# Patient Record
Sex: MALE | Race: WHITE | NOT HISPANIC OR LATINO | Employment: OTHER | URBAN - METROPOLITAN AREA
[De-identification: names, ages, dates, MRNs, and addresses within clinical notes are randomized per-mention and may not be internally consistent; named-entity substitution may affect disease eponyms.]

---

## 2017-08-17 PROBLEM — I48.0 PAROXYSMAL ATRIAL FIBRILLATION: Status: ACTIVE | Noted: 2017-08-17

## 2018-12-12 ENCOUNTER — LAB VISIT (OUTPATIENT)
Dept: LAB | Facility: HOSPITAL | Age: 78
End: 2018-12-12
Attending: UROLOGY
Payer: MEDICARE

## 2018-12-12 ENCOUNTER — OFFICE VISIT (OUTPATIENT)
Dept: UROLOGY | Facility: CLINIC | Age: 78
End: 2018-12-12
Attending: UROLOGY
Payer: MEDICARE

## 2018-12-12 VITALS — WEIGHT: 189.38 LBS | BODY MASS INDEX: 28.05 KG/M2 | HEIGHT: 69 IN

## 2018-12-12 DIAGNOSIS — C61 PROSTATE CANCER: Primary | ICD-10-CM

## 2018-12-12 DIAGNOSIS — C61 ADENOCARCINOMA OF PROSTATE: ICD-10-CM

## 2018-12-12 LAB — COMPLEXED PSA SERPL-MCNC: 0.24 NG/ML

## 2018-12-12 PROCEDURE — 99204 OFFICE O/P NEW MOD 45 MIN: CPT | Mod: S$GLB,,, | Performed by: UROLOGY

## 2018-12-12 PROCEDURE — 99999 PR PBB SHADOW E&M-NEW PATIENT-LVL III: CPT | Mod: PBBFAC,,, | Performed by: UROLOGY

## 2018-12-12 PROCEDURE — 84153 ASSAY OF PSA TOTAL: CPT

## 2018-12-12 PROCEDURE — 36415 COLL VENOUS BLD VENIPUNCTURE: CPT

## 2018-12-12 RX ORDER — POTASSIUM CHLORIDE 1500 MG/1
20 TABLET, EXTENDED RELEASE ORAL DAILY
COMMUNITY
Start: 2018-12-11 | End: 2024-01-11

## 2018-12-12 RX ORDER — CLONAZEPAM 2 MG/1
2 TABLET ORAL DAILY PRN
COMMUNITY
Start: 2018-11-07 | End: 2020-07-09

## 2018-12-12 RX ORDER — FERROUS SULFATE 325(65) MG
325 TABLET ORAL
COMMUNITY
End: 2020-07-09

## 2018-12-12 RX ORDER — CHOLECALCIFEROL (VITAMIN D3) 125 MCG
5000 CAPSULE ORAL DAILY
COMMUNITY

## 2018-12-12 NOTE — PROGRESS NOTES
Subjective:       Patient ID: Michael Mclean is a 78 y.o. male.    Chief Complaint: Results (PSA done 2-3mos ago)    HPI  patient who is deaf who has an  with him is here for follow-up of prostate cancer.  Evidently for 5 years ago he underwent XRT for prostate cancer followed by a radical retropubic prostatectomy and was found have a PSA of 29.  He saw Dr. carter free annual all urines and was started on hormone treatment and his PSA which was done in September and [facility] was 0.1.  Patient feels well and voids well but would not like to hold off being treated with hormone deprivation therapy due to side effects including erectile dysfunction.  He does not have back pain or bone pain and he had a negative bone scan and metastatic workup    Past Medical History:   Diagnosis Date    Cancer     prostate    Carotid artery stenosis     left    Coronary artery disease     Encounter for blood transfusion     Hyperlipidemia     Hypertension     Mitral regurgitation     Paroxysmal atrial fibrillation        Past Surgical History:   Procedure Laterality Date    AORTIC VALVE REPLACEMENT      CARDIOVERSION N/A 8/17/2017    Performed by Puma Shrestha MD at [facility]    CORONARY ARTERY BYPASS GRAFT      HERNIA REPAIR      SHOULDER SURGERY      TOE SURGERY         History reviewed. No pertinent family history.    Social History     Socioeconomic History    Marital status:      Spouse name: Not on file    Number of children: Not on file    Years of education: Not on file    Highest education level: Not on file   Social Needs    Financial resource strain: Not on file    Food insecurity - worry: Not on file    Food insecurity - inability: Not on file    Transportation needs - medical: Not on file    Transportation needs - non-medical: Not on file   Occupational History    Not on file   Tobacco Use    Smoking status: Never Smoker    Smokeless tobacco: Never Used   Substance and  Sexual Activity    Alcohol use: Yes     Comment: occasionally    Drug use: Not on file    Sexual activity: Not on file   Other Topics Concern    Not on file   Social History Narrative    Not on file       Allergies:  Patient has no known allergies.    Medications:    Current Outpatient Medications:     alpha lipoic acid 200 mg Cap, Take 200 mg by mouth once daily., Disp: , Rfl:     aspirin (ECOTRIN) 81 MG EC tablet, Take 81 mg by mouth once daily., Disp: , Rfl:     CALCIUM ORAL, Take 750 mg by mouth., Disp: , Rfl:     clonazePAM (KLONOPIN) 2 MG Tab, 2 mg as needed. , Disp: , Rfl:     CYANOCOBALAMIN, VITAMIN B-12, INJ, Inject as directed., Disp: , Rfl:     diltiaZEM (CARDIZEM CD) 240 MG 24 hr capsule, Take 240 mg by mouth once daily., Disp: , Rfl:     docusate sodium (COLACE) 100 MG capsule, Take 100 mg by mouth 2 (two) times daily., Disp: , Rfl:     FENUGREEK SEED-BL.THISTLE-ANIS ORAL, Take 610 mg by mouth once daily., Disp: , Rfl:     ferrous sulfate (FEOSOL) 325 mg (65 mg iron) Tab tablet, Take 325 mg by mouth daily with breakfast., Disp: , Rfl:     fish oil-omega-3 fatty acids 300-1,000 mg capsule, Take 1 g by mouth once daily., Disp: , Rfl:     KLOR-CON M20 20 mEq tablet, , Disp: , Rfl:     magnesium oxide (MAG-OX) 400 mg tablet, Take 400 mg by mouth once daily., Disp: , Rfl:     rivaroxaban (XARELTO) 20 mg Tab, Take 20 mg by mouth daily with dinner or evening meal., Disp: , Rfl:     rosuvastatin (CRESTOR) 20 MG tablet, Take 20 mg by mouth once daily., Disp: , Rfl:     torsemide (DEMADEX) 20 MG Tab, Take 20 mg by mouth once daily., Disp: , Rfl:     TURMERIC ORAL, Take by mouth once daily., Disp: , Rfl:     vitamin D (VITAMIN D3) 1000 units Tab, Take 1,000 Units by mouth., Disp: , Rfl:     niacin 500 MG CpSR, Take 250 mg by mouth every evening., Disp: , Rfl:     Review of Systems   Constitutional: Negative for activity change, appetite change, chills, diaphoresis, fatigue, fever and  unexpected weight change.   HENT: Negative for congestion, dental problem, hearing loss, mouth sores, postnasal drip, rhinorrhea, sinus pressure and trouble swallowing.    Eyes: Negative for pain, discharge and itching.   Respiratory: Negative for apnea, cough, choking, chest tightness, shortness of breath and wheezing.    Cardiovascular: Negative for chest pain, palpitations and leg swelling.   Gastrointestinal: Negative for abdominal distention, abdominal pain, anal bleeding, blood in stool, constipation, diarrhea, nausea, rectal pain and vomiting.   Endocrine: Negative for polydipsia and polyuria.   Genitourinary: Negative for decreased urine volume, difficulty urinating, discharge, dysuria, enuresis, flank pain, frequency, genital sores, hematuria, penile pain, penile swelling, scrotal swelling, testicular pain and urgency.   Musculoskeletal: Negative for arthralgias, back pain and myalgias.   Skin: Negative for color change, rash and wound.   Neurological: Negative for dizziness, syncope, speech difficulty, light-headedness and headaches.   Hematological: Negative for adenopathy. Does not bruise/bleed easily.   Psychiatric/Behavioral: Negative for behavioral problems, confusion, hallucinations and sleep disturbance.       Objective:      Physical Exam   Constitutional: He appears well-developed.   HENT:   Head: Normocephalic.   Cardiovascular: Normal rate.    Pulmonary/Chest: Effort normal.   Abdominal: Soft.   Neurological: He is alert.   Skin: Skin is warm.     Psychiatric: He has a normal mood and affect.       Assessment:       1. psa    2. Adenocarcinoma of prostate        Plan:       Michael was seen today for results.    Diagnoses and all orders for this visit:    psa    Adenocarcinoma of prostate  -     Prostate Specific Antigen, Diagnostic; Future  -     Prostate Specific Antigen, Diagnostic; Future        await results of PSA and if it is elevated then he will need to continue and LHRH agonist and  possible Casodex.  If his PSA is still id 0 then perhaps we can watch him and treat conservatively per his wishes.  Will call him with the results and at the very least PSA comes back 0 that I will see him back in 6 months with another PSA.  If he needs an LHRH agonist will get him in sooner

## 2018-12-13 ENCOUNTER — TELEPHONE (OUTPATIENT)
Dept: UROLOGY | Facility: CLINIC | Age: 78
End: 2018-12-13

## 2018-12-13 NOTE — TELEPHONE ENCOUNTER
----- Message from Adeel Manrique Jr., MD sent at 12/13/2018  6:41 AM CST -----  psa .24  Hold lupron and repeat in 3 months w office visit

## 2018-12-17 NOTE — TELEPHONE ENCOUNTER
Notified patient of results and recommendations via . Patient voiced understanding via ASL (American Sign Language).

## 2019-03-11 ENCOUNTER — LAB VISIT (OUTPATIENT)
Dept: LAB | Facility: HOSPITAL | Age: 79
End: 2019-03-11
Attending: UROLOGY
Payer: MEDICARE

## 2019-03-11 DIAGNOSIS — C61 ADENOCARCINOMA OF PROSTATE: ICD-10-CM

## 2019-03-11 LAB — COMPLEXED PSA SERPL-MCNC: 35.5 NG/ML

## 2019-03-11 PROCEDURE — 36415 COLL VENOUS BLD VENIPUNCTURE: CPT

## 2019-03-11 PROCEDURE — 84153 ASSAY OF PSA TOTAL: CPT

## 2019-03-13 ENCOUNTER — OFFICE VISIT (OUTPATIENT)
Dept: UROLOGY | Facility: CLINIC | Age: 79
End: 2019-03-13
Attending: UROLOGY
Payer: MEDICARE

## 2019-03-13 VITALS
HEART RATE: 83 BPM | BODY MASS INDEX: 28.05 KG/M2 | DIASTOLIC BLOOD PRESSURE: 82 MMHG | SYSTOLIC BLOOD PRESSURE: 134 MMHG | WEIGHT: 189.38 LBS | HEIGHT: 69 IN

## 2019-03-13 DIAGNOSIS — C61 PROSTATE CANCER: Primary | ICD-10-CM

## 2019-03-13 PROCEDURE — 1101F PT FALLS ASSESS-DOCD LE1/YR: CPT | Mod: CPTII,S$GLB,, | Performed by: UROLOGY

## 2019-03-13 PROCEDURE — 99999 PR PBB SHADOW E&M-EST. PATIENT-LVL III: CPT | Mod: PBBFAC,,, | Performed by: UROLOGY

## 2019-03-13 PROCEDURE — 99214 OFFICE O/P EST MOD 30 MIN: CPT | Mod: S$GLB,,, | Performed by: UROLOGY

## 2019-03-13 PROCEDURE — 1101F PR PT FALLS ASSESS DOC 0-1 FALLS W/OUT INJ PAST YR: ICD-10-PCS | Mod: CPTII,S$GLB,, | Performed by: UROLOGY

## 2019-03-13 PROCEDURE — 99999 PR PBB SHADOW E&M-EST. PATIENT-LVL III: ICD-10-PCS | Mod: PBBFAC,,, | Performed by: UROLOGY

## 2019-03-13 PROCEDURE — 99214 PR OFFICE/OUTPT VISIT, EST, LEVL IV, 30-39 MIN: ICD-10-PCS | Mod: S$GLB,,, | Performed by: UROLOGY

## 2019-03-13 RX ORDER — WARFARIN SODIUM 5 MG/1
5 TABLET ORAL DAILY
Status: ON HOLD | COMMUNITY
End: 2019-04-05 | Stop reason: SDUPTHER

## 2019-03-13 NOTE — PROGRESS NOTES
Subjective:       Patient ID: Michael Mclean is a 79 y.o. male.    Chief Complaint: Prostate Cancer    HPI  a PSA for his prostate cancer.  He was late on receiving his Lupron and I was hoping that his PSA would have remained low we could have given him a holiday however his PSA is 35 so we need to restart Lupron as soon as we get approval from his insurance company.  yuri is here follow-up with   patient is voiding well and feels well.  We also discussed with his son and him the possibility of Viagra versus Pap injections.  He has taken Viagra in the past but is not as effective in was interested in Pap.  I explained to him the potential side effects etc and he wants to think about his options    Past Medical History:   Diagnosis Date    Cancer     prostate    Carotid artery stenosis     left    Coronary artery disease     Encounter for blood transfusion     Hyperlipidemia     Hypertension     Mitral regurgitation     Paroxysmal atrial fibrillation        Past Surgical History:   Procedure Laterality Date    AORTIC VALVE REPLACEMENT      CARDIOVERSION N/A 8/17/2017    Performed by [facility]ha [facility] at [facility]    CORONARY ARTERY BYPASS GRAFT      HERNIA REPAIR      SHOULDER SURGERY      TOE SURGERY         History reviewed. No pertinent family history.    Social History     Socioeconomic History    Marital status:      Spouse name: Not on file    Number of children: Not on file    Years of education: Not on file    Highest education level: Not on file   Social Needs    Financial resource strain: Not on file    Food insecurity - worry: Not on file    Food insecurity - inability: Not on file    Transportation needs - medical: Not on file    Transportation needs - non-medical: Not on file   Occupational History    Not on file   Tobacco Use    Smoking status: Never Smoker    Smokeless tobacco: Never Used   Substance and Sexual Activity    Alcohol use: Yes     Comment:  occasionally    Drug use: Not on file    Sexual activity: Not on file   Other Topics Concern    Not on file   Social History Narrative    Not on file       Allergies:  Patient has no known allergies.    Medications:    Current Outpatient Medications:     warfarin (COUMADIN) 5 MG tablet, Take 5 mg by mouth once daily., Disp: , Rfl:     alpha lipoic acid 200 mg Cap, Take 200 mg by mouth once daily., Disp: , Rfl:     aspirin (ECOTRIN) 81 MG EC tablet, Take 81 mg by mouth once daily., Disp: , Rfl:     CALCIUM ORAL, Take 750 mg by mouth., Disp: , Rfl:     clonazePAM (KLONOPIN) 2 MG Tab, 2 mg as needed. , Disp: , Rfl:     CYANOCOBALAMIN, VITAMIN B-12, INJ, Inject as directed., Disp: , Rfl:     diltiaZEM (CARDIZEM CD) 240 MG 24 hr capsule, Take 240 mg by mouth once daily., Disp: , Rfl:     docusate sodium (COLACE) 100 MG capsule, Take 100 mg by mouth 2 (two) times daily., Disp: , Rfl:     FENUGREEK SEED-BL.THISTLE-ANIS ORAL, Take 610 mg by mouth once daily., Disp: , Rfl:     ferrous sulfate (FEOSOL) 325 mg (65 mg iron) Tab tablet, Take 325 mg by mouth daily with breakfast., Disp: , Rfl:     fish oil-omega-3 fatty acids 300-1,000 mg capsule, Take 1 g by mouth once daily., Disp: , Rfl:     KLOR-CON M20 20 mEq tablet, , Disp: , Rfl:     magnesium oxide (MAG-OX) 400 mg tablet, Take 400 mg by mouth once daily., Disp: , Rfl:     niacin 500 MG CpSR, Take 250 mg by mouth every evening., Disp: , Rfl:     rivaroxaban (XARELTO) 20 mg Tab, Take 20 mg by mouth daily with dinner or evening meal., Disp: , Rfl:     rosuvastatin (CRESTOR) 20 MG tablet, Take 20 mg by mouth once daily., Disp: , Rfl:     torsemide (DEMADEX) 20 MG Tab, Take 20 mg by mouth once daily., Disp: , Rfl:     TURMERIC ORAL, Take by mouth once daily., Disp: , Rfl:     vitamin D (VITAMIN D3) 1000 units Tab, Take 1,000 Units by mouth., Disp: , Rfl:     Review of Systems   Constitutional: Negative for activity change, appetite change, chills,  diaphoresis, fatigue, fever and unexpected weight change.   HENT: Negative for congestion, dental problem, hearing loss, mouth sores, postnasal drip, rhinorrhea, sinus pressure and trouble swallowing.    Eyes: Negative for pain, discharge and itching.   Respiratory: Negative for apnea, cough, choking, chest tightness, shortness of breath and wheezing.    Cardiovascular: Negative for chest pain, palpitations and leg swelling.   Gastrointestinal: Negative for abdominal distention, abdominal pain, anal bleeding, blood in stool, constipation, diarrhea, nausea, rectal pain and vomiting.   Endocrine: Negative for polydipsia and polyuria.   Genitourinary: Negative for decreased urine volume, difficulty urinating, discharge, dysuria, enuresis, flank pain, frequency, genital sores, hematuria, penile pain, penile swelling, scrotal swelling, testicular pain and urgency.   Musculoskeletal: Negative for arthralgias, back pain and myalgias.   Skin: Negative for color change, rash and wound.   Neurological: Negative for dizziness, syncope, speech difficulty, light-headedness and headaches.   Hematological: Negative for adenopathy. Does not bruise/bleed easily.   Psychiatric/Behavioral: Negative for behavioral problems, confusion, hallucinations and sleep disturbance.       Objective:      Physical Exam   Constitutional: He appears well-developed.   HENT:   Head: Normocephalic.   Cardiovascular: Normal rate.    Pulmonary/Chest: Effort normal.   Abdominal: Soft.   Genitourinary: Prostate normal.   Neurological: He is alert.   Skin: Skin is warm.     Psychiatric: He has a normal mood and affect.       Assessment:       1. Prostate cancer        Plan:       Michael was seen today for prostate cancer.    Diagnoses and all orders for this visit:    Prostate cancer        will place an order for prior authorization start Lupron 45 mg IM every 6 months and repeat a PSA in 6 months

## 2019-03-15 DIAGNOSIS — C61 PROSTATE CANCER: Primary | ICD-10-CM

## 2019-03-27 ENCOUNTER — OFFICE VISIT (OUTPATIENT)
Dept: UROLOGY | Facility: CLINIC | Age: 79
End: 2019-03-27
Attending: UROLOGY
Payer: MEDICARE

## 2019-03-27 DIAGNOSIS — C61 PROSTATE CANCER: Primary | ICD-10-CM

## 2019-03-27 PROCEDURE — 99499 UNLISTED E&M SERVICE: CPT | Mod: S$GLB,,, | Performed by: UROLOGY

## 2019-03-27 PROCEDURE — 99999 PR PBB SHADOW E&M-EST. PATIENT-LVL I: CPT | Mod: PBBFAC,,, | Performed by: UROLOGY

## 2019-03-27 PROCEDURE — 99999 PR PBB SHADOW E&M-EST. PATIENT-LVL I: ICD-10-PCS | Mod: PBBFAC,,, | Performed by: UROLOGY

## 2019-03-27 PROCEDURE — 99499 NO LOS: ICD-10-PCS | Mod: S$GLB,,, | Performed by: UROLOGY

## 2019-04-02 PROBLEM — I63.412 STROKE DUE TO EMBOLISM OF LEFT MIDDLE CEREBRAL ARTERY: Status: ACTIVE | Noted: 2019-04-02

## 2019-04-03 ENCOUNTER — HOSPITAL ENCOUNTER (INPATIENT)
Facility: HOSPITAL | Age: 79
LOS: 2 days | Discharge: HOME | DRG: 064 | End: 2019-04-05
Attending: EMERGENCY MEDICINE | Admitting: PSYCHIATRY & NEUROLOGY
Payer: MEDICARE

## 2019-04-03 DIAGNOSIS — I48.0 PAROXYSMAL ATRIAL FIBRILLATION: ICD-10-CM

## 2019-04-03 DIAGNOSIS — I63.412 EMBOLIC STROKE INVOLVING LEFT MIDDLE CEREBRAL ARTERY: Primary | ICD-10-CM

## 2019-04-03 DIAGNOSIS — I42.9 CARDIOMYOPATHY, UNSPECIFIED TYPE: ICD-10-CM

## 2019-04-03 DIAGNOSIS — R47.01 APHASIA: ICD-10-CM

## 2019-04-03 DIAGNOSIS — H91.3 DEAF, NONSPEAKING: ICD-10-CM

## 2019-04-03 PROBLEM — I10 ESSENTIAL HYPERTENSION: Status: ACTIVE | Noted: 2019-04-03

## 2019-04-03 LAB
ALBUMIN SERPL BCP-MCNC: 3.5 G/DL (ref 3.5–5.2)
ALP SERPL-CCNC: 56 U/L (ref 55–135)
ALT SERPL W/O P-5'-P-CCNC: 12 U/L (ref 10–44)
ANION GAP SERPL CALC-SCNC: 10 MMOL/L (ref 8–16)
ASCENDING AORTA: 3.57 CM
AST SERPL-CCNC: 23 U/L (ref 10–40)
AV INDEX (PROSTH): 0.26
AV MEAN GRADIENT: 10.33 MMHG
AV PEAK GRADIENT: 13.25 MMHG
AV VALVE AREA: 0.79 CM2
AV VELOCITY RATIO: 0.27
BACTERIA #/AREA URNS AUTO: ABNORMAL /HPF
BASOPHILS # BLD AUTO: 0.04 K/UL (ref 0–0.2)
BASOPHILS NFR BLD: 0.5 % (ref 0–1.9)
BILIRUB SERPL-MCNC: 0.6 MG/DL (ref 0.1–1)
BILIRUB UR QL STRIP: NEGATIVE
BSA FOR ECHO PROCEDURE: 2.02 M2
BUN SERPL-MCNC: 19 MG/DL (ref 8–23)
CALCIUM SERPL-MCNC: 9.1 MG/DL (ref 8.7–10.5)
CHLORIDE SERPL-SCNC: 105 MMOL/L (ref 95–110)
CHOLEST SERPL-MCNC: 124 MG/DL (ref 120–199)
CHOLEST/HDLC SERPL: 3.5 {RATIO} (ref 2–5)
CK MB SERPL-MCNC: 2.2 NG/ML (ref 0.1–6.5)
CK MB SERPL-RTO: 2.7 % (ref 0–5)
CK SERPL-CCNC: 83 U/L (ref 20–200)
CLARITY UR REFRACT.AUTO: CLEAR
CO2 SERPL-SCNC: 24 MMOL/L (ref 23–29)
COLOR UR AUTO: YELLOW
CREAT SERPL-MCNC: 0.9 MG/DL (ref 0.5–1.4)
CV ECHO LV RWT: 0.37 CM
DIFFERENTIAL METHOD: ABNORMAL
DOP CALC AO PEAK VEL: 1.82 M/S
DOP CALC AO VTI: 26.02 CM
DOP CALC LVOT AREA: 3.02 CM2
DOP CALC LVOT DIAMETER: 1.96 CM
DOP CALC LVOT PEAK VEL: 0.49 M/S
DOP CALC LVOT STROKE VOLUME: 20.66 CM3
DOP CALCLVOT PEAK VEL VTI: 6.85 CM
ECHO LV POSTERIOR WALL: 1.15 CM (ref 0.6–1.1)
EOSINOPHIL # BLD AUTO: 0.1 K/UL (ref 0–0.5)
EOSINOPHIL NFR BLD: 0.7 % (ref 0–8)
ERYTHROCYTE [DISTWIDTH] IN BLOOD BY AUTOMATED COUNT: 12.5 % (ref 11.5–14.5)
EST. GFR  (AFRICAN AMERICAN): >60 ML/MIN/1.73 M^2
EST. GFR  (NON AFRICAN AMERICAN): >60 ML/MIN/1.73 M^2
FRACTIONAL SHORTENING: 4 % (ref 28–44)
GLUCOSE SERPL-MCNC: 103 MG/DL (ref 70–110)
GLUCOSE UR QL STRIP: NEGATIVE
HCT VFR BLD AUTO: 43.2 % (ref 40–54)
HDLC SERPL-MCNC: 35 MG/DL (ref 40–75)
HDLC SERPL: 28.2 % (ref 20–50)
HGB BLD-MCNC: 15.1 G/DL (ref 14–18)
HGB UR QL STRIP: ABNORMAL
IMM GRANULOCYTES # BLD AUTO: 0.02 K/UL (ref 0–0.04)
IMM GRANULOCYTES NFR BLD AUTO: 0.3 % (ref 0–0.5)
INR PPP: 2.3 (ref 0.8–1.2)
INTERVENTRICULAR SEPTUM: 1.36 CM (ref 0.6–1.1)
KETONES UR QL STRIP: NEGATIVE
LA MAJOR: 6.71 CM
LA MINOR: 5.9 CM
LA WIDTH: 4.82 CM
LDLC SERPL CALC-MCNC: 74 MG/DL (ref 63–159)
LEFT ATRIUM SIZE: 4.43 CM
LEFT ATRIUM VOLUME INDEX: 57 ML/M2
LEFT ATRIUM VOLUME: 113.96 CM3
LEFT INTERNAL DIMENSION IN SYSTOLE: 5.99 CM (ref 2.1–4)
LEFT VENTRICLE DIASTOLIC VOLUME INDEX: 99.12 ML/M2
LEFT VENTRICLE DIASTOLIC VOLUME: 198.07 ML
LEFT VENTRICLE MASS INDEX: 179 G/M2
LEFT VENTRICLE SYSTOLIC VOLUME INDEX: 89.6 ML/M2
LEFT VENTRICLE SYSTOLIC VOLUME: 179.02 ML
LEFT VENTRICULAR INTERNAL DIMENSION IN DIASTOLE: 6.26 CM (ref 3.5–6)
LEFT VENTRICULAR MASS: 357.7 G
LEUKOCYTE ESTERASE UR QL STRIP: NEGATIVE
LYMPHOCYTES # BLD AUTO: 1.9 K/UL (ref 1–4.8)
LYMPHOCYTES NFR BLD: 25.8 % (ref 18–48)
MCH RBC QN AUTO: 32.5 PG (ref 27–31)
MCHC RBC AUTO-ENTMCNC: 35 G/DL (ref 32–36)
MCV RBC AUTO: 93 FL (ref 82–98)
MICROSCOPIC COMMENT: ABNORMAL
MONOCYTES # BLD AUTO: 0.8 K/UL (ref 0.3–1)
MONOCYTES NFR BLD: 11 % (ref 4–15)
NEUTROPHILS # BLD AUTO: 4.6 K/UL (ref 1.8–7.7)
NEUTROPHILS NFR BLD: 61.7 % (ref 38–73)
NITRITE UR QL STRIP: NEGATIVE
NONHDLC SERPL-MCNC: 89 MG/DL
NRBC BLD-RTO: 0 /100 WBC
PH UR STRIP: 8 [PH] (ref 5–8)
PISA TR MAX VEL: 2.65 M/S
PLATELET # BLD AUTO: 212 K/UL (ref 150–350)
PMV BLD AUTO: 10.8 FL (ref 9.2–12.9)
POTASSIUM SERPL-SCNC: 3.9 MMOL/L (ref 3.5–5.1)
PROT SERPL-MCNC: 6.7 G/DL (ref 6–8.4)
PROT UR QL STRIP: NEGATIVE
PROTHROMBIN TIME: 22 SEC (ref 9–12.5)
RA MAJOR: 5.11 CM
RA PRESSURE: 3 MMHG
RA WIDTH: 4.24 CM
RBC # BLD AUTO: 4.65 M/UL (ref 4.6–6.2)
RBC #/AREA URNS AUTO: 8 /HPF (ref 0–4)
RIGHT VENTRICULAR END-DIASTOLIC DIMENSION: 3.43 CM
SINUS: 3.86 CM
SODIUM SERPL-SCNC: 139 MMOL/L (ref 136–145)
SP GR UR STRIP: >=1.03 (ref 1–1.03)
STJ: 3.38 CM
TDI LATERAL: 0.14
TDI SEPTAL: 0.05
TDI: 0.1
TR MAX PG: 28.09 MMHG
TRIGL SERPL-MCNC: 75 MG/DL (ref 30–150)
TROPONIN I SERPL DL<=0.01 NG/ML-MCNC: 0.02 NG/ML (ref 0–0.03)
TSH SERPL DL<=0.005 MIU/L-ACNC: 3.51 UIU/ML (ref 0.4–4)
TV REST PULMONARY ARTERY PRESSURE: 31 MMHG
URN SPEC COLLECT METH UR: ABNORMAL
WBC # BLD AUTO: 7.45 K/UL (ref 3.9–12.7)
WBC #/AREA URNS AUTO: 1 /HPF (ref 0–5)

## 2019-04-03 PROCEDURE — 51798 US URINE CAPACITY MEASURE: CPT

## 2019-04-03 PROCEDURE — 85610 PROTHROMBIN TIME: CPT

## 2019-04-03 PROCEDURE — 82550 ASSAY OF CK (CPK): CPT

## 2019-04-03 PROCEDURE — 84484 ASSAY OF TROPONIN QUANT: CPT

## 2019-04-03 PROCEDURE — 25000003 PHARM REV CODE 250: Performed by: PHYSICIAN ASSISTANT

## 2019-04-03 PROCEDURE — 63600175 PHARM REV CODE 636 W HCPCS: Performed by: PHYSICIAN ASSISTANT

## 2019-04-03 PROCEDURE — 80053 COMPREHEN METABOLIC PANEL: CPT

## 2019-04-03 PROCEDURE — 25000003 PHARM REV CODE 250: Performed by: NURSE PRACTITIONER

## 2019-04-03 PROCEDURE — 85025 COMPLETE CBC W/AUTO DIFF WBC: CPT

## 2019-04-03 PROCEDURE — 99285 PR EMERGENCY DEPT VISIT,LEVEL V: ICD-10-PCS | Mod: ,,, | Performed by: PHYSICIAN ASSISTANT

## 2019-04-03 PROCEDURE — 51701 INSERT BLADDER CATHETER: CPT

## 2019-04-03 PROCEDURE — 20600001 HC STEP DOWN PRIVATE ROOM

## 2019-04-03 PROCEDURE — 99232 PR SUBSEQUENT HOSPITAL CARE,LEVL II: ICD-10-PCS | Mod: ,,, | Performed by: INTERNAL MEDICINE

## 2019-04-03 PROCEDURE — 96374 THER/PROPH/DIAG INJ IV PUSH: CPT

## 2019-04-03 PROCEDURE — 99223 1ST HOSP IP/OBS HIGH 75: CPT | Mod: AI,GC,, | Performed by: PSYCHIATRY & NEUROLOGY

## 2019-04-03 PROCEDURE — 84443 ASSAY THYROID STIM HORMONE: CPT

## 2019-04-03 PROCEDURE — 92610 EVALUATE SWALLOWING FUNCTION: CPT

## 2019-04-03 PROCEDURE — 81001 URINALYSIS AUTO W/SCOPE: CPT

## 2019-04-03 PROCEDURE — 80061 LIPID PANEL: CPT

## 2019-04-03 PROCEDURE — 99285 EMERGENCY DEPT VISIT HI MDM: CPT | Mod: 25

## 2019-04-03 PROCEDURE — 96375 TX/PRO/DX INJ NEW DRUG ADDON: CPT | Mod: 59

## 2019-04-03 PROCEDURE — 99223 PR INITIAL HOSPITAL CARE,LEVL III: ICD-10-PCS | Mod: AI,GC,, | Performed by: PSYCHIATRY & NEUROLOGY

## 2019-04-03 PROCEDURE — 82553 CREATINE MB FRACTION: CPT

## 2019-04-03 PROCEDURE — 99285 EMERGENCY DEPT VISIT HI MDM: CPT | Mod: ,,, | Performed by: PHYSICIAN ASSISTANT

## 2019-04-03 PROCEDURE — 99232 SBSQ HOSP IP/OBS MODERATE 35: CPT | Mod: ,,, | Performed by: INTERNAL MEDICINE

## 2019-04-03 RX ORDER — WARFARIN SODIUM 5 MG/1
5 TABLET ORAL
Status: DISCONTINUED | OUTPATIENT
Start: 2019-04-08 | End: 2019-04-05 | Stop reason: HOSPADM

## 2019-04-03 RX ORDER — WARFARIN SODIUM 5 MG/1
5 TABLET ORAL
Status: DISCONTINUED | OUTPATIENT
Start: 2019-04-04 | End: 2019-04-05 | Stop reason: HOSPADM

## 2019-04-03 RX ORDER — WARFARIN SODIUM 5 MG/1
5 TABLET ORAL
Status: DISCONTINUED | OUTPATIENT
Start: 2019-04-07 | End: 2019-04-05 | Stop reason: HOSPADM

## 2019-04-03 RX ORDER — METOPROLOL TARTRATE 50 MG/1
50 TABLET ORAL 2 TIMES DAILY
Status: DISCONTINUED | OUTPATIENT
Start: 2019-04-03 | End: 2019-04-05 | Stop reason: HOSPADM

## 2019-04-03 RX ORDER — DIGOXIN 0.25 MG/ML
125 INJECTION INTRAMUSCULAR; INTRAVENOUS ONCE
Status: COMPLETED | OUTPATIENT
Start: 2019-04-03 | End: 2019-04-03

## 2019-04-03 RX ORDER — VALSARTAN 80 MG/1
80 TABLET ORAL DAILY
Status: DISCONTINUED | OUTPATIENT
Start: 2019-04-04 | End: 2019-04-03

## 2019-04-03 RX ORDER — ROSUVASTATIN CALCIUM 20 MG/1
20 TABLET, COATED ORAL NIGHTLY
Status: DISCONTINUED | OUTPATIENT
Start: 2019-04-04 | End: 2019-04-05 | Stop reason: HOSPADM

## 2019-04-03 RX ORDER — WARFARIN 2.5 MG/1
2.5 TABLET ORAL
Status: DISCONTINUED | OUTPATIENT
Start: 2019-04-03 | End: 2019-04-05 | Stop reason: HOSPADM

## 2019-04-03 RX ORDER — DILTIAZEM HYDROCHLORIDE 120 MG/1
240 CAPSULE, COATED, EXTENDED RELEASE ORAL DAILY
Status: DISCONTINUED | OUTPATIENT
Start: 2019-04-03 | End: 2019-04-03

## 2019-04-03 RX ORDER — HYDRALAZINE HYDROCHLORIDE 20 MG/ML
10 INJECTION INTRAMUSCULAR; INTRAVENOUS EVERY 8 HOURS PRN
Status: DISCONTINUED | OUTPATIENT
Start: 2019-04-03 | End: 2019-04-05 | Stop reason: HOSPADM

## 2019-04-03 RX ORDER — WARFARIN SODIUM 5 MG/1
5 TABLET ORAL DAILY
Status: DISCONTINUED | OUTPATIENT
Start: 2019-04-03 | End: 2019-04-03

## 2019-04-03 RX ORDER — DILTIAZEM HYDROCHLORIDE 5 MG/ML
20 INJECTION INTRAVENOUS
Status: DISCONTINUED | OUTPATIENT
Start: 2019-04-03 | End: 2019-04-03

## 2019-04-03 RX ORDER — DOCUSATE SODIUM 100 MG/1
100 CAPSULE, LIQUID FILLED ORAL 2 TIMES DAILY
Status: DISCONTINUED | OUTPATIENT
Start: 2019-04-03 | End: 2019-04-05 | Stop reason: HOSPADM

## 2019-04-03 RX ORDER — DILTIAZEM HYDROCHLORIDE 5 MG/ML
10 INJECTION INTRAVENOUS
Status: COMPLETED | OUTPATIENT
Start: 2019-04-03 | End: 2019-04-03

## 2019-04-03 RX ORDER — ONDANSETRON 8 MG/1
8 TABLET, ORALLY DISINTEGRATING ORAL EVERY 8 HOURS PRN
Status: DISCONTINUED | OUTPATIENT
Start: 2019-04-03 | End: 2019-04-05 | Stop reason: HOSPADM

## 2019-04-03 RX ORDER — LOSARTAN POTASSIUM 50 MG/1
50 TABLET ORAL DAILY
Status: DISCONTINUED | OUTPATIENT
Start: 2019-04-04 | End: 2019-04-05 | Stop reason: HOSPADM

## 2019-04-03 RX ORDER — SODIUM CHLORIDE 0.9 % (FLUSH) 0.9 %
10 SYRINGE (ML) INJECTION
Status: DISCONTINUED | OUTPATIENT
Start: 2019-04-03 | End: 2019-04-05 | Stop reason: HOSPADM

## 2019-04-03 RX ORDER — ONDANSETRON 2 MG/ML
4 INJECTION INTRAMUSCULAR; INTRAVENOUS EVERY 12 HOURS PRN
Status: DISCONTINUED | OUTPATIENT
Start: 2019-04-03 | End: 2019-04-05 | Stop reason: HOSPADM

## 2019-04-03 RX ORDER — ATORVASTATIN CALCIUM 20 MG/1
40 TABLET, FILM COATED ORAL DAILY
Status: DISCONTINUED | OUTPATIENT
Start: 2019-04-03 | End: 2019-04-03

## 2019-04-03 RX ORDER — DILTIAZEM HYDROCHLORIDE 30 MG/1
30 TABLET, FILM COATED ORAL
Status: DISCONTINUED | OUTPATIENT
Start: 2019-04-03 | End: 2019-04-03

## 2019-04-03 RX ADMIN — WARFARIN SODIUM 2.5 MG: 2.5 TABLET ORAL at 05:04

## 2019-04-03 RX ADMIN — ATORVASTATIN CALCIUM 40 MG: 10 TABLET, FILM COATED ORAL at 08:04

## 2019-04-03 RX ADMIN — METOPROLOL TARTRATE 50 MG: 50 TABLET, FILM COATED ORAL at 09:04

## 2019-04-03 RX ADMIN — DOCUSATE SODIUM 100 MG: 100 CAPSULE, LIQUID FILLED ORAL at 08:04

## 2019-04-03 RX ADMIN — DILTIAZEM HYDROCHLORIDE 240 MG: 240 CAPSULE, COATED, EXTENDED RELEASE ORAL at 08:04

## 2019-04-03 RX ADMIN — DILTIAZEM HYDROCHLORIDE 10 MG: 5 INJECTION INTRAVENOUS at 05:04

## 2019-04-03 RX ADMIN — DIGOXIN 125 MCG: 250 INJECTION, SOLUTION INTRAMUSCULAR; INTRAVENOUS at 09:04

## 2019-04-03 RX ADMIN — DOCUSATE SODIUM 100 MG: 100 CAPSULE, LIQUID FILLED ORAL at 09:04

## 2019-04-03 NOTE — ED NOTES
at bedside along with physician and patient's daughter for assessment.  Patient has returned from ordered MRIs

## 2019-04-03 NOTE — ASSESSMENT & PLAN NOTE
-Patient is deaf at baseline and uses sign language to communicate.  [facility]  at the bedside.  -He is currently having difficulty communicating.

## 2019-04-03 NOTE — SUBJECTIVE & OBJECTIVE
Past Medical History:   Diagnosis Date    Cancer     prostate    Carotid artery stenosis     left    Coronary artery disease     Encounter for blood transfusion     Hyperlipidemia     Hypertension     Mitral regurgitation     Paroxysmal atrial fibrillation      Past Surgical History:   Procedure Laterality Date    AORTIC VALVE REPLACEMENT      CARDIOVERSION N/A 8/17/2017    Performed by [facility] at [facility]    CORONARY ARTERY BYPASS GRAFT      HERNIA REPAIR      SHOULDER SURGERY      TOE SURGERY       No family history on file.  Social History     Tobacco Use    Smoking status: Never Smoker    Smokeless tobacco: Never Used   Substance Use Topics    Alcohol use: Yes     Comment: occasionally    Drug use: Not on file     Review of patient's allergies indicates:  No Known Allergies    Medications: I have reviewed the current medication administration record.    Current Facility-Administered Medications on File Prior to Encounter   Medication Dose Route Frequency Provider Last Rate Last Dose    leuprolide (6 month) SyKt 45 mg  45 mg Intramuscular Q6 Months Adeel Manrique Jr., MD        leuprolide (6 month) SyKt 45 mg  45 mg Intramuscular Q6 Months Adeel Manrique Jr., MD         Current Outpatient Medications on File Prior to Encounter   Medication Sig Dispense Refill    alpha lipoic acid 200 mg Cap Take 200 mg by mouth once daily.      aspirin (ECOTRIN) 81 MG EC tablet Take 81 mg by mouth once daily.      CALCIUM ORAL Take 750 mg by mouth.      clonazePAM (KLONOPIN) 2 MG Tab 2 mg as needed.       CYANOCOBALAMIN, VITAMIN B-12, INJ Inject as directed.      diltiaZEM (CARDIZEM CD) 240 MG 24 hr capsule Take 240 mg by mouth once daily.      docusate sodium (COLACE) 100 MG capsule Take 100 mg by mouth 2 (two) times daily.      FENUGREEK SEED-BL.THISTLE-ANIS ORAL Take 610 mg by mouth once daily.      ferrous sulfate (FEOSOL) 325 mg (65 mg iron) Tab tablet Take 325 mg by mouth  daily with breakfast.      fish oil-omega-3 fatty acids 300-1,000 mg capsule Take 1 g by mouth once daily.      KLOR-CON M20 20 mEq tablet       magnesium oxide (MAG-OX) 400 mg tablet Take 400 mg by mouth once daily.      niacin 500 MG CpSR Take 250 mg by mouth every evening.      rivaroxaban (XARELTO) 20 mg Tab Take 20 mg by mouth daily with dinner or evening meal.      rosuvastatin (CRESTOR) 20 MG tablet Take 20 mg by mouth once daily.      torsemide (DEMADEX) 20 MG Tab Take 20 mg by mouth once daily.      TURMERIC ORAL Take by mouth once daily.      vitamin D (VITAMIN D3) 1000 units Tab Take 1,000 Units by mouth.      warfarin (COUMADIN) 5 MG tablet Take 5 mg by mouth once daily.           Review of Systems   Unable to perform ROS: Other   Constitutional: Negative for fever.   HENT: Negative for drooling.    Eyes: Negative for discharge and redness.   Respiratory: Negative for cough and shortness of breath.    Gastrointestinal: Negative for diarrhea and vomiting.   Genitourinary: Negative for hematuria.   Musculoskeletal: Negative for neck stiffness.   Skin: Negative for rash.   Neurological: Positive for facial asymmetry and speech difficulty (baseline). Negative for dizziness and weakness.   Psychiatric/Behavioral: Positive for confusion. Negative for agitation.     Objective:     Vital Signs (Most Recent):  Temp: 98.4 °F (36.9 °C) (04/03/19 0430)  Pulse: 95 (04/03/19 0516)  Resp: 20 (04/03/19 0516)  BP: 106/66 (04/03/19 0516)  SpO2: (!) 94 % (04/03/19 0516)    Vital Signs Range (Last 24H):  Temp:  [98.1 °F (36.7 °C)-98.4 °F (36.9 °C)]   Pulse:  []   Resp:  [16-21]   BP: (106-142)/(64-91)   SpO2:  [93 %-99 %]     Physical Exam   Constitutional: He is oriented to person, place, and time. He appears well-developed and well-nourished. No distress.   HENT:   Head: Normocephalic and atraumatic.   Right Ear: External ear normal.   Left Ear: External ear normal.   Nose: Nose normal.   Mouth/Throat:  Oropharynx is clear and moist. No oropharyngeal exudate.   Eyes: Pupils are equal, round, and reactive to light. Conjunctivae are normal. Right eye exhibits no discharge. Left eye exhibits no discharge. No scleral icterus.   Neck: Normal range of motion. Neck supple. No thyromegaly present.   Cardiovascular: Normal heart sounds and intact distal pulses. An irregularly irregular rhythm present. Tachycardia present.   No murmur heard.  Pulmonary/Chest: Effort normal and breath sounds normal. No respiratory distress.   Abdominal: Soft. Bowel sounds are normal. He exhibits no distension. There is no tenderness.   Musculoskeletal: He exhibits no edema.   Lymphadenopathy:     He has no cervical adenopathy.   Neurological: He is alert and oriented to person, place, and time.   Skin: Skin is warm and dry. He is not diaphoretic.   Psychiatric: He has a normal mood and affect.   Nursing note and vitals reviewed.      Neurological Exam:   LOC: alert  Attention Span: Good   Language: Global aphasia, exam complicated by patient being deaf at baseline and using sign language to communicated.  It is noted he is having difficulty using sign language at this time.  Pupils (CN II, III): PERRL  Facial Sensation (CN V): Normal  Facial Movement (CN VII): Lower facial weakness on the Right  Motor: Arm left  Normal 5/5  Leg left  Normal 5/5  Arm right  Normal 5/5  Leg right Normal 5/5  Sensation: Intact to light touch, temperature and vibration      Laboratory:  CMP: No results for input(s): GLUCOSE, CALCIUM, ALBUMIN, PROT, NA, K, CO2, CL, BUN, CREATININE, ALKPHOS, ALT, AST, BILITOT in the last 24 hours.  CBC: No results for input(s): WBC, RBC, HGB, HCT, PLT, MCV, MCH, MCHC in the last 168 hours.  Lipid Panel: No results for input(s): CHOL, LDLCALC, HDL, TRIG in the last 168 hours.  Coagulation: No results for input(s): PT, INR, APTT in the last 168 hours.  Hgb A1C: No results for input(s): HGBA1C in the last 168 hours.  TSH: No results  for input(s): TSH in the last 168 hours.    Diagnostic Results:      Brain imaging:  CTH 04/02/2019 Performed at OSH.  Negative for acute findings.    Vessel Imaging:  CTA head and neck 04/03/2019 Performed at OSH.  Nondiagnostic secondary to the timing of the bolus.      Cardiac Evaluation:   2D Echo pending

## 2019-04-03 NOTE — ASSESSMENT & PLAN NOTE
79 y.o. male with significant past medical history of Afib on anticoagulation, deaf (uses sign language to communicate) presented to hospital as a transfer from [facility] for evaluation of stroke.  The patient had right facial droop with right upper and lower extremity weakness.  He also had difficulty communicating using sign language, following commands.  No tPA administered due to INR 2.9.  Inconclusive CTA head and neck at the OSH.  No intervention at this time.    Antithrombotics for secondary stroke prevention: Anticoagulants: Warfarin INR adjusted target    Statins for secondary stroke prevention and hyperlipidemia, if present:   Statins: Atorvastatin- 40 mg daily    Aggressive risk factor modification: HTN, HLD, A-Fib     Rehab efforts: PT/OT/SLP to evaluate and treat    Diagnostics ordered/pending: HgbA1C to assess blood glucose levels, TTE to assess cardiac function/status , TSH to assess thyroid function    VTE prophylaxis: None: Reason for No Pharmacological VTE Prophylaxis: Currently on anticoagulation, Mechanical prophylaxis: Place SCDs    BP parameters: Infarct: No intervention, SBP <220

## 2019-04-03 NOTE — ED NOTES
Communicated with MD Serena from stroke team. Informed her about patient's HR. Instructed to give the digoxin injection.

## 2019-04-03 NOTE — CONSULTS
[facility]  Cardiology  Consult Note    Patient Name: Michael Mclean  MRN: [id number]  Admission Date: 4/3/2019  Hospital Length of Stay: 0 days  Code Status: Full Code   Attending Provider: Ezekiel Barrera MD   Consulting Provider: Mu Kiran MD  Primary Care Physician: Josh Alejo Jr, MD  Principal Problem:Embolic stroke involving left middle cerebral artery    Patient information was obtained from patient and ER records.     Inpatient consult to Cardiology  Consult performed by: Mu Kiran MD  Consult ordered by: AMY Novak        Subjective:     Chief Complaint:  AFib     HPI:   79 M with PMH bioprosthetic AVR and 2v CABG (2010), AF, HTN, deafness (uses sign language) admitted with L MCA embolic stroke, cardiology is consulted for AF and cardiomyopathy. The patient was using facetime with his son when he collapsed, when he came back on screen he was aphasic. He had been complaining of dizziness for months and balance problems. He denies any history of chest pain/pressure/tightness/discomfort and does not recall his symptoms prior to his CABG. He denies any prior syncope, palpitations or irregular heart beats. He has noticed lower extremity edema in the past but has not had LOCKHART, orthopnea or PND. When he was admitted with stroke he did not receive tPA because his INR was therapeutic. He has been compliant with his medications and takes diltiazem for rate control. His EF was found to be 15% with no evidence of thrombus.    Past Medical History:   Diagnosis Date    Anticoagulant long-term use     Cancer     prostate    Carotid artery stenosis     left    Coronary artery disease     Encounter for blood transfusion     Hyperlipidemia     Hypertension     Mitral regurgitation     Paroxysmal atrial fibrillation     Stroke        Past Surgical History:   Procedure Laterality Date    AORTIC VALVE REPLACEMENT      CARDIOVERSION N/A 8/17/2017    Performed by Puma  NONA Shrestha MD at [facility]    CORONARY ARTERY BYPASS GRAFT      HERNIA REPAIR      SHOULDER SURGERY      TOE SURGERY         Review of patient's allergies indicates:  No Known Allergies    No current facility-administered medications on file prior to encounter.      Current Outpatient Medications on File Prior to Encounter   Medication Sig    warfarin (COUMADIN) 5 MG tablet Take 5 mg by mouth once daily.    alpha lipoic acid 200 mg Cap Take 200 mg by mouth once daily.    aspirin (ECOTRIN) 81 MG EC tablet Take 81 mg by mouth once daily.    CALCIUM ORAL Take 1,500 mg by mouth once daily.     clonazePAM (KLONOPIN) 2 MG Tab 2 mg as needed.     CYANOCOBALAMIN, VITAMIN B-12, INJ Inject as directed.    diltiaZEM (CARDIZEM CD) 240 MG 24 hr capsule Take 240 mg by mouth once daily.    docusate sodium (COLACE) 100 MG capsule Take 100 mg by mouth 2 (two) times daily.    FENUGREEK SEED-BL.THISTLE-ANIS ORAL Take 610 mg by mouth once daily.    ferrous sulfate (FEOSOL) 325 mg (65 mg iron) Tab tablet Take 325 mg by mouth daily with breakfast.    fish oil-omega-3 fatty acids 300-1,000 mg capsule Take 1 g by mouth once daily.    KLOR-CON M20 20 mEq tablet     magnesium oxide (MAG-OX) 400 mg tablet Take 400 mg by mouth once daily.    niacin 500 MG CpSR Take 250 mg by mouth every evening.    rosuvastatin (CRESTOR) 20 MG tablet Take 20 mg by mouth once daily.    torsemide (DEMADEX) 20 MG Tab Take 20 mg by mouth once daily.    TURMERIC ORAL Take by mouth once daily.    vitamin D (VITAMIN D3) 1000 units Tab Take 1,000 Units by mouth.    [DISCONTINUED] rivaroxaban (XARELTO) 20 mg Tab Take 20 mg by mouth daily with dinner or evening meal.     Family History     Family history is unknown by patient.        Tobacco Use    Smoking status: Never Smoker    Smokeless tobacco: Never Used   Substance and Sexual Activity    Alcohol use: Yes     Comment: occasionally    Drug use: Not Currently    Sexual activity: Not on file      Review of Systems   Constitution: Negative.   HENT: Negative.    Eyes: Negative.    Cardiovascular: Negative.    Respiratory: Negative.    Endocrine: Negative.    Hematologic/Lymphatic: Negative.    Skin: Negative.    Musculoskeletal: Positive for falls.   Gastrointestinal: Negative.    Genitourinary: Negative.    Neurological: Positive for disturbances in coordination, dizziness and loss of balance.   Psychiatric/Behavioral: Negative.      Objective:     Vital Signs (Most Recent):  Temp: 98 °F (36.7 °C) (04/03/19 1130)  Pulse: (!) 130 (04/03/19 1130)  Resp: 16 (04/03/19 1130)  BP: 126/83 (04/03/19 1130)  SpO2: 96 % (04/03/19 1015) Vital Signs (24h Range):  Temp:  [97.8 °F (36.6 °C)-98.4 °F (36.9 °C)] 98 °F (36.7 °C)  Pulse:  [] 130  Resp:  [11-21] 16  SpO2:  [93 %-99 %] 96 %  BP: (106-151)/(64-97) 126/83     Weight: 83.5 kg (184 lb)  Body mass index is 27.17 kg/m².    SpO2: 96 %  O2 Device (Oxygen Therapy): room air    No intake or output data in the 24 hours ending 04/03/19 1816    Lines/Drains/Airways     Peripheral Intravenous Line                 Peripheral IV - Single Lumen 04/02/19 Left Forearm 1 day         Peripheral IV - Single Lumen 04/02/19 Right Forearm 1 day                Physical Exam   Constitutional: He is oriented to person, place, and time. He appears well-developed and well-nourished. No distress.   HENT:   Head: Normocephalic and atraumatic.   Neck: No JVD present.   Cardiovascular: Normal rate, regular rhythm, normal heart sounds and intact distal pulses. Exam reveals no gallop and no friction rub.   No murmur heard.  Sternotomy incision, healed   Pulmonary/Chest: Effort normal and breath sounds normal. No respiratory distress. He has no wheezes. He has no rales.   Abdominal: Soft. Bowel sounds are normal. He exhibits no distension. There is no tenderness.   Musculoskeletal: He exhibits no edema.   Neurological: He is alert and oriented to person, place, and time.   Skin: He is not  diaphoretic.       Significant Labs:     Recent Results (from the past 24 hour(s))   CBC auto differential    Collection Time: 04/03/19  5:02 AM   Result Value Ref Range    WBC 7.45 3.90 - 12.70 K/uL    RBC 4.65 4.60 - 6.20 M/uL    Hemoglobin 15.1 14.0 - 18.0 g/dL    Hematocrit 43.2 40.0 - 54.0 %    MCV 93 82 - 98 fL    MCH 32.5 (H) 27.0 - 31.0 pg    MCHC 35.0 32.0 - 36.0 g/dL    RDW 12.5 11.5 - 14.5 %    Platelets 212 150 - 350 K/uL    MPV 10.8 9.2 - 12.9 fL    Immature Granulocytes 0.3 0.0 - 0.5 %    Gran # (ANC) 4.6 1.8 - 7.7 K/uL    Immature Grans (Abs) 0.02 0.00 - 0.04 K/uL    Lymph # 1.9 1.0 - 4.8 K/uL    Mono # 0.8 0.3 - 1.0 K/uL    Eos # 0.1 0.0 - 0.5 K/uL    Baso # 0.04 0.00 - 0.20 K/uL    nRBC 0 0 /100 WBC    Gran% 61.7 38.0 - 73.0 %    Lymph% 25.8 18.0 - 48.0 %    Mono% 11.0 4.0 - 15.0 %    Eosinophil% 0.7 0.0 - 8.0 %    Basophil% 0.5 0.0 - 1.9 %    Differential Method Automated    Comprehensive metabolic panel    Collection Time: 04/03/19  5:02 AM   Result Value Ref Range    Sodium 139 136 - 145 mmol/L    Potassium 3.9 3.5 - 5.1 mmol/L    Chloride 105 95 - 110 mmol/L    CO2 24 23 - 29 mmol/L    Glucose 103 70 - 110 mg/dL    BUN, Bld 19 8 - 23 mg/dL    Creatinine 0.9 0.5 - 1.4 mg/dL    Calcium 9.1 8.7 - 10.5 mg/dL    Total Protein 6.7 6.0 - 8.4 g/dL    Albumin 3.5 3.5 - 5.2 g/dL    Total Bilirubin 0.6 0.1 - 1.0 mg/dL    Alkaline Phosphatase 56 55 - 135 U/L    AST 23 10 - 40 U/L    ALT 12 10 - 44 U/L    Anion Gap 10 8 - 16 mmol/L    eGFR if African American >60.0 >60 mL/min/1.73 m^2    eGFR if non African American >60.0 >60 mL/min/1.73 m^2   Lipid panel    Collection Time: 04/03/19  5:02 AM   Result Value Ref Range    Cholesterol 124 120 - 199 mg/dL    Triglycerides 75 30 - 150 mg/dL    HDL 35 (L) 40 - 75 mg/dL    LDL Cholesterol 74.0 63.0 - 159.0 mg/dL    HDL/Chol Ratio 28.2 20.0 - 50.0 %    Total Cholesterol/HDL Ratio 3.5 2.0 - 5.0    Non-HDL Cholesterol 89 mg/dL   Troponin I    Collection Time: 04/03/19   5:02 AM   Result Value Ref Range    Troponin I 0.018 0.000 - 0.026 ng/mL   Protime-INR    Collection Time: 04/03/19  5:02 AM   Result Value Ref Range    Prothrombin Time 22.0 (H) 9.0 - 12.5 sec    INR 2.3 (H) 0.8 - 1.2   TSH    Collection Time: 04/03/19  5:02 AM   Result Value Ref Range    TSH 3.512 0.400 - 4.000 uIU/mL   CK-MB    Collection Time: 04/03/19  5:02 AM   Result Value Ref Range    CPK 83 20 - 200 U/L    CPK MB 2.2 0.1 - 6.5 ng/mL    MB% 2.7 0.0 - 5.0 %   Transthoracic echo (TTE) complete (Cupid Only)    Collection Time: 04/03/19  9:05 AM   Result Value Ref Range    Ascending aorta 3.57 cm    STJ 3.38 cm    AV mean gradient 10.33 mmHg    Ao peak mehdi 1.82 m/s    Ao VTI 26.02 cm    IVS 1.36 (A) 0.6 - 1.1 cm    LA size 4.43 cm    Left Atrium Major Axis 6.71 cm    Left Atrium Minor Axis 5.90 cm    LVIDD 6.26 (A) 3.5 - 6.0 cm    LVIDS 5.99 (A) 2.1 - 4.0 cm    LVOT diameter 1.96 cm    LVOT peak VTI 6.85 cm    PW 1.15 (A) 0.6 - 1.1 cm    RA Major Axis 5.11 cm    RA Width 4.24 cm    RVDD 3.43 cm    Sinus 3.86 cm    TR Max Mehdi 2.65 m/s    TDI LATERAL 0.14     TDI SEPTAL 0.05     LA WIDTH 4.82 cm    LV Diastolic Volume 198.07 mL    LV Systolic Volume 179.02 mL    LVOT peak mehdi [id number] m/s    FS 4 %    LA volume 113.96 cm3    LV mass 357.70 g    Left Ventricle Relative Wall Thickness 0.37 cm    AV valve area 0.79 cm2    AV Velocity Ratio 0.27     AV index (prosthetic) 0.26     Mean e' 0.10     LVOT area 3.02 cm2    LVOT stroke volume 20.66 cm3    AV peak gradient 13.25 mmHg    LV Systolic Volume Index 89.6 mL/m2    LV Diastolic Volume Index 99.12 mL/m2    LA Volume Index 57.0 mL/m2    LV Mass Index 179.0 g/m2    Triscuspid Valve Regurgitation Peak Gradient 28.09 mmHg    BSA 2.02 m2    Right Atrial Pressure (from IVC) 3 mmHg    TV rest pulmonary artery pressure 31 mmHg   Urinalysis, Reflex to Urine Culture Urine, Clean Catch    Collection Time: 04/03/19  9:34 AM   Result Value Ref Range    Specimen UA  Urine, Clean Catch     Color, UA Yellow Yellow, Straw, [place]    Appearance, UA Clear Clear    pH, UA 8.0 5.0 - 8.0    Specific Gravity, UA >=1.030 (A) 1.005 - 1.030    Protein, UA Negative Negative    Glucose, UA Negative Negative    Ketones, UA Negative Negative    Bilirubin (UA) Negative Negative    Occult Blood UA 1+ (A) Negative    Nitrite, UA Negative Negative    Leukocytes, UA Negative Negative   Urinalysis Microscopic    Collection Time: 04/03/19  9:34 AM   Result Value Ref Range    RBC, UA 8 (H) 0 - 4 /hpf    WBC, UA 1 0 - 5 /hpf    Bacteria, UA Occasional None-Occ /hpf    Microscopic Comment SEE COMMENT          Significant Imaging:     I have reviewed all pertinent imaging studies from the last 24 hours      Assessment and Plan:     Cardiomyopathy  EF 15%  Recommend starting GDMT with valsartan 80 mg daily  Metoprolol tartrate 50 mg BID  Can also add spironolactone at a later time if BP tolerates  Patient will need outpatient cardiology follow-up (he has established care with a cardiologist)      Paroxysmal atrial fibrillation  CHADS VASc 7  HAS BLED 3  Stop diltiazem as patient has cardiomyopathy with EF 15%  Start metoprolol 50 mg BID for rate control  Patient has high stroke risk, recommend continuing anticoagulation and adding clopidogrel (if acceptable from a bleeding risk standpoint) to reduce stroke risk        VTE Risk Mitigation (From admission, onward)        Ordered     warfarin (COUMADIN) tablet 5 mg  Every Monday 04/03/19 1444     warfarin (COUMADIN) tablet 5 mg  Every Sunday 04/03/19 1444     warfarin (COUMADIN) tablet 5 mg  Every Tues, Thurs, Sat      04/03/19 1444     warfarin (COUMADIN) tablet 2.5 mg  Every Wednesday 04/03/19 1444     Reason for No Pharmacological VTE Prophylaxis  Once      04/03/19 0704     IP VTE HIGH RISK PATIENT  Once      04/03/19 0704     Place sequential compression device  Until discontinued      04/03/19 0704          Thank you for your consult.  Please call with questions or concerns.    Mu Kiran MD  Cardiology   [facility]

## 2019-04-03 NOTE — ED TRIAGE NOTES
Michael Mclean, an 79 y.o. male presents to the ED as a transfer from [facility] secondary to right hemiparesis and confusion.  Patient is deaf at baseline and his son noticed around 2200 yesterday changes in behavior and affect while on a facetime phone call with him.  Daughter is at bedside on arrival and patient was placed on cardiac monitoring, continuous pulse oximetry and BP monitoring.      Review of patient's allergies indicates:  No Known Allergies  Chief Complaint   Patient presents with    Cerebrovascular Accident     Stroke transfer from [facility]. Right hemiparesis and confusion. Pt fell out of the chair while facetiming with son. Pt appeared to be unaware and confused. Was not signing appropriately (pt is deaf/mute)     Past Medical History:   Diagnosis Date    Cancer     prostate    Carotid artery stenosis     left    Coronary artery disease     Encounter for blood transfusion     Hyperlipidemia     Hypertension     Mitral regurgitation     Paroxysmal atrial fibrillation

## 2019-04-03 NOTE — HPI
79 M with PMH bioprosthetic AVR and 2v CABG (2010), AF, HTN, deafness (uses sign language) admitted with L MCA embolic stroke, cardiology is consulted for AF and cardiomyopathy. The patient was using facetime with his son when he collapsed, when he came back on screen he was aphasic. He had been complaining of dizziness for months and balance problems. He denies any history of chest pain/pressure/tightness/discomfort and does not recall his symptoms prior to his CABG. He denies any prior syncope, palpitations or irregular heart beats. He has noticed lower extremity edema in the past but has not had LOCKHART, orthopnea or PND. When he was admitted with stroke he did not receive tPA because his INR was therapeutic. He has been compliant with his medications and takes diltiazem for rate control. His EF was found to be 15% with no evidence of thrombus.

## 2019-04-03 NOTE — ED NOTES
Patient able to take medications without a problem; could take multiple at a time. No trouble swallowing, no coughing observed. Continuous pulse ox, BP, and cardiac monitoring in place. Daughter at bedside. Call bell within reach. Will continue to monitor.

## 2019-04-03 NOTE — PLAN OF CARE
Problem: SLP Goal  Goal: SLP Goal  Speech Language Pathology Goals  Goals expected to be met by 4/10/19    1.  Pt will tolerate regular consistency diet w/ thin liquids w/ adequate oral clearance and no overt signs of aspiration.  2.  Pt will complete Speech Language Cognitive evaluation to determine the need for additional goals.      Outcome: Ongoing (interventions implemented as appropriate)  Clinical Swallow Evaluation completed.  REC:  Pt resume po intake w/ regular consistency diet w/thin liquids, No Straws, oral meds whole 1-2 at a time, aspiration precautions.  Recs reviewed w/ RN. Maude ST per POC.    Grace Espinal, CCC-SLP  4/3/2019

## 2019-04-03 NOTE — ASSESSMENT & PLAN NOTE
EF 15%  Recommend starting GDMT with valsartan 80 mg daily  Metoprolol tartrate 50 mg BID  Can also add spironolactone at a later time if BP tolerates  Patient will need outpatient cardiology follow-up (he has established care with a cardiologist)

## 2019-04-03 NOTE — HPI
Patient is a 79 y.o. male with significant past medical history of Afib on anticoagulation, deaf (uses sign language to communicate) presented to hospital as a transfer from [facility] for evaluation of stroke.  HPI information gathered from a review of the patient's medical record, discussion with the patient's family due to the patient's current condition.  At baseline the patient lives alone and performs his ADLs, handles his personal affairs without assistance.  He was using Facetime with his son when he fell out of a chair for ~3-4 min.  When he reappeared the patient seemed confused, looking around, and not engaged with his computer.  The patient's son went to the patient's home and found him in the same state.  The patient was incontinent of urine but did not have evidence of tongue biting or abrasions to his body.  At that time he was noted to have a right facial droop with right arm and leg weakness.  The patient is on Coumadin for A-fib with INR of 2.9.  He was brought [facility] ED for evaluation.  A CTH was obtained and was negative for acute findings.  A telestroke consult was obtained and performed by Dr. Ivory.  tPA not administered 2/2 elevated INR.  A CTA head was obtained but was inconclusive due to the timing of the bolus.  The patient was transferred to [facility] for higher level of care.  On arrival the patient is awake and alert.  Exam is complicated by the patient being deaf and using sign language.  Official  en route.  Patient's daughter, who uses sign language, is at the bedside.  The patient is confused and is unable to express himself as he normally would through signing per his daughter.  He denies HA, dizziness, weakness, n/v.  The patient seems to have confusion with some simple and all multi-step commands.  MRI brain reveals acute stroke of the left frontal lobe precentral gyrus, left caudate head and putamen.  Will admit to  Vascular Neurology.

## 2019-04-03 NOTE — ASSESSMENT & PLAN NOTE
CHADS VASc 7  HAS BLED 3  Stop diltiazem as patient has cardiomyopathy with EF 15%  Start metoprolol 50 mg BID for rate control  Patient has high stroke risk, recommend continuing anticoagulation and adding clopidogrel (if acceptable from a bleeding risk standpoint) to reduce stroke risk

## 2019-04-03 NOTE — PT/OT/SLP EVAL
Speech Language Pathology Evaluation  Bedside Swallow    Patient Name:  Michael Mclean   MRN:  [id number]  Admitting Diagnosis: Embolic stroke involving left middle cerebral artery    Recommendations:                 General Recommendations:  Dysphagia therapy and Speech language evaluation  Diet recommendations:  Regular, Thin   Aspiration Precautions: 1 bite/sip at a time, Alternating bites/sips, Double swallow with each bite/sip, Feed only when awake/alert, HOB to 90 degrees, Meds whole 1 at a time, Monitor for s/s of aspiration, No straws, Small bites/sips and Standard aspiration precautions   General Precautions: Standard, aspiration, aphasia, deaf, fall  Communication strategies:  provide increased time to answer and  needed    History:     Past Medical History:   Diagnosis Date    Anticoagulant long-term use     Cancer     prostate    Carotid artery stenosis     left    Coronary artery disease     Encounter for blood transfusion     Hyperlipidemia     Hypertension     Mitral regurgitation     Paroxysmal atrial fibrillation     Stroke        Past Surgical History:   Procedure Laterality Date    AORTIC VALVE REPLACEMENT      CARDIOVERSION N/A 8/17/2017    Performed by Puma Shrestha MD at [facility]    CORONARY ARTERY BYPASS GRAFT      HERNIA REPAIR      SHOULDER SURGERY      TOE SURGERY         Social History: Patient lives alone.    Prior Intubation HX:  None this admission    Modified Barium Swallow: none this admission    Chest X-Rays: none this admission    Prior diet: regular w/thin liquids.    Occupation/hobbies/homemaking: Pt's daughter reported pt was a  for many years.    Subjective     Pt was awake and alert in NAD. Evaluation was completed w/  present for communication assistance  Patient goals: none expressed     Pain/Comfort:  · Pain Rating 1: 0/10  · Pain Rating Post-Intervention 1: 0/10    Objective:     Oral  Musculature Evaluation  · Oral Musculature: facial asymmetry present  · Dentition: present and adequate  · Secretion Management: adequate  · Mucosal Quality: adequate  · Mandibular Strength and Mobility: WFL  · Oral Labial Strength and Mobility: impaired retraction  · Lingual Strength and Mobility: impaired anterior elevation, impaired strength, impaired left lateral movement  · Buccal Strength and Mobility: impaired  · Volitional Cough: adequate  · Volitional Swallow: timely  · Voice Prior to PO Intake: clear    Bedside Swallow Eval:   Consistencies Assessed:  · Thin liquids tsp, cup and straw sips  · Puree tsp bites  · Solids self fed bite     Oral Phase:   · Lingual residue: mild solid stasis on right lateral margin of tongue body cleared w/ liquid wash  · Slow oral transit time    Pharyngeal Phase:   · coughing/choking: delayed x1 w/ large cup sip  · Pt tolerated all other trials of thin liquids, puree and solids using strategies of small bites/sips, double swallows per bite/sip and alternating bites/sips w/ no overt signs of airway threat    Compensatory Strategies  · alternating bites/sips  · Multiple swallows   · Small bites/sips    Treatment: Education was provided to pt and daughter re: SLP role, eval results, s/s of aspiration, risk of aspiration, aspiration precautions, diet recs and SLP POC.  Pt and daughter indicated good understanding and agreed w/ established POC.    Assessment:     Michael Mclean is a 79 y.o. male with an SLP diagnosis of Dysphagia.  He presents with mild oropharyngeal dysphagia at this time.    Goals:   Multidisciplinary Problems     SLP Goals        Problem: SLP Goal    Goal Priority Disciplines Outcome   SLP Goal     SLP Ongoing (interventions implemented as appropriate)   Description:  Speech Language Pathology Goals  Goals expected to be met by 4/10/19    1.  Pt will tolerate regular consistency diet w/ thin liquids w/ adequate oral clearance and no overt signs of  aspiration.  2.  Pt will complete Speech Language Cognitive evaluation to determine the need for additional goals.                        Plan:     · Patient to be seen:  4 x/week   · Plan of Care expires:  05/02/19  · Plan of Care reviewed with:  patient, daughter   · SLP Follow-Up:  Yes       Discharge recommendations:  other (see comments)(pending pt progress and pt/ot recs)   Barriers to Discharge:  Inaccessible Home Environment    Time Tracking:     SLP Treatment Date:   04/03/19  Speech Start Time:  1000  Speech Stop Time:  1024     Speech Total Time (min):  24 min    Billable Minutes: Eval Swallow and Oral Function 24    Grace Espinal CCC-SLP  04/03/2019

## 2019-04-03 NOTE — SUBJECTIVE & OBJECTIVE
Past Medical History:   Diagnosis Date    Anticoagulant long-term use     Cancer     prostate    Carotid artery stenosis     left    Coronary artery disease     Encounter for blood transfusion     Hyperlipidemia     Hypertension     Mitral regurgitation     Paroxysmal atrial fibrillation     Stroke        Past Surgical History:   Procedure Laterality Date    AORTIC VALVE REPLACEMENT      CARDIOVERSION N/A 8/17/2017    Performed by Puma Shrestha MD at [facility] ENDO    CORONARY ARTERY BYPASS GRAFT      HERNIA REPAIR      SHOULDER SURGERY      TOE SURGERY         Review of patient's allergies indicates:  No Known Allergies    No current facility-administered medications on file prior to encounter.      Current Outpatient Medications on File Prior to Encounter   Medication Sig    warfarin (COUMADIN) 5 MG tablet Take 5 mg by mouth once daily.    alpha lipoic acid 200 mg Cap Take 200 mg by mouth once daily.    aspirin (ECOTRIN) 81 MG EC tablet Take 81 mg by mouth once daily.    CALCIUM ORAL Take 1,500 mg by mouth once daily.     clonazePAM (KLONOPIN) 2 MG Tab 2 mg as needed.     CYANOCOBALAMIN, VITAMIN B-12, INJ Inject as directed.    diltiaZEM (CARDIZEM CD) 240 MG 24 hr capsule Take 240 mg by mouth once daily.    docusate sodium (COLACE) 100 MG capsule Take 100 mg by mouth 2 (two) times daily.    FENUGREEK SEED-BL.THISTLE-ANIS ORAL Take 610 mg by mouth once daily.    ferrous sulfate (FEOSOL) 325 mg (65 mg iron) Tab tablet Take 325 mg by mouth daily with breakfast.    fish oil-omega-3 fatty acids 300-1,000 mg capsule Take 1 g by mouth once daily.    KLOR-CON M20 20 mEq tablet     magnesium oxide (MAG-OX) 400 mg tablet Take 400 mg by mouth once daily.    niacin 500 MG CpSR Take 250 mg by mouth every evening.    rosuvastatin (CRESTOR) 20 MG tablet Take 20 mg by mouth once daily.    torsemide (DEMADEX) 20 MG Tab Take 20 mg by mouth once daily.    TURMERIC ORAL Take by mouth once daily.     vitamin D (VITAMIN D3) 1000 units Tab Take 1,000 Units by mouth.    [DISCONTINUED] rivaroxaban (XARELTO) 20 mg Tab Take 20 mg by mouth daily with dinner or evening meal.     Family History     Family history is unknown by patient.        Tobacco Use    Smoking status: Never Smoker    Smokeless tobacco: Never Used   Substance and Sexual Activity    Alcohol use: Yes     Comment: occasionally    Drug use: Not Currently    Sexual activity: Not on file     Review of Systems   Constitution: Negative.   HENT: Negative.    Eyes: Negative.    Cardiovascular: Negative.    Respiratory: Negative.    Endocrine: Negative.    Hematologic/Lymphatic: Negative.    Skin: Negative.    Musculoskeletal: Positive for falls.   Gastrointestinal: Negative.    Genitourinary: Negative.    Neurological: Positive for disturbances in coordination, dizziness and loss of balance.   Psychiatric/Behavioral: Negative.      Objective:     Vital Signs (Most Recent):  Temp: 98 °F (36.7 °C) (04/03/19 1130)  Pulse: (!) 130 (04/03/19 1130)  Resp: 16 (04/03/19 1130)  BP: 126/83 (04/03/19 1130)  SpO2: 96 % (04/03/19 1015) Vital Signs (24h Range):  Temp:  [97.8 °F (36.6 °C)-98.4 °F (36.9 °C)] 98 °F (36.7 °C)  Pulse:  [] 130  Resp:  [11-21] 16  SpO2:  [93 %-99 %] 96 %  BP: (106-151)/(64-97) 126/83     Weight: 83.5 kg (184 lb)  Body mass index is 27.17 kg/m².    SpO2: 96 %  O2 Device (Oxygen Therapy): room air    No intake or output data in the 24 hours ending 04/03/19 1816    Lines/Drains/Airways     Peripheral Intravenous Line                 Peripheral IV - Single Lumen 04/02/19 Left Forearm 1 day         Peripheral IV - Single Lumen 04/02/19 Right Forearm 1 day                Physical Exam   Constitutional: He is oriented to person, place, and time. He appears well-developed and well-nourished. No distress.   HENT:   Head: Normocephalic and atraumatic.   Neck: No JVD present.   Cardiovascular: Normal rate, regular rhythm, normal heart sounds and  intact distal pulses. Exam reveals no gallop and no friction rub.   No murmur heard.  Sternotomy incision, healed   Pulmonary/Chest: Effort normal and breath sounds normal. No respiratory distress. He has no wheezes. He has no rales.   Abdominal: Soft. Bowel sounds are normal. He exhibits no distension. There is no tenderness.   Musculoskeletal: He exhibits no edema.   Neurological: He is alert and oriented to person, place, and time.   Skin: He is not diaphoretic.       Significant Labs:     Recent Results (from the past 24 hour(s))   CBC auto differential    Collection Time: 04/03/19  5:02 AM   Result Value Ref Range    WBC 7.45 3.90 - 12.70 K/uL    RBC 4.65 4.60 - 6.20 M/uL    Hemoglobin 15.1 14.0 - 18.0 g/dL    Hematocrit 43.2 40.0 - 54.0 %    MCV 93 82 - 98 fL    MCH 32.5 (H) 27.0 - 31.0 pg    MCHC 35.0 32.0 - 36.0 g/dL    RDW 12.5 11.5 - 14.5 %    Platelets 212 150 - 350 K/uL    MPV 10.8 9.2 - 12.9 fL    Immature Granulocytes 0.3 0.0 - 0.5 %    Gran # (ANC) 4.6 1.8 - 7.7 K/uL    Immature Grans (Abs) 0.02 0.00 - 0.04 K/uL    Lymph # 1.9 1.0 - 4.8 K/uL    Mono # 0.8 0.3 - 1.0 K/uL    Eos # 0.1 0.0 - 0.5 K/uL    Baso # 0.04 0.00 - 0.20 K/uL    nRBC 0 0 /100 WBC    Gran% 61.7 38.0 - 73.0 %    Lymph% 25.8 18.0 - 48.0 %    Mono% 11.0 4.0 - 15.0 %    Eosinophil% 0.7 0.0 - 8.0 %    Basophil% 0.5 0.0 - 1.9 %    Differential Method Automated    Comprehensive metabolic panel    Collection Time: 04/03/19  5:02 AM   Result Value Ref Range    Sodium 139 136 - 145 mmol/L    Potassium 3.9 3.5 - 5.1 mmol/L    Chloride 105 95 - 110 mmol/L    CO2 24 23 - 29 mmol/L    Glucose 103 70 - 110 mg/dL    BUN, Bld 19 8 - 23 mg/dL    Creatinine 0.9 0.5 - 1.4 mg/dL    Calcium 9.1 8.7 - 10.5 mg/dL    Total Protein 6.7 6.0 - 8.4 g/dL    Albumin 3.5 3.5 - 5.2 g/dL    Total Bilirubin 0.6 0.1 - 1.0 mg/dL    Alkaline Phosphatase 56 55 - 135 U/L    AST 23 10 - 40 U/L    ALT 12 10 - 44 U/L    Anion Gap 10 8 - 16 mmol/L    eGFR if African American  >60.0 >60 mL/min/1.73 m^2    eGFR if non African American >60.0 >60 mL/min/1.73 m^2   Lipid panel    Collection Time: 04/03/19  5:02 AM   Result Value Ref Range    Cholesterol 124 120 - 199 mg/dL    Triglycerides 75 30 - 150 mg/dL    HDL 35 (L) 40 - 75 mg/dL    LDL Cholesterol 74.0 63.0 - 159.0 mg/dL    HDL/Chol Ratio 28.2 20.0 - 50.0 %    Total Cholesterol/HDL Ratio 3.5 2.0 - 5.0    Non-HDL Cholesterol 89 mg/dL   Troponin I    Collection Time: 04/03/19  5:02 AM   Result Value Ref Range    Troponin I 0.018 0.000 - 0.026 ng/mL   Protime-INR    Collection Time: 04/03/19  5:02 AM   Result Value Ref Range    Prothrombin Time 22.0 (H) 9.0 - 12.5 sec    INR 2.3 (H) 0.8 - 1.2   TSH    Collection Time: 04/03/19  5:02 AM   Result Value Ref Range    TSH 3.512 0.400 - 4.000 uIU/mL   CK-MB    Collection Time: 04/03/19  5:02 AM   Result Value Ref Range    CPK 83 20 - 200 U/L    CPK MB 2.2 0.1 - 6.5 ng/mL    MB% 2.7 0.0 - 5.0 %   Transthoracic echo (TTE) complete (Cupid Only)    Collection Time: 04/03/19  9:05 AM   Result Value Ref Range    Ascending aorta 3.57 cm    STJ 3.38 cm    AV mean gradient 10.33 mmHg    Ao peak mehdi 1.82 m/s    Ao VTI 26.02 cm    IVS 1.36 (A) 0.6 - 1.1 cm    LA size 4.43 cm    Left Atrium Major Axis 6.71 cm    Left Atrium Minor Axis 5.90 cm    LVIDD 6.26 (A) 3.5 - 6.0 cm    LVIDS 5.99 (A) 2.1 - 4.0 cm    LVOT diameter 1.96 cm    LVOT peak VTI 6.85 cm    PW 1.15 (A) 0.6 - 1.1 cm    RA Major Axis 5.11 cm    RA Width 4.24 cm    RVDD 3.43 cm    Sinus 3.86 cm    TR Max Mehdi 2.65 m/s    TDI LATERAL 0.14     TDI SEPTAL 0.05     LA WIDTH 4.82 cm    LV Diastolic Volume 198.07 mL    LV Systolic Volume 179.02 mL    LVOT peak mehdi 0.652369540052617 m/s    FS 4 %    LA volume 113.96 cm3    LV mass 357.70 g    Left Ventricle Relative Wall Thickness 0.37 cm    AV valve area 0.79 cm2    AV Velocity Ratio 0.27     AV index (prosthetic) 0.26     Mean e' 0.10     LVOT area 3.02 cm2    LVOT stroke volume 20.66 cm3    AV peak  gradient 13.25 mmHg    LV Systolic Volume Index 89.6 mL/m2    LV Diastolic Volume Index 99.12 mL/m2    LA Volume Index 57.0 mL/m2    LV Mass Index 179.0 g/m2    Triscuspid Valve Regurgitation Peak Gradient 28.09 mmHg    BSA 2.02 m2    Right Atrial Pressure (from IVC) 3 mmHg    TV rest pulmonary artery pressure 31 mmHg   Urinalysis, Reflex to Urine Culture Urine, Clean Catch    Collection Time: 04/03/19  9:34 AM   Result Value Ref Range    Specimen UA Urine, Clean Catch     Color, UA Yellow Yellow, Straw, [place]    Appearance, UA Clear Clear    pH, UA 8.0 5.0 - 8.0    Specific Gravity, UA >=1.030 (A) 1.005 - 1.030    Protein, UA Negative Negative    Glucose, UA Negative Negative    Ketones, UA Negative Negative    Bilirubin (UA) Negative Negative    Occult Blood UA 1+ (A) Negative    Nitrite, UA Negative Negative    Leukocytes, UA Negative Negative   Urinalysis Microscopic    Collection Time: 04/03/19  9:34 AM   Result Value Ref Range    RBC, UA 8 (H) 0 - 4 /hpf    WBC, UA 1 0 - 5 /hpf    Bacteria, UA Occasional None-Occ /hpf    Microscopic Comment SEE COMMENT          Significant Imaging:     I have reviewed all pertinent imaging studies from the last 24 hours

## 2019-04-03 NOTE — CONSULTS
Inpatient consult to Physical Medicine Rehab  Consult performed by: Tamika Langley NP  Consult ordered by: Silvia Schwartz, LILLY, NP  Reason for consult: Assess rehab needs      Reviewed patient history and current admission.  Rehab team following.  Full consult to follow.    SYLVIA Street, FNP-C  Physical Medicine & Rehabilitation   04/03/2019  Spectralink: [id number]

## 2019-04-03 NOTE — ASSESSMENT & PLAN NOTE
-Stroke risk factor.  Patient with history of A-fib.  Afib w/RVR at OSH w/HR 100s.  On arrival to this facility HR 130s-150s.  -Patient given Diltiazem IV in the ED  -Continue home Diltiazem and Coumadin  -Monitor

## 2019-04-03 NOTE — H&P
[facility]  Vascular Neurology  Comprehensive Stroke Center  History & Physical    Consults  Assessment/Plan:     * Embolic stroke involving left middle cerebral artery  79 y.o. male with significant past medical history of Afib on anticoagulation, deaf (uses sign language to communicate) presented to hospital as a transfer from [facility] for evaluation of stroke.  The patient had right facial droop with right upper and lower extremity weakness.  He also had difficulty communicating using sign language, following commands.  No tPA administered due to INR 2.9.  Inconclusive CTA head and neck at the OSH.  No intervention at this time.    Antithrombotics for secondary stroke prevention: Anticoagulants: Warfarin INR adjusted target    Statins for secondary stroke prevention and hyperlipidemia, if present:   Statins: Atorvastatin- 40 mg daily    Aggressive risk factor modification: HTN, HLD, A-Fib     Rehab efforts: PT/OT/SLP to evaluate and treat    Diagnostics ordered/pending: HgbA1C to assess blood glucose levels, TTE to assess cardiac function/status , TSH to assess thyroid function    VTE prophylaxis: None: Reason for No Pharmacological VTE Prophylaxis: Currently on anticoagulation, Mechanical prophylaxis: Place SCDs    BP parameters: Infarct: No intervention, SBP <220        Paroxysmal atrial fibrillation  -Stroke risk factor.  Patient with history of A-fib.  Afib w/RVR at OSH w/HR 100s.  On arrival to this facility HR 130s-150s.  -Patient given Diltiazem IV in the ED  -Continue home Diltiazem and Coumadin  -Monitor    Essential hypertension  -Stroke risk factor.  SBP<220.  -Resume home meds when appropriate.    Deaf, nonspeaking  -Patient is deaf at baseline and uses sign language to communicate.  Hospital  at the bedside.  -He is currently having difficulty communicating.          STROKE DOCUMENTATION     Acute Stroke Times   Last Known Normal Date:  04/02/19  Last Known Normal Time: 2200  Symptom Onset Date: 04/02/19  Symptom Onset Time: 2200  Stroke Team Called Date: 04/02/19  Stroke Team Called Time: 2310  Stroke Team Arrival Date: 04/02/19  Stroke Team Arrival Time: 2313  CT Interpretation Time: 2313  Decision to Treat Time for Alteplase: 2313(no tPA due to elevated INR)    NIH Scale:  Interval: baseline  1b. LOC Questions: 2-->Answers neither question correctly  1c. LOC Commands: 1-->Performs one task correctly  4. Facial Palsy: 1-->Minor paralysis (flattened nasolabial fold, asymmetry on smiling)  7. Limb Ataxia: 0-->Absent  9. Best Language: 3-->Mute, global aphasia, no usable speech or auditory comprehension(patient is deaf and uses sign language to communicate at baseline)  Total (NIH Stroke Scale): 9     Modified Palmer Score: 0  [place] Coma Scale:14   ABCD2 Score:    [id number] Score:5  HAS -BLED Score:3  ICH Score:   Hunt & Harris Classification:      Thrombolysis Candidate? No, PT > 15 second or INR > 1.7       Interventional Revascularization Candidate?   Is the patient eligible for mechanical endovascular reperfusion (BENITO)?  No; No large vessel occlusion    Hemorrhagic change of an Ischemic Stroke: Does this patient have an ischemic stroke with hemorrhagic changes? No         Subjective:     History of Present Illness:  Patient is a 79 y.o. male with significant past medical history of Afib on anticoagulation, deaf (uses sign language to communicate) presented to hospital as a transfer from [facility] for evaluation of stroke.  HPI information gathered from a review of the patient's medical record, discussion with the patient's family due to the patient's current condition.  At baseline the patient lives alone and performs his ADLs, handles his personal affairs without assistance.  He was using Facetime with his son when he fell out of a chair for ~3-4 min.  When he reappeared the patient seemed confused, looking around, and  not engaged with his computer.  The patient's son went to the patient's home and found him in the same state.  The patient was incontinent of urine but did not have evidence of tongue biting or abrasions to his body.  At that time he was noted to have a right facial droop with right arm and leg weakness.  The patient is on Coumadin for A-fib with INR of 2.9.  He was brought to the [facility] ED for evaluation.  A CTH was obtained and was negative for acute findings.  A telestroke consult was obtained and performed by Dr. Ivory.  tPA not administered 2/2 elevated INR.  A CTA head was obtained but was inconclusive due to the timing of the bolus.  The patient was transferred to [facility] for higher level of care.  On arrival the patient is awake and alert.  Exam is complicated by the patient being deaf and using sign language.  Official  en route.  Patient's daughter, who uses sign language, is at the bedside.  The patient is confused and is unable to express himself as he normally would through signing per his daughter.  He denies HA, dizziness, weakness, n/v.  The patient seems to have confusion with some simple and all multi-step commands.  MRI brain reveals acute stroke of the left frontal lobe precentral gyrus, left caudate head and putamen.  Will admit to Vascular Neurology.            Past Medical History:   Diagnosis Date    Cancer     prostate    Carotid artery stenosis     left    Coronary artery disease     Encounter for blood transfusion     Hyperlipidemia     Hypertension     Mitral regurgitation     Paroxysmal atrial fibrillation      Past Surgical History:   Procedure Laterality Date    AORTIC VALVE REPLACEMENT      CARDIOVERSION N/A 8/17/2017    Performed by [facility]restha [facility] at [facility] ENDO    CORONARY ARTERY BYPASS GRAFT      HERNIA REPAIR      SHOULDER SURGERY      TOE SURGERY       No family history on file.  Social History     Tobacco Use    Smoking  status: Never Smoker    Smokeless tobacco: Never Used   Substance Use Topics    Alcohol use: Yes     Comment: occasionally    Drug use: Not on file     Review of patient's allergies indicates:  No Known Allergies    Medications: I have reviewed the current medication administration record.    Current Facility-Administered Medications on File Prior to Encounter   Medication Dose Route Frequency Provider Last Rate Last Dose    leuprolide (6 month) SyKt 45 mg  45 mg Intramuscular Q6 Months Adeel Manrique Jr., MD        leuprolide (6 month) SyKt 45 mg  45 mg Intramuscular Q6 Months Adeel Manrique Jr., MD         Current Outpatient Medications on File Prior to Encounter   Medication Sig Dispense Refill    alpha lipoic acid 200 mg Cap Take 200 mg by mouth once daily.      aspirin (ECOTRIN) 81 MG EC tablet Take 81 mg by mouth once daily.      CALCIUM ORAL Take 750 mg by mouth.      clonazePAM (KLONOPIN) 2 MG Tab 2 mg as needed.       CYANOCOBALAMIN, VITAMIN B-12, INJ Inject as directed.      diltiaZEM (CARDIZEM CD) 240 MG 24 hr capsule Take 240 mg by mouth once daily.      docusate sodium (COLACE) 100 MG capsule Take 100 mg by mouth 2 (two) times daily.      FENUGREEK SEED-BL.THISTLE-ANIS ORAL Take 610 mg by mouth once daily.      ferrous sulfate (FEOSOL) 325 mg (65 mg iron) Tab tablet Take 325 mg by mouth daily with breakfast.      fish oil-omega-3 fatty acids 300-1,000 mg capsule Take 1 g by mouth once daily.      KLOR-CON M20 20 mEq tablet       magnesium oxide (MAG-OX) 400 mg tablet Take 400 mg by mouth once daily.      niacin 500 MG CpSR Take 250 mg by mouth every evening.      rivaroxaban (XARELTO) 20 mg Tab Take 20 mg by mouth daily with dinner or evening meal.      rosuvastatin (CRESTOR) 20 MG tablet Take 20 mg by mouth once daily.      torsemide (DEMADEX) 20 MG Tab Take 20 mg by mouth once daily.      TURMERIC ORAL Take by mouth once daily.      vitamin D (VITAMIN D3) 1000 units Tab Take  1,000 Units by mouth.      warfarin (COUMADIN) 5 MG tablet Take 5 mg by mouth once daily.           Review of Systems   Unable to perform ROS: Other   Constitutional: Negative for fever.   HENT: Negative for drooling.    Eyes: Negative for discharge and redness.   Respiratory: Negative for cough and shortness of breath.    Gastrointestinal: Negative for diarrhea and vomiting.   Genitourinary: Negative for hematuria.   Musculoskeletal: Negative for neck stiffness.   Skin: Negative for rash.   Neurological: Positive for facial asymmetry and speech difficulty (baseline). Negative for dizziness and weakness.   Psychiatric/Behavioral: Positive for confusion. Negative for agitation.     Objective:     Vital Signs (Most Recent):  Temp: 98.4 °F (36.9 °C) (04/03/19 0430)  Pulse: 95 (04/03/19 0516)  Resp: 20 (04/03/19 0516)  BP: 106/66 (04/03/19 0516)  SpO2: (!) 94 % (04/03/19 0516)    Vital Signs Range (Last 24H):  Temp:  [98.1 °F (36.7 °C)-98.4 °F (36.9 °C)]   Pulse:  []   Resp:  [16-21]   BP: (106-142)/(64-91)   SpO2:  [93 %-99 %]     Physical Exam   Constitutional: He is oriented to person, place, and time. He appears well-developed and well-nourished. No distress.   HENT:   Head: Normocephalic and atraumatic.   Right Ear: External ear normal.   Left Ear: External ear normal.   Nose: Nose normal.   Mouth/Throat: Oropharynx is clear and moist. No oropharyngeal exudate.   Eyes: Pupils are equal, round, and reactive to light. Conjunctivae are normal. Right eye exhibits no discharge. Left eye exhibits no discharge. No scleral icterus.   Neck: Normal range of motion. Neck supple. No thyromegaly present.   Cardiovascular: Normal heart sounds and intact distal pulses. An irregularly irregular rhythm present. Tachycardia present.   No murmur heard.  Pulmonary/Chest: Effort normal and breath sounds normal. No respiratory distress.   Abdominal: Soft. Bowel sounds are normal. He exhibits no distension. There is no tenderness.    Musculoskeletal: He exhibits no edema.   Lymphadenopathy:     He has no cervical adenopathy.   Neurological: He is alert and oriented to person, place, and time.   Skin: Skin is warm and dry. He is not diaphoretic.   Psychiatric: He has a normal mood and affect.   Nursing note and vitals reviewed.      Neurological Exam:   LOC: alert  Attention Span: Good   Language: Global aphasia, exam complicated by patient being deaf at baseline and using sign language to communicated.  It is noted he is having difficulty using sign language at this time.  Pupils (CN II, III): PERRL  Facial Sensation (CN V): Normal  Facial Movement (CN VII): Lower facial weakness on the Right  Motor: Arm left  Normal 5/5  Leg left  Normal 5/5  Arm right  Normal 5/5  Leg right Normal 5/5  Sensation: Intact to light touch, temperature and vibration      Laboratory:  CMP: No results for input(s): GLUCOSE, CALCIUM, ALBUMIN, PROT, NA, K, CO2, CL, BUN, CREATININE, ALKPHOS, ALT, AST, BILITOT in the last 24 hours.  CBC: No results for input(s): WBC, RBC, HGB, HCT, PLT, MCV, MCH, MCHC in the last 168 hours.  Lipid Panel: No results for input(s): CHOL, LDLCALC, HDL, TRIG in the last 168 hours.  Coagulation: No results for input(s): PT, INR, APTT in the last 168 hours.  Hgb A1C: No results for input(s): HGBA1C in the last 168 hours.  TSH: No results for input(s): TSH in the last 168 hours.    Diagnostic Results:      Brain imaging:  CTH 04/02/2019 Performed at OSH.  Negative for acute findings.    Vessel Imaging:  CTA head and neck 04/03/2019 Performed at OSH.  Nondiagnostic secondary to the timing of the bolus.      Cardiac Evaluation:   2D Echo pending        Silvia Schwartz, LILLY, NP  [facility]  Department of Vascular Neurology   [facility]

## 2019-04-03 NOTE — ED NOTES
Attempted to call and give report. Nurse was in patient room and requested I call back in 10 minutes.

## 2019-04-04 PROBLEM — R47.01 APHASIA: Status: ACTIVE | Noted: 2019-04-04

## 2019-04-04 LAB
ALBUMIN SERPL BCP-MCNC: 3 G/DL (ref 3.5–5.2)
ALP SERPL-CCNC: 50 U/L (ref 55–135)
ALT SERPL W/O P-5'-P-CCNC: 11 U/L (ref 10–44)
ANION GAP SERPL CALC-SCNC: 7 MMOL/L (ref 8–16)
APTT BLDCRRT: 31.3 SEC (ref 21–32)
AST SERPL-CCNC: 21 U/L (ref 10–40)
BASOPHILS # BLD AUTO: 0.05 K/UL (ref 0–0.2)
BASOPHILS NFR BLD: 0.8 % (ref 0–1.9)
BILIRUB SERPL-MCNC: 0.7 MG/DL (ref 0.1–1)
BUN SERPL-MCNC: 13 MG/DL (ref 8–23)
CALCIUM SERPL-MCNC: 8.9 MG/DL (ref 8.7–10.5)
CHLORIDE SERPL-SCNC: 108 MMOL/L (ref 95–110)
CO2 SERPL-SCNC: 25 MMOL/L (ref 23–29)
CREAT SERPL-MCNC: 0.8 MG/DL (ref 0.5–1.4)
DIFFERENTIAL METHOD: ABNORMAL
EOSINOPHIL # BLD AUTO: 0.2 K/UL (ref 0–0.5)
EOSINOPHIL NFR BLD: 2.6 % (ref 0–8)
ERYTHROCYTE [DISTWIDTH] IN BLOOD BY AUTOMATED COUNT: 12.7 % (ref 11.5–14.5)
EST. GFR  (AFRICAN AMERICAN): >60 ML/MIN/1.73 M^2
EST. GFR  (NON AFRICAN AMERICAN): >60 ML/MIN/1.73 M^2
GLUCOSE SERPL-MCNC: 84 MG/DL (ref 70–110)
HCT VFR BLD AUTO: 42 % (ref 40–54)
HGB BLD-MCNC: 13.8 G/DL (ref 14–18)
IMM GRANULOCYTES # BLD AUTO: 0.03 K/UL (ref 0–0.04)
IMM GRANULOCYTES NFR BLD AUTO: 0.5 % (ref 0–0.5)
INR PPP: 2.2 (ref 0.8–1.2)
LYMPHOCYTES # BLD AUTO: 2 K/UL (ref 1–4.8)
LYMPHOCYTES NFR BLD: 30 % (ref 18–48)
MAGNESIUM SERPL-MCNC: 1.9 MG/DL (ref 1.6–2.6)
MCH RBC QN AUTO: 31.9 PG (ref 27–31)
MCHC RBC AUTO-ENTMCNC: 32.9 G/DL (ref 32–36)
MCV RBC AUTO: 97 FL (ref 82–98)
MONOCYTES # BLD AUTO: 0.9 K/UL (ref 0.3–1)
MONOCYTES NFR BLD: 13.5 % (ref 4–15)
NEUTROPHILS # BLD AUTO: 3.5 K/UL (ref 1.8–7.7)
NEUTROPHILS NFR BLD: 52.6 % (ref 38–73)
NRBC BLD-RTO: 0 /100 WBC
PHOSPHATE SERPL-MCNC: 2.8 MG/DL (ref 2.7–4.5)
PLATELET # BLD AUTO: 190 K/UL (ref 150–350)
PMV BLD AUTO: 10.6 FL (ref 9.2–12.9)
POTASSIUM SERPL-SCNC: 4.2 MMOL/L (ref 3.5–5.1)
PROT SERPL-MCNC: 5.8 G/DL (ref 6–8.4)
PROTHROMBIN TIME: 21.2 SEC (ref 9–12.5)
RBC # BLD AUTO: 4.33 M/UL (ref 4.6–6.2)
SODIUM SERPL-SCNC: 140 MMOL/L (ref 136–145)
WBC # BLD AUTO: 6.57 K/UL (ref 3.9–12.7)

## 2019-04-04 PROCEDURE — 97165 OT EVAL LOW COMPLEX 30 MIN: CPT

## 2019-04-04 PROCEDURE — 25000003 PHARM REV CODE 250: Performed by: NURSE PRACTITIONER

## 2019-04-04 PROCEDURE — 83735 ASSAY OF MAGNESIUM: CPT

## 2019-04-04 PROCEDURE — 99222 PR INITIAL HOSPITAL CARE,LEVL II: ICD-10-PCS | Mod: ,,, | Performed by: NURSE PRACTITIONER

## 2019-04-04 PROCEDURE — 20600001 HC STEP DOWN PRIVATE ROOM

## 2019-04-04 PROCEDURE — 99233 SBSQ HOSP IP/OBS HIGH 50: CPT | Mod: GC,,, | Performed by: PSYCHIATRY & NEUROLOGY

## 2019-04-04 PROCEDURE — 97161 PT EVAL LOW COMPLEX 20 MIN: CPT

## 2019-04-04 PROCEDURE — 84100 ASSAY OF PHOSPHORUS: CPT

## 2019-04-04 PROCEDURE — 25000003 PHARM REV CODE 250: Performed by: PHYSICIAN ASSISTANT

## 2019-04-04 PROCEDURE — 85025 COMPLETE CBC W/AUTO DIFF WBC: CPT

## 2019-04-04 PROCEDURE — 36415 COLL VENOUS BLD VENIPUNCTURE: CPT

## 2019-04-04 PROCEDURE — 99222 1ST HOSP IP/OBS MODERATE 55: CPT | Mod: ,,, | Performed by: NURSE PRACTITIONER

## 2019-04-04 PROCEDURE — 80053 COMPREHEN METABOLIC PANEL: CPT

## 2019-04-04 PROCEDURE — 92526 ORAL FUNCTION THERAPY: CPT

## 2019-04-04 PROCEDURE — 85730 THROMBOPLASTIN TIME PARTIAL: CPT

## 2019-04-04 PROCEDURE — 92523 SPEECH SOUND LANG COMPREHEN: CPT

## 2019-04-04 PROCEDURE — 99233 PR SUBSEQUENT HOSPITAL CARE,LEVL III: ICD-10-PCS | Mod: GC,,, | Performed by: PSYCHIATRY & NEUROLOGY

## 2019-04-04 PROCEDURE — 85610 PROTHROMBIN TIME: CPT

## 2019-04-04 RX ADMIN — METOPROLOL TARTRATE 50 MG: 50 TABLET, FILM COATED ORAL at 09:04

## 2019-04-04 RX ADMIN — DOCUSATE SODIUM 100 MG: 100 CAPSULE, LIQUID FILLED ORAL at 09:04

## 2019-04-04 RX ADMIN — LOSARTAN POTASSIUM 50 MG: 50 TABLET, FILM COATED ORAL at 09:04

## 2019-04-04 RX ADMIN — ROSUVASTATIN CALCIUM 20 MG: 20 TABLET, FILM COATED ORAL at 08:04

## 2019-04-04 RX ADMIN — METOPROLOL TARTRATE 50 MG: 50 TABLET, FILM COATED ORAL at 08:04

## 2019-04-04 RX ADMIN — WARFARIN SODIUM 5 MG: 5 TABLET ORAL at 06:04

## 2019-04-04 RX ADMIN — DOCUSATE SODIUM 100 MG: 100 CAPSULE, LIQUID FILLED ORAL at 08:04

## 2019-04-04 NOTE — PLAN OF CARE
Please continue current medications for cardiomyopathy and AF rate control (metoprolol 50 mg BID, losartan 50 mg daily). Spironolactone can be added at a later time if BP permits. Also recommend continuing anticoagulation with target INR 2-3 with warfarin and adding antiplatelet therapy, such as plavix 75 mg daily to reduce future stroke risk, although this would increase bleeding risk.    Cardiology will sign off, please call with any questions or concerns.    Mu Kiran MD  PGY-IV

## 2019-04-04 NOTE — SUBJECTIVE & OBJECTIVE
Neurologic Chief Complaint: L MCA stroke     Subjective:     Interval History: Patient is seen for follow-up neurological assessment and treatment recommendations:   patient seen and examined with family at bedside and with gabrielle  for american sign language. The patient still with aphasia, noting some peserverations, expressive worse than receptive. Also with some issues with dexterity of the right hand. Appreciate cardiology recs     HPI, Past Medical, Family, and Social History remains the same as documented in the initial encounter.     Review of Systems   Constitutional: Negative for fever.   Eyes: Negative for visual disturbance.   Neurological: Positive for facial asymmetry. Negative for dizziness.        (+) aphasia     Scheduled Meds:   docusate sodium  100 mg Oral BID    losartan  50 mg Oral Daily    metoprolol tartrate  50 mg Oral BID    rosuvastatin  20 mg Oral QHS    warfarin  2.5 mg Oral Every Wed    warfarin  5 mg Oral Every Tues, Thurs, Sat    [START ON 4/8/2019] warfarin  5 mg Oral Every Mon    [START ON 4/7/2019] warfarin  5 mg Oral Every Sun     Continuous Infusions:   sodium chloride 0.9%       PRN Meds:hydrALAZINE, ondansetron, ondansetron, sodium chloride 0.9%, sodium chloride 0.9%    Objective:     Vital Signs (Most Recent):  Temp: 97 °F (36.1 °C) (04/04/19 1541)  Pulse: 82 (04/04/19 1541)  Resp: 18 (04/04/19 1541)  BP: (!) 151/94 (04/04/19 1541)  SpO2: 98 % (04/04/19 1541)  BP Location: Left arm    Vital Signs Range (Last 24H):  Temp:  [97 °F (36.1 °C)-98.2 °F (36.8 °C)]   Pulse:  []   Resp:  [18]   BP: (141-151)/(65-94)   SpO2:  [94 %-99 %]   BP Location: Left arm    Physical Exam   Constitutional: He appears well-developed and well-nourished.   HENT:   Head: Normocephalic and atraumatic.   Cardiovascular: Normal rate.   Pulmonary/Chest: Effort normal.   Musculoskeletal: Normal range of motion.   Neurological: He is alert.   Skin: Skin is warm and dry.   Nursing note  and vitals reviewed.      Neurological Exam: patient is deaf but communicates through american sign language      LOC: alert  Attention Span: Good   Language: peseverates, expressive worse than receptive, naming impaired   Orientation: Person, Place, Time   Visual Fields: Full  EOM (CN III, IV, VI): Full/intact  Facial Movement (CN VII): Lower facial weakness on the Right  Motor: Arm left  Normal 5/5  Leg left  Normal 5/5  Arm right  Normal 5/5  Leg right Normal 5/5  Sensation: Intact to light touch, temperature and vibration  Tone: Normal tone throughout  Slowed rapid alternating movements on right       Laboratory:  CMP:   Recent Labs   Lab 04/04/19  0346   CALCIUM 8.9   ALBUMIN 3.0*   PROT 5.8*      K 4.2   CO2 25      BUN 13   CREATININE 0.8   ALKPHOS 50*   ALT 11   AST 21   BILITOT 0.7     CBC:   Recent Labs   Lab 04/04/19  0346   WBC 6.57   RBC 4.33*   HGB 13.8*   HCT 42.0      MCV 97   MCH 31.9*   MCHC 32.9     Lipid Panel:   Recent Labs   Lab 04/03/19  0502   CHOL 124   LDLCALC 74.0   HDL 35*   TRIG 75     Coagulation:   Recent Labs   Lab 04/04/19  0346   INR 2.2*   APTT 31.3     Platelet Aggregation Study: No results for input(s): PLTAGG, PLTAGINTERP, PLTAGREGLACO, ADPPLTAGGREG in the last 168 hours.  Hgb A1C: No results for input(s): HGBA1C in the last 168 hours.  TSH:   Recent Labs   Lab 04/03/19  0502   TSH 3.512       Diagnostic Results     Brain Imaging   4/3/19 MRI/MRA  Small acute infarcts involving the posterior left frontal lobe and left basal ganglia.  There is a punctate focus of gradient susceptibility within the region of the posterior left frontal lobe infarct which could represent small focus of petechial hemorrhage.    Mild chronic microvascular ischemic change.    No evidence of large vessel occlusion.    Diminutive appearance of the V3 and V4 segments of the right vertebral artery which may be congenital relate to underlying stenosis.  Further evaluation with CTA as  clinically warranted.    Echo 4/3/19  · Mild left ventricular enlargement.  · Severely decreased left ventricular systolic function. The estimated ejection fraction is 15%  · Moderately reduced right ventricular systolic function.  · Severe left atrial enlargement.  · Atrial fibrillation observed.  · Mild aortic regurgitation.  · The estimated PA systolic pressure is 31 mm Hg  · Normal central venous pressure (3 mm Hg).

## 2019-04-04 NOTE — PLAN OF CARE
Problem: Physical Therapy Goal  Goal: Physical Therapy Goal  Outcome: Outcome(s) achieved Date Met: 04/04/19  Initial eval completed.  Results, POC, and therapy recommendations discussed with patient and RN.   Complete evaluation documentation to follow.    Mobility Recommendations: safe to walk  D/c recommendations: home with 24 hr supervision      Susanna Christie, PT  4/4/2019  [phone] (pager)

## 2019-04-04 NOTE — HPI
Michael Mclean is a 79-year-old male with PMHx of deaf (uses sign language), HTN, HLP, CAD, A-fib (on Coumadin), & prostate CA.  Patient presented to [facility] ED with AMS and R sided weakness.  CTH revealed no acute pathology.  A telemedicine consult was placed.  Not a tPA candidate 2/2 full dose of AC. [place] [facility] [place] evaluation and management.  MRI brain revealed small acute infarcts involving the posterior left frontal lobe and left basal ganglia. Hospital course complicated by cardiomyopathy with elevated HR (EF 15%, Cards rec Valsartan 80 mg daily and Metoprolol tartrate 50 mg BID and stopping Diltiazem).       Functional History: Patient lives in [place] with alone in a single story home with no steps to enter.  Prior to admission, (I) with ADLs and mobility.  DME: none.

## 2019-04-04 NOTE — ASSESSMENT & PLAN NOTE
-Cards consulted  -EF 15%  -Cards rec Valsartan 80 mg daily and Metoprolol tartrate 50 mg BID and stopping Diltiazem

## 2019-04-04 NOTE — HOSPITAL COURSE
4/4/19- patient seen and examined with family at bedside and with gabrielle  for american sign language. The patient still with aphasia, noting some peserverations, expressive worse than receptive. Also with some issues with dexterity of the right hand. Appreciate cardiology recs   4/5/19 - seen with speech therapy and gabrielle , patient with aphasia still present. Plan to go home today with 24 hour care and family support (as well as outpatient speech therapy). Coumadin regimen will remain the same, has been therapeutic on regimen. Will send DC summary to cardiologist who follows him - Dr Fady Rodriguez Vinay is a 79 y.o. male with infarct 2/2  Embolic (Afib and low EF). Patient was educated on the type of stroke and etiology of their stroke. For secondary stroke prevention, the following risk factors were addressed with the patient, low EF , HLD, a fib and CAD along with the appropriate medications to continue at discharge. Stroke follow up appointment was discussed the patient. Patient was educated on the warning signs of stroke and instructed to call 911 immediately if they experience any stroke like symptoms.    Patient needs to go home with 24 hour care - advised daughter (napoleon) that he cannot drive or be alone until after follow up with vascular neurology (concern for severe aphasia). Also advised he should not go out walking alone. It seems Mr Mclean was previously independent and I think this will overall be hard for him but his daughter makes her own work schedule and has been here daily and very willing to participate in his care.She stated understanding and taught back regarding some of his deficiences in understanding and expressing himself.   Explained home medication changes and asked that he follow up with his cardiologist - new severely decreased EF. Stopped dilt, added valsartan and metoprolol   (was previously having a lot of dizziness - being investigated by various doctors  but has not had any here, suspect this may have been due to his tachycardia and afib) much better rate control here.   Will do outpatient therapy - daughter will accompany him for translation   Should follow up with pcp as well     Continued to stress home safety and supervision to daughter with  and speech therapy. Speech also did additional training on swallowing.     Inpatient acute stroke work up completed and patient is stable for discharge.  Please see imaging and discharge medication list below.

## 2019-04-04 NOTE — ASSESSMENT & PLAN NOTE
79 y.o. male with significant past medical history of Afib on anticoagulation, deaf (uses sign language to communicate) presented to hospital as a transfer from [facility] for evaluation of stroke.  The patient had right facial droop with right upper and lower extremity weakness.  He also had difficulty communicating using sign language, following commands.  No tPA administered due to INR 2.9.  Inconclusive CTA head and neck at the OSH.  No intervention at this time.    Antithrombotics for secondary stroke prevention: Anticoagulants: Warfarin INR adjusted target - remains therapeutic on home dosing     Statins for secondary stroke prevention and hyperlipidemia, if present:   Statins: Atorvastatin- 40 mg daily    Aggressive risk factor modification: HTN, HLD, A-Fib     Rehab efforts: PT/OT/SLP to evaluate and treat    Diagnostics ordered/pending: none    VTE prophylaxis: None: Reason for No Pharmacological VTE Prophylaxis: Currently on anticoagulation, Mechanical prophylaxis: Place SCDs    BP parameters: Infarct: No intervention, SBP <220

## 2019-04-04 NOTE — PROGRESS NOTES
"Stroke book individualized and filled out. Pt is deaf, so used tele [employer]  to do stroke teaching with pt. Pt is AAOx3, but has some cognitive deficits remaining and" difficulty with word finding to" sign" states Tamika the .Instructed Tamika to" sign" to pt to eat fruits and vegeatables and low salt, low fat diet . Also instructed on importance of medication compliance to prevent further strokes.Instructed on s/s of when to call 911: sudden severe H/A, trouble seeing, understanding, speaking,movement,or weakness, numbness. Pt nodded head yes and" signed" to Tamika "OK" and  that he doesn't have any questions.Val remains at bedside.Stroke book at bedside with pt.  "

## 2019-04-04 NOTE — PROGRESS NOTES
[facility]  Vascular Neurology  Comprehensive Stroke Center  Progress Note    Assessment/Plan:     * Embolic stroke involving left middle cerebral artery  79 y.o. male with significant past medical history of Afib on anticoagulation, deaf (uses sign language to communicate) presented to hospital as a transfer from [facility] for evaluation of stroke.  The patient had right facial droop with right upper and lower extremity weakness.  He also had difficulty communicating using sign language, following commands.  No tPA administered due to INR 2.9.  Inconclusive CTA head and neck at the OSH.  No intervention at this time.    Antithrombotics for secondary stroke prevention: Anticoagulants: Warfarin INR adjusted target - remains therapeutic on home dosing     Statins for secondary stroke prevention and hyperlipidemia, if present:   Statins: Atorvastatin- 40 mg daily    Aggressive risk factor modification: HTN, HLD, A-Fib     Rehab efforts: PT/OT/SLP to evaluate and treat    Diagnostics ordered/pending: none    VTE prophylaxis: None: Reason for No Pharmacological VTE Prophylaxis: Currently on anticoagulation, Mechanical prophylaxis: Place SCDs    BP parameters: Infarct: No intervention, SBP <220        Aphasia  Plan to dc home with 24 hour supervision   Patient with 3 children who have been with him at the hospital.   Patients children willing to help, will dc home when arrangements are made  Will do out patient or home health speech therapy     Cardiomyopathy  Seen by cards  Suspect new EF   Continue coumadin, recommended adding plavix however at this time we do not feel that there is evidence for decreased stroke risk   meds adjusted per cards     Essential hypertension  -Stroke risk factor.  SBP<220.  -Resume home meds when appropriate.  -valsartan started per cards   Metoprolol bid     Deaf, nonspeaking  -Patient is deaf at baseline and uses sign language to communicate. Dionne  and family used for communication   -He is currently having difficulty communicating.  Mainly naming impared expressive aphasia and some receptive     Paroxysmal atrial fibrillation  -Stroke risk factor.  Patient with history of A-fib.  Afib w/RVR at OSH w/HR 100s.  On arrival to this facility HR 130s-150s.  diltizem discontinued due to low EF  Cards recommending valsartan and metoprolol   Family updated.   Rate much better controlled on 4/4/19 4/4/19- patient seen and examined with family at bedside and with gabrielle  for american sign language. The patient still with aphasia, noting some peserverations, expressive worse than receptive. Also with some issues with dexterity of the right hand. Appreciate cardiology recs     STROKE DOCUMENTATION   Acute Stroke Times   Last Known Normal Date: 04/02/19  Last Known Normal Time: 2200  Symptom Onset Date: 04/02/19  Symptom Onset Time: 2200  Stroke Team Called Date: 04/02/19  Stroke Team Called Time: 2310  Stroke Team Arrival Date: 04/02/19  Stroke Team Arrival Time: 2313  CT Interpretation Time: 2313  Decision to Treat Time for Alteplase: 2313(no tPA due to elevated INR)    NIH Scale:  1a. Level of Consciousness: 0-->Alert, keenly responsive  1b. LOC Questions: 1-->Answers one question correctly  1c. LOC Commands: 0-->Performs both tasks correctly  2. Best Gaze: 0-->Normal  3. Visual: 0-->No visual loss  4. Facial Palsy: 1-->Minor paralysis (flattened nasolabial fold, asymmetry on smiling)  5a. Motor Arm, Left: 0-->No drift, limb holds 90 (or 45) degrees for full 10 secs  5b. Motor Arm, Right: 0-->No drift, limb holds 90 (or 45) degrees for full 10 secs  6a. Motor Leg, Left: 0-->No drift, leg holds 30 degree position for full 5 secs  6b. Motor Leg, Right: 0-->No drift, leg holds 30 degree position for full 5 secs  7. Limb Ataxia: 0-->Absent  8. Sensory: 0-->Normal, no sensory loss  9. Best Language: 1-->Mild-to-moderate aphasia, some obvious loss of fluency  or facility of comprehension, without significant limitation on ideas expressed or form of expression. Reduction of speech and/or comprehension, however, makes conversation. . . (see row details)  10. Dysarthria: 0-->Normal  11. Extinction and Inattention (formerly Neglect): 0-->No abnormality  Total (NIH Stroke Scale): 3       Modified Palmer Score: 0  [place] Coma Scale:    ABCD2 Score:    [id number] Score:5  HAS -BLED Score:3  ICH Score:   Hunt & Harris Classification:      Hemorrhagic change of an Ischemic Stroke: Does this patient have an ischemic stroke with hemorrhagic changes? No     Neurologic Chief Complaint: L MCA stroke     Subjective:     Interval History: Patient is seen for follow-up neurological assessment and treatment recommendations:   patient seen and examined with family at bedside and with gabrielle  for american sign language. The patient still with aphasia, noting some peserverations, expressive worse than receptive. Also with some issues with dexterity of the right hand. Appreciate cardiology recs     HPI, Past Medical, Family, and Social History remains the same as documented in the initial encounter.     Review of Systems   Constitutional: Negative for fever.   Eyes: Negative for visual disturbance.   Neurological: Positive for facial asymmetry. Negative for dizziness.        (+) aphasia     Scheduled Meds:   docusate sodium  100 mg Oral BID    losartan  50 mg Oral Daily    metoprolol tartrate  50 mg Oral BID    rosuvastatin  20 mg Oral QHS    warfarin  2.5 mg Oral Every Wed    warfarin  5 mg Oral Every Tues, Thurs, Sat    [START ON 4/8/2019] warfarin  5 mg Oral Every Mon    [START ON 4/7/2019] warfarin  5 mg Oral Every Sun     Continuous Infusions:   sodium chloride 0.9%       PRN Meds:hydrALAZINE, ondansetron, ondansetron, sodium chloride 0.9%, sodium chloride 0.9%    Objective:     Vital Signs (Most Recent):  Temp: 97 °F (36.1 °C) (04/04/19 1541)  Pulse: 82 (04/04/19  1541)  Resp: 18 (04/04/19 1541)  BP: (!) 151/94 (04/04/19 1541)  SpO2: 98 % (04/04/19 1541)  BP Location: Left arm    Vital Signs Range (Last 24H):  Temp:  [97 °F (36.1 °C)-98.2 °F (36.8 °C)]   Pulse:  []   Resp:  [18]   BP: (141-151)/(65-94)   SpO2:  [94 %-99 %]   BP Location: Left arm    Physical Exam   Constitutional: He appears well-developed and well-nourished.   HENT:   Head: Normocephalic and atraumatic.   Cardiovascular: Normal rate.   Pulmonary/Chest: Effort normal.   Musculoskeletal: Normal range of motion.   Neurological: He is alert.   Skin: Skin is warm and dry.   Nursing note and vitals reviewed.      Neurological Exam: patient is deaf but communicates through american sign language      LOC: alert  Attention Span: Good   Language: peseverates, expressive worse than receptive, naming impaired   Orientation: Person, Place, Time   Visual Fields: Full  EOM (CN III, IV, VI): Full/intact  Facial Movement (CN VII): Lower facial weakness on the Right  Motor: Arm left  Normal 5/5  Leg left  Normal 5/5  Arm right  Normal 5/5  Leg right Normal 5/5  Sensation: Intact to light touch, temperature and vibration  Tone: Normal tone throughout  Slowed rapid alternating movements on right       Laboratory:  CMP:   Recent Labs   Lab 04/04/19  0346   CALCIUM 8.9   ALBUMIN 3.0*   PROT 5.8*      K 4.2   CO2 25      BUN 13   CREATININE 0.8   ALKPHOS 50*   ALT 11   AST 21   BILITOT 0.7     CBC:   Recent Labs   Lab 04/04/19  0346   WBC 6.57   RBC 4.33*   HGB 13.8*   HCT 42.0      MCV 97   MCH 31.9*   MCHC 32.9     Lipid Panel:   Recent Labs   Lab 04/03/19  0502   CHOL 124   LDLCALC 74.0   HDL 35*   TRIG 75     Coagulation:   Recent Labs   Lab 04/04/19  0346   INR 2.2*   APTT 31.3     Platelet Aggregation Study: No results for input(s): PLTAGG, PLTAGINTERP, PLTAGREGLACO, ADPPLTAGGREG in the last 168 hours.  Hgb A1C: No results for input(s): HGBA1C in the last 168 hours.  TSH:   Recent Labs   Lab  04/03/19  0502   TSH 3.512       Diagnostic Results     Brain Imaging   4/3/19 MRI/MRA  Small acute infarcts involving the posterior left frontal lobe and left basal ganglia.  There is a punctate focus of gradient susceptibility within the region of the posterior left frontal lobe infarct which could represent small focus of petechial hemorrhage.    Mild chronic microvascular ischemic change.    No evidence of large vessel occlusion.    Diminutive appearance of the V3 and V4 segments of the right vertebral artery which may be congenital relate to underlying stenosis.  Further evaluation with CTA as clinically warranted.    Echo 4/3/19  · Mild left ventricular enlargement.  · Severely decreased left ventricular systolic function. The estimated ejection fraction is 15%  · Moderately reduced right ventricular systolic function.  · Severe left atrial enlargement.  · Atrial fibrillation observed.  · Mild aortic regurgitation.  · The estimated PA systolic pressure is 31 mm Hg  · Normal central venous pressure (3 mm Hg).      AMY Tucker  Comprehensive Stroke Center  Department of Vascular Neurology   [facility]

## 2019-04-04 NOTE — ASSESSMENT & PLAN NOTE
Seen by cards  Suspect new EF   Continue coumadin, recommended adding plavix however at this time we do not feel that there is evidence for decreased stroke risk   meds adjusted per cards

## 2019-04-04 NOTE — HOSPITAL COURSE
4/3/19: Passed bedside swallow evaluation.  SLP recommending regular diet and thin liquids. SLP cog eval pending. PT/OT evals pending.   4/4/19: Evaluated by PT, OT, and SLP.  Found to have aphasia.  Functional mobility and transfers (I)/SV.  Ambulated 150 ft SV.  ADLs set-up/SV.

## 2019-04-04 NOTE — PT/OT/SLP EVAL
"Occupational Therapy   Evaluation and Discharge     Name: Michael Mclean  MRN: [id number]  Admitting Diagnosis:  Embolic stroke involving left middle cerebral artery      Recommendations:     Discharge Recommendations:  Home with supervision.     Assessment:     Michael Mclean is a 79 y.o. male with a medical diagnosis of Embolic stroke involving left middle cerebral artery.   Pt tolerated session well. Pt cooperative and demo supervision with functional activity. However, pt is deaf and uses sign language to communicate PTA.  Pt with difficulty accurately communicating with sign language and is not safe to be home alone at this time. OT rec supervision in the home to ensure safety.        Plan:     D/C OT    Subjective     Pt smiling and able to write his name. Pt unable to use sign language to spell his name.     Occupational Profile:  Pt lives alone. Pt reports independence PTA.   Pt is deaf and communicates with sign language PTA.     Pain/Comfort:  · Pain Rating 1: 0/10    Patients cultural, spiritual, [employer] conflicts given the current situation:    None stated   Objective:     Communicated with: marcus  DOMENIC utilized at start of OT session; however, connection lost with this device during session. Fly not present.     General Precautions: Standard, aspiration, fall, deaf       Occupational Performance:    Functional Mobility/Transfers:  Supervision sit>Stand and ambulated to/from bathroom with supervision.     Activities of Daily Living:  · Pt able to stand to perform G/H skills with supervision.  · Pt able to imelda socks and shoes with set-up only     Cognitive/Visual Perceptual  Pt smiling and occasionally giving "thumbs up" sign to OT.   Pt with good eye contact and demo WFL visual tracking during functional tasks.  Pt able to follow gestures and able to follow one step commands via  on DOMENIC  Pt able to read simple words at times. However, pt often repeating questions with sign " language and rewriting words when trying to communicate with pen/paper.     Physical Exam:  Pt demo equal strength and utilized UE's equally during functional activity. Coordination and sensation appeared WFL during functional activity.     AMPAC 6 Click ADL:  AMPAC Total Score: 18    Education:    Patient left seated in chair with nsg notified and NP present   GOALS:   Multidisciplinary Problems     Occupational Therapy Goals     Not on file                History:     Past Medical History:   Diagnosis Date    Anticoagulant long-term use     Cancer     prostate    Carotid artery stenosis     left    Coronary artery disease     Encounter for blood transfusion     Hyperlipidemia     Hypertension     Mitral regurgitation     Paroxysmal atrial fibrillation     Stroke        Past Surgical History:   Procedure Laterality Date    AORTIC VALVE REPLACEMENT      CARDIOVERSION N/A 8/17/2017    Performed by Puma Shrestha [facility] at [facility]    CORONARY ARTERY BYPASS GRAFT      HERNIA REPAIR      SHOULDER SURGERY      TOE SURGERY         Time Tracking:     OT Date of Treatment: 04/04/19  OT Start Time: 0920  OT Stop Time: 0935  OT Total Time (min): 15 min    Billable Minutes:Evaluation 15    DALTON Gomes  4/4/2019

## 2019-04-04 NOTE — PT/OT/SLP EVAL
"Physical Therapy Evaluation   Discharge Summary    Patient Name: Michael Mclean  MRN: [id number]   Diagnosis: Embolic stroke involving left middle cerebral artery    Recommendations:   Discharge Recommendations:  (24 hr supervision)   Discharge Equipment Recommendations: none   Barriers to Discharge: decreased caregiver assistance    Assessment:   Michael Mclean is a 79 y.o. male admitted with a medical diagnosis of Embolic stroke involving left middle cerebral artery.  Prior to admit he lived alone. He now presents with primarily language deficits.  He is not in need of skilled PT services at this time.  Recommend 24 hr supervision after discharge.        History:     Past Medical History:   Diagnosis Date    Anticoagulant long-term use     Cancer     prostate    Carotid artery stenosis     left    Coronary artery disease     Encounter for blood transfusion     Hyperlipidemia     Hypertension     Mitral regurgitation     Paroxysmal atrial fibrillation     Stroke        Past Surgical History:   Procedure Laterality Date    AORTIC VALVE REPLACEMENT      CARDIOVERSION N/A 8/17/2017    Per[facility] by [facility] at [facility]    CORONARY ARTERY BYPASS GRAFT      HERNIA REPAIR      SHOULDER SURGERY      TOE SURGERY         Subjective   Patient comments/goals: "I feel alright" -signed  Pain/Comfort:  ·  Pain Rating 1: 0/10  · Pain Rating Post-Intervention 1: 0/10     Recent Vital Signs: (Last documentation)  Pulse: 90 (04/04/19 0700)  BP: (!) 141/65 (04/04/19 0402)  SpO2: 99 % (04/04/19 0402)     Living Environment:  Home: The patient lives alone.     PLOF:  Independent with no AD  DME owned: none    Assistance Available: Upon discharge, patient unable to report whether he will have assistance at home.     Objective:   The patient had telemetry    General Precautions: aspiration, fall, deaf  Recent Surgery: * No surgery found *    Recent Vital Signs:   Pulse: 90 (04/04/19 0700)  BP: (!) 141/65 " (04/04/19 0402)  SpO2: 99 % (04/04/19 0402)    Physical Examination:   The patient was found supine in bed in NAD. He agreed to therapy.  ID # [id number].    Cognitive Function:  - Oriented to: required cues and 1st 3 letters to sign his name, not oriented to hospital or year  - Level of Alertness: awake and alert  - Follows Commands/attention: Follows one-step commands  - Communication: expressive aphasia (sign language)  - Safety awareness/insight to disability: impaired  Musculoskeletal System  Upper Extremities:   PROM: WFL  Strength: WFL  Lower Extremities:  PROM: WFL  Strength: WFL  Cardiopulmonary System:   · HR: 69 bpm  · BP: NT  · SpO2: 98% on ra    BALANCE:  Sitting: independent  Standing: supervision to independent    FUNCTIONAL MOBILITY ASSESSMENT:  Bed Mobility: performed with HOB flat  · Rolling/Turning R: NT  · Rolling/Turning L: NT  · Supine > sit: independent  · Sit > supine: NT  · Scooting EOB: independent    Transfers:  · Sit <> stand transfer: independent    · Bed <> chair transfer: independent    Gait:   Gait x 150 feet supervision with no significant gait deviations.  He demonstrates a slightly antalgic gait pattern with no LOB and no safety concerns.  DOMENIC for  lost its signal during gait.  Unable to elicit from patient whether this is his normal gait.     Therapeutic Activities, Education, or Exercises:  The therapist educated the patient on the role of PT, POC, and therapy recommendations of no PT needs.  The therapist discussed the patients current mobility status, deficits, and level of assistance with RN.  Time was provided for active listening, discussion of health disposition, and discussion of safe discharge recommendations. Therapist answered questions to patient/familys satisfaction within scope of practice.  Patient and family are aware of patient's deficits and therapy progression. White board updated to reflect current level of  assistance.    FUNCTIONAL OUTCOME MEASURES:  [facility]  Turning over in bed (including adjusting bedclothes, sheets and blankets)?: 4  Sitting down on and standing up from a chair with arms (e.g., wheelchair, bedside commode, etc.): 4  Moving from lying on back to sitting on the side of the bed?: 4  Moving to and from a bed to a chair (including a wheelchair)?: 4  Need to walk in hospital room?: 4  Climbing 3-5 steps with a railing?: 4  Basic Mobility Total Score: 24    Goals:     Multidisciplinary Problems     Physical Therapy Goals     Not on file          Multidisciplinary Problems (Resolved)        Problem: Physical Therapy Goal    Goal Priority Disciplines Outcome Goal Variances Interventions   Physical Therapy Goal   (Resolved)     PT, PT/OT Outcome(s) achieved                     Plan:   · Plan of Care Expires: 05/04/19   Plan of Care Reviewed with: patient    This plan of care has been discussed with the patient and/or family who were involved in its development and are in agreement with the identified goals and treatment plan.     Clinical Decision Making:   Level of Complexity:     Low- [id number]     Time Tracking:   PT Received On:  04/04/19  PT Start Time:   0900    PT Stop Time:  0930  PT Total Time (min): 30 min      Billable Minutes: Evaluation 30    Susanna Christie, PT  10/02/2018  [phone] (pager)

## 2019-04-04 NOTE — PLAN OF CARE
Problem: SLP Goal  Goal: SLP Goal  Speech Language Pathology Goals  Goals expected to be met by 4/10/19    1.  Pt will tolerate regular consistency diet w/ thin liquids w/ adequate oral clearance and no overt signs of aspiration.  2.  Pt will complete Speech Language Cognitive evaluation to determine the need for additional goals.-met  2. Pt will answer 2 unit y/n questions with 70% accy.   3. Pt will follow simple commands with 70% accy given mod A.   4. Pt will complete simple expressive language tasks with 70% accy given mod A.    5. Pt will participate in ongoing assessment of cognition as appropriate.        Outcome: Ongoing (interventions implemented as appropriate)  Rec continue regular diet with thin liquids with strict aspiration precautions.  Please encourage small, single sips from cup only and encourage finger sweep to clear any solid stasis from R buccal cavity. JUAN JOSE Shahid, CCC/SLP 4/4/2019

## 2019-04-04 NOTE — ASSESSMENT & PLAN NOTE
Plan to dc home with 24 hour supervision   Patient with 3 children who have been with him at the hospital.   Patients children willing to help, will dc home when arrangements are made  Will do out patient or home health speech therapy

## 2019-04-04 NOTE — PLAN OF CARE
Problem: Communication Impairment (Stroke, Ischemic/Transient Ischemic Attack)  Goal: Improved Communication Skills    Intervention: Optimize Cognitive and Communication Skills  Pt VSS throughout shift. Mendez retrieved for translation.  d/c service due to pt only mimicking sign language seen with no real meaning/comprehension. Using non-verbal strategies and simple signs. Pt slept well through shift. Bed in lowest position, call bell within reach. WCTM.

## 2019-04-04 NOTE — CONSULTS
[facility]  Physical Medicine & Rehab  Consult Note    Patient Name: Michael Mclean  MRN: [id number]  Admission Date: 4/3/2019  Hospital Length of Stay: 1 days  Attending Physician: Ezekiel Barrera MD     Inpatient consult to Physical Medicine & Rehabilitation  Consult performed by: Wendie Israel NP  Consult requested by:  Ezekiel Barrera MD    Reason for Consult:  assess rehabilitation needs  Consults  Subjective:     Principal Problem: Embolic stroke involving left middle cerebral artery     HPI: Michael Mclean is a 79-year-old male with PMHx of deaf (uses sign language), HTN, HLP, CAD, A-fib (on Coumadin), & prostate CA.  Patient presented to [facility] ED with AMS and R sided weakness.  CTH revealed no acute pathology.  A telemedicine consult was placed.  Not a tPA candidate 2/2 full dose of AC. Transferred to [facility] on 4/3 for further evaluation and management.  MRI brain revealed small acute infarcts involving the posterior left frontal lobe and left basal ganglia. Hospital course complicated by cardiomyopathy with elevated HR (EF 15%, Cards rec Valsartan 80 mg daily and Metoprolol tartrate 50 mg BID and stopping Diltiazem).     Functional History: Patient lives in [place] with alone in a single story home with no steps to enter.  Prior to admission, (I) with ADLs and mobility.  DME: none.    Hospital Course: 4/3/19: Passed bedside swallow evaluation.  SLP recommending regular diet and thin liquids. SLP cog eval pending. PT/OT evals pending.       Past Medical History:   Diagnosis Date    Anticoagulant long-term use     Cancer     prostate    Carotid artery stenosis     left    Coronary artery disease     Encounter for blood transfusion     Hyperlipidemia     Hypertension     Mitral regurgitation     Paroxysmal atrial fibrillation     Stroke      Past Surgical History:   Procedure Laterality Date    AORTIC VALVE REPLACEMENT      CARDIOVERSION N/A 8/17/2017    Performed by  [facility] at [facility] ENDO    CORONARY ARTERY BYPASS GRAFT      HERNIA REPAIR      SHOULDER SURGERY      TOE SURGERY       Review of patient's allergies indicates:  No Known Allergies    Scheduled Medications:    docusate sodium  100 mg Oral BID    losartan  50 mg Oral Daily    metoprolol tartrate  50 mg Oral BID    rosuvastatin  20 mg Oral QHS    warfarin  2.5 mg Oral Every Wed    warfarin  5 mg Oral Every Tues, Thurs, Sat    [START ON 4/8/2019] warfarin  5 mg Oral Every Mon    [START ON 4/7/2019] warfarin  5 mg Oral Every Sun       PRN Medications: hydrALAZINE, ondansetron, ondansetron, sodium chloride 0.9%, sodium chloride 0.9%    Family History     Family history is unknown by patient.        Tobacco Use    Smoking status: Never Smoker    Smokeless tobacco: Never Used   Substance and Sexual Activity    Alcohol use: Yes     Comment: occasionally    Drug use: Not Currently    Sexual activity: Not on file     Review of Systems   Reason unable to perform ROS: aphasia.     Objective:     Vital Signs (Most Recent):  Temp: 97 °F (36.1 °C) (04/04/19 0800)  Pulse: 101 (04/04/19 0800)  Resp: 18 (04/04/19 0800)  BP: (!) 141/89 (04/04/19 0800)  SpO2: 96 % (04/04/19 0800)    Vital Signs (24h Range):  Temp:  [97 °F (36.1 °C)-98.2 °F (36.8 °C)] 97 °F (36.1 °C)  Pulse:  [] 101  Resp:  [16-18] 18  SpO2:  [94 %-99 %] 96 %  BP: (126-147)/(65-91) 141/89     Body mass index is 27.17 kg/m².    Physical Exam   Constitutional: He appears well-developed and well-nourished.   Deaf-uses sign lang   HENT:   Head: Normocephalic and atraumatic.   Eyes: Right eye exhibits no discharge. Left eye exhibits no discharge. No scleral icterus.   Neck: Neck supple.   Cardiovascular: Normal rate and intact distal pulses.   Pulmonary/Chest: Effort normal. No respiratory distress.   Abdominal: Soft. He exhibits no distension.   Musculoskeletal: He exhibits no edema or deformity.   No focal weakness    Neurological: He is  alert. He is disoriented. No sensory deficit. He exhibits normal muscle tone.   Writes correct name and states correct age with Dionne only    Skin: Skin is warm and dry.   Psychiatric: He has a normal mood and affect. His behavior is normal. Cognition and memory are impaired.    use for communication          Diagnostic Results:   Labs: Reviewed  ECG: Reviewed  X-Ray: Reviewed  MRI: Reviewed    Assessment/Plan:     * Embolic stroke involving left middle cerebral artery  - MRI brain revealed small acute infarcts involving the posterior left frontal lobe and left basal ganglia    See hospital course for functional, cognitive/speech/language, and nutrition/swallow status.      Recommendations  -  Encourage mobility, OOB in chair at least 3 hours per day, and early ambulation as appropriate   -  PT/OT evaluate and treat  -  SLP speech and cognitive evaluate and treat  -  Monitor sleep disturbances and establish consistent sleep-wake cycle  -  Monitor for bowel and bladder dysfunction  -  Monitor for shoulder pain, subluxation, & spasticity  -  Monitor for and prevent skin breakdown and pressure ulcers  · Early mobility, repositioning/weight shifting every 20-30 minutes when sitting, turn patient every 2 hours, proper mattress/overlay and chair cushioning, pressure relief/heel protector boots  -  DVT prophylaxis (if appropriate)  -  Reviewed discharge options (IP rehab, SNF, HH therapy, and OP therapy)    Cardiomyopathy  -Cards consulted  -EF 15%  -Cards rec Valsartan 80 mg daily and Metoprolol tartrate 50 mg BID and stopping Diltiazem      Deaf, nonspeaking  -deaf at baseline and uses sign language to communicate  Paroxysmal atrial fibrillation  -on home coumadin    OT/PT evaluations pending. Will follow progress and discuss with rehab team for post acute care/rehab recommendation.      Thank you for your consult.     Wendie Israel NP  Department of Physical Medicine & Rehab  [facility]  [place]

## 2019-04-04 NOTE — ASSESSMENT & PLAN NOTE
-Patient is deaf at baseline and uses sign language to communicate. Dionne and family used for communication   -He is currently having difficulty communicating.  Mainly naming impared expressive aphasia and some receptive

## 2019-04-04 NOTE — PLAN OF CARE
PCP: Josh Alejo Jr, MD  Pharmacy:   [facility] - [place], [place] - [address]  [address]  [facility] [address]  Phone: [phone] Fax: [phone]    [facility] - [place], [place] - [address] [facility] [address]  [address] [facility] [address]  Phone: [phone] Fax: [phone]         04/03/19 1115   Discharge Assessment   Assessment Type Discharge Planning Assessment   Confirmed/corrected address and phone number on facesheet? Yes   Assessment information obtained from? Patient   Expected Length of Stay (days)   (TBD)   Communicated expected length of stay with patient/caregiver yes   Prior to hospitilization cognitive status: Alert/Oriented;No Deficits   Prior to hospitalization functional status: Independent   Current cognitive status: Alert/Oriented   Current Functional Status: Assistive Equipment;Needs Assistance   Lives With alone   Able to Return to Prior Arrangements other (see comments)  (TBD)   Is patient able to care for self after discharge? Unable to determine at this time (comments)   Who are your caregiver(s) and their phone number(s)? Karen Mclean Daughter     [phone]    Patient's perception of discharge disposition home or selfcare   Readmission Within the Last 30 Days no previous admission in last 30 days   Patient currently being followed by outpatient case management? No   Patient currently receives any other outside agency services? No   Equipment Currently Used at Home none   Do you have any problems affording any of your prescribed medications? No   Is the patient taking medications as prescribed? yes   Does the patient have transportation home? Yes   Transportation Anticipated family or friend will provide   Does the patient receive services at the [facility]? No   Discharge Plan A Home with family   Discharge Plan B Home Health;Home with family   DME Needed Upon Discharge  none   Patient/Family in Agreement with Plan yes

## 2019-04-04 NOTE — PT/OT/SLP EVAL
"Speech Language Pathology Evaluation  Cognitive/Bedside Swallow    Patient Name:  Michael Mclean   MRN:  [id number]  Admitting Diagnosis: Embolic stroke involving left middle cerebral artery    Recommendations:                  General Recommendations:  Dysphagia therapy and Speech/language therapy  Diet recommendations:  Regular, Thin   Aspiration Precautions: Check for pocketing/oral residue, Small bites/sips and Strict aspiration precautions   General Precautions: Standard, aspiration, fall, deaf, aphasia  Communication strategies:  go to room if call light pushed and     History:     Past Medical History:   Diagnosis Date    Anticoagulant long-term use     Cancer     prostate    Carotid artery stenosis     left    Coronary artery disease     Encounter for blood transfusion     Hyperlipidemia     Hypertension     Mitral regurgitation     Paroxysmal atrial fibrillation     Stroke        Past Surgical History:   Procedure Laterality Date    AORTIC VALVE REPLACEMENT      CARDIOVERSION N/A 8/17/2017    Performed by [facility] at [facility] ENDO    CORONARY ARTERY BYPASS GRAFT      HERNIA REPAIR      SHOULDER SURGERY      TOE SURGERY         Social History: Patient lives alone, indpt pta.    Subjective     "I don't know. I don't have a watch." Pt signed when asked, "What do we tell time with?"     Pain/Comfort:  · Pain Rating 1: 0/10  · Pain Rating Post-Intervention 1: 0/10    Objective:     Cognitive Status:    Pt oriented to self, place, and time given written field of 2.  Cont to assess 2/2 langauge deficits      Receptive Language:   Comprehension:   impaired,  providing additional cues to aid comprehension  Questions Simple yes/no 90%  Complex yes/no 0%  Commands  One step mod cues    Pragmatics:    WFL    Expressive Language:  Non-Verbal:    impaired, simple ADL objs named wiht 100% accy, 0% with responsive naming.  Pt noted to frequently perseverate on " finger spelling or mimicking 's signs when attempting to communicate.  Good use of gesture.       Motor Speech:  n/a    Voice:   n/a    Visual-Spatial:  appears WFL in environment, cont to assess    Reading:   Pt with difficulty following written command, oriented x3 given field of 2.       Written Expression:   uses dominant hand, perseverations evident.  Pt able to write name of adl object, wrote signed letters with 75% accy    Oral Musculature Evaluation  · Oral Musculature: facial asymmetry present  · Dentition: present and adequate  · Secretion Management: adequate  · Mucosal Quality: adequate  · Mandibular Strength and Mobility: WFL  · Oral Labial Strength and Mobility: impaired retraction  · Lingual Strength and Mobility: impaired anterior elevation, impaired strength, impaired left lateral movement  · Buccal Strength and Mobility: impaired  · Volitional Cough: adequate  · Volitional Swallow: timely  · Voice Prior to PO Intake: clear    Bedside Swallow Treatment:   Pt observed self presenting thin liquids via large cyclical sips with delayed cough x1.  Immediate cough observed x1 when pt taking large sip then attempting finger sweep during swallow with thins.  Extensive education with and without  provided re: swallow precs, aspiration risk, use of finger sweep with solids only and s/s aspiration.  Pt able to demonstrate understanding.  No overt s/s aspiration with small single sips from cup.  Mild stasis noted with solid trials, clears with finger sweep and liquid wash.  Nursing alerted re: swallow precs and diet recs.  SLP to follow to ensure tolerance.        Assessment:     Michael Mclean is a 79 y.o. male with an SLP diagnosis of Aphasia and Dysphagia.      Goals:   Multidisciplinary Problems     SLP Goals        Problem: SLP Goal    Goal Priority Disciplines Outcome   SLP Goal     SLP Ongoing (interventions implemented as appropriate)   Description:  Speech Language Pathology  Goals  Goals expected to be met by 4/10/19    1.  Pt will tolerate regular consistency diet w/ thin liquids w/ adequate oral clearance and no overt signs of aspiration.  2.  Pt will complete Speech Language Cognitive evaluation to determine the need for additional goals.-met  2. Pt will answer 2 unit y/n questions with 70% accy.   3. Pt will follow simple commands with 70% accy given mod A.   4. Pt will complete simple expressive language tasks with 70% accy given mod A.    5. Pt will participate in ongoing assessment of cognition as appropriate.                          Plan:     · Patient to be seen:  4 x/week   · Plan of Care expires:  05/02/19  · Plan of Care reviewed with:  patient   · SLP Follow-Up:  Yes       Discharge recommendations:  Discharge Facility/Level of Care Needs: home health speech therapy(24 hour supervision)   Barriers to Discharge:  Level of Skilled Assistance Needed      Time Tracking:     SLP Treatment Date:   04/04/19  Speech Start Time:  1045  Speech Stop Time:  1116     Speech Total Time (min):  31 min    Billable Minutes: Eval 16  and Treatment Swallowing Dysfunction 15    JUAN JOSE Shahid, CCC-SLP  04/04/2019

## 2019-04-04 NOTE — ASSESSMENT & PLAN NOTE
- MRI brain revealed small acute infarcts involving the posterior left frontal lobe and left basal ganglia    See hospital course for functional, cognitive/speech/language, and nutrition/swallow status.      Recommendations  -  Encourage mobility, OOB in chair at least 3 hours per day, and early ambulation as appropriate   -  PT/OT evaluate and treat  -  SLP speech and cognitive evaluate and treat  -  Monitor sleep disturbances and establish consistent sleep-wake cycle  -  Monitor for bowel and bladder dysfunction  -  Monitor for shoulder pain, subluxation, & spasticity  -  Monitor for and prevent skin breakdown and pressure ulcers  · Early mobility, repositioning/weight shifting every 20-30 minutes when sitting, turn patient every 2 hours, proper mattress/overlay and chair cushioning, pressure relief/heel protector boots  -  DVT prophylaxis (if appropriate)  -  Reviewed discharge options (IP rehab, SNF, HH therapy, and OP therapy)

## 2019-04-04 NOTE — ASSESSMENT & PLAN NOTE
-Stroke risk factor.  Patient with history of A-fib.  Afib w/RVR at OSH w/HR 100s.  On arrival to this facility HR 130s-150s.  diltizem discontinued due to low EF  Cards recommending valsartan and metoprolol   Family updated.   Rate much better controlled on 4/4/19

## 2019-04-04 NOTE — ASSESSMENT & PLAN NOTE
-Stroke risk factor.  SBP<220.  -Resume home meds when appropriate.  -valsartan started per cards   Metoprolol bid

## 2019-04-04 NOTE — NURSING
Mendez interpretation at bedside. Pt having difficulty communicating using sign language. No family at bedside.  Answered no questions correctly, able to follow commands.

## 2019-04-05 VITALS
HEIGHT: 69 IN | DIASTOLIC BLOOD PRESSURE: 74 MMHG | SYSTOLIC BLOOD PRESSURE: 160 MMHG | BODY MASS INDEX: 27.25 KG/M2 | TEMPERATURE: 97 F | RESPIRATION RATE: 18 BRPM | HEART RATE: 98 BPM | WEIGHT: 184 LBS | OXYGEN SATURATION: 96 %

## 2019-04-05 LAB
ALBUMIN SERPL BCP-MCNC: 3 G/DL (ref 3.5–5.2)
ALP SERPL-CCNC: 50 U/L (ref 55–135)
ALT SERPL W/O P-5'-P-CCNC: 11 U/L (ref 10–44)
ANION GAP SERPL CALC-SCNC: 8 MMOL/L (ref 8–16)
ANION GAP SERPL CALC-SCNC: 8 MMOL/L (ref 8–16)
AST SERPL-CCNC: 23 U/L (ref 10–40)
BASOPHILS # BLD AUTO: 0.05 K/UL (ref 0–0.2)
BASOPHILS NFR BLD: 0.7 % (ref 0–1.9)
BILIRUB SERPL-MCNC: 0.6 MG/DL (ref 0.1–1)
BUN SERPL-MCNC: 17 MG/DL (ref 8–23)
BUN SERPL-MCNC: 18 MG/DL (ref 8–23)
CALCIUM SERPL-MCNC: 8.8 MG/DL (ref 8.7–10.5)
CALCIUM SERPL-MCNC: 9.2 MG/DL (ref 8.7–10.5)
CHLORIDE SERPL-SCNC: 108 MMOL/L (ref 95–110)
CHLORIDE SERPL-SCNC: 111 MMOL/L (ref 95–110)
CO2 SERPL-SCNC: 20 MMOL/L (ref 23–29)
CO2 SERPL-SCNC: 22 MMOL/L (ref 23–29)
CREAT SERPL-MCNC: 0.8 MG/DL (ref 0.5–1.4)
CREAT SERPL-MCNC: 0.9 MG/DL (ref 0.5–1.4)
DIFFERENTIAL METHOD: ABNORMAL
EOSINOPHIL # BLD AUTO: 0.2 K/UL (ref 0–0.5)
EOSINOPHIL NFR BLD: 3 % (ref 0–8)
ERYTHROCYTE [DISTWIDTH] IN BLOOD BY AUTOMATED COUNT: 12.5 % (ref 11.5–14.5)
EST. GFR  (AFRICAN AMERICAN): >60 ML/MIN/1.73 M^2
EST. GFR  (AFRICAN AMERICAN): >60 ML/MIN/1.73 M^2
EST. GFR  (NON AFRICAN AMERICAN): >60 ML/MIN/1.73 M^2
EST. GFR  (NON AFRICAN AMERICAN): >60 ML/MIN/1.73 M^2
GLUCOSE SERPL-MCNC: 83 MG/DL (ref 70–110)
GLUCOSE SERPL-MCNC: 87 MG/DL (ref 70–110)
HCT VFR BLD AUTO: 40.8 % (ref 40–54)
HGB BLD-MCNC: 13.7 G/DL (ref 14–18)
IMM GRANULOCYTES # BLD AUTO: 0.02 K/UL (ref 0–0.04)
IMM GRANULOCYTES NFR BLD AUTO: 0.3 % (ref 0–0.5)
INR PPP: 2.5 (ref 0.8–1.2)
LYMPHOCYTES # BLD AUTO: 2 K/UL (ref 1–4.8)
LYMPHOCYTES NFR BLD: 26.6 % (ref 18–48)
MAGNESIUM SERPL-MCNC: 2.2 MG/DL (ref 1.6–2.6)
MCH RBC QN AUTO: 31.7 PG (ref 27–31)
MCHC RBC AUTO-ENTMCNC: 33.6 G/DL (ref 32–36)
MCV RBC AUTO: 94 FL (ref 82–98)
MONOCYTES # BLD AUTO: 1.1 K/UL (ref 0.3–1)
MONOCYTES NFR BLD: 14 % (ref 4–15)
NEUTROPHILS # BLD AUTO: 4.2 K/UL (ref 1.8–7.7)
NEUTROPHILS NFR BLD: 55.4 % (ref 38–73)
NRBC BLD-RTO: 0 /100 WBC
PHOSPHATE SERPL-MCNC: 3 MG/DL (ref 2.7–4.5)
PLATELET # BLD AUTO: 182 K/UL (ref 150–350)
PMV BLD AUTO: 10.7 FL (ref 9.2–12.9)
POTASSIUM SERPL-SCNC: 4.1 MMOL/L (ref 3.5–5.1)
POTASSIUM SERPL-SCNC: 4.4 MMOL/L (ref 3.5–5.1)
PROT SERPL-MCNC: 5.9 G/DL (ref 6–8.4)
PROTHROMBIN TIME: 24.2 SEC (ref 9–12.5)
RBC # BLD AUTO: 4.32 M/UL (ref 4.6–6.2)
SODIUM SERPL-SCNC: 138 MMOL/L (ref 136–145)
SODIUM SERPL-SCNC: 139 MMOL/L (ref 136–145)
WBC # BLD AUTO: 7.62 K/UL (ref 3.9–12.7)

## 2019-04-05 PROCEDURE — 85025 COMPLETE CBC W/AUTO DIFF WBC: CPT

## 2019-04-05 PROCEDURE — 25000003 PHARM REV CODE 250: Performed by: PHYSICIAN ASSISTANT

## 2019-04-05 PROCEDURE — 25000003 PHARM REV CODE 250: Performed by: NURSE PRACTITIONER

## 2019-04-05 PROCEDURE — 80053 COMPREHEN METABOLIC PANEL: CPT

## 2019-04-05 PROCEDURE — 99232 SBSQ HOSP IP/OBS MODERATE 35: CPT | Mod: ,,, | Performed by: NURSE PRACTITIONER

## 2019-04-05 PROCEDURE — 83735 ASSAY OF MAGNESIUM: CPT

## 2019-04-05 PROCEDURE — 99233 SBSQ HOSP IP/OBS HIGH 50: CPT | Mod: GC,,, | Performed by: PSYCHIATRY & NEUROLOGY

## 2019-04-05 PROCEDURE — 36415 COLL VENOUS BLD VENIPUNCTURE: CPT

## 2019-04-05 PROCEDURE — 99232 PR SUBSEQUENT HOSPITAL CARE,LEVL II: ICD-10-PCS | Mod: ,,, | Performed by: NURSE PRACTITIONER

## 2019-04-05 PROCEDURE — 92526 ORAL FUNCTION THERAPY: CPT

## 2019-04-05 PROCEDURE — 84100 ASSAY OF PHOSPHORUS: CPT

## 2019-04-05 PROCEDURE — 99233 PR SUBSEQUENT HOSPITAL CARE,LEVL III: ICD-10-PCS | Mod: GC,,, | Performed by: PSYCHIATRY & NEUROLOGY

## 2019-04-05 PROCEDURE — 80048 BASIC METABOLIC PNL TOTAL CA: CPT

## 2019-04-05 PROCEDURE — 85610 PROTHROMBIN TIME: CPT

## 2019-04-05 PROCEDURE — 97535 SELF CARE MNGMENT TRAINING: CPT

## 2019-04-05 PROCEDURE — 92507 TX SP LANG VOICE COMM INDIV: CPT

## 2019-04-05 RX ORDER — WARFARIN SODIUM 5 MG/1
5 TABLET ORAL DAILY
Qty: 30 TABLET | Refills: 1 | Status: SHIPPED | OUTPATIENT
Start: 2019-04-05 | End: 2019-08-05

## 2019-04-05 RX ORDER — METOPROLOL TARTRATE 50 MG/1
50 TABLET ORAL 2 TIMES DAILY
Qty: 60 TABLET | Refills: 11 | Status: SHIPPED | OUTPATIENT
Start: 2019-04-05 | End: 2019-07-23 | Stop reason: CLARIF

## 2019-04-05 RX ORDER — VALSARTAN 80 MG/1
80 TABLET ORAL DAILY
Qty: 90 TABLET | Refills: 3 | Status: SHIPPED | OUTPATIENT
Start: 2019-04-05 | End: 2019-07-23 | Stop reason: DRUGHIGH

## 2019-04-05 RX ADMIN — DOCUSATE SODIUM 100 MG: 100 CAPSULE, LIQUID FILLED ORAL at 01:04

## 2019-04-05 RX ADMIN — LOSARTAN POTASSIUM 50 MG: 50 TABLET, FILM COATED ORAL at 09:04

## 2019-04-05 RX ADMIN — METOPROLOL TARTRATE 50 MG: 50 TABLET, FILM COATED ORAL at 09:04

## 2019-04-05 NOTE — SUBJECTIVE & OBJECTIVE
Neurologic Chief Complaint: L MCA stroke     Subjective:     Interval History: Patient is seen for follow-up neurological assessment and treatment recommendations:   seen with speech therapy and gabrielle , patient with aphasia still present. Plan to go home today with 24 hour care and family support (as well as outpatient speech therapy). Coumadin regimen will remain the same, has been therapeutic on regimen. Will send DC summary to cardiologist who follows him - Dr Shrestha (phone - [phone]))    HPI, Past Medical, Family, and Social History remains the same as documented in the initial encounter.     Review of Systems   Constitutional: Negative for fever.   Eyes: Negative for visual disturbance.   Neurological: Positive for facial asymmetry. Negative for dizziness.        (+) aphasia     Scheduled Meds:   docusate sodium  100 mg Oral BID    losartan  50 mg Oral Daily    metoprolol tartrate  50 mg Oral BID    rosuvastatin  20 mg Oral QHS    warfarin  2.5 mg Oral Every Wed    warfarin  5 mg Oral Every Tues, Thurs, Sat    [START ON 4/8/2019] warfarin  5 mg Oral Every Mon    [START ON 4/7/2019] warfarin  5 mg Oral Every Sun     Continuous Infusions:   sodium chloride 0.9%       PRN Meds:hydrALAZINE, ondansetron, ondansetron, sodium chloride 0.9%, sodium chloride 0.9%    Objective:     Vital Signs (Most Recent):  Temp: 97.2 °F (36.2 °C) (04/05/19 1124)  Pulse: 98 (04/05/19 1124)  Resp: 18 (04/05/19 1124)  BP: (!) 160/74 (04/05/19 1124)  SpO2: 96 % (04/05/19 1124)  BP Location: Left arm    Vital Signs Range (Last 24H):  Temp:  [97.2 °F (36.2 °C)-98.3 °F (36.8 °C)]   Pulse:  []   Resp:  [17-20]   BP: (107-160)/(67-83)   SpO2:  [96 %-97 %]   BP Location: Left arm    Physical Exam   Constitutional: He appears well-developed and well-nourished.   HENT:   Head: Normocephalic and atraumatic.   Cardiovascular: Normal rate.   Pulmonary/Chest: Effort normal.   Musculoskeletal: Normal range of motion.    Neurological: He is alert.   Skin: Skin is warm and dry.   Nursing note and vitals reviewed.      Neurological Exam: patient is deaf but communicates through american sign language      LOC: alert  Attention Span: Good   Language: peseverates, expressive and receptive, naming impaired   Orientation: Person, Place, Time   Visual Fields: Full  EOM (CN III, IV, VI): Full/intact  Facial Movement (CN VII): Lower facial weakness on the Right  Motor: Arm left  Normal 5/5  Leg left  Normal 5/5  Arm right  Normal 5/5  Leg right Normal 5/5  Sensation: Intact to light touch, temperature and vibration  Tone: Normal tone throughout  Slowed rapid alternating movements on right       Laboratory:  CMP:   Recent Labs   Lab 04/05/19  0537 04/05/19  0745   CALCIUM 8.8 9.2   ALBUMIN 3.0*  --    PROT 5.9*  --     138   K 4.1 4.4   CO2 20* 22*   * 108   BUN 18 17   CREATININE 0.8 0.9   ALKPHOS 50*  --    ALT 11  --    AST 23  --    BILITOT 0.6  --      CBC:   Recent Labs   Lab 04/05/19  0537   WBC 7.62   RBC 4.32*   HGB 13.7*   HCT 40.8      MCV 94   MCH 31.7*   MCHC 33.6     Lipid Panel:   Recent Labs   Lab 04/03/19  0502   CHOL 124   LDLCALC 74.0   HDL 35*   TRIG 75     Coagulation:   Recent Labs   Lab 04/04/19  0346 04/05/19  0745   INR 2.2* 2.5*   APTT 31.3  --      Platelet Aggregation Study: No results for input(s): PLTAGG, PLTAGINTERP, PLTAGREGLACO, ADPPLTAGGREG in the last 168 hours.  Hgb A1C: No results for input(s): HGBA1C in the last 168 hours.  TSH:   Recent Labs   Lab 04/03/19  0502   TSH 3.512       Diagnostic Results     Brain Imaging   4/3/19 MRI/MRA  Small acute infarcts involving the posterior left frontal lobe and left basal ganglia.  There is a punctate focus of gradient susceptibility within the region of the posterior left frontal lobe infarct which could represent small focus of petechial hemorrhage.    Mild chronic microvascular ischemic change.    No evidence of large vessel  occlusion.    Diminutive appearance of the V3 and V4 segments of the right vertebral artery which may be congenital relate to underlying stenosis.  Further evaluation with CTA as clinically warranted.    Echo 4/3/19  · Mild left ventricular enlargement.  · Severely decreased left ventricular systolic function. The estimated ejection fraction is 15%  · Moderately reduced right ventricular systolic function.  · Severe left atrial enlargement.  · Atrial fibrillation observed.  · Mild aortic regurgitation.  · The estimated PA systolic pressure is 31 mm Hg  · Normal central venous pressure (3 mm Hg).

## 2019-04-05 NOTE — ASSESSMENT & PLAN NOTE
-Stroke risk factor.  Patient with history of A-fib.  Afib w/RVR at OSH w/HR 100s.  On arrival to this facility HR 130s-150s.  diltizem discontinued due to low EF  Cards recommending valsartan and metoprolol   Family updated.   Rate much better controlled

## 2019-04-05 NOTE — ASSESSMENT & PLAN NOTE
-MRI brain revealed small acute infarcts involving the posterior left frontal lobe and left basal ganglia  -Management per Vascular Neurology  See hospital course for functional, cognitive/speech/language, and nutrition/swallow status.      Recommendations  -  Encourage mobility, OOB in chair at least 3 hours per day, and early ambulation as appropriate   -  PT/OT evaluate and treat  -  SLP speech and cognitive evaluate and treat  -  Monitor sleep disturbances and establish consistent sleep-wake cycle  -  Monitor for bowel and bladder dysfunction  -  Monitor for shoulder pain, subluxation, & spasticity  -  Monitor for and prevent skin breakdown and pressure ulcers  · Early mobility, repositioning/weight shifting every 20-30 minutes when sitting, turn patient every 2 hours, proper mattress/overlay and chair cushioning, pressure relief/heel protector boots  -  DVT prophylaxis (if appropriate)  -  Reviewed discharge options (IP rehab, SNF, HH therapy, and OP therapy)

## 2019-04-05 NOTE — PT/OT/SLP PROGRESS
"Speech Language Pathology Treatment    Patient Name:  Michael Mclean   MRN:  [id number]  Admitting Diagnosis: Embolic stroke involving left middle cerebral artery    Recommendations:                 General Recommendations:  Dysphagia therapy, Speech/language therapy and Cognitive-linguistic therapy  Diet recommendations:  Regular, Liquid Diet Level: Thin   Aspiration Precautions: No straws and Strict aspiration precautions   General Precautions: Standard, aspiration, fall, deaf, aphasia  Communication strategies:  communication board, go to room if call light pushed and      Subjective     "He's getting better."        Pain/Comfort:  · Pain Rating 1: 0/10  · Pain Rating Post-Intervention 1: 0/10    Objective:     Has the patient been evaluated by SLP for swallowing?   Yes  Keep patient NPO? No   Current Respiratory Status: room air      Pt seen bedside with daughter present.  AMY Lyons with stroke team also present for portion of session.   via Celly utilized for clear communication.  Pt's daughter and pt reporting improved expressive language with improved word finding.  Definition of aphasia, results of evaluation yesterday, safety considerations and discharge recs reviewed with pt and daughter.  Pt with some improved naming both confrontation and responsive naming though continued difficulty with less common word or new tasks.  Decreased fluency at short phrase level with continued perseverations evident.  Daughter verbalized understanding of recs for 24 hour supervision and  SLP services.  Daughter in agreement.  Simple comprehension improving with increased accy with basic y/n questions though continued difficulty with complex y/n questions.  Pt unable to recall swallow precs presented yesterday.  All swallow precs and diet recs reviewed with pt and daughter.  Pt demonstrated understanding given min A from daughter.  No overt s/s aspiration with solids or thin " liquids.  Finger sweep utilized to clear mild stasis from R buccal cavity.  Pt and daughter with no additional questions, report comfort with discharge recs and education.                Assessment:     Michael Mclean is a 79 y.o. male with an SLP diagnosis of Aphasia and Dysphagia.      Goals:   Multidisciplinary Problems     SLP Goals        Problem: SLP Goal    Goal Priority Disciplines Outcome   SLP Goal     SLP Ongoing (interventions implemented as appropriate)   Description:  Speech Language Pathology Goals  Goals expected to be met by 4/10/19    1.  Pt will tolerate regular consistency diet w/ thin liquids w/ adequate oral clearance and no overt signs of aspiration.  2.  Pt will complete Speech Language Cognitive evaluation to determine the need for additional goals.-met  2. Pt will answer 2 unit y/n questions with 70% accy.   3. Pt will follow simple commands with 70% accy given mod A.   4. Pt will complete simple expressive language tasks with 70% accy given mod A.    5. Pt will participate in ongoing assessment of cognition as appropriate.                          Plan:     · Patient to be seen:  4 x/week   · Plan of Care expires:  05/02/19  · Plan of Care reviewed with:  patient   · SLP Follow-Up:  Yes       Discharge recommendations:  home health speech therapy   Barriers to Discharge:  None    Time Tracking:     SLP Treatment Date:   04/05/19  Speech Start Time:  1015  Speech Stop Time:  1051     Speech Total Time (min):  36 min    Billable Minutes: Speech Therapy Individual 10, Treatment Swallowing Dysfunction 10 and Seld Care/Home Management Training 16    JUAN JOSE Shahid, CCC-SLP  04/05/2019

## 2019-04-05 NOTE — DISCHARGE SUMMARY
[facility]  Vascular Neurology  Comprehensive Stroke Center  Discharge Summary     Summary:     Admit Date: 4/3/2019  4:30 AM    Discharge Date and Time: 4/5/2019  4:09 PM    Attending Physician: Dr Yanez     Discharge Provider: AMY Tucker    History of Present Illness: Patient is a 79 y.o. male with significant past medical history of Afib on anticoagulation, deaf (uses sign language to communicate) presented to hospital as a transfer from [facility] for evaluation of stroke.  HPI information gathered from a review of the patient's medical record, discussion with the patient's family due to the patient's current condition.  At baseline the patient lives alone and performs his ADLs, handles his personal affairs without assistance.  He was using Facetime with his son when he fell out of a chair for ~3-4 min.  When he reappeared the patient seemed confused, looking around, and not engaged with his computer.  The patient's son went to the patient's home and found him in the same state.  The patient was incontinent of urine but did not have evidence of tongue biting or abrasions to his body.  At that time he was noted to have a right facial droop with right arm and leg weakness.  The patient is on Coumadin for A-fib with INR of 2.9.  He was brought to the [facility] ED for evaluation.  A CTH was obtained and was negative for acute findings.  A telestroke consult was obtained and performed by Dr. Ivory.  tPA not administered 2/2 elevated INR.  A CTA head was obtained but was inconclusive due to the timing of the bolus.  The patient was transferred to [facility] for higher level of care.  On arrival the patient is awake and alert.  Exam is complicated by the patient being deaf and using sign language.  Official  en route.  Patient's daughter, who uses sign language, is at the bedside.  The patient is confused and is unable to express himself as he  normally would through signing per his daughter.  He denies HA, dizziness, weakness, n/v.  The patient seems to have confusion with some simple and all multi-step commands.  MRI brain reveals acute stroke of the left frontal lobe precentral gyrus, left caudate head and putamen.  Will admit to Vascular Neurology.    Hospital Course (synopsis of major diagnoses, care, treatment, and services provided during the course of the hospital stay): 4/4/19- patient seen and examined with family at bedside and with gabrielle  for american sign language. The patient still with aphasia, noting some peserverations, expressive worse than receptive. Also with some issues with dexterity of the right hand. Appreciate cardiology recs   4/5/19 - seen with speech therapy and gabrielle , patient with aphasia still present. Plan to go home today with 24 hour care and family support (as well as outpatient speech therapy). Coumadin regimen will remain the same, has been therapeutic on regimen. Will send DC summary to cardiologist who follows him - Dr Fady Rodriguez Vinay is a 79 y.o. male with infarct 2/2  Embolic (Afib and low EF). Patient was educated on the type of stroke and etiology of their stroke. For secondary stroke prevention, the following risk factors were addressed with the patient, low EF , HLD, a fib and CAD along with the appropriate medications to continue at discharge. Stroke follow up appointment was discussed the patient. Patient was educated on the warning signs of stroke and instructed to call 911 immediately if they experience any stroke like symptoms.    Patient needs to go home with 24 hour care - advised daughter (napoleon) that he cannot drive or be alone until after follow up with vascular neurology (concern for severe aphasia). Also advised he should not go out walking alone. It seems Mr Mclean was previously independent and I think this will overall be hard for him but his daughter makes her own  work schedule and has been here daily and very willing to participate in his care.She stated understanding and taught back regarding some of his deficiences in understanding and expressing himself.   Explained home medication changes and asked that he follow up with his cardiologist - new severely decreased EF. Stopped dilt, added valsartan and metoprolol   (was previously having a lot of dizziness - being investigated by various doctors but has not had any here, suspect this may have been due to his tachycardia and afib) much better rate control here.   Will do outpatient therapy - daughter will accompany him for translation   Should follow up with pcp as well     Continued to stress home safety and supervision to daughter with  and speech therapy. Speech also did additional training on swallowing.     Inpatient acute stroke work up completed and patient is stable for discharge.  Please see imaging and discharge medication list below.        Stroke Etiology: Evident afib and 15% EF Cardio-Aortic Embolism (CE)    STROKE DOCUMENTATION   Acute Stroke Times   Last Known Normal Date: 04/02/19  Last Known Normal Time: 2200  Symptom Onset Date: 04/02/19  Symptom Onset Time: 2200  Stroke Team Called Date: 04/02/19  Stroke Team Called Time: 2310  Stroke Team Arrival Date: 04/02/19  Stroke Team Arrival Time: 2313  CT Interpretation Time: 2313  Decision to Treat Time for Alteplase: 2313(no tPA due to elevated INR)     NIH Scale:  1a. Level of Consciousness: 0-->Alert, keenly responsive  1b. LOC Questions: 1-->Answers one question correctly  1c. LOC Commands: 0-->Performs both tasks correctly  2. Best Gaze: 0-->Normal  3. Visual: 0-->No visual loss  4. Facial Palsy: 1-->Minor paralysis (flattened nasolabial fold, asymmetry on smiling)  5a. Motor Arm, Left: 0-->No drift, limb holds 90 (or 45) degrees for full 10 secs  5b. Motor Arm, Right: 0-->No drift, limb holds 90 (or 45) degrees for full 10 secs  6a. Motor Leg,  Left: 0-->No drift, leg holds 30 degree position for full 5 secs  6b. Motor Leg, Right: 0-->No drift, leg holds 30 degree position for full 5 secs  7. Limb Ataxia: 0-->Absent  8. Sensory: 0-->Normal, no sensory loss  9. Best Language: 1-->Mild-to-moderate aphasia, some obvious loss of fluency or facility of comprehension, without significant limitation on ideas expressed or form of expression. Reduction of speech and/or comprehension, however, makes conversation. . . (see row details)  10. Dysarthria: 0-->Normal  11. Extinction and Inattention (formerly Neglect): 0-->No abnormality  Total (NIH Stroke Scale): 3        Modified Palmer Score: 0  Ligia Coma Scale:    ABCD2 Score:    [id number] Score:5  HAS -BLED Score:3  ICH Score:   Hunt & Harris Classification:       Assessment/Plan:     Diagnostic Results:      Brain Imagin/3/19 MRI/MRA  Small acute infarcts involving the posterior left frontal lobe and left basal ganglia.  There is a punctate focus of gradient susceptibility within the region of the posterior left frontal lobe infarct which could represent small focus of petechial hemorrhage.    Mild chronic microvascular ischemic change.    No evidence of large vessel occlusion.    Diminutive appearance of the V3 and V4 segments of the right vertebral artery which may be congenital relate to underlying stenosis.  Further evaluation with CTA as clinically warranted.     Echo 4/3/19  · Mild left ventricular enlargement.  · Severely decreased left ventricular systolic function. The estimated ejection fraction is 15%  · Moderately reduced right ventricular systolic function.  · Severe left atrial enlargement.  · Atrial fibrillation observed.  · Mild aortic regurgitation.  · The estimated PA systolic pressure is 31 mm Hg  · Normal central venous pressure (3 mm Hg).        Interventions: None    Complications: None    Disposition: Home or Self Care    Final Active Diagnoses:    Diagnosis Date Noted POA     PRINCIPAL PROBLEM:  Embolic stroke involving left middle cerebral artery [I63.412] 04/02/2019 Yes    Aphasia [R47.01] 04/04/2019 Yes    Deaf, nonspeaking [H91.3] 04/03/2019 Yes    Essential hypertension [I10] 04/03/2019 Yes    Cardiomyopathy [I42.9] 04/03/2019 Unknown    Paroxysmal atrial fibrillation [I48.0] 08/17/2017 Yes      Problems Resolved During this Admission:     * Embolic stroke involving left middle cerebral artery  79 y.o. male with significant past medical history of Afib on anticoagulation, deaf (uses sign language to communicate) presented to hospital as a transfer from [facility] for evaluation of stroke.    Stroke in the L posterior left frontal lobe and left basal ganglia  Etiology cardioembolic (EF 15%, afib)    Antithrombotics for secondary stroke prevention: Anticoagulants: Warfarin INR adjusted target - remains therapeutic on home dosing (coumadin will be followed by his cardiologist)    Statins for secondary stroke prevention and hyperlipidemia, if present:   Statins: Atorvastatin- 40 mg daily    Aggressive risk factor modification: HTN, HLD, A-Fib     Rehab efforts: PT/OT/SLP to evaluate and treat - home with outpatient speech therapy     Diagnostics ordered/pending: none    VTE prophylaxis: None: Reason for No Pharmacological VTE Prophylaxis: Currently on anticoagulation, Mechanical prophylaxis: Place SCDs    BP parameters: Infarct: No intervention, SBP <220        Aphasia  Plan to dc home with 24 hour supervision   Patient with 3 children who have been with him at the hospital.   Patients children willing to help, will dc home when arrangements are made  Will do out patient speech therapy - attempted to contact daugther to let her know after discharge but voice mail box full     Cardiomyopathy  Seen by cards  Suspect new EF   Continue coumadin, recommended adding plavix however at this time we do not feel that there is evidence for decreased stroke risk   meds  adjusted per cards   Will send dc summary to his cardiologist - dr frazier - [phone]    Essential hypertension  -Stroke risk factor.  SBP<220.  -Resume home meds when appropriate.  -valsartan started per cards   Metoprolol bid     Deaf, nonspeaking  -Patient is deaf at baseline and uses sign language to communicate. Dionne and family used for communication   -He is currently having difficulty communicating.  Mainly naming impared expressive aphasia and some receptive   - found to also have some issues with his right handed dexterity and therefore having trouble with signing   - daughter will go with him to speech therapy     Paroxysmal atrial fibrillation  -Stroke risk factor.  Patient with history of A-fib.  Afib w/RVR at OSH w/HR 100s.  On arrival to this facility HR 130s-150s.  diltizem discontinued due to low EF  Cards recommending valsartan and metoprolol   Family updated.   Rate much better controlled        Recommendations:     Post-discharge complication risks: Falls    Stroke Education given to: patient and family    Follow-up in Stroke Clinic in 4-6 weeks  PCP in one week  Cardiologist in one week (contact about coumadin monitoring on Monday)     Discharge Plan:  Coumadin   Statin: Rosuvastatin 20mg  Aggresive risk factor modification:  Hypertension  High Cholesterol  Atrial Fibrillation    Follow Up:  Follow-up Information     Josh Alejo Jr, MD.    Specialty:  Internal Medicine  Why:  Hospital Follow-up/ Please make an appointment with PCP  Contact information:  [address] [address]  [phone]             [facility] VASCULAR NEUROLOGY. Schedule an appointment as soon as possible for a visit in 4 weeks.    Specialty:  Vascular Neurology  Contact information:  [address]  [facility] [id number] [phone]           Puma Frazier MD. Schedule an appointment as soon as possible for a visit in 1 week.    Specialty:   Cardiology  Contact information:  [address] [facility] [facility] [address]  [phone]                   Patient Instructions:   No discharge procedures on file.    Medications:  Reconciled Home Medications:      Medication List      START taking these medications    metoprolol tartrate 50 MG tablet  Commonly known as:  LOPRESSOR  Take 1 tablet (50 mg total) by mouth 2 (two) times daily.     valsartan 80 MG tablet  Commonly known as:  DIOVAN  Take 1 tablet (80 mg total) by mouth once daily.        CHANGE how you take these medications    warfarin 5 MG tablet  Commonly known as:  COUMADIN  Take 1 tablet (5 mg total) by mouth Daily. On wednesdays take 1/2 tablet (2.5 mg)  What changed:    · when to take this  · additional instructions        CONTINUE taking these medications    CALCIUM ORAL  Take 1,500 mg by mouth once daily.     clonazePAM 2 MG Tab  Commonly known as:  KLONOPIN  2 mg as needed.     CYANOCOBALAMIN (VITAMIN B-12) INJ  Inject as directed.     docusate sodium 100 MG capsule  Commonly known as:  COLACE  Take 100 mg by mouth 2 (two) times daily.     FENUGREEK SEED-BL.THISTLE-ANIS ORAL  Take 610 mg by mouth once daily.     ferrous sulfate 325 mg (65 mg iron) Tab tablet  Commonly known as:  FEOSOL  Take 325 mg by mouth daily with breakfast.     fish oil-omega-3 fatty acids 300-1,000 mg capsule  Take 1 g by mouth once daily.     KLOR-CON M20 20 MEQ tablet  Generic drug:  potassium chloride SA     magnesium oxide 400 mg (241.3 mg magnesium) tablet  Commonly known as:  MAG-OX  Take 400 mg by mouth once daily.     niacin 500 MG Cpsr  Take 250 mg by mouth every evening.     rosuvastatin 20 MG tablet  Commonly known as:  CRESTOR  Take 20 mg by mouth once daily.     torsemide 20 MG Tab  Commonly known as:  DEMADEX  Take 20 mg by mouth once daily.     TURMERIC ORAL  Take by mouth once daily.     vitamin D 1000 units Tab  Commonly known as:  VITAMIN D3  Take 1,000 Units by mouth.        STOP taking  these medications    alpha lipoic acid 200 mg Cap     aspirin 81 MG EC tablet  Commonly known as:  ECOTRIN     diltiaZEM 240 MG 24 hr capsule  Commonly known as:  CARDIZEM CD            AMY Tucker  [facility]  Department of Vascular Neurology   [facility]

## 2019-04-05 NOTE — PLAN OF CARE
Patient discharging home today with family. Patient will also be referred to [facility] for therapy. Faxed referral to [phone].      04/05/19 1526   Final Note   Assessment Type Final Discharge Note   Anticipated Discharge Disposition Home   Right Care Referral Info   Post Acute Recommendation No Care

## 2019-04-05 NOTE — PROGRESS NOTES
[facility]  Vascular Neurology  Comprehensive Stroke Center  Progress Note    Assessment/Plan:     * Embolic stroke involving left middle cerebral artery  79 y.o. male with significant past medical history of Afib on anticoagulation, deaf (uses sign language to communicate) presented to hospital as a transfer from [facility] for evaluation of stroke.    Stroke in the L posterior left frontal lobe and left basal ganglia  Etiology cardioembolic (EF 15%, afib)    Antithrombotics for secondary stroke prevention: Anticoagulants: Warfarin INR adjusted target - remains therapeutic on home dosing     Statins for secondary stroke prevention and hyperlipidemia, if present:   Statins: Atorvastatin- 40 mg daily    Aggressive risk factor modification: HTN, HLD, A-Fib     Rehab efforts: PT/OT/SLP to evaluate and treat - home with outpatient speech therapy     Diagnostics ordered/pending: none    VTE prophylaxis: None: Reason for No Pharmacological VTE Prophylaxis: Currently on anticoagulation, Mechanical prophylaxis: Place SCDs    BP parameters: Infarct: No intervention, SBP <220        Aphasia  Plan to dc home with 24 hour supervision   Patient with 3 children who have been with him at the hospital.   Patients children willing to help, will dc home when arrangements are made  Will do out patient speech therapy - attempted to contact viry but voice mail box full     Cardiomyopathy  Seen by cards  Suspect new EF   Continue coumadin, recommended adding plavix however at this time we do not feel that there is evidence for decreased stroke risk   meds adjusted per cards   Will send dc summary to his cardiologist - dr frazier - [phone]    Essential hypertension  -Stroke risk factor.  SBP<220.  -Resume home meds when appropriate.  -valsartan started per cards   Metoprolol bid     Deaf, nonspeaking  -Patient is deaf at baseline and uses sign language to communicate. Dionne and family used  for communication   -He is currently having difficulty communicating.  Mainly naming impared expressive aphasia and some receptive   - found to also have some issues with his right handed dexterity and therefore having trouble with signing   - daughter will go with him to speech therapy     Paroxysmal atrial fibrillation  -Stroke risk factor.  Patient with history of A-fib.  Afib w/RVR at OSH w/HR 100s.  On arrival to this facility HR 130s-150s.  diltizem discontinued due to low EF  Cards recommending valsartan and metoprolol   Family updated.   Rate much better controlled         4/4/19- patient seen and examined with family at bedside and with gabrielle  for american sign language. The patient still with aphasia, noting some peserverations, expressive worse than receptive. Also with some issues with dexterity of the right hand. Appreciate cardiology recs   4/5/19 - seen with speech therapy and gabrielle , patient with aphasia still present. Plan to go home today with 24 hour care and family support (as well as outpatient speech therapy). Coumadin regimen will remain the same, has been therapeutic on regimen. Will send DC summary to cardiologist who follows him - Dr Shrestha     STROKE DOCUMENTATION   Acute Stroke Times   Last Known Normal Date: 04/02/19  Last Known Normal Time: 2200  Symptom Onset Date: 04/02/19  Symptom Onset Time: 2200  Stroke Team Called Date: 04/02/19  Stroke Team Called Time: 2310  Stroke Team Arrival Date: 04/02/19  Stroke Team Arrival Time: 2313  CT Interpretation Time: 2313  Decision to Treat Time for Alteplase: 2313(no tPA due to elevated INR)    NIH Scale:  1a. Level of Consciousness: 0-->Alert, keenly responsive  1b. LOC Questions: 1-->Answers one question correctly  1c. LOC Commands: 0-->Performs both tasks correctly  2. Best Gaze: 0-->Normal  3. Visual: 0-->No visual loss  4. Facial Palsy: 1-->Minor paralysis (flattened nasolabial fold, asymmetry on smiling)  5a. Motor Arm,  Left: 0-->No drift, limb holds 90 (or 45) degrees for full 10 secs  5b. Motor Arm, Right: 0-->No drift, limb holds 90 (or 45) degrees for full 10 secs  6a. Motor Leg, Left: 0-->No drift, leg holds 30 degree position for full 5 secs  6b. Motor Leg, Right: 0-->No drift, leg holds 30 degree position for full 5 secs  7. Limb Ataxia: 0-->Absent  8. Sensory: 0-->Normal, no sensory loss  9. Best Language: 1-->Mild-to-moderate aphasia, some obvious loss of fluency or facility of comprehension, without significant limitation on ideas expressed or form of expression. Reduction of speech and/or comprehension, however, makes conversation. . . (see row details)  10. Dysarthria: 0-->Normal  11. Extinction and Inattention (formerly Neglect): 0-->No abnormality  Total (NIH Stroke Scale): 3       Modified Palmer Score: 0  Ligia Coma Scale:    ABCD2 Score:    [id number] Score:5  HAS -BLED Score:3  ICH Score:   Hunt & Harris Classification:      Hemorrhagic change of an Ischemic Stroke: Does this patient have an ischemic stroke with hemorrhagic changes? No     Neurologic Chief Complaint: L MCA stroke     Subjective:     Interval History: Patient is seen for follow-up neurological assessment and treatment recommendations:   seen with speech therapy and gabrielle , patient with aphasia still present. Plan to go home today with 24 hour care and family support (as well as outpatient speech therapy). Coumadin regimen will remain the same, has been therapeutic on regimen. Will send DC summary to cardiologist who follows him - Dr Shrestha (phone - [phone]))    HPI, Past Medical, Family, and Social History remains the same as documented in the initial encounter.     Review of Systems   Constitutional: Negative for fever.   Eyes: Negative for visual disturbance.   Neurological: Positive for facial asymmetry. Negative for dizziness.        (+) aphasia     Scheduled Meds:   docusate sodium  100 mg Oral BID    losartan  50 mg Oral  Daily    metoprolol tartrate  50 mg Oral BID    rosuvastatin  20 mg Oral QHS    warfarin  2.5 mg Oral Every Wed    warfarin  5 mg Oral Every Tues, Thurs, Sat    [START ON 4/8/2019] warfarin  5 mg Oral Every Mon    [START ON 4/7/2019] warfarin  5 mg Oral Every Sun     Continuous Infusions:   sodium chloride 0.9%       PRN Meds:hydrALAZINE, ondansetron, ondansetron, sodium chloride 0.9%, sodium chloride 0.9%    Objective:     Vital Signs (Most Recent):  Temp: 97.2 °F (36.2 °C) (04/05/19 1124)  Pulse: 98 (04/05/19 1124)  Resp: 18 (04/05/19 1124)  BP: (!) 160/74 (04/05/19 1124)  SpO2: 96 % (04/05/19 1124)  BP Location: Left arm    Vital Signs Range (Last 24H):  Temp:  [97.2 °F (36.2 °C)-98.3 °F (36.8 °C)]   Pulse:  []   Resp:  [17-20]   BP: (107-160)/(67-83)   SpO2:  [96 %-97 %]   BP Location: Left arm    Physical Exam   Constitutional: He appears well-developed and well-nourished.   HENT:   Head: Normocephalic and atraumatic.   Cardiovascular: Normal rate.   Pulmonary/Chest: Effort normal.   Musculoskeletal: Normal range of motion.   Neurological: He is alert.   Skin: Skin is warm and dry.   Nursing note and vitals reviewed.      Neurological Exam: patient is deaf but communicates through american sign language      LOC: alert  Attention Span: Good   Language: peseverates, expressive and receptive, naming impaired   Orientation: Person, Place, Time   Visual Fields: Full  EOM (CN III, IV, VI): Full/intact  Facial Movement (CN VII): Lower facial weakness on the Right  Motor: Arm left  Normal 5/5  Leg left  Normal 5/5  Arm right  Normal 5/5  Leg right Normal 5/5  Sensation: Intact to light touch, temperature and vibration  Tone: Normal tone throughout  Slowed rapid alternating movements on right       Laboratory:  CMP:   Recent Labs   Lab 04/05/19  0537 04/05/19  0745   CALCIUM 8.8 9.2   ALBUMIN 3.0*  --    PROT 5.9*  --     138   K 4.1 4.4   CO2 20* 22*   * 108   BUN 18 17   CREATININE 0.8 0.9    ALKPHOS 50*  --    ALT 11  --    AST 23  --    BILITOT 0.6  --      CBC:   Recent Labs   Lab 04/05/19  0537   WBC 7.62   RBC 4.32*   HGB 13.7*   HCT 40.8      MCV 94   MCH 31.7*   MCHC 33.6     Lipid Panel:   Recent Labs   Lab 04/03/19  0502   CHOL 124   LDLCALC 74.0   HDL 35*   TRIG 75     Coagulation:   Recent Labs   Lab 04/04/19  0346 04/05/19  0745   INR 2.2* 2.5*   APTT 31.3  --      Platelet Aggregation Study: No results for input(s): PLTAGG, PLTAGINTERP, PLTAGREGLACO, ADPPLTAGGREG in the last 168 hours.  Hgb A1C: No results for input(s): HGBA1C in the last 168 hours.  TSH:   Recent Labs   Lab 04/03/19  0502   TSH 3.512       Diagnostic Results     Brain Imaging   4/3/19 MRI/MRA  Small acute infarcts involving the posterior left frontal lobe and left basal ganglia.  There is a punctate focus of gradient susceptibility within the region of the posterior left frontal lobe infarct which could represent small focus of petechial hemorrhage.    Mild chronic microvascular ischemic change.    No evidence of large vessel occlusion.    Diminutive appearance of the V3 and V4 segments of the right vertebral artery which may be congenital relate to underlying stenosis.  Further evaluation with CTA as clinically warranted.    Echo 4/3/19  · Mild left ventricular enlargement.  · Severely decreased left ventricular systolic function. The estimated ejection fraction is 15%  · Moderately reduced right ventricular systolic function.  · Severe left atrial enlargement.  · Atrial fibrillation observed.  · Mild aortic regurgitation.  · The estimated PA systolic pressure is 31 mm Hg  · Normal central venous pressure (3 mm Hg).      AMY Tucker  Comprehensive Stroke Center  Department of Vascular Neurology   [facility]

## 2019-04-05 NOTE — PLAN OF CARE
Problem: SLP Goal  Goal: SLP Goal  Speech Language Pathology Goals  Goals expected to be met by 4/10/19    1.  Pt will tolerate regular consistency diet w/ thin liquids w/ adequate oral clearance and no overt signs of aspiration.  2.  Pt will complete Speech Language Cognitive evaluation to determine the need for additional goals.-met  2. Pt will answer 2 unit y/n questions with 70% accy.   3. Pt will follow simple commands with 70% accy given mod A.   4. Pt will complete simple expressive language tasks with 70% accy given mod A.    5. Pt will participate in ongoing assessment of cognition as appropriate.         Outcome: Ongoing (interventions implemented as appropriate)  Goals remain appropriate, cont POC. JUAN JOSE Shahid, CCC/SLP  4/5/2019

## 2019-04-05 NOTE — ED PROVIDER NOTES
"Encounter Date: 4/3/2019       History     Chief Complaint   Patient presents with    Cerebrovascular Accident     Stroke transfer from [facility]. Right hemiparesis and confusion. Pt fell out of the chair while facetiming with son. Pt appeared to be unaware and confused. Was not signing appropriately (pt is deaf/mute)     Mr Mclean is a 79YOWM who presents as stroke code transfer from [facility]; pertinent PMHx Afib on anticoagulation, deaf (uses sign language to communicate). Patient presented to [facility] when his son saw his father suddenly "slump over" and was unable to communicate at baseline via sign language. He was evaluated by telestroke service: The patient had right facial droop with right upper and lower extremity weakness.  He also had difficulty communicating using sign language, following commands.  No tPA administered due to INR 2.9.  Inconclusive CTA head and neck at the OSH.   Stroke team at the bedside upon arrival. Original report of RUE/RLE weakness is resolved on current exam. Patient had no acute events post-transfer. EMS reports stable BP and no progressive neuro deficits. Of note, daughter endorses prior right-sided facial droop from another ?stroke.   Patient denies CP, SOB, abdominal pain, headache, vision changes. Daughter at the bedside assisting in sign language translation.         Review of patient's allergies indicates:  No Known Allergies  Past Medical History:   Diagnosis Date    Anticoagulant long-term use     Cancer     prostate    Carotid artery stenosis     left    Coronary artery disease     Encounter for blood transfusion     Hyperlipidemia     Hypertension     Mitral regurgitation     Paroxysmal atrial fibrillation     Stroke      Past Surgical History:   Procedure Laterality Date    AORTIC VALVE REPLACEMENT      CARDIOVERSION N/A 8/17/2017    Performed by [facility]restha [facility] at [facility]    CORONARY ARTERY BYPASS GRAFT      HERNIA REPAIR      SHOULDER SURGERY      TOE " "SURGERY       Family History   Family history unknown: Yes     Social History     Tobacco Use    Smoking status: Never Smoker    Smokeless tobacco: Never Used   Substance Use Topics    Alcohol use: Yes     Comment: occasionally    Drug use: Not Currently     Review of Systems   Constitutional: Negative for chills and fever.   Eyes: Negative for visual disturbance.   Respiratory: Negative for shortness of breath.    Cardiovascular: Negative for chest pain.   Gastrointestinal: Negative for abdominal pain, nausea and vomiting.   Musculoskeletal: Negative for back pain and neck pain.   Neurological: Negative for weakness, numbness and headaches.        "aphasia" of sign language   Psychiatric/Behavioral: Positive for confusion. Negative for agitation.       Physical Exam     Initial Vitals [04/03/19 0430]   BP Pulse Resp Temp SpO2   (!) 140/86 109 18 98.4 °F (36.9 °C) 97 %      MAP       --         Physical Exam    Nursing note and vitals reviewed.  Constitutional: He appears well-developed and well-nourished. He is not diaphoretic. No distress.   Non-toxic appearing male in NAD, tachycardic to 140, VSS, afebrile, 96% on RA.     HENT:   Head: Normocephalic and atraumatic.   Right Ear: External ear normal.   Left Ear: External ear normal.   Nose: Nose normal.   Mouth/Throat: Oropharynx is clear and moist. No oropharyngeal exudate.   Eyes: Conjunctivae and EOM are normal. Pupils are equal, round, and reactive to light.   Neck: Normal range of motion. Neck supple.   Cardiovascular: Normal rate, normal heart sounds and intact distal pulses.   Irregularly irregular rhythm   Pulmonary/Chest: Breath sounds normal. No respiratory distress.   Abdominal: Soft. There is no tenderness.   Musculoskeletal: Normal range of motion. He exhibits no edema.   Neurological: He is alert. He has normal strength.   Aphasia of sign language, reports comprehension when signed to, cannot respond appropriately.   BUE/BLE strength 5/5. " Difficulty following direction for foot-to-shin and finger-to-nose testing.   Right facial droop, daughter states he has at baseline, though this appears more pronounced.   Mildly decreased strength of ocularis muscle to the right.   Skin: Skin is warm and dry. Capillary refill takes less than 2 seconds. No rash noted. No erythema. No pallor.   Psychiatric: He has a normal mood and affect. His behavior is normal.         ED Course   Procedures  Labs Reviewed   CBC W/ AUTO DIFFERENTIAL - Abnormal; Notable for the following components:       Result Value    MCH 32.5 (*)     All other components within normal limits   LIPID PANEL - Abnormal; Notable for the following components:    HDL 35 (*)     All other components within normal limits   PROTIME-INR - Abnormal; Notable for the following components:    Prothrombin Time 22.0 (*)     INR 2.3 (*)     All other components within normal limits   URINALYSIS, REFLEX TO URINE CULTURE - Abnormal; Notable for the following components:    Specific Gravity, UA >=1.030 (*)     Occult Blood UA 1+ (*)     All other components within normal limits    Narrative:     Preferred Collection Type->Urine, Clean Catch   URINALYSIS MICROSCOPIC - Abnormal; Notable for the following components:    RBC, UA 8 (*)     All other components within normal limits    Narrative:     Preferred Collection Type->Urine, Clean Catch   COMPREHENSIVE METABOLIC PANEL   TROPONIN I   TSH   CK-MB   TSH    Narrative:     add on tsh and ckmb as per carly dowling DNP,NP  04/03/2019  07:13    CK-MB    Narrative:     add on tsh and ckmb as per carly dowling DNP,NP  04/03/2019  07:13    HEMOGLOBIN A1C          Imaging Results           MRI Brain Without Contrast (Final result)  Result time 04/03/19 07:06:37    Final result by Bull Penn MD (04/03/19 07:06:37)                 Impression:      Small acute infarcts involving the posterior left frontal lobe and left basal ganglia.  There is a punctate focus of  gradient susceptibility within the region of the posterior left frontal lobe infarct which could represent small focus of petechial hemorrhage.    Mild chronic microvascular ischemic change.    No evidence of large vessel occlusion.    Diminutive appearance of the V3 and V4 segments of the right vertebral artery which may be congenital relate to underlying stenosis.  Further evaluation with CTA as clinically warranted.    COMMUNICATION  This critical result was discovered/received at 06:20.  The critical information above was relayed directly by Dr. Menard By telephone to Dr. Hidalgo on 04/03/2019 at 06:30.  This report was flagged in Epic as abnormal.    Electronically signed by resident: Mj Menard  Date:    04/03/2019  Time:    06:19    Electronically signed by: Bull Penn MD  Date:    04/03/2019  Time:    07:06             Narrative:    EXAMINATION:  MRI BRAIN WITHOUT CONTRAST; MRA BRAIN WITHOUT CONTRAST; MRA NECK WITHOUT CONTRAST    CLINICAL HISTORY:  Stroke    TECHNIQUE:  Routine MRI brain without contrast, noncontrast MRA of the brain, and noncontrast MRA of the neck. Multiplanar multisequence MR imaging of the brain was performed without contrast. By separate acquisition, noncontrast MR angiography was performed of the intracranial vasculature with 3D time-of-flight technique through the [place], with MIP reformatting. Non-contrast 2D and/or 3D time-of-flight MR angiography of the neck was performed, with MIP reformatting.    COMPARISON:  None    FINDINGS:  MRI brain:    Ventricles and sulci are normal in size for age without evidence of hydrocephalus. No extra-axial blood or fluid collections.    Small area of restricted diffusion involving the subcortical posterior left frontal lobe consistent with acute infarct.  There is additional abnormal restricted diffusion of the left caudate head and putamen with corresponding T2/FLAIR signal hyperintensity consistent with acute infarction.  There is a  single small focus of gradient susceptibility within the region of the subcortical left frontal lobe infarct which could represent small focus of petechial hemorrhage.  Few scattered foci of T2/FLAIR signal hyperintensity throughout the supratentorial white matter consistent with mild chronic microvascular ischemic change.    There is no midline shift.  The sellar region appears within normal limits.  The craniocervical junction is unremarkable.  There is a small mucous retention cyst or polyp within the left maxillary sinus.    MRA head:    Anterior circulation:  The bilateral distal cervical, rahul, cavernous and supraclinoid segments of the ICA's are patent without evidence of significant focal stenosis or aneurysm. The visualized bilateral anterior and middle cerebral arteries are patent without evidence for significant focal stenosis or aneurysm.  The left A1 segment of the TIM is hypoplastic with the A2 segment predominantly supplied via the anterior communicating artery.    Posterior circulation: The basilar artery and posterior cerebral arteries are patent without evidence for significant focal stenosis or aneurysm.    MRA neck:    Limited noncontrast 2-D TOF images demonstrate no evidence of high grade stenosis or occlusion of the bilateral common carotids and extracranials internal carotid arteries.  The left vertebral artery is patent.  The V3 and V4 segments of the right vertebral artery are diminutive which may be congenital or relate to underlying stenosis.                                MRA Neck without contrast (Final result)  Result time 04/03/19 07:06:37    Final result by Bull Penn MD (04/03/19 07:06:37)                 Impression:      Small acute infarcts involving the posterior left frontal lobe and left basal ganglia.  There is a punctate focus of gradient susceptibility within the region of the posterior left frontal lobe infarct which could represent small focus of petechial  hemorrhage.    Mild chronic microvascular ischemic change.    No evidence of large vessel occlusion.    Diminutive appearance of the V3 and V4 segments of the right vertebral artery which may be congenital relate to underlying stenosis.  Further evaluation with CTA as clinically warranted.    COMMUNICATION  This critical result was discovered/received at 06:20.  The critical information above was relayed directly by Dr. Menard By telephone to Dr. Hidalgo on 04/03/2019 at 06:30.  This report was flagged in Epic as abnormal.    Electronically signed by resident: Mj Menard  Date:    04/03/2019  Time:    06:19    Electronically signed by: Bull Penn MD  Date:    04/03/2019  Time:    07:06             Narrative:    EXAMINATION:  MRI BRAIN WITHOUT CONTRAST; MRA BRAIN WITHOUT CONTRAST; MRA NECK WITHOUT CONTRAST    CLINICAL HISTORY:  Stroke    TECHNIQUE:  Routine MRI brain without contrast, noncontrast MRA of the brain, and noncontrast MRA of the neck. Multiplanar multisequence MR imaging of the brain was performed without contrast. By separate acquisition, noncontrast MR angiography was performed of the intracranial vasculature with 3D time-of-flight technique through the [place], with MIP reformatting. Non-contrast 2D and/or 3D time-of-flight MR angiography of the neck was performed, with MIP reformatting.    COMPARISON:  None    FINDINGS:  MRI brain:    Ventricles and sulci are normal in size for age without evidence of hydrocephalus. No extra-axial blood or fluid collections.    Small area of restricted diffusion involving the subcortical posterior left frontal lobe consistent with acute infarct.  There is additional abnormal restricted diffusion of the left caudate head and putamen with corresponding T2/FLAIR signal hyperintensity consistent with acute infarction.  There is a single small focus of gradient susceptibility within the region of the subcortical left frontal lobe infarct which could represent  small focus of petechial hemorrhage.  Few scattered foci of T2/FLAIR signal hyperintensity throughout the supratentorial white matter consistent with mild chronic microvascular ischemic change.    There is no midline shift.  The sellar region appears within normal limits.  The craniocervical junction is unremarkable.  There is a small mucous retention cyst or polyp within the left maxillary sinus.    MRA head:    Anterior circulation:  The bilateral distal cervical, rahul, cavernous and supraclinoid segments of the ICA's are patent without evidence of significant focal stenosis or aneurysm. The visualized bilateral anterior and middle cerebral arteries are patent without evidence for significant focal stenosis or aneurysm.  The left A1 segment of the TIM is hypoplastic with the A2 segment predominantly supplied via the anterior communicating artery.    Posterior circulation: The basilar artery and posterior cerebral arteries are patent without evidence for significant focal stenosis or aneurysm.    MRA neck:    Limited noncontrast 2-D TOF images demonstrate no evidence of high grade stenosis or occlusion of the bilateral common carotids and extracranials internal carotid arteries.  The left vertebral artery is patent.  The V3 and V4 segments of the right vertebral artery are diminutive which may be congenital or relate to underlying stenosis.                                MRA Brain without contrast (Final result)  Result time 04/03/19 07:06:37    Final result by Bull Penn MD (04/03/19 07:06:37)                 Impression:      Small acute infarcts involving the posterior left frontal lobe and left basal ganglia.  There is a punctate focus of gradient susceptibility within the region of the posterior left frontal lobe infarct which could represent small focus of petechial hemorrhage.    Mild chronic microvascular ischemic change.    No evidence of large vessel occlusion.    Diminutive appearance of the V3  and V4 segments of the right vertebral artery which may be congenital relate to underlying stenosis.  Further evaluation with CTA as clinically warranted.    COMMUNICATION  This critical result was discovered/received at 06:20.  The critical information above was relayed directly by Dr. Menard By telephone to Dr. Hidalgo on 04/03/2019 at 06:30.  This report was flagged in Epic as abnormal.    Electronically signed by resident: Mj Menard  Date:    04/03/2019  Time:    06:19    Electronically signed by: Bull Penn MD  Date:    04/03/2019  Time:    07:06             Narrative:    EXAMINATION:  MRI BRAIN WITHOUT CONTRAST; MRA BRAIN WITHOUT CONTRAST; MRA NECK WITHOUT CONTRAST    CLINICAL HISTORY:  Stroke    TECHNIQUE:  Routine MRI brain without contrast, noncontrast MRA of the brain, and noncontrast MRA of the neck. Multiplanar multisequence MR imaging of the brain was performed without contrast. By separate acquisition, noncontrast MR angiography was performed of the intracranial vasculature with 3D time-of-flight technique through the [place], with MIP reformatting. Non-contrast 2D and/or 3D time-of-flight MR angiography of the neck was performed, with MIP reformatting.    COMPARISON:  None    FINDINGS:  MRI brain:    Ventricles and sulci are normal in size for age without evidence of hydrocephalus. No extra-axial blood or fluid collections.    Small area of restricted diffusion involving the subcortical posterior left frontal lobe consistent with acute infarct.  There is additional abnormal restricted diffusion of the left caudate head and putamen with corresponding T2/FLAIR signal hyperintensity consistent with acute infarction.  There is a single small focus of gradient susceptibility within the region of the subcortical left frontal lobe infarct which could represent small focus of petechial hemorrhage.  Few scattered foci of T2/FLAIR signal hyperintensity throughout the supratentorial white matter  consistent with mild chronic microvascular ischemic change.    There is no midline shift.  The sellar region appears within normal limits.  The craniocervical junction is unremarkable.  There is a small mucous retention cyst or polyp within the left maxillary sinus.    MRA head:    Anterior circulation:  The bilateral distal cervical, rahul, cavernous and supraclinoid segments of the ICA's are patent without evidence of significant focal stenosis or aneurysm. The visualized bilateral anterior and middle cerebral arteries are patent without evidence for significant focal stenosis or aneurysm.  The left A1 segment of the TIM is hypoplastic with the A2 segment predominantly supplied via the anterior communicating artery.    Posterior circulation: The basilar artery and posterior cerebral arteries are patent without evidence for significant focal stenosis or aneurysm.    MRA neck:    Limited noncontrast 2-D TOF images demonstrate no evidence of high grade stenosis or occlusion of the bilateral common carotids and extracranials internal carotid arteries.  The left vertebral artery is patent.  The V3 and V4 segments of the right vertebral artery are diminutive which may be congenital or relate to underlying stenosis.                                 Medical Decision Making:   History:   Old Medical Records: I decided to obtain old medical records.  Old Records Summarized: records from clinic visits and records from previous admission(s).  Initial Assessment:   Patient with Hx afib/anticoagulation presents as stroke transfer for several hours of ?sign-language aphasia +/- worsened facial droop. afib RVR, BP stable, no neuro decompensation on transfer.   Differential Diagnosis:   DDx includes stroke, metabolic encephalopathy, electrolyte imbalance. Physical exam and history taking lower clinical suspicion for ACS, acute abdomen.   Clinical Tests:   Lab Tests: Ordered and Reviewed  Radiological Study: Ordered and  "Reviewed  Medical Tests: Ordered and Reviewed  ED Management:  Current afib RVR, given initial dose cardizem with improvement in rate to 100s-110s. Stroke team at the bedside, additional imaging ordered.    Update: MRI shows "Small acute infarcts involving the posterior left frontal lobe and left basal ganglia". Patient to be admitted to stroke team. Vitals remain stable. Patient agreed to plan of care and voiced understanding.    Mitra Arias PA-C  04/04/2019    I discussed the following case, diagnosis and plan of care with attending physician.                              Clinical Impression:       ICD-10-CM ICD-9-CM   1. Embolic stroke involving left middle cerebral artery I63.412 434.11   2. Cardiomyopathy, unspecified type I42.9 425.4   3. Deaf, nonspeaking H91.3 389.7   4. Paroxysmal atrial fibrillation I48.0 427.31         Disposition:   Disposition: Admitted  Condition: Serious                        Mitra Arias PA-C  04/04/19 2321    "

## 2019-04-05 NOTE — ASSESSMENT & PLAN NOTE
79 y.o. male with significant past medical history of Afib on anticoagulation, deaf (uses sign language to communicate) presented to hospital as a transfer from [facility] for evaluation of stroke.    Stroke in the L posterior left frontal lobe and left basal ganglia  Etiology cardioembolic (EF 15%, afib)    Antithrombotics for secondary stroke prevention: Anticoagulants: Warfarin INR adjusted target - remains therapeutic on home dosing     Statins for secondary stroke prevention and hyperlipidemia, if present:   Statins: Atorvastatin- 40 mg daily    Aggressive risk factor modification: HTN, HLD, A-Fib     Rehab efforts: PT/OT/SLP to evaluate and treat - home with outpatient speech therapy     Diagnostics ordered/pending: none    VTE prophylaxis: None: Reason for No Pharmacological VTE Prophylaxis: Currently on anticoagulation, Mechanical prophylaxis: Place SCDs    BP parameters: Infarct: No intervention, SBP <220

## 2019-04-05 NOTE — ASSESSMENT & PLAN NOTE
Plan to dc home with 24 hour supervision   Patient with 3 children who have been with him at the hospital.   Patients children willing to help, will dc home when arrangements are made  Will do out patient speech therapy - attempted to contact viry but voice mail box full

## 2019-04-05 NOTE — ASSESSMENT & PLAN NOTE
Seen by lubna  Suspect new EF   Continue coumadin, recommended adding plavix however at this time we do not feel that there is evidence for decreased stroke risk   meds adjusted per cards   Will send dc summary to his cardiologist - dr frazier - [phone]

## 2019-04-05 NOTE — PLAN OF CARE
.  04/05/2019      Michael Mclean  [address]  [facility] [address]          Vascular Neurology Dept.  [facility] [address] [place] [address] [phone] after hours  [phone] fax Diagnosis:  Embolic stroke involving left middle cerebral artery                         Debility    SLP - eval and treat post stroke               __________________________  AMY Tucker  04/05/2019

## 2019-04-05 NOTE — SUBJECTIVE & OBJECTIVE
Interval History 4/5/2019:  Patient is seen for follow-up rehab evaluation and recommendations: Evaluated by therapy yesterday.  PT and OT discharged from service and recommended home with 24 hr SV.  SLP following for aphasia.  Per patient and daughter, language is improving.    HPI, Past Medical, Family, and Social History remains the same as documented in the initial encounter.    Scheduled Medications:    docusate sodium  100 mg Oral BID    losartan  50 mg Oral Daily    metoprolol tartrate  50 mg Oral BID    rosuvastatin  20 mg Oral QHS    warfarin  2.5 mg Oral Every Wed    warfarin  5 mg Oral Every Tues, Thurs, Sat    [START ON 4/8/2019] warfarin  5 mg Oral Every Mon    [START ON 4/7/2019] warfarin  5 mg Oral Every Sun     PRN Medications: hydrALAZINE, ondansetron, ondansetron, sodium chloride 0.9%, sodium chloride 0.9%    Review of Systems   Constitutional: Negative for chills, fatigue and fever.   HENT: Positive for hearing loss. Negative for drooling, trouble swallowing and voice change.    Eyes: Negative for pain and visual disturbance.   Respiratory: Negative for cough, shortness of breath and wheezing.    Cardiovascular: Negative for chest pain and palpitations.   Gastrointestinal: Negative for abdominal pain, nausea and vomiting.   Genitourinary: Negative for difficulty urinating and flank pain.   Musculoskeletal: Positive for arthralgias. Negative for back pain, myalgias and neck pain.   Skin: Negative for rash and wound.   Neurological: Positive for speech difficulty (language difficulty improving). Negative for dizziness, weakness, numbness and headaches.   Psychiatric/Behavioral: Negative for agitation and hallucinations. The patient is not nervous/anxious.      Objective:     Vital Signs (Most Recent):  Temp: 97.2 °F (36.2 °C) (04/05/19 0900)  Pulse: 75 (04/05/19 0900)  Resp: 20 (04/05/19 0900)  BP: 123/82 (04/05/19 0900)  SpO2: 96 % (04/05/19 0900)    Vital Signs (24h Range):  Temp:  [97 °F  (36.1 °C)-98.3 °F (36.8 °C)] 97.2 °F (36.2 °C)  Pulse:  [] 75  Resp:  [17-20] 20  SpO2:  [96 %-98 %] 96 %  BP: (107-151)/(67-94) 123/82     Physical Exam   Constitutional: He appears well-developed and well-nourished. No distress.   HENT:   Head: Normocephalic and atraumatic.   Right Ear: External ear normal.   Left Ear: External ear normal.   Nose: Nose normal.   Eyes: Right eye exhibits no discharge. Left eye exhibits no discharge. No scleral icterus.   Neck: Normal range of motion.   Cardiovascular: Normal rate, regular rhythm and intact distal pulses.   Pulmonary/Chest: Effort normal. No respiratory distress. He has no wheezes.   Abdominal: Soft. He exhibits no distension. There is no tenderness.   Musculoskeletal: He exhibits no edema or tenderness.        Right shoulder: He exhibits decreased range of motion and decreased strength.   Neurological: He is alert. No sensory deficit. He exhibits normal muscle tone.   Follows commands.  No focal weakness.   Skin: Skin is warm and dry. No rash noted.   Psychiatric: He has a normal mood and affect. His behavior is normal.   Vitals reviewed.  Sign language interpretation per daughter      Diagnostic Results:   Labs: Reviewed  ECG: Reviewed  X-Ray: Reviewed  MRI: Reviewed

## 2019-04-05 NOTE — ASSESSMENT & PLAN NOTE
-Patient is deaf at baseline and uses sign language to communicate. Dionne and family used for communication   -He is currently having difficulty communicating.  Mainly naming impared expressive aphasia and some receptive   - found to also have some issues with his right handed dexterity and therefore having trouble with signing   - daughter will go with him to speech therapy

## 2019-04-05 NOTE — ASSESSMENT & PLAN NOTE
79 y.o. male with significant past medical history of Afib on anticoagulation, deaf (uses sign language to communicate) presented to hospital as a transfer from [facility] for evaluation of stroke.    Stroke in the L posterior left frontal lobe and left basal ganglia  Etiology cardioembolic (EF 15%, afib)    Antithrombotics for secondary stroke prevention: Anticoagulants: Warfarin INR adjusted target - remains therapeutic on home dosing (coumadin will be followed by his cardiologist)    Statins for secondary stroke prevention and hyperlipidemia, if present:   Statins: Atorvastatin- 40 mg daily    Aggressive risk factor modification: HTN, HLD, A-Fib     Rehab efforts: PT/OT/SLP to evaluate and treat - home with outpatient speech therapy     Diagnostics ordered/pending: none    VTE prophylaxis: None: Reason for No Pharmacological VTE Prophylaxis: Currently on anticoagulation, Mechanical prophylaxis: Place SCDs    BP parameters: Infarct: No intervention, SBP <220

## 2019-04-05 NOTE — PROGRESS NOTES
[facility]  Physical Medicine & Rehab  Progress Note    Patient Name: Michael Mclean  MRN: [id number]  Admission Date: 4/3/2019  Length of Stay: 2 days  Attending Physician: Dennis Yanez MD    Subjective:     Principal Problem:Embolic stroke involving left middle cerebral artery    Hospital Course:   4/3/19: Passed bedside swallow evaluation.  SLP recommending regular diet and thin liquids. SLP cog eval pending. PT/OT evals pending.   4/4/19: Evaluated by PT, OT, and SLP.  Found to have aphasia.  Functional mobility and transfers (I)/SV.  Ambulated 150 ft SV.  ADLs set-up/SV.     Interval History 4/5/2019:  Patient is seen for follow-up rehab evaluation and recommendations: Evaluated by therapy yesterday.  PT and OT discharged from service and recommended home with 24 hr SV.  SLP following for aphasia.  Per patient and daughter, language is improving.    HPI, Past Medical, Family, and Social History remains the same as documented in the initial encounter.    Scheduled Medications:    docusate sodium  100 mg Oral BID    losartan  50 mg Oral Daily    metoprolol tartrate  50 mg Oral BID    rosuvastatin  20 mg Oral QHS    warfarin  2.5 mg Oral Every Wed    warfarin  5 mg Oral Every Tues, Thurs, Sat    [START ON 4/8/2019] warfarin  5 mg Oral Every Mon    [START ON 4/7/2019] warfarin  5 mg Oral Every Sun     PRN Medications: hydrALAZINE, ondansetron, ondansetron, sodium chloride 0.9%, sodium chloride 0.9%    Review of Systems   Constitutional: Negative for chills, fatigue and fever.   HENT: Positive for hearing loss (patient is deaf). Negative for drooling, trouble swallowing and voice change.    Eyes: Negative for pain and visual disturbance.   Respiratory: Negative for cough, shortness of breath and wheezing.    Cardiovascular: Negative for chest pain and palpitations.   Gastrointestinal: Negative for abdominal pain, nausea and vomiting.   Genitourinary: Negative for difficulty urinating and  flank pain.   Musculoskeletal: Positive for arthralgias (R shoulder). Negative for back pain, myalgias and neck pain.   Skin: Negative for rash and wound.   Neurological: Positive for language/communication difficulty. Negative for dizziness, weakness, numbness and headaches.   Psychiatric/Behavioral: Negative for agitation and hallucinations. The patient is not nervous/anxious.    Sign language interpretation per daughter    Objective:     Vital Signs (Most Recent):  Temp: 97.2 °F (36.2 °C) (04/05/19 0900)  Pulse: 75 (04/05/19 0900)  Resp: 20 (04/05/19 0900)  BP: 123/82 (04/05/19 0900)  SpO2: 96 % (04/05/19 0900)    Vital Signs (24h Range):  Temp:  [97 °F (36.1 °C)-98.3 °F (36.8 °C)] 97.2 °F (36.2 °C)  Pulse:  [] 75  Resp:  [17-20] 20  SpO2:  [96 %-98 %] 96 %  BP: (107-151)/(67-94) 123/82     Physical Exam   Constitutional: He appears well-developed and well-nourished. No distress.   HENT:   Head: Normocephalic and atraumatic.   Right Ear: External ear normal.   Left Ear: External ear normal.   Nose: Nose normal.   Eyes: Right eye exhibits no discharge. Left eye exhibits no discharge. No scleral icterus.   Neck: Normal range of motion.   Cardiovascular: Normal rate, regular rhythm and intact distal pulses.   Pulmonary/Chest: Effort normal. No respiratory distress. He has no wheezes.   Abdominal: Soft. He exhibits no distension. There is no tenderness.   Musculoskeletal: He exhibits no edema or tenderness.        Right shoulder: He exhibits decreased range of motion and decreased strength.   Neurological: He is alert. No sensory deficit. He exhibits normal muscle tone.   Follows commands.  No focal weakness.   Skin: Skin is warm and dry. No rash noted.   Psychiatric: He has a normal mood and affect. His behavior is normal.   Vitals reviewed.  Sign language interpretation per daughter      Diagnostic Results:   Labs: Reviewed  ECG: Reviewed  X-Ray: Reviewed  MRI: Reviewed    Assessment/Plan:      * Embolic  stroke involving left middle cerebral artery  -MRI brain revealed small acute infarcts involving the posterior left frontal lobe and left basal ganglia  -Management per Vascular Neurology  See hospital course for functional, cognitive/speech/language, and nutrition/swallow status.      Recommendations  -  Encourage mobility, OOB in chair at least 3 hours per day, and early ambulation as appropriate   -  PT/OT evaluate and treat  -  SLP speech and cognitive evaluate and treat  -  Monitor sleep disturbances and establish consistent sleep-wake cycle  -  Monitor for bowel and bladder dysfunction  -  Monitor for shoulder pain, subluxation, & spasticity  -  Monitor for and prevent skin breakdown and pressure ulcers  · Early mobility, repositioning/weight shifting every 20-30 minutes when sitting, turn patient every 2 hours, proper mattress/overlay and chair cushioning, pressure relief/heel protector boots  -  DVT prophylaxis (if appropriate)  -  Reviewed discharge options (IP rehab, SNF, HH therapy, and OP therapy)    Aphasia  -2/2 stroke  -SLP following    Cardiomyopathy  -Cards consulted  -EF 15%  -Cards rec Valsartan 80 mg daily and Metoprolol tartrate 50 mg BID and stopping Diltiazem    Deaf, nonspeaking       -deaf at baseline and uses sign language to communicate    Paroxysmal atrial fibrillation  -on home coumadin    Near premorbid level of function and with limited rehab goals.  Recommend home with supervision and home health SLP.  Will sign off.  Please call with questions/concerns or re-consult if situation changes.    KIKI Javier  Department of Physical Medicine & Rehab   [facility]

## 2019-04-05 NOTE — PHYSICIAN QUERY
"PT Name: Michael Mclean  MR #: [id number]    Physician Query Form - Heart  Condition Clarification     CDS/: Karen LEDEZMA, RN              Contact information: [email or web address]  This form is a permanent document in the medical record.     Query Date: April 5, 2019    By submitting this query, we are merely seeking further clarification of documentation. Please utilize your independent clinical judgment when addressing the question(s) below.    The medical record contains the following   Indicators     Supporting Clinical Findings Location in Medical Record    BNP      x EF  EF was found to be 15% with no evidence of thrombus.  Cardiology Consult 4/3/19    Radiology findings      x Echo Results ·  Mild left ventricular enlargement.  · Severely decreased left ventricular systolic function. The estimated ejection fraction is 15%  · Moderately reduced right ventricular systolic function.  · Severe left atrial enlargement.  · Atrial fibrillation observed.  · Mild aortic regurgitation.  · The estimated PA systolic pressure is 31 mm Hg Normal central venous pressure (3 mm Hg).  ECHO 4/3/19    "Ascites" documented      "SOB" or "LOCKHART" documented      "Hypoxia" documented      Heart Failure documented      "Edema" documented      Diuretics/Meds      x Treatment:  Cardiomyopathy  EF 15%  Recommend starting GDMT with valsartan 80 mg daily  Metoprolol tartrate 50 mg BID  Can also add spironolactone at a later time if BP tolerates  Patient will need outpatient cardiology follow-up (he has established care with a cardiologist)  Cardiology Consult 4/3/19    Other:      Heart failure (HF) can be acute, chronic or both. It is generally further specificed as systolic, diastolic, or combined. Lastly, it is important to identify an underlying etiology if known or suspected.     Common clues to acute exacerbation:  Rapidly progressive symptoms (w/in 2 weeks of presentation), using IV diuretics to treat, using supplemental " O2, pulmonary edema on Xray, MI w/in 4 weeks, and/or BNP >500    Systolic Heart Failure: is defined as chart documentation of a left ventricular ejection fraction (LVEF) less than 40%     Diastolic Heart Failure: is defined as a left ventricular ejection fraction (LVEF) greater than 40%   +      Evidence of diastolic dysfunction on echocardiography OR    Right heart catheterization wedge pressure above 12 mm Hg OR    Left heart catheterization left ventricular end diastolic pressure 18 mm Hg or above.    References: *American Heart Association    The clinical guidelines noted below are only system guidelines, and do not replace the providers clinical judgment.     Provider, please specify the diagnosis associated with above clinical findings  [   ] Acute Systolic Heart Failure - New diagnosis.  EF < 40%  and acute HF symptoms documented  [  X ] Acute on Chronic Systolic Heart Failure- Pre-existing systolic HF diagnosis.  EF < 40%  and acute HF symptoms documented  [   ] Chronic Systolic Heart Failure - Pre-existing systolic HF diagnosis.  EF < 40%  without  acute HF symptoms documented  [   ] Other Type of Heart Failure (please specify type): _________________________  [   ] Heart Failure Ruled Out  [   ] Other (please specify): ___________________________________  [  ] Clinically Undetermined                          Please document in your progress notes daily for the duration of treatment until resolved and include in your discharge summary.

## 2019-04-05 NOTE — ASSESSMENT & PLAN NOTE
Plan to dc home with 24 hour supervision   Patient with 3 children who have been with him at the hospital.   Patients children willing to help, will dc home when arrangements are made  Will do out patient speech therapy - attempted to contact viry to let her know after discharge but voice mail box full

## 2019-04-24 ENCOUNTER — TELEPHONE (OUTPATIENT)
Dept: NEUROLOGY | Facility: CLINIC | Age: 79
End: 2019-04-24

## 2019-04-24 NOTE — TELEPHONE ENCOUNTER
----- Message from Susie Rosales RN sent at 4/5/2019  2:41 PM CDT -----  Please schedule a neuro followup appt for 4-6 weeks. Patient was inpatient and seen by Dr Yanez. Please contact patient with date and time of appt.

## 2019-06-26 ENCOUNTER — LAB VISIT (OUTPATIENT)
Dept: LAB | Facility: HOSPITAL | Age: 79
End: 2019-06-26
Attending: UROLOGY
Payer: MEDICARE

## 2019-06-26 ENCOUNTER — OFFICE VISIT (OUTPATIENT)
Dept: UROLOGY | Facility: CLINIC | Age: 79
End: 2019-06-26
Attending: UROLOGY
Payer: MEDICARE

## 2019-06-26 VITALS
HEART RATE: 77 BPM | BODY MASS INDEX: 27.26 KG/M2 | SYSTOLIC BLOOD PRESSURE: 116 MMHG | DIASTOLIC BLOOD PRESSURE: 79 MMHG | WEIGHT: 184.06 LBS | HEIGHT: 69 IN

## 2019-06-26 DIAGNOSIS — C61 PROSTATE CANCER: ICD-10-CM

## 2019-06-26 DIAGNOSIS — C61 PROSTATE CANCER: Primary | ICD-10-CM

## 2019-06-26 LAB — COMPLEXED PSA SERPL-MCNC: 4.4 NG/ML (ref 0–4)

## 2019-06-26 PROCEDURE — 99214 OFFICE O/P EST MOD 30 MIN: CPT | Mod: S$GLB,,, | Performed by: UROLOGY

## 2019-06-26 PROCEDURE — 84153 ASSAY OF PSA TOTAL: CPT

## 2019-06-26 PROCEDURE — 99214 PR OFFICE/OUTPT VISIT, EST, LEVL IV, 30-39 MIN: ICD-10-PCS | Mod: S$GLB,,, | Performed by: UROLOGY

## 2019-06-26 PROCEDURE — 1101F PR PT FALLS ASSESS DOC 0-1 FALLS W/OUT INJ PAST YR: ICD-10-PCS | Mod: CPTII,S$GLB,, | Performed by: UROLOGY

## 2019-06-26 PROCEDURE — 99999 PR PBB SHADOW E&M-EST. PATIENT-LVL II: ICD-10-PCS | Mod: PBBFAC,,, | Performed by: UROLOGY

## 2019-06-26 PROCEDURE — 1101F PT FALLS ASSESS-DOCD LE1/YR: CPT | Mod: CPTII,S$GLB,, | Performed by: UROLOGY

## 2019-06-26 PROCEDURE — 99999 PR PBB SHADOW E&M-EST. PATIENT-LVL II: CPT | Mod: PBBFAC,,, | Performed by: UROLOGY

## 2019-06-26 PROCEDURE — 36415 COLL VENOUS BLD VENIPUNCTURE: CPT

## 2019-06-26 NOTE — PROGRESS NOTES
Subjective:       Patient ID: Michael Mclean is a 79 y.o. male.    Chief Complaint: No chief complaint on file.    HPI patient is here to follow-up on his prostate cancer.  He had a PSA over 30 because he had stopped taking the Lupron.  I gave him a shot and his repeat PSA at this time 3 months later is 4.4.  Overall is feeling well.  He seems to have suffered a stroke and has some mild confusion but is voiding well and feels well.    Past Medical History:   Diagnosis Date    Anticoagulant long-term use     Cancer     prostate    Carotid artery stenosis     left    Coronary artery disease     Encounter for blood transfusion     Hyperlipidemia     Hypertension     Mitral regurgitation     Paroxysmal atrial fibrillation     Stroke        Past Surgical History:   Procedure Laterality Date    AORTIC VALVE REPLACEMENT      CARDIOVERSION N/A 8/17/2017    Performed [facility]restha [facility] at [facility]    CORONARY ARTERY BYPASS GRAFT      HERNIA REPAIR      SHOULDER SURGERY      TOE SURGERY         Family History   Family history unknown: Yes       Social History     Socioeconomic History    Marital status:      Spouse name: Not on file    Number of children: Not on file    Years of education: Not on file    Highest education level: Not on file   Occupational History    Not on file   Social Needs    Financial resource strain: Not on file    Food insecurity:     Worry: Not on file     Inability: Not on file    Transportation needs:     Medical: Not on file     Non-medical: Not on file   Tobacco Use    Smoking status: Never Smoker    Smokeless tobacco: Never Used   Substance and Sexual Activity    Alcohol use: Yes     Comment: occasionally    Drug use: Not Currently    Sexual activity: Not on file   Lifestyle    Physical activity:     Days per week: Not on file     Minutes per session: Not on file    Stress: Not on file   Relationships    Social connections:     Talks on phone: Not on  file     Gets together: Not on file     Attends Muslim service: Not on file     Active member of club or organization: Not on file     Attends meetings of clubs or organizations: Not on file     Relationship status: Not on file   Other Topics Concern    Not on file   Social History Narrative    Not on file       Allergies:  Patient has no known allergies.    Medications:    Current Outpatient Medications:     CALCIUM ORAL, Take 1,500 mg by mouth once daily. , Disp: , Rfl:     clonazePAM (KLONOPIN) 2 MG Tab, 2 mg as needed. , Disp: , Rfl:     CYANOCOBALAMIN, VITAMIN B-12, INJ, Inject as directed., Disp: , Rfl:     docusate sodium (COLACE) 100 MG capsule, Take 100 mg by mouth 2 (two) times daily., Disp: , Rfl:     FENUGREEK SEED-BL.THISTLE-ANIS ORAL, Take 610 mg by mouth once daily., Disp: , Rfl:     ferrous sulfate (FEOSOL) 325 mg (65 mg iron) Tab tablet, Take 325 mg by mouth daily with breakfast., Disp: , Rfl:     fish oil-omega-3 fatty acids 300-1,000 mg capsule, Take 1 g by mouth once daily., Disp: , Rfl:     KLOR-CON M20 20 mEq tablet, , Disp: , Rfl:     magnesium oxide (MAG-OX) 400 mg tablet, Take 400 mg by mouth once daily., Disp: , Rfl:     metoprolol tartrate (LOPRESSOR) 50 MG tablet, Take 1 tablet (50 mg total) by mouth 2 (two) times daily., Disp: 60 tablet, Rfl: 11    niacin 500 MG CpSR, Take 250 mg by mouth every evening., Disp: , Rfl:     rosuvastatin (CRESTOR) 20 MG tablet, Take 20 mg by mouth once daily., Disp: , Rfl:     torsemide (DEMADEX) 20 MG Tab, Take 20 mg by mouth once daily., Disp: , Rfl:     TURMERIC ORAL, Take by mouth once daily., Disp: , Rfl:     valsartan (DIOVAN) 80 MG tablet, Take 1 tablet (80 mg total) by mouth once daily., Disp: 90 tablet, Rfl: 3    vitamin D (VITAMIN D3) 1000 units Tab, Take 1,000 Units by mouth., Disp: , Rfl:     warfarin (COUMADIN) 5 MG tablet, Take 1 tablet (5 mg total) by mouth Daily. On wednesdays take 1/2 tablet (2.5 mg), Disp: 30 tablet,  Rfl: 1    Current Facility-Administered Medications:     leuprolide (6 month) SyKt 45 mg, 45 mg, Intramuscular, Q6 Months, Adeel Manrique Jr., MD    leuprolide (6 month) SyKt 45 mg, 45 mg, Intramuscular, Q6 Months, Adeel Manrique Jr., MD    Review of Systems   Constitutional: Negative for activity change, appetite change, chills, diaphoresis, fatigue, fever and unexpected weight change.   HENT: Negative for congestion, dental problem, hearing loss, mouth sores, postnasal drip, rhinorrhea, sinus pressure and trouble swallowing.    Eyes: Negative for pain, discharge and itching.   Respiratory: Negative for apnea, cough, choking, chest tightness, shortness of breath and wheezing.    Cardiovascular: Negative for chest pain, palpitations and leg swelling.   Gastrointestinal: Negative for abdominal distention, abdominal pain, anal bleeding, blood in stool, constipation, diarrhea, nausea, rectal pain and vomiting.   Endocrine: Negative for polydipsia and polyuria.   Genitourinary: Negative for decreased urine volume, difficulty urinating, discharge, dysuria, enuresis, flank pain, frequency, genital sores, hematuria, penile pain, penile swelling, scrotal swelling, testicular pain and urgency.   Musculoskeletal: Negative for arthralgias, back pain and myalgias.   Skin: Negative for color change, rash and wound.   Neurological: Negative for dizziness, syncope, speech difficulty, light-headedness and headaches.   Hematological: Negative for adenopathy. Does not bruise/bleed easily.   Psychiatric/Behavioral: Negative for behavioral problems, confusion, hallucinations and sleep disturbance.       Objective:      Physical Exam   Constitutional: He appears well-developed.   HENT:   Head: Normocephalic.   Cardiovascular: Normal rate.    Pulmonary/Chest: Effort normal.   Abdominal: Soft.   Neurological: He is alert.   Skin: Skin is warm.     Psychiatric: He has a normal mood and affect.       Assessment:       1. Prostate cancer         Plan:       Diagnoses and all orders for this visit:    Prostate cancer  -     Prostate Specific Antigen, Diagnostic; Future        return to clinic 3 months for Lupron injection with repeat PSA.  Patient will call me if he has problems sooner.  He is deaf but we use the [facility] to have a  available

## 2019-07-10 ENCOUNTER — TELEPHONE (OUTPATIENT)
Dept: UROLOGY | Facility: CLINIC | Age: 79
End: 2019-07-10

## 2019-07-10 NOTE — TELEPHONE ENCOUNTER
----- Message from Mable Nesbitt sent at 7/10/2019 11:08 AM CDT -----  Contact: self  Michael Mclean  MRN: [id number]  : 1940  PCP: Josh Alejo Jr  Home Phone      [phone]  Work Phone      Not on file.  Mobile          [phone]      MESSAGE:  Pt is calling regarding prostate levels was told he needed it lowered but is unsure why when he was told it was fine where it's at the other day. Please advise, thanks.  Phone: [phone]

## 2019-07-10 NOTE — TELEPHONE ENCOUNTER
Spoke with pt and explained that although we would like his psa to be o.o we are very happy with 4.4 given where it was when we started his lhrh injections. He will receive another injection early oct. Pt is agreeable

## 2019-08-07 PROBLEM — I48.19 PERSISTENT ATRIAL FIBRILLATION: Status: ACTIVE | Noted: 2019-08-07

## 2019-09-05 ENCOUNTER — TELEPHONE (OUTPATIENT)
Dept: UROLOGY | Facility: CLINIC | Age: 79
End: 2019-09-05

## 2019-09-05 PROBLEM — I49.01 VENTRICULAR FIBRILLATION: Status: ACTIVE | Noted: 2019-09-05

## 2019-09-26 ENCOUNTER — LAB VISIT (OUTPATIENT)
Dept: LAB | Facility: HOSPITAL | Age: 79
End: 2019-09-26
Attending: UROLOGY
Payer: MEDICARE

## 2019-09-26 DIAGNOSIS — C61 PROSTATE CANCER: ICD-10-CM

## 2019-09-26 LAB — COMPLEXED PSA SERPL-MCNC: 0.67 NG/ML (ref 0–4)

## 2019-09-26 PROCEDURE — 36415 COLL VENOUS BLD VENIPUNCTURE: CPT

## 2019-09-26 PROCEDURE — 84153 ASSAY OF PSA TOTAL: CPT

## 2019-10-02 ENCOUNTER — OFFICE VISIT (OUTPATIENT)
Dept: UROLOGY | Facility: CLINIC | Age: 79
End: 2019-10-02
Attending: UROLOGY
Payer: MEDICARE

## 2019-10-02 VITALS — WEIGHT: 170 LBS | RESPIRATION RATE: 16 BRPM | BODY MASS INDEX: 25.18 KG/M2 | HEIGHT: 69 IN

## 2019-10-02 DIAGNOSIS — C61 PROSTATE CANCER: Primary | ICD-10-CM

## 2019-10-02 PROCEDURE — 99999 PR PBB SHADOW E&M-EST. PATIENT-LVL III: ICD-10-PCS | Mod: PBBFAC,,, | Performed by: UROLOGY

## 2019-10-02 PROCEDURE — 1101F PR PT FALLS ASSESS DOC 0-1 FALLS W/OUT INJ PAST YR: ICD-10-PCS | Mod: CPTII,S$GLB,, | Performed by: UROLOGY

## 2019-10-02 PROCEDURE — 99999 PR PBB SHADOW E&M-EST. PATIENT-LVL III: CPT | Mod: PBBFAC,,, | Performed by: UROLOGY

## 2019-10-02 PROCEDURE — 99213 OFFICE O/P EST LOW 20 MIN: CPT | Mod: S$GLB,,, | Performed by: UROLOGY

## 2019-10-02 PROCEDURE — 1101F PT FALLS ASSESS-DOCD LE1/YR: CPT | Mod: CPTII,S$GLB,, | Performed by: UROLOGY

## 2019-10-02 PROCEDURE — 99213 PR OFFICE/OUTPT VISIT, EST, LEVL III, 20-29 MIN: ICD-10-PCS | Mod: S$GLB,,, | Performed by: UROLOGY

## 2019-10-02 NOTE — PROGRESS NOTES
Subjective:       Patient ID: Michael Mclean is a 79 y.o. male.    Chief Complaint: Prostate Cancer (psa 0.67 here for lupron shot)    HPI patient is here for Lupron injection.  His PSA has come down to almost 0.  He is feeling well and doing well.  He received Lupron 45 mg IM    Past Medical History:   Diagnosis Date    Abdominal hernia     Anticoagulant long-term use     Arthritis     Cancer     prostate    Carotid artery stenosis     left    Coronary artery disease     Deaf     Encounter for blood transfusion     Hyperlipidemia     Hypertension     Mitral regurgitation     Paroxysmal atrial fibrillation     Stroke        Past Surgical History:   Procedure Laterality Date    AORTIC VALVE REPLACEMENT      CARDIAC CATHETERIZATION      stent x3    carotid artery stent Left     CORONARY ARTERY BYPASS GRAFT      HERNIA REPAIR      PROSTATECTOMY      SHOULDER SURGERY      TOE SURGERY      TREATMENT OF CARDIAC ARRHYTHMIA N/A 8/7/2019    Procedure: Cardioversion or Defibrillation;  Surgeon: Tal Laurent MD;  Location: [facility] CATH;  Service: Cardiology;  Laterality: N/A;       Family History   Problem Relation Age of Onset    Heart disease Mother     Heart attack Mother        Social History     Socioeconomic History    Marital status:      Spouse name: Not on file    Number of children: Not on file    Years of education: Not on file    Highest education level: Not on file   Occupational History    Not on file   Social Needs    Financial resource strain: Not on file    Food insecurity:     Worry: Not on file     Inability: Not on file    Transportation needs:     Medical: Not on file     Non-medical: Not on file   Tobacco Use    Smoking status: Never Smoker    Smokeless tobacco: Never Used   Substance and Sexual Activity    Alcohol use: Yes     Comment: occasionally    Drug use: Not Currently    Sexual activity: Not on file   Lifestyle    Physical activity:     Days per  week: Not on file     Minutes per session: Not on file    Stress: Not on file   Relationships    Social connections:     Talks on phone: Not on file     Gets together: Not on file     Attends [employer] service: Not on file     Active member of club or organization: Not on file     Attends meetings of clubs or organizations: Not on file     Relationship status: Not on file   Other Topics Concern    Not on file   Social History Narrative    Not on file       Allergies:  Eliquis [apixaban] and Losartan    Medications:    Current Outpatient Medications:     rivaroxaban (XARELTO ORAL), Take by mouth., Disp: , Rfl:     acetylcarnitin/alpha lipoic ac (ACETYLCARNITIN HCL-A LIPOIC AC) 400-200 mg Cap, Take 1 capsule by mouth once daily., Disp: , Rfl:     amiodarone (PACERONE) 200 MG Tab, Take 0.5 tablets (100 mg total) by mouth once daily., Disp: , Rfl:     arginine HCl, L-arginine, 1,000 mg Tab, Take 1,000 mg by mouth once daily., Disp: , Rfl:     CALCIUM ORAL, Take 1,500 mg by mouth once daily. , Disp: , Rfl:     cholecalciferol, vitamin D3, 5,000 unit capsule, Take 5,000 Units by mouth once daily. , Disp: , Rfl:     clonazePAM (KLONOPIN) 2 MG Tab, Take 2 mg by mouth daily as needed (anxiety). , Disp: , Rfl:     CYANOCOBALAMIN, VITAMIN B-12, INJ, Inject 1,000 mcg as directed every 28 days. , Disp: , Rfl:     docusate sodium (COLACE) 100 MG capsule, Take 100 mg by mouth once daily. , Disp: , Rfl:     ferrous sulfate (FEOSOL) 325 mg (65 mg iron) Tab tablet, Take 325 mg by mouth daily with breakfast., Disp: , Rfl:     fish oil-omega-3 fatty acids 300-1,000 mg capsule, Take 1 g by mouth once daily., Disp: , Rfl:     KLOR-CON M20 20 mEq tablet, Take 20 mEq by mouth once daily. , Disp: , Rfl:     magnesium oxide (MAG-OX) 400 mg tablet, Take 400 mg by mouth once daily., Disp: , Rfl:     metoprolol succinate (TOPROL-XL) 50 MG 24 hr tablet, Take 1 tablet (50 mg total) by mouth 2 (two) times daily., Disp: 60 tablet,  Rfl: 11    rosuvastatin (CRESTOR) 20 MG tablet, Take 20 mg by mouth every evening. , Disp: , Rfl:     torsemide (DEMADEX) 20 MG Tab, Take 20 mg by mouth once daily., Disp: , Rfl:     valsartan (DIOVAN) 40 MG tablet, Take 40 mg by mouth once daily., Disp: , Rfl:     warfarin (COUMADIN) 5 MG tablet, Take 5mg daily or as advised by [facility] (Patient not taking: Reported on 10/2/2019), Disp: 30 tablet, Rfl: 1    Current Facility-Administered Medications:     leuprolide (6 month) injection 45 mg, 45 mg, Intramuscular, Q6 Months, Adeel Manrique Jr., MD    leuprolide (6 month) SyKt 45 mg, 45 mg, Intramuscular, Q6 Months, Adeel Manrique Jr., MD    leuprolide (6 month) SyKt 45 mg, 45 mg, Intramuscular, Q6 Months, Adeel Manrique Jr., MD    Review of Systems   Constitutional: Negative for activity change, appetite change, chills, diaphoresis, fatigue, fever and unexpected weight change.   HENT: Negative for congestion, dental problem, hearing loss, mouth sores, postnasal drip, rhinorrhea, sinus pressure and trouble swallowing.    Eyes: Negative for pain, discharge and itching.   Respiratory: Negative for apnea, cough, choking, chest tightness, shortness of breath and wheezing.    Cardiovascular: Negative for chest pain, palpitations and leg swelling.   Gastrointestinal: Negative for abdominal distention, abdominal pain, anal bleeding, blood in stool, constipation, diarrhea, nausea, rectal pain and vomiting.   Endocrine: Negative for polydipsia and polyuria.   Genitourinary: Negative for decreased urine volume, difficulty urinating, discharge, dysuria, enuresis, flank pain, frequency, genital sores, hematuria, penile pain, penile swelling, scrotal swelling, testicular pain and urgency.   Musculoskeletal: Negative for arthralgias, back pain and myalgias.   Skin: Negative for color change, rash and wound.   Neurological: Negative for dizziness, syncope, speech difficulty, light-headedness and headaches.   Hematological:  Negative for adenopathy. Does not bruise/bleed easily.   Psychiatric/Behavioral: Negative for behavioral problems, confusion, hallucinations and sleep disturbance.       Objective:      Physical Exam   Constitutional: He appears well-developed.   HENT:   Head: Normocephalic.   Cardiovascular: Normal rate.    Pulmonary/Chest: Effort normal.   Abdominal: Soft.   Neurological: He is alert.   Skin: Skin is warm.     Psychiatric: He has a normal mood and affect.       Assessment:       1. Prostate cancer        Plan:       Michael was seen today for prostate cancer.    Diagnoses and all orders for this visit:    Prostate cancer  -     leuprolide (6 month) injection 45 mg        return to clinic 6 months with PSA for repeat Lupron 45 mg

## 2019-10-02 NOTE — LETTER
October 2, 2019      Abbie Martinez, FARHAT  [address] [facility] [address]           [facility]  [address] [facility] [address]  Phone: [phone]          Patient: Michael Mclean   MR Number: [id number]   YOB: 1940   Date of Visit: 10/2/2019       Dear Abbie Martinez:    Thank you for referring Michael Mclean to me for evaluation. Attached you will find relevant portions of my assessment and plan of care.    If you have questions, please do not hesitate to call me. I look forward to following Michael Mclean along with you.    Sincerely,    Adeel Manrique Jr., MD    Enclosure  CC:  No Recipients    If you would like to receive this communication electronically, please contact [email or web address] or [phone] to request more information on [facility] access.    For providers and/or their staff who would like to refer a patient to [facility], please contact us through our one-stop-shop provider referral line, Dary Fischer, at [phone].    If you feel you have received this communication in error or would no longer like to receive these types of communications, please e-mail [email or web address]

## 2020-03-25 ENCOUNTER — TELEPHONE (OUTPATIENT)
Dept: UROLOGY | Facility: CLINIC | Age: 80
End: 2020-03-25

## 2020-06-10 ENCOUNTER — LAB VISIT (OUTPATIENT)
Dept: LAB | Facility: HOSPITAL | Age: 80
End: 2020-06-10
Attending: UROLOGY
Payer: MEDICARE

## 2020-06-10 DIAGNOSIS — C61 PROSTATE CANCER: ICD-10-CM

## 2020-06-10 LAB — COMPLEXED PSA SERPL-MCNC: 0.41 NG/ML (ref 0–4)

## 2020-06-10 PROCEDURE — 84153 ASSAY OF PSA TOTAL: CPT

## 2020-06-10 PROCEDURE — 36415 COLL VENOUS BLD VENIPUNCTURE: CPT

## 2020-06-16 ENCOUNTER — TELEPHONE (OUTPATIENT)
Dept: UROLOGY | Facility: CLINIC | Age: 80
End: 2020-06-16

## 2020-06-16 DIAGNOSIS — C61 PROSTATE CANCER: Primary | ICD-10-CM

## 2020-06-16 NOTE — TELEPHONE ENCOUNTER
----- Message from Beverley Wallace LPN sent at 6/16/2020 10:53 AM CDT -----  Can you please put in request for auth for lupron shot tomorrow (kiki)

## 2020-07-02 DIAGNOSIS — C61 PROSTATE CANCER: Primary | ICD-10-CM

## 2020-07-08 ENCOUNTER — OFFICE VISIT (OUTPATIENT)
Dept: UROLOGY | Facility: CLINIC | Age: 80
End: 2020-07-08
Attending: UROLOGY
Payer: MEDICARE

## 2020-07-08 VITALS
RESPIRATION RATE: 16 BRPM | SYSTOLIC BLOOD PRESSURE: 121 MMHG | BODY MASS INDEX: 25.18 KG/M2 | DIASTOLIC BLOOD PRESSURE: 66 MMHG | HEART RATE: 61 BPM | HEIGHT: 69 IN | WEIGHT: 170 LBS

## 2020-07-08 DIAGNOSIS — C61 PROSTATE CANCER: Primary | ICD-10-CM

## 2020-07-08 PROCEDURE — 1159F MED LIST DOCD IN RCRD: CPT | Mod: S$GLB,,, | Performed by: UROLOGY

## 2020-07-08 PROCEDURE — 1101F PR PT FALLS ASSESS DOC 0-1 FALLS W/OUT INJ PAST YR: ICD-10-PCS | Mod: CPTII,S$GLB,, | Performed by: UROLOGY

## 2020-07-08 PROCEDURE — 99999 PR PBB SHADOW E&M-EST. PATIENT-LVL III: ICD-10-PCS | Mod: PBBFAC,,, | Performed by: UROLOGY

## 2020-07-08 PROCEDURE — 99499 UNLISTED E&M SERVICE: CPT | Mod: S$GLB,,, | Performed by: UROLOGY

## 2020-07-08 PROCEDURE — 99499 NO LOS: ICD-10-PCS | Mod: S$GLB,,, | Performed by: UROLOGY

## 2020-07-08 PROCEDURE — 1159F PR MEDICATION LIST DOCUMENTED IN MEDICAL RECORD: ICD-10-PCS | Mod: S$GLB,,, | Performed by: UROLOGY

## 2020-07-08 PROCEDURE — 96402 PR CHEMOTHER HORMON ANTINEOPL SUB-Q/IM: ICD-10-PCS | Mod: S$GLB,,, | Performed by: UROLOGY

## 2020-07-08 PROCEDURE — 99999 PR PBB SHADOW E&M-EST. PATIENT-LVL III: CPT | Mod: PBBFAC,,, | Performed by: UROLOGY

## 2020-07-08 PROCEDURE — 1101F PT FALLS ASSESS-DOCD LE1/YR: CPT | Mod: CPTII,S$GLB,, | Performed by: UROLOGY

## 2020-07-08 PROCEDURE — 96402 CHEMO HORMON ANTINEOPL SQ/IM: CPT | Mod: S$GLB,,, | Performed by: UROLOGY

## 2020-07-08 NOTE — PROGRESS NOTES
Subjective:       Patient ID: Michael Mclean is a 80 y.o. male.    Chief Complaint: Follow-up (lupron injection )    HPI patient is here for his Lupron shot.  He has adenocarcinoma prostate.  He is doing well eating well.  No back pain or bone pain or weight loss.  His PSA is down 2.4 and continues to slowly decrease    Past Medical History:   Diagnosis Date    Abdominal hernia     Anticoagulant long-term use     Arthritis     Cancer     prostate    Carotid artery stenosis     left    Coronary artery disease     Deaf     Encounter for blood transfusion     Hyperlipidemia     Hypertension     Mitral regurgitation     Paroxysmal atrial fibrillation     Stroke        Past Surgical History:   Procedure Laterality Date    AORTIC VALVE REPLACEMENT      CARDIAC CATHETERIZATION      stent x3    carotid artery stent Left     CORONARY ARTERY BYPASS GRAFT      HERNIA REPAIR      PROSTATECTOMY      SHOULDER SURGERY      TOE SURGERY      TREATMENT OF CARDIAC ARRHYTHMIA N/A 8/7/2019    Procedure: Cardioversion or Defibrillation;  Surgeon: Tal Laurent MD;  Location: [facility] CATH;  Service: Cardiology;  Laterality: N/A;       Family History   Problem Relation Age of Onset    Heart disease Mother     Heart attack Mother        Social History     Socioeconomic History    Marital status:      Spouse name: Not on file    Number of children: Not on file    Years of education: Not on file    Highest education level: Not on file   Occupational History    Not on file   Social Needs    Financial resource strain: Not on file    Food insecurity     Worry: Not on file     Inability: Not on file    Transportation needs     Medical: Not on file     Non-medical: Not on file   Tobacco Use    Smoking status: Never Smoker    Smokeless tobacco: Never Used   Substance and Sexual Activity    Alcohol use: Yes     Comment: occasionally    Drug use: Not Currently    Sexual activity: Not on file   Lifestyle     Physical activity     Days per week: Not on file     Minutes per session: Not on file    Stress: Not on file   Relationships    Social connections     Talks on phone: Not on file     Gets together: Not on file     Attends [employer]ism service: Not on file     Active member of club or organization: Not on file     Attends meetings of clubs or organizations: Not on file     Relationship status: Not on file   Other Topics Concern    Not on file   Social History Narrative    Not on file       Allergies:  Eliquis [apixaban] and Losartan    Medications:    Current Outpatient Medications:     acetylcarnitin/alpha lipoic ac (ACETYLCARNITIN HCL-A LIPOIC AC) 400-200 mg Cap, Take 1 capsule by mouth once daily., Disp: , Rfl:     amiodarone (PACERONE) 200 MG Tab, Take 0.5 tablets (100 mg total) by mouth once daily., Disp: , Rfl:     arginine HCl, L-arginine, 1,000 mg Tab, Take 1,000 mg by mouth once daily., Disp: , Rfl:     CALCIUM ORAL, Take 1,500 mg by mouth once daily. , Disp: , Rfl:     cholecalciferol, vitamin D3, 5,000 unit capsule, Take 5,000 Units by mouth once daily. , Disp: , Rfl:     clonazePAM (KLONOPIN) 2 MG Tab, Take 2 mg by mouth daily as needed (anxiety). , Disp: , Rfl:     CYANOCOBALAMIN, VITAMIN B-12, INJ, Inject 1,000 mcg as directed every 28 days. , Disp: , Rfl:     docusate sodium (COLACE) 100 MG capsule, Take 100 mg by mouth once daily. , Disp: , Rfl:     ferrous sulfate (FEOSOL) 325 mg (65 mg iron) Tab tablet, Take 325 mg by mouth daily with breakfast., Disp: , Rfl:     fish oil-omega-3 fatty acids 300-1,000 mg capsule, Take 1 g by mouth once daily., Disp: , Rfl:     KLOR-CON M20 20 mEq tablet, Take 20 mEq by mouth once daily. , Disp: , Rfl:     magnesium oxide (MAG-OX) 400 mg tablet, Take 400 mg by mouth once daily., Disp: , Rfl:     metoprolol succinate (TOPROL-XL) 50 MG 24 hr tablet, Take 1 tablet (50 mg total) by mouth 2 (two) times daily., Disp: 60 tablet, Rfl: 11    rivaroxaban  (XARELTO ORAL), Take by mouth., Disp: , Rfl:     rosuvastatin (CRESTOR) 20 MG tablet, Take 20 mg by mouth every evening. , Disp: , Rfl:     torsemide (DEMADEX) 20 MG Tab, Take 20 mg by mouth once daily., Disp: , Rfl:     valsartan (DIOVAN) 40 MG tablet, Take 40 mg by mouth once daily., Disp: , Rfl:     warfarin (COUMADIN) 5 MG tablet, Take 5mg daily or as advised by [facility] (Patient not taking: Reported on 10/2/2019), Disp: 30 tablet, Rfl: 1    Current Facility-Administered Medications:     leuprolide (6 month) injection 45 mg, 45 mg, Intramuscular, Q6 Months, Adeel Manrique Jr., MD, 45 mg at 10/02/19 1353    leuprolide (6 month) injection 45 mg, 45 mg, Intramuscular, Q6 Months, Adeel Manrique Jr., MD    leuprolide (6 month) SyKt 45 mg, 45 mg, Intramuscular, Q6 Months, Adeel Manrique Jr., MD    leuprolide (6 month) SyKt 45 mg, 45 mg, Intramuscular, Q6 Months, Adeel Manrique Jr., MD    Review of Systems   Constitutional: Negative for activity change, appetite change, chills, diaphoresis, fatigue, fever and unexpected weight change.   HENT: Positive for hearing loss. Negative for congestion, dental problem, mouth sores, postnasal drip, rhinorrhea, sinus pressure and trouble swallowing.    Eyes: Negative for pain, discharge and itching.   Respiratory: Negative for apnea, cough, choking, chest tightness, shortness of breath and wheezing.    Cardiovascular: Negative for chest pain, palpitations and leg swelling.   Gastrointestinal: Negative for abdominal distention, abdominal pain, anal bleeding, blood in stool, constipation, diarrhea, nausea, rectal pain and vomiting.   Endocrine: Negative for polydipsia and polyuria.   Genitourinary: Negative for decreased urine volume, difficulty urinating, discharge, dysuria, enuresis, flank pain, frequency, genital sores, hematuria, penile pain, penile swelling, scrotal swelling, testicular pain and urgency.   Musculoskeletal: Negative for arthralgias, back pain and  myalgias.   Skin: Negative for color change, rash and wound.   Neurological: Negative for dizziness, syncope, speech difficulty, light-headedness and headaches.   Hematological: Negative for adenopathy. Does not bruise/bleed easily.   Psychiatric/Behavioral: Negative for behavioral problems, confusion, hallucinations and sleep disturbance.       Objective:      Physical Exam   Constitutional: He appears well-developed.   HENT:   Head: Normocephalic.   Cardiovascular: Normal rate.    Pulmonary/Chest: Effort normal.   Abdominal: Soft.   Neurological: He is alert.   Skin: Skin is warm.         Assessment:       1. Prostate cancer        Plan:       Michael was seen today for follow-up.    Diagnoses and all orders for this visit:    Prostate cancer        patient was given Lupron 45 mg IM.  He tolerated it well.  He will return in 6 months with a PSA

## 2020-10-08 ENCOUNTER — LAB VISIT (OUTPATIENT)
Dept: LAB | Facility: HOSPITAL | Age: 80
End: 2020-10-08
Attending: INTERNAL MEDICINE
Payer: MEDICARE

## 2020-10-08 DIAGNOSIS — I50.20 HEART FAILURE, SYSTOLIC: ICD-10-CM

## 2020-10-08 DIAGNOSIS — E78.5 HYPERLIPEMIA: ICD-10-CM

## 2020-10-08 DIAGNOSIS — D64.9 ANEMIA, UNSPECIFIED: ICD-10-CM

## 2020-10-08 DIAGNOSIS — E63.0 ESSENTIAL FATTY ACID DEFICIENCY: ICD-10-CM

## 2020-10-08 DIAGNOSIS — I48.0 PAROXYSMAL ATRIAL FIBRILLATION: ICD-10-CM

## 2020-10-08 DIAGNOSIS — I10 ESSENTIAL HYPERTENSION, MALIGNANT: Primary | ICD-10-CM

## 2020-10-08 LAB
ALBUMIN SERPL BCP-MCNC: 3.6 G/DL (ref 3.5–5.2)
ALP SERPL-CCNC: 51 U/L (ref 55–135)
ALT SERPL W/O P-5'-P-CCNC: 18 U/L (ref 10–44)
ANION GAP SERPL CALC-SCNC: 7 MMOL/L (ref 8–16)
AST SERPL-CCNC: 28 U/L (ref 10–40)
BASOPHILS # BLD AUTO: 0.04 K/UL (ref 0–0.2)
BASOPHILS NFR BLD: 0.7 % (ref 0–1.9)
BILIRUB SERPL-MCNC: 0.3 MG/DL (ref 0.1–1)
BUN SERPL-MCNC: 22 MG/DL (ref 8–23)
CALCIUM SERPL-MCNC: 8.6 MG/DL (ref 8.7–10.5)
CHLORIDE SERPL-SCNC: 104 MMOL/L (ref 95–110)
CO2 SERPL-SCNC: 26 MMOL/L (ref 23–29)
CREAT SERPL-MCNC: 2 MG/DL (ref 0.5–1.4)
DIFFERENTIAL METHOD: ABNORMAL
EOSINOPHIL # BLD AUTO: 0.4 K/UL (ref 0–0.5)
EOSINOPHIL NFR BLD: 6.4 % (ref 0–8)
ERYTHROCYTE [DISTWIDTH] IN BLOOD BY AUTOMATED COUNT: 16.7 % (ref 11.5–14.5)
EST. GFR  (AFRICAN AMERICAN): 34.9 ML/MIN/1.73 M^2
EST. GFR  (NON AFRICAN AMERICAN): 30.2 ML/MIN/1.73 M^2
GLUCOSE SERPL-MCNC: 100 MG/DL (ref 70–110)
HCT VFR BLD AUTO: 19.2 % (ref 40–54)
HGB BLD-MCNC: 5.6 G/DL (ref 14–18)
IMM GRANULOCYTES # BLD AUTO: 0.02 K/UL (ref 0–0.04)
IMM GRANULOCYTES NFR BLD AUTO: 0.4 % (ref 0–0.5)
LYMPHOCYTES # BLD AUTO: 1.6 K/UL (ref 1–4.8)
LYMPHOCYTES NFR BLD: 27.6 % (ref 18–48)
MCH RBC QN AUTO: 24 PG (ref 27–31)
MCHC RBC AUTO-ENTMCNC: 29.2 G/DL (ref 32–36)
MCV RBC AUTO: 82 FL (ref 82–98)
MONOCYTES # BLD AUTO: 0.9 K/UL (ref 0.3–1)
MONOCYTES NFR BLD: 15.7 % (ref 4–15)
NEUTROPHILS # BLD AUTO: 2.8 K/UL (ref 1.8–7.7)
NEUTROPHILS NFR BLD: 49.2 % (ref 38–73)
NRBC BLD-RTO: 0 /100 WBC
PLATELET # BLD AUTO: 206 K/UL (ref 150–350)
PMV BLD AUTO: 11 FL (ref 9.2–12.9)
POTASSIUM SERPL-SCNC: 4.5 MMOL/L (ref 3.5–5.1)
PROT SERPL-MCNC: 6.5 G/DL (ref 6–8.4)
RBC # BLD AUTO: 2.33 M/UL (ref 4.6–6.2)
SODIUM SERPL-SCNC: 137 MMOL/L (ref 136–145)
WBC # BLD AUTO: 5.62 K/UL (ref 3.9–12.7)

## 2020-10-08 PROCEDURE — 36415 COLL VENOUS BLD VENIPUNCTURE: CPT

## 2020-10-08 PROCEDURE — 85025 COMPLETE CBC W/AUTO DIFF WBC: CPT

## 2020-10-08 PROCEDURE — 80053 COMPREHEN METABOLIC PANEL: CPT

## 2020-10-12 ENCOUNTER — HOSPITAL ENCOUNTER (OUTPATIENT)
Facility: HOSPITAL | Age: 80
Discharge: HOME HEALTH | End: 2020-10-13
Attending: FAMILY MEDICINE | Admitting: INTERNAL MEDICINE
Payer: MEDICARE

## 2020-10-12 DIAGNOSIS — D64.9 ANEMIA: ICD-10-CM

## 2020-10-12 DIAGNOSIS — R53.83 FATIGUE, UNSPECIFIED TYPE: ICD-10-CM

## 2020-10-12 DIAGNOSIS — R53.1 WEAKNESS: Primary | ICD-10-CM

## 2020-10-12 DIAGNOSIS — I48.0 PAROXYSMAL ATRIAL FIBRILLATION: ICD-10-CM

## 2020-10-12 LAB
ABO + RH BLD: NORMAL
ALBUMIN SERPL BCP-MCNC: 3.5 G/DL (ref 3.5–5.2)
ALP SERPL-CCNC: 51 U/L (ref 55–135)
ALT SERPL W/O P-5'-P-CCNC: 17 U/L (ref 10–44)
ANION GAP SERPL CALC-SCNC: 5 MMOL/L (ref 8–16)
AST SERPL-CCNC: 22 U/L (ref 10–40)
BASOPHILS # BLD AUTO: 0.03 K/UL (ref 0–0.2)
BASOPHILS NFR BLD: 0.6 % (ref 0–1.9)
BILIRUB SERPL-MCNC: 0.4 MG/DL (ref 0.1–1)
BLD GP AB SCN CELLS X3 SERPL QL: NORMAL
BUN SERPL-MCNC: 25 MG/DL (ref 8–23)
CALCIUM SERPL-MCNC: 8.9 MG/DL (ref 8.7–10.5)
CHLORIDE SERPL-SCNC: 107 MMOL/L (ref 95–110)
CO2 SERPL-SCNC: 28 MMOL/L (ref 23–29)
CREAT SERPL-MCNC: 1.3 MG/DL (ref 0.5–1.4)
DIFFERENTIAL METHOD: ABNORMAL
EOSINOPHIL # BLD AUTO: 0.4 K/UL (ref 0–0.5)
EOSINOPHIL NFR BLD: 7.3 % (ref 0–8)
ERYTHROCYTE [DISTWIDTH] IN BLOOD BY AUTOMATED COUNT: 16.8 % (ref 11.5–14.5)
EST. GFR  (AFRICAN AMERICAN): 58.7 ML/MIN/1.73 M^2
EST. GFR  (NON AFRICAN AMERICAN): 50.8 ML/MIN/1.73 M^2
FOLATE SERPL-MCNC: 37.3 NG/ML (ref 4–24)
GLUCOSE SERPL-MCNC: 100 MG/DL (ref 70–110)
HCT VFR BLD AUTO: 19.6 % (ref 40–54)
HGB BLD-MCNC: 5.7 G/DL (ref 14–18)
IMM GRANULOCYTES # BLD AUTO: 0.02 K/UL (ref 0–0.04)
IMM GRANULOCYTES NFR BLD AUTO: 0.4 % (ref 0–0.5)
INR PPP: 1.2 (ref 0.8–1.2)
IRON SATN MFR SERPL: 2 % (ref 20–50)
IRON SERPL-MCNC: 9 UG/DL (ref 45–160)
LDH SERPL L TO P-CCNC: 247 U/L (ref 110–260)
LIPASE SERPL-CCNC: 83 U/L (ref 23–300)
LYMPHOCYTES # BLD AUTO: 1.2 K/UL (ref 1–4.8)
LYMPHOCYTES NFR BLD: 23.2 % (ref 18–48)
MCH RBC QN AUTO: 23.8 PG (ref 27–31)
MCHC RBC AUTO-ENTMCNC: 29.1 G/DL (ref 32–36)
MCV RBC AUTO: 82 FL (ref 82–98)
MONOCYTES # BLD AUTO: 0.8 K/UL (ref 0.3–1)
MONOCYTES NFR BLD: 14.7 % (ref 4–15)
NEUTROPHILS # BLD AUTO: 2.9 K/UL (ref 1.8–7.7)
NEUTROPHILS NFR BLD: 53.8 % (ref 38–73)
NRBC BLD-RTO: 0 /100 WBC
PLATELET # BLD AUTO: 201 K/UL (ref 150–350)
PMV BLD AUTO: 10.4 FL (ref 9.2–12.9)
POTASSIUM SERPL-SCNC: 3.8 MMOL/L (ref 3.5–5.1)
PROT SERPL-MCNC: 6.9 G/DL (ref 6–8.4)
PROTHROMBIN TIME: 12.6 SEC (ref 9–12.5)
RBC # BLD AUTO: 2.39 M/UL (ref 4.6–6.2)
RETICS/RBC NFR AUTO: 2.2 % (ref 0.4–2)
SARS-COV-2 RDRP RESP QL NAA+PROBE: NEGATIVE
SODIUM SERPL-SCNC: 140 MMOL/L (ref 136–145)
TOTAL IRON BINDING CAPACITY: 378 UG/DL (ref 250–450)
VIT B12 SERPL-MCNC: 647 PG/ML (ref 210–950)
WBC # BLD AUTO: 5.31 K/UL (ref 3.9–12.7)

## 2020-10-12 PROCEDURE — 82746 ASSAY OF FOLIC ACID SERUM: CPT

## 2020-10-12 PROCEDURE — 25000003 PHARM REV CODE 250: Performed by: FAMILY MEDICINE

## 2020-10-12 PROCEDURE — U0002 COVID-19 LAB TEST NON-CDC: HCPCS

## 2020-10-12 PROCEDURE — 85025 COMPLETE CBC W/AUTO DIFF WBC: CPT

## 2020-10-12 PROCEDURE — 86920 COMPATIBILITY TEST SPIN: CPT | Mod: 59

## 2020-10-12 PROCEDURE — 25000003 PHARM REV CODE 250: Performed by: INTERNAL MEDICINE

## 2020-10-12 PROCEDURE — 99291 CRITICAL CARE FIRST HOUR: CPT | Mod: 25

## 2020-10-12 PROCEDURE — 82607 VITAMIN B-12: CPT

## 2020-10-12 PROCEDURE — S5010 5% DEXTROSE AND 0.45% SALINE: HCPCS | Performed by: FAMILY MEDICINE

## 2020-10-12 PROCEDURE — 85610 PROTHROMBIN TIME: CPT

## 2020-10-12 PROCEDURE — 85045 AUTOMATED RETICULOCYTE COUNT: CPT

## 2020-10-12 PROCEDURE — 11000001 HC ACUTE MED/SURG PRIVATE ROOM

## 2020-10-12 PROCEDURE — P9016 RBC LEUKOCYTES REDUCED: HCPCS

## 2020-10-12 PROCEDURE — 36415 COLL VENOUS BLD VENIPUNCTURE: CPT

## 2020-10-12 PROCEDURE — G0378 HOSPITAL OBSERVATION PER HR: HCPCS

## 2020-10-12 PROCEDURE — 83540 ASSAY OF IRON: CPT

## 2020-10-12 PROCEDURE — 96374 THER/PROPH/DIAG INJ IV PUSH: CPT

## 2020-10-12 PROCEDURE — 86850 RBC ANTIBODY SCREEN: CPT

## 2020-10-12 PROCEDURE — 80053 COMPREHEN METABOLIC PANEL: CPT

## 2020-10-12 PROCEDURE — 63600175 PHARM REV CODE 636 W HCPCS: Performed by: FAMILY MEDICINE

## 2020-10-12 PROCEDURE — 36430 TRANSFUSION BLD/BLD COMPNT: CPT

## 2020-10-12 PROCEDURE — 93010 ELECTROCARDIOGRAM REPORT: CPT | Mod: ,,, | Performed by: INTERNAL MEDICINE

## 2020-10-12 PROCEDURE — C9113 INJ PANTOPRAZOLE SODIUM, VIA: HCPCS | Performed by: FAMILY MEDICINE

## 2020-10-12 PROCEDURE — 83690 ASSAY OF LIPASE: CPT

## 2020-10-12 PROCEDURE — S5010 5% DEXTROSE AND 0.45% SALINE: HCPCS | Performed by: INTERNAL MEDICINE

## 2020-10-12 PROCEDURE — 93005 ELECTROCARDIOGRAM TRACING: CPT

## 2020-10-12 PROCEDURE — 93010 EKG 12-LEAD: ICD-10-PCS | Mod: ,,, | Performed by: INTERNAL MEDICINE

## 2020-10-12 PROCEDURE — 83615 LACTATE (LD) (LDH) ENZYME: CPT

## 2020-10-12 RX ORDER — PANTOPRAZOLE SODIUM 40 MG/10ML
80 INJECTION, POWDER, LYOPHILIZED, FOR SOLUTION INTRAVENOUS
Status: COMPLETED | OUTPATIENT
Start: 2020-10-12 | End: 2020-10-12

## 2020-10-12 RX ORDER — HYDROCODONE BITARTRATE AND ACETAMINOPHEN 500; 5 MG/1; MG/1
TABLET ORAL
Status: DISCONTINUED | OUTPATIENT
Start: 2020-10-12 | End: 2020-10-13 | Stop reason: HOSPADM

## 2020-10-12 RX ORDER — SODIUM CHLORIDE 0.9 % (FLUSH) 0.9 %
10 SYRINGE (ML) INJECTION
Status: DISCONTINUED | OUTPATIENT
Start: 2020-10-12 | End: 2020-10-13 | Stop reason: HOSPADM

## 2020-10-12 RX ORDER — DEXTROSE MONOHYDRATE AND SODIUM CHLORIDE 5; .45 G/100ML; G/100ML
INJECTION, SOLUTION INTRAVENOUS CONTINUOUS
Status: DISCONTINUED | OUTPATIENT
Start: 2020-10-12 | End: 2020-10-13 | Stop reason: HOSPADM

## 2020-10-12 RX ORDER — ONDANSETRON 2 MG/ML
4 INJECTION INTRAMUSCULAR; INTRAVENOUS EVERY 8 HOURS PRN
Status: DISCONTINUED | OUTPATIENT
Start: 2020-10-12 | End: 2020-10-13 | Stop reason: HOSPADM

## 2020-10-12 RX ORDER — FAMOTIDINE 10 MG/ML
20 INJECTION INTRAVENOUS DAILY
Status: DISCONTINUED | OUTPATIENT
Start: 2020-10-13 | End: 2020-10-13 | Stop reason: HOSPADM

## 2020-10-12 RX ORDER — SODIUM CHLORIDE 9 MG/ML
1000 INJECTION, SOLUTION INTRAVENOUS
Status: COMPLETED | OUTPATIENT
Start: 2020-10-12 | End: 2020-10-12

## 2020-10-12 RX ADMIN — DEXTROSE AND SODIUM CHLORIDE: 5; .45 INJECTION, SOLUTION INTRAVENOUS at 06:10

## 2020-10-12 RX ADMIN — PANTOPRAZOLE SODIUM 80 MG: 40 INJECTION, POWDER, LYOPHILIZED, FOR SOLUTION INTRAVENOUS at 01:10

## 2020-10-12 RX ADMIN — SODIUM CHLORIDE 1000 ML: 0.9 INJECTION, SOLUTION INTRAVENOUS at 02:10

## 2020-10-12 NOTE — ED NOTES
Left message with Dr. Alejo nurse, Dr. Alejo with a patient in his clinic right now, will return call

## 2020-10-12 NOTE — ED NOTES
"Called medsurg to notify will bring up patient at this time. Per Kei Samson-- "you can't bring up patient now, that is not the right room assigned, awaiting nurse to get out of another room" will call back  "

## 2020-10-12 NOTE — ED PROVIDER NOTES
Encounter Date: 10/12/2020       History     Chief Complaint   Patient presents with    Fatigue     sent from dr sheehan office.   States needs iron transfusion.       Patient is an 80-year-old male with history of atrial fibrillation on Xarelto.  He has been quite fatigued the last several days.  His daughter states he has been pale.  He denies losing blood.  He states he is short of breath with ambulation.  He denies any chest pain.        Review of patient's allergies indicates:   Allergen Reactions    Losartan     Valsartan      Past Medical History:   Diagnosis Date    Abdominal hernia     Anticoagulant long-term use     Arthritis     SHOULDER    Cancer     prostate    Cardiomyopathy     Carotid artery stenosis     left    Coronary artery disease     Deaf     Dizziness     Encounter for blood transfusion     Hyperlipidemia     Hypertension     Mitral regurgitation     Mitral regurgitation     PAD (peripheral artery disease)     Paroxysmal atrial fibrillation     Stroke      Past Surgical History:   Procedure Laterality Date    ABLATION N/A 7/22/2020    Procedure: Ablation, afib;  Surgeon: Tal Laurent MD;  Location: [facility] CATH;  Service: Cardiology;  Laterality: N/A;  needs covid test;needs ALEXYS ([place])    AORTIC VALVE REPLACEMENT      CARDIAC CATHETERIZATION      stent x3    carotid artery stent Left     CORONARY ARTERY BYPASS GRAFT      HERNIA REPAIR      PACERMAKER      PROSTATECTOMY      SHOULDER SURGERY      TOE SURGERY      TREATMENT OF CARDIAC ARRHYTHMIA N/A 8/7/2019    Procedure: Cardioversion or Defibrillation;  Surgeon: Tal Laurent MD;  Location: [facility] CATH;  Service: Cardiology;  Laterality: N/A;     Family History   Problem Relation Age of Onset    Heart disease Mother     Heart attack Mother      Social History     Tobacco Use    Smoking status: Never Smoker    Smokeless tobacco: Never Used   Substance Use Topics    Alcohol use: Yes     Comment:  occasionally    Drug use: Not Currently     Review of Systems   Constitutional: Positive for fatigue. Negative for fever.   HENT: Negative for sore throat.    Respiratory: Negative for shortness of breath.    Cardiovascular: Negative for chest pain.   Gastrointestinal: Negative for nausea.   Genitourinary: Negative for dysuria.   Musculoskeletal: Negative for back pain.   Skin: Positive for pallor. Negative for rash.   Neurological: Positive for weakness.   Hematological: Does not bruise/bleed easily.       Physical Exam     Initial Vitals [10/12/20 1225]   BP Pulse Resp Temp SpO2   (!) 116/53 74 14 97.5 °F (36.4 °C) 100 %      MAP       --         Physical Exam    Nursing note and vitals reviewed.  Constitutional: He appears well-developed and well-nourished.   80-year-old male.  No acute distress.  Very pale.   HENT:   Head: Normocephalic and atraumatic.   Eyes: EOM are normal. Pupils are equal, round, and reactive to light.   Neck: Normal range of motion. Neck supple.   Cardiovascular: Normal rate, regular rhythm, normal heart sounds and intact distal pulses.   Pulmonary/Chest: Breath sounds normal. No respiratory distress. He has no wheezes. He has no rhonchi. He has no rales.   Abdominal: Soft. Bowel sounds are normal. He exhibits no distension. There is no abdominal tenderness. There is no rebound.   Musculoskeletal: Normal range of motion. No tenderness or edema.   Neurological: He is alert and oriented to person, place, and time. No cranial nerve deficit.   Skin: Skin is warm. Capillary refill takes less than 2 seconds. There is pallor.   Psychiatric: He has a normal mood and affect. His behavior is normal. Judgment and thought content normal.         ED Course   Critical Care    Date/Time: 10/12/2020 4:18 PM  Performed by: Edgar Cloud Jr., MD  Authorized by: Edgar Cloud Jr., MD   Direct patient critical care time: 10 minutes  Additional history critical care time: 5 minutes  Ordering /  reviewing critical care time: 5 minutes  Documentation critical care time: 5 minutes  Consulting other physicians critical care time: 5 minutes  Consult with family critical care time: 5 minutes  Total critical care time (exclusive of procedural time) : 35 minutes  Critical care was necessary to treat or prevent imminent or life-threatening deterioration of the following conditions: circulatory failure and shock.  Critical care was time spent personally by me on the following activities: ordering and review of laboratory studies, ordering and performing treatments and interventions, examination of patient, discussions with primary provider, pulse oximetry, evaluation of patient's response to treatment and obtaining history from patient or surrogate.        Labs Reviewed   CBC W/ AUTO DIFFERENTIAL - Abnormal; Notable for the following components:       Result Value    RBC 2.39 (*)     Hemoglobin 5.7 (*)     Hematocrit 19.6 (*)     Mean Corpuscular Hemoglobin 23.8 (*)     Mean Corpuscular Hemoglobin Conc 29.1 (*)     RDW 16.8 (*)     All other components within normal limits    Narrative:      H/H  critical result(s) called and verbal readback obtained from   Arie Powell/DARLINE by MEI 10/12/2020 14:05   COMPREHENSIVE METABOLIC PANEL - Abnormal; Notable for the following components:    BUN, Bld 25 (*)     Alkaline Phosphatase 51 (*)     Anion Gap 5 (*)     eGFR if  58.7 (*)     eGFR if non  50.8 (*)     All other components within normal limits   PROTIME-INR - Abnormal; Notable for the following components:    Prothrombin Time 12.6 (*)     All other components within normal limits   RETICULOCYTES - Abnormal; Notable for the following components:    Retic 2.2 (*)     All other components within normal limits   LIPASE   FOLATE   VITAMIN B12   IRON AND TIBC   LACTATE DEHYDROGENASE   OCCULT BLOOD X 1, STOOL   TYPE & SCREEN   PREPARE RBC SOFT   PREPARE RBC SOFT     EKG Readings:  (Independently Interpreted)   Initial Reading: No STEMI. Rhythm: Atrial Fibrillation. Heart Rate: 70. Ectopy: No Ectopy. ST Segments: Normal ST Segments. T Waves: Normal. Axis: Normal. Clinical Impression: Atrial Fibrillation     ECG Results          EKG 12-lead (Final result)  Result time 10/12/20 15:32:03    Final result by Interface, Lab In [facility] (10/12/20 15:32:03)                 Narrative:    Test Reason : D64.9,    Vent. Rate : 071 BPM     Atrial Rate : 070 BPM     P-R Int : 000 ms          QRS Dur : 124 ms      QT Int : 452 ms       P-R-T Axes : 000 003 137 degrees     QTc Int : 491 ms    Atrial pacing with occasional premature supraventricular beats  Nonspecific intraventricular conduction delay  Nonspecific ST and T wave abnormality  Abnormal ECG  When compared with ECG of 23-JUL-2020 11:58,  Premature beats are now present  Confirmed by Geovanny Rodriguez MD ([id number]) on 10/12/2020 3:31:57 PM    Referred By: [email or web address]   SELF           Confirmed By:Geovanny Rodriguez MD                            Imaging Results    None          Medical Decision Making:   Initial Assessment:   80-year-old male with history of atrial fibrillation on Xarelto.  Now with increasing weakness times several days and pale.  Short of breath with exertion.  Differential Diagnosis:   Occult GI blood loss, anemia, iron deficiency,  Clinical Tests:   Lab Tests: Ordered  Medical Tests: Ordered  ED Management:  Will admit to medical service, Dr. Alejo.  Patient will receive 2 units PRBC in the ED. Patient is stable.                             Clinical Impression:       ICD-10-CM ICD-9-CM   1. Weakness  R53.1 780.79   2. Anemia  D64.9 285.9   3. Fatigue, unspecified type  R53.83 780.79                      Disposition:   Disposition: Admitted  Condition: Stable     ED Disposition Condition    Admit                             Edgar Cloud Jr., MD  10/12/20 4585

## 2020-10-13 VITALS
BODY MASS INDEX: 25.48 KG/M2 | DIASTOLIC BLOOD PRESSURE: 56 MMHG | HEIGHT: 70 IN | SYSTOLIC BLOOD PRESSURE: 116 MMHG | TEMPERATURE: 98 F | RESPIRATION RATE: 18 BRPM | HEART RATE: 69 BPM | WEIGHT: 178 LBS | OXYGEN SATURATION: 99 %

## 2020-10-13 PROBLEM — D64.9 SYMPTOMATIC ANEMIA: Status: ACTIVE | Noted: 2020-10-13

## 2020-10-13 LAB
ALBUMIN SERPL BCP-MCNC: 2.9 G/DL (ref 3.5–5.2)
ALP SERPL-CCNC: 40 U/L (ref 55–135)
ALT SERPL W/O P-5'-P-CCNC: 13 U/L (ref 10–44)
ANION GAP SERPL CALC-SCNC: 6 MMOL/L (ref 8–16)
AST SERPL-CCNC: 23 U/L (ref 10–40)
BILIRUB SERPL-MCNC: 0.7 MG/DL (ref 0.1–1)
BLD PROD TYP BPU: NORMAL
BLOOD UNIT EXPIRATION DATE: NORMAL
BLOOD UNIT TYPE CODE: 5100
BLOOD UNIT TYPE: NORMAL
BUN SERPL-MCNC: 16 MG/DL (ref 8–23)
CALCIUM SERPL-MCNC: 8.6 MG/DL (ref 8.7–10.5)
CHLORIDE SERPL-SCNC: 110 MMOL/L (ref 95–110)
CO2 SERPL-SCNC: 26 MMOL/L (ref 23–29)
CODING SYSTEM: NORMAL
CREAT SERPL-MCNC: 1.1 MG/DL (ref 0.5–1.4)
DISPENSE STATUS: NORMAL
ERYTHROCYTE [DISTWIDTH] IN BLOOD BY AUTOMATED COUNT: 17.3 % (ref 11.5–14.5)
EST. GFR  (AFRICAN AMERICAN): >60 ML/MIN/1.73 M^2
EST. GFR  (NON AFRICAN AMERICAN): >60 ML/MIN/1.73 M^2
GLUCOSE SERPL-MCNC: 101 MG/DL (ref 70–110)
HCT VFR BLD AUTO: 23.9 % (ref 40–54)
HGB BLD-MCNC: 7.1 G/DL (ref 14–18)
MCH RBC QN AUTO: 23.9 PG (ref 27–31)
MCHC RBC AUTO-ENTMCNC: 29.7 G/DL (ref 32–36)
MCV RBC AUTO: 81 FL (ref 82–98)
NUM UNITS TRANS PACKED RBC: NORMAL
OB PNL STL: NEGATIVE
PLATELET # BLD AUTO: 147 K/UL (ref 150–350)
PMV BLD AUTO: 10.7 FL (ref 9.2–12.9)
POTASSIUM SERPL-SCNC: 3.8 MMOL/L (ref 3.5–5.1)
PROT SERPL-MCNC: 5.8 G/DL (ref 6–8.4)
RBC # BLD AUTO: 2.97 M/UL (ref 4.6–6.2)
SODIUM SERPL-SCNC: 142 MMOL/L (ref 136–145)
WBC # BLD AUTO: 5.98 K/UL (ref 3.9–12.7)

## 2020-10-13 PROCEDURE — P9016 RBC LEUKOCYTES REDUCED: HCPCS

## 2020-10-13 PROCEDURE — 25000003 PHARM REV CODE 250: Performed by: FAMILY MEDICINE

## 2020-10-13 PROCEDURE — S5010 5% DEXTROSE AND 0.45% SALINE: HCPCS | Performed by: INTERNAL MEDICINE

## 2020-10-13 PROCEDURE — 80053 COMPREHEN METABOLIC PANEL: CPT

## 2020-10-13 PROCEDURE — 85027 COMPLETE CBC AUTOMATED: CPT

## 2020-10-13 PROCEDURE — S5010 5% DEXTROSE AND 0.45% SALINE: HCPCS | Performed by: FAMILY MEDICINE

## 2020-10-13 PROCEDURE — 36415 COLL VENOUS BLD VENIPUNCTURE: CPT

## 2020-10-13 PROCEDURE — G0378 HOSPITAL OBSERVATION PER HR: HCPCS

## 2020-10-13 PROCEDURE — 25000003 PHARM REV CODE 250: Performed by: INTERNAL MEDICINE

## 2020-10-13 PROCEDURE — 82272 OCCULT BLD FECES 1-3 TESTS: CPT

## 2020-10-13 RX ORDER — FERROUS SULFATE 325(65) MG
325 TABLET ORAL DAILY
Refills: 0
Start: 2020-10-13

## 2020-10-13 RX ORDER — HYDROCODONE BITARTRATE AND ACETAMINOPHEN 500; 5 MG/1; MG/1
TABLET ORAL
Status: DISCONTINUED | OUTPATIENT
Start: 2020-10-13 | End: 2020-10-13 | Stop reason: HOSPADM

## 2020-10-13 RX ADMIN — DEXTROSE AND SODIUM CHLORIDE: 5; .45 INJECTION, SOLUTION INTRAVENOUS at 05:10

## 2020-10-13 RX ADMIN — FAMOTIDINE 20 MG: 10 INJECTION INTRAVENOUS at 08:10

## 2020-10-13 RX ADMIN — SODIUM CHLORIDE: 0.9 INJECTION, SOLUTION INTRAVENOUS at 11:10

## 2020-10-13 NOTE — ASSESSMENT & PLAN NOTE
Will continue his home medical therapy while hospitalized.  Most recent ejection fraction per my records is 15%.  Cardiology consulted.       Pt is calling to let Dr know that she had her thyroid test done and needs the med refill asap.

## 2020-10-13 NOTE — CONSULTS
[facility]  Cardiology  Consult Note    Patient Name: Michael Mclean  Patient : 1940  MRN: [id number]  Admission Date: 10/12/2020  Hospital Length of Stay: 1 days  Code Status: Full Code   Attending Provider: Josh Alejo Jr., MD   Consulting Provider: KIKI Laird  Primary Care Physician: Josh Alejo Jr, MD  Principal Problem:<principal problem not specified>      Patient information was obtained from patient, past medical records and ER records.     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    Presented to hospital for several day history of shortness of breath with exertion.  Seen by pulmonology outpatient and had outpatient labs done.  CBC showed marked reduction in H/H.  He presented to ER as symptoms were progressing.  On arrival, H/H remained low and was admitted for blood transfusion.      Past Medical History:   Diagnosis Date    Abdominal hernia     Anticoagulant long-term use     Arthritis     SHOULDER    Cancer     prostate    Cardiomyopathy     Carotid artery stenosis     left    Coronary artery disease     Deaf     Dizziness     Encounter for blood transfusion     Hyperlipidemia     Hypertension     Mitral regurgitation     Mitral regurgitation     PAD (peripheral artery disease)     Paroxysmal atrial fibrillation     Stroke        Past Surgical History:   Procedure Laterality Date    ABLATION N/A 2020    Procedure: Ablation, afib;  Surgeon: Tal Laurent MD;  Location: [facility] CATH;  Service: Cardiology;  Laterality: N/A;  needs covid test;needs ALEXYS ([place])    AORTIC VALVE  REPLACEMENT      CARDIAC CATHETERIZATION      stent x3    carotid artery stent Left     CORONARY ARTERY BYPASS GRAFT      HERNIA REPAIR      PACERMAKER      PROSTATECTOMY      SHOULDER SURGERY      TOE SURGERY      TREATMENT OF CARDIAC ARRHYTHMIA N/A 8/7/2019    Procedure: Cardioversion or Defibrillation;  Surgeon: Tal Laurent MD;  Location: [facility] CATH;  Service: Cardiology;  Laterality: N/A;       Review of patient's allergies indicates:   Allergen Reactions    Losartan     Valsartan        No current facility-administered medications on file prior to encounter.      Current Outpatient Medications on File Prior to Encounter   Medication Sig    acetylcarnitin/alpha lipoic ac (ACETYLCARNITIN HCL-A LIPOIC AC) 400-200 mg Cap Take 1 capsule by mouth once daily.    cholecalciferol, vitamin D3, 5,000 unit capsule Take 5,000 Units by mouth once daily.     clonazePAM (KLONOPIN) 2 MG Tab Take 2 mg by mouth daily as needed.    colchicine (COLCRYS) 0.6 mg tablet Take 1 tablet (0.6 mg total) by mouth once daily. for 7 days    cyanocobalamin, vitamin B-12, 1,000 mcg/mL Kit Inject 1 mL as directed every 28 days.    KLOR-CON M20 20 mEq tablet Take 20 mEq by mouth once daily.     levothyroxine (SYNTHROID) 25 MCG tablet Take 25 mcg by mouth once daily.    losartan (COZAAR) 25 MG tablet Take 25 mg by mouth once daily.    magnesium oxide (MAG-OX) 400 mg tablet Take 400 mg by mouth once daily.    metoprolol succinate (TOPROL-XL) 100 MG 24 hr tablet Take 100 mg by mouth 2 (two) times a day.    pantoprazole (PROTONIX) 40 MG tablet Take 1 tablet twice daily for 2 weeks and once daily for 2 weeks after    potassium chloride SA (K-DUR,KLOR-CON) 20 MEQ tablet Take 20 mEq by mouth once daily.    rivaroxaban (XARELTO) 20 mg Tab Take 20 mg by mouth daily with dinner or evening meal.    rosuvastatin (CRESTOR) 20 MG tablet Take 20 mg by mouth every evening.    sotaloL (BETAPACE) 80 MG tablet Take 1 tablet (80 mg  total) by mouth 2 (two) times daily.    torsemide (DEMADEX) 20 MG Tab Take 20 mg by mouth every other day.    vitamin D3-folic acid 5,000 unit- 1 mg Tab Take 1 tablet by mouth once daily.     Family History     Problem Relation (Age of Onset)    Heart attack Mother    Heart disease Mother        Tobacco Use    Smoking status: Never Smoker    Smokeless tobacco: Never Used   Substance and Sexual Activity    Alcohol use: Yes     Comment: occasionally    Drug use: Not Currently    Sexual activity: Not on file     Review of Systems   Constitutional: Positive for fatigue.   HENT: Negative.    Eyes: Negative.    Respiratory: Positive for shortness of breath.    Cardiovascular: Negative.    Gastrointestinal: Negative.    Endocrine: Negative.    Genitourinary: Negative.    Musculoskeletal: Negative.    Skin: Negative.    Allergic/Immunologic: Negative.    Neurological: Negative.    Hematological: Negative.    Psychiatric/Behavioral: Negative.      Objective:     Vital Signs (Most Recent):  Temp: 98.2 °F (36.8 °C) (10/13/20 0716)  Pulse: 73 (10/13/20 0716)  Resp: 18 (10/13/20 0716)  BP: (!) 107/59 (10/13/20 0716)  SpO2: 100 % (10/13/20 0716) Vital Signs (24h Range):  Temp:  [97.5 °F (36.4 °C)-98.7 °F (37.1 °C)] 98.2 °F (36.8 °C)  Pulse:  [69-78] 73  Resp:  [14-20] 18  SpO2:  [97 %-100 %] 100 %  BP: (107-146)/(53-80) 107/59     Weight: 80.7 kg (178 lb)  Body mass index is 25.54 kg/m².    SpO2: 100 %  O2 Device (Oxygen Therapy): room air      Intake/Output Summary (Last 24 hours) at 10/13/2020 0932  Last data filed at 10/13/2020 0456  Gross per 24 hour   Intake 2192 ml   Output --   Net 2192 ml       Lines/Drains/Airways     Peripheral Intravenous Line                 Peripheral IV - Single Lumen 10/12/20 1341 18 G Right Antecubital less than 1 day         Peripheral IV - Single Lumen 10/12/20 1413 18 G Left Forearm less than 1 day                Physical Exam  Vitals signs and nursing note reviewed.   Constitutional:        General: He is not in acute distress.     Appearance: Normal appearance. He is normal weight.   HENT:      Head: Normocephalic and atraumatic.      Nose: Nose normal.      Mouth/Throat:      Mouth: Mucous membranes are moist.   Eyes:      Pupils: Pupils are equal, round, and reactive to light.   Neck:      Musculoskeletal: Normal range of motion.   Cardiovascular:      Rate and Rhythm: Normal rate and regular rhythm.      Pulses: Normal pulses.   Pulmonary:      Effort: Pulmonary effort is normal.      Breath sounds: Normal breath sounds.   Abdominal:      General: Abdomen is flat.      Palpations: Abdomen is soft.   Musculoskeletal: Normal range of motion.   Skin:     General: Skin is warm and dry.   Neurological:      General: No focal deficit present.      Mental Status: He is alert. Mental status is at baseline. He is disoriented.   Psychiatric:         Mood and Affect: Mood normal.         Behavior: Behavior normal.         Thought Content: Thought content normal.         Significant Labs:  All pertinent lab results from the last 24 hours have been reviewed.   Recent Lab Results  (Last 5 results in the past 72 hours)      10/13/20  0653   10/13/20  0535   10/12/20  1623   10/12/20  1551   10/12/20  1342        Lipase Result         83     Unit Blood Type Code         5100[P]              5100     Unit Expiration         [id number][P]              [id number]     Unit Blood Type         O POS[P]              O POS     Albumin 2.9       3.5     Alkaline Phosphatase 40       51     ALT 13       17     Anion Gap 6       5     AST 23       22     Baso #         0.03     Basophil%         0.6     BILIRUBIN TOTAL 0.7  Comment:  For infants and newborns, interpretation of results should be based  on gestational age, weight and in agreement with clinical  observations.  Premature Infant recommended reference ranges:  Up to 24 hours.............<8.0 mg/dL  Up to 48 hours............<12.0 mg/dL  3-5  days..................<15.0 mg/dL  6-29 days.................<15.0 mg/dL  For patients on Eltrombopag therapy, use of Dimension [place] TBIL is   not   recommended.         0.4  Comment:  For infants and newborns, interpretation of results should be based  on gestational age, weight and in agreement with clinical  observations.  Premature Infant recommended reference ranges:  Up to 24 hours.............<8.0 mg/dL  Up to 48 hours............<12.0 mg/dL  3-5 days..................<15.0 mg/dL  6-29 days.................<15.0 mg/dL  For patients on Eltrombopag therapy, use of Dimension [place] TBIL is   not   recommended.       BUN, Bld 16       25     Calcium 8.6       8.9     Chloride 110       107     CO2 26       28     CODING SYSTEM         UDSM348[P]              [id number]     Creatinine 1.1       1.3     Differential Method         Automated     DISPENSE STATUS         ISSUED[P]              TRANSFUSED     eGFR if  >60.0       58.7     eGFR if non  >60.0  Comment:  Calculation used to obtain the estimated glomerular filtration  rate (eGFR) is the CKD-EPI equation.          50.8  Comment:  Calculation used to obtain the estimated glomerular filtration  rate (eGFR) is the CKD-EPI equation.        Eos #         0.4     Eosinophil%         7.3     Folate       37.3  Comment:  ATTENTION: The use of Biotin Supplements may interfere with this   assay.         Glucose 101       100     Gran # (ANC)         2.9     Gran%         53.8     Group & Rh         O POS     Hematocrit 23.9       19.6  Comment:  H/H  critical result(s) called and verbal readback obtained from   Arie Figure 1cori/DARLINE by MEI 10/12/2020 14:05       Hemoglobin 7.1       5.7  Comment:  H/H  critical result(s) called and verbal readback obtained from   Arie Figure 1cori/DARLINE by MEI 10/12/2020 14:05       Immature Grans (Abs)         0.02  Comment:  Mild elevation in immature granulocytes is non specific and   can be seen in a variety  of conditions including stress response,   acute inflammation, trauma and pregnancy. Correlation with other   laboratory and clinical findings is essential.       Immature Granulocytes         0.4     Indirect Diana GEL         NEG     INR         1.2  Comment:  Coumadin Therapy:  2.0 - 3.0 for INR for all indicators except mechanical heart valves  and antiphospholipid syndromes which should use 2.5 - 3.5.       Iron       9       Iron Saturation       2       LD       247  Comment:  Results are increased in hemolyzed samples.       Lymph #         1.2     Lymph%         23.2     MCH 23.9       23.8     MCHC 29.7       29.1     MCV 81       82     Mono #         0.8     Mono%         14.7     MPV 10.7       10.4     nRBC         0     Occult Blood   Negative           Platelets 147       201     Potassium 3.8       3.8     Product Code         D5116I23[P]              [id number]     PROTEIN TOTAL 5.8       6.9     Protime         12.6     RBC 2.97       2.39     RDW 17.3       16.8     Retic       2.2       SARS-CoV-2 RNA, Amplification, Qual     Negative  Comment:  This test utilizes isothermal nucleic acid amplification   technology to detect the SARS-CoV-2 RdRp nucleic acid segment.   The analytical sensitivity (limit of detection) is 125 genome   equivalents/mL.   A POSITIVE result implies infection with the SARS-CoV-2 virus;  the patient is presumed to be contagious.    A NEGATIVE result means that SARS-CoV-2 nucleic acids are not  present above the limit of detection. A NEGATIVE result should be   treated as presumptive. It does not rule out the possibility of   COVID-19 and should not be the sole basis for treatment decisions.   If COVID-19 is strongly suspected based on clinical and exposure   history, re-testing using an alternate molecular assay should be   considered.   This test is only for use under the Food and Drug   Administration s Emergency Use Authorization (EUA).   Commercial kits are provided by  [employer].   Performance characteristics of the EUA have been independently  verified by [facility] Department of  Pathology and Laboratory Medicine.   _________________________________________________________________  The ID NOW COVID-19 Letter of Authorization, along with the   authorized Fact Sheet for Healthcare Providers, the authorized Fact  Sheet for Patients, and authorized labeling are available on the FDA   website:  www.fda.gov/MedicalDevices/Safety/EmergencySituations/cjy639744.htm           Sodium 142       140     TIBC       378       UNIT NUMBER         [id number][P]              [id number]     Vitamin B-12       647  Comment:  ATTENTION: The use of Biotin Supplements may interfere with this   assay.         WBC 5.98       5.31                          Significant Imaging:   Imaging Results    None         ECHO:         ECG:  Atrial paced rhythm with occasional PACs       Lab Results   Component Value Date    CHOL 124 04/03/2019     Lab Results   Component Value Date    HDL 35 (L) 04/03/2019     Lab Results   Component Value Date    LDLCALC 74.0 04/03/2019     Lab Results   Component Value Date    TRIG 75 04/03/2019     Lab Results   Component Value Date    CHOLHDL 28.2 04/03/2019       MEDICATIONS:     Current Facility-Administered Medications:     0.9%  NaCl infusion (for blood administration), , Intravenous, Q24H PRN, Edgar Cloud Jr., MD    0.9%  NaCl infusion (for blood administration), , Intravenous, Q24H PRN, Josh Alejo Jr., MD    dextrose 5 % and 0.45 % NaCl infusion, , Intravenous, Continuous, Josh Alejo Jr., MD, Last Rate: 125 mL/hr at 10/13/20 0552    famotidine (PF) injection 20 mg, 20 mg, Intravenous, Daily, Edgar Cloud Jr., MD    ondansetron injection 4 mg, 4 mg, Intravenous, Q8H PRN, Edgar Cloud Jr., MD    sodium chloride 0.9% flush 10 mL, 10 mL, Intravenous, PRN, Edgar Cloud Jr., MD      Assessment and Plan:     Active  Diagnoses:    Diagnosis Date Noted POA    Symptomatic anemia [D64.9] 10/13/2020 Unknown    Weakness [R53.1] 10/12/2020 Yes    Cardiomyopathy [I42.9] 04/03/2019 Yes    Deaf, nonspeaking [H91.3] 04/03/2019 Yes    Essential hypertension [I10] 04/03/2019 Yes    Paroxysmal atrial fibrillation [I48.0] 08/17/2017 Yes      Problems Resolved During this Admission:       VTE Risk Mitigation (From admission, onward)         Ordered     IP VTE HIGH RISK PATIENT  Once      10/12/20 1821     Place sequential compression device  Until discontinued      10/12/20 1821              1.  Iron Deficiency Anemia:  Defer to IM services.  H/H improved with PRBC transfusion x2 but remains anemic.  Planning for 2 more PRBC transfusions today and d/c home this afternoon.    2.  Shortness of breath:  Secondary to anemia    3.  HHD with history of heart failure s/p ICD implant:   Blood pressure overall well controlled. Shortness of breath resolved with blood transfusion.    4. History of atrial fibrillation:  ECG with atrially paced rhythm, s/p successful a fib ablation.     5.  Disposition:  Plan to discharge home later today per IM services.  Continue xarelto at d/c given history of a fib with CVA.  Occult blood stool with no evidence of bleeding from GI tract.  Anemia likely secondary to iron deficiency.  Plan to follow up with GI as outpatient for evaluation.       Thank you for your consult.          Monica Boyle, KIKI  Cardiology  [facility]  10/13/2020

## 2020-10-13 NOTE — PLAN OF CARE
10/13/20 1216   Post-Acute Status   Post-Acute Authorization Home Health   Home Health Status Referrals Sent     Referral faxed to [facility]

## 2020-10-13 NOTE — PLAN OF CARE
10/13/20 1707   Final Note   Assessment Type Final Discharge Note   Anticipated Discharge Disposition Home-Health   Post-Acute Status   Post-Acute Authorization Home Health   Home Health Status Set-up Complete     Patient discharged to home w/home health services from [facility].  Faxed dc order to [facility] and notified them of discharge.  CM discussed w/patient discharge through written communication as patient is speech and hearing impaired.  CM also notified primary nurse Richy that per Dr. Alejo, once blood is transfused, patient is clear to dc.  Voiced understanding.

## 2020-10-13 NOTE — ASSESSMENT & PLAN NOTE
Patient's Xarelto initially placed on hold.  Stool negative for occult blood.  Will reinstitute Xarelto at discharge.  Provide iron supplementation moving forward

## 2020-10-13 NOTE — HPI
Chief Complaint   Patient presents with    Fatigue     sent from dr sheehan office. States needs iron transfusion.    Patient is an 80-year-old male with history of atrial fibrillation on Xarelto. He has been quite fatigued the last several days. His daughter states he has been pale. He denies losing blood. He states he is short of breath with ambulation. He denies any chest pain.

## 2020-10-13 NOTE — ASSESSMENT & PLAN NOTE
Patient's Xarelto has been placed on hold given significant anemia.  He remains rate controlled.  Will defer further management per cardiology's recommendation.

## 2020-10-13 NOTE — H&P
[facility] Hospital Medicine  History & Physical    Patient Name: Michael Mclean  MRN: [id number]  Admission Date: 10/12/2020  Attending Physician: Josh Alejo Jr, MD  Primary Care Provider: Josh Alejo Jr, MD         Patient information was obtained from patient and ER records.     Subjective:     Principal Problem:<principal problem not specified>    Chief Complaint:   Chief Complaint   Patient presents with    Fatigue     sent from dr alejo office.   States needs iron transfusion.          HPI:   Chief Complaint   Patient presents with    Fatigue     sent from dr alejo office. States needs iron transfusion.    Patient is an 80-year-old male with history of atrial fibrillation on Xarelto. He has been quite fatigued the last several days. His daughter states he has been pale. He denies losing blood. He states he is short of breath with ambulation. He denies any chest pain.      No new subjective & objective note has been filed under this hospital service since the last note was generated.    Assessment/Plan:     Symptomatic anemia  Patient's symptoms included dyspnea on exertion and generalized fatigue.    Of 2 units packed red blood cells provided 10/12/2020.  Will provide an additional 2 units at this time.    Recent Labs   Lab 10/08/20  1749 10/12/20  1342 10/13/20  0653   HGB 5.6* 5.7* 7.1*            Weakness  Likely related to underlying heart failure and anemia.  Improvement noted.      Cardiomyopathy      Will continue his home medical therapy while hospitalized.  Most recent ejection fraction per my records is 15%.  Cardiology consulted.        Essential hypertension  Vitals:    10/13/20 0716   BP: (!) 107/59   Pulse: 73   Resp: 18   Temp: 98.2 °F (36.8 °C)           Deaf, nonspeaking  At baseline      Paroxysmal atrial fibrillation  Patient's Xarelto has been placed on hold given significant anemia.  He remains rate controlled.  Will defer further management per  cardiology's recommendation.        VTE Risk Mitigation (From admission, onward)         Ordered     IP VTE HIGH RISK PATIENT  Once      10/12/20 1821     Place sequential compression device  Until discontinued      10/12/20 1821                   Josh Alejo Jr, MD  Department of Hospital Medicine   [facility]

## 2020-10-13 NOTE — ASSESSMENT & PLAN NOTE
Patient's symptoms included dyspnea on exertion and generalized fatigue.    Of 2 units packed red blood cells provided 10/12/2020.  Will provide an additional 2 units at this time.  Significant improvement noted.  Will place him on iron supplementation and follow up closely in the outpatient setting.    Recent Labs   Lab 10/08/20  1749 10/12/20  1342 10/13/20  0653   HGB 5.6* 5.7* 7.1*

## 2020-10-13 NOTE — PLAN OF CARE
10/13/20 0740   Medicare Message   Date IMM was signed 10/12/20     First IM sign and dated w/no time noted.

## 2020-10-13 NOTE — PLAN OF CARE
Plan of care reviewed with patient, Sign and symptoms of blood transfusion reaction given to patient and pt signed understanding. Explained signs of anemia, pt reports he was having fatigue and sob with ambulation. Understands that all the symtoms he was having was most likely related to Anemia. Daughter contacted staff concerned that pt would be unable to communicate due to being deaf. Claridge at ease once explained that we had already communicated to him and that were utilizing pen and paper. Both patient and daughter at ease now that we can communicate. Will cont to monitor

## 2020-10-13 NOTE — PLAN OF CARE
10/13/20 1544   NAVAS Message   Date NAVAS was signed 10/13/20   Time NAVAS was signed 1541     NAVAS obtained.

## 2020-10-13 NOTE — HOSPITAL COURSE
The patient is a very pleasant 80-year-old  male who presented to our facility after was found to be severely anemic based on his recent labs.  His stool was negative for occult blood.  He was ultimately admitted provided 4 units packed red blood cells with significant improvement in overall fatigue and dyspnea on exertion.  After further discussion evaluation with Cardiology it was decided to continue his Xarelto given history of CVA in the setting of atrial fibrillation.  He will be placed on iron supplement and will closely follow-up in the outpatient setting to ensure stability of his blood counts.

## 2020-10-13 NOTE — PLAN OF CARE
10/13/20 1208   Discharge Assessment   Assessment Type Discharge Planning Assessment   Confirmed/corrected address and phone number on facesheet? Yes   Assessment information obtained from? Caregiver;Patient   Expected Length of Stay (days) 2   Communicated expected length of stay with patient/caregiver yes   Prior to hospitilization cognitive status: Alert/Oriented   Prior to hospitalization functional status: Assistive Equipment   Current cognitive status: Alert/Oriented   Current Functional Status: Assistive Equipment   Lives With alone  (Patient lives alone but is assisted by daughter with cooking.)   Able to Return to Prior Arrangements yes   Is patient able to care for self after discharge? Yes   Who are your caregiver(s) and their phone number(s)? Karen Mclean [phone]   Patient's perception of discharge disposition home or selfcare   Readmission Within the Last 30 Days no previous admission in last 30 days   Patient currently being followed by outpatient case management? No   Patient currently receives any other outside agency services? No   Equipment Currently Used at Home none   Do you have any problems affording any of your prescribed medications? No   Is the patient taking medications as prescribed? yes   Does the patient have transportation home? Yes   Transportation Anticipated family or friend will provide   Discharge Plan A Home;Home Health   Discharge Plan B Home;Home Health   DME Needed Upon Discharge  none   Patient/Family in Agreement with Plan yes     DCP obtained from patient's daughter Karen.  She states patient is pretty much independent except he requires occasional use of cane.  Patient's daughter's cook for patient and provide him w/meals.  She states she would like home health services as they can monitor patient's medical condition.  Discussed options of home health services that are covered by patient's insurance.  She opted for services from [facility] [place].  CM did contact Dr. Alejo to  obtain orders.  Orders given and faxed new referral to [facility].  Alo beebe/[facility] also notified of new referral.

## 2020-10-13 NOTE — ASSESSMENT & PLAN NOTE
Patient's symptoms included dyspnea on exertion and generalized fatigue.    Of 2 units packed red blood cells provided 10/12/2020.  Will provide an additional 2 units at this time.    Recent Labs   Lab 10/08/20  1749 10/12/20  1342 10/13/20  0653   HGB 5.6* 5.7* 7.1*

## 2020-10-13 NOTE — SUBJECTIVE & OBJECTIVE
Past Medical History:   Diagnosis Date    Abdominal hernia     Anticoagulant long-term use     Arthritis     SHOULDER    Cancer     prostate    Cardiomyopathy     Carotid artery stenosis     left    Coronary artery disease     Deaf     Dizziness     Encounter for blood transfusion     Hyperlipidemia     Hypertension     Mitral regurgitation     Mitral regurgitation     PAD (peripheral artery disease)     Paroxysmal atrial fibrillation     Stroke        Past Surgical History:   Procedure Laterality Date    ABLATION N/A 7/22/2020    Procedure: Ablation, afib;  Surgeon: Tal Laurent MD;  Location: [facility] CATH;  Service: Cardiology;  Laterality: N/A;  needs covid test;needs ALEXYS ([place])    AORTIC VALVE REPLACEMENT      CARDIAC CATHETERIZATION      stent x3    carotid artery stent Left     CORONARY ARTERY BYPASS GRAFT      HERNIA REPAIR      PACERMAKER      PROSTATECTOMY      SHOULDER SURGERY      TOE SURGERY      TREATMENT OF CARDIAC ARRHYTHMIA N/A 8/7/2019    Procedure: Cardioversion or Defibrillation;  Surgeon: Tal Laurent MD;  Location: [facility] CATH;  Service: Cardiology;  Laterality: N/A;       Review of patient's allergies indicates:   Allergen Reactions    Losartan     Valsartan        No current facility-administered medications on file prior to encounter.      Current Outpatient Medications on File Prior to Encounter   Medication Sig    acetylcarnitin/alpha lipoic ac (ACETYLCARNITIN HCL-A LIPOIC AC) 400-200 mg Cap Take 1 capsule by mouth once daily.    cholecalciferol, vitamin D3, 5,000 unit capsule Take 5,000 Units by mouth once daily.     clonazePAM (KLONOPIN) 2 MG Tab Take 2 mg by mouth daily as needed.    colchicine (COLCRYS) 0.6 mg tablet Take 1 tablet (0.6 mg total) by mouth once daily. for 7 days    cyanocobalamin, vitamin B-12, 1,000 mcg/mL Kit Inject 1 mL as directed every 28 days.    KLOR-CON M20 20 mEq tablet Take 20 mEq by mouth once daily.     levothyroxine  (SYNTHROID) 25 MCG tablet Take 25 mcg by mouth once daily.    losartan (COZAAR) 25 MG tablet Take 25 mg by mouth once daily.    magnesium oxide (MAG-OX) 400 mg tablet Take 400 mg by mouth once daily.    metoprolol succinate (TOPROL-XL) 100 MG 24 hr tablet Take 100 mg by mouth 2 (two) times a day.    pantoprazole (PROTONIX) 40 MG tablet Take 1 tablet twice daily for 2 weeks and once daily for 2 weeks after    potassium chloride SA (K-DUR,KLOR-CON) 20 MEQ tablet Take 20 mEq by mouth once daily.    rivaroxaban (XARELTO) 20 mg Tab Take 20 mg by mouth daily with dinner or evening meal.    rosuvastatin (CRESTOR) 20 MG tablet Take 20 mg by mouth every evening.    sotaloL (BETAPACE) 80 MG tablet Take 1 tablet (80 mg total) by mouth 2 (two) times daily.    torsemide (DEMADEX) 20 MG Tab Take 20 mg by mouth every other day.    vitamin D3-folic acid 5,000 unit- 1 mg Tab Take 1 tablet by mouth once daily.     Family History     Problem Relation (Age of Onset)    Heart attack Mother    Heart disease Mother        Tobacco Use    Smoking status: Never Smoker    Smokeless tobacco: Never Used   Substance and Sexual Activity    Alcohol use: Yes     Comment: occasionally    Drug use: Not Currently    Sexual activity: Not on file     Review of Systems   Constitutional: Positive for fatigue. Negative for chills and fever.   HENT: Negative for rhinorrhea, sneezing and sore throat.    Eyes: Negative for pain and visual disturbance.   Respiratory: Positive for shortness of breath. Negative for cough.    Cardiovascular: Negative for chest pain.   Gastrointestinal: Negative for abdominal pain, constipation and diarrhea.   Endocrine: Negative for cold intolerance and heat intolerance.   Genitourinary: Negative for difficulty urinating.   Musculoskeletal: Negative for arthralgias and joint swelling.   Skin: Negative for rash.   Allergic/Immunologic: Negative for environmental allergies.   Neurological: Negative for dizziness,  syncope and weakness.   Hematological: Does not bruise/bleed easily.   Psychiatric/Behavioral: Negative for dysphoric mood. The patient is not nervous/anxious.      Objective:     Vital Signs (Most Recent):  Temp: 98.2 °F (36.8 °C) (10/13/20 0716)  Pulse: 73 (10/13/20 0716)  Resp: 18 (10/13/20 0716)  BP: (!) 107/59 (10/13/20 0716)  SpO2: 100 % (10/13/20 0716) Vital Signs (24h Range):  Temp:  [97.5 °F (36.4 °C)-98.7 °F (37.1 °C)] 98.2 °F (36.8 °C)  Pulse:  [69-78] 73  Resp:  [14-20] 18  SpO2:  [97 %-100 %] 100 %  BP: (107-146)/(53-80) 107/59     Weight: 80.7 kg (178 lb)  Body mass index is 25.54 kg/m².    Physical Exam  Vitals signs and nursing note reviewed.   Constitutional:       Appearance: Normal appearance.      Comments: Patient overall appears pale.  He communicates via sign language as he is deaf.   HENT:      Head: Normocephalic and atraumatic.      Nose: Nose normal.   Eyes:      Extraocular Movements: Extraocular movements intact.      Pupils: Pupils are equal, round, and reactive to light.   Neck:      Musculoskeletal: Normal range of motion and neck supple.   Cardiovascular:      Rate and Rhythm: Normal rate and regular rhythm.      Heart sounds: No murmur. No friction rub. No gallop.    Pulmonary:      Effort: Pulmonary effort is normal.      Breath sounds: Normal breath sounds.   Abdominal:      General: Abdomen is flat. Bowel sounds are normal.      Palpations: Abdomen is soft.   Musculoskeletal:         General: No swelling or deformity.   Skin:     General: Skin is warm and dry.      Capillary Refill: Capillary refill takes less than 2 seconds.   Neurological:      General: No focal deficit present.      Mental Status: He is alert and oriented to person, place, and time.   Psychiatric:         Mood and Affect: Mood normal.         Behavior: Behavior normal.           CRANIAL NERVES     CN III, IV, VI   Pupils are equal, round, and reactive to light.       Significant Labs: All pertinent labs within  the past 24 hours have been reviewed.    Significant Imaging: I have reviewed and interpreted all pertinent imaging results/findings within the past 24 hours.

## 2020-10-13 NOTE — PLAN OF CARE
Contacted patient's daughter Karen to notify her of patient's dc today.  Informed her nurse will call her when patient's blood is completed so that she can come pick him up.  She voiced understanding.

## 2020-10-13 NOTE — H&P
[facility] Medicine  History & Physical    Patient Name: Michael Mclean  MRN: [id number]  Admission Date: 10/12/2020  Attending Physician: Josh Alejo Jr., MD   Primary Care Provider: Josh Alejo Jr, MD         Patient information was obtained from ER records.     Subjective:     Principal Problem:<principal problem not specified>    Chief Complaint:   Chief Complaint   Patient presents with    Fatigue     sent from dr alejo office.   States needs iron transfusion.          HPI:   Chief Complaint   Patient presents with    Fatigue     sent from dr alejo office. States needs iron transfusion.    Patient is an 80-year-old male with history of atrial fibrillation on Xarelto. He has been quite fatigued the last several days. His daughter states he has been pale. He denies losing blood. He states he is short of breath with ambulation. He denies any chest pain.      Past Medical History:   Diagnosis Date    Abdominal hernia     Anticoagulant long-term use     Arthritis     SHOULDER    Cancer     prostate    Cardiomyopathy     Carotid artery stenosis     left    Coronary artery disease     Deaf     Dizziness     Encounter for blood transfusion     Hyperlipidemia     Hypertension     Mitral regurgitation     Mitral regurgitation     PAD (peripheral artery disease)     Paroxysmal atrial fibrillation     Stroke        Past Surgical History:   Procedure Laterality Date    ABLATION N/A 7/22/2020    Procedure: Ablation, afib;  Surgeon: Tal Laurent MD;  Location: [facility] CATH;  Service: Cardiology;  Laterality: N/A;  needs covid test;needs ALEXYS ([place])    AORTIC VALVE REPLACEMENT      CARDIAC CATHETERIZATION      stent x3    carotid artery stent Left     CORONARY ARTERY BYPASS GRAFT      HERNIA REPAIR      PACERMAKER      PROSTATECTOMY      SHOULDER SURGERY      TOE SURGERY      TREATMENT OF CARDIAC ARRHYTHMIA N/A 8/7/2019    Procedure: Cardioversion or  Defibrillation;  Surgeon: Tal Laurent MD;  Location: [facility] CATH;  Service: Cardiology;  Laterality: N/A;       Review of patient's allergies indicates:   Allergen Reactions    Losartan     Valsartan        No current facility-administered medications on file prior to encounter.      Current Outpatient Medications on File Prior to Encounter   Medication Sig    acetylcarnitin/alpha lipoic ac (ACETYLCARNITIN HCL-A LIPOIC AC) 400-200 mg Cap Take 1 capsule by mouth once daily.    cholecalciferol, vitamin D3, 5,000 unit capsule Take 5,000 Units by mouth once daily.     clonazePAM (KLONOPIN) 2 MG Tab Take 2 mg by mouth daily as needed.    colchicine (COLCRYS) 0.6 mg tablet Take 1 tablet (0.6 mg total) by mouth once daily. for 7 days    cyanocobalamin, vitamin B-12, 1,000 mcg/mL Kit Inject 1 mL as directed every 28 days.    KLOR-CON M20 20 mEq tablet Take 20 mEq by mouth once daily.     levothyroxine (SYNTHROID) 25 MCG tablet Take 25 mcg by mouth once daily.    losartan (COZAAR) 25 MG tablet Take 25 mg by mouth once daily.    magnesium oxide (MAG-OX) 400 mg tablet Take 400 mg by mouth once daily.    metoprolol succinate (TOPROL-XL) 100 MG 24 hr tablet Take 100 mg by mouth 2 (two) times a day.    pantoprazole (PROTONIX) 40 MG tablet Take 1 tablet twice daily for 2 weeks and once daily for 2 weeks after    potassium chloride SA (K-DUR,KLOR-CON) 20 MEQ tablet Take 20 mEq by mouth once daily.    rivaroxaban (XARELTO) 20 mg Tab Take 20 mg by mouth daily with dinner or evening meal.    rosuvastatin (CRESTOR) 20 MG tablet Take 20 mg by mouth every evening.    sotaloL (BETAPACE) 80 MG tablet Take 1 tablet (80 mg total) by mouth 2 (two) times daily.    torsemide (DEMADEX) 20 MG Tab Take 20 mg by mouth every other day.    vitamin D3-folic acid 5,000 unit- 1 mg Tab Take 1 tablet by mouth once daily.     Family History     Problem Relation (Age of Onset)    Heart attack Mother    Heart disease Mother         Tobacco Use    Smoking status: Never Smoker    Smokeless tobacco: Never Used   Substance and Sexual Activity    Alcohol use: Yes     Comment: occasionally    Drug use: Not Currently    Sexual activity: Not on file     Review of Systems   Constitutional: Positive for fatigue. Negative for chills and fever.   HENT: Negative for rhinorrhea, sneezing and sore throat.    Eyes: Negative for pain and visual disturbance.   Respiratory: Positive for shortness of breath. Negative for cough.    Cardiovascular: Negative for chest pain.   Gastrointestinal: Negative for abdominal pain, constipation and diarrhea.   Endocrine: Negative for cold intolerance and heat intolerance.   Genitourinary: Negative for difficulty urinating.   Musculoskeletal: Negative for arthralgias and joint swelling.   Skin: Negative for rash.   Allergic/Immunologic: Negative for environmental allergies.   Neurological: Negative for dizziness, syncope and weakness.   Hematological: Does not bruise/bleed easily.   Psychiatric/Behavioral: Negative for dysphoric mood. The patient is not nervous/anxious.      Objective:     Vital Signs (Most Recent):  Temp: 98.2 °F (36.8 °C) (10/13/20 0716)  Pulse: 73 (10/13/20 0716)  Resp: 18 (10/13/20 0716)  BP: (!) 107/59 (10/13/20 0716)  SpO2: 100 % (10/13/20 0716) Vital Signs (24h Range):  Temp:  [97.5 °F (36.4 °C)-98.7 °F (37.1 °C)] 98.2 °F (36.8 °C)  Pulse:  [69-78] 73  Resp:  [14-20] 18  SpO2:  [97 %-100 %] 100 %  BP: (107-146)/(53-80) 107/59     Weight: 80.7 kg (178 lb)  Body mass index is 25.54 kg/m².    Physical Exam  Vitals signs and nursing note reviewed.   Constitutional:       Appearance: Normal appearance.      Comments: Patient overall appears pale.  He communicates via sign language as he is deaf.   HENT:      Head: Normocephalic and atraumatic.      Nose: Nose normal.   Eyes:      Extraocular Movements: Extraocular movements intact.      Pupils: Pupils are equal, round, and reactive to light.   Neck:       Musculoskeletal: Normal range of motion and neck supple.   Cardiovascular:      Rate and Rhythm: Normal rate and regular rhythm.      Heart sounds: No murmur. No friction rub. No gallop.    Pulmonary:      Effort: Pulmonary effort is normal.      Breath sounds: Normal breath sounds.   Abdominal:      General: Abdomen is flat. Bowel sounds are normal.      Palpations: Abdomen is soft.   Musculoskeletal:         General: No swelling or deformity.   Skin:     General: Skin is warm and dry.      Capillary Refill: Capillary refill takes less than 2 seconds.   Neurological:      General: No focal deficit present.      Mental Status: He is alert and oriented to person, place, and time.   Psychiatric:         Mood and Affect: Mood normal.         Behavior: Behavior normal.           CRANIAL NERVES     CN III, IV, VI   Pupils are equal, round, and reactive to light.       Significant Labs: All pertinent labs within the past 24 hours have been reviewed.    Significant Imaging: I have reviewed and interpreted all pertinent imaging results/findings within the past 24 hours.    Assessment/Plan:     Symptomatic anemia  Patient's symptoms included dyspnea on exertion and generalized fatigue.    Of 2 units packed red blood cells provided 10/12/2020.  Will provide an additional 2 units at this time.    Recent Labs   Lab 10/08/20  1749 10/12/20  1342 10/13/20  0653   HGB 5.6* 5.7* 7.1*            Weakness  Likely related to underlying heart failure and anemia.  Improvement noted.      Cardiomyopathy      Will continue his home medical therapy while hospitalized.  Most recent ejection fraction per my records is 15%.  Cardiology consulted.        Essential hypertension  Vitals:    10/13/20 0716   BP: (!) 107/59   Pulse: 73   Resp: 18   Temp: 98.2 °F (36.8 °C)           Deaf, nonspeaking  At baseline      Paroxysmal atrial fibrillation  Patient's Xarelto has been placed on hold given significant anemia.  He remains rate controlled.   Will defer further management per cardiology's recommendation.        VTE Risk Mitigation (From admission, onward)         Ordered     IP VTE HIGH RISK PATIENT  Once      10/12/20 1821     Place sequential compression device  Until discontinued      10/12/20 1821                   Josh Alejo Jr, MD  Department of Hospital Medicine   [facility]

## 2020-10-13 NOTE — PLAN OF CARE
Contacted by Mirna Duran that patient did not meet inpatient criteria.  We can however, bill as OBS.  LORA contacted Dr. Alejo and is in agreement in changing patient to OBS.  Code 44 initiated and proper procedure was completed.

## 2020-10-13 NOTE — DISCHARGE SUMMARY
[facility] Medicine  Discharge Summary      Patient Name: Michael Mclean  MRN: [id number]  Admission Date: 10/12/2020  Hospital Length of Stay: 1 days  Discharge Date and Time:  10/13/2020 4:07 PM  Attending Physician: Jean-Pierre Sheehan Jr., MD   Discharging Provider: Jean-Pierre Sheehan Jr, MD  Primary Care Provider: Jean-Pierre Sheehan Jr, MD      HPI:   Chief Complaint   Patient presents with    Fatigue     sent from dr sheehan office. States needs iron transfusion.    Patient is an 80-year-old male with history of atrial fibrillation on Xarelto. He has been quite fatigued the last several days. His daughter states he has been pale. He denies losing blood. He states he is short of breath with ambulation. He denies any chest pain.      * No surgery found *      Hospital Course:   The patient is a very pleasant 80-year-old  male who presented to our facility after was found to be severely anemic based on his recent labs.  His stool was negative for occult blood.  He was ultimately admitted provided 4 units packed red blood cells with significant improvement in overall fatigue and dyspnea on exertion.  After further discussion evaluation with Cardiology it was decided to continue his Xarelto given history of CVA in the setting of atrial fibrillation.  He will be placed on iron supplement and will closely follow-up in the outpatient setting to ensure stability of his blood counts.     Consults:   Consults (From admission, onward)        Status Ordering Provider     Inpatient consult to Cardiology  Once     Provider:  Puma Shrestha MD    Completed JEAN-PIERRE SHEEHAN JR     Inpatient consult to Social Work/Case Management  Once     Provider:  (Not yet assigned)    Acknowledged JEAN-PIERRE SHEEHAN JR          * Symptomatic anemia  Patient's symptoms included dyspnea on exertion and generalized fatigue.    Of 2 units packed red blood cells provided 10/12/2020.  Will provide an additional 2  units at this time.  Significant improvement noted.  Will place him on iron supplementation and follow up closely in the outpatient setting.    Recent Labs   Lab 10/08/20  1749 10/12/20  1342 10/13/20  0653   HGB 5.6* 5.7* 7.1*            Weakness  Likely related to underlying heart failure and anemia.  Improvement noted.      Cardiomyopathy      Will continue his home medical therapy while hospitalized.  Most recent ejection fraction per my records is 15%.  Cardiology consulted.        Essential hypertension  Vitals:    10/13/20 1422   BP: (!) 116/56   Pulse: 69   Resp: 18   Temp: 97.7 °F (36.5 °C)           Deaf, nonspeaking  At baseline      Paroxysmal atrial fibrillation  Patient's Xarelto initially placed on hold.  Stool negative for occult blood.  Will reinstitute Xarelto at discharge.  Provide iron supplementation moving forward        Final Active Diagnoses:    Diagnosis Date Noted POA    PRINCIPAL PROBLEM:  Symptomatic anemia [D64.9] 10/13/2020 Unknown    Weakness [R53.1] 10/12/2020 Yes    Cardiomyopathy [I42.9] 04/03/2019 Yes    Deaf, nonspeaking [H91.3] 04/03/2019 Yes    Essential hypertension [I10] 04/03/2019 Yes    Paroxysmal atrial fibrillation [I48.0] 08/17/2017 Yes      Problems Resolved During this Admission:       Discharged Condition: fair    Disposition: [place]-Health [facility]    Follow Up:  Follow-up Information     Josh Alejo Jr, MD In 1 week.    Specialty: Internal Medicine  Contact information:  [address] [place] [address] [id number] [phone]                 Patient Instructions:      Diet Cardiac     Activity as tolerated       Significant Diagnostic Studies: Labs: All labs within the past 24 hours have been reviewed    Pending Diagnostic Studies:     None         Medications:  Reconciled Home Medications:      Medication List      START taking these medications    ferrous sulfate 325 mg (65 mg iron) Tab tablet  Commonly known  as: FEOSOL  Take 1 tablet (325 mg total) by mouth once daily.        CONTINUE taking these medications    acetylcarnitin HCL-a lipoic ac 400-200 mg Cap  Take 1 capsule by mouth once daily.     cholecalciferol (vitamin D3) 125 mcg (5,000 unit) capsule  Take 5,000 Units by mouth once daily.     clonazePAM 2 MG Tab  Commonly known as: KLONOPIN  Take 2 mg by mouth daily as needed.     colchicine 0.6 mg tablet  Commonly known as: COLCRYS  Take 1 tablet (0.6 mg total) by mouth once daily. for 7 days     cyanocobalamin (vitamin B-12) 1,000 mcg/mL Kit  Inject 1 mL as directed every 28 days.     * potassium chloride SA 20 MEQ tablet  Commonly known as: K-DUR,KLOR-CON  Take 20 mEq by mouth once daily.     * KLOR-CON M20 20 MEQ tablet  Generic drug: potassium chloride SA  Take 20 mEq by mouth once daily.     levothyroxine 25 MCG tablet  Commonly known as: SYNTHROID  Take 25 mcg by mouth once daily.     losartan 25 MG tablet  Commonly known as: COZAAR  Take 25 mg by mouth once daily.     magnesium oxide 400 mg (241.3 mg magnesium) tablet  Commonly known as: MAG-OX  Take 400 mg by mouth once daily.     metoprolol succinate 100 MG 24 hr tablet  Commonly known as: TOPROL-XL  Take 100 mg by mouth 2 (two) times a day.     pantoprazole 40 MG tablet  Commonly known as: PROTONIX  Take 1 tablet twice daily for 2 weeks and once daily for 2 weeks after     rivaroxaban 20 mg Tab  Commonly known as: XARELTO  Take 20 mg by mouth daily with dinner or evening meal.     rosuvastatin 20 MG tablet  Commonly known as: CRESTOR  Take 20 mg by mouth every evening.     sotaloL 80 MG tablet  Commonly known as: BETAPACE  Take 1 tablet (80 mg total) by mouth 2 (two) times daily.     torsemide 20 MG Tab  Commonly known as: DEMADEX  Take 20 mg by mouth every other day.     vitamin D3-folic acid 5,000 unit- 1 mg Tab  Take 1 tablet by mouth once daily.         * This list has 2 medication(s) that are the same as other medications prescribed for you. Read the  directions carefully, and ask your doctor or other care provider to review them with you.                Indwelling Lines/Drains at time of discharge:   Lines/Drains/Airways     None                 Time spent on the discharge of patient: 60 minutes  Patient was seen and examined on the date of discharge and determined to be suitable for discharge.         Josh Alejo Jr, MD  Department of Hospital Medicine  [facility]

## 2020-10-13 NOTE — ASSESSMENT & PLAN NOTE
Will continue his home medical therapy while hospitalized.  Most recent ejection fraction per my records is 15%.  Cardiology consulted.

## 2020-11-05 ENCOUNTER — HOSPITAL ENCOUNTER (EMERGENCY)
Facility: HOSPITAL | Age: 80
Discharge: ANOTHER HOSPITAL | End: 2020-11-05
Attending: EMERGENCY MEDICINE
Payer: MEDICARE

## 2020-11-05 VITALS
RESPIRATION RATE: 16 BRPM | HEIGHT: 70 IN | TEMPERATURE: 98 F | OXYGEN SATURATION: 95 % | DIASTOLIC BLOOD PRESSURE: 65 MMHG | SYSTOLIC BLOOD PRESSURE: 143 MMHG | BODY MASS INDEX: 25.48 KG/M2 | HEART RATE: 74 BPM | WEIGHT: 178 LBS

## 2020-11-05 DIAGNOSIS — R52 PAIN: ICD-10-CM
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
## 2020-11-05 DIAGNOSIS — S72.001A CLOSED FRACTURE OF RIGHT HIP, INITIAL ENCOUNTER: Primary | ICD-10-CM

## 2020-11-05 PROBLEM — I25.10 CAD (CORONARY ARTERY DISEASE): Status: ACTIVE | Noted: 2020-11-05

## 2020-11-05 PROBLEM — I50.20 HFREF (HEART FAILURE WITH REDUCED EJECTION FRACTION): Status: ACTIVE | Noted: 2019-04-03

## 2020-11-05 LAB
ALBUMIN SERPL BCP-MCNC: 3.5 G/DL (ref 3.5–5.2)
ALP SERPL-CCNC: 65 U/L (ref 55–135)
ALT SERPL W/O P-5'-P-CCNC: 20 U/L (ref 10–44)
ANION GAP SERPL CALC-SCNC: 6 MMOL/L (ref 8–16)
APTT BLDCRRT: 31.2 SEC (ref 21–32)
AST SERPL-CCNC: 27 U/L (ref 10–40)
BASOPHILS # BLD AUTO: 0.02 K/UL (ref 0–0.2)
BASOPHILS NFR BLD: 0.2 % (ref 0–1.9)
BILIRUB SERPL-MCNC: 0.8 MG/DL (ref 0.1–1)
BUN SERPL-MCNC: 14 MG/DL (ref 8–23)
CALCIUM SERPL-MCNC: 9.5 MG/DL (ref 8.7–10.5)
CHLORIDE SERPL-SCNC: 105 MMOL/L (ref 95–110)
CO2 SERPL-SCNC: 26 MMOL/L (ref 23–29)
CREAT SERPL-MCNC: 1 MG/DL (ref 0.5–1.4)
CTP QC/QA: YES
DIFFERENTIAL METHOD: ABNORMAL
EOSINOPHIL # BLD AUTO: 0 K/UL (ref 0–0.5)
EOSINOPHIL NFR BLD: 0.1 % (ref 0–8)
ERYTHROCYTE [DISTWIDTH] IN BLOOD BY AUTOMATED COUNT: 19.4 % (ref 11.5–14.5)
EST. GFR  (AFRICAN AMERICAN): >60 ML/MIN/1.73 M^2
EST. GFR  (NON AFRICAN AMERICAN): >60 ML/MIN/1.73 M^2
GLUCOSE SERPL-MCNC: 111 MG/DL (ref 70–110)
HCT VFR BLD AUTO: 36.4 % (ref 40–54)
HGB BLD-MCNC: 11.3 G/DL (ref 14–18)
IMM GRANULOCYTES # BLD AUTO: 0.05 K/UL (ref 0–0.04)
IMM GRANULOCYTES NFR BLD AUTO: 0.4 % (ref 0–0.5)
INR PPP: 1.2 (ref 0.8–1.2)
LYMPHOCYTES # BLD AUTO: 1.2 K/UL (ref 1–4.8)
LYMPHOCYTES NFR BLD: 10.2 % (ref 18–48)
MCH RBC QN AUTO: 26.1 PG (ref 27–31)
MCHC RBC AUTO-ENTMCNC: 31 G/DL (ref 32–36)
MCV RBC AUTO: 84 FL (ref 82–98)
MONOCYTES # BLD AUTO: 1.1 K/UL (ref 0.3–1)
MONOCYTES NFR BLD: 9.6 % (ref 4–15)
NEUTROPHILS # BLD AUTO: 9.2 K/UL (ref 1.8–7.7)
NEUTROPHILS NFR BLD: 79.5 % (ref 38–73)
NRBC BLD-RTO: 0 /100 WBC
PLATELET # BLD AUTO: 199 K/UL (ref 150–350)
PMV BLD AUTO: 11.4 FL (ref 9.2–12.9)
POTASSIUM SERPL-SCNC: 4.2 MMOL/L (ref 3.5–5.1)
PROT SERPL-MCNC: 8.1 G/DL (ref 6–8.4)
PROTHROMBIN TIME: 11.8 SEC (ref 9–12.5)
RBC # BLD AUTO: 4.33 M/UL (ref 4.6–6.2)
SARS-COV-2 RDRP RESP QL NAA+PROBE: NEGATIVE
SODIUM SERPL-SCNC: 137 MMOL/L (ref 136–145)
WBC # BLD AUTO: 11.55 K/UL (ref 3.9–12.7)

## 2020-11-05 PROCEDURE — 96374 THER/PROPH/DIAG INJ IV PUSH: CPT

## 2020-11-05 PROCEDURE — 85610 PROTHROMBIN TIME: CPT

## 2020-11-05 PROCEDURE — 85730 THROMBOPLASTIN TIME PARTIAL: CPT

## 2020-11-05 PROCEDURE — 85025 COMPLETE CBC W/AUTO DIFF WBC: CPT

## 2020-11-05 PROCEDURE — 25000003 PHARM REV CODE 250: Performed by: NURSE PRACTITIONER

## 2020-11-05 PROCEDURE — 36415 COLL VENOUS BLD VENIPUNCTURE: CPT

## 2020-11-05 PROCEDURE — 80053 COMPREHEN METABOLIC PANEL: CPT

## 2020-11-05 PROCEDURE — U0002 COVID-19 LAB TEST NON-CDC: HCPCS | Performed by: NURSE PRACTITIONER

## 2020-11-05 PROCEDURE — 63600175 PHARM REV CODE 636 W HCPCS: Performed by: NURSE PRACTITIONER

## 2020-11-05 PROCEDURE — 96375 TX/PRO/DX INJ NEW DRUG ADDON: CPT

## 2020-11-05 PROCEDURE — 99285 EMERGENCY DEPT VISIT HI MDM: CPT | Mod: 25

## 2020-11-05 RX ORDER — SODIUM CHLORIDE 9 MG/ML
1000 INJECTION, SOLUTION INTRAVENOUS
Status: COMPLETED | OUTPATIENT
Start: 2020-11-05 | End: 2020-11-05

## 2020-11-05 RX ORDER — HYDROMORPHONE HYDROCHLORIDE 1 MG/ML
1 INJECTION, SOLUTION INTRAMUSCULAR; INTRAVENOUS; SUBCUTANEOUS
Status: COMPLETED | OUTPATIENT
Start: 2020-11-05 | End: 2020-11-05

## 2020-11-05 RX ORDER — ONDANSETRON 2 MG/ML
4 INJECTION INTRAMUSCULAR; INTRAVENOUS
Status: COMPLETED | OUTPATIENT
Start: 2020-11-05 | End: 2020-11-05

## 2020-11-05 RX ADMIN — ONDANSETRON 4 MG: 2 INJECTION INTRAMUSCULAR; INTRAVENOUS at 03:11

## 2020-11-05 RX ADMIN — SODIUM CHLORIDE 1000 ML: 0.9 INJECTION, SOLUTION INTRAVENOUS at 03:11

## 2020-11-05 RX ADMIN — HYDROMORPHONE HYDROCHLORIDE 1 MG: 1 INJECTION, SOLUTION INTRAMUSCULAR; INTRAVENOUS; SUBCUTANEOUS at 03:11

## 2020-11-05 NOTE — ED PROVIDER NOTES
Encounter Date: 11/5/2020       History     Chief Complaint   Patient presents with    Fall     today at [facility]. c/o right hip and neck pain     Pt presents with c/o neck pain and right hip pain after having 2 falls today.  Pt states he already had generalized neck pain and today he fell at home, not sure how he landed, but states he has worse pain in the right side of his neck.  Pt is able to turn his neck but states it is painful, on the right, with any ROM.  Pt was in a store earlier today and slipped and fell landing on his right hip and is c/o right hip pain.  Pt states it is too painful to ambulate.  Pt denies hitting his head or LOC.  No TX PTA.         Review of patient's allergies indicates:   Allergen Reactions    Losartan     Valsartan      Past Medical History:   Diagnosis Date    Abdominal hernia     Anticoagulant long-term use     Arthritis     SHOULDER    Cancer     prostate    Cardiomyopathy     Carotid artery stenosis     left    Coronary artery disease     Deaf     Dizziness     Encounter for blood transfusion     Hyperlipidemia     Hypertension     Mitral regurgitation     Mitral regurgitation     PAD (peripheral artery disease)     Paroxysmal atrial fibrillation     Stroke      Past Surgical History:   Procedure Laterality Date    ABLATION N/A 7/22/2020    Procedure: Ablation, afib;  Surgeon: Tal Laurent MD;  Location: [facility] CATH;  Service: Cardiology;  Laterality: N/A;  needs covid test;needs ALEXYS ([place])    AORTIC VALVE REPLACEMENT      CARDIAC CATHETERIZATION      stent x3    carotid artery stent Left     CORONARY ARTERY BYPASS GRAFT      ESOPHAGOGASTRODUODENOSCOPY N/A 11/10/2020    Procedure: EGD (ESOPHAGOGASTRODUODENOSCOPY);  Surgeon: Renny Prakash MD;  Location: [facility] ENDO;  Service: Endoscopy;  Laterality: N/A;    HERNIA REPAIR      HIP REPLACEMENT ARTHROPLASTY Right 11/6/2020    Procedure: EMELY -ARTHROPLASTY, HIP REPLACEMENT (ENDO HIP);  Surgeon:  Lino Thomas MD;  Location: [facility] OR;  Service: Orthopedics;  Laterality: Right;    PACERMAKER      PROSTATECTOMY      SHOULDER SURGERY      TOE SURGERY      TREATMENT OF CARDIAC ARRHYTHMIA N/A 8/7/2019    Procedure: Cardioversion or Defibrillation;  Surgeon: Tal Laurent MD;  Location: [facility] CATH;  Service: Cardiology;  Laterality: N/A;     Family History   Problem Relation Age of Onset    Heart disease Mother     Heart attack Mother      Social History     Tobacco Use    Smoking status: Never Smoker    Smokeless tobacco: Never Used   Substance Use Topics    Alcohol use: Yes     Comment: occasionally    Drug use: Not Currently     Review of Systems   Musculoskeletal: Positive for neck pain.        + right hip pain    All other systems reviewed and are negative.      Physical Exam     Initial Vitals [11/05/20 1302]   BP Pulse Resp Temp SpO2   (!) 149/68 71 16 98 °F (36.7 °C) 98 %      MAP       --         Physical Exam    Nursing note and vitals reviewed.  Constitutional: He appears well-developed and well-nourished. He is not diaphoretic. No distress.   HENT:   Head: Normocephalic.   Mouth/Throat: Oropharynx is clear and moist.   Eyes: Pupils are equal, round, and reactive to light.   Neck: Trachea normal and normal range of motion. Neck supple. Muscular tenderness present. No edema, no erythema and normal range of motion present. No neck rigidity.   Cardiovascular: Normal rate.   Pulmonary/Chest: Breath sounds normal. No respiratory distress. He has no wheezes.   Abdominal: Soft. Bowel sounds are normal. He exhibits no distension. There is no abdominal tenderness.   Musculoskeletal:      Right hip: He exhibits decreased range of motion and bony tenderness. He exhibits no swelling, no crepitus and no laceration.      Comments: Right leg shorter than left, outward rotation of right foot - per pt that is old from a previous foot fracture   Neurological: He is alert and oriented to person, place, and  time. He has normal strength. GCS score is 15. GCS eye subscore is 4. GCS verbal subscore is 5. GCS motor subscore is 6.   Skin: Skin is warm and dry. Capillary refill takes less than 2 seconds.         ED Course   Procedures  Labs Reviewed   CBC W/ AUTO DIFFERENTIAL - Abnormal; Notable for the following components:       Result Value    RBC 4.33 (*)     Hemoglobin 11.3 (*)     Hematocrit 36.4 (*)     MCH 26.1 (*)     MCHC 31.0 (*)     RDW 19.4 (*)     Gran # (ANC) 9.2 (*)     Immature Grans (Abs) 0.05 (*)     Mono # 1.1 (*)     Gran % 79.5 (*)     Lymph % 10.2 (*)     All other components within normal limits   COMPREHENSIVE METABOLIC PANEL - Abnormal; Notable for the following components:    Glucose 111 (*)     Anion Gap 6 (*)     All other components within normal limits   PROTIME-INR   APTT   SARS-COV-2 RDRP GENE    Narrative:     This test utilizes isothermal nucleic acid amplification   technology to detect the SARS-CoV-2 RdRp nucleic acid segment.   The analytical sensitivity (limit of detection) is 125 genome   equivalents/mL.   A POSITIVE result implies infection with the SARS-CoV-2 virus;   the patient is presumed to be contagious.     A NEGATIVE result means that SARS-CoV-2 nucleic acids are not   present above the limit of detection. A NEGATIVE result should be   treated as presumptive. It does not rule out the possibility of   COVID-19 and should not be the sole basis for treatment decisions.   If COVID-19 is strongly suspected based on clinical and exposure   history, re-testing using an alternate molecular assay should be   considered.   This test is only for use under the Food and Drug   Administration s Emergency Use Authorization (EUA).   Commercial kits are provided by [employer].   Performance characteristics of the EUA have been independently   verified by [facility] Department of   Pathology and Laboratory Medicine.    _________________________________________________________________   The authorized Fact Sheet for Healthcare Providers and the authorized Fact   Sheet for Patients of the ID NOW COVID-19 are available on the FDA   website:     https://www.fda.gov/media/011040/download  https://www.fda.gov/media/600162/download              Imaging Results          X-Ray Hip 2 View Right (Final result)  Result time 11/05/20 14:45:20    Final result by Josh Montalvo MD (11/05/20 14:45:20)                 Impression:      Complete fracture right femoral neck.      Electronically signed by: Josh Montalvo MD  Date:    11/05/2020  Time:    14:45             Narrative:    EXAMINATION:  XR HIP 2 VIEW RIGHT    CLINICAL HISTORY:  Right hip pain    COMPARISON:  None    FINDINGS:  Right hip radiographs, three views, demonstrate a complete fracture of the right femoral neck.  No other fractures.                               X-Ray Cervical Spine 2 or 3 Views (Final result)  Result time 11/05/20 14:43:53   Procedure changed from X-Ray Cervical Spine Complete 5 view     Final result by Josh Montalvo MD (11/05/20 14:43:53)                 Impression:      Multilevel degenerative change of the cervical spine.      Electronically signed by: Josh Montalvo MD  Date:    11/05/2020  Time:    14:43             Narrative:    EXAMINATION:  XR CERVICAL SPINE 2 OR 3 VIEWS    CLINICAL HISTORY:  pain;  Pain, unspecified    COMPARISON:  None    FINDINGS:  Cervical spine radiographs, four views, demonstrate no fracture or dislocation.  Slight grade 1 anterolisthesis C4-5.  Slight grade 1 retrolisthesis C5-6.  Severe disc space narrowing at C5-6 and moderate disc space narrowing at C6-7.  Multilevel facet arthrosis.  No prevertebral soft tissue swelling.                                 Medical Decision Making:   History:   I obtained history from: someone other than patient.       <> Summary of History: Family   Differential Diagnosis:    Fracture  Sprain  Strain  Contusion   Clinical Tests:   Radiological Study: Ordered                   ED Course as of Nov 13 1817   Thu Nov 05, 2020   1451 Impression:     Complete fracture right femoral neck.    [NL]   1455 Impression:     Multilevel degenerative change of the cervical spine.    [NL]      ED Course User Index  [NL] Alisia Hager NP            Clinical Impression:       ICD-10-CM ICD-9-CM   1. Closed fracture of right hip, initial encounter  S72.001A 820.8   2. Pain  R52 780.96   3. Fall, initial encounter  W19.XXXA E888.9                      Disposition:   Disposition: Transferred  Condition: Stable     ED Disposition Condition    Transfer to Another Facility Stable                            Alisia Hager NP  11/13/20 1985

## 2020-11-05 NOTE — ED NOTES
Patient changed into gown, non skid socks on, two pillows under right knee/leg per patient request to help relieve pain, pillow behind head.

## 2020-11-05 NOTE — ED NOTES
Patient's daughter and son-in-law at bedside. Family reports patient usually walks around with a cane. Patient states that he fell at [employer] and then fell again at home. He lives alone.

## 2020-11-09 PROBLEM — D50.0 IRON DEFICIENCY ANEMIA DUE TO CHRONIC BLOOD LOSS: Status: ACTIVE | Noted: 2020-11-09

## 2020-11-19 ENCOUNTER — LAB VISIT (OUTPATIENT)
Dept: LAB | Facility: HOSPITAL | Age: 80
End: 2020-11-19
Attending: INTERNAL MEDICINE
Payer: MEDICARE

## 2020-11-19 DIAGNOSIS — I11.0 HYPERTENSIVE HEART DISEASE WITH CONGESTIVE HEART FAILURE: Primary | ICD-10-CM

## 2020-11-19 DIAGNOSIS — D64.9 ANEMIA, UNSPECIFIED: ICD-10-CM

## 2020-11-19 DIAGNOSIS — I50.20 HEART FAILURE, SYSTOLIC: ICD-10-CM

## 2020-11-19 LAB
ALBUMIN SERPL BCP-MCNC: 2.7 G/DL (ref 3.5–5.2)
ALP SERPL-CCNC: 92 U/L (ref 55–135)
ALT SERPL W/O P-5'-P-CCNC: 27 U/L (ref 10–44)
ANION GAP SERPL CALC-SCNC: 6 MMOL/L (ref 8–16)
AST SERPL-CCNC: 34 U/L (ref 10–40)
BASOPHILS # BLD AUTO: 0.05 K/UL (ref 0–0.2)
BASOPHILS NFR BLD: 0.5 % (ref 0–1.9)
BILIRUB SERPL-MCNC: 0.7 MG/DL (ref 0.1–1)
BUN SERPL-MCNC: 12 MG/DL (ref 8–23)
CALCIUM SERPL-MCNC: 8.6 MG/DL (ref 8.7–10.5)
CHLORIDE SERPL-SCNC: 106 MMOL/L (ref 95–110)
CO2 SERPL-SCNC: 27 MMOL/L (ref 23–29)
CREAT SERPL-MCNC: 1.1 MG/DL (ref 0.5–1.4)
DIFFERENTIAL METHOD: ABNORMAL
EOSINOPHIL # BLD AUTO: 0.3 K/UL (ref 0–0.5)
EOSINOPHIL NFR BLD: 2.7 % (ref 0–8)
ERYTHROCYTE [DISTWIDTH] IN BLOOD BY AUTOMATED COUNT: 20.4 % (ref 11.5–14.5)
EST. GFR  (AFRICAN AMERICAN): >60 ML/MIN/1.73 M^2
EST. GFR  (NON AFRICAN AMERICAN): >60 ML/MIN/1.73 M^2
GLUCOSE SERPL-MCNC: 103 MG/DL (ref 70–110)
HCT VFR BLD AUTO: 35.6 % (ref 40–54)
HGB BLD-MCNC: 10.8 G/DL (ref 14–18)
IMM GRANULOCYTES # BLD AUTO: 0.05 K/UL (ref 0–0.04)
IMM GRANULOCYTES NFR BLD AUTO: 0.5 % (ref 0–0.5)
LYMPHOCYTES # BLD AUTO: 1.3 K/UL (ref 1–4.8)
LYMPHOCYTES NFR BLD: 12.3 % (ref 18–48)
MCH RBC QN AUTO: 26.2 PG (ref 27–31)
MCHC RBC AUTO-ENTMCNC: 30.3 G/DL (ref 32–36)
MCV RBC AUTO: 86 FL (ref 82–98)
MONOCYTES # BLD AUTO: 1.1 K/UL (ref 0.3–1)
MONOCYTES NFR BLD: 10.1 % (ref 4–15)
NEUTROPHILS # BLD AUTO: 7.8 K/UL (ref 1.8–7.7)
NEUTROPHILS NFR BLD: 73.9 % (ref 38–73)
NRBC BLD-RTO: 0 /100 WBC
PLATELET # BLD AUTO: 391 K/UL (ref 150–350)
PMV BLD AUTO: 10.2 FL (ref 9.2–12.9)
POTASSIUM SERPL-SCNC: 4.2 MMOL/L (ref 3.5–5.1)
PROT SERPL-MCNC: 6.4 G/DL (ref 6–8.4)
RBC # BLD AUTO: 4.12 M/UL (ref 4.6–6.2)
SODIUM SERPL-SCNC: 139 MMOL/L (ref 136–145)
WBC # BLD AUTO: 10.51 K/UL (ref 3.9–12.7)

## 2020-11-19 PROCEDURE — 36415 COLL VENOUS BLD VENIPUNCTURE: CPT

## 2020-11-19 PROCEDURE — 85025 COMPLETE CBC W/AUTO DIFF WBC: CPT

## 2020-11-19 PROCEDURE — 80053 COMPREHEN METABOLIC PANEL: CPT

## 2020-12-04 ENCOUNTER — LAB VISIT (OUTPATIENT)
Dept: LAB | Facility: HOSPITAL | Age: 80
End: 2020-12-04
Attending: INTERNAL MEDICINE
Payer: MEDICARE

## 2020-12-04 DIAGNOSIS — D64.9 ANEMIA, UNSPECIFIED: Primary | ICD-10-CM

## 2020-12-04 LAB
BASOPHILS # BLD AUTO: 0.08 K/UL (ref 0–0.2)
BASOPHILS NFR BLD: 1.3 % (ref 0–1.9)
DIFFERENTIAL METHOD: ABNORMAL
EOSINOPHIL # BLD AUTO: 0.3 K/UL (ref 0–0.5)
EOSINOPHIL NFR BLD: 4.9 % (ref 0–8)
ERYTHROCYTE [DISTWIDTH] IN BLOOD BY AUTOMATED COUNT: 19.5 % (ref 11.5–14.5)
HCT VFR BLD AUTO: 35.2 % (ref 40–54)
HGB BLD-MCNC: 11 G/DL (ref 14–18)
IMM GRANULOCYTES # BLD AUTO: 0.02 K/UL (ref 0–0.04)
IMM GRANULOCYTES NFR BLD AUTO: 0.3 % (ref 0–0.5)
LYMPHOCYTES # BLD AUTO: 1.8 K/UL (ref 1–4.8)
LYMPHOCYTES NFR BLD: 29.6 % (ref 18–48)
MCH RBC QN AUTO: 27.2 PG (ref 27–31)
MCHC RBC AUTO-ENTMCNC: 31.3 G/DL (ref 32–36)
MCV RBC AUTO: 87 FL (ref 82–98)
MONOCYTES # BLD AUTO: 0.8 K/UL (ref 0.3–1)
MONOCYTES NFR BLD: 13.3 % (ref 4–15)
NEUTROPHILS # BLD AUTO: 3.1 K/UL (ref 1.8–7.7)
NEUTROPHILS NFR BLD: 50.6 % (ref 38–73)
NRBC BLD-RTO: 0 /100 WBC
PLATELET # BLD AUTO: 212 K/UL (ref 150–350)
PMV BLD AUTO: 11.2 FL (ref 9.2–12.9)
RBC # BLD AUTO: 4.05 M/UL (ref 4.6–6.2)
WBC # BLD AUTO: 6.08 K/UL (ref 3.9–12.7)

## 2020-12-04 PROCEDURE — 36415 COLL VENOUS BLD VENIPUNCTURE: CPT

## 2020-12-04 PROCEDURE — 85025 COMPLETE CBC W/AUTO DIFF WBC: CPT

## 2020-12-15 ENCOUNTER — LAB VISIT (OUTPATIENT)
Dept: LAB | Facility: HOSPITAL | Age: 80
End: 2020-12-15
Attending: INTERNAL MEDICINE
Payer: MEDICARE

## 2020-12-15 DIAGNOSIS — D64.9 ANEMIA, UNSPECIFIED: ICD-10-CM

## 2020-12-15 DIAGNOSIS — I11.0 HYPERTENSIVE HEART DISEASE WITH CONGESTIVE HEART FAILURE: Primary | ICD-10-CM

## 2020-12-15 LAB
ALBUMIN SERPL BCP-MCNC: 3.2 G/DL (ref 3.5–5.2)
ALP SERPL-CCNC: 80 U/L (ref 55–135)
ALT SERPL W/O P-5'-P-CCNC: 17 U/L (ref 10–44)
ANION GAP SERPL CALC-SCNC: 4 MMOL/L (ref 8–16)
AST SERPL-CCNC: 24 U/L (ref 10–40)
BASOPHILS # BLD AUTO: 0.05 K/UL (ref 0–0.2)
BASOPHILS NFR BLD: 0.8 % (ref 0–1.9)
BILIRUB SERPL-MCNC: 0.5 MG/DL (ref 0.1–1)
BUN SERPL-MCNC: 22 MG/DL (ref 8–23)
CALCIUM SERPL-MCNC: 9 MG/DL (ref 8.7–10.5)
CHLORIDE SERPL-SCNC: 103 MMOL/L (ref 95–110)
CO2 SERPL-SCNC: 31 MMOL/L (ref 23–29)
CREAT SERPL-MCNC: 1.1 MG/DL (ref 0.5–1.4)
DIFFERENTIAL METHOD: ABNORMAL
EOSINOPHIL # BLD AUTO: 0.5 K/UL (ref 0–0.5)
EOSINOPHIL NFR BLD: 7.8 % (ref 0–8)
ERYTHROCYTE [DISTWIDTH] IN BLOOD BY AUTOMATED COUNT: 19.5 % (ref 11.5–14.5)
EST. GFR  (AFRICAN AMERICAN): >60 ML/MIN/1.73 M^2
EST. GFR  (NON AFRICAN AMERICAN): >60 ML/MIN/1.73 M^2
GLUCOSE SERPL-MCNC: 83 MG/DL (ref 70–110)
HCT VFR BLD AUTO: 35.5 % (ref 40–54)
HGB BLD-MCNC: 11.3 G/DL (ref 14–18)
IMM GRANULOCYTES # BLD AUTO: 0.01 K/UL (ref 0–0.04)
IMM GRANULOCYTES NFR BLD AUTO: 0.2 % (ref 0–0.5)
LYMPHOCYTES # BLD AUTO: 1.6 K/UL (ref 1–4.8)
LYMPHOCYTES NFR BLD: 25.9 % (ref 18–48)
MCH RBC QN AUTO: 28.5 PG (ref 27–31)
MCHC RBC AUTO-ENTMCNC: 31.8 G/DL (ref 32–36)
MCV RBC AUTO: 89 FL (ref 82–98)
MONOCYTES # BLD AUTO: 0.8 K/UL (ref 0.3–1)
MONOCYTES NFR BLD: 12.6 % (ref 4–15)
NEUTROPHILS # BLD AUTO: 3.2 K/UL (ref 1.8–7.7)
NEUTROPHILS NFR BLD: 52.7 % (ref 38–73)
NRBC BLD-RTO: 0 /100 WBC
PLATELET # BLD AUTO: 180 K/UL (ref 150–350)
PMV BLD AUTO: 11.1 FL (ref 9.2–12.9)
POTASSIUM SERPL-SCNC: 3.8 MMOL/L (ref 3.5–5.1)
PROT SERPL-MCNC: 6.9 G/DL (ref 6–8.4)
RBC # BLD AUTO: 3.97 M/UL (ref 4.6–6.2)
SODIUM SERPL-SCNC: 138 MMOL/L (ref 136–145)
WBC # BLD AUTO: 6.02 K/UL (ref 3.9–12.7)

## 2020-12-15 PROCEDURE — 36415 COLL VENOUS BLD VENIPUNCTURE: CPT

## 2020-12-15 PROCEDURE — 80053 COMPREHEN METABOLIC PANEL: CPT

## 2020-12-15 PROCEDURE — 85025 COMPLETE CBC W/AUTO DIFF WBC: CPT

## 2021-01-25 DIAGNOSIS — C61 PROSTATE CANCER: Primary | ICD-10-CM

## 2021-01-28 ENCOUNTER — LAB VISIT (OUTPATIENT)
Dept: LAB | Facility: HOSPITAL | Age: 81
End: 2021-01-28
Attending: UROLOGY
Payer: MEDICARE

## 2021-01-28 DIAGNOSIS — C61 PROSTATE CANCER: ICD-10-CM

## 2021-01-28 LAB — COMPLEXED PSA SERPL-MCNC: 0.7 NG/ML (ref 0–4)

## 2021-01-28 PROCEDURE — 36415 COLL VENOUS BLD VENIPUNCTURE: CPT

## 2021-01-28 PROCEDURE — 84153 ASSAY OF PSA TOTAL: CPT

## 2021-02-05 ENCOUNTER — TELEPHONE (OUTPATIENT)
Dept: UROLOGY | Facility: CLINIC | Age: 81
End: 2021-02-05

## 2021-02-08 ENCOUNTER — OFFICE VISIT (OUTPATIENT)
Dept: UROLOGY | Facility: CLINIC | Age: 81
End: 2021-02-08
Payer: MEDICARE

## 2021-02-08 VITALS
DIASTOLIC BLOOD PRESSURE: 74 MMHG | HEART RATE: 75 BPM | HEIGHT: 70 IN | BODY MASS INDEX: 24.34 KG/M2 | SYSTOLIC BLOOD PRESSURE: 143 MMHG | RESPIRATION RATE: 15 BRPM | WEIGHT: 170 LBS

## 2021-02-08 DIAGNOSIS — C61 PROSTATE CANCER: Primary | ICD-10-CM

## 2021-02-08 DIAGNOSIS — H91.3 DEAF, NONSPEAKING: ICD-10-CM

## 2021-02-08 DIAGNOSIS — R97.20 ELEVATED PSA: ICD-10-CM

## 2021-02-08 PROCEDURE — 1126F AMNT PAIN NOTED NONE PRSNT: CPT | Mod: S$GLB,,, | Performed by: NURSE PRACTITIONER

## 2021-02-08 PROCEDURE — 3288F FALL RISK ASSESSMENT DOCD: CPT | Mod: CPTII,S$GLB,, | Performed by: NURSE PRACTITIONER

## 2021-02-08 PROCEDURE — 1101F PT FALLS ASSESS-DOCD LE1/YR: CPT | Mod: CPTII,S$GLB,, | Performed by: NURSE PRACTITIONER

## 2021-02-08 PROCEDURE — 96402 CHEMO HORMON ANTINEOPL SQ/IM: CPT | Mod: S$GLB,,, | Performed by: NURSE PRACTITIONER

## 2021-02-08 PROCEDURE — 96402 PR CHEMOTHER HORMON ANTINEOPL SUB-Q/IM: ICD-10-PCS | Mod: S$GLB,,, | Performed by: NURSE PRACTITIONER

## 2021-02-08 PROCEDURE — 99999 PR PBB SHADOW E&M-EST. PATIENT-LVL IV: ICD-10-PCS | Mod: PBBFAC,,, | Performed by: NURSE PRACTITIONER

## 2021-02-08 PROCEDURE — 3288F PR FALLS RISK ASSESSMENT DOCUMENTED: ICD-10-PCS | Mod: CPTII,S$GLB,, | Performed by: NURSE PRACTITIONER

## 2021-02-08 PROCEDURE — 99999 PR PBB SHADOW E&M-EST. PATIENT-LVL IV: CPT | Mod: PBBFAC,,, | Performed by: NURSE PRACTITIONER

## 2021-02-08 PROCEDURE — 1101F PR PT FALLS ASSESS DOC 0-1 FALLS W/OUT INJ PAST YR: ICD-10-PCS | Mod: CPTII,S$GLB,, | Performed by: NURSE PRACTITIONER

## 2021-02-08 PROCEDURE — 99499 NO LOS: ICD-10-PCS | Mod: S$GLB,,, | Performed by: NURSE PRACTITIONER

## 2021-02-08 PROCEDURE — 1126F PR PAIN SEVERITY QUANTIFIED, NO PAIN PRESENT: ICD-10-PCS | Mod: S$GLB,,, | Performed by: NURSE PRACTITIONER

## 2021-02-08 PROCEDURE — 99499 UNLISTED E&M SERVICE: CPT | Mod: S$GLB,,, | Performed by: NURSE PRACTITIONER

## 2021-02-11 DIAGNOSIS — R79.1 ABNORMAL COAGULATION PROFILE: ICD-10-CM

## 2021-02-11 DIAGNOSIS — D64.9 ANEMIA: ICD-10-CM

## 2021-02-11 DIAGNOSIS — D50.9 IRON DEFICIENCY ANEMIA, UNSPECIFIED IRON DEFICIENCY ANEMIA TYPE: Primary | ICD-10-CM

## 2021-02-11 RX ORDER — SODIUM CHLORIDE 9 MG/ML
INJECTION, SOLUTION INTRAVENOUS CONTINUOUS
Status: CANCELLED | OUTPATIENT
Start: 2021-02-11

## 2021-02-11 RX ORDER — SODIUM CHLORIDE 0.9 % (FLUSH) 0.9 %
10 SYRINGE (ML) INJECTION
Status: CANCELLED | OUTPATIENT
Start: 2021-02-11

## 2021-02-24 ENCOUNTER — HOSPITAL ENCOUNTER (OUTPATIENT)
Dept: PULMONOLOGY | Facility: HOSPITAL | Age: 81
Discharge: HOME | End: 2021-02-24
Attending: SURGERY
Payer: MEDICARE

## 2021-02-24 ENCOUNTER — HOSPITAL ENCOUNTER (OUTPATIENT)
Dept: PREADMISSION TESTING | Facility: HOSPITAL | Age: 81
Discharge: HOME | End: 2021-02-24
Attending: SURGERY
Payer: MEDICARE

## 2021-02-24 VITALS — BODY MASS INDEX: 23.95 KG/M2 | HEIGHT: 68 IN | WEIGHT: 158 LBS

## 2021-02-24 DIAGNOSIS — Z01.818 PRE-OP TESTING: ICD-10-CM

## 2021-02-24 DIAGNOSIS — D50.9 IRON DEFICIENCY ANEMIA, UNSPECIFIED IRON DEFICIENCY ANEMIA TYPE: ICD-10-CM

## 2021-02-24 PROCEDURE — 93010 EKG 12-LEAD: ICD-10-PCS | Mod: ,,, | Performed by: INTERNAL MEDICINE

## 2021-02-24 PROCEDURE — 93010 ELECTROCARDIOGRAM REPORT: CPT | Mod: ,,, | Performed by: INTERNAL MEDICINE

## 2021-02-24 PROCEDURE — 93005 ELECTROCARDIOGRAM TRACING: CPT

## 2021-02-26 ENCOUNTER — ANESTHESIA EVENT (OUTPATIENT)
Dept: ENDOSCOPY | Facility: HOSPITAL | Age: 81
End: 2021-02-26
Payer: MEDICARE

## 2021-03-01 ENCOUNTER — HOSPITAL ENCOUNTER (OUTPATIENT)
Dept: PREADMISSION TESTING | Facility: HOSPITAL | Age: 81
Discharge: HOME | End: 2021-03-01
Attending: SURGERY
Payer: MEDICARE

## 2021-03-01 DIAGNOSIS — Z01.818 PRE-OP TESTING: ICD-10-CM

## 2021-03-01 LAB — SARS-COV-2 RDRP RESP QL NAA+PROBE: NEGATIVE

## 2021-03-01 PROCEDURE — U0002 COVID-19 LAB TEST NON-CDC: HCPCS

## 2021-03-02 ENCOUNTER — ANESTHESIA (OUTPATIENT)
Dept: ENDOSCOPY | Facility: HOSPITAL | Age: 81
End: 2021-03-02
Payer: MEDICARE

## 2021-03-02 ENCOUNTER — HOSPITAL ENCOUNTER (OUTPATIENT)
Facility: HOSPITAL | Age: 81
Discharge: HOME | End: 2021-03-02
Attending: SURGERY | Admitting: SURGERY
Payer: MEDICARE

## 2021-03-02 VITALS
HEART RATE: 72 BPM | DIASTOLIC BLOOD PRESSURE: 65 MMHG | OXYGEN SATURATION: 97 % | SYSTOLIC BLOOD PRESSURE: 112 MMHG | TEMPERATURE: 98 F | RESPIRATION RATE: 18 BRPM

## 2021-03-02 DIAGNOSIS — K57.90 DIVERTICULOSIS: ICD-10-CM

## 2021-03-02 DIAGNOSIS — K29.70 GASTRITIS, PRESENCE OF BLEEDING UNSPECIFIED, UNSPECIFIED CHRONICITY, UNSPECIFIED GASTRITIS TYPE: ICD-10-CM

## 2021-03-02 DIAGNOSIS — K21.00 GASTROESOPHAGEAL REFLUX DISEASE WITH ESOPHAGITIS WITHOUT HEMORRHAGE: ICD-10-CM

## 2021-03-02 DIAGNOSIS — D64.9 ANEMIA: Primary | ICD-10-CM

## 2021-03-02 PROBLEM — D50.9 IRON DEFICIENCY ANEMIA: Status: ACTIVE | Noted: 2020-11-09

## 2021-03-02 PROCEDURE — 88342 IMHCHEM/IMCYTCHM 1ST ANTB: CPT | Mod: TC | Performed by: ANESTHESIOLOGY

## 2021-03-02 PROCEDURE — 63600175 PHARM REV CODE 636 W HCPCS: Performed by: NURSE ANESTHETIST, CERTIFIED REGISTERED

## 2021-03-02 PROCEDURE — 88305 TISSUE EXAM BY PATHOLOGIST: CPT | Mod: TC | Performed by: ANESTHESIOLOGY

## 2021-03-02 PROCEDURE — 37000008 HC ANESTHESIA 1ST 15 MINUTES: Performed by: SURGERY

## 2021-03-02 PROCEDURE — 43239 EGD BIOPSY SINGLE/MULTIPLE: CPT | Performed by: SURGERY

## 2021-03-02 PROCEDURE — 45378 DIAGNOSTIC COLONOSCOPY: CPT | Performed by: SURGERY

## 2021-03-02 PROCEDURE — 25000003 PHARM REV CODE 250: Performed by: SURGERY

## 2021-03-02 PROCEDURE — 27201423 OPTIME MED/SURG SUP & DEVICES STERILE SUPPLY: Performed by: SURGERY

## 2021-03-02 PROCEDURE — 37000009 HC ANESTHESIA EA ADD 15 MINS: Performed by: SURGERY

## 2021-03-02 RX ORDER — SODIUM CHLORIDE 9 MG/ML
INJECTION, SOLUTION INTRAVENOUS CONTINUOUS
Status: DISCONTINUED | OUTPATIENT
Start: 2021-03-02 | End: 2021-03-02 | Stop reason: HOSPADM

## 2021-03-02 RX ORDER — PROPOFOL 10 MG/ML
INJECTION, EMULSION INTRAVENOUS
Status: DISCONTINUED | OUTPATIENT
Start: 2021-03-02 | End: 2021-03-02

## 2021-03-02 RX ORDER — SODIUM CHLORIDE 0.9 % (FLUSH) 0.9 %
10 SYRINGE (ML) INJECTION
Status: DISCONTINUED | OUTPATIENT
Start: 2021-03-02 | End: 2021-03-02 | Stop reason: HOSPADM

## 2021-03-02 RX ORDER — PANTOPRAZOLE SODIUM 40 MG/1
40 TABLET, DELAYED RELEASE ORAL DAILY
Qty: 30 TABLET | Refills: 11 | Status: SHIPPED | OUTPATIENT
Start: 2021-03-02 | End: 2023-03-02

## 2021-03-02 RX ADMIN — PROPOFOL 50 MG: 10 INJECTION, EMULSION INTRAVENOUS at 10:03

## 2021-03-02 RX ADMIN — SODIUM CHLORIDE: 0.9 INJECTION, SOLUTION INTRAVENOUS at 09:03

## 2021-03-02 RX ADMIN — PROPOFOL 50 MG: 10 INJECTION, EMULSION INTRAVENOUS at 11:03

## 2021-03-02 RX ADMIN — TOPICAL ANESTHETIC 2 EACH: 200 SPRAY DENTAL; PERIODONTAL at 10:03

## 2021-03-05 LAB — SPECIMEN TO PATHOLOGY - SURGICAL: NORMAL

## 2021-03-24 PROBLEM — R60.9 EDEMA: Status: ACTIVE | Noted: 2021-03-24

## 2021-03-24 PROBLEM — N17.9 AKI (ACUTE KIDNEY INJURY): Status: ACTIVE | Noted: 2021-03-24

## 2021-03-24 PROBLEM — I38 VHD (VALVULAR HEART DISEASE): Status: ACTIVE | Noted: 2021-03-24

## 2021-03-24 PROBLEM — R42 DIZZINESS: Status: ACTIVE | Noted: 2021-03-24

## 2021-03-24 PROBLEM — K92.2 GI BLEED: Status: ACTIVE | Noted: 2021-03-24

## 2021-03-24 PROBLEM — E78.5 HYPERLIPIDEMIA: Status: ACTIVE | Noted: 2021-03-24

## 2021-03-24 PROBLEM — A04.8 H. PYLORI INFECTION: Status: ACTIVE | Noted: 2021-03-24

## 2021-03-24 PROBLEM — R53.83 FATIGUE: Status: ACTIVE | Noted: 2021-03-24

## 2021-05-10 ENCOUNTER — PATIENT MESSAGE (OUTPATIENT)
Dept: RESEARCH | Facility: HOSPITAL | Age: 81
End: 2021-05-10

## 2021-06-10 PROBLEM — H61.20 CERUMEN IMPACTION: Status: ACTIVE | Noted: 2021-06-10

## 2021-06-24 ENCOUNTER — HOSPITAL ENCOUNTER (EMERGENCY)
Facility: HOSPITAL | Age: 81
Discharge: HOME | End: 2021-06-24
Attending: EMERGENCY MEDICINE
Payer: MEDICARE

## 2021-06-24 VITALS
OXYGEN SATURATION: 98 % | DIASTOLIC BLOOD PRESSURE: 82 MMHG | RESPIRATION RATE: 17 BRPM | WEIGHT: 158.88 LBS | BODY MASS INDEX: 24.08 KG/M2 | HEIGHT: 68 IN | SYSTOLIC BLOOD PRESSURE: 115 MMHG | TEMPERATURE: 99 F | HEART RATE: 70 BPM

## 2021-06-24 DIAGNOSIS — H81.10 BENIGN PAROXYSMAL POSITIONAL VERTIGO, UNSPECIFIED LATERALITY: Primary | ICD-10-CM

## 2021-06-24 DIAGNOSIS — R42 DIZZINESS: ICD-10-CM

## 2021-06-24 PROBLEM — I49.01 VENTRICULAR FIBRILLATION: Status: RESOLVED | Noted: 2019-09-05 | Resolved: 2021-06-24

## 2021-06-24 LAB
ALBUMIN SERPL BCP-MCNC: 3.3 G/DL (ref 3.5–5.2)
ALP SERPL-CCNC: 56 U/L (ref 55–135)
ALT SERPL W/O P-5'-P-CCNC: 23 U/L (ref 10–44)
ANION GAP SERPL CALC-SCNC: 5 MMOL/L (ref 8–16)
APTT BLDCRRT: 24 SEC (ref 21–32)
AST SERPL-CCNC: 28 U/L (ref 10–40)
BASOPHILS # BLD AUTO: 0.05 K/UL (ref 0–0.2)
BASOPHILS NFR BLD: 0.7 % (ref 0–1.9)
BILIRUB SERPL-MCNC: 0.8 MG/DL (ref 0.1–1)
BUN SERPL-MCNC: 24 MG/DL (ref 8–23)
CALCIUM SERPL-MCNC: 9.3 MG/DL (ref 8.7–10.5)
CHLORIDE SERPL-SCNC: 109 MMOL/L (ref 95–110)
CO2 SERPL-SCNC: 29 MMOL/L (ref 23–29)
CREAT SERPL-MCNC: 1 MG/DL (ref 0.5–1.4)
DIFFERENTIAL METHOD: ABNORMAL
EOSINOPHIL # BLD AUTO: 0.3 K/UL (ref 0–0.5)
EOSINOPHIL NFR BLD: 5 % (ref 0–8)
ERYTHROCYTE [DISTWIDTH] IN BLOOD BY AUTOMATED COUNT: 12.9 % (ref 11.5–14.5)
EST. GFR  (AFRICAN AMERICAN): >60 ML/MIN/1.73 M^2
EST. GFR  (NON AFRICAN AMERICAN): >60 ML/MIN/1.73 M^2
GLUCOSE SERPL-MCNC: 96 MG/DL (ref 70–110)
HCT VFR BLD AUTO: 39.5 % (ref 40–54)
HGB BLD-MCNC: 13.3 G/DL (ref 14–18)
IMM GRANULOCYTES # BLD AUTO: 0.01 K/UL (ref 0–0.04)
IMM GRANULOCYTES NFR BLD AUTO: 0.1 % (ref 0–0.5)
INR PPP: 1 (ref 0.8–1.2)
LYMPHOCYTES # BLD AUTO: 1.7 K/UL (ref 1–4.8)
LYMPHOCYTES NFR BLD: 24.6 % (ref 18–48)
MAGNESIUM SERPL-MCNC: 2.4 MG/DL (ref 1.6–2.6)
MCH RBC QN AUTO: 31.9 PG (ref 27–31)
MCHC RBC AUTO-ENTMCNC: 33.7 G/DL (ref 32–36)
MCV RBC AUTO: 95 FL (ref 82–98)
MONOCYTES # BLD AUTO: 0.7 K/UL (ref 0.3–1)
MONOCYTES NFR BLD: 10.8 % (ref 4–15)
NEUTROPHILS # BLD AUTO: 4 K/UL (ref 1.8–7.7)
NEUTROPHILS NFR BLD: 58.8 % (ref 38–73)
NRBC BLD-RTO: 0 /100 WBC
PLATELET # BLD AUTO: 137 K/UL (ref 150–450)
PMV BLD AUTO: 11.2 FL (ref 9.2–12.9)
POTASSIUM SERPL-SCNC: 3.9 MMOL/L (ref 3.5–5.1)
PROT SERPL-MCNC: 7 G/DL (ref 6–8.4)
PROTHROMBIN TIME: 10.8 SEC (ref 9–12.5)
RBC # BLD AUTO: 4.17 M/UL (ref 4.6–6.2)
SODIUM SERPL-SCNC: 143 MMOL/L (ref 136–145)
TROPONIN I SERPL DL<=0.01 NG/ML-MCNC: <0.02 NG/ML (ref 0–0.03)
WBC # BLD AUTO: 6.83 K/UL (ref 3.9–12.7)

## 2021-06-24 PROCEDURE — 84484 ASSAY OF TROPONIN QUANT: CPT | Performed by: EMERGENCY MEDICINE

## 2021-06-24 PROCEDURE — 85610 PROTHROMBIN TIME: CPT | Performed by: EMERGENCY MEDICINE

## 2021-06-24 PROCEDURE — 93010 ELECTROCARDIOGRAM REPORT: CPT | Mod: ,,, | Performed by: INTERNAL MEDICINE

## 2021-06-24 PROCEDURE — 93010 EKG 12-LEAD: ICD-10-PCS | Mod: ,,, | Performed by: INTERNAL MEDICINE

## 2021-06-24 PROCEDURE — 99285 EMERGENCY DEPT VISIT HI MDM: CPT | Mod: 25

## 2021-06-24 PROCEDURE — 96374 THER/PROPH/DIAG INJ IV PUSH: CPT

## 2021-06-24 PROCEDURE — 36415 COLL VENOUS BLD VENIPUNCTURE: CPT | Performed by: EMERGENCY MEDICINE

## 2021-06-24 PROCEDURE — 25000003 PHARM REV CODE 250: Performed by: EMERGENCY MEDICINE

## 2021-06-24 PROCEDURE — 93005 ELECTROCARDIOGRAM TRACING: CPT

## 2021-06-24 PROCEDURE — 63600175 PHARM REV CODE 636 W HCPCS: Performed by: EMERGENCY MEDICINE

## 2021-06-24 PROCEDURE — 85025 COMPLETE CBC W/AUTO DIFF WBC: CPT | Performed by: EMERGENCY MEDICINE

## 2021-06-24 PROCEDURE — 83735 ASSAY OF MAGNESIUM: CPT | Performed by: EMERGENCY MEDICINE

## 2021-06-24 PROCEDURE — 85730 THROMBOPLASTIN TIME PARTIAL: CPT | Performed by: EMERGENCY MEDICINE

## 2021-06-24 PROCEDURE — 80053 COMPREHEN METABOLIC PANEL: CPT | Performed by: EMERGENCY MEDICINE

## 2021-06-24 RX ORDER — ONDANSETRON 2 MG/ML
4 INJECTION INTRAMUSCULAR; INTRAVENOUS
Status: COMPLETED | OUTPATIENT
Start: 2021-06-24 | End: 2021-06-24

## 2021-06-24 RX ORDER — ONDANSETRON 4 MG/1
4 TABLET, ORALLY DISINTEGRATING ORAL EVERY 6 HOURS PRN
Qty: 20 TABLET | Refills: 0 | Status: SHIPPED | OUTPATIENT
Start: 2021-06-24 | End: 2021-09-21

## 2021-06-24 RX ORDER — MECLIZINE HYDROCHLORIDE 25 MG/1
25 TABLET ORAL 3 TIMES DAILY PRN
Qty: 20 TABLET | Refills: 0 | Status: SHIPPED | OUTPATIENT
Start: 2021-06-24 | End: 2022-12-01

## 2021-06-24 RX ORDER — MECLIZINE HYDROCHLORIDE 25 MG/1
25 TABLET ORAL
Status: COMPLETED | OUTPATIENT
Start: 2021-06-24 | End: 2021-06-24

## 2021-06-24 RX ADMIN — SODIUM CHLORIDE 1000 ML: 0.9 INJECTION, SOLUTION INTRAVENOUS at 09:06

## 2021-06-24 RX ADMIN — MECLIZINE HYDROCHLORIDE 25 MG: 25 TABLET ORAL at 09:06

## 2021-06-24 RX ADMIN — ONDANSETRON 4 MG: 2 INJECTION INTRAMUSCULAR; INTRAVENOUS at 09:06

## 2021-08-02 ENCOUNTER — LAB VISIT (OUTPATIENT)
Dept: LAB | Facility: HOSPITAL | Age: 81
End: 2021-08-02
Attending: NURSE PRACTITIONER
Payer: MEDICARE

## 2021-08-02 DIAGNOSIS — C61 PROSTATE CANCER: ICD-10-CM

## 2021-08-02 LAB — COMPLEXED PSA SERPL-MCNC: 4.3 NG/ML (ref 0–4)

## 2021-08-02 PROCEDURE — 36415 COLL VENOUS BLD VENIPUNCTURE: CPT | Performed by: NURSE PRACTITIONER

## 2021-08-02 PROCEDURE — 84153 ASSAY OF PSA TOTAL: CPT | Performed by: NURSE PRACTITIONER

## 2021-08-09 ENCOUNTER — OFFICE VISIT (OUTPATIENT)
Dept: UROLOGY | Facility: CLINIC | Age: 81
End: 2021-08-09
Payer: MEDICARE

## 2021-08-09 VITALS
HEIGHT: 68 IN | WEIGHT: 159 LBS | BODY MASS INDEX: 24.1 KG/M2 | DIASTOLIC BLOOD PRESSURE: 71 MMHG | HEART RATE: 74 BPM | SYSTOLIC BLOOD PRESSURE: 114 MMHG

## 2021-08-09 DIAGNOSIS — R97.21 RISING PSA FOLLOWING TREATMENT FOR MALIGNANT NEOPLASM OF PROSTATE: ICD-10-CM

## 2021-08-09 DIAGNOSIS — Z92.3 HISTORY OF EXTERNAL BEAM RADIATION THERAPY: ICD-10-CM

## 2021-08-09 DIAGNOSIS — Z90.79 HISTORY OF RADICAL PROSTATECTOMY: ICD-10-CM

## 2021-08-09 DIAGNOSIS — C61 PROSTATE CANCER: Primary | ICD-10-CM

## 2021-08-09 PROCEDURE — 96402 CHEMO HORMON ANTINEOPL SQ/IM: CPT | Mod: S$GLB,,, | Performed by: NURSE PRACTITIONER

## 2021-08-09 PROCEDURE — 3288F PR FALLS RISK ASSESSMENT DOCUMENTED: ICD-10-PCS | Mod: CPTII,S$GLB,, | Performed by: NURSE PRACTITIONER

## 2021-08-09 PROCEDURE — 99214 OFFICE O/P EST MOD 30 MIN: CPT | Mod: 25,S$GLB,, | Performed by: NURSE PRACTITIONER

## 2021-08-09 PROCEDURE — 99999 PR PBB SHADOW E&M-EST. PATIENT-LVL IV: ICD-10-PCS | Mod: PBBFAC,,, | Performed by: NURSE PRACTITIONER

## 2021-08-09 PROCEDURE — 3074F PR MOST RECENT SYSTOLIC BLOOD PRESSURE < 130 MM HG: ICD-10-PCS | Mod: CPTII,S$GLB,, | Performed by: NURSE PRACTITIONER

## 2021-08-09 PROCEDURE — 99214 PR OFFICE/OUTPT VISIT, EST, LEVL IV, 30-39 MIN: ICD-10-PCS | Mod: 25,S$GLB,, | Performed by: NURSE PRACTITIONER

## 2021-08-09 PROCEDURE — 1101F PR PT FALLS ASSESS DOC 0-1 FALLS W/OUT INJ PAST YR: ICD-10-PCS | Mod: CPTII,S$GLB,, | Performed by: NURSE PRACTITIONER

## 2021-08-09 PROCEDURE — 1160F PR REVIEW ALL MEDS BY PRESCRIBER/CLIN PHARMACIST DOCUMENTED: ICD-10-PCS | Mod: CPTII,S$GLB,, | Performed by: NURSE PRACTITIONER

## 2021-08-09 PROCEDURE — 3078F PR MOST RECENT DIASTOLIC BLOOD PRESSURE < 80 MM HG: ICD-10-PCS | Mod: CPTII,S$GLB,, | Performed by: NURSE PRACTITIONER

## 2021-08-09 PROCEDURE — 3074F SYST BP LT 130 MM HG: CPT | Mod: CPTII,S$GLB,, | Performed by: NURSE PRACTITIONER

## 2021-08-09 PROCEDURE — 1126F PR PAIN SEVERITY QUANTIFIED, NO PAIN PRESENT: ICD-10-PCS | Mod: CPTII,S$GLB,, | Performed by: NURSE PRACTITIONER

## 2021-08-09 PROCEDURE — 1101F PT FALLS ASSESS-DOCD LE1/YR: CPT | Mod: CPTII,S$GLB,, | Performed by: NURSE PRACTITIONER

## 2021-08-09 PROCEDURE — 1159F MED LIST DOCD IN RCRD: CPT | Mod: CPTII,S$GLB,, | Performed by: NURSE PRACTITIONER

## 2021-08-09 PROCEDURE — 1159F PR MEDICATION LIST DOCUMENTED IN MEDICAL RECORD: ICD-10-PCS | Mod: CPTII,S$GLB,, | Performed by: NURSE PRACTITIONER

## 2021-08-09 PROCEDURE — 3078F DIAST BP <80 MM HG: CPT | Mod: CPTII,S$GLB,, | Performed by: NURSE PRACTITIONER

## 2021-08-09 PROCEDURE — 96402 PR CHEMOTHER HORMON ANTINEOPL SUB-Q/IM: ICD-10-PCS | Mod: S$GLB,,, | Performed by: NURSE PRACTITIONER

## 2021-08-09 PROCEDURE — 3288F FALL RISK ASSESSMENT DOCD: CPT | Mod: CPTII,S$GLB,, | Performed by: NURSE PRACTITIONER

## 2021-08-09 PROCEDURE — 1126F AMNT PAIN NOTED NONE PRSNT: CPT | Mod: CPTII,S$GLB,, | Performed by: NURSE PRACTITIONER

## 2021-08-09 PROCEDURE — 1160F RVW MEDS BY RX/DR IN RCRD: CPT | Mod: CPTII,S$GLB,, | Performed by: NURSE PRACTITIONER

## 2021-08-09 PROCEDURE — 99999 PR PBB SHADOW E&M-EST. PATIENT-LVL IV: CPT | Mod: PBBFAC,,, | Performed by: NURSE PRACTITIONER

## 2021-09-09 ENCOUNTER — LAB VISIT (OUTPATIENT)
Dept: LAB | Facility: HOSPITAL | Age: 81
End: 2021-09-09
Attending: NURSE PRACTITIONER
Payer: MEDICARE

## 2021-09-09 DIAGNOSIS — C61 PROSTATE CANCER: ICD-10-CM

## 2021-09-09 DIAGNOSIS — R97.21 RISING PSA FOLLOWING TREATMENT FOR MALIGNANT NEOPLASM OF PROSTATE: ICD-10-CM

## 2021-09-09 DIAGNOSIS — Z90.79 HISTORY OF RADICAL PROSTATECTOMY: ICD-10-CM

## 2021-09-09 DIAGNOSIS — Z92.3 HISTORY OF EXTERNAL BEAM RADIATION THERAPY: ICD-10-CM

## 2021-09-09 LAB — COMPLEXED PSA SERPL-MCNC: 5.4 NG/ML (ref 0–4)

## 2021-09-09 PROCEDURE — 84403 ASSAY OF TOTAL TESTOSTERONE: CPT | Performed by: NURSE PRACTITIONER

## 2021-09-09 PROCEDURE — 84153 ASSAY OF PSA TOTAL: CPT | Performed by: NURSE PRACTITIONER

## 2021-09-09 PROCEDURE — 36415 COLL VENOUS BLD VENIPUNCTURE: CPT | Performed by: NURSE PRACTITIONER

## 2021-09-10 ENCOUNTER — PATIENT MESSAGE (OUTPATIENT)
Dept: UROLOGY | Facility: CLINIC | Age: 81
End: 2021-09-10

## 2021-09-10 DIAGNOSIS — C61 PROSTATE CANCER: ICD-10-CM

## 2021-09-10 DIAGNOSIS — Z92.3 HISTORY OF EXTERNAL BEAM RADIATION THERAPY: ICD-10-CM

## 2021-09-10 DIAGNOSIS — Z90.79 HISTORY OF RADICAL RETROPUBIC PROSTATECTOMY: ICD-10-CM

## 2021-09-10 DIAGNOSIS — R97.21 RISING PSA FOLLOWING TREATMENT FOR MALIGNANT NEOPLASM OF PROSTATE: Primary | ICD-10-CM

## 2021-09-10 LAB — TESTOST SERPL-MCNC: 12 NG/DL (ref 304–1227)

## 2021-09-13 ENCOUNTER — TELEPHONE (OUTPATIENT)
Dept: UROLOGY | Facility: CLINIC | Age: 81
End: 2021-09-13

## 2021-09-16 ENCOUNTER — TELEPHONE (OUTPATIENT)
Dept: HEMATOLOGY/ONCOLOGY | Facility: CLINIC | Age: 81
End: 2021-09-16

## 2021-09-20 ENCOUNTER — TELEPHONE (OUTPATIENT)
Dept: HEMATOLOGY/ONCOLOGY | Facility: CLINIC | Age: 81
End: 2021-09-20

## 2021-09-21 ENCOUNTER — OFFICE VISIT (OUTPATIENT)
Dept: HEMATOLOGY/ONCOLOGY | Facility: CLINIC | Age: 81
End: 2021-09-21
Payer: MEDICARE

## 2021-09-21 VITALS
DIASTOLIC BLOOD PRESSURE: 74 MMHG | HEART RATE: 72 BPM | SYSTOLIC BLOOD PRESSURE: 161 MMHG | BODY MASS INDEX: 25.23 KG/M2 | RESPIRATION RATE: 16 BRPM | OXYGEN SATURATION: 99 % | HEIGHT: 68 IN | WEIGHT: 166.5 LBS | TEMPERATURE: 98 F

## 2021-09-21 DIAGNOSIS — Z92.3 HISTORY OF EXTERNAL BEAM RADIATION THERAPY: ICD-10-CM

## 2021-09-21 DIAGNOSIS — Z90.79 HISTORY OF RADICAL RETROPUBIC PROSTATECTOMY: ICD-10-CM

## 2021-09-21 DIAGNOSIS — R97.21 RISING PSA FOLLOWING TREATMENT FOR MALIGNANT NEOPLASM OF PROSTATE: ICD-10-CM

## 2021-09-21 PROCEDURE — 99999 PR PBB SHADOW E&M-EST. PATIENT-LVL V: ICD-10-PCS | Mod: PBBFAC,,, | Performed by: INTERNAL MEDICINE

## 2021-09-21 PROCEDURE — 1101F PT FALLS ASSESS-DOCD LE1/YR: CPT | Mod: CPTII,S$GLB,, | Performed by: INTERNAL MEDICINE

## 2021-09-21 PROCEDURE — 1160F RVW MEDS BY RX/DR IN RCRD: CPT | Mod: CPTII,S$GLB,, | Performed by: INTERNAL MEDICINE

## 2021-09-21 PROCEDURE — 3078F DIAST BP <80 MM HG: CPT | Mod: CPTII,S$GLB,, | Performed by: INTERNAL MEDICINE

## 2021-09-21 PROCEDURE — 1160F PR REVIEW ALL MEDS BY PRESCRIBER/CLIN PHARMACIST DOCUMENTED: ICD-10-PCS | Mod: CPTII,S$GLB,, | Performed by: INTERNAL MEDICINE

## 2021-09-21 PROCEDURE — 3077F PR MOST RECENT SYSTOLIC BLOOD PRESSURE >= 140 MM HG: ICD-10-PCS | Mod: CPTII,S$GLB,, | Performed by: INTERNAL MEDICINE

## 2021-09-21 PROCEDURE — 1126F AMNT PAIN NOTED NONE PRSNT: CPT | Mod: CPTII,S$GLB,, | Performed by: INTERNAL MEDICINE

## 2021-09-21 PROCEDURE — 1126F PR PAIN SEVERITY QUANTIFIED, NO PAIN PRESENT: ICD-10-PCS | Mod: CPTII,S$GLB,, | Performed by: INTERNAL MEDICINE

## 2021-09-21 PROCEDURE — 3078F PR MOST RECENT DIASTOLIC BLOOD PRESSURE < 80 MM HG: ICD-10-PCS | Mod: CPTII,S$GLB,, | Performed by: INTERNAL MEDICINE

## 2021-09-21 PROCEDURE — 3077F SYST BP >= 140 MM HG: CPT | Mod: CPTII,S$GLB,, | Performed by: INTERNAL MEDICINE

## 2021-09-21 PROCEDURE — 99999 PR PBB SHADOW E&M-EST. PATIENT-LVL V: CPT | Mod: PBBFAC,,, | Performed by: INTERNAL MEDICINE

## 2021-09-21 PROCEDURE — 99205 PR OFFICE/OUTPT VISIT, NEW, LEVL V, 60-74 MIN: ICD-10-PCS | Mod: S$GLB,,, | Performed by: INTERNAL MEDICINE

## 2021-09-21 PROCEDURE — 3288F FALL RISK ASSESSMENT DOCD: CPT | Mod: CPTII,S$GLB,, | Performed by: INTERNAL MEDICINE

## 2021-09-21 PROCEDURE — 1159F MED LIST DOCD IN RCRD: CPT | Mod: CPTII,S$GLB,, | Performed by: INTERNAL MEDICINE

## 2021-09-21 PROCEDURE — 1101F PR PT FALLS ASSESS DOC 0-1 FALLS W/OUT INJ PAST YR: ICD-10-PCS | Mod: CPTII,S$GLB,, | Performed by: INTERNAL MEDICINE

## 2021-09-21 PROCEDURE — 99205 OFFICE O/P NEW HI 60 MIN: CPT | Mod: S$GLB,,, | Performed by: INTERNAL MEDICINE

## 2021-09-21 PROCEDURE — 3288F PR FALLS RISK ASSESSMENT DOCUMENTED: ICD-10-PCS | Mod: CPTII,S$GLB,, | Performed by: INTERNAL MEDICINE

## 2021-09-21 PROCEDURE — 1159F PR MEDICATION LIST DOCUMENTED IN MEDICAL RECORD: ICD-10-PCS | Mod: CPTII,S$GLB,, | Performed by: INTERNAL MEDICINE

## 2021-09-28 ENCOUNTER — LAB VISIT (OUTPATIENT)
Dept: LAB | Facility: HOSPITAL | Age: 81
End: 2021-09-28
Attending: INTERNAL MEDICINE
Payer: MEDICARE

## 2021-09-28 DIAGNOSIS — R97.21 RISING PSA FOLLOWING TREATMENT FOR MALIGNANT NEOPLASM OF PROSTATE: ICD-10-CM

## 2021-09-28 LAB
CREAT SERPL-MCNC: 1.2 MG/DL (ref 0.5–1.4)
EST. GFR  (AFRICAN AMERICAN): >60 ML/MIN/1.73 M^2
EST. GFR  (NON AFRICAN AMERICAN): 55.6 ML/MIN/1.73 M^2

## 2021-09-28 PROCEDURE — 36415 COLL VENOUS BLD VENIPUNCTURE: CPT | Performed by: INTERNAL MEDICINE

## 2021-09-28 PROCEDURE — 82565 ASSAY OF CREATININE: CPT | Performed by: INTERNAL MEDICINE

## 2021-09-30 ENCOUNTER — HOSPITAL ENCOUNTER (OUTPATIENT)
Dept: RADIOLOGY | Facility: HOSPITAL | Age: 81
Discharge: HOME | End: 2021-09-30
Attending: INTERNAL MEDICINE
Payer: MEDICARE

## 2021-09-30 DIAGNOSIS — R97.21 RISING PSA FOLLOWING TREATMENT FOR MALIGNANT NEOPLASM OF PROSTATE: ICD-10-CM

## 2021-09-30 PROCEDURE — 25500020 PHARM REV CODE 255: Performed by: INTERNAL MEDICINE

## 2021-09-30 PROCEDURE — 74178 CT ABD&PLV WO CNTR FLWD CNTR: CPT | Mod: TC

## 2021-09-30 PROCEDURE — 71260 CT THORAX DX C+: CPT | Mod: TC

## 2021-09-30 PROCEDURE — A9503 TC99M MEDRONATE: HCPCS

## 2021-09-30 PROCEDURE — 78306 BONE IMAGING WHOLE BODY: CPT | Mod: TC

## 2021-09-30 RX ADMIN — IOHEXOL 100 ML: 350 INJECTION, SOLUTION INTRAVENOUS at 08:09

## 2021-10-05 ENCOUNTER — TELEPHONE (OUTPATIENT)
Dept: HEMATOLOGY/ONCOLOGY | Facility: CLINIC | Age: 81
End: 2021-10-05

## 2021-10-11 ENCOUNTER — TELEPHONE (OUTPATIENT)
Dept: HEMATOLOGY/ONCOLOGY | Facility: CLINIC | Age: 81
End: 2021-10-11

## 2021-10-14 PROBLEM — Z00.00 ROUTINE GENERAL MEDICAL EXAMINATION AT A HEALTH CARE FACILITY: Status: ACTIVE | Noted: 2021-10-14

## 2021-11-11 ENCOUNTER — TELEPHONE (OUTPATIENT)
Dept: UROLOGY | Facility: CLINIC | Age: 81
End: 2021-11-11
Payer: MEDICARE

## 2021-11-11 ENCOUNTER — OFFICE VISIT (OUTPATIENT)
Dept: ORTHOPEDICS | Facility: CLINIC | Age: 81
End: 2021-11-11
Payer: MEDICARE

## 2021-11-11 VITALS — WEIGHT: 168.31 LBS | HEIGHT: 68 IN | BODY MASS INDEX: 25.51 KG/M2

## 2021-11-11 DIAGNOSIS — M47.816 LUMBAR SPONDYLOSIS: Primary | ICD-10-CM

## 2021-11-11 PROCEDURE — 99999 PR PBB SHADOW E&M-EST. PATIENT-LVL III: ICD-10-PCS | Mod: PBBFAC,,, | Performed by: ORTHOPAEDIC SURGERY

## 2021-11-11 PROCEDURE — 1159F MED LIST DOCD IN RCRD: CPT | Mod: CPTII,S$GLB,, | Performed by: ORTHOPAEDIC SURGERY

## 2021-11-11 PROCEDURE — 1160F PR REVIEW ALL MEDS BY PRESCRIBER/CLIN PHARMACIST DOCUMENTED: ICD-10-PCS | Mod: CPTII,S$GLB,, | Performed by: ORTHOPAEDIC SURGERY

## 2021-11-11 PROCEDURE — 1126F AMNT PAIN NOTED NONE PRSNT: CPT | Mod: CPTII,S$GLB,, | Performed by: ORTHOPAEDIC SURGERY

## 2021-11-11 PROCEDURE — 1126F PR PAIN SEVERITY QUANTIFIED, NO PAIN PRESENT: ICD-10-PCS | Mod: CPTII,S$GLB,, | Performed by: ORTHOPAEDIC SURGERY

## 2021-11-11 PROCEDURE — 1101F PR PT FALLS ASSESS DOC 0-1 FALLS W/OUT INJ PAST YR: ICD-10-PCS | Mod: CPTII,S$GLB,, | Performed by: ORTHOPAEDIC SURGERY

## 2021-11-11 PROCEDURE — 3288F FALL RISK ASSESSMENT DOCD: CPT | Mod: CPTII,S$GLB,, | Performed by: ORTHOPAEDIC SURGERY

## 2021-11-11 PROCEDURE — 3288F PR FALLS RISK ASSESSMENT DOCUMENTED: ICD-10-PCS | Mod: CPTII,S$GLB,, | Performed by: ORTHOPAEDIC SURGERY

## 2021-11-11 PROCEDURE — 1101F PT FALLS ASSESS-DOCD LE1/YR: CPT | Mod: CPTII,S$GLB,, | Performed by: ORTHOPAEDIC SURGERY

## 2021-11-11 PROCEDURE — 99999 PR PBB SHADOW E&M-EST. PATIENT-LVL III: CPT | Mod: PBBFAC,,, | Performed by: ORTHOPAEDIC SURGERY

## 2021-11-11 PROCEDURE — 99204 OFFICE O/P NEW MOD 45 MIN: CPT | Mod: S$GLB,,, | Performed by: ORTHOPAEDIC SURGERY

## 2021-11-11 PROCEDURE — 1159F PR MEDICATION LIST DOCUMENTED IN MEDICAL RECORD: ICD-10-PCS | Mod: CPTII,S$GLB,, | Performed by: ORTHOPAEDIC SURGERY

## 2021-11-11 PROCEDURE — 99204 PR OFFICE/OUTPT VISIT, NEW, LEVL IV, 45-59 MIN: ICD-10-PCS | Mod: S$GLB,,, | Performed by: ORTHOPAEDIC SURGERY

## 2021-11-11 PROCEDURE — 1160F RVW MEDS BY RX/DR IN RCRD: CPT | Mod: CPTII,S$GLB,, | Performed by: ORTHOPAEDIC SURGERY

## 2021-12-17 ENCOUNTER — TELEPHONE (OUTPATIENT)
Dept: HEMATOLOGY/ONCOLOGY | Facility: CLINIC | Age: 81
End: 2021-12-17
Payer: MEDICARE

## 2022-01-17 PROBLEM — Z00.00 ROUTINE GENERAL MEDICAL EXAMINATION AT A HEALTH CARE FACILITY: Status: RESOLVED | Noted: 2021-10-14 | Resolved: 2022-01-17

## 2022-01-25 DIAGNOSIS — R97.21 RISING PSA FOLLOWING TREATMENT FOR MALIGNANT NEOPLASM OF PROSTATE: Primary | ICD-10-CM

## 2022-01-28 ENCOUNTER — PATIENT MESSAGE (OUTPATIENT)
Dept: HEMATOLOGY/ONCOLOGY | Facility: CLINIC | Age: 82
End: 2022-01-28
Payer: MEDICARE

## 2022-01-28 ENCOUNTER — TELEPHONE (OUTPATIENT)
Dept: HEMATOLOGY/ONCOLOGY | Facility: CLINIC | Age: 82
End: 2022-01-28
Payer: MEDICARE

## 2022-01-28 NOTE — TELEPHONE ENCOUNTER
----- Message from Angelique Daily sent at 1/28/2022  1:17 PM CST -----      Patient states he is confused regarding the bone scans scheduled for 2/14  States he had them a couple months ago    Please at your convenience call and verify w/patient the need for those  @# [phone]    Patient is deaf and would need a  to receive a call back  Can send through portal as well

## 2022-02-14 ENCOUNTER — HOSPITAL ENCOUNTER (OUTPATIENT)
Dept: RADIOLOGY | Facility: HOSPITAL | Age: 82
Discharge: HOME | End: 2022-02-14
Attending: INTERNAL MEDICINE
Payer: MEDICARE

## 2022-02-14 DIAGNOSIS — R97.21 RISING PSA FOLLOWING TREATMENT FOR MALIGNANT NEOPLASM OF PROSTATE: ICD-10-CM

## 2022-02-14 PROCEDURE — A9503 TC99M MEDRONATE: HCPCS

## 2022-02-14 PROCEDURE — 74178 CT ABD&PLV WO CNTR FLWD CNTR: CPT | Mod: TC

## 2022-02-14 PROCEDURE — 78306 BONE IMAGING WHOLE BODY: CPT | Mod: TC

## 2022-02-14 PROCEDURE — 71270 CT THORAX DX C-/C+: CPT | Mod: TC

## 2022-02-14 PROCEDURE — 25500020 PHARM REV CODE 255: Performed by: INTERNAL MEDICINE

## 2022-02-14 RX ADMIN — IOHEXOL 100 ML: 350 INJECTION, SOLUTION INTRAVENOUS at 11:02

## 2022-02-16 DIAGNOSIS — C61 PROSTATE CANCER: Primary | ICD-10-CM

## 2022-02-16 DIAGNOSIS — R97.21 RISING PSA FOLLOWING TREATMENT FOR MALIGNANT NEOPLASM OF PROSTATE: ICD-10-CM

## 2022-02-17 ENCOUNTER — TELEPHONE (OUTPATIENT)
Dept: HEMATOLOGY/ONCOLOGY | Facility: CLINIC | Age: 82
End: 2022-02-17
Payer: MEDICARE

## 2022-02-17 NOTE — PROGRESS NOTES
Subjective:       Patient ID: Michael Mclean is a 81 y.o. male.    Chief Complaint: Rising PSA following treatment for malignant neoplasm of pr and Prostate Cancer    HPI     Returns for follow up after missed interval appointments    Hearing impaired and  present    No pain issues.  Sometimes notes a little dizziness -- unclear if related to his BP medications  Wonders if medication changes needed and advised to address with his PCP  He gets these prescribed from an outside cardiologist    + vertigo since birth and this does not help the dizziness  Pain in right ear and thought related to recent dental cleaning  Better now  He has an ENT MD    Imaging:  - 2/14/2022 CT A/P:  FINDINGS:  No suspicious mass, lymphadenopathy, or change from the previous examination of 09/30/2021.  Scattered small aortocaval lymph nodes, unchanged.  Left renal cysts and left renal atrophy, unchanged.  Somewhat small liver.  Stones within an otherwise unremarkable gallbladder.  Unremarkable right kidney, adrenals, spleen and pancreas.  Artifact secondary to a right hip prosthesis.  Impression:  No evidence of metastatic disease in the abdomen or pelvis    2/14- /2022 Bone scan:  FINDINGS:  Radiotracer accumulation within the right 2nd rib has increased since 09/30/2021.  Otherwise, additional sites of radiotracer accumulation have not significantly changed in appearance since the prior exam other than resolution of the activity right skull base.  Patchy, suspected degenerative type uptake, cervical spine, lumbar spine, both knees and about right hip and shoulder prostheses.  Impression:  1. Increased radiotracer accumulation within the right 2nd rib as compared to bone scan of 09/30/2021 suspicious for metastatic disease  2. Otherwise, suspected degenerative type uptake within cervical spine, lumbar spine, both knees and about right shoulder and knee prostheses appears similar    Previously seen at [facility] until  2016 until establish care here  Referred recently by Abbie Martinez for rising PSA     On Lupron currently     PSA trend:          Oncology History:  - negative prostate biopsy in 2011 (PSA = 15 ng.ml)     - followed for a year and PSA was around 7 ng/ml     - 2012, PSA increased to 13.49 ng/ml     - 3/20/2012 prostate biopsy:  Pathology revealed Maspeth 4+3=7 in 1 core involving 80% of that core, Elliott 3+4=7 in 3 cores involving 3 cores involving 70% to 80% of those cores and Elliott 3+3=6 in 1 core involving 5% of that core.      - saw Dr. Bartholomew at [facility] and underwent a robotic prostatectomy with lymphadenectomy on 5/22/2012.   Pathology revealed a Maspeth 5+4= 9 disease, pT3aN0.      - Unfortunately, Mr. Mclean's PSA did not become undetectable and he completed further restaging that was without evidence of metastatic disease. Mr. Mclean opted to proceed with salvage therapy and completed one year of combined ADT and EBRT in 10/2013.   Notes at [facility] note that main side effects were weight gain and hot flashes. He has had Testosterone recovery and improved stamina and has been able to loose a little weight since completing therapy. His PSA became detectable again in September 2015 and has been slowly rising since without clinical symptoms.    PSA History at [facility]:  12/06/2016 1.67 OFF   09/12/2016 1.94  07/11/2016 2.48 Test 221  04/11/2016 1.76 OFF  12/08/2015 1.36   10/06/2015 1.43 OFF  09/01/2015 0.84  05/15/2015 0.08 Test 139  11/06/2014 0.02 Test 128  06/30/2014 <0.01 Test 181  03/31/2014 <0.01 Test 119  01/06/2014 0.01 Test 67  10/07/2013 <0.01 Casodex and LHRHa discontinued  07/01/2013 <0.01  01/08/2013 0.02  10/09/2012 0.82 Salvage CAA Started Test 296  09/19/2012 0.56  08/22/2012 0.43  06/13/2012 0.49  05/08/2012 13.49 PTX 5/22/2012, Maspeth 9, pT3aN0     Past Medical History:   Carotid artery stenosis - endarterectomy on left, monitored on right  - Prior CVA x 2, slight facial droop residual   Deaf - need  an     Essential hypertension, benign    Hyperlipidemia    Mitral valve regurgitation   severe MVP, moderately to severe MR    MVP (mitral valve prolapse) 8/25/11   severe MVP, moderately to severe MR    PAD (peripheral artery disease)      FH:  No prostate cancer  No breast, ovarian cancer  No cancers     SH:  Worked for [employer]  - took steroids  No tobacco  No EtOH (rare)  Lives alone- 7 children    Review of Systems   Constitutional: Negative for activity change, appetite change, chills, fatigue, fever and unexpected weight change (gained weight).   HENT: Positive for hearing loss.    Eyes: Negative for visual disturbance.   Respiratory: Negative for cough, shortness of breath and wheezing.    Cardiovascular: Negative for chest pain, palpitations and leg swelling.   Gastrointestinal: Negative for abdominal distention, abdominal pain, blood in stool, change in bowel habit, constipation, diarrhea, nausea, vomiting and change in bowel habit.   Genitourinary: Negative for bladder incontinence, decreased urine volume, difficulty urinating, frequency and urgency.   Musculoskeletal: Negative for arthralgias, back pain, gait problem and leg pain.   Neurological: Positive for dizziness. Negative for weakness, numbness and headaches.   Hematological: Negative for adenopathy. Does not bruise/bleed easily.   Psychiatric/Behavioral: Negative for dysphoric mood. The patient is not nervous/anxious.          Objective:      Physical Exam  Vitals and nursing note reviewed.   Constitutional:       Comments: Presents with daughter and her friend       majority of visit was discussion  Assessment:       Problem List Items Addressed This Visit     Prostate cancer - Primary    Relevant Medications    bicalutamide (CASODEX) 50 MG Tab          Plan:       Discussed diagnosis and scan results  There remain multiple questions including regarding testosterone and what it does and if new  girlfriend impacts level    Lupron today  Casodex start today- reviewed    Repeat PSA in 3 months- virtual requested  RTC here in 6 months with Jayna in Urology - and labs - psa    Route Chart for Scheduling    Med Onc Chart Routing      Follow up with physician 3 months and 6 months. Virtual- do PSA in [place] in 3 months; 6 months psa and EP same day with Abbie rojo in uroligy   Follow up with KALYAN    Labs    Imaging    Pharmacy appointment    Other referrals

## 2022-02-17 NOTE — TELEPHONE ENCOUNTER
Spoke with patient to reschedule appointment time for tomorrow.  Patient asked that I call his daughter to see if she would be able to drive him down earlier.  Phoned and spoke with daughter Karen.  She agreed to earlier appointment time.

## 2022-02-18 ENCOUNTER — OFFICE VISIT (OUTPATIENT)
Dept: UROLOGY | Facility: CLINIC | Age: 82
End: 2022-02-18
Payer: MEDICARE

## 2022-02-18 ENCOUNTER — OFFICE VISIT (OUTPATIENT)
Dept: HEMATOLOGY/ONCOLOGY | Facility: CLINIC | Age: 82
End: 2022-02-18
Payer: MEDICARE

## 2022-02-18 VITALS
HEART RATE: 69 BPM | BODY MASS INDEX: 24.83 KG/M2 | DIASTOLIC BLOOD PRESSURE: 70 MMHG | RESPIRATION RATE: 16 BRPM | SYSTOLIC BLOOD PRESSURE: 155 MMHG | HEIGHT: 68 IN | WEIGHT: 163.81 LBS | OXYGEN SATURATION: 97 %

## 2022-02-18 VITALS
WEIGHT: 163 LBS | BODY MASS INDEX: 24.71 KG/M2 | DIASTOLIC BLOOD PRESSURE: 65 MMHG | SYSTOLIC BLOOD PRESSURE: 157 MMHG | HEIGHT: 68 IN | HEART RATE: 77 BPM

## 2022-02-18 DIAGNOSIS — Z90.79 HISTORY OF RADICAL RETROPUBIC PROSTATECTOMY: ICD-10-CM

## 2022-02-18 DIAGNOSIS — C61 PROSTATE CANCER: Primary | ICD-10-CM

## 2022-02-18 DIAGNOSIS — Z92.3 HISTORY OF EXTERNAL BEAM RADIATION THERAPY: ICD-10-CM

## 2022-02-18 DIAGNOSIS — R97.21 RISING PSA FOLLOWING TREATMENT FOR MALIGNANT NEOPLASM OF PROSTATE: Primary | ICD-10-CM

## 2022-02-18 DIAGNOSIS — C61 PROSTATE CANCER: ICD-10-CM

## 2022-02-18 PROCEDURE — 1125F AMNT PAIN NOTED PAIN PRSNT: CPT | Mod: CPTII,S$GLB,, | Performed by: INTERNAL MEDICINE

## 2022-02-18 PROCEDURE — 3078F PR MOST RECENT DIASTOLIC BLOOD PRESSURE < 80 MM HG: ICD-10-PCS | Mod: CPTII,S$GLB,, | Performed by: INTERNAL MEDICINE

## 2022-02-18 PROCEDURE — 1160F RVW MEDS BY RX/DR IN RCRD: CPT | Mod: CPTII,S$GLB,, | Performed by: NURSE PRACTITIONER

## 2022-02-18 PROCEDURE — 1159F MED LIST DOCD IN RCRD: CPT | Mod: CPTII,S$GLB,, | Performed by: INTERNAL MEDICINE

## 2022-02-18 PROCEDURE — 3288F PR FALLS RISK ASSESSMENT DOCUMENTED: ICD-10-PCS | Mod: CPTII,S$GLB,, | Performed by: INTERNAL MEDICINE

## 2022-02-18 PROCEDURE — 3077F PR MOST RECENT SYSTOLIC BLOOD PRESSURE >= 140 MM HG: ICD-10-PCS | Mod: CPTII,S$GLB,, | Performed by: INTERNAL MEDICINE

## 2022-02-18 PROCEDURE — 1101F PR PT FALLS ASSESS DOC 0-1 FALLS W/OUT INJ PAST YR: ICD-10-PCS | Mod: CPTII,S$GLB,, | Performed by: INTERNAL MEDICINE

## 2022-02-18 PROCEDURE — 3078F DIAST BP <80 MM HG: CPT | Mod: CPTII,S$GLB,, | Performed by: NURSE PRACTITIONER

## 2022-02-18 PROCEDURE — 3288F PR FALLS RISK ASSESSMENT DOCUMENTED: ICD-10-PCS | Mod: CPTII,S$GLB,, | Performed by: NURSE PRACTITIONER

## 2022-02-18 PROCEDURE — 96402 CHEMO HORMON ANTINEOPL SQ/IM: CPT | Mod: S$GLB,,, | Performed by: NURSE PRACTITIONER

## 2022-02-18 PROCEDURE — 96402 PR CHEMOTHER HORMON ANTINEOPL SUB-Q/IM: ICD-10-PCS | Mod: S$GLB,,, | Performed by: NURSE PRACTITIONER

## 2022-02-18 PROCEDURE — 3077F PR MOST RECENT SYSTOLIC BLOOD PRESSURE >= 140 MM HG: ICD-10-PCS | Mod: CPTII,S$GLB,, | Performed by: NURSE PRACTITIONER

## 2022-02-18 PROCEDURE — 99999 PR PBB SHADOW E&M-EST. PATIENT-LVL IV: CPT | Mod: PBBFAC,,, | Performed by: INTERNAL MEDICINE

## 2022-02-18 PROCEDURE — 99499 UNLISTED E&M SERVICE: CPT | Mod: S$GLB,,, | Performed by: NURSE PRACTITIONER

## 2022-02-18 PROCEDURE — 1159F PR MEDICATION LIST DOCUMENTED IN MEDICAL RECORD: ICD-10-PCS | Mod: CPTII,S$GLB,, | Performed by: NURSE PRACTITIONER

## 2022-02-18 PROCEDURE — 99214 PR OFFICE/OUTPT VISIT, EST, LEVL IV, 30-39 MIN: ICD-10-PCS | Mod: S$GLB,,, | Performed by: INTERNAL MEDICINE

## 2022-02-18 PROCEDURE — 99214 OFFICE O/P EST MOD 30 MIN: CPT | Mod: S$GLB,,, | Performed by: INTERNAL MEDICINE

## 2022-02-18 PROCEDURE — 1125F PR PAIN SEVERITY QUANTIFIED, PAIN PRESENT: ICD-10-PCS | Mod: CPTII,S$GLB,, | Performed by: INTERNAL MEDICINE

## 2022-02-18 PROCEDURE — 1160F PR REVIEW ALL MEDS BY PRESCRIBER/CLIN PHARMACIST DOCUMENTED: ICD-10-PCS | Mod: CPTII,S$GLB,, | Performed by: NURSE PRACTITIONER

## 2022-02-18 PROCEDURE — 99999 PR PBB SHADOW E&M-EST. PATIENT-LVL IV: ICD-10-PCS | Mod: PBBFAC,,, | Performed by: INTERNAL MEDICINE

## 2022-02-18 PROCEDURE — 1101F PR PT FALLS ASSESS DOC 0-1 FALLS W/OUT INJ PAST YR: ICD-10-PCS | Mod: CPTII,S$GLB,, | Performed by: NURSE PRACTITIONER

## 2022-02-18 PROCEDURE — 99999 PR PBB SHADOW E&M-EST. PATIENT-LVL IV: CPT | Mod: PBBFAC,,, | Performed by: NURSE PRACTITIONER

## 2022-02-18 PROCEDURE — 3077F SYST BP >= 140 MM HG: CPT | Mod: CPTII,S$GLB,, | Performed by: NURSE PRACTITIONER

## 2022-02-18 PROCEDURE — 1101F PT FALLS ASSESS-DOCD LE1/YR: CPT | Mod: CPTII,S$GLB,, | Performed by: NURSE PRACTITIONER

## 2022-02-18 PROCEDURE — 3078F DIAST BP <80 MM HG: CPT | Mod: CPTII,S$GLB,, | Performed by: INTERNAL MEDICINE

## 2022-02-18 PROCEDURE — 3078F PR MOST RECENT DIASTOLIC BLOOD PRESSURE < 80 MM HG: ICD-10-PCS | Mod: CPTII,S$GLB,, | Performed by: NURSE PRACTITIONER

## 2022-02-18 PROCEDURE — 99499 NO LOS: ICD-10-PCS | Mod: S$GLB,,, | Performed by: NURSE PRACTITIONER

## 2022-02-18 PROCEDURE — 3288F FALL RISK ASSESSMENT DOCD: CPT | Mod: CPTII,S$GLB,, | Performed by: INTERNAL MEDICINE

## 2022-02-18 PROCEDURE — 3288F FALL RISK ASSESSMENT DOCD: CPT | Mod: CPTII,S$GLB,, | Performed by: NURSE PRACTITIONER

## 2022-02-18 PROCEDURE — 1101F PT FALLS ASSESS-DOCD LE1/YR: CPT | Mod: CPTII,S$GLB,, | Performed by: INTERNAL MEDICINE

## 2022-02-18 PROCEDURE — 3077F SYST BP >= 140 MM HG: CPT | Mod: CPTII,S$GLB,, | Performed by: INTERNAL MEDICINE

## 2022-02-18 PROCEDURE — 1159F PR MEDICATION LIST DOCUMENTED IN MEDICAL RECORD: ICD-10-PCS | Mod: CPTII,S$GLB,, | Performed by: INTERNAL MEDICINE

## 2022-02-18 PROCEDURE — 99999 PR PBB SHADOW E&M-EST. PATIENT-LVL IV: ICD-10-PCS | Mod: PBBFAC,,, | Performed by: NURSE PRACTITIONER

## 2022-02-18 PROCEDURE — 1160F PR REVIEW ALL MEDS BY PRESCRIBER/CLIN PHARMACIST DOCUMENTED: ICD-10-PCS | Mod: CPTII,S$GLB,, | Performed by: INTERNAL MEDICINE

## 2022-02-18 PROCEDURE — 1159F MED LIST DOCD IN RCRD: CPT | Mod: CPTII,S$GLB,, | Performed by: NURSE PRACTITIONER

## 2022-02-18 PROCEDURE — 1160F RVW MEDS BY RX/DR IN RCRD: CPT | Mod: CPTII,S$GLB,, | Performed by: INTERNAL MEDICINE

## 2022-02-18 RX ORDER — BICALUTAMIDE 50 MG/1
50 TABLET, FILM COATED ORAL DAILY
Qty: 30 TABLET | Refills: 3 | Status: SHIPPED | OUTPATIENT
Start: 2022-02-18 | End: 2022-05-24 | Stop reason: SDUPTHER

## 2022-02-18 NOTE — PROGRESS NOTES
CHIEF COMPLAINT:    Michael Mclean is a 82 y.o. male presents today for Prostate Cancer.     HISTORY OF PRESENTING ILLINESS:    Michael Mclean is a 81 y.o. deaf male with a PMH of prostate cancer. He is s/p RRP 05/2012 with Dr. Bartholomew at [facility]  Pathology revealed a [place]olin 5+4= 9 disease, pT3aN0.  In 10/2013 completed one year of ADT and XRT.   He had been followed there until 2016 before moving here.   He was initially seen in our clinic with Dr. Manrique. Had been receiving leuprolide (lupron) every 6 months. Last office visit with Dr. Ariza 07/08/2020 .   I saw him 08/09/2021.   08/02/2021 PSA was 4.3; previously 0.70 on 01.28/2021  LUPRON 45mg IM given  Plan to recheck PSA and T level; consult Hem/Onc if abnormal.   09/09/2021 PSA was 5.4 with T level of 12. Hem/Onc consult placed.     He is here today for LUPRON 45mg.  Last PSA was 5.0 on 11/03/2021  No complaints.   Has no urinary complaints today.  Daughter present.   He is seeing Hem/Onc today.     Needing a place to eat fish today.      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         REVIEW OF SYSTEMS:  Review of Systems   Constitutional: Negative.  Negative for chills and fever.   Eyes: Negative for double vision.   Respiratory: Negative for cough and shortness of breath.    Cardiovascular: Negative for chest pain and palpitations.   Gastrointestinal: Negative for nausea and vomiting.   Genitourinary: Negative.  Negative for dysuria, flank pain and hematuria.   Musculoskeletal:        Using WC   Neurological: Positive for weakness. Negative for dizziness.         PATIENT HISTORY:    Past Medical History:   Diagnosis Date    Abdominal hernia     Anemia 02/2021     Anticoagulant long-term use     Arthritis     SHOULDER    Cancer     prostate    Cardiomyopathy     Carotid artery stenosis     left    Coronary artery disease     Deaf     Dizziness     Encounter for blood transfusion     Gastritis 3/2/2021    Hyperlipidemia     Hypertension     Mitral regurgitation     Mitral regurgitation     PAD (peripheral artery disease)     Paroxysmal atrial fibrillation     Stroke 2020    Thyroid disease        Past Surgical History:   Procedure Laterality Date    ABLATION N/A 7/22/2020    Procedure: Ablation, afib;  Surgeon: Tla Laurent MD;  Location: [facility] CATH;  Service: Cardiology;  Laterality: N/A;  needs covid test;needs ALEXYS ([place])    AORTIC VALVE REPLACEMENT      CARDIAC CATHETERIZATION      3 HEART STENTS    carotid artery stent Left     COLONOSCOPY N/A 3/2/2021    Procedure: COLONOSCOPY;  Surgeon: Emiliana Cardenas MD;  Location: [facility] ENDO;  Service: General;  Laterality: N/A;  4  8:30am   CL/AM/Bd    CORONARY ARTERY BYPASS GRAFT      ESOPHAGOGASTRODUODENOSCOPY N/A 11/10/2020    Procedure: EGD (ESOPHAGOGASTRODUODENOSCOPY);  Surgeon: Renny Prakash MD;  Location: [facility] ENDO;  Service: Endoscopy;  Laterality: N/A;    HERNIA REPAIR      HIP REPLACEMENT ARTHROPLASTY Right 11/6/2020    Procedure: EMELY -ARTHROPLASTY, HIP REPLACEMENT (ENDO HIP);  Surgeon: Lino Thomas MD;  Location: [facility] OR;  Service: Orthopedics;  Laterality: Right;    PACERMAKER      PROSTATECTOMY      SHOULDER SURGERY      TOE SURGERY      TREATMENT OF CARDIAC ARRHYTHMIA N/A 8/7/2019    Procedure: Cardioversion or Defibrillation;  Surgeon: Tal Laurent MD;  Location: [facility] CATH;  Service: Cardiology;  Laterality: N/A;       Family History   Problem Relation Age of Onset    Heart disease Mother     Heart attack Mother        Social History     Socioeconomic History    Marital status:    Tobacco Use    Smoking status: Never Smoker    Smokeless tobacco: Never  Used   Substance and Sexual Activity    Alcohol use: Never    Drug use: Not Currently       Allergies:  Losartan and Valsartan    Medications:    Current Outpatient Medications:     acetylcarnitin/alpha lipoic ac (ACETYLCARNITIN HCL-A LIPOIC AC) 400-200 mg Cap, Take 1 capsule by mouth once daily., Disp: , Rfl:     bicalutamide (CASODEX) 50 MG Tab, Take 1 tablet (50 mg total) by mouth once daily., Disp: 30 tablet, Rfl: 3    cholecalciferol, vitamin D3, 5,000 unit capsule, Take 5,000 Units by mouth once daily. , Disp: , Rfl:     clonazePAM (KLONOPIN) 2 MG Tab, TAKE 1 TABLET BY MOUTH ONCE DAILY AS NEEDED FOR ANXIETY, Disp: 30 tablet, Rfl: 0    cyanocobalamin, vitamin B-12, 1,000 mcg/mL Kit, Inject 1 mL as directed every 28 days., Disp: , Rfl:     ferrous sulfate (FEOSOL) 325 mg (65 mg iron) Tab tablet, Take 1 tablet (325 mg total) by mouth once daily., Disp: , Rfl: 0    KLOR-CON M20 20 mEq tablet, Take 20 mEq by mouth once daily. , Disp: , Rfl:     magnesium oxide (MAG-OX) 400 mg tablet, Take 400 mg by mouth once daily., Disp: , Rfl:     meclizine (ANTIVERT) 25 mg tablet, Take 1 tablet (25 mg total) by mouth 3 (three) times daily as needed., Disp: 20 tablet, Rfl: 0    metoprolol succinate (TOPROL-XL) 100 MG 24 hr tablet, Take 50 mg by mouth 2 (two) times a day., Disp: , Rfl:     pantoprazole (PROTONIX) 40 MG tablet, Take 1 tablet (40 mg total) by mouth once daily., Disp: 30 tablet, Rfl: 11    rosuvastatin (CRESTOR) 20 MG tablet, Take 20 mg by mouth every evening., Disp: , Rfl:     sotaloL (BETAPACE) 80 MG tablet, Take 0.5 tablets (40 mg total) by mouth 2 (two) times daily., Disp: 90 tablet, Rfl: 0    torsemide (DEMADEX) 20 MG Tab, Take 20 mg by mouth every other day., Disp: , Rfl:     vitamin D3-folic acid 5,000 unit- 1 mg Tab, Take 1 tablet by mouth once daily., Disp: , Rfl:     Current Facility-Administered Medications:     leuprolide (6 month) injection 45 mg, 45 mg, Intramuscular, Q6 Months, Abbie  BRETT Martinez NP, 45 mg at 02/18/22 1321    PHYSICAL EXAMINATION:  Physical Exam  Vitals and nursing note reviewed.   Constitutional:       General: He is awake.      Appearance: Normal appearance.   HENT:      Head: Normocephalic.      Right Ear: External ear normal.      Left Ear: External ear normal.      Nose: Nose normal.   Cardiovascular:      Rate and Rhythm: Normal rate.   Pulmonary:      Effort: Pulmonary effort is normal. No respiratory distress.   Abdominal:      Tenderness: There is no abdominal tenderness. There is no right CVA tenderness or left CVA tenderness.   Musculoskeletal:      Cervical back: Normal range of motion.   Skin:     General: Skin is warm and dry.   Neurological:      Mental Status: He is alert and oriented to person, place, and time.   Psychiatric:         Mood and Affect: Mood normal.         Behavior: Behavior is cooperative.           LABS:    Last PSA was 5.0 on 11/03/2021    Lab Results   Component Value Date    PSADIAG 5.4 (H) 09/09/2021    PSADIAG 4.3 (H) 08/02/2021    PSADIAG 0.70 01/28/2021             IMPRESSION:    Encounter Diagnoses   Name Primary?    Rising PSA following treatment for malignant neoplasm of prostate Yes    Prostate cancer     History of radical retropubic prostatectomy     History of external beam radiation therapy          Assessment:       1. Rising PSA following treatment for malignant neoplasm of prostate    2. Prostate cancer    3. History of radical retropubic prostatectomy    4. History of external beam radiation therapy        Plan:         I spent 30 minutes with the patient of which more than half was spent in direct consultation with the patient in regards to our treatment and plan.  We addressed the office findings and recent labs.   Education and recommendations of today's plan of care including home remedies and needed follow up with PCP.   Discussed his PSA and LUPRON  LUPRON 45mg IM given today.  Will RTC 6 months for next LUPRON, but  continue close f/u with Hem/Onc

## 2022-03-04 ENCOUNTER — TELEPHONE (OUTPATIENT)
Dept: HEMATOLOGY/ONCOLOGY | Facility: CLINIC | Age: 82
End: 2022-03-04
Payer: MEDICARE

## 2022-03-04 NOTE — TELEPHONE ENCOUNTER
Phoned and spoke with patient through .  Advised patient that I will refer him to the financial counselor to help identify co-pay assist programs for his medication.  Patient verbalized understanding through .

## 2022-03-04 NOTE — TELEPHONE ENCOUNTER
"----- Message from Bri Jernigan sent at 3/4/2022  3:44 PM CST -----  Regarding: Consult/Advisory:  Name Of Caller:  Michael    Contact Preference?:  [phone]        What is the nature of the call?:  Patient was wondering if the office has any samples or any discount cards for the following medication:   bicalutamide (CASODEX) 50 MG Tab.  Patient states that the medication is getting expensive.             Additional Notes:  "Thank you for all that you do for our patients'"     "

## 2022-04-25 ENCOUNTER — TELEPHONE (OUTPATIENT)
Dept: HEMATOLOGY/ONCOLOGY | Facility: CLINIC | Age: 82
End: 2022-04-25
Payer: MEDICARE

## 2022-04-25 NOTE — TELEPHONE ENCOUNTER
----- Message from Velvet Browne sent at 4/25/2022  2:25 PM CDT -----  Regarding: Need Medical Advice  Contact: Vinay Jones Daughter [phone]  Type:Need Medical Advice         Name of Caller: Ms Jones  states Patient need to speak with nurse   Patient want to know if he need another bone scan has a spot on his bone   Patient would like scan done  in [place] if he need to have another scan  When is the first available appointment?  Symptoms:  Best Call Back Number:please call Ms Jones [phone]  Additional Information:

## 2022-05-04 ENCOUNTER — HOSPITAL ENCOUNTER (EMERGENCY)
Facility: HOSPITAL | Age: 82
Discharge: HOME | End: 2022-05-04
Attending: EMERGENCY MEDICINE
Payer: MEDICARE

## 2022-05-04 VITALS
RESPIRATION RATE: 18 BRPM | OXYGEN SATURATION: 96 % | DIASTOLIC BLOOD PRESSURE: 89 MMHG | SYSTOLIC BLOOD PRESSURE: 136 MMHG | HEART RATE: 71 BPM | TEMPERATURE: 98 F

## 2022-05-04 DIAGNOSIS — R05.9 COUGH: ICD-10-CM

## 2022-05-04 DIAGNOSIS — J40 BRONCHITIS: Primary | ICD-10-CM

## 2022-05-04 LAB
ALBUMIN SERPL BCP-MCNC: 3 G/DL (ref 3.5–5.2)
ALP SERPL-CCNC: 50 U/L (ref 55–135)
ALT SERPL W/O P-5'-P-CCNC: 15 U/L (ref 10–44)
ANION GAP SERPL CALC-SCNC: 5 MMOL/L (ref 8–16)
AST SERPL-CCNC: 24 U/L (ref 10–40)
BASOPHILS # BLD AUTO: 0.02 K/UL (ref 0–0.2)
BASOPHILS NFR BLD: 0.3 % (ref 0–1.9)
BILIRUB SERPL-MCNC: 0.9 MG/DL (ref 0.1–1)
BUN SERPL-MCNC: 23 MG/DL (ref 8–23)
CALCIUM SERPL-MCNC: 8.7 MG/DL (ref 8.7–10.5)
CHLORIDE SERPL-SCNC: 109 MMOL/L (ref 95–110)
CO2 SERPL-SCNC: 26 MMOL/L (ref 23–29)
CREAT SERPL-MCNC: 1.1 MG/DL (ref 0.5–1.4)
CTP QC/QA: YES
CTP QC/QA: YES
DIFFERENTIAL METHOD: ABNORMAL
EOSINOPHIL # BLD AUTO: 0.1 K/UL (ref 0–0.5)
EOSINOPHIL NFR BLD: 1 % (ref 0–8)
ERYTHROCYTE [DISTWIDTH] IN BLOOD BY AUTOMATED COUNT: 13.6 % (ref 11.5–14.5)
EST. GFR  (AFRICAN AMERICAN): >60 ML/MIN/1.73 M^2
EST. GFR  (NON AFRICAN AMERICAN): >60 ML/MIN/1.73 M^2
GLUCOSE SERPL-MCNC: 121 MG/DL (ref 70–110)
HCT VFR BLD AUTO: 35.1 % (ref 40–54)
HGB BLD-MCNC: 12.1 G/DL (ref 14–18)
IMM GRANULOCYTES # BLD AUTO: 0.02 K/UL (ref 0–0.04)
IMM GRANULOCYTES NFR BLD AUTO: 0.3 % (ref 0–0.5)
LYMPHOCYTES # BLD AUTO: 1 K/UL (ref 1–4.8)
LYMPHOCYTES NFR BLD: 15.1 % (ref 18–48)
MCH RBC QN AUTO: 31.5 PG (ref 27–31)
MCHC RBC AUTO-ENTMCNC: 34.5 G/DL (ref 32–36)
MCV RBC AUTO: 91 FL (ref 82–98)
MONOCYTES # BLD AUTO: 0.7 K/UL (ref 0.3–1)
MONOCYTES NFR BLD: 10.4 % (ref 4–15)
NEUTROPHILS # BLD AUTO: 5 K/UL (ref 1.8–7.7)
NEUTROPHILS NFR BLD: 72.9 % (ref 38–73)
NRBC BLD-RTO: 0 /100 WBC
NT-PROBNP SERPL-MCNC: 8205 PG/ML (ref 5–1800)
PLATELET # BLD AUTO: 92 K/UL (ref 150–450)
PMV BLD AUTO: 11.9 FL (ref 9.2–12.9)
POC MOLECULAR INFLUENZA A AGN: NEGATIVE
POC MOLECULAR INFLUENZA B AGN: NEGATIVE
POTASSIUM SERPL-SCNC: 4.3 MMOL/L (ref 3.5–5.1)
PROT SERPL-MCNC: 6.7 G/DL (ref 6–8.4)
RBC # BLD AUTO: 3.84 M/UL (ref 4.6–6.2)
SARS-COV-2 RDRP RESP QL NAA+PROBE: NEGATIVE
SODIUM SERPL-SCNC: 140 MMOL/L (ref 136–145)
WBC # BLD AUTO: 6.89 K/UL (ref 3.9–12.7)

## 2022-05-04 PROCEDURE — 87804 INFLUENZA ASSAY W/OPTIC: CPT

## 2022-05-04 PROCEDURE — 36415 COLL VENOUS BLD VENIPUNCTURE: CPT | Performed by: EMERGENCY MEDICINE

## 2022-05-04 PROCEDURE — 85025 COMPLETE CBC W/AUTO DIFF WBC: CPT | Performed by: EMERGENCY MEDICINE

## 2022-05-04 PROCEDURE — 99284 EMERGENCY DEPT VISIT MOD MDM: CPT | Mod: 25

## 2022-05-04 PROCEDURE — 83880 ASSAY OF NATRIURETIC PEPTIDE: CPT | Performed by: EMERGENCY MEDICINE

## 2022-05-04 PROCEDURE — 80053 COMPREHEN METABOLIC PANEL: CPT | Performed by: EMERGENCY MEDICINE

## 2022-05-04 PROCEDURE — U0002 COVID-19 LAB TEST NON-CDC: HCPCS | Performed by: EMERGENCY MEDICINE

## 2022-05-04 RX ORDER — PREDNISONE 20 MG/1
40 TABLET ORAL DAILY
Qty: 10 TABLET | Refills: 0 | Status: SHIPPED | OUTPATIENT
Start: 2022-05-04 | End: 2022-05-09

## 2022-05-04 RX ORDER — DOXYCYCLINE 100 MG/1
100 CAPSULE ORAL 2 TIMES DAILY
Qty: 14 CAPSULE | Refills: 0 | Status: SHIPPED | OUTPATIENT
Start: 2022-05-04 | End: 2022-05-11

## 2022-05-04 RX ORDER — ALBUTEROL SULFATE 90 UG/1
1-2 AEROSOL, METERED RESPIRATORY (INHALATION) EVERY 6 HOURS PRN
Qty: 8 G | Refills: 0 | Status: SHIPPED | OUTPATIENT
Start: 2022-05-04 | End: 2022-09-01

## 2022-05-04 NOTE — ED PROVIDER NOTES
Encounter Date: 5/4/2022       History     Chief Complaint   Patient presents with    Shortness of Breath    Weakness     Pt stated that for the past 2 days he has been experiencing dyspnea which is progressively worsening and is now becoming weak. Hx anemia with blood transfusion.      81 yo male here with complaint of congestion, cough and shortness of breath x 2 days. No CP. No fever/chills. No known sick contacts. Gradual onset. Cough is non productive. SImilar to previous.         Review of patient's allergies indicates:   Allergen Reactions    Losartan Hives    Valsartan Hives     Past Medical History:   Diagnosis Date    Abdominal hernia     Anemia 02/2021    Anticoagulant long-term use     Arthritis     SHOULDER    Cancer     prostate    Cardiomyopathy     Carotid artery stenosis     left    Coronary artery disease     Deaf     Dizziness     Encounter for blood transfusion     Gastritis 3/2/2021    Hyperlipidemia     Hypertension     Mitral regurgitation     Mitral regurgitation     PAD (peripheral artery disease)     Paroxysmal atrial fibrillation     Stroke 2020    Thyroid disease      Past Surgical History:   Procedure Laterality Date    ABLATION N/A 7/22/2020    Procedure: Ablation, afib;  Surgeon: Tal Laurent MD;  Location: [facility] CATH;  Service: Cardiology;  Laterality: N/A;  needs covid test;needs ALEXYS ([place])    AORTIC VALVE REPLACEMENT      CARDIAC CATHETERIZATION      3 HEART STENTS    carotid artery stent Left     COLONOSCOPY N/A 3/2/2021    Procedure: COLONOSCOPY;  Surgeon: Emiliana Cardenas MD;  Location: [facility] ENDO;  Service: General;  Laterality: N/A;  4  8:30am   CL/AM/Bd    CORONARY ARTERY BYPASS GRAFT      ESOPHAGOGASTRODUODENOSCOPY N/A 11/10/2020    Procedure: EGD (ESOPHAGOGASTRODUODENOSCOPY);  Surgeon: Renny Prakash MD;  Location: [facility] ENDO;  Service: Endoscopy;  Laterality: N/A;    HERNIA REPAIR      HIP REPLACEMENT ARTHROPLASTY Right 11/6/2020     Procedure: EMELY -ARTHROPLASTY, HIP REPLACEMENT (ENDO HIP);  Surgeon: Lino Thomas MD;  Location: [facility] OR;  Service: Orthopedics;  Laterality: Right;    PACERMAKER      PROSTATECTOMY      SHOULDER SURGERY      TOE SURGERY      TREATMENT OF CARDIAC ARRHYTHMIA N/A 8/7/2019    Procedure: Cardioversion or Defibrillation;  Surgeon: Tal Laurent MD;  Location: [facility] CATH;  Service: Cardiology;  Laterality: N/A;     Family History   Problem Relation Age of Onset    Heart disease Mother     Heart attack Mother      Social History     Tobacco Use    Smoking status: Never Smoker    Smokeless tobacco: Never Used   Substance Use Topics    Alcohol use: Never    Drug use: Not Currently     Review of Systems   Constitutional: Negative.    HENT: Positive for congestion.    Respiratory: Positive for cough.    Gastrointestinal: Negative.    All other systems reviewed and are negative.      Physical Exam     Initial Vitals [05/04/22 1017]   BP Pulse Resp Temp SpO2   (!) 139/96 73 20 98 °F (36.7 °C) 96 %      MAP       --         Physical Exam    Nursing note and vitals reviewed.  Constitutional: He appears well-developed and well-nourished. He is not diaphoretic. No distress.   HENT:   Head: Normocephalic and atraumatic.   Eyes: EOM are normal. Pupils are equal, round, and reactive to light.   Neck: Neck supple.   Normal range of motion.  Cardiovascular: Normal rate, regular rhythm and intact distal pulses.   Pulmonary/Chest: No respiratory distress. He has no wheezes. He has rhonchi. He has no rales.   Abdominal: Abdomen is soft. Bowel sounds are normal. He exhibits no distension. There is no abdominal tenderness. There is no rebound.   Musculoskeletal:         General: No tenderness or edema. Normal range of motion.      Cervical back: Normal range of motion and neck supple.     Neurological: He is alert and oriented to person, place, and time.   Skin: Skin is warm and dry. Capillary refill takes less than 2  seconds. No rash noted.   Psychiatric: He has a normal mood and affect. Thought content normal.         ED Course   Procedures  Labs Reviewed   CBC W/ AUTO DIFFERENTIAL - Abnormal; Notable for the following components:       Result Value    RBC 3.84 (*)     Hemoglobin 12.1 (*)     Hematocrit 35.1 (*)     MCH 31.5 (*)     Platelets 92 (*)     Lymph % 15.1 (*)     All other components within normal limits   COMPREHENSIVE METABOLIC PANEL - Abnormal; Notable for the following components:    Glucose 121 (*)     Albumin 3.0 (*)     Alkaline Phosphatase 50 (*)     Anion Gap 5 (*)     All other components within normal limits   NT-PRO NATRIURETIC PEPTIDE - Abnormal; Notable for the following components:    NT-proBNP 8205 (*)     All other components within normal limits   NT-PRO NATRIURETIC PEPTIDE   POCT INFLUENZA A/B MOLECULAR   SARS-COV-2 RDRP GENE    Narrative:     This test utilizes isothermal nucleic acid amplification   technology to detect the SARS-CoV-2 RdRp nucleic acid segment.   The analytical sensitivity (limit of detection) is 125 genome   equivalents/mL.   A POSITIVE result implies infection with the SARS-CoV-2 virus;   the patient is presumed to be contagious.     A NEGATIVE result means that SARS-CoV-2 nucleic acids are not   present above the limit of detection. A NEGATIVE result should be   treated as presumptive. It does not rule out the possibility of   COVID-19 and should not be the sole basis for treatment decisions.   If COVID-19 is strongly suspected based on clinical and exposure   history, re-testing using an alternate molecular assay should be   considered.   This test is only for use under the Food and Drug   Administration s Emergency Use Authorization (EUA).   Commercial kits are provided by [employer].   Performance characteristics of the EUA have been independently   verified by [facility] Department of   Pathology and Laboratory Medicine.    _________________________________________________________________   The authorized Fact Sheet for Healthcare Providers and the authorized Fact   Sheet for Patients of the ID NOW COVID-19 are available on the FDA   website:     https://www.fda.gov/media/400600/download  https://www.fda.gov/media/330555/download             EKG Readings: (Independently Interpreted)   Initial Reading: No STEMI. Rhythm: Normal Sinus Rhythm. Heart Rate: 63. Ectopy: No Ectopy. Clinical Impression: Normal Sinus Rhythm       Imaging Results          X-Ray Chest AP Portable (Final result)  Result time 05/04/22 11:11:05    Final result by Francisco Mir MD (05/04/22 11:11:05)                 Impression:      Diffuse reticular opacities, more pronounced on the prior exam, possible interstitial edema.      Electronically signed by: Francisco Mir MD  Date:    05/04/2022  Time:    11:11             Narrative:    EXAMINATION:  XR CHEST AP PORTABLE    CLINICAL HISTORY:  Cough, unspecified    COMPARISON:  Chest x-ray 11/07/2020.    FINDINGS:  Prominent AP cardiac silhouette.  Left-sided ICD present.  Diffuse reticular opacities, more pronounced than on the prior exam, possible interstitial edema.  No pleural fluid.  Prior median sternotomy.                              X-Rays:   Independently Interpreted Readings:   Chest X-Ray: No acute abnormalities.     Medications - No data to display  Medical Decision Making:   Clinical Tests:   Lab Tests: Reviewed and Ordered  Radiological Study: Reviewed and Ordered  Medical Tests: Reviewed and Ordered                      Clinical Impression:   Final diagnoses:  [R05.9] Cough  [J40] Bronchitis (Primary)          ED Disposition Condition    Discharge Stable        ED Prescriptions     Medication Sig Dispense Start Date End Date Auth. Provider    doxycycline (VIBRAMYCIN) 100 MG Cap Take 1 capsule (100 mg total) by mouth 2 (two) times daily. for 7 days 14 capsule 5/4/2022 5/11/2022 Geovanny Estrada MD     predniSONE (DELTASONE) 20 MG tablet Take 2 tablets (40 mg total) by mouth once daily. for 5 days 10 tablet 5/4/2022 5/9/2022 Geovanny Estrada MD    albuterol (PROVENTIL/VENTOLIN HFA) 90 mcg/actuation inhaler Inhale 1-2 puffs into the lungs every 6 (six) hours as needed for Wheezing. Rescue 8 g 5/4/2022  Geovanny Estrada MD        Follow-up Information     Follow up With Specialties Details Why Contact Info    Josh Alejo Jr., MD Internal Medicine Schedule an appointment as soon as possible for a visit   [address] [place] [id number]  [phone]             Geovanny Estrada MD  05/04/22 7618

## 2022-05-19 ENCOUNTER — LAB VISIT (OUTPATIENT)
Dept: LAB | Facility: HOSPITAL | Age: 82
End: 2022-05-19
Attending: INTERNAL MEDICINE
Payer: MEDICARE

## 2022-05-19 DIAGNOSIS — C61 PROSTATE CANCER: ICD-10-CM

## 2022-05-19 LAB — COMPLEXED PSA SERPL-MCNC: 4.5 NG/ML (ref 0–4)

## 2022-05-19 PROCEDURE — 84153 ASSAY OF PSA TOTAL: CPT | Performed by: INTERNAL MEDICINE

## 2022-05-19 PROCEDURE — 36415 COLL VENOUS BLD VENIPUNCTURE: CPT | Performed by: INTERNAL MEDICINE

## 2022-05-24 DIAGNOSIS — C61 PROSTATE CANCER: ICD-10-CM

## 2022-05-24 RX ORDER — BICALUTAMIDE 50 MG/1
50 TABLET, FILM COATED ORAL DAILY
Qty: 30 TABLET | Refills: 3 | Status: SHIPPED | OUTPATIENT
Start: 2022-05-24 | End: 2022-06-01 | Stop reason: SDUPTHER

## 2022-05-24 NOTE — TELEPHONE ENCOUNTER
"----- Message from Fran Tejeda sent at 5/24/2022  2:09 PM CDT -----  Contact: Michael  Consult/Advisory:       Name Of Caller: Michael      Contact Preference?: [phone]       Does patient feel the need to be seen today?No      What is the nature of the call?: Pt need refill on Casodex 50 mg tb      [facility] - [place], [place] - [address]  [address] [address]  Phone: [phone] Fax: [phone]           Additional Notes:  "Thank you for all that you do for our patients'"      "

## 2022-06-01 DIAGNOSIS — C61 PROSTATE CANCER: ICD-10-CM

## 2022-06-01 RX ORDER — BICALUTAMIDE 50 MG/1
50 TABLET, FILM COATED ORAL DAILY
Qty: 30 TABLET | Refills: 3 | Status: SHIPPED | OUTPATIENT
Start: 2022-06-01 | End: 2022-11-18

## 2022-06-01 NOTE — TELEPHONE ENCOUNTER
----- Message from Cameron Jovel sent at 6/1/2022  2:38 PM CDT -----  Regarding: Refills  Contact: [phone]  RX Refill Request    Who Called: Michael (American Sign Language)     Refill or New Rx: Refills    RX Name and Strength:bicalutamide (CASODEX) 50 MG Tab    Preferred Pharmacy with phone number: [facility]    Local or Mail Order: mail Order    Would the patient rather a call back or a response via [facility]? Call back    Best Call Back Number:[phone]

## 2022-06-06 ENCOUNTER — TELEPHONE (OUTPATIENT)
Dept: CARDIOLOGY | Facility: CLINIC | Age: 82
End: 2022-06-06
Payer: MEDICARE

## 2022-06-06 NOTE — TELEPHONE ENCOUNTER
Patient called.  Spoke to him via .  Mr. Mclean states he has been referred by Dr. Shrestha for TAVR eval.  Will have records faxed to our office.  Once received we will contact patient to schedule appointments.

## 2022-06-09 DIAGNOSIS — I35.0 NONRHEUMATIC AORTIC VALVE STENOSIS: Primary | ICD-10-CM

## 2022-06-09 DIAGNOSIS — N18.9 CHRONIC KIDNEY DISEASE, UNSPECIFIED CKD STAGE: ICD-10-CM

## 2022-06-10 ENCOUNTER — PATIENT MESSAGE (OUTPATIENT)
Dept: CARDIOLOGY | Facility: CLINIC | Age: 82
End: 2022-06-10
Payer: MEDICARE

## 2022-06-10 ENCOUNTER — EDUCATION (OUTPATIENT)
Dept: CARDIOLOGY | Facility: CLINIC | Age: 82
End: 2022-06-10
Payer: MEDICARE

## 2022-06-10 DIAGNOSIS — I63.412 EMBOLIC STROKE INVOLVING LEFT MIDDLE CEREBRAL ARTERY: Primary | ICD-10-CM

## 2022-06-13 ENCOUNTER — TELEPHONE (OUTPATIENT)
Dept: CARDIOLOGY | Facility: CLINIC | Age: 82
End: 2022-06-13
Payer: MEDICARE

## 2022-06-13 NOTE — TELEPHONE ENCOUNTER
Tried to call both daughters back at there request to discuss his appointment with Dr Richardson and his ALEXYS.   requested for appointment.  Both mailboxes are full and I cannot leave a message.

## 2022-06-16 ENCOUNTER — DOCUMENTATION ONLY (OUTPATIENT)
Dept: CARDIOLOGY | Facility: CLINIC | Age: 82
End: 2022-06-16
Payer: MEDICARE

## 2022-06-16 ENCOUNTER — PATIENT MESSAGE (OUTPATIENT)
Dept: CARDIOLOGY | Facility: CLINIC | Age: 82
End: 2022-06-16
Payer: MEDICARE

## 2022-06-16 NOTE — PROGRESS NOTES
"You have been scheduled for a procedure in the echo lab on Monday, June 27, 2022.     Please report to the Cardiology Waiting Area on the Third floor of the hospital and check in at 9:15 AM.     You will then be taken to the SSCU (Short Stay Cardiac Unit) and prepared for your procedure. Please be aware that this is not the time of your procedure but the time you are to arrive. The procedures are scheduled on an hourly basis; however, emergency cases take precedence over all other cases.     You may not have anything to eat or drink after midnight the night before your test. You may take your regular morning medications with water.    The procedure will take 1-2 hours to perform. After the procedure, you will return to SSCU on the third floor of the hospital. Your family may remain in the room with you during this time.     You will be discharged home that same afternoon. You must have someone to drive you home.  Your doctor will determine, based on your progress, the time of your discharge. The results of your procedure will be discussed with you before you are discharged. Any further testing or procedures will be scheduled for you either before you leave or you will be called with these appointments.       If you should have any questions, concerns, or need to change the date of your procedure, please call MEMO Pedroza @ [phone]",    Special Instructions:    Continue to take your current medications.    Your doctor appointment with Dr. Richardson will be first (1), at 8am, then your ALEXYS appointment will follow. Staff will escort you to the short stay dept to check in for your ALEXYS.        THE ABOVE INSTRUCTIONS WERE GIVEN TO THE PATIENT VERBALLY AND THEY VERBALIZED UNDERSTANDING.  THEY DO NOT REQUIRE ANY SPECIAL NEEDS AND DO NOT HAVE ANY LEARNING BARRIERS.          Directions for Reporting to Cardiology Waiting Area in the Hospital  If you park in the Parking Garage:  Take elevators to the1st floor of the parking " luis [address].  Continue past the gift shop, coffee shop, and piano.  Take a right and go to the gold elevators. (Elevator B)  Take the elevator to the 3rd floor.  Follow the arrow on the sign on the wall that says Cath Lab Registration/EP Lab Registration.  Follow the long hallway all the way around until you come to a big open area.  This is the registration area.  Check in at Reception Desk.    OR    If family is dropping you off:  Have them drop you off at the front of the Hospital under the green overhang.  Enter through the doors and take a right.  Take the E elevators to the 3rd floor Cardiology Waiting Area.  Check in at the Reception Desk in the waiting room.

## 2022-06-23 PROBLEM — I50.32 CHRONIC DIASTOLIC HEART FAILURE: Status: ACTIVE | Noted: 2019-04-03

## 2022-06-23 PROBLEM — Z95.2 S/P AVR: Status: ACTIVE | Noted: 2022-06-23

## 2022-06-23 PROBLEM — Z95.1 S/P CABG (CORONARY ARTERY BYPASS GRAFT): Status: ACTIVE | Noted: 2022-06-23

## 2022-06-23 NOTE — ASSESSMENT & PLAN NOTE
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 He is s/p CABG x 2 with LIMA-LAD and SVG-RCA along with aortic valve replacement

## 2022-06-23 NOTE — PROGRESS NOTES
Subjective:    Patient ID:  Michael Mclean is a 82 y.o. male who presents for evaluation of AI/PVL. He is completely deaf.  Referring Physician: Dr Shrestha at [facility]  Michael Mclean is a 82 y.o. male with a past medical history of persistent Afib (s/p DCCV in 2017, s/p PVI in 2020), CAD s/p 2v CABG & bioprosthetic aotic valve in 2012, s/p BiV ICD/PPM, h/o SSS, HTN, HLD, CVA, and prostate cancer who presents for evaluation of prosthetic valve insufficiency vs PVL.     Patient underwent TTE on 5/18/22 with Dr Shrestha which showed normal LV function, moderate to severe AS with SAIDA 0,8, PV 3.8, and MG 29 mmHg. Mild to moderate AI. EF 55%    Patient states that for the past 3 month he has been experiencing dyspnea on exertion with walking. He denies SOB at rest and heart failure admission. He did have a history of blood transfusion about 1 year ago. He is followed by Dr Wilkins for prostate cancer. He is s/p prostatectomy with injections and currently undergoing hormone therapy.      [facility] 2010 (preop; Dr Alicea): diffuse disease 20% in left main, calcified 60% mid lesion in LAD, diffuse disease 20% in LCx, calcified 75% mid lesion in RCA.     Surgery: Dr. Feliberto Cruz at [facility] in [facility] on 4/29/2010. He is s/p CABG x 2 with LIMA-LAD and SVG-RCA along with aortic valve replacement utilizing 25 mm pericardial bioprosthetic aortic valve.     Michael Mclean is a 82 y.o. male referred by Dr Shrestha for evaluation of failing 25 mm Perimount bioprosthetic valve vs PVL (NYHA Class II sx).     The patient has undergone the following TAVR work-up:    ECHO (Date 5/18/22): SAIDA= 0.8 cm2, MG= 29 mmHg, Peak Mehdi= 3.8 m/s, EF= 55%. Mild to moderate AI. ALEXYS scheduled for today   [facility] (Date 2010): prior to CABG, needs repeat    STS: 5%    Frailty: 2/3 (failed  and walk. Needs albumin)     Iliacs are >   LVOT area by CTA is:   Incidental findings on CT:    CT Surgery risk  assessment: needs CTS   Rhythm issues: PPM in situ   PFTs: needs   KCCQ/5 meter walk:    Comorbidities: deaf, hx CVA, s/p AVR/CABG, prostate CA   Antibiotics given?        NYHA: II CCS: 0    Review of Systems   Constitutional: Negative for chills and fever.   HENT: Negative for sore throat.    Eyes: Negative for blurred vision.   Cardiovascular: Positive for dyspnea on exertion. Negative for chest pain, claudication, cyanosis, irregular heartbeat, leg swelling, near-syncope, orthopnea, palpitations, paroxysmal nocturnal dyspnea and syncope.   Respiratory: Negative for cough and sputum production.    Hematologic/Lymphatic: Does not bruise/bleed easily.   Skin: Negative for itching, rash and suspicious lesions.   Musculoskeletal: Negative for falls.   Gastrointestinal: Negative for abdominal pain and change in bowel habit.   Genitourinary: Negative for dysuria.   Neurological: Negative for disturbances in coordination, dizziness and loss of balance.   Psychiatric/Behavioral: Negative for altered mental status.          Past Medical History:   Diagnosis Date    Abdominal hernia     Anemia 02/2021    Anticoagulant long-term use     Arthritis     SHOULDER    Cancer     prostate    Cardiomyopathy     Carotid artery stenosis     left    Coronary artery disease     Deaf     Dizziness     Encounter for blood transfusion     Gastritis 3/2/2021    Hyperlipidemia     Hypertension     Mitral regurgitation     Mitral regurgitation     PAD (peripheral artery disease)     Paroxysmal atrial fibrillation     Stroke 2020    Thyroid disease        Current Outpatient Medications:     acetylcarnitin/alpha lipoic ac (ACETYLCARNITIN HCL-A LIPOIC AC) 400-200 mg Cap, Take 1 capsule by mouth once daily., Disp: , Rfl:     albuterol (PROVENTIL/VENTOLIN HFA) 90 mcg/actuation inhaler, Inhale 1-2 puffs into the lungs every 6 (six) hours as needed for Wheezing. Rescue, Disp: 8 g, Rfl: 0    bicalutamide (CASODEX) 50 MG  Tab, Take 1 tablet (50 mg total) by mouth once daily., Disp: 30 tablet, Rfl: 3    cholecalciferol, vitamin D3, 5,000 unit capsule, Take 5,000 Units by mouth once daily. , Disp: , Rfl:     clonazePAM (KLONOPIN) 2 MG Tab, TAKE 1 TABLET BY MOUTH ONCE DAILY AS NEEDED FOR ANXIETY, Disp: 30 tablet, Rfl: 0    cyanocobalamin, vitamin B-12, 1,000 mcg/mL Kit, Inject 1 mL as directed every 28 days., Disp: , Rfl:     ferrous sulfate (FEOSOL) 325 mg (65 mg iron) Tab tablet, Take 1 tablet (325 mg total) by mouth once daily., Disp: , Rfl: 0    KLOR-CON M20 20 mEq tablet, Take 20 mEq by mouth once daily. , Disp: , Rfl:     magnesium oxide (MAG-OX) 400 mg tablet, Take 400 mg by mouth once daily., Disp: , Rfl:     meclizine (ANTIVERT) 25 mg tablet, Take 1 tablet (25 mg total) by mouth 3 (three) times daily as needed., Disp: 20 tablet, Rfl: 0    metoprolol succinate (TOPROL-XL) 100 MG 24 hr tablet, Take 50 mg by mouth 2 (two) times a day., Disp: , Rfl:     pantoprazole (PROTONIX) 40 MG tablet, Take 1 tablet (40 mg total) by mouth once daily., Disp: 30 tablet, Rfl: 11    rosuvastatin (CRESTOR) 20 MG tablet, Take 20 mg by mouth every evening., Disp: , Rfl:     sotaloL (BETAPACE) 80 MG tablet, Take 0.5 tablets (40 mg total) by mouth 2 (two) times daily., Disp: 90 tablet, Rfl: 0    torsemide (DEMADEX) 20 MG Tab, Take 20 mg by mouth every other day., Disp: , Rfl:     vitamin D3-folic acid 5,000 unit- 1 mg Tab, Take 1 tablet by mouth once daily., Disp: , Rfl:     Current Facility-Administered Medications:     leuprolide (6 month) injection 45 mg, 45 mg, Intramuscular, Q6 Months, Abbie Martinez, FARHAT, 45 mg at 02/18/22 1321    Objective:    Physical Exam  Vitals reviewed.   Constitutional:       General: He is not in acute distress.     Appearance: He is well-developed. He is not diaphoretic.      Comments:  present   HENT:      Head: Normocephalic and atraumatic.   Neck:      Vascular: No JVD.  "  Cardiovascular:      Rate and Rhythm: Normal rate and regular rhythm.      Pulses: Intact distal pulses.      Heart sounds: Murmur heard.    Systolic murmur is present with a grade of 2/6.   Diastolic murmur is present with a grade of 2/4.  Pulmonary:      Effort: Pulmonary effort is normal. No respiratory distress.      Breath sounds: Normal breath sounds.   Musculoskeletal:      Cervical back: Normal range of motion.      Right lower leg: No edema.      Left lower leg: No edema.   Skin:     General: Skin is warm and dry.   Neurological:      Mental Status: He is alert and oriented to person, place, and time.             Vitals:    06/27/22 0803 06/27/22 0807   BP: (!) 155/66 (!) 165/72   BP Location: Right arm Left arm   Patient Position: Sitting Sitting   BP Method: Large (Automatic) Large (Automatic)   Pulse: 72 72   SpO2: 99%    Weight: 73.8 kg (162 lb 11.2 oz)    Height: 5' 7" (1.702 m)      Body mass index is 25.48 kg/m².    Test(s) Reviewed  I have reviewed the following in detail:  [] Stress test   [] Angiography   [] Echocardiogram   [] Labs   [] Other       Chemistry        Component Value Date/Time     05/04/2022 1049    K 4.3 05/04/2022 1049     05/04/2022 1049    CO2 26 05/04/2022 1049    BUN 23 05/04/2022 1049    CREATININE 1.1 05/04/2022 1049     (H) 05/04/2022 1049        Component Value Date/Time    CALCIUM 8.7 05/04/2022 1049    ALKPHOS 50 (L) 05/04/2022 1049    AST 24 05/04/2022 1049    ALT 15 05/04/2022 1049    BILITOT 0.9 05/04/2022 1049    ESTGFRAFRICA >60.0 05/04/2022 1049    EGFRNONAA >60.0 05/04/2022 1049              Assessment:   S/P AVR  Michael Mclean is a 82 y.o. male referred by Dr Shrestha for evaluation of failing 25 mm Perimount bioprosthetic valve vs PVL (NYHA Class II sx).     The patient has undergone the following TAVR work-up:    ECHO (Date 5/18/22): SAIDA= 0.8 cm2, MG= 29 mmHg, Peak Mehdi= 3.8 m/s, EF= 55%. Mild to moderate AI. ALEXYS scheduled for [facility] " (Date 2010): prior to CABG, needs repeat    STS: 5%    Frailty: 2/3 (failed  and walk. Needs albumin)     Iliacs are >   LVOT area by CTA is:   Incidental findings on CT:    CT Surgery risk assessment: needs CTS   Rhythm issues: PPM in situ   PFTs: needs   KCCQ/5 meter walk:    Comorbidities: deaf, hx CVA, s/p AVR/CABG, prostate CA   Antibiotics given?    S/P CABG (coronary artery bypass graft)  Dr. Feliberto Cruz at [facility] in [facility] on 4/29/2010. He is s/p CABG x 2 with LIMA-LAD and SVG-RCA along with aortic valve replacement    Prostate cancer  Followed by Dr Wilkins. S/p prostatectomy. Currently on Lupron injections and Casodex hormone therapy.     Paroxysmal atrial fibrillation  Not currently on VESNA.  No MAZE/MAURICIO ligation in the OP report.     Chronic diastolic heart failure  Chronic. Controlled. Follow up with Cardiology/PCP.      Plan:     - ALEXYS today.  - Needs angiogram +/- and IVUS iliacs.  - Needs CTS consultation.  - Anti-platelet Therapy: ASA/Plavix  - Access: right femoral  - Catheters: 6Fr JL 5, JR 4  - Creatinine/CrCl: 1.1  - Allergies: no contrast allergy  - All patient's questions were answered.  -The risks, benefits and alternatives of the procedure were explained to the patient.   -The risks of coronary angiography include but are not limited to: bleeding, infection, heart rhythm abnormalities, allergic reactions, kidney injury and potential need for dialysis, stroke and death.   - Should stenting be indicated, the patient has agreed to dual anti-platelet therapy for 1-consecutive year with a drug-eluting stent and a minimum of 1-month with the use of a bare metal stent  - Additionally, pt is aware that non-compliance is likely to result in stent clotting with heart attack, heart failure, and/or death  -The risks of moderate sedation include hypotension, respiratory depression, arrhythmias, bronchospasm, and death.   - Informed consent was obtained and  the  patient is agreeable to proceed with the procedure         Liv Hazel PA-C  Valve and Structural Heart Disease  [facility]    Staff:  I have personally taken the history and examined this patient and agree with the fellow's note as stated above and amended it accordingly :-)  I have not signed surgical consent because he isn't a candidate for emergency bypass surgery at this time.  We will do coronary angiography and possible intervention with an  and iliac angiography and IVUS.  He needs to see CT surgery as an outpatient.      Addendum:  TTE today:  Summary    · 83 yo M with hx of CABG x2 and 25 mm Perimount bioprosthetic valve at [facility] in [facility] on 4/29/2010  · The left ventricle is mildly enlarged with moderately decreased systolic function. The estimated ejection fraction is 40%.  · Normal right ventricular size with normal right ventricular systolic function.  · No interatrial septal defect present.  · Normal appearing left atrial appendage. No thrombus is present in the appendage. Abnormal appendage velocities.  · Moderate, posteriorly directed mitral regurgitation.  · There is a 25 mm Perimount bioprosthetic aortic valve present. There is moderate central aortic insufficiency present. No paravalvular leak is identified.       IMP:  Failing bioprosthetic valve with AS/AI    Rec:  Continue TAVR evaluation plan as described above.

## 2022-06-23 NOTE — H&P (VIEW-ONLY)
Subjective:    Patient ID:  Michael Mclean is a 82 y.o. male who presents for evaluation of AI/PVL. He is completely deaf.  Referring Physician: Dr Shrestha at [facility]  Michael Mclean is a 82 y.o. male with a past medical history of persistent Afib (s/p DCCV in 2017, s/p PVI in 2020), CAD s/p 2v CABG & bioprosthetic aotic valve in 2012, s/p BiV ICD/PPM, h/o SSS, HTN, HLD, CVA, and prostate cancer who presents for evaluation of prosthetic valve insufficiency vs PVL.     Patient underwent TTE on 5/18/22 with Dr Shrestha which showed normal LV function, moderate to severe AS with SAIDA 0,8, PV 3.8, and MG 29 mmHg. Mild to moderate AI. EF 55%    Patient states that for the past 3 month he has been experiencing dyspnea on exertion with walking. He denies SOB at rest and heart failure admission. He did have a history of blood transfusion about 1 year ago. He is followed by Dr Wilkins for prostate cancer. He is s/p prostatectomy with injections and currently undergoing hormone therapy.      [facility] 2010 (preop; Dr Alicea): diffuse disease 20% in left main, calcified 60% mid lesion in LAD, diffuse disease 20% in LCx, calcified 75% mid lesion in RCA.     Surgery: Dr. Feliberto Cruz at [facility] in [facility] on 4/29/2010. He is s/p CABG x 2 with LIMA-LAD and SVG-RCA along with aortic valve replacement utilizing 25 mm pericardial bioprosthetic aortic valve.     Michael Mclean is a 82 y.o. male referred by Dr Shrestha for evaluation of failing 25 mm Perimount bioprosthetic valve vs PVL (NYHA Class II sx).     The patient has undergone the following TAVR work-up:    ECHO (Date 5/18/22): SAIDA= 0.8 cm2, MG= 29 mmHg, Peak Mehdi= 3.8 m/s, EF= 55%. Mild to moderate AI. ALEXYS scheduled for today   [facility] (Date 2010): prior to CABG, needs repeat    STS: 5%    Frailty: 2/3 (failed  and walk. Needs albumin)     Iliacs are >   LVOT area by CTA is:   Incidental findings on CT:    CT Surgery risk  assessment: needs CTS   Rhythm issues: PPM in situ   PFTs: needs   KCCQ/5 meter walk:    Comorbidities: deaf, hx CVA, s/p AVR/CABG, prostate CA   Antibiotics given?        NYHA: II CCS: 0    Review of Systems   Constitutional: Negative for chills and fever.   HENT: Negative for sore throat.    Eyes: Negative for blurred vision.   Cardiovascular: Positive for dyspnea on exertion. Negative for chest pain, claudication, cyanosis, irregular heartbeat, leg swelling, near-syncope, orthopnea, palpitations, paroxysmal nocturnal dyspnea and syncope.   Respiratory: Negative for cough and sputum production.    Hematologic/Lymphatic: Does not bruise/bleed easily.   Skin: Negative for itching, rash and suspicious lesions.   Musculoskeletal: Negative for falls.   Gastrointestinal: Negative for abdominal pain and change in bowel habit.   Genitourinary: Negative for dysuria.   Neurological: Negative for disturbances in coordination, dizziness and loss of balance.   Psychiatric/Behavioral: Negative for altered mental status.          Past Medical History:   Diagnosis Date    Abdominal hernia     Anemia 02/2021    Anticoagulant long-term use     Arthritis     SHOULDER    Cancer     prostate    Cardiomyopathy     Carotid artery stenosis     left    Coronary artery disease     Deaf     Dizziness     Encounter for blood transfusion     Gastritis 3/2/2021    Hyperlipidemia     Hypertension     Mitral regurgitation     Mitral regurgitation     PAD (peripheral artery disease)     Paroxysmal atrial fibrillation     Stroke 2020    Thyroid disease        Current Outpatient Medications:     acetylcarnitin/alpha lipoic ac (ACETYLCARNITIN HCL-A LIPOIC AC) 400-200 mg Cap, Take 1 capsule by mouth once daily., Disp: , Rfl:     albuterol (PROVENTIL/VENTOLIN HFA) 90 mcg/actuation inhaler, Inhale 1-2 puffs into the lungs every 6 (six) hours as needed for Wheezing. Rescue, Disp: 8 g, Rfl: 0    bicalutamide (CASODEX) 50 MG  Tab, Take 1 tablet (50 mg total) by mouth once daily., Disp: 30 tablet, Rfl: 3    cholecalciferol, vitamin D3, 5,000 unit capsule, Take 5,000 Units by mouth once daily. , Disp: , Rfl:     clonazePAM (KLONOPIN) 2 MG Tab, TAKE 1 TABLET BY MOUTH ONCE DAILY AS NEEDED FOR ANXIETY, Disp: 30 tablet, Rfl: 0    cyanocobalamin, vitamin B-12, 1,000 mcg/mL Kit, Inject 1 mL as directed every 28 days., Disp: , Rfl:     ferrous sulfate (FEOSOL) 325 mg (65 mg iron) Tab tablet, Take 1 tablet (325 mg total) by mouth once daily., Disp: , Rfl: 0    KLOR-CON M20 20 mEq tablet, Take 20 mEq by mouth once daily. , Disp: , Rfl:     magnesium oxide (MAG-OX) 400 mg tablet, Take 400 mg by mouth once daily., Disp: , Rfl:     meclizine (ANTIVERT) 25 mg tablet, Take 1 tablet (25 mg total) by mouth 3 (three) times daily as needed., Disp: 20 tablet, Rfl: 0    metoprolol succinate (TOPROL-XL) 100 MG 24 hr tablet, Take 50 mg by mouth 2 (two) times a day., Disp: , Rfl:     pantoprazole (PROTONIX) 40 MG tablet, Take 1 tablet (40 mg total) by mouth once daily., Disp: 30 tablet, Rfl: 11    rosuvastatin (CRESTOR) 20 MG tablet, Take 20 mg by mouth every evening., Disp: , Rfl:     sotaloL (BETAPACE) 80 MG tablet, Take 0.5 tablets (40 mg total) by mouth 2 (two) times daily., Disp: 90 tablet, Rfl: 0    torsemide (DEMADEX) 20 MG Tab, Take 20 mg by mouth every other day., Disp: , Rfl:     vitamin D3-folic acid 5,000 unit- 1 mg Tab, Take 1 tablet by mouth once daily., Disp: , Rfl:     Current Facility-Administered Medications:     leuprolide (6 month) injection 45 mg, 45 mg, Intramuscular, Q6 Months, Abbie Martinez, FARHAT, 45 mg at 02/18/22 1321    Objective:    Physical Exam  Vitals reviewed.   Constitutional:       General: He is not in acute distress.     Appearance: He is well-developed. He is not diaphoretic.      Comments:  present   HENT:      Head: Normocephalic and atraumatic.   Neck:      Vascular: No JVD.  "  Cardiovascular:      Rate and Rhythm: Normal rate and regular rhythm.      Pulses: Intact distal pulses.      Heart sounds: Murmur heard.    Systolic murmur is present with a grade of 2/6.   Diastolic murmur is present with a grade of 2/4.  Pulmonary:      Effort: Pulmonary effort is normal. No respiratory distress.      Breath sounds: Normal breath sounds.   Musculoskeletal:      Cervical back: Normal range of motion.      Right lower leg: No edema.      Left lower leg: No edema.   Skin:     General: Skin is warm and dry.   Neurological:      Mental Status: He is alert and oriented to person, place, and time.             Vitals:    06/27/22 0803 06/27/22 0807   BP: (!) 155/66 (!) 165/72   BP Location: Right arm Left arm   Patient Position: Sitting Sitting   BP Method: Large (Automatic) Large (Automatic)   Pulse: 72 72   SpO2: 99%    Weight: 73.8 kg (162 lb 11.2 oz)    Height: 5' 7" (1.702 m)      Body mass index is 25.48 kg/m².    Test(s) Reviewed  I have reviewed the following in detail:  [] Stress test   [] Angiography   [] Echocardiogram   [] Labs   [] Other       Chemistry        Component Value Date/Time     05/04/2022 1049    K 4.3 05/04/2022 1049     05/04/2022 1049    CO2 26 05/04/2022 1049    BUN 23 05/04/2022 1049    CREATININE 1.1 05/04/2022 1049     (H) 05/04/2022 1049        Component Value Date/Time    CALCIUM 8.7 05/04/2022 1049    ALKPHOS 50 (L) 05/04/2022 1049    AST 24 05/04/2022 1049    ALT 15 05/04/2022 1049    BILITOT 0.9 05/04/2022 1049    ESTGFRAFRICA >60.0 05/04/2022 1049    EGFRNONAA >60.0 05/04/2022 1049              Assessment:   S/P AVR  Michael Mclean is a 82 y.o. male referred by Dr Shrestha for evaluation of failing 25 mm Perimount bioprosthetic valve vs PVL (NYHA Class II sx).     The patient has undergone the following TAVR work-up:    ECHO (Date 5/18/22): SAIDA= 0.8 cm2, MG= 29 mmHg, Peak Mehdi= 3.8 m/s, EF= 55%. Mild to moderate AI. ALEXYS scheduled for [facility] " (Date 2010): prior to CABG, needs repeat    STS: 5%    Frailty: 2/3 (failed  and walk. Needs albumin)     Iliacs are >   LVOT area by CTA is:   Incidental findings on CT:    CT Surgery risk assessment: needs CTS   Rhythm issues: PPM in situ   PFTs: needs   KCCQ/5 meter walk:    Comorbidities: deaf, hx CVA, s/p AVR/CABG, prostate CA   Antibiotics given?    S/P CABG (coronary artery bypass graft)  Dr. Feliberto Cruz at [facility] on 4/29/2010. He is s/p CABG x 2 with LIMA-LAD and SVG-RCA along with aortic valve replacement    Prostate cancer  Followed by Dr Wilkins. S/p prostatectomy. Currently on Lupron injections and Casodex hormone therapy.     Paroxysmal atrial fibrillation  Not currently on VESNA.  No MAZE/MAURICIO ligation in the OP report.     Chronic diastolic heart failure  Chronic. Controlled. Follow up with Cardiology/PCP.      Plan:     - ALEXYS today.  - Needs angiogram +/- and IVUS iliacs.  - Needs CTS consultation.  - Anti-platelet Therapy: ASA/Plavix  - Access: right femoral  - Catheters: 6Fr JL 5, JR 4  - Creatinine/CrCl: 1.1  - Allergies: no contrast allergy  - All patient's questions were answered.  -The risks, benefits and alternatives of the procedure were explained to the patient.   -The risks of coronary angiography include but are not limited to: bleeding, infection, heart rhythm abnormalities, allergic reactions, kidney injury and potential need for dialysis, stroke and death.   - Should stenting be indicated, the patient has agreed to dual anti-platelet therapy for 1-consecutive year with a drug-eluting stent and a minimum of 1-month with the use of a bare metal stent  - Additionally, pt is aware that non-compliance is likely to result in stent clotting with heart attack, heart failure, and/or death  -The risks of moderate sedation include hypotension, respiratory depression, arrhythmias, bronchospasm, and death.   - Informed consent was obtained and  the  patient is agreeable to proceed with the procedure         Liv Hazel PA-C  Valve and Structural Heart Disease  [facility]    Staff:  I have personally taken the history and examined this patient and agree with the fellow's note as stated above and amended it accordingly :-)  I have not signed surgical consent because he isn't a candidate for emergency bypass surgery at this time.  We will do coronary angiography and possible intervention with an  and iliac angiography and IVUS.  He needs to see CT surgery as an outpatient.      Addendum:  TTE today:  Summary    · 83 yo M with hx of CABG x2 and 25 mm Perimount bioprosthetic valve at [facility] in [facility] on 4/29/2010  · The left ventricle is mildly enlarged with moderately decreased systolic function. The estimated ejection fraction is 40%.  · Normal right ventricular size with normal right ventricular systolic function.  · No interatrial septal defect present.  · Normal appearing left atrial appendage. No thrombus is present in the appendage. Abnormal appendage velocities.  · Moderate, posteriorly directed mitral regurgitation.  · There is a 25 mm Perimount bioprosthetic aortic valve present. There is moderate central aortic insufficiency present. No paravalvular leak is identified.       IMP:  Failing bioprosthetic valve with AS/AI    Rec:  Continue TAVR evaluation plan as described above.

## 2022-06-24 ENCOUNTER — LAB VISIT (OUTPATIENT)
Dept: LAB | Facility: HOSPITAL | Age: 82
End: 2022-06-24
Attending: INTERNAL MEDICINE
Payer: MEDICARE

## 2022-06-24 DIAGNOSIS — I63.412 EMBOLIC STROKE INVOLVING LEFT MIDDLE CEREBRAL ARTERY: ICD-10-CM

## 2022-06-24 LAB
INR PPP: 1.1 (ref 0.8–1.2)
PROTHROMBIN TIME: 11.7 SEC (ref 9–12.5)

## 2022-06-24 PROCEDURE — 85610 PROTHROMBIN TIME: CPT | Performed by: INTERNAL MEDICINE

## 2022-06-24 PROCEDURE — 36415 COLL VENOUS BLD VENIPUNCTURE: CPT | Performed by: INTERNAL MEDICINE

## 2022-06-27 ENCOUNTER — OFFICE VISIT (OUTPATIENT)
Dept: CARDIOLOGY | Facility: CLINIC | Age: 82
End: 2022-06-27
Payer: MEDICARE

## 2022-06-27 ENCOUNTER — HOSPITAL ENCOUNTER (OUTPATIENT)
Facility: HOSPITAL | Age: 82
Discharge: HOME | End: 2022-06-27
Attending: INTERNAL MEDICINE | Admitting: INTERNAL MEDICINE
Payer: MEDICARE

## 2022-06-27 ENCOUNTER — EDUCATION (OUTPATIENT)
Dept: CARDIOLOGY | Facility: CLINIC | Age: 82
End: 2022-06-27
Payer: MEDICARE

## 2022-06-27 ENCOUNTER — ANESTHESIA EVENT (OUTPATIENT)
Dept: MEDSURG UNIT | Facility: HOSPITAL | Age: 82
End: 2022-06-27
Payer: MEDICARE

## 2022-06-27 ENCOUNTER — ANESTHESIA (OUTPATIENT)
Dept: MEDSURG UNIT | Facility: HOSPITAL | Age: 82
End: 2022-06-27
Payer: MEDICARE

## 2022-06-27 ENCOUNTER — HOSPITAL ENCOUNTER (OUTPATIENT)
Dept: CARDIOLOGY | Facility: HOSPITAL | Age: 82
Discharge: HOME | End: 2022-06-27
Attending: INTERNAL MEDICINE | Admitting: INTERNAL MEDICINE
Payer: MEDICARE

## 2022-06-27 VITALS
OXYGEN SATURATION: 99 % | HEIGHT: 67 IN | DIASTOLIC BLOOD PRESSURE: 56 MMHG | TEMPERATURE: 98 F | RESPIRATION RATE: 24 BRPM | WEIGHT: 160 LBS | BODY MASS INDEX: 25.11 KG/M2 | SYSTOLIC BLOOD PRESSURE: 117 MMHG | HEART RATE: 70 BPM

## 2022-06-27 VITALS
HEART RATE: 72 BPM | SYSTOLIC BLOOD PRESSURE: 165 MMHG | WEIGHT: 162.69 LBS | DIASTOLIC BLOOD PRESSURE: 72 MMHG | HEIGHT: 67 IN | BODY MASS INDEX: 25.53 KG/M2 | OXYGEN SATURATION: 99 %

## 2022-06-27 VITALS
BODY MASS INDEX: 25.11 KG/M2 | DIASTOLIC BLOOD PRESSURE: 70 MMHG | WEIGHT: 160 LBS | SYSTOLIC BLOOD PRESSURE: 164 MMHG | HEIGHT: 67 IN

## 2022-06-27 DIAGNOSIS — I50.32 CHRONIC DIASTOLIC HEART FAILURE: ICD-10-CM

## 2022-06-27 DIAGNOSIS — I48.0 PAROXYSMAL ATRIAL FIBRILLATION: ICD-10-CM

## 2022-06-27 DIAGNOSIS — Z95.2 S/P AVR: ICD-10-CM

## 2022-06-27 DIAGNOSIS — I63.412 EMBOLIC STROKE INVOLVING LEFT MIDDLE CEREBRAL ARTERY: ICD-10-CM

## 2022-06-27 DIAGNOSIS — I25.10 CORONARY ARTERY DISEASE, UNSPECIFIED VESSEL OR LESION TYPE, UNSPECIFIED WHETHER ANGINA PRESENT, UNSPECIFIED WHETHER NATIVE OR TRANSPLANTED HEART: Primary | ICD-10-CM

## 2022-06-27 DIAGNOSIS — I25.10 CORONARY ARTERY DISEASE, UNSPECIFIED VESSEL OR LESION TYPE, UNSPECIFIED WHETHER ANGINA PRESENT, UNSPECIFIED WHETHER NATIVE OR TRANSPLANTED HEART: ICD-10-CM

## 2022-06-27 DIAGNOSIS — Z95.1 S/P CABG (CORONARY ARTERY BYPASS GRAFT): ICD-10-CM

## 2022-06-27 DIAGNOSIS — I35.0 NONRHEUMATIC AORTIC VALVE STENOSIS: ICD-10-CM

## 2022-06-27 DIAGNOSIS — I48.0 PAROXYSMAL ATRIAL FIBRILLATION: Primary | ICD-10-CM

## 2022-06-27 LAB
AORTIC ROOT ANNULUS: 2.5 CM
BSA FOR ECHO PROCEDURE: 1.85 M2
EJECTION FRACTION: 40 %
SINUS: 4 CM
STJ: 3.4 CM

## 2022-06-27 PROCEDURE — D9220A PRA ANESTHESIA: ICD-10-PCS | Mod: CRNA,,, | Performed by: NURSE ANESTHETIST, CERTIFIED REGISTERED

## 2022-06-27 PROCEDURE — D9220A PRA ANESTHESIA: Mod: CRNA,,, | Performed by: NURSE ANESTHETIST, CERTIFIED REGISTERED

## 2022-06-27 PROCEDURE — 93312 TRANSESOPHAGEAL ECHO (TEE) (CUPID ONLY): ICD-10-PCS | Mod: 26,,, | Performed by: INTERNAL MEDICINE

## 2022-06-27 PROCEDURE — 3077F PR MOST RECENT SYSTOLIC BLOOD PRESSURE >= 140 MM HG: ICD-10-PCS | Mod: CPTII,S$GLB,, | Performed by: INTERNAL MEDICINE

## 2022-06-27 PROCEDURE — 37000009 HC ANESTHESIA EA ADD 15 MINS

## 2022-06-27 PROCEDURE — 63600175 PHARM REV CODE 636 W HCPCS: Performed by: NURSE ANESTHETIST, CERTIFIED REGISTERED

## 2022-06-27 PROCEDURE — 3078F PR MOST RECENT DIASTOLIC BLOOD PRESSURE < 80 MM HG: ICD-10-PCS | Mod: CPTII,S$GLB,, | Performed by: INTERNAL MEDICINE

## 2022-06-27 PROCEDURE — 1126F AMNT PAIN NOTED NONE PRSNT: CPT | Mod: CPTII,S$GLB,, | Performed by: INTERNAL MEDICINE

## 2022-06-27 PROCEDURE — 99204 PR OFFICE/OUTPT VISIT, NEW, LEVL IV, 45-59 MIN: ICD-10-PCS | Mod: S$GLB,,, | Performed by: INTERNAL MEDICINE

## 2022-06-27 PROCEDURE — 93320 TRANSESOPHAGEAL ECHO (TEE) (CUPID ONLY): ICD-10-PCS | Mod: 26,,, | Performed by: INTERNAL MEDICINE

## 2022-06-27 PROCEDURE — 3078F DIAST BP <80 MM HG: CPT | Mod: CPTII,S$GLB,, | Performed by: INTERNAL MEDICINE

## 2022-06-27 PROCEDURE — 3077F SYST BP >= 140 MM HG: CPT | Mod: CPTII,S$GLB,, | Performed by: INTERNAL MEDICINE

## 2022-06-27 PROCEDURE — 99999 PR PBB SHADOW E&M-EST. PATIENT-LVL IV: CPT | Mod: PBBFAC,,, | Performed by: INTERNAL MEDICINE

## 2022-06-27 PROCEDURE — 25000003 PHARM REV CODE 250: Performed by: NURSE ANESTHETIST, CERTIFIED REGISTERED

## 2022-06-27 PROCEDURE — 99204 OFFICE O/P NEW MOD 45 MIN: CPT | Mod: S$GLB,,, | Performed by: INTERNAL MEDICINE

## 2022-06-27 PROCEDURE — 99999 PR PBB SHADOW E&M-EST. PATIENT-LVL IV: ICD-10-PCS | Mod: PBBFAC,,, | Performed by: INTERNAL MEDICINE

## 2022-06-27 PROCEDURE — 37000008 HC ANESTHESIA 1ST 15 MINUTES

## 2022-06-27 PROCEDURE — D9220A PRA ANESTHESIA: ICD-10-PCS | Mod: ANES,,, | Performed by: ANESTHESIOLOGY

## 2022-06-27 PROCEDURE — 1126F PR PAIN SEVERITY QUANTIFIED, NO PAIN PRESENT: ICD-10-PCS | Mod: CPTII,S$GLB,, | Performed by: INTERNAL MEDICINE

## 2022-06-27 PROCEDURE — 93320 DOPPLER ECHO COMPLETE: CPT | Mod: 26,,, | Performed by: INTERNAL MEDICINE

## 2022-06-27 PROCEDURE — 93325 TRANSESOPHAGEAL ECHO (TEE) (CUPID ONLY): ICD-10-PCS | Mod: 26,,, | Performed by: INTERNAL MEDICINE

## 2022-06-27 PROCEDURE — 93325 DOPPLER ECHO COLOR FLOW MAPG: CPT | Mod: 26,,, | Performed by: INTERNAL MEDICINE

## 2022-06-27 PROCEDURE — 93325 DOPPLER ECHO COLOR FLOW MAPG: CPT

## 2022-06-27 PROCEDURE — 93312 ECHO TRANSESOPHAGEAL: CPT | Mod: 26,,, | Performed by: INTERNAL MEDICINE

## 2022-06-27 PROCEDURE — D9220A PRA ANESTHESIA: Mod: ANES,,, | Performed by: ANESTHESIOLOGY

## 2022-06-27 RX ORDER — CLOPIDOGREL BISULFATE 75 MG/1
75 TABLET ORAL DAILY
Qty: 30 TABLET | Refills: 11 | Status: SHIPPED | OUTPATIENT
Start: 2022-06-27 | End: 2022-07-27

## 2022-06-27 RX ORDER — PROPOFOL 10 MG/ML
VIAL (ML) INTRAVENOUS
Status: DISCONTINUED | OUTPATIENT
Start: 2022-06-27 | End: 2022-06-27

## 2022-06-27 RX ORDER — ASPIRIN 81 MG/1
81 TABLET ORAL DAILY
Qty: 30 TABLET | Refills: 11 | Status: ON HOLD | OUTPATIENT
Start: 2022-06-27 | End: 2023-01-05 | Stop reason: HOSPADM

## 2022-06-27 RX ORDER — DIPHENHYDRAMINE HCL 50 MG
50 CAPSULE ORAL ONCE
Status: CANCELLED | OUTPATIENT
Start: 2022-06-27 | End: 2022-06-27

## 2022-06-27 RX ORDER — PROPOFOL 10 MG/ML
VIAL (ML) INTRAVENOUS CONTINUOUS PRN
Status: DISCONTINUED | OUTPATIENT
Start: 2022-06-27 | End: 2022-06-27

## 2022-06-27 RX ORDER — SODIUM CHLORIDE 0.9 % (FLUSH) 0.9 %
3 SYRINGE (ML) INJECTION
Status: DISCONTINUED | OUTPATIENT
Start: 2022-06-27 | End: 2022-06-27 | Stop reason: HOSPADM

## 2022-06-27 RX ORDER — LIDOCAINE HYDROCHLORIDE 20 MG/ML
INJECTION INTRAVENOUS
Status: DISCONTINUED | OUTPATIENT
Start: 2022-06-27 | End: 2022-06-27

## 2022-06-27 RX ORDER — EPHEDRINE SULFATE 50 MG/ML
INJECTION, SOLUTION INTRAVENOUS
Status: DISCONTINUED | OUTPATIENT
Start: 2022-06-27 | End: 2022-06-27

## 2022-06-27 RX ORDER — LIDOCAINE HYDROCHLORIDE 20 MG/ML
SOLUTION OROPHARYNGEAL
Status: DISCONTINUED | OUTPATIENT
Start: 2022-06-27 | End: 2022-06-27

## 2022-06-27 RX ORDER — SODIUM CHLORIDE 9 MG/ML
INJECTION, SOLUTION INTRAVENOUS CONTINUOUS
Status: CANCELLED | OUTPATIENT
Start: 2022-06-27 | End: 2022-06-27

## 2022-06-27 RX ORDER — DEXMEDETOMIDINE HYDROCHLORIDE 100 UG/ML
INJECTION, SOLUTION INTRAVENOUS
Status: DISCONTINUED | OUTPATIENT
Start: 2022-06-27 | End: 2022-06-27

## 2022-06-27 RX ADMIN — LIDOCAINE HYDROCHLORIDE 15 ML: 20 SOLUTION ORAL; TOPICAL at 11:06

## 2022-06-27 RX ADMIN — DEXMEDETOMIDINE HYDROCHLORIDE 8 MCG: 100 INJECTION, SOLUTION, CONCENTRATE INTRAVENOUS at 11:06

## 2022-06-27 RX ADMIN — SODIUM CHLORIDE: 9 INJECTION, SOLUTION INTRAVENOUS at 11:06

## 2022-06-27 RX ADMIN — EPHEDRINE SULFATE 20 MG: 50 INJECTION INTRAVENOUS at 11:06

## 2022-06-27 RX ADMIN — DEXMEDETOMIDINE HYDROCHLORIDE 12 MCG: 100 INJECTION, SOLUTION, CONCENTRATE INTRAVENOUS at 11:06

## 2022-06-27 RX ADMIN — LIDOCAINE HYDROCHLORIDE 100 MG: 20 INJECTION, SOLUTION INTRAVENOUS at 11:06

## 2022-06-27 RX ADMIN — PROPOFOL 100 MCG/KG/MIN: 10 INJECTION, EMULSION INTRAVENOUS at 11:06

## 2022-06-27 RX ADMIN — Medication 50 MG: at 11:06

## 2022-06-27 NOTE — TRANSFER OF CARE
"Anesthesia Transfer of Care Note    Patient: Michael Mclean    Procedure(s) Performed: Procedure(s) (LRB):  ECHOCARDIOGRAM, TRANSESOPHAGEAL (N/A)    Patient location: PACU    Anesthesia Type: general    Transport from OR: Transported from OR on 2-3 L/min O2 by NC with adequate spontaneous ventilation    Post pain: adequate analgesia    Post assessment: no apparent anesthetic complications and tolerated procedure well    Post vital signs: stable    Level of consciousness: sedated and responds to stimulation    Nausea/Vomiting: no nausea/vomiting    Complications: none    Transfer of care protocol was followed      Last vitals:   Visit Vitals  BP (!) 165/71 (BP Location: Right arm, Patient Position: Sitting)   Pulse 71   Resp 16   Ht 5' 7" (1.702 m)   Wt 72.6 kg (160 lb)   SpO2 99%   BMI 25.06 kg/m²     "

## 2022-06-27 NOTE — PLAN OF CARE
Patient arrived to room.  at bedside.PIV placed, no labs ordered. Admit assessment completed. Plan of care discussed with patient and family. VSS. A&Ox4. Pt denies any complaints or pain at this time. Nurse call bell within reach. Will continue to monitor.

## 2022-06-27 NOTE — PLAN OF CARE
ARRIVED TO CATH HOLDING WITH  AT BEDSIDE. AAOX 3. RESP EVEN AND UNLABORED. NO SIGNS OF DISTRESS. SR UP X 2 AND BLL.

## 2022-06-27 NOTE — SUBJECTIVE & OBJECTIVE
Past Medical History:   Diagnosis Date    Abdominal hernia     Anemia 02/2021    Anticoagulant long-term use     Arthritis     SHOULDER    Cancer     prostate    Cardiomyopathy     Carotid artery stenosis     left    Coronary artery disease     Deaf     Dizziness     Encounter for blood transfusion     Gastritis 3/2/2021    Hyperlipidemia     Hypertension     Mitral regurgitation     Mitral regurgitation     PAD (peripheral artery disease)     Paroxysmal atrial fibrillation     Stroke 2020    Thyroid disease        Past Surgical History:   Procedure Laterality Date    ABLATION N/A 7/22/2020    Procedure: Ablation, afib;  Surgeon: Tal Laurent MD;  Location: [facility] CATH;  Service: Cardiology;  Laterality: N/A;  needs covid test;needs ALEXYS ([place])    AORTIC VALVE REPLACEMENT      CARDIAC CATHETERIZATION      3 HEART STENTS    carotid artery stent Left     COLONOSCOPY N/A 3/2/2021    Procedure: COLONOSCOPY;  Surgeon: Emiliana Cardenas MD;  Location: [facility] ENDO;  Service: General;  Laterality: N/A;  4  8:30am   CL/AM/Bd    CORONARY ARTERY BYPASS GRAFT      ESOPHAGOGASTRODUODENOSCOPY N/A 11/10/2020    Procedure: EGD (ESOPHAGOGASTRODUODENOSCOPY);  Surgeon: Renny Prakash MD;  Location: [facility] ENDO;  Service: Endoscopy;  Laterality: N/A;    HERNIA REPAIR      HIP REPLACEMENT ARTHROPLASTY Right 11/6/2020    Procedure: EMELY -ARTHROPLASTY, HIP REPLACEMENT (ENDO HIP);  Surgeon: Lino Thomas MD;  Location: [facility] OR;  Service: Orthopedics;  Laterality: Right;    PACERMAKER      PROSTATECTOMY      SHOULDER SURGERY      TOE SURGERY      TREATMENT OF CARDIAC ARRHYTHMIA N/A 8/7/2019    Procedure: Cardioversion or Defibrillation;  Surgeon: Tal Laurent MD;  Location: [facility] CATH;  Service: Cardiology;  Laterality: N/A;       Review of patient's allergies indicates:   Allergen Reactions    Losartan Hives    Valsartan Hives       No current facility-administered medications on file prior to encounter.     Current Outpatient  Medications on File Prior to Encounter   Medication Sig    acetylcarnitin/alpha lipoic ac (ACETYLCARNITIN HCL-A LIPOIC AC) 400-200 mg Cap Take 1 capsule by mouth once daily.    albuterol (PROVENTIL/VENTOLIN HFA) 90 mcg/actuation inhaler Inhale 1-2 puffs into the lungs every 6 (six) hours as needed for Wheezing. Rescue    bicalutamide (CASODEX) 50 MG Tab Take 1 tablet (50 mg total) by mouth once daily.    cholecalciferol, vitamin D3, 5,000 unit capsule Take 5,000 Units by mouth once daily.     cyanocobalamin, vitamin B-12, 1,000 mcg/mL Kit Inject 1 mL as directed every 28 days.    ferrous sulfate (FEOSOL) 325 mg (65 mg iron) Tab tablet Take 1 tablet (325 mg total) by mouth once daily.    KLOR-CON M20 20 mEq tablet Take 20 mEq by mouth once daily.     magnesium oxide (MAG-OX) 400 mg tablet Take 400 mg by mouth once daily.    meclizine (ANTIVERT) 25 mg tablet Take 1 tablet (25 mg total) by mouth 3 (three) times daily as needed.    metoprolol succinate (TOPROL-XL) 100 MG 24 hr tablet Take 50 mg by mouth 2 (two) times a day.    pantoprazole (PROTONIX) 40 MG tablet Take 1 tablet (40 mg total) by mouth once daily.    rosuvastatin (CRESTOR) 20 MG tablet Take 20 mg by mouth every evening.    sotaloL (BETAPACE) 80 MG tablet Take 0.5 tablets (40 mg total) by mouth 2 (two) times daily.    torsemide (DEMADEX) 20 MG Tab Take 20 mg by mouth every other day.    vitamin D3-folic acid 5,000 unit- 1 mg Tab Take 1 tablet by mouth once daily.     Family History       Problem Relation (Age of Onset)    Heart attack Mother    Heart disease Mother          Tobacco Use    Smoking status: Never Smoker    Smokeless tobacco: Never Used   Substance and Sexual Activity    Alcohol use: Never    Drug use: Never    Sexual activity: Not on file     Comment:      Review of Systems   Constitutional: Negative for diaphoresis, malaise/fatigue, weight gain and weight loss.   HENT:  Positive for hearing loss. Negative for nosebleeds.    Eyes:   Negative for vision loss in left eye, vision loss in right eye and visual disturbance.   Cardiovascular:  Positive for dyspnea on exertion. Negative for chest pain, claudication, irregular heartbeat, leg swelling, near-syncope, orthopnea, palpitations, paroxysmal nocturnal dyspnea and syncope.   Respiratory:  Positive for shortness of breath. Negative for cough, sleep disturbances due to breathing, snoring and wheezing.    Hematologic/Lymphatic: Negative for bleeding problem. Does not bruise/bleed easily.   Skin:  Negative for poor wound healing and rash.   Musculoskeletal:  Negative for muscle cramps and myalgias.   Gastrointestinal:  Negative for bloating, abdominal pain, diarrhea, heartburn, melena, nausea and vomiting.   Genitourinary:  Negative for hematuria and nocturia.   Neurological:  Negative for brief paralysis, dizziness, headaches, light-headedness, numbness and weakness.   Psychiatric/Behavioral:  Negative for depression.    Allergic/Immunologic: Negative for hives.   Objective:     Vital Signs (Most Recent):    Vital Signs (24h Range):  Pulse:  [72] 72  SpO2:  [99 %] 99 %  BP: (155-165)/(66-72) 165/72        There is no height or weight on file to calculate BMI.            No intake or output data in the 24 hours ending 06/27/22 0956    Lines/Drains/Airways       None                   Physical Exam  Vitals and nursing note reviewed.   Constitutional:       Appearance: He is well-developed. He is not diaphoretic.   HENT:      Head: Normocephalic and atraumatic.      Right Ear: Decreased hearing noted.      Left Ear: Decreased hearing noted.   Eyes:      Conjunctiva/sclera: Conjunctivae normal.   Neck:      Vascular: No carotid bruit or JVD.   Cardiovascular:      Rate and Rhythm: Normal rate and regular rhythm.      Pulses: Normal pulses and intact distal pulses.      Heart sounds: Normal heart sounds. No murmur heard.    No friction rub. No gallop.   Pulmonary:      Effort: Pulmonary effort is  normal.      Breath sounds: Normal breath sounds. No rales.   Chest:      Chest wall: No tenderness.   Abdominal:      General: There is no distension.      Palpations: Abdomen is soft. There is no mass.      Tenderness: There is no abdominal tenderness.   Skin:     General: Skin is warm and dry.      Coloration: Skin is not pale.   Neurological:      Mental Status: He is alert and oriented to person, place, and time.       Significant Labs: All pertinent lab results from the last 24 hours have been reviewed.

## 2022-06-27 NOTE — PROGRESS NOTES
Reviewed discharge instructions with patient and family (als  at bedside). Verbalized understanding. Had questions regarding home medications and what to take at home between today and Wednesday. Sent message to Dr. Richardson. Able to ambulate with assistance to bathroom and void. Discharged off floor via wheelchair. No complaints or complications noted at this time.

## 2022-06-27 NOTE — H&P
[facility], [place]  H&P  ALEXYS Cardiology      Michael Mclean  YOB: 1940  Medical Record Number:  [id number]  Attending Physician:  Glenroy Richardson MD   Date of Admission: 6/27/2022       Hospital Day:  0  Current Principal Problem:  <principal problem not specified>      History     Cc: PVL    HPI  Michael Mclean is a 82 y.o. male with a past medical history of persistent Afib (s/p DCCV in 2017, s/p PVI in 2020), CAD s/p 2v CABG & bioprosthetic valve in 2010, s/p BiV ICD/PPM, h/o SSS, HTN, HLD, CVA, and prostate cancer. Patient reports LOCKHART x 3 months. He underwent TTE on 5/18/22 with Dr Shrestha which showed normal LV function, moderate to severe AS, and mild to moderate AI. He was referred to Dr. Richardson for evaluation of failing 25 mm Perimount bioprosthetic valve vs PVL (NYHA Class II sx). He presents today for ALEXYS for TAVR eval.    TTE from [facility] 5/18/22 (scanned into media)  -LVEF 55%. G1DD.   -Moderate to severe aortic valve stenosis. SAIDA 0.8 cm2. Peak velocity 3.8 m/s. Mean gradient 29.3 mmHg.   -Mild to moderate aortic regurgitation.   -Mild to moderate mitral regurgitation   -PASP 39.3 mmHg    Anticoagulant/antiplatelets: ASA and Plavix   ECG:    Dysphagia or odynophagia:  No  Liver Disease, esophageal disease, or known varices:  No  Upper GI Bleeding: No  Snoring:  No  Sleep Apnea:  No  Prior neck surgery or radiation:  No  History of anesthetic difficulties:  No  Family history of anesthetic difficulties:  No  Last oral intake: yesterday before midnight  Able to move neck in all directions:  Yes  Platelet count:   INR: 1.1              Medications - Outpatient  Prior to Admission medications    Medication Sig Start Date End Date Taking? Authorizing Provider   albuterol (PROVENTIL/VENTOLIN HFA) 90 mcg/actuation inhaler Inhale 1-2 puffs into the lungs every 6 (six) hours as needed for Wheezing. Rescue 5/4/22  Yes Geovanny Estrada MD   bicalutamide (CASODEX) 50 MG Tab Take 1  tablet (50 mg total) by mouth once daily. 6/1/22 6/1/23 Yes Monica Wilkins MD   cholecalciferol, vitamin D3, 5,000 unit capsule Take 5,000 Units by mouth once daily.    Yes Historical Provider   clonazePAM (KLONOPIN) 2 MG Tab TAKE 1 TABLET BY MOUTH ONCE DAILY AS NEEDED FOR ANXIETY 6/13/22  Yes SYLVIA Segura   ferrous sulfate (FEOSOL) 325 mg (65 mg iron) Tab tablet Take 1 tablet (325 mg total) by mouth once daily. 10/13/20  Yes Josh Alejo Jr., MD   KLOR-CON M20 20 mEq tablet Take 20 mEq by mouth once daily.  12/11/18  Yes Historical Provider   magnesium oxide (MAG-OX) 400 mg tablet Take 400 mg by mouth once daily.   Yes Historical Provider   metoprolol succinate (TOPROL-XL) 100 MG 24 hr tablet Take 50 mg by mouth 2 (two) times a day.   Yes Historical Provider   pantoprazole (PROTONIX) 40 MG tablet Take 1 tablet (40 mg total) by mouth once daily. 3/2/21 6/27/22 Yes Emiliana Cardenas MD   rosuvastatin (CRESTOR) 20 MG tablet Take 20 mg by mouth every evening.   Yes Historical Provider   sotaloL (BETAPACE) 80 MG tablet Take 0.5 tablets (40 mg total) by mouth 2 (two) times daily. 11/24/21 6/27/22 Yes Josh Alejo Jr., MD   torsemide (DEMADEX) 20 MG Tab Take 20 mg by mouth every other day.   Yes Historical Provider   acetylcarnitin/alpha lipoic ac (ACETYLCARNITIN HCL-A LIPOIC AC) 400-200 mg Cap Take 1 capsule by mouth once daily.    Historical Provider   aspirin (ECOTRIN) 81 MG EC tablet Take 1 tablet (81 mg total) by mouth once daily. 6/27/22 6/27/23  Liv Hazel PA-C   clopidogreL (PLAVIX) 75 mg tablet Take 1 tablet (75 mg total) by mouth once daily. 6/27/22 6/27/23  Liv Hazel PA-C   cyanocobalamin, vitamin B-12, 1,000 mcg/mL Kit Inject 1 mL as directed every 28 days.    Historical Provider   meclizine (ANTIVERT) 25 mg tablet Take 1 tablet (25 mg total) by mouth 3 (three) times daily as needed. 6/24/21   Vickey Beckford MD   vitamin D3-folic acid 5,000 unit- 1 mg Tab Take 1 tablet by  mouth once daily.    Historical Provider         Medications - Current  Scheduled Meds:  Continuous Infusions:  PRN Meds:.      Allergies  Review of patient's allergies indicates:   Allergen Reactions    Losartan Hives    Valsartan Hives         Past Medical History  Past Medical History:   Diagnosis Date    Abdominal hernia     Anemia 02/2021    Anticoagulant long-term use     Arthritis     SHOULDER    Cancer     prostate    Cardiomyopathy     Carotid artery stenosis     left    Coronary artery disease     Deaf     Dizziness     Encounter for blood transfusion     Gastritis 3/2/2021    Hyperlipidemia     Hypertension     Mitral regurgitation     Mitral regurgitation     PAD (peripheral artery disease)     Paroxysmal atrial fibrillation     Stroke 2020    Thyroid disease          Past Surgical History  Past Surgical History:   Procedure Laterality Date    ABLATION N/A 7/22/2020    Procedure: Ablation, afib;  Surgeon: Tal Laurent MD;  Location: [facility] CATH;  Service: Cardiology;  Laterality: N/A;  needs covid test;needs ALEXYS ([place])    AORTIC VALVE REPLACEMENT      CARDIAC CATHETERIZATION      3 HEART STENTS    carotid artery stent Left     COLONOSCOPY N/A 3/2/2021    Procedure: COLONOSCOPY;  Surgeon: Emiliana Cardenas MD;  Location: [facility] ENDO;  Service: General;  Laterality: N/A;  4  8:30am   CL/AM/Bd    CORONARY ARTERY BYPASS GRAFT      ESOPHAGOGASTRODUODENOSCOPY N/A 11/10/2020    Procedure: EGD (ESOPHAGOGASTRODUODENOSCOPY);  Surgeon: Renny Prakash MD;  Location: [facility] ENDO;  Service: Endoscopy;  Laterality: N/A;    HERNIA REPAIR      HIP REPLACEMENT ARTHROPLASTY Right 11/6/2020    Procedure: EMELY -ARTHROPLASTY, HIP REPLACEMENT (ENDO HIP);  Surgeon: Lino Thomas MD;  Location: [facility] OR;  Service: Orthopedics;  Laterality: Right;    PACERMAKER      PROSTATECTOMY      SHOULDER SURGERY      TOE SURGERY      TREATMENT OF CARDIAC ARRHYTHMIA N/A 8/7/2019    Procedure:  Cardioversion or Defibrillation;  Surgeon: Tal Laurent MD;  Location: [facility] CATH;  Service: Cardiology;  Laterality: N/A;         Social History  Social History     Socioeconomic History    Marital status:    Tobacco Use    Smoking status: Never Smoker    Smokeless tobacco: Never Used   Substance and Sexual Activity    Alcohol use: Yes     Comment: occassionally    Drug use: Never         ROS   Admits Denies   Constitutional  Chills, diaphoresis, malaise   Eyes  Visual changes   ENMT  Dysphagia, Epistaxis, nasal congestion, hearing loss   Cardiovascular  Chest pain, palpitations, edema   Respiratory  Cough, dyspnea, wheezing   Gastrointestinal  Nausea, vomiting, constipation, diarrhea, anorexia.     Genitourinary  Dysuria, incontinence   Musculoskeletal  Myalgias, joint pain, joint swelling   Integumentary  Rash, inflammation, burning   Neruo-Psychiatric  Anxiety, insomnia.  Changes in speech, strength, sensation.     Endocrine     Hematologic  Abnormal bruising, bleeding   Immunologic  Inflammation, pain at IV sites.  Pruritis.         Physical Examination       Vital Signs  Vitals  Pulse: 71  Heart Rate Source: Monitor  Resp: 16  SpO2: 99 %  BP: (!) 165/71  MAP (mmHg): 102  BP Location: Right arm  BP Method: Automatic  Patient Position: Sitting          24 Hour VS Range    Pulse:  [71-72]   Resp:  [16]   BP: (155-167)/(60-72)   SpO2:  [99 %]   No intake or output data in the 24 hours ending 06/27/22 1130      General: [place] in bed in no distress  Head: NCAT  Eyes: conjunctivae and lids normal, no scleral icterus, EOMI.  ENMT:  no gingival bleeding, normal oral mucosa without pallor or cyanosis.   Neck:  JVP normal.  Trachea non-displaced.     Chest:  Normal respiratory effort.  Chest clear to auscultation.  No wheezes, rales, or rhonchi.  Heart:  Normal PMI, S1 S2 normal quality, splitting.  No murmurs rubs or gallops.  Peripheral pulses 2+.  Abdomen:  Non-distended, normal bowel sounds,  non-tender.  No hepato-jugular reflux.  Extremities:  No edema. Normal capillary refill.    Skin:  Warm and dry.  No cyanosis or pallor.  No ulcers, stasis.  IV sites without tenderness or inflammation.    Neurological / Psychiatric:  Oriented to person, time, and place.  No facial asymmetry, drift.  Fluent without dysarthria.  Mood euthymic, affect normal.         Assessment & Plan   Perivalvular leak s/p Bioprosthetic valve  ALEXYS for evaluation of PVL for TAVR  -No absolute contraindications of esophageal stricture, tumor, perforation, laceration,or diverticulum and/or active GI bleed  -The risks, benefits & alternatives of the procedure were explained to the patient.   -The risks of transesophageal echo include but are not limited to:  Dental trauma, esophageal trauma/perforation, bleeding, laryngospasm/brochospasm, aspiration, sore throat/hoarseness, & dislodgement of the endotracheal tube/nasogastric tube (where applicable).    -The risks of moderate sedation include hypotension, respiratory depression, arrhythmias, bronchospasm, & death.    -Informed consent was obtained. The patient is agreeable to proceed with the procedure and all questions and concerns addressed.    Case discussed with an attending in echocardiography lab.     Further recommendations per attending addendum          Signed:  Junior Dunn M.D.  Page # [phone]  Cardiovascular Fellow  [facility]

## 2022-06-27 NOTE — ASSESSMENT & PLAN NOTE
Patient referred to Dr. Richardson for evaluation of failing 25 mm Perimount bioprosthetic valve vs PVL. Here today for ALEXYS for TAVR evaluation.   -No absolute contraindications of esophageal stricture, tumor, perforation, laceration,or diverticulum and/or active GI bleed  -The risks, benefits & alternatives of the procedure were explained to the patient.   -The risks of transesophageal echo include but are not limited to:  Dental trauma, esophageal trauma/perforation, bleeding, laryngospasm/brochospasm, aspiration, sore throat/hoarseness, & dislodgement of the endotracheal tube/nasogastric tube (where applicable).    -The risks of ANES monitored sedation include hypotension, respiratory depression, arrhythmias, bronchospasm, & death.    -Informed consent was obtained. The patient is agreeable to proceed with the procedure and all questions and concerns addressed.    Case discussed with an attending in echocardiography lab.    Further recommendations per attending addendum

## 2022-06-27 NOTE — PROGRESS NOTES
Called daughter and gave her instructions per Dr. Richardson to start his asa and plavix now and no changes to other medications. Verbalized understanding.

## 2022-06-27 NOTE — PROGRESS NOTES
OUTPATIENT CATHETERIZATION INSTRUCTIONS    You have been scheduled for a procedure in the catheterization lab on Wednesday, June 29, 2022.     Please report to the Cardiology Waiting Area on the Third floor of the hospital and check in at 9 am.   You will then be taken to the SSCU (Short Stay Cardiac Unit) and prepared for your procedure. Please be aware that this is not the time of your procedure but the time you are to arrive. The procedures are scheduled on an hourly basis; however, emergency cases take precedence over all other cases.       1. No solid foods 8 hours prior to your procedure.  You may have clear liquids until the time of your admission which should be 2 hours prior to your procedure.  You are encouraged to drink at least 8 ounces of clear liquids prior to your admission to SSCU.  Patients with gastric emptying issues should be fasting for 6- 8 hours prior to the procedure.  Clear liquids include water, black coffee, clear juices, and performance drinks - no pulp or milk.    2. Heart failure or dialysis patients will be limited to 8 ounces (1 cup) of clear liquids up until 2 hours of the procedure.    3.   You may take your regular morning medications with water. If there are any medications that you should not take you will be instructed to hold them that morning. If you         are diabetic and on Metformin (Glucophage) do not take it the day before, the day of, and for 2 days after your procedure.  4.   If you are on Pradaxa, Eliquis or Coumadin , you can hold them 3 days prior to your procedure.      The procedure will take 1-2 hours to perform. After the procedure, you will return to SSCU on the third floor of the hospital. You will need to lie still (or keep your arm still) for the next 4 to 6 hours to minimize bleeding from the puncture site. Your family may remain in the room with you during this time.       You may be able to be discharged home that same afternoon if there is someone to  drive you home and there were no complications. If you have one of the balloon, stent, or device procedures you may spend the night in the hospital. Your doctor will determine, based on your progress, the date and time of your discharge. The results of your procedure will be discussed with you before you are discharged. Any further testing or procedures will be scheduled for you either before you leave or you will be called with these appointments.       If you should have any questions, concerns, or need to change the date of your procedure, please call  MEMO Carlson @ [phone]    Special Instructions:  Take 4 plavix tablets the day before then one daily  Aspirin 81 mg daily        THE ABOVE INSTRUCTIONS WERE GIVEN TO THE PATIENT VERBALLY AND THEY VERBALIZED UNDERSTANDING.  THEY DO NOT REQUIRE ANY SPECIAL NEEDS AND DO NOT HAVE ANY LEARNING BARRIERS.          Directions for Reporting to Cardiology Waiting Area in the Hospital  If you park in the Parking Garage:  Take elevators to the1st floor of the parking garage.  Continue past the gift shop, coffee shop, and piano.  Take a right and go to the gold elevators. (Elevator B)  Take the elevator to the 3rd floor.  Follow the arrow on the sign on the wall that says Cath Lab Registration/EP Lab Registration.  Follow the long hallway all the way around until you come to a big open area.  This is the registration area.  Check in at Reception Desk.    OR    If family is dropping you off:  Have them drop you off at the front of the Hospital under the green overhang.  Enter through the doors and take a right.  Take the E elevators to the 3rd floor Cardiology Waiting Area.  Check in at the Reception Desk in the waiting room.

## 2022-06-27 NOTE — ANESTHESIA POSTPROCEDURE EVALUATION
Anesthesia Post Evaluation    Patient: Michael Mclean    Procedure(s) Performed: Procedure(s) (LRB):  ECHOCARDIOGRAM, TRANSESOPHAGEAL (N/A)    Final Anesthesia Type: general      Patient location during evaluation: PACU  Patient participation: Yes- Able to Participate  Level of consciousness: awake and alert and oriented  Post-procedure vital signs: reviewed and stable  Pain management: adequate  Airway patency: patent  VESNA mitigation strategies: Intraoperative administration of CPAP, nasopharyngeal airway, or oral appliance during sedation and Multimodal analgesia  PONV status at discharge: No PONV  Anesthetic complications: no      Cardiovascular status: hemodynamically stable  Respiratory status: unassisted  Hydration status: euvolemic  Follow-up not needed.          Vitals Value Taken Time   /62 06/27/22 1302   Temp 36.6 °C (97.9 °F) 06/27/22 1214   Pulse 71 06/27/22 1309   Resp 40 06/27/22 1308   SpO2 96 % 06/27/22 1309   Vitals shown include unvalidated device data.      No case tracking events are documented in the log.      Pain/Maura Score: No data recorded

## 2022-06-27 NOTE — HPI
"Michael Mclean is a 82 y.o. male with a past medical history of persistent Afib (s/p DCCV in 2017, s/p PVI in 2020), CAD s/p 2v CABG & bioprosthetic valve in 2010, s/p BiV ICD/PPM, h/o SSS, HTN, HLD, CVA, and prostate cancer. Patient reports LOCKHART x 3 months. He underwent TTE on 5/18/22 with Dr Shrestha which showed normal LV function, moderate to severe AS, and mild to moderate AI. He was referred to Dr. Richardson for evaluation of failing 25 mm Perimount bioprosthetic valve vs PVL (NYHA Class II sx). He presents today for ALEXYS for TAVR eval.    TTE from [facility] 5/18/22 (scanned into media)  -LVEF 55%. G1DD.   -Moderate to severe aortic valve stenosis. SAIDA 0.8 cm2. Peak velocity 3.8 m/s. Mean gradient 29.3 mmHg.   -Mild to moderate aortic regurgitation.   -Mild to moderate mitral regurgitation   -PASP 39.3 mmHg    Anticoagulant/antiplatelets: ASA and Plavix   ECG:    Dysphagia or odynophagia:  {Blank single:19197::"Yes","No"}  Liver Disease, esophageal disease, or known varices:  {Blank single:19197::"Yes","No"}  Upper GI Bleeding: {Blank single:19197::"Yes","No"}  Snoring:  {Blank single:19197::"Yes","No"}  Sleep Apnea:  {Blank single:19197::"Yes","No"}  Prior neck surgery or radiation:  {Blank single:19197::"Yes","No"}  History of anesthetic difficulties:  {Blank single:19197::"Yes","No"}  Family history of anesthetic difficulties:  {Blank single:19197::"Yes","No"}  Last oral intake: yesterday before midnight  Able to move neck in all directions:  {Blank single:19197::"Yes","No"}  Platelet count:   INR: 1.1     "

## 2022-06-27 NOTE — ANESTHESIA PREPROCEDURE EVALUATION
06/27/2022  Pre-operative evaluation for Procedure(s) (LRB):  ECHOCARDIOGRAM, TRANSESOPHAGEAL (N/A)    Michael Mclean is a 82 y.o. male c pmhx/o CAD s/p CABG/AVR in 2010, now with failing bioprosthetic here for ALEXYS as part of TAVR evaluation. Previous depressed EF now normalized. Significant LOCKHART.    Patient Active Problem List   Diagnosis    Paroxysmal atrial fibrillation    Embolic stroke involving left middle cerebral artery    Deaf, nonspeaking    Essential hypertension    Chronic diastolic heart failure    Aphasia    Persistent atrial fibrillation    Weakness    Symptomatic anemia    Closed displaced fracture of right femoral neck    CAD (coronary artery disease)    Iron deficiency anemia    Prostate cancer    Elevated PSA    Anemia    Gastritis    Reflux esophagitis    Diverticulosis    GI bleed    Edema    VHD (valvular heart disease)    MELISSA (acute kidney injury)    Hyperlipidemia    Dizziness    H. pylori infection    Fatigue    Cerumen impaction    S/P AVR    S/P CABG (coronary artery bypass graft)       Review of patient's allergies indicates:   Allergen Reactions    Losartan Hives    Valsartan Hives       No current facility-administered medications on file prior to encounter.     Current Outpatient Medications on File Prior to Encounter   Medication Sig Dispense Refill    acetylcarnitin/alpha lipoic ac (ACETYLCARNITIN HCL-A LIPOIC AC) 400-200 mg Cap Take 1 capsule by mouth once daily.      albuterol (PROVENTIL/VENTOLIN HFA) 90 mcg/actuation inhaler Inhale 1-2 puffs into the lungs every 6 (six) hours as needed for Wheezing. Rescue 8 g 0    bicalutamide (CASODEX) 50 MG Tab Take 1 tablet (50 mg total) by mouth once daily. 30 tablet 3    cholecalciferol, vitamin D3, 5,000 unit capsule Take 5,000 Units by mouth once daily.       cyanocobalamin, vitamin B-12, 1,000  mcg/mL Kit Inject 1 mL as directed every 28 days.      ferrous sulfate (FEOSOL) 325 mg (65 mg iron) Tab tablet Take 1 tablet (325 mg total) by mouth once daily.  0    KLOR-CON M20 20 mEq tablet Take 20 mEq by mouth once daily.       magnesium oxide (MAG-OX) 400 mg tablet Take 400 mg by mouth once daily.      meclizine (ANTIVERT) 25 mg tablet Take 1 tablet (25 mg total) by mouth 3 (three) times daily as needed. 20 tablet 0    metoprolol succinate (TOPROL-XL) 100 MG 24 hr tablet Take 50 mg by mouth 2 (two) times a day.      pantoprazole (PROTONIX) 40 MG tablet Take 1 tablet (40 mg total) by mouth once daily. 30 tablet 11    rosuvastatin (CRESTOR) 20 MG tablet Take 20 mg by mouth every evening.      sotaloL (BETAPACE) 80 MG tablet Take 0.5 tablets (40 mg total) by mouth 2 (two) times daily. 90 tablet 0    torsemide (DEMADEX) 20 MG Tab Take 20 mg by mouth every other day.      vitamin D3-folic acid 5,000 unit- 1 mg Tab Take 1 tablet by mouth once daily.         Past Surgical History:   Procedure Laterality Date    ABLATION N/A 7/22/2020    Procedure: Ablation, afib;  Surgeon: Tal Laurent MD;  Location: [facility] CATH;  Service: Cardiology;  Laterality: N/A;  needs covid test;needs ALEXYS ([place])    AORTIC VALVE REPLACEMENT      CARDIAC CATHETERIZATION      3 HEART STENTS    carotid artery stent Left     COLONOSCOPY N/A 3/2/2021    Procedure: COLONOSCOPY;  Surgeon: Emiliana Cardenas MD;  Location: [facility] ENDO;  Service: General;  Laterality: N/A;  4  8:30am   CL/AM/Bd    CORONARY ARTERY BYPASS GRAFT      ESOPHAGOGASTRODUODENOSCOPY N/A 11/10/2020    Procedure: EGD (ESOPHAGOGASTRODUODENOSCOPY);  Surgeon: Renny Prakash MD;  Location: [facility] ENDO;  Service: Endoscopy;  Laterality: N/A;    HERNIA REPAIR      HIP REPLACEMENT ARTHROPLASTY Right 11/6/2020    Procedure: EMELY -ARTHROPLASTY, HIP REPLACEMENT (ENDO HIP);  Surgeon: Lino Thomas MD;  Location: [facility] OR;  Service: Orthopedics;  Laterality: Right;     PACERMAKER      PROSTATECTOMY      SHOULDER SURGERY      TOE SURGERY      TREATMENT OF CARDIAC ARRHYTHMIA N/A 2019    Procedure: Cardioversion or Defibrillation;  Surgeon: Tal Laurent MD;  [facility];  Service: Cardiology;  Laterality: N/A;       Social History     Socioeconomic History    Marital status:    Tobacco Use    Smoking status: Never Smoker    Smokeless tobacco: Never Used   Substance and Sexual Activity    Alcohol use: Never    Drug use: Never         CBC: No results for input(s): WBC, RBC, HGB, HCT, PLT, MCV, MCH, MCHC in the last 72 hours.    CMP: No results for input(s): NA, K, CL, CO2, BUN, CREATININE, GLU, MG, PHOS, CALCIUM, ALBUMIN, PROT, ALKPHOS, ALT, AST, BILITOT in the last 72 hours.    INR  No results for input(s): PT, INR, PROTIME, APTT in the last 72 hours.        Diagnostic Studies:      EKD Echo:  No results found for this or any previous visit.        Pre-op Assessment    I have reviewed the Patient Summary Reports.    I have reviewed the NPO Status.   I have reviewed the Medications.     Review of Systems  Anesthesia Hx:  History of prior surgery of interest to airway management or planning: Denies Family Hx of Anesthesia complications.   Denies Personal Hx of Anesthesia complications.       Physical Exam  General: Well nourished, Alert, Cooperative and Oriented    Airway:  Mallampati: II   Mouth Opening: Normal  TM Distance: Normal  Tongue: Normal  Neck ROM: Normal ROM    Dental:  Intact    Chest/Lungs:  Clear to auscultation, Normal Respiratory Rate    Heart:  Rate: Normal  Rhythm: Regular Rhythm        Anesthesia Plan  Type of Anesthesia, risks & benefits discussed:    Anesthesia Type: Gen Natural Airway  Intra-op Monitoring Plan: Standard ASA Monitors  Post Op Pain Control Plan: multimodal analgesia  Induction:  IV  Informed Consent: Informed consent signed with the Patient and all parties understand the risks and agree with anesthesia  plan.  All questions answered.   ASA Score: 3  Day of Surgery Review of History & Physical: H&P Update referred to the surgeon/provider.    Ready For Surgery From Anesthesia Perspective.     .

## 2022-06-29 ENCOUNTER — HOSPITAL ENCOUNTER (OUTPATIENT)
Facility: HOSPITAL | Age: 82
Discharge: HOME | End: 2022-06-29
Attending: INTERNAL MEDICINE | Admitting: INTERNAL MEDICINE
Payer: MEDICARE

## 2022-06-29 VITALS
HEIGHT: 67 IN | DIASTOLIC BLOOD PRESSURE: 60 MMHG | RESPIRATION RATE: 16 BRPM | OXYGEN SATURATION: 97 % | WEIGHT: 162 LBS | TEMPERATURE: 98 F | HEART RATE: 70 BPM | BODY MASS INDEX: 25.43 KG/M2 | SYSTOLIC BLOOD PRESSURE: 140 MMHG

## 2022-06-29 DIAGNOSIS — I50.32 CHRONIC DIASTOLIC HEART FAILURE: ICD-10-CM

## 2022-06-29 DIAGNOSIS — Z95.2 S/P AVR: Primary | ICD-10-CM

## 2022-06-29 DIAGNOSIS — I25.10 CAD (CORONARY ARTERY DISEASE): ICD-10-CM

## 2022-06-29 DIAGNOSIS — I25.10 CORONARY ARTERY DISEASE, UNSPECIFIED VESSEL OR LESION TYPE, UNSPECIFIED WHETHER ANGINA PRESENT, UNSPECIFIED WHETHER NATIVE OR TRANSPLANTED HEART: ICD-10-CM

## 2022-06-29 LAB
ABO + RH BLD: NORMAL
ANION GAP SERPL CALC-SCNC: 10 MMOL/L (ref 8–16)
APTT BLDCRRT: 27.6 SEC (ref 21–32)
BLD GP AB SCN CELLS X3 SERPL QL: NORMAL
BUN SERPL-MCNC: 28 MG/DL (ref 8–23)
CALCIUM SERPL-MCNC: 9.3 MG/DL (ref 8.7–10.5)
CHLORIDE SERPL-SCNC: 107 MMOL/L (ref 95–110)
CO2 SERPL-SCNC: 25 MMOL/L (ref 23–29)
CREAT SERPL-MCNC: 1 MG/DL (ref 0.5–1.4)
ERYTHROCYTE [DISTWIDTH] IN BLOOD BY AUTOMATED COUNT: 13.5 % (ref 11.5–14.5)
EST. GFR  (AFRICAN AMERICAN): >60 ML/MIN/1.73 M^2
EST. GFR  (NON AFRICAN AMERICAN): >60 ML/MIN/1.73 M^2
GLUCOSE SERPL-MCNC: 86 MG/DL (ref 70–110)
HCT VFR BLD AUTO: 39 % (ref 40–54)
HGB BLD-MCNC: 13.1 G/DL (ref 14–18)
MCH RBC QN AUTO: 32.4 PG (ref 27–31)
MCHC RBC AUTO-ENTMCNC: 33.6 G/DL (ref 32–36)
MCV RBC AUTO: 97 FL (ref 82–98)
PLATELET # BLD AUTO: 142 K/UL (ref 150–450)
PMV BLD AUTO: 11.7 FL (ref 9.2–12.9)
POTASSIUM SERPL-SCNC: 3.6 MMOL/L (ref 3.5–5.1)
RBC # BLD AUTO: 4.04 M/UL (ref 4.6–6.2)
SODIUM SERPL-SCNC: 142 MMOL/L (ref 136–145)
WBC # BLD AUTO: 6.34 K/UL (ref 3.9–12.7)

## 2022-06-29 PROCEDURE — 93455 CORONARY ART/GRFT ANGIO S&I: CPT | Performed by: INTERNAL MEDICINE

## 2022-06-29 PROCEDURE — 99152 PR MOD CONSCIOUS SEDATION, SAME PHYS, 5+ YRS, FIRST 15 MIN: ICD-10-PCS | Mod: ,,, | Performed by: INTERNAL MEDICINE

## 2022-06-29 PROCEDURE — 85730 THROMBOPLASTIN TIME PARTIAL: CPT | Performed by: INTERNAL MEDICINE

## 2022-06-29 PROCEDURE — 93010 ELECTROCARDIOGRAM REPORT: CPT | Mod: ,,, | Performed by: INTERNAL MEDICINE

## 2022-06-29 PROCEDURE — 25500020 PHARM REV CODE 255: Performed by: INTERNAL MEDICINE

## 2022-06-29 PROCEDURE — 99152 MOD SED SAME PHYS/QHP 5/>YRS: CPT | Mod: ,,, | Performed by: INTERNAL MEDICINE

## 2022-06-29 PROCEDURE — 93455 CORONARY ART/GRFT ANGIO S&I: CPT | Mod: 26,,, | Performed by: INTERNAL MEDICINE

## 2022-06-29 PROCEDURE — 93010 EKG 12-LEAD: ICD-10-PCS | Mod: ,,, | Performed by: INTERNAL MEDICINE

## 2022-06-29 PROCEDURE — 25000003 PHARM REV CODE 250: Performed by: INTERNAL MEDICINE

## 2022-06-29 PROCEDURE — 36215 PLACE CATHETER IN ARTERY: CPT | Mod: 51,,, | Performed by: INTERNAL MEDICINE

## 2022-06-29 PROCEDURE — 27201423 OPTIME MED/SURG SUP & DEVICES STERILE SUPPLY: Performed by: INTERNAL MEDICINE

## 2022-06-29 PROCEDURE — 85027 COMPLETE CBC AUTOMATED: CPT | Performed by: INTERNAL MEDICINE

## 2022-06-29 PROCEDURE — 36215 PR PLACE CATH SELECTIVE ART,NECK: ICD-10-PCS | Mod: 51,,, | Performed by: INTERNAL MEDICINE

## 2022-06-29 PROCEDURE — 93455 PR CATH PLACE/CORONARY ANGIO, IMG SUPER/INTERP, BYPASS ANGIO: ICD-10-PCS | Mod: 26,,, | Performed by: INTERNAL MEDICINE

## 2022-06-29 PROCEDURE — 63600175 PHARM REV CODE 636 W HCPCS: Performed by: INTERNAL MEDICINE

## 2022-06-29 PROCEDURE — 80048 BASIC METABOLIC PNL TOTAL CA: CPT | Performed by: INTERNAL MEDICINE

## 2022-06-29 PROCEDURE — 75710 ARTERY X-RAYS ARM/LEG: CPT | Mod: 26,59,LT, | Performed by: INTERNAL MEDICINE

## 2022-06-29 PROCEDURE — C1894 INTRO/SHEATH, NON-LASER: HCPCS | Performed by: INTERNAL MEDICINE

## 2022-06-29 PROCEDURE — 36215 PLACE CATHETER IN ARTERY: CPT | Mod: 59,LT | Performed by: INTERNAL MEDICINE

## 2022-06-29 PROCEDURE — 99152 MOD SED SAME PHYS/QHP 5/>YRS: CPT | Performed by: INTERNAL MEDICINE

## 2022-06-29 PROCEDURE — 86901 BLOOD TYPING SEROLOGIC RH(D): CPT | Performed by: INTERNAL MEDICINE

## 2022-06-29 PROCEDURE — 75710 PR  ANGIO EXTREMITY UNILAT: ICD-10-PCS | Mod: 26,59,LT, | Performed by: INTERNAL MEDICINE

## 2022-06-29 PROCEDURE — 99153 MOD SED SAME PHYS/QHP EA: CPT | Performed by: INTERNAL MEDICINE

## 2022-06-29 PROCEDURE — 75710 ARTERY X-RAYS ARM/LEG: CPT | Performed by: INTERNAL MEDICINE

## 2022-06-29 PROCEDURE — 93005 ELECTROCARDIOGRAM TRACING: CPT | Mod: 59

## 2022-06-29 PROCEDURE — C1769 GUIDE WIRE: HCPCS | Performed by: INTERNAL MEDICINE

## 2022-06-29 PROCEDURE — C1760 CLOSURE DEV, VASC: HCPCS | Performed by: INTERNAL MEDICINE

## 2022-06-29 RX ORDER — SODIUM CHLORIDE 9 MG/ML
INJECTION, SOLUTION INTRAVENOUS CONTINUOUS
Status: DISCONTINUED | OUTPATIENT
Start: 2022-06-29 | End: 2022-06-29 | Stop reason: HOSPADM

## 2022-06-29 RX ORDER — CEFAZOLIN SODIUM 1 G/3ML
INJECTION, POWDER, FOR SOLUTION INTRAMUSCULAR; INTRAVENOUS
Status: DISCONTINUED | OUTPATIENT
Start: 2022-06-29 | End: 2022-06-29 | Stop reason: HOSPADM

## 2022-06-29 RX ORDER — DIPHENHYDRAMINE HCL 50 MG
50 CAPSULE ORAL ONCE
Status: COMPLETED | OUTPATIENT
Start: 2022-06-29 | End: 2022-06-29

## 2022-06-29 RX ORDER — MIDAZOLAM HYDROCHLORIDE 1 MG/ML
INJECTION, SOLUTION INTRAMUSCULAR; INTRAVENOUS
Status: DISCONTINUED | OUTPATIENT
Start: 2022-06-29 | End: 2022-06-29 | Stop reason: HOSPADM

## 2022-06-29 RX ORDER — SODIUM CHLORIDE 9 MG/ML
INJECTION, SOLUTION INTRAVENOUS CONTINUOUS
Status: ACTIVE | OUTPATIENT
Start: 2022-06-29 | End: 2022-06-29

## 2022-06-29 RX ADMIN — SODIUM CHLORIDE: 0.9 INJECTION, SOLUTION INTRAVENOUS at 09:06

## 2022-06-29 RX ADMIN — DIPHENHYDRAMINE HYDROCHLORIDE 50 MG: 50 CAPSULE ORAL at 09:06

## 2022-06-29 NOTE — Clinical Note
The catheter was repositioned into the L Subclavian artery. An angiography was performed of the L Subclavian artery.

## 2022-06-29 NOTE — Clinical Note
48 ml of contrast were injected throughout the case. 2 mL of contrast was the total wasted during the case. 50 mL was the total amount used during the case.

## 2022-06-29 NOTE — PROGRESS NOTES
Patient ambulated in norris with RN. No complaints. R groin cdi, soft. Patient voided without difficulty.

## 2022-06-29 NOTE — BRIEF OP NOTE
Brief Operative Note:    : Glenroy Richardson MD     Referring Physician: Glenroy Richardson     All Operators: Surgeon(s):  MD Leslee Lin MD     Preoperative Diagnosis: Coronary artery disease, unspecified vessel or lesion type, unspecified whether angina present, unspecified whether native or transplanted heart [I25.10]  Chronic diastolic heart failure [I50.32]     Postop Diagnosis: Coronary artery disease, unspecified vessel or lesion type, unspecified whether angina present, unspecified whether native or transplanted heart [I25.10]  Chronic diastolic heart failure [I50.32]    Treatments/Procedures: Procedure(s) (LRB):  Left heart cath (N/A)    Access: Right CFA    Findings:Severe coronary artery disease is present.   SVG to RCA patent  LIMA to LAD patent  Intermediate mid left circumflex disease  Heavily calcified and tortuous iliac vessels    See catheterization report for full details.    Intervention: none    See catheterization report for full details.    Closure device: Perclose       Plan:  - Post cath protocol   - IVF @ 150 cc/hr x 4 hours  - Bed rest x 2 hours   - Plan for CTA TAVR outpatient  - Continue aspirin 81 mg daily indefinitely  - Continue high intensity statin therapy (LDL goal < 70)  - Risk factor reduction (BP <130/80 mmHg, glycemic control, etc)  - Cardiac rehab referral  - Follow up with outpatient cardiologist    Estimated Blood loss: 20 cc    Specimens removed: Shayna Worthington MD  Interventional Cardiology PGY-7  06/29/2022

## 2022-06-29 NOTE — PROGRESS NOTES
Report received from MEMO Islas. Patient s/p [facility]. R groin dressing cdi, soft. + pulse. Post procedure protocol discussed with patient and family. IVF infusing. Patient connected to cont tele monitoring. Call light in reach.

## 2022-06-29 NOTE — Clinical Note
The catheter was inserted into the ostial LIMA graft. An angiography was performed of the graft. Multiple views were taken.

## 2022-06-29 NOTE — DISCHARGE SUMMARY
Discharge Summary  Interventional Cardiology      Admit Date: 6/29/2022    Discharge Date:  6/29/2022    Attending Physician: Glenroy Richardson MD    Discharge Physician: Glenroy Richardson MD    Principal Diagnoses: S/P AVR  Indication for Admission: Left heart cath (N/A)    Discharged Condition: Good    Hospital Course:   Patient presented for outpatient coronary angiogram which went without complication. Coronary angiogram revealed patent LIMA to LAD and patent SVG to RCA. See full cath report in Epic for details. Hemostasis of patient's R CFA access site was achieved with Perclosure. Patient was monitored per post-cath protocol, and his R groin access site was c/d/i with no hematoma. Patient was able to ambulate without difficulty. He was feeling well and anticipating discharge home today.     Michael Mclean is a 82 y.o. male referred by Dr Shrestha for evaluation of failing 25 mm Perimount bioprosthetic valve vs PVL (NYHA Class II sx). Patient with need 18Fr 65cm long sheath for his TAVR.     The patient has undergone the following TAVR work-up:   · ECHO (5/18/22): SAIDA= 0.8 cm2, MG= 29 mmHg, Peak Mehdi= 3.8 m/s, EF= 55%. Mild to moderate AI.   · ALEXYS 6/27/2022: moderate central aortic insufficiency present. No paravalvular leak is identified  · LHC (6/29/2022): patent LIMA to LAD, patent SVG to RCA  · STS: 5%   · Frailty: 2/3 (failed  and walk. Needs albumin)    · Iliacs are >  · LVOT area by CTA is: pending  · Incidental findings on CT: pending  · CT Surgery risk assessment: pending, has an appointment with Dr. Schofield on 7/7  · Rhythm issues: PPM in situ  · PFTs: needs  · KCCQ/5 meter walk:   · Comorbidities: deaf, hx CVA, s/p AVR/CABG, prostate CA        Outpatient Plan:  - There were no medication changes    Diet: Cardiac diet    Activity: Ad luther, wound care instructions provided    Disposition: Home or Self Care    Follow Up:  Patient with need CTA TAVR to complete his work up  Will follow up with   Dylan in clinic once CTA complete    Discharge Medications:      Medication List      CONTINUE taking these medications    acetylcarnitin HCL-a lipoic ac 400-200 mg Cap     albuterol 90 mcg/actuation inhaler  Commonly known as: PROVENTIL/VENTOLIN HFA  Inhale 1-2 puffs into the lungs every 6 (six) hours as needed for Wheezing. Rescue     aspirin 81 MG EC tablet  Commonly known as: ECOTRIN  Take 1 tablet (81 mg total) by mouth once daily.     bicalutamide 50 MG Tab  Commonly known as: CASODEX  Take 1 tablet (50 mg total) by mouth once daily.     cholecalciferol (vitamin D3) 125 mcg (5,000 unit) capsule     clonazePAM 2 MG Tab  Commonly known as: KlonoPIN  TAKE 1 TABLET BY MOUTH ONCE DAILY AS NEEDED FOR ANXIETY     clopidogreL 75 mg tablet  Commonly known as: PLAVIX  Take 1 tablet (75 mg total) by mouth once daily.     cyanocobalamin (vitamin B-12) 1,000 mcg/mL Kit     ferrous sulfate 325 mg (65 mg iron) Tab tablet  Commonly known as: FEOSOL  Take 1 tablet (325 mg total) by mouth once daily.     KLOR-CON M20 20 MEQ tablet  Generic drug: potassium chloride SA     magnesium oxide 400 mg (241.3 mg magnesium) tablet  Commonly known as: MAG-OX     meclizine 25 mg tablet  Commonly known as: ANTIVERT  Take 1 tablet (25 mg total) by mouth 3 (three) times daily as needed.     metoprolol succinate 100 MG 24 hr tablet  Commonly known as: TOPROL-XL     pantoprazole 40 MG tablet  Commonly known as: PROTONIX  Take 1 tablet (40 mg total) by mouth once daily.     rosuvastatin 20 MG tablet  Commonly known as: CRESTOR     sotaloL 80 MG tablet  Commonly known as: BETAPACE  Take 0.5 tablets (40 mg total) by mouth 2 (two) times daily.     torsemide 20 MG Tab  Commonly known as: DEMADEX     vitamin D3-folic acid 125 mcg (5,000 unit)-1 mg Tab            Leslee Worthington  Interventional Cardiology Fellow PGY-7  06/29/2022

## 2022-06-29 NOTE — Clinical Note
The groin was prepped. The site was clipped. The patient was draped. The patient was positioned supine.

## 2022-06-29 NOTE — PLAN OF CARE
patient aao x4. Vss. Iv hep lock removed. Daughter at bedside with sign language translation. AVS printed. home care instructions reviewed with patient. All questions answered. Wheelchair provided for discharge.

## 2022-06-30 ENCOUNTER — PATIENT MESSAGE (OUTPATIENT)
Dept: CARDIOTHORACIC SURGERY | Facility: CLINIC | Age: 82
End: 2022-06-30
Payer: MEDICARE

## 2022-07-01 ENCOUNTER — TELEPHONE (OUTPATIENT)
Dept: CARDIOTHORACIC SURGERY | Facility: CLINIC | Age: 82
End: 2022-07-01
Payer: MEDICARE

## 2022-07-01 ENCOUNTER — PATIENT MESSAGE (OUTPATIENT)
Dept: CARDIOLOGY | Facility: CLINIC | Age: 82
End: 2022-07-01
Payer: MEDICARE

## 2022-07-01 NOTE — TELEPHONE ENCOUNTER
Called patient to update him of appointment time changes. Pt has ASL interpretation set up via his phone line. Unable to reach patient. Left message through  for patient that appointment times have been changed. Also sent patient a message through the portal.

## 2022-07-01 NOTE — TELEPHONE ENCOUNTER
Returned patient's call regarding his appointments and his question about Plavix. Updated patient on appointment times and he verbalizes understanding.    Explained that Dr Richardson would advise him on how to manage Plavix. I will send a message to Dr. Richardson's office and ask them to call patient.    ----- Message from Akhil Vidal sent at 7/1/2022  1:42 PM CDT -----  Contact: Pt  Pt's requesting a call back regarding if to keep taking meds below clopidogreL (PLAVIX) 75 mg tablet.. Pt is also returning a call from.      Confirmed contact info below:  Contact Name: Michael Mclean  Phone Number: [phone]

## 2022-07-06 ENCOUNTER — TELEPHONE (OUTPATIENT)
Dept: CARDIOTHORACIC SURGERY | Facility: CLINIC | Age: 82
End: 2022-07-06
Payer: MEDICARE

## 2022-07-06 NOTE — TELEPHONE ENCOUNTER
Called and confirmed pt's appts for 7/7 for CT scan and consult with Dr. Schofield.  Spoke to pt via , [employer], and informed pt of appt times and locations, and fasting instructions for CT scan.  Pt informed that an , Monique, has been scheduled to meet him for his CT scan appt and she will accompany him to meet with Dr. Schofield, which he verbalized understanding to.

## 2022-07-07 ENCOUNTER — HOSPITAL ENCOUNTER (OUTPATIENT)
Dept: RADIOLOGY | Facility: HOSPITAL | Age: 82
Discharge: HOME | End: 2022-07-07
Attending: INTERNAL MEDICINE
Payer: MEDICARE

## 2022-07-07 ENCOUNTER — OFFICE VISIT (OUTPATIENT)
Dept: CARDIOTHORACIC SURGERY | Facility: CLINIC | Age: 82
End: 2022-07-07
Payer: MEDICARE

## 2022-07-07 VITALS
HEIGHT: 67 IN | HEART RATE: 71 BPM | DIASTOLIC BLOOD PRESSURE: 69 MMHG | RESPIRATION RATE: 18 BRPM | BODY MASS INDEX: 25.37 KG/M2 | SYSTOLIC BLOOD PRESSURE: 161 MMHG | WEIGHT: 161.63 LBS | OXYGEN SATURATION: 99 %

## 2022-07-07 DIAGNOSIS — I35.0 AORTIC VALVE STENOSIS, ETIOLOGY OF CARDIAC VALVE DISEASE UNSPECIFIED: Primary | ICD-10-CM

## 2022-07-07 DIAGNOSIS — Z95.2 S/P AVR: ICD-10-CM

## 2022-07-07 PROCEDURE — 74174 CTA ABD&PLVS W/CONTRAST: CPT | Mod: 26,,, | Performed by: RADIOLOGY

## 2022-07-07 PROCEDURE — 3078F DIAST BP <80 MM HG: CPT | Mod: CPTII,S$GLB,, | Performed by: STUDENT IN AN ORGANIZED HEALTH CARE EDUCATION/TRAINING PROGRAM

## 2022-07-07 PROCEDURE — 25500020 PHARM REV CODE 255: Performed by: INTERNAL MEDICINE

## 2022-07-07 PROCEDURE — 99999 PR PBB SHADOW E&M-EST. PATIENT-LVL III: CPT | Mod: PBBFAC,,, | Performed by: STUDENT IN AN ORGANIZED HEALTH CARE EDUCATION/TRAINING PROGRAM

## 2022-07-07 PROCEDURE — 3077F PR MOST RECENT SYSTOLIC BLOOD PRESSURE >= 140 MM HG: ICD-10-PCS | Mod: CPTII,S$GLB,, | Performed by: STUDENT IN AN ORGANIZED HEALTH CARE EDUCATION/TRAINING PROGRAM

## 2022-07-07 PROCEDURE — 3078F PR MOST RECENT DIASTOLIC BLOOD PRESSURE < 80 MM HG: ICD-10-PCS | Mod: CPTII,S$GLB,, | Performed by: STUDENT IN AN ORGANIZED HEALTH CARE EDUCATION/TRAINING PROGRAM

## 2022-07-07 PROCEDURE — 99205 OFFICE O/P NEW HI 60 MIN: CPT | Mod: S$GLB,,, | Performed by: STUDENT IN AN ORGANIZED HEALTH CARE EDUCATION/TRAINING PROGRAM

## 2022-07-07 PROCEDURE — 71275 CT ANGIOGRAPHY CHEST: CPT | Mod: TC

## 2022-07-07 PROCEDURE — 1126F PR PAIN SEVERITY QUANTIFIED, NO PAIN PRESENT: ICD-10-PCS | Mod: CPTII,S$GLB,, | Performed by: STUDENT IN AN ORGANIZED HEALTH CARE EDUCATION/TRAINING PROGRAM

## 2022-07-07 PROCEDURE — 74174 CTA CARDIAC TAVR_PARTNERS (XPD): ICD-10-PCS | Mod: 26,,, | Performed by: RADIOLOGY

## 2022-07-07 PROCEDURE — 99999 PR PBB SHADOW E&M-EST. PATIENT-LVL III: ICD-10-PCS | Mod: PBBFAC,,, | Performed by: STUDENT IN AN ORGANIZED HEALTH CARE EDUCATION/TRAINING PROGRAM

## 2022-07-07 PROCEDURE — 71275 CT ANGIOGRAPHY CHEST: CPT | Mod: 26,,, | Performed by: RADIOLOGY

## 2022-07-07 PROCEDURE — 3077F SYST BP >= 140 MM HG: CPT | Mod: CPTII,S$GLB,, | Performed by: STUDENT IN AN ORGANIZED HEALTH CARE EDUCATION/TRAINING PROGRAM

## 2022-07-07 PROCEDURE — 1126F AMNT PAIN NOTED NONE PRSNT: CPT | Mod: CPTII,S$GLB,, | Performed by: STUDENT IN AN ORGANIZED HEALTH CARE EDUCATION/TRAINING PROGRAM

## 2022-07-07 PROCEDURE — 99205 PR OFFICE/OUTPT VISIT, NEW, LEVL V, 60-74 MIN: ICD-10-PCS | Mod: S$GLB,,, | Performed by: STUDENT IN AN ORGANIZED HEALTH CARE EDUCATION/TRAINING PROGRAM

## 2022-07-07 PROCEDURE — 71275 CTA CARDIAC TAVR_PARTNERS (XPD): ICD-10-PCS | Mod: 26,,, | Performed by: RADIOLOGY

## 2022-07-07 RX ADMIN — IOHEXOL 100 ML: 350 INJECTION, SOLUTION INTRAVENOUS at 10:07

## 2022-07-07 NOTE — PROGRESS NOTES
Subjective:      Patient ID: Michael Mclean is a 82 y.o. male.    Chief Complaint: No chief complaint on file.      HPI:  Michael Mclean is a 82 y.o. male who presents as an initial consult for possible TAVR.  He has a medical history significant for persistent Afib (s/p DCCV in 2017, s/p PVI in 2020), CAD s/p 2v CABG & bioprosthetic aotic valve in 2012, s/p BiV ICD/PPM, h/o SSS, HTN, HLD, CVA, and prostate cancer.  He is s/p CABG x 2 with LIMA-LAD and SVG-RCA along with aortic valve replacement utilizing 25 mm pericardial bioprosthetic aortic valve with Dr. Feliberto Cruz at [facility] in [facility] on 4/29/2010. He reports 3 months of increased shortness of breath. He was seen by Dr. Ramees team who offered a TAVR. He presented to todays clinic visit in wheelchair with staff and .       Family and social history reviewed    Review of patient's allergies indicates:   Allergen Reactions    Losartan Hives    Valsartan Hives     Past Medical History:   Diagnosis Date    Abdominal hernia     Anemia 02/2021    Anticoagulant long-term use     Arthritis     SHOULDER    Cancer     prostate    Cardiomyopathy     Carotid artery stenosis     left    Coronary artery disease     Deaf     Dizziness     Encounter for blood transfusion     Gastritis 3/2/2021    Hyperlipidemia     Hypertension     Mitral regurgitation     Mitral regurgitation     PAD (peripheral artery disease)     Paroxysmal atrial fibrillation     Stroke 2020    Thyroid disease      Past Surgical History:   Procedure Laterality Date    ABLATION N/A 7/22/2020    Procedure: Ablation, afib;  Surgeon: Tal Laurent MD;  Location: [facility] CATH;  Service: Cardiology;  Laterality: N/A;  needs covid test;needs ALEXYS ([place])    AORTIC VALVE REPLACEMENT      CARDIAC CATHETERIZATION      3 HEART STENTS    carotid artery stent Left     COLONOSCOPY N/A 3/2/2021    Procedure: COLONOSCOPY;  Surgeon: Emiliana MORTON  MD Ronald;  Location: [facility] ENDO;  Service: General;  Laterality: N/A;  4  8:30am   CL/AM/Bd    CORONARY ARTERY BYPASS GRAFT      ESOPHAGOGASTRODUODENOSCOPY N/A 11/10/2020    Procedure: EGD (ESOPHAGOGASTRODUODENOSCOPY);  Surgeon: Renny Prakash MD;  [facility];  Service: Endoscopy;  Laterality: N/A;    HERNIA REPAIR      HIP REPLACEMENT ARTHROPLASTY Right 11/6/2020    Procedure: EMELY -ARTHROPLASTY, HIP REPLACEMENT (ENDO HIP);  Surgeon: Lino Thomas MD;  [facility];  Service: Orthopedics;  Laterality: Right;    LEFT HEART CATHETERIZATION N/A 6/29/2022    Procedure: Left heart cath;  Surgeon: Glenroy Richardson MD;  Location: [facility] CATH LAB;  Service: Cardiology;  Laterality: N/A;    PACERMAKER      PROSTATECTOMY      SHOULDER SURGERY      TOE SURGERY      TRANSESOPHAGEAL ECHOCARDIOGRAPHY N/A 6/27/2022    Procedure: ECHOCARDIOGRAM, TRANSESOPHAGEAL;  Surgeon: Fracisco Diagnostic Provider;  Location: [facility] EP LAB;  Service: Anesthesiology;  Laterality: N/A;    TREATMENT OF CARDIAC ARRHYTHMIA N/A 8/7/2019    Procedure: Cardioversion or Defibrillation;  Surgeon: Tal Laurent MD;  [facility];  Service: Cardiology;  Laterality: N/A;     Family History     Problem Relation (Age of Onset)    Heart attack Mother    Heart disease Mother        Social History     Socioeconomic History    Marital status:    Tobacco Use    Smoking status: Never Smoker    Smokeless tobacco: Never Used   Substance and Sexual Activity    Alcohol use: Yes     Comment: occassionally    Drug use: Never       Current Outpatient Medications:     acetylcarnitin/alpha lipoic ac (ACETYLCARNITIN HCL-A LIPOIC AC) 400-200 mg Cap, Take 1 capsule by mouth once daily., Disp: , Rfl:     albuterol (PROVENTIL/VENTOLIN HFA) 90 mcg/actuation inhaler, Inhale 1-2 puffs into the lungs every 6 (six) hours as needed for Wheezing. Rescue, Disp: 8 g, Rfl: 0    aspirin (ECOTRIN) 81 MG EC tablet, Take 1 tablet (81 mg  total) by mouth once daily., Disp: 30 tablet, Rfl: 11    bicalutamide (CASODEX) 50 MG Tab, Take 1 tablet (50 mg total) by mouth once daily., Disp: 30 tablet, Rfl: 3    cholecalciferol, vitamin D3, 5,000 unit capsule, Take 5,000 Units by mouth once daily. , Disp: , Rfl:     clonazePAM (KLONOPIN) 2 MG Tab, TAKE 1 TABLET BY MOUTH ONCE DAILY AS NEEDED FOR ANXIETY, Disp: 30 tablet, Rfl: 0    clopidogreL (PLAVIX) 75 mg tablet, Take 1 tablet (75 mg total) by mouth once daily., Disp: 30 tablet, Rfl: 11    cyanocobalamin, vitamin B-12, 1,000 mcg/mL Kit, Inject 1 mL as directed every 28 days., Disp: , Rfl:     ferrous sulfate (FEOSOL) 325 mg (65 mg iron) Tab tablet, Take 1 tablet (325 mg total) by mouth once daily., Disp: , Rfl: 0    KLOR-CON M20 20 mEq tablet, Take 20 mEq by mouth once daily. , Disp: , Rfl:     magnesium oxide (MAG-OX) 400 mg tablet, Take 400 mg by mouth once daily., Disp: , Rfl:     meclizine (ANTIVERT) 25 mg tablet, Take 1 tablet (25 mg total) by mouth 3 (three) times daily as needed., Disp: 20 tablet, Rfl: 0    metoprolol succinate (TOPROL-XL) 100 MG 24 hr tablet, Take 50 mg by mouth 2 (two) times a day., Disp: , Rfl:     pantoprazole (PROTONIX) 40 MG tablet, Take 1 tablet (40 mg total) by mouth once daily., Disp: 30 tablet, Rfl: 11    rosuvastatin (CRESTOR) 20 MG tablet, Take 20 mg by mouth every evening., Disp: , Rfl:     sotaloL (BETAPACE) 80 MG tablet, Take 0.5 tablets (40 mg total) by mouth 2 (two) times daily., Disp: 90 tablet, Rfl: 0    torsemide (DEMADEX) 20 MG Tab, Take 20 mg by mouth every other day., Disp: , Rfl:     vitamin D3-folic acid 5,000 unit- 1 mg Tab, Take 1 tablet by mouth once daily., Disp: , Rfl:     Current Facility-Administered Medications:     leuprolide (6 month) injection 45 mg, 45 mg, Intramuscular, Q6 Months, Abbie Martinez, NP, 45 mg at 02/18/22 1321  Current medications Reviewed    Review of Systems   Constitutional: Negative for activity change, appetite  change, fatigue and fever.   HENT: Negative for nosebleeds.    Respiratory: Negative for cough and shortness of breath.    Cardiovascular: Negative for chest pain, palpitations and leg swelling.   Gastrointestinal: Negative for abdominal distention, abdominal pain and nausea.   Genitourinary: Negative for frequency.   Musculoskeletal: Negative for arthralgias and myalgias.   Skin: Negative for rash.   Neurological: Negative for dizziness and numbness.   Hematological: Does not bruise/bleed easily.     Objective:   Physical Exam  HENT:      Head: Normocephalic and atraumatic.   Eyes:      Extraocular Movements: Extraocular movements intact.   Cardiovascular:      Rate and Rhythm: Normal rate and regular rhythm.   Pulmonary:      Effort: Pulmonary effort is normal.   Abdominal:      General: Abdomen is flat.      Palpations: Abdomen is soft.   Musculoskeletal:         General: Normal range of motion.      Cervical back: Normal range of motion.   Skin:     General: Skin is warm and dry.      Capillary Refill: Capillary refill takes less than 2 seconds.   Neurological:      General: No focal deficit present.       Diagnostic Results: Reviewed   STS Score: 5%    ECHO:  · 83 yo M with hx of CABG x2 and 25 mm Perimount bioprosthetic valve at [facility] in [facility] on 4/29/2010  · The left ventricle is mildly enlarged with moderately decreased systolic function. The estimated ejection fraction is 40%.  · Normal right ventricular size with normal right ventricular systolic function.  · No interatrial septal defect present.  · Normal appearing left atrial appendage. No thrombus is present in the appendage. Abnormal appendage velocities.  · Moderate, posteriorly directed mitral regurgitation.  · There is a 25 mm Perimount bioprosthetic aortic valve present. There is moderate central aortic insufficiency present. No paravalvular leak is identified.  Assessment:   1. Moderate to Severe Aortic Valve  Stenosis  Plan:    82M who walks with cane and sometimes uses wheelchair who appears frail. Further he has had priot CABG and bio-AVR. Given his age, frailty and prior sternotomy I believe he is INOPERABLE. He has a TAVR CTA performed today and he is heavily calcified. I spoke with Robyn Bush and Dr Richardson is to review this CT today to make a decision about TF versus Tax TAVR. Patient will follow up in TAVR clinic.    Cruz Schofield  CV Surgery

## 2022-07-08 ENCOUNTER — EDUCATION (OUTPATIENT)
Dept: CARDIOLOGY | Facility: CLINIC | Age: 82
End: 2022-07-08
Payer: MEDICARE

## 2022-07-08 DIAGNOSIS — N18.9 CHRONIC KIDNEY DISEASE, UNSPECIFIED CKD STAGE: ICD-10-CM

## 2022-07-08 DIAGNOSIS — I35.0 NONRHEUMATIC AORTIC VALVE STENOSIS: Primary | ICD-10-CM

## 2022-07-08 RX ORDER — SODIUM CHLORIDE 9 MG/ML
INJECTION, SOLUTION INTRAVENOUS CONTINUOUS
Status: CANCELLED | OUTPATIENT
Start: 2022-07-08 | End: 2022-07-08

## 2022-07-08 RX ORDER — DIPHENHYDRAMINE HCL 50 MG
50 CAPSULE ORAL ONCE
Status: CANCELLED | OUTPATIENT
Start: 2022-07-08 | End: 2022-07-08

## 2022-07-08 NOTE — PROGRESS NOTES
Transcatheter Valve Replacement (TAVR)    You have been scheduled for your valve replacement on Wednesday, July 27, 2022.     Please report to the Cardiology Waiting Area on the Third floor of the hospital and check in at 8 AM.   You will then be taken to the SSCU (Short Stay Cardiac Unit) and prepared for your procedure. Please be aware that this is not the time of your procedure but the time you are to arrive.     Preperations for your procedure:  1. Shower with Dial soap the night before and the morning of your procedure.  2. No solid foods 8 hours prior to your procedure.  You may have clear liquids until the time of your admission which should be 2 hours prior to your procedure.  You are encouraged to have at least 8 ounces of clear liquids prior to admission to SSCU.  Patients with gastric emptying issues should be fasting for 6- 8 hours prior to the procedure.  Clear liquids include water, black coffee, clear juices, and performance drinks - no pulp or milk.    3. Heart failure or dialysis patients will be limited to 8 ounces (1 cup) of clear liquids up until 2 hours of the procedure.  You may take your regular morning medications         with as much water as necessary.   4. Bring your cane and/or walker with you to the hospital.  5. Bring CPAP/BiPAP, or oxygen if you are on oxygen at home.  6. Call the office for any signs of infection ( fever, cough, pneumonia, urinary tract, etc.) We cannot implant a device in an infected patient.    Medications:  You may take your regular scheduled medications the morning of your procedure with as much water as necessary.  If you are diabetic and on Metformin (Glucophage), do not take it the day before, the day of, and 2 days after your procedure.  If you are on Pradaxa, Xarelto, Eliquis, or Coumadin hold 3 days prior to your procedure.     How long will the procedure take?  The entire procedure takes three to four hours.  After the procedure, you will go to an ICU where  you will be monitored closely for the next several hours.  You will remain in the ICU overnight.        If you walk with a cane or walker, please bring it with you to the hospital so that we can get you out of bed several hours after your valve replacement.  Our goal is to get you up in a chair and moving as quickly as possible as long as it is safe for you to do so.    When can I go home?  Your length of stay in the hospital, will be determined by your physician.  Be prepared to stay 1-3 days.  You will be discharged when your physician thinks it is safe for you to go home.  You must have someone to drive you home when you are discharged.    Follow Up After Valve Replacement:  It is required that you return to [facility] for the follow up in one month and one year with an echo, lab work, and a clinic visit.  If you are in a research trial, your follow up will be slightly different and according to the trial requirements.  You can follow up with your regular cardiologist regarding any other heart issues.    Contacts one of our nurses at [phone] for any questions.  Robyn Bush, MEMO Roque RN        THE ABOVE INSTRUCTIONS WERE GIVEN TO THE PATIENT VERBALLY AND THEY VERBALIZED UNDERSTANDING.  THEY DO NOT REQUIRE ANY SPECIAL NEEDS AND DO NOT HAVE ANY LEARNING BARRIERS.          Directions for Reporting to Cardiology Waiting Area in the Hospital  If you park in the Parking Garage:  Take elevators to the1st floor of the parking garage.  Continue past the gift shop, coffee shop, and piano.  Take a right and go to the gold elevators. (Elevator B)  Take the elevator to the 3rd floor.  Follow the arrow on the sign on the wall that says Cath Lab Registration/EP Lab Registration.  Follow the long hallway all the way around until you come to a big open area.  This is the registration area.  Check in at Reception Desk.    OR    If family is dropping you off:  Have them drop you off at the front of the Hospital under  the green overhang.  Enter through the doors and take a right.  Take the E elevators to the 3rd floor Cardiology Waiting Area.  Check in at the Reception Desk in the waiting room.

## 2022-07-12 ENCOUNTER — PATIENT MESSAGE (OUTPATIENT)
Dept: CARDIOLOGY | Facility: CLINIC | Age: 82
End: 2022-07-12
Payer: MEDICARE

## 2022-07-25 ENCOUNTER — OFFICE VISIT (OUTPATIENT)
Dept: CARDIOLOGY | Facility: CLINIC | Age: 82
DRG: 266 | End: 2022-07-25
Payer: MEDICARE

## 2022-07-25 VITALS
SYSTOLIC BLOOD PRESSURE: 147 MMHG | HEIGHT: 67 IN | DIASTOLIC BLOOD PRESSURE: 64 MMHG | HEART RATE: 70 BPM | BODY MASS INDEX: 24.99 KG/M2 | WEIGHT: 159.19 LBS | OXYGEN SATURATION: 100 %

## 2022-07-25 DIAGNOSIS — I35.1 AORTIC VALVE INSUFFICIENCY, ETIOLOGY OF CARDIAC VALVE DISEASE UNSPECIFIED: Primary | ICD-10-CM

## 2022-07-25 PROCEDURE — 99214 PR OFFICE/OUTPT VISIT, EST, LEVL IV, 30-39 MIN: ICD-10-PCS | Mod: S$GLB,,, | Performed by: INTERNAL MEDICINE

## 2022-07-25 PROCEDURE — 1159F PR MEDICATION LIST DOCUMENTED IN MEDICAL RECORD: ICD-10-PCS | Mod: CPTII,S$GLB,, | Performed by: INTERNAL MEDICINE

## 2022-07-25 PROCEDURE — 99999 PR PBB SHADOW E&M-EST. PATIENT-LVL IV: ICD-10-PCS | Mod: PBBFAC,,, | Performed by: INTERNAL MEDICINE

## 2022-07-25 PROCEDURE — 1126F PR PAIN SEVERITY QUANTIFIED, NO PAIN PRESENT: ICD-10-PCS | Mod: CPTII,S$GLB,, | Performed by: INTERNAL MEDICINE

## 2022-07-25 PROCEDURE — 3078F DIAST BP <80 MM HG: CPT | Mod: CPTII,S$GLB,, | Performed by: INTERNAL MEDICINE

## 2022-07-25 PROCEDURE — 3077F PR MOST RECENT SYSTOLIC BLOOD PRESSURE >= 140 MM HG: ICD-10-PCS | Mod: CPTII,S$GLB,, | Performed by: INTERNAL MEDICINE

## 2022-07-25 PROCEDURE — 1126F AMNT PAIN NOTED NONE PRSNT: CPT | Mod: CPTII,S$GLB,, | Performed by: INTERNAL MEDICINE

## 2022-07-25 PROCEDURE — 99214 OFFICE O/P EST MOD 30 MIN: CPT | Mod: S$GLB,,, | Performed by: INTERNAL MEDICINE

## 2022-07-25 PROCEDURE — 99999 PR PBB SHADOW E&M-EST. PATIENT-LVL IV: CPT | Mod: PBBFAC,,, | Performed by: INTERNAL MEDICINE

## 2022-07-25 PROCEDURE — 3077F SYST BP >= 140 MM HG: CPT | Mod: CPTII,S$GLB,, | Performed by: INTERNAL MEDICINE

## 2022-07-25 PROCEDURE — 3078F PR MOST RECENT DIASTOLIC BLOOD PRESSURE < 80 MM HG: ICD-10-PCS | Mod: CPTII,S$GLB,, | Performed by: INTERNAL MEDICINE

## 2022-07-25 PROCEDURE — 1159F MED LIST DOCD IN RCRD: CPT | Mod: CPTII,S$GLB,, | Performed by: INTERNAL MEDICINE

## 2022-07-25 RX ORDER — DOCUSATE SODIUM 100 MG/1
100 CAPSULE, LIQUID FILLED ORAL 2 TIMES DAILY
COMMUNITY

## 2022-07-25 RX ORDER — CEPHRADINE 500 MG
CAPSULE ORAL
COMMUNITY

## 2022-07-25 NOTE — H&P (VIEW-ONLY)
Subjective:    Patient ID:  Michael Mclean is a 82 y.o. male who presents for evaluation of AI/PVL. He is completely deaf.    Referring Physician: Dr Shrestha at [facility]  Michael Mclean is a 82 y.o. male with a past medical history of persistent Afib (s/p DCCV in 2017, s/p PVI in 2020), CAD s/p 2v CABG & bioprosthetic aotic valve in 2012, s/p BiV ICD/PPM, h/o SSS, HTN, HLD, CVA, and prostate cancer who presents for evaluation of prosthetic valve insufficiency.     Patient underwent TTE on 5/18/22 with Dr Shrestha which showed normal LV function, moderate to severe AS with SAIDA 0,8, PV 3.8, and MG 29 mmHg. Mild to moderate AI. EF 55%    Patient states that for the past 3 month he has been experiencing dyspnea on exertion with walking. He denies SOB at rest and heart failure admission. He did have a history of blood transfusion about 1 year ago. He is followed by Dr Wilkins for prostate cancer. He is s/p prostatectomy with injections and currently undergoing hormone therapy.    .   Surgery: Dr. Feliberto Cruz at [facility] in [facility] on 4/29/2010. He is s/p CABG x 2 with LIMA-LAD and SVG-RCA along with aortic valve replacement utilizing 25 mm pericardial bioprosthetic aortic valve.     Michael Mclean is a 82 y.o. male referred by Dr Shrestha for evaluation of failing 25 mm Perimount bioprosthetic valve True ID 23 (NYHA Class III sx).     The patient has undergone the following TAVR work-up:    ECHO (Date 5/18/22): SAIDA= 0.8 cm2, MG= 29 mmHg, Peak Mehdi= 3.8 m/s, EF= 55%. Moderate AI.  Failing bioprosthetic valve.   LHC (Date 2010): LIMA to LAD, SVG to RCA, 60% LCX.     STS: 5%    Frailty: 3/4 (failed  and walk and albumin)     Iliacs are > 6 on the R with high grade RCI stenosis and > 7.3 on the L with severe tortuosity.  Best for L TF access.   LVOT area by CTA is:   Incidental findings on CT:    CT Surgery risk assessment: Inoperable per  Schofield   Rhythm issues: AICD in situ   PFTs: Deferred   KCCQ/5 meter walk: Done   Comorbidities: deaf, hx CVA, s/p AVR/CABG, prostate CA      NYHA: III CCS: 0    Review of Systems   Constitutional: Negative for chills and fever.   HENT: Negative for sore throat.    Eyes: Negative for blurred vision.   Cardiovascular: Positive for dyspnea on exertion. Negative for chest pain, claudication, cyanosis, irregular heartbeat, leg swelling, near-syncope, orthopnea, palpitations, paroxysmal nocturnal dyspnea and syncope.   Respiratory: Negative for cough and sputum production.    Hematologic/Lymphatic: Does not bruise/bleed easily.   Skin: Negative for itching, rash and suspicious lesions.   Musculoskeletal: Negative for falls.   Gastrointestinal: Negative for abdominal pain and change in bowel habit.   Genitourinary: Negative for dysuria.   Neurological: Negative for disturbances in coordination, dizziness and loss of balance.   Psychiatric/Behavioral: Negative for altered mental status.     Past Medical History:   Diagnosis Date    Abdominal hernia     Anemia 02/2021    Anticoagulant long-term use     Arthritis     SHOULDER    Cancer     prostate    Cardiomyopathy     Carotid artery stenosis     left    Coronary artery disease     Deaf     Dizziness     Encounter for blood transfusion     Gastritis 3/2/2021    Hyperlipidemia     Hypertension     Mitral regurgitation     Mitral regurgitation     PAD (peripheral artery disease)     Paroxysmal atrial fibrillation     Stroke 2020    Thyroid disease      Current Outpatient Medications on File Prior to Visit   Medication Sig Dispense Refill    acetylcarnitin/alpha lipoic ac (ACETYLCARNITIN HCL-A LIPOIC AC) 400-200 mg Cap Take 1 capsule by mouth once daily.      aspirin (ECOTRIN) 81 MG EC tablet Take 1 tablet (81 mg total) by mouth once daily. 30 tablet 11    bicalutamide (CASODEX) 50 MG Tab Take 1 tablet (50 mg total) by mouth once daily. 30 tablet 3     cholecalciferol, vitamin D3, 5,000 unit capsule Take 5,000 Units by mouth once daily.       clonazePAM (KLONOPIN) 2 MG Tab TAKE 1 TABLET BY MOUTH ONCE DAILY AS NEEDED FOR ANXIETY 30 tablet 0    clopidogreL (PLAVIX) 75 mg tablet Take 1 tablet (75 mg total) by mouth once daily. 30 tablet 11    cyanocobalamin, vitamin B-12, 1,000 mcg/mL Kit Inject 1 mL as directed every 28 days.      docusate sodium (COLACE) 100 MG capsule Take 100 mg by mouth 2 (two) times daily.      ferrous sulfate (FEOSOL) 325 mg (65 mg iron) Tab tablet Take 1 tablet (325 mg total) by mouth once daily.  0    KLOR-CON M20 20 mEq tablet Take 20 mEq by mouth once daily.       magnesium oxide (MAG-OX) 400 mg tablet Take 400 mg by mouth once daily.      metoprolol succinate (TOPROL-XL) 100 MG 24 hr tablet Take 50 mg by mouth 2 (two) times a day.      rosuvastatin (CRESTOR) 20 MG tablet Take 20 mg by mouth every evening.      sotaloL (BETAPACE) 80 MG tablet Take 0.5 tablets (40 mg total) by mouth 2 (two) times daily. 90 tablet 0    torsemide (DEMADEX) 20 MG Tab Take 20 mg by mouth every other day.      vitamin D3-folic acid 5,000 unit- 1 mg Tab Take 1 tablet by mouth once daily.      vitamin D3-vitamin K2, MK4, (K2 PLUS D3) 1,000-100 unit-mcg Tab Take by mouth.      albuterol (PROVENTIL/VENTOLIN HFA) 90 mcg/actuation inhaler Inhale 1-2 puffs into the lungs every 6 (six) hours as needed for Wheezing. Rescue (Patient not taking: Reported on 7/25/2022) 8 g 0    meclizine (ANTIVERT) 25 mg tablet Take 1 tablet (25 mg total) by mouth 3 (three) times daily as needed. (Patient not taking: Reported on 7/25/2022) 20 tablet 0    pantoprazole (PROTONIX) 40 MG tablet Take 1 tablet (40 mg total) by mouth once daily. 30 tablet 11     Current Facility-Administered Medications on File Prior to Visit   Medication Dose Route Frequency Provider Last Rate Last Admin    leuprolide (6 month) injection 45 mg  45 mg Intramuscular Q6 Months Abbie Martinez,  "NP   45 mg at 02/18/22 1321     Vitals:    07/25/22 1410 07/25/22 1412   BP: (!) 141/64 (!) 147/64   BP Location: Left arm Right arm   Patient Position: Sitting Sitting   BP Method: Large (Automatic) Large (Automatic)   Pulse: 71 70   SpO2: 100%    Weight: 72.2 kg (159 lb 2.8 oz)    Height: 5' 7" (1.702 m)      Body mass index is 24.93 kg/m².         Objective:    Physical Exam  Vitals reviewed.   Constitutional:       General: He is not in acute distress.     Appearance: He is well-developed. He is not diaphoretic.      Comments:  present   HENT:      Head: Normocephalic and atraumatic.   Neck:      Vascular: No JVD.   Cardiovascular:      Rate and Rhythm: Normal rate and regular rhythm.      Pulses: Intact distal pulses.      Heart sounds: Murmur heard.    Systolic murmur is present with a grade of 2/6.   Diastolic murmur is present with a grade of 2/4.  Pulmonary:      Effort: Pulmonary effort is normal. No respiratory distress.      Breath sounds: Normal breath sounds.   Musculoskeletal:      Cervical back: Normal range of motion.      Right lower leg: No edema.      Left lower leg: No edema.   Skin:     General: Skin is warm and dry.   Neurological:      Mental Status: He is alert and oriented to person, place, and time.             Vitals:    07/25/22 1410 07/25/22 1412   BP: (!) 141/64 (!) 147/64   BP Location: Left arm Right arm   Patient Position: Sitting Sitting   BP Method: Large (Automatic) Large (Automatic)   Pulse: 71 70   SpO2: 100%    Weight: 72.2 kg (159 lb 2.8 oz)    Height: 5' 7" (1.702 m)      Body mass index is 24.93 kg/m².    Test(s) Reviewed  I have reviewed the following in detail:  [] Stress test   [] Angiography   [] Echocardiogram   [] Labs   [] Other       Chemistry        Component Value Date/Time     06/29/2022 0916    K 3.6 06/29/2022 0916     06/29/2022 0916    CO2 25 06/29/2022 0916    BUN 28 (H) 06/29/2022 0916    CREATININE 1.0 06/29/2022 0916 "    GLU 86 06/29/2022 0916        Component Value Date/Time    CALCIUM 9.3 06/29/2022 0916    ALKPHOS 50 (L) 05/04/2022 1049    AST 24 05/04/2022 1049    ALT 15 05/04/2022 1049    BILITOT 0.9 05/04/2022 1049    ESTGFRAFRICA >60.0 06/29/2022 0916    EGFRNONAA >60.0 06/29/2022 0916              Assessment:   S/P AVR  Michael Mclean is a 82 y.o. male referred by Dr Shrestha for evaluation of failing 25 mm Perimount bioprosthetic valve True ID 23 (NYHA Class III sx).     The patient has undergone the following TAVR work-up:    ECHO (Date 5/18/22): SAIDA= 0.8 cm2, MG= 29 mmHg, Peak Mehdi= 3.8 m/s, EF= 55%. Moderate AI.  Failing bioprosthetic valve.   LHC (Date 2010): LIMA to LAD, SVG to RCA, 60% LCX.     STS: 5%    Frailty: 3/4 (failed  and walk and albumin)     Iliacs are > 6 on the R with high grade RCI stenosis and > 7.3 on the L with severe tortuosity.  Best for L TF access.   LVOT area by CTA is:   Incidental findings on CT:    CT Surgery risk assessment: Inoperable per Schofield   Rhythm issues: AICD in situ   PFTs: Deferred   KCCQ/5 meter walk: Done   Comorbidities: deaf, hx CVA, s/p AVR/CABG, prostate CA      NYHA: III CCS: 0    S/P CABG (coronary artery bypass graft)  Dr. Feliberto Cruz at [facility] in [facility] on 4/29/2010. He is s/p CABG x 2 with LIMA-LAD and SVG-RCA along with aortic valve replacement    Prostate cancer  Followed by Dr Wilkins. S/p prostatectomy. Currently on Lupron injections and Casodex hormone therapy.     Paroxysmal atrial fibrillation  Not currently on VESNA.  No MAZE/MAURICIO ligation in the OP report.     Chronic diastolic heart failure  Chronic. Controlled. Follow up with Cardiology/PCP.      Plan:     Transcatheter Aortic valve replacement   1. Valve: 26 Evolut  2. TAVR access: LTF with 65cm 18Fr sheath.  3. RCFA access: Bright tip 6Fr sheath.  4. Valvuloplasty balloon: None  5. Viabahn size if needed: 9x5 from L SFA  6. Antithrombotic therapy: ASA  alone  7. Pacemaker risk factors: AICD in situ   8. RCFV TVP  9. Consenting: (with )  1. The patient was told that the procedure carries around a 12.5% risk of permanent pacemaker requirement and that risk depends on the patients underlying conduction system.  2. Prior to the [facility] visit, all available records from referring provider were reviewed.  3. I have personally reviewed all the lab tests available related to the patient's case  4. The patient's images were reviewed by myself and the procedural planning was done with my own interpretation of the iliac and aortic anatomy based on CTA, angiography or ALEXYS. I have reviewed all other imaging studies relevant to the patient including echocardiography and coronary angiography.  5. Patient was educated abut the pathophysiology and natural history of severe aortic stenosis and was educated about the treatment options including surgical and transcatheter valve replacement. She agrees to be full code for the forseable future and to undergo workup for treatment of severe AS.   6. The risks, benefits, and alternatives of transcatheter aortic valve replacement were discussed with the patient. All questions were answered and informed consent was obtained. I had a detailed discussion with the patient regarding risk of stroke, MI, bleeding access site complications including limb loss, allergy, kidney failure including dialysis and death.  7. The patient understands the risks and benefits and wishes to go ahead with the procedure.  8. The referring Cardiologist was notified of the plan  9. All patient's questions were answered

## 2022-07-25 NOTE — PROGRESS NOTES
Subjective:    Patient ID:  Michael Mclean is a 82 y.o. male who presents for evaluation of AI/PVL. He is completely deaf.    Referring Physician: Dr Shrestha at [facility]  Michael Mclean is a 82 y.o. male with a past medical history of persistent Afib (s/p DCCV in 2017, s/p PVI in 2020), CAD s/p 2v CABG & bioprosthetic aotic valve in 2012, s/p BiV ICD/PPM, h/o SSS, HTN, HLD, CVA, and prostate cancer who presents for evaluation of prosthetic valve insufficiency.     Patient underwent TTE on 5/18/22 with Dr Shrestha which showed normal LV function, moderate to severe AS with SAIDA 0,8, PV 3.8, and MG 29 mmHg. Mild to moderate AI. EF 55%    Patient states that for the past 3 month he has been experiencing dyspnea on exertion with walking. He denies SOB at rest and heart failure admission. He did have a history of blood transfusion about 1 year ago. He is followed by Dr Wilkins for prostate cancer. He is s/p prostatectomy with injections and currently undergoing hormone therapy.    .   Surgery: Dr. Feliberto Cruz at [facility] in [facility] on 4/29/2010. He is s/p CABG x 2 with LIMA-LAD and SVG-RCA along with aortic valve replacement utilizing 25 mm pericardial bioprosthetic aortic valve.     Michael Mclean is a 82 y.o. male referred by Dr Shrestha for evaluation of failing 25 mm Perimount bioprosthetic valve True ID 23 (NYHA Class III sx).     The patient has undergone the following TAVR work-up:    ECHO (Date 5/18/22): SAIDA= 0.8 cm2, MG= 29 mmHg, Peak Mehdi= 3.8 m/s, EF= 55%. Moderate AI.  Failing bioprosthetic valve.   LHC (Date 2010): LIMA to LAD, SVG to RCA, 60% LCX.     STS: 5%    Frailty: 3/4 (failed  and walk and albumin)     Iliacs are > 6 on the R with high grade RCI stenosis and > 7.3 on the L with severe tortuosity.  Best for L TF access.   LVOT area by CTA is:   Incidental findings on CT:    CT Surgery risk assessment: Inoperable per  Schofield   Rhythm issues: AICD in situ   PFTs: Deferred   KCCQ/5 meter walk: Done   Comorbidities: deaf, hx CVA, s/p AVR/CABG, prostate CA      NYHA: III CCS: 0    Review of Systems   Constitutional: Negative for chills and fever.   HENT: Negative for sore throat.    Eyes: Negative for blurred vision.   Cardiovascular: Positive for dyspnea on exertion. Negative for chest pain, claudication, cyanosis, irregular heartbeat, leg swelling, near-syncope, orthopnea, palpitations, paroxysmal nocturnal dyspnea and syncope.   Respiratory: Negative for cough and sputum production.    Hematologic/Lymphatic: Does not bruise/bleed easily.   Skin: Negative for itching, rash and suspicious lesions.   Musculoskeletal: Negative for falls.   Gastrointestinal: Negative for abdominal pain and change in bowel habit.   Genitourinary: Negative for dysuria.   Neurological: Negative for disturbances in coordination, dizziness and loss of balance.   Psychiatric/Behavioral: Negative for altered mental status.     Past Medical History:   Diagnosis Date    Abdominal hernia     Anemia 02/2021    Anticoagulant long-term use     Arthritis     SHOULDER    Cancer     prostate    Cardiomyopathy     Carotid artery stenosis     left    Coronary artery disease     Deaf     Dizziness     Encounter for blood transfusion     Gastritis 3/2/2021    Hyperlipidemia     Hypertension     Mitral regurgitation     Mitral regurgitation     PAD (peripheral artery disease)     Paroxysmal atrial fibrillation     Stroke 2020    Thyroid disease      Current Outpatient Medications on File Prior to Visit   Medication Sig Dispense Refill    acetylcarnitin/alpha lipoic ac (ACETYLCARNITIN HCL-A LIPOIC AC) 400-200 mg Cap Take 1 capsule by mouth once daily.      aspirin (ECOTRIN) 81 MG EC tablet Take 1 tablet (81 mg total) by mouth once daily. 30 tablet 11    bicalutamide (CASODEX) 50 MG Tab Take 1 tablet (50 mg total) by mouth once daily. 30 tablet 3     cholecalciferol, vitamin D3, 5,000 unit capsule Take 5,000 Units by mouth once daily.       clonazePAM (KLONOPIN) 2 MG Tab TAKE 1 TABLET BY MOUTH ONCE DAILY AS NEEDED FOR ANXIETY 30 tablet 0    clopidogreL (PLAVIX) 75 mg tablet Take 1 tablet (75 mg total) by mouth once daily. 30 tablet 11    cyanocobalamin, vitamin B-12, 1,000 mcg/mL Kit Inject 1 mL as directed every 28 days.      docusate sodium (COLACE) 100 MG capsule Take 100 mg by mouth 2 (two) times daily.      ferrous sulfate (FEOSOL) 325 mg (65 mg iron) Tab tablet Take 1 tablet (325 mg total) by mouth once daily.  0    KLOR-CON M20 20 mEq tablet Take 20 mEq by mouth once daily.       magnesium oxide (MAG-OX) 400 mg tablet Take 400 mg by mouth once daily.      metoprolol succinate (TOPROL-XL) 100 MG 24 hr tablet Take 50 mg by mouth 2 (two) times a day.      rosuvastatin (CRESTOR) 20 MG tablet Take 20 mg by mouth every evening.      sotaloL (BETAPACE) 80 MG tablet Take 0.5 tablets (40 mg total) by mouth 2 (two) times daily. 90 tablet 0    torsemide (DEMADEX) 20 MG Tab Take 20 mg by mouth every other day.      vitamin D3-folic acid 5,000 unit- 1 mg Tab Take 1 tablet by mouth once daily.      vitamin D3-vitamin K2, MK4, (K2 PLUS D3) 1,000-100 unit-mcg Tab Take by mouth.      albuterol (PROVENTIL/VENTOLIN HFA) 90 mcg/actuation inhaler Inhale 1-2 puffs into the lungs every 6 (six) hours as needed for Wheezing. Rescue (Patient not taking: Reported on 7/25/2022) 8 g 0    meclizine (ANTIVERT) 25 mg tablet Take 1 tablet (25 mg total) by mouth 3 (three) times daily as needed. (Patient not taking: Reported on 7/25/2022) 20 tablet 0    pantoprazole (PROTONIX) 40 MG tablet Take 1 tablet (40 mg total) by mouth once daily. 30 tablet 11     Current Facility-Administered Medications on File Prior to Visit   Medication Dose Route Frequency Provider Last Rate Last Admin    leuprolide (6 month) injection 45 mg  45 mg Intramuscular Q6 Months Abbie Martinez,  "NP   45 mg at 02/18/22 1321     Vitals:    07/25/22 1410 07/25/22 1412   BP: (!) 141/64 (!) 147/64   BP Location: Left arm Right arm   Patient Position: Sitting Sitting   BP Method: Large (Automatic) Large (Automatic)   Pulse: 71 70   SpO2: 100%    Weight: 72.2 kg (159 lb 2.8 oz)    Height: 5' 7" (1.702 m)      Body mass index is 24.93 kg/m².         Objective:    Physical Exam  Vitals reviewed.   Constitutional:       General: He is not in acute distress.     Appearance: He is well-developed. He is not diaphoretic.      Comments:  present   HENT:      Head: Normocephalic and atraumatic.   Neck:      Vascular: No JVD.   Cardiovascular:      Rate and Rhythm: Normal rate and regular rhythm.      Pulses: Intact distal pulses.      Heart sounds: Murmur heard.    Systolic murmur is present with a grade of 2/6.   Diastolic murmur is present with a grade of 2/4.  Pulmonary:      Effort: Pulmonary effort is normal. No respiratory distress.      Breath sounds: Normal breath sounds.   Musculoskeletal:      Cervical back: Normal range of motion.      Right lower leg: No edema.      Left lower leg: No edema.   Skin:     General: Skin is warm and dry.   Neurological:      Mental Status: He is alert and oriented to person, place, and time.             Vitals:    07/25/22 1410 07/25/22 1412   BP: (!) 141/64 (!) 147/64   BP Location: Left arm Right arm   Patient Position: Sitting Sitting   BP Method: Large (Automatic) Large (Automatic)   Pulse: 71 70   SpO2: 100%    Weight: 72.2 kg (159 lb 2.8 oz)    Height: 5' 7" (1.702 m)      Body mass index is 24.93 kg/m².    Test(s) Reviewed  I have reviewed the following in detail:  [] Stress test   [] Angiography   [] Echocardiogram   [] Labs   [] Other       Chemistry        Component Value Date/Time     06/29/2022 0916    K 3.6 06/29/2022 0916     06/29/2022 0916    CO2 25 06/29/2022 0916    BUN 28 (H) 06/29/2022 0916    CREATININE 1.0 06/29/2022 0916 "    GLU 86 06/29/2022 0916        Component Value Date/Time    CALCIUM 9.3 06/29/2022 0916    ALKPHOS 50 (L) 05/04/2022 1049    AST 24 05/04/2022 1049    ALT 15 05/04/2022 1049    BILITOT 0.9 05/04/2022 1049    ESTGFRAFRICA >60.0 06/29/2022 0916    EGFRNONAA >60.0 06/29/2022 0916              Assessment:   S/P AVR  Michael Mclean is a 82 y.o. male referred by Dr Shrestha for evaluation of failing 25 mm Perimount bioprosthetic valve True ID 23 (NYHA Class III sx).     The patient has undergone the following TAVR work-up:    ECHO (Date 5/18/22): SAIDA= 0.8 cm2, MG= 29 mmHg, Peak Mehdi= 3.8 m/s, EF= 55%. Moderate AI.  Failing bioprosthetic valve.   LHC (Date 2010): LIMA to LAD, SVG to RCA, 60% LCX.     STS: 5%    Frailty: 3/4 (failed  and walk and albumin)     Iliacs are > 6 on the R with high grade RCI stenosis and > 7.3 on the L with severe tortuosity.  Best for L TF access.   LVOT area by CTA is:   Incidental findings on CT:    CT Surgery risk assessment: Inoperable per Schofield   Rhythm issues: AICD in situ   PFTs: Deferred   KCCQ/5 meter walk: Done   Comorbidities: deaf, hx CVA, s/p AVR/CABG, prostate CA      NYHA: III CCS: 0    S/P CABG (coronary artery bypass graft)  Dr. Feliberto Cruz at [facility] on 4/29/2010. He is s/p CABG x 2 with LIMA-LAD and SVG-RCA along with aortic valve replacement    Prostate cancer  Followed by Dr Wilkins. S/p prostatectomy. Currently on Lupron injections and Casodex hormone therapy.     Paroxysmal atrial fibrillation  Not currently on VESNA.  No MAZE/MAURICIO ligation in the OP report.     Chronic diastolic heart failure  Chronic. Controlled. Follow up with Cardiology/PCP.      Plan:     Transcatheter Aortic valve replacement   1. Valve: 26 Evolut  2. TAVR access: LTF with 65cm 18Fr sheath.  3. RCFA access: Bright tip 6Fr sheath.  4. Valvuloplasty balloon: None  5. Viabahn size if needed: 9x5 from L SFA  6. Antithrombotic therapy: ASA  alone  7. Pacemaker risk factors: AICD in situ   8. RCFV TVP  9. Consenting: (with )  1. The patient was told that the procedure carries around a 12.5% risk of permanent pacemaker requirement and that risk depends on the patients underlying conduction system.  2. Prior to the [facility] visit, all available records from referring provider were reviewed.  3. I have personally reviewed all the lab tests available related to the patient's case  4. The patient's images were reviewed by myself and the procedural planning was done with my own interpretation of the iliac and aortic anatomy based on CTA, angiography or ALEXYS. I have reviewed all other imaging studies relevant to the patient including echocardiography and coronary angiography.  5. Patient was educated abut the pathophysiology and natural history of severe aortic stenosis and was educated about the treatment options including surgical and transcatheter valve replacement. She agrees to be full code for the forseable future and to undergo workup for treatment of severe AS.   6. The risks, benefits, and alternatives of transcatheter aortic valve replacement were discussed with the patient. All questions were answered and informed consent was obtained. I had a detailed discussion with the patient regarding risk of stroke, MI, bleeding access site complications including limb loss, allergy, kidney failure including dialysis and death.  7. The patient understands the risks and benefits and wishes to go ahead with the procedure.  8. The referring Cardiologist was notified of the plan  9. All patient's questions were answered

## 2022-07-26 ENCOUNTER — ANESTHESIA EVENT (OUTPATIENT)
Dept: MEDSURG UNIT | Facility: HOSPITAL | Age: 82
DRG: 266 | End: 2022-07-26
Payer: MEDICARE

## 2022-07-26 NOTE — ANESTHESIA PREPROCEDURE EVALUATION
[facility]  Anesthesia Pre-Operative Evaluation         Patient Name: Michael Mclean  YOB: 1940  MRN: [id number]    SUBJECTIVE:     Pre-operative evaluation for Procedure(s) (LRB):  REPLACEMENT, AORTIC VALVE, TRANSCATHETER (TAVR) (N/A)     07/26/2022    Michael Mclean is a 82 y.o. male w/ a significant PMHx of CHF w/ EF 55% with AICD due to previously reduced EF 15%, Afib s/p PVI in 2020, CAD s/p CABG, sick sinus syndrome and bioprosthetic AV in 2012 who presents for TAVR 2/2 to prosthetic valve insufficiency.     Additional medical history:  HTN, HLD, CVA, and prostate cancer    Patient is completely deaf and was consented via in person . His daughter was present at bedside who speaks and uses ASL and was very helpful.     TTE:   SAIDA= 0.8 cm2, MG= 29 mmHg, Peak Mehdi= 3.8 m/s, EF= 55%. Moderate AI.   Failing bioprosthetic valve.      Prev airway: Direct, nathan 2, grade 1 view 2020      Patient Active Problem List   Diagnosis    Paroxysmal atrial fibrillation    Embolic stroke involving left middle cerebral artery    Deaf, nonspeaking    Essential hypertension    Chronic diastolic heart failure    Aphasia    Persistent atrial fibrillation    Weakness    Symptomatic anemia    Closed displaced fracture of right femoral neck    CAD (coronary artery disease)    Iron deficiency anemia    Prostate cancer    Elevated PSA    Anemia    Gastritis    Reflux esophagitis    Diverticulosis    GI bleed    Edema    VHD (valvular heart disease)    MELISSA (acute kidney injury)    Hyperlipidemia    Dizziness    H. pylori infection    Fatigue    Cerumen impaction    S/P AVR    S/P CABG (coronary artery bypass graft)       Review of patient's allergies indicates:   Allergen Reactions    Losartan Hives    Valsartan Hives       Current Outpatient Medications   Medication Instructions    acetylcarnitin/alpha lipoic ac (ACETYLCARNITIN HCL-A LIPOIC  AC) 400-200 mg Cap 1 capsule, Oral, Daily    albuterol (PROVENTIL/VENTOLIN HFA) 90 mcg/actuation inhaler 1-2 puffs, Inhalation, Every 6 hours PRN, Rescue    aspirin (ECOTRIN) 81 mg, Oral, Daily    bicalutamide (CASODEX) 50 mg, Oral, Daily    cholecalciferol (vitamin D3) 5,000 Units, Oral, Daily    clonazePAM (KLONOPIN) 2 MG Tab TAKE 1 TABLET BY MOUTH ONCE DAILY AS NEEDED FOR ANXIETY    clopidogreL (PLAVIX) 75 mg, Oral, Daily    cyanocobalamin, vitamin B-12, 1,000 mcg/mL Kit 1 mL, Injection, Every 28 days    docusate sodium (COLACE) 100 mg, Oral, 2 times daily    ferrous sulfate (FEOSOL) 325 mg, Oral, Daily    KLOR-CON M20 20 mEq tablet 20 mEq, Oral, Daily    magnesium oxide (MAG-OX) 400 mg, Oral, Daily    meclizine (ANTIVERT) 25 mg, Oral, 3 times daily PRN    metoprolol succinate (TOPROL-XL) 50 mg, Oral, 2 times daily    pantoprazole (PROTONIX) 40 mg, Oral, Daily    rosuvastatin (CRESTOR) 20 mg, Oral, Nightly    sotaloL (BETAPACE) 40 mg, Oral, 2 times daily    torsemide (DEMADEX) 20 mg, Oral, Every other day    vitamin D3-folic acid 5,000 unit- 1 mg Tab 1 tablet, Oral, Daily    vitamin D3-vitamin K2, MK4, (K2 PLUS D3) 1,000-100 unit-mcg Tab Oral       Past Surgical History:   Procedure Laterality Date    ABLATION N/A 7/22/2020    Procedure: Ablation, afib;  Surgeon: Tal Laurent MD;  Location: [facility] CATH;  Service: Cardiology;  Laterality: N/A;  needs covid test;needs ALEXYS ([place])    AORTIC VALVE REPLACEMENT      CARDIAC CATHETERIZATION      3 HEART STENTS    carotid artery stent Left     COLONOSCOPY N/A 3/2/2021    Procedure: COLONOSCOPY;  Surgeon: Emiliana Cardenas MD;  Location: [facility] ENDO;  Service: General;  Laterality: N/A;  4  8:30am   CL/AM/Bd    CORONARY ARTERY BYPASS GRAFT      ESOPHAGOGASTRODUODENOSCOPY N/A 11/10/2020    Procedure: EGD (ESOPHAGOGASTRODUODENOSCOPY);  Surgeon: Renny Prakash MD;  [facility];  Service: Endoscopy;  Laterality: N/A;    HERNIA REPAIR       HIP REPLACEMENT ARTHROPLASTY Right 11/6/2020    Procedure: EMELY -ARTHROPLASTY, HIP REPLACEMENT (ENDO HIP);  Surgeon: Lino Thomas MD;  [facility]  Service: Orthopedics;  Laterality: Right;    LEFT HEART CATHETERIZATION N/A 6/29/2022    Procedure: Left heart cath;  Surgeon: Glenroy Richardson MD;  Location: [facility] CATH LAB;  Service: Cardiology;  Laterality: N/A;    PACERMAKER      PROSTATECTOMY      SHOULDER SURGERY      TOE SURGERY      TRANSESOPHAGEAL ECHOCARDIOGRAPHY N/A 6/27/2022    Procedure: ECHOCARDIOGRAM, TRANSESOPHAGEAL;  Surgeon: Fracisco Diagnostic Provider;  Location: [facility] EP LAB;  Service: Anesthesiology;  Laterality: N/A;    TREATMENT OF CARDIAC ARRHYTHMIA N/A 8/7/2019    Procedure: Cardioversion or Defibrillation;  Surgeon: Tal Laurent MD;  [facility];  Service: Cardiology;  Laterality: N/A;       Social History:  Tobacco Use: Low Risk     Smoking Tobacco Use: Never Smoker    Smokeless Tobacco Use: Never Used     OBJECTIVE:     Vital Signs Range (Last 24H):  Pulse:  [70-71]   BP: (141-147)/(64)   SpO2:  [100 %]       Significant Labs:    Lab Results   Component Value Date    WBC 7.41 07/25/2022    HGB 12.6 (L) 07/25/2022    HCT 38.1 (L) 07/25/2022    MCV 96 07/25/2022     (L) 07/25/2022       BMP  Lab Results   Component Value Date     07/25/2022    K 4.3 07/25/2022     07/25/2022    CO2 28 07/25/2022    BUN 26 (H) 07/25/2022    CREATININE 1.3 07/25/2022    CALCIUM 9.6 07/25/2022    ANIONGAP 9 07/25/2022    ESTGFRAFRICA 57.9 (A) 07/25/2022    EGFRNONAA 50.1 (A) 07/25/2022     Lab Results   Component Value Date    ALT 15 05/04/2022    AST 24 05/04/2022    ALKPHOS 50 (L) 05/04/2022    BILITOT 0.9 05/04/2022       Lab Results   Component Value Date    INR 1.1 07/25/2022    INR 1.1 06/24/2022    INR 1.0 06/24/2021       Lab Results   Component Value Date    HGBA1C 5.5 02/24/2022       EKG:   Results for orders placed or performed during the hospital  encounter of 06/29/22   EKG 12-lead    Collection Time: 06/29/22  9:09 AM    Narrative    Test Reason : I25.10,I50.32,    Vent. Rate : 071 BPM     Atrial Rate : 071 BPM     P-R Int : 232 ms          QRS Dur : 136 ms      QT Int : 432 ms       P-R-T Axes : 000 -15 104 degrees     QTc Int : 469 ms    Atrial-paced rhythm with prolonged AV conduction  LVH with QRS widening and repolarization abnormality ( [place] product )  Low septal forces  Abnormal ECG  When compared with ECG of 24-JUN-2021 09:23,  Septal forces have changed  Confirmed by Saad BARNETT MD ([id number]) on 6/29/2022 11:13:54 AM    Referred By: SHELDON JEFFERS           Confirmed By:Saad BARNETT MD     ALEXYS:  Results for orders placed or performed during the hospital encounter of 06/27/22   Transesophageal echo (ALEXYS)   Result Value Ref Range    BSA 1.85 m2    EF 40 %    Ao root annulus 2.50 cm    Sinus 4.00 cm    STJ 3.40 cm    Narrative    · 81 yo M with hx of CABG x2 and 25 mm Perimount bioprosthetic valve at   [facility] in [facility] on 4/29/2010  · The left ventricle is mildly enlarged with moderately decreased systolic   function. The estimated ejection fraction is 40%.  · Normal right ventricular size with normal right ventricular systolic   function.  · No interatrial septal defect present.  · Normal appearing left atrial appendage. No thrombus is present in the   appendage. Abnormal appendage velocities.  · Moderate, posteriorly directed mitral regurgitation.  · There is a 25 mm Perimount bioprosthetic aortic valve present. There is   moderate central aortic insufficiency present. No paravalvular leak is   identified.          ASSESSMENT/PLAN:       Pre-op Assessment    I have reviewed the Patient Summary Reports.     I have reviewed the Nursing Notes. I have reviewed the NPO Status.   I have reviewed the Medications.     Review of Systems  Anesthesia Hx:  No problems with previous Anesthesia  Denies Family Hx of Anesthesia  complications.   Denies Personal Hx of Anesthesia complications.   Cardiovascular:   Hypertension Valvular problems/Murmurs CAD  CABG/stent Dysrhythmias  hyperlipidemia    Pulmonary:   Shortness of breath SOB present at baseline since he started having issues with his valve. It's slightly worse this morning from being up and moving around.    Renal/:  Renal/ Normal     Hepatic/GI:  Hepatic/GI Normal    Neurological:  Neurology Normal    Endocrine:  Endocrine Normal        Physical Exam  General: Well nourished    Airway:  Mallampati: II   Mouth Opening: Normal  TM Distance: > 6 cm  Tongue: Normal  Neck ROM: Normal ROM    Dental:  Periodontal disease  Per patient, nothing loose/chipped/broken/removable.   Chest/Lungs:  Normal Respiratory Rate    Heart:  Rate: Normal        Anesthesia Plan  Type of Anesthesia, risks & benefits discussed:    Anesthesia Type: Gen Natural Airway, MAC  Intra-op Monitoring Plan: Standard ASA Monitors, Art Line and Central Line  Post Op Pain Control Plan: multimodal analgesia and IV/PO Opioids PRN  Induction:  IV  Airway Plan: Video and Direct, Post-Induction  Informed Consent: Informed consent signed with the Patient and all parties understand the risks and agree with anesthesia plan.  All questions answered. Patient consented to blood products? Yes  ASA Score: 4  Day of Surgery Review of History & Physical: H&P Update referred to the surgeon/provider.    Ready For Surgery From Anesthesia Perspective.     .

## 2022-07-27 ENCOUNTER — ANESTHESIA (OUTPATIENT)
Dept: MEDSURG UNIT | Facility: HOSPITAL | Age: 82
DRG: 266 | End: 2022-07-27
Payer: MEDICARE

## 2022-07-27 ENCOUNTER — HOSPITAL ENCOUNTER (INPATIENT)
Facility: HOSPITAL | Age: 82
LOS: 1 days | Discharge: HOME | DRG: 266 | End: 2022-07-28
Attending: INTERNAL MEDICINE | Admitting: INTERNAL MEDICINE
Payer: MEDICARE

## 2022-07-27 DIAGNOSIS — I35.0 SEVERE AORTIC STENOSIS: ICD-10-CM

## 2022-07-27 DIAGNOSIS — I35.0 NONRHEUMATIC AORTIC VALVE STENOSIS: ICD-10-CM

## 2022-07-27 DIAGNOSIS — N18.9 CHRONIC KIDNEY DISEASE, UNSPECIFIED CKD STAGE: ICD-10-CM

## 2022-07-27 DIAGNOSIS — I35.0 AORTIC STENOSIS: ICD-10-CM

## 2022-07-27 DIAGNOSIS — Z95.2 S/P TAVR (TRANSCATHETER AORTIC VALVE REPLACEMENT): Primary | ICD-10-CM

## 2022-07-27 PROBLEM — I50.33 ACUTE ON CHRONIC DIASTOLIC HEART FAILURE: Status: ACTIVE | Noted: 2019-04-03

## 2022-07-27 PROBLEM — I70.0 AORTIC ATHEROSCLEROSIS: Status: ACTIVE | Noted: 2022-07-27

## 2022-07-27 PROBLEM — Z95.810 BIVENTRICULAR IMPLANTABLE CARDIOVERTER-DEFIBRILLATOR (ICD) IN SITU: Status: ACTIVE | Noted: 2022-07-27

## 2022-07-27 LAB
ABO + RH BLD: NORMAL
ANION GAP SERPL CALC-SCNC: 10 MMOL/L (ref 8–16)
APTT BLDCRRT: 27.2 SEC (ref 21–32)
BLD GP AB SCN CELLS X3 SERPL QL: NORMAL
BNP SERPL-MCNC: 1369 PG/ML (ref 0–99)
BUN SERPL-MCNC: 23 MG/DL (ref 8–23)
CALCIUM SERPL-MCNC: 9.4 MG/DL (ref 8.7–10.5)
CHLORIDE SERPL-SCNC: 105 MMOL/L (ref 95–110)
CO2 SERPL-SCNC: 25 MMOL/L (ref 23–29)
CREAT SERPL-MCNC: 1.1 MG/DL (ref 0.5–1.4)
ERYTHROCYTE [DISTWIDTH] IN BLOOD BY AUTOMATED COUNT: 13.7 % (ref 11.5–14.5)
EST. GFR  (AFRICAN AMERICAN): >60 ML/MIN/1.73 M^2
EST. GFR  (NON AFRICAN AMERICAN): >60 ML/MIN/1.73 M^2
GLUCOSE SERPL-MCNC: 86 MG/DL (ref 70–110)
HCT VFR BLD AUTO: 35.2 % (ref 40–54)
HGB BLD-MCNC: 11.9 G/DL (ref 14–18)
INR PPP: 1.1 (ref 0.8–1.2)
MCH RBC QN AUTO: 31.3 PG (ref 27–31)
MCHC RBC AUTO-ENTMCNC: 33.8 G/DL (ref 32–36)
MCV RBC AUTO: 93 FL (ref 82–98)
PLATELET # BLD AUTO: 112 K/UL (ref 150–450)
PMV BLD AUTO: 11.4 FL (ref 9.2–12.9)
POTASSIUM SERPL-SCNC: 3.8 MMOL/L (ref 3.5–5.1)
PROTHROMBIN TIME: 11 SEC (ref 9–12.5)
RBC # BLD AUTO: 3.8 M/UL (ref 4.6–6.2)
SODIUM SERPL-SCNC: 140 MMOL/L (ref 136–145)
WBC # BLD AUTO: 6.52 K/UL (ref 3.9–12.7)

## 2022-07-27 PROCEDURE — 63600175 PHARM REV CODE 636 W HCPCS: Performed by: INTERNAL MEDICINE

## 2022-07-27 PROCEDURE — D9220A PRA ANESTHESIA: Mod: Q0,,, | Performed by: ANESTHESIOLOGY

## 2022-07-27 PROCEDURE — 37000008 HC ANESTHESIA 1ST 15 MINUTES: Performed by: INTERNAL MEDICINE

## 2022-07-27 PROCEDURE — 85730 THROMBOPLASTIN TIME PARTIAL: CPT | Performed by: INTERNAL MEDICINE

## 2022-07-27 PROCEDURE — C1769 GUIDE WIRE: HCPCS | Performed by: INTERNAL MEDICINE

## 2022-07-27 PROCEDURE — 93005 ELECTROCARDIOGRAM TRACING: CPT

## 2022-07-27 PROCEDURE — 25000003 PHARM REV CODE 250: Performed by: STUDENT IN AN ORGANIZED HEALTH CARE EDUCATION/TRAINING PROGRAM

## 2022-07-27 PROCEDURE — C1760 CLOSURE DEV, VASC: HCPCS | Performed by: INTERNAL MEDICINE

## 2022-07-27 PROCEDURE — C1894 INTRO/SHEATH, NON-LASER: HCPCS | Performed by: INTERNAL MEDICINE

## 2022-07-27 PROCEDURE — 33361 REPLACE AORTIC VALVE PERQ: CPT | Mod: 62,Q0,, | Performed by: INTERNAL MEDICINE

## 2022-07-27 PROCEDURE — 99900035 HC TECH TIME PER 15 MIN (STAT)

## 2022-07-27 PROCEDURE — 85027 COMPLETE CBC AUTOMATED: CPT | Performed by: INTERNAL MEDICINE

## 2022-07-27 PROCEDURE — 25500020 PHARM REV CODE 255: Performed by: INTERNAL MEDICINE

## 2022-07-27 PROCEDURE — 85610 PROTHROMBIN TIME: CPT | Performed by: INTERNAL MEDICINE

## 2022-07-27 PROCEDURE — 25000003 PHARM REV CODE 250: Performed by: INTERNAL MEDICINE

## 2022-07-27 PROCEDURE — 37000009 HC ANESTHESIA EA ADD 15 MINS: Performed by: INTERNAL MEDICINE

## 2022-07-27 PROCEDURE — 94761 N-INVAS EAR/PLS OXIMETRY MLT: CPT

## 2022-07-27 PROCEDURE — 93010 ELECTROCARDIOGRAM REPORT: CPT | Mod: ,,, | Performed by: INTERNAL MEDICINE

## 2022-07-27 PROCEDURE — 27800903 OPTIME MED/SURG SUP & DEVICES OTHER IMPLANTS: Performed by: INTERNAL MEDICINE

## 2022-07-27 PROCEDURE — 33361 PR TAVR, PERCUTANEOUS FEMORAL: ICD-10-PCS | Mod: 62,Q0,, | Performed by: THORACIC SURGERY (CARDIOTHORACIC VASCULAR SURGERY)

## 2022-07-27 PROCEDURE — 80048 BASIC METABOLIC PNL TOTAL CA: CPT | Performed by: INTERNAL MEDICINE

## 2022-07-27 PROCEDURE — 33361 REPLACE AORTIC VALVE PERQ: CPT | Performed by: INTERNAL MEDICINE

## 2022-07-27 PROCEDURE — 93010 EKG 12-LEAD: ICD-10-PCS | Mod: ,,, | Performed by: INTERNAL MEDICINE

## 2022-07-27 PROCEDURE — 33361 PR TAVR, PERCUTANEOUS FEMORAL: ICD-10-PCS | Mod: 62,Q0,, | Performed by: INTERNAL MEDICINE

## 2022-07-27 PROCEDURE — 33361 REPLACE AORTIC VALVE PERQ: CPT | Mod: 62,Q0,, | Performed by: THORACIC SURGERY (CARDIOTHORACIC VASCULAR SURGERY)

## 2022-07-27 PROCEDURE — 27201423 OPTIME MED/SURG SUP & DEVICES STERILE SUPPLY: Performed by: INTERNAL MEDICINE

## 2022-07-27 PROCEDURE — 20000000 HC ICU ROOM

## 2022-07-27 PROCEDURE — 83880 ASSAY OF NATRIURETIC PEPTIDE: CPT | Performed by: INTERNAL MEDICINE

## 2022-07-27 PROCEDURE — A4216 STERILE WATER/SALINE, 10 ML: HCPCS | Performed by: STUDENT IN AN ORGANIZED HEALTH CARE EDUCATION/TRAINING PROGRAM

## 2022-07-27 PROCEDURE — 86850 RBC ANTIBODY SCREEN: CPT | Performed by: INTERNAL MEDICINE

## 2022-07-27 PROCEDURE — 63600175 PHARM REV CODE 636 W HCPCS: Performed by: ANESTHESIOLOGY

## 2022-07-27 PROCEDURE — D9220A PRA ANESTHESIA: ICD-10-PCS | Mod: Q0,,, | Performed by: ANESTHESIOLOGY

## 2022-07-27 PROCEDURE — 63600175 PHARM REV CODE 636 W HCPCS: Performed by: STUDENT IN AN ORGANIZED HEALTH CARE EDUCATION/TRAINING PROGRAM

## 2022-07-27 DEVICE — VALVE EVOLUT PRO+ TAVR 26MM: Type: IMPLANTABLE DEVICE | Site: HEART | Status: FUNCTIONAL

## 2022-07-27 DEVICE — SYS EVOLUT PRO+ DELIVERY 23-29: Type: IMPLANTABLE DEVICE | Site: HEART | Status: FUNCTIONAL

## 2022-07-27 DEVICE — SYS EVOLUT PRO+ LOADING 23-29: Type: IMPLANTABLE DEVICE | Site: HEART | Status: FUNCTIONAL

## 2022-07-27 RX ORDER — PROTAMINE SULFATE 10 MG/ML
INJECTION, SOLUTION INTRAVENOUS
Status: DISCONTINUED | OUTPATIENT
Start: 2022-07-27 | End: 2022-07-27

## 2022-07-27 RX ORDER — PROPOFOL 10 MG/ML
VIAL (ML) INTRAVENOUS
Status: DISCONTINUED | OUTPATIENT
Start: 2022-07-27 | End: 2022-07-27

## 2022-07-27 RX ORDER — SODIUM CHLORIDE 0.9 % (FLUSH) 0.9 %
10 SYRINGE (ML) INJECTION
Status: DISCONTINUED | OUTPATIENT
Start: 2022-07-27 | End: 2022-07-28 | Stop reason: HOSPADM

## 2022-07-27 RX ORDER — ACETAMINOPHEN 325 MG/1
650 TABLET ORAL EVERY 4 HOURS PRN
Status: DISCONTINUED | OUTPATIENT
Start: 2022-07-27 | End: 2022-07-28 | Stop reason: HOSPADM

## 2022-07-27 RX ORDER — HEPARIN SODIUM 1000 [USP'U]/ML
INJECTION, SOLUTION INTRAVENOUS; SUBCUTANEOUS
Status: DISCONTINUED | OUTPATIENT
Start: 2022-07-27 | End: 2022-07-27

## 2022-07-27 RX ORDER — SOTALOL HYDROCHLORIDE 80 MG/1
40 TABLET ORAL 2 TIMES DAILY
Status: DISCONTINUED | OUTPATIENT
Start: 2022-07-27 | End: 2022-07-28 | Stop reason: HOSPADM

## 2022-07-27 RX ORDER — HEPARIN SOD,PORCINE/0.9 % NACL 1000/500ML
INTRAVENOUS SOLUTION INTRAVENOUS
Status: DISCONTINUED | OUTPATIENT
Start: 2022-07-27 | End: 2022-07-27 | Stop reason: HOSPADM

## 2022-07-27 RX ORDER — ONDANSETRON 8 MG/1
8 TABLET, ORALLY DISINTEGRATING ORAL EVERY 8 HOURS PRN
Status: DISCONTINUED | OUTPATIENT
Start: 2022-07-27 | End: 2022-07-28 | Stop reason: HOSPADM

## 2022-07-27 RX ORDER — SODIUM,POTASSIUM PHOSPHATES 280-250MG
2 POWDER IN PACKET (EA) ORAL
Status: DISCONTINUED | OUTPATIENT
Start: 2022-07-27 | End: 2022-07-28 | Stop reason: HOSPADM

## 2022-07-27 RX ORDER — LANOLIN ALCOHOL/MO/W.PET/CERES
800 CREAM (GRAM) TOPICAL
Status: DISCONTINUED | OUTPATIENT
Start: 2022-07-27 | End: 2022-07-28 | Stop reason: HOSPADM

## 2022-07-27 RX ORDER — CEFAZOLIN SODIUM 1 G/3ML
INJECTION, POWDER, FOR SOLUTION INTRAMUSCULAR; INTRAVENOUS
Status: DISCONTINUED | OUTPATIENT
Start: 2022-07-27 | End: 2022-07-27

## 2022-07-27 RX ORDER — LIDOCAINE HYDROCHLORIDE 20 MG/ML
INJECTION, SOLUTION EPIDURAL; INFILTRATION; INTRACAUDAL; PERINEURAL
Status: DISCONTINUED | OUTPATIENT
Start: 2022-07-27 | End: 2022-07-27 | Stop reason: HOSPADM

## 2022-07-27 RX ORDER — MIDAZOLAM HYDROCHLORIDE 5 MG/ML
INJECTION INTRAMUSCULAR; INTRAVENOUS
Status: DISCONTINUED | OUTPATIENT
Start: 2022-07-27 | End: 2022-07-27

## 2022-07-27 RX ORDER — DEXMEDETOMIDINE HYDROCHLORIDE 100 UG/ML
INJECTION, SOLUTION INTRAVENOUS
Status: DISCONTINUED | OUTPATIENT
Start: 2022-07-27 | End: 2022-07-27

## 2022-07-27 RX ORDER — ASPIRIN 81 MG/1
81 TABLET ORAL DAILY
Status: DISCONTINUED | OUTPATIENT
Start: 2022-07-28 | End: 2022-07-28 | Stop reason: HOSPADM

## 2022-07-27 RX ORDER — NOREPINEPHRINE BITARTRATE/D5W 4MG/250ML
0-3 PLASTIC BAG, INJECTION (ML) INTRAVENOUS CONTINUOUS
Status: DISCONTINUED | OUTPATIENT
Start: 2022-07-27 | End: 2022-07-28 | Stop reason: HOSPADM

## 2022-07-27 RX ORDER — FENTANYL CITRATE 50 UG/ML
INJECTION, SOLUTION INTRAMUSCULAR; INTRAVENOUS
Status: DISCONTINUED | OUTPATIENT
Start: 2022-07-27 | End: 2022-07-27

## 2022-07-27 RX ORDER — DIPHENHYDRAMINE HCL 50 MG
50 CAPSULE ORAL ONCE
Status: COMPLETED | OUTPATIENT
Start: 2022-07-27 | End: 2022-07-27

## 2022-07-27 RX ORDER — SODIUM CHLORIDE 9 MG/ML
INJECTION, SOLUTION INTRAVENOUS CONTINUOUS
Status: ACTIVE | OUTPATIENT
Start: 2022-07-27 | End: 2022-07-27

## 2022-07-27 RX ORDER — ATORVASTATIN CALCIUM 20 MG/1
40 TABLET, FILM COATED ORAL DAILY
Status: DISCONTINUED | OUTPATIENT
Start: 2022-07-28 | End: 2022-07-28 | Stop reason: HOSPADM

## 2022-07-27 RX ADMIN — FENTANYL CITRATE 50 MCG: 0.05 INJECTION, SOLUTION INTRAMUSCULAR; INTRAVENOUS at 11:07

## 2022-07-27 RX ADMIN — PROTAMINE SULFATE 50 MG: 10 INJECTION, SOLUTION INTRAVENOUS at 12:07

## 2022-07-27 RX ADMIN — MIDAZOLAM 2 MG: 5 INJECTION INTRAMUSCULAR; INTRAVENOUS at 10:07

## 2022-07-27 RX ADMIN — SOTALOL HYDROCHLORIDE 40 MG: 80 TABLET ORAL at 08:07

## 2022-07-27 RX ADMIN — DIPHENHYDRAMINE HYDROCHLORIDE 50 MG: 50 CAPSULE ORAL at 09:07

## 2022-07-27 RX ADMIN — PROPOFOL 30 MG: 10 INJECTION, EMULSION INTRAVENOUS at 11:07

## 2022-07-27 RX ADMIN — DEXMEDETOMIDINE HYDROCHLORIDE 1 MCG/KG/HR: 100 INJECTION, SOLUTION, CONCENTRATE INTRAVENOUS at 10:07

## 2022-07-27 RX ADMIN — DEXMEDETOMIDINE HYDROCHLORIDE 36 MCG: 100 INJECTION, SOLUTION, CONCENTRATE INTRAVENOUS at 10:07

## 2022-07-27 RX ADMIN — NOREPINEPHRINE BITARTRATE 0.04 MCG/KG/MIN: 1 INJECTION, SOLUTION, CONCENTRATE INTRAVENOUS at 11:07

## 2022-07-27 RX ADMIN — CEFAZOLIN SODIUM 2 G: 1 INJECTION, POWDER, FOR SOLUTION INTRAMUSCULAR; INTRAVENOUS at 11:07

## 2022-07-27 RX ADMIN — HEPARIN SODIUM 9000 UNITS: 1000 INJECTION INTRAVENOUS; SUBCUTANEOUS at 11:07

## 2022-07-27 RX ADMIN — SODIUM CHLORIDE 150 ML/HR: 0.9 INJECTION, SOLUTION INTRAVENOUS at 12:07

## 2022-07-27 NOTE — OP NOTE
[facility]  Cardiothoracic Surgery Operative Report    Patient Name:  Michael Mclean; [id number]    DATE OF PROCEDURE: 07/27/2022   ATTENDING SURGEONS: Glenroy Richardson M.D., Jimi Vanegas M.D., and Glenroy Sam M.D.  PREOPERATIVE DIAGNOSIS:  Severe aortic stenosis.  POSTOPERATIVE DIAGNOSIS:  Severe aortic stenosis  ?  OPERATION PERFORMED: Transcatheter aortic valve insertion via transfemoral approach using a 26mm Medtronic Evolut bioprosthesis  ANESTHESIA: General.  ESTIMATED BLOOD LOSS: 10 mL.  BRIEF HISTORY: This patient is a 82 year old male with symptomatic severe aortic stenosis.  The patient has had progressive dyspnea on exertion. This prompted a thoughtful and thorough evaluation, which demonstrated severe aortic stenosis. Given the comorbid conditions, a transcatheter valve insertion was recommended. The patient now presents for transcatheter aortic valve insertion.  PROCEDURE: After obtaining informed and written consent, the patient was brought to the cath lab and placed on the cath table in supine position. After induction of adequate anesthesia, a transvenous pacing wire was placed.  Bilateral femoral arterial and femoral venous access was obtained. A wire was advanced across the aortic valve. It was exchanged for stiff wire, and an aortic balloon was placed. Under rapid ventricular pacing, balloon valvuloplasty was performed. The balloon was then withdrawn, and a valve was advanced to the level of the aortic annulus. Once the team was satisfied that the valve was in proper position, it was deployed. Excellent positioning was obtained. Post-procedure echo demonstrated no aortic insufficiency. The femoral access sites were controlled using percutaneous techniques. The patient tolerated the procedure well, there were no complications. At the conclusion of the case, sponge and instrument counts were correct.

## 2022-07-27 NOTE — PROGRESS NOTES
Patient admitted to recovery see [facility] for complete assessment pacu bcg's maintained safety measures verified patient sedated at this time . interpertaur at bedside with patient for when patient wakes up ekg done and in chart. Also patient has some blood drainage on right and left groin sites when brought out from the lab. Drainage outlined on sites. Also patient no distress noted.

## 2022-07-27 NOTE — HOSPITAL COURSE
Michael Mclean was admitted and underwent successful placement of a 26 mm Evolut valve in valve TAVR inside of a failing 25 mm Perimount bioprosthetic valve via TF access under MAC sedation on 7/26/22. Please see full cath report for details. A transthoracic echo was performed immediately post procedure which showed no paravalvular leak. An aortic valve mean gradient of 6 mmHg and a maximum velocity through the aortic valve of 1.4 m/s. He was transported to the CCU in stable condition with an arterial line in place. PPM in situ so EP was not consulted. He remained hemodynamically stable overnight. This morning, he ambulated without difficulty and was eager to go home. It was felt he was stable for discharge and will go home on ASA alone for antithrombotic therapy post TAVR.

## 2022-07-27 NOTE — ASSESSMENT & PLAN NOTE
Successful 26 mm Evolut TAVR inside of a failing 25 mm Perimount bioprosthetic valve.  No paravalvular leak, peak velocity 1.4 and mean gradient 6 post TAVR by surface echo.     Discharge Plans:  1. Follow up with [facility] in 1 month and 1 year with labs and Echo.  2. ASA indefinitely.   3. No non sterile procedures which could cause endocarditis for 6 months post TAVR including dental work, endoscopy, colonoscopy, and  procedures.   4. SBE prophylaxis for life.  5. Would like to establish cardiology [place] [facility] [place] Ambulatory referral sent to Dr Kiran.

## 2022-07-27 NOTE — ANESTHESIA POSTPROCEDURE EVALUATION
Anesthesia Post Evaluation    Patient: Michael Mclean    Procedure(s) Performed: Procedure(s) (LRB):  REPLACEMENT, AORTIC VALVE, TRANSCATHETER (TAVR) (N/A)  Cardiac Cath Cosurgeon (N/A)  REPLACEMENT, AORTIC VALVE, TRANSCATHETER (TAVR)    Final Anesthesia Type: general      Patient location during evaluation: PACU  Patient participation: Yes- Able to Participate  Level of consciousness: awake  Post-procedure vital signs: reviewed and stable  Pain management: adequate  Airway patency: patent    PONV status at discharge: No PONV  Anesthetic complications: no      Cardiovascular status: blood pressure returned to baseline  Respiratory status: unassisted  Follow-up not needed.          Vitals Value Taken Time   /68 07/27/22 1432   Temp 36.4 °C (97.6 °F) 07/27/22 1330   Pulse 70 07/27/22 1442   Resp 16 07/27/22 1415   SpO2 98 % 07/27/22 1442   Vitals shown include unvalidated device data.      No case tracking events are documented in the log.      Pain/Maura Score: Maura Score: 9 (7/27/2022  1:39 PM)

## 2022-07-27 NOTE — ASSESSMENT & PLAN NOTE
Patient presented to TAVR with elevated BNP >500 and increased SOB. Symptoms and physical exam findings improved.

## 2022-07-27 NOTE — PROCEDURES
Brief Operative Note:    : Glenroy Richardson MD     Referring Physician: Glenroy Richardson     All Operators: Surgeon(s):  MD Lindsey Tirado MD Stephen R. Ramee, MD James S. Jenkins, MD Nitin Tandan, MD Stephen M. Spindel, MD     Preoperative Diagnosis: Nonrheumatic aortic valve stenosis [I35.0]     Postop Diagnosis: Nonrheumatic aortic valve stenosis [I35.0]    Treatments/Procedures: Procedure(s) (LRB):  REPLACEMENT, AORTIC VALVE, TRANSCATHETER (TAVR) (N/A)  Cardiac Cath Cosurgeon (N/A)  REPLACEMENT, AORTIC VALVE, TRANSCATHETER (TAVR)    Access: Right CFA, Left CFA    Findings:Severe structural heart disease is present. LVEDP 30 mmHg.    See catheterization report for full details.    Intervention: S/P 26 mm Evolut Ashleigh TAVR via LTF access.     See catheterization report for full details.    Closure device: Perclose           Estimated Blood loss: 20 cc    Specimens removed: No    Lindsey Tinsley MD

## 2022-07-27 NOTE — PROGRESS NOTES
Patient admitted to recovery see epic for complete assessment pacu bcg's maintained safety measures verified patient sedated at this time no distress noted.elma  at bedside.  See flowsheet for complete assessment.

## 2022-07-27 NOTE — NURSING TRANSFER
Nursing Transfer Note      7/27/2022     Reason patient is being transferred: 3083    Transfer To: d/c criteria to icu     Transfer via bed    Transfer with cardiac monitoring    Transported by mesfin vicente and marcel traore     Medicines sent: iv fluids infusing via pump    Any special needs or follow-up needed: groin checks and follow orders     Chart send with patient: Yes    Notified: daughter also intpretatur along with patient to 3083    Patient reassessed at: see epic  (date, time)    Upon arrival to floor: to room no complaints no distress noted.

## 2022-07-27 NOTE — TRANSFER OF CARE
"Anesthesia Transfer of Care Note    Patient: Michael Mclean    Procedure(s) Performed: Procedure(s) (LRB):  REPLACEMENT, AORTIC VALVE, TRANSCATHETER (TAVR) (N/A)  Cardiac Cath Cosurgeon (N/A)  REPLACEMENT, AORTIC VALVE, TRANSCATHETER (TAVR)    Patient location: PACU    Anesthesia Type: general    Transport from OR: Transported from OR on 6-10 L/min O2 by face mask with adequate spontaneous ventilation    Post pain: adequate analgesia    Post assessment: no apparent anesthetic complications    Post vital signs: stable    Level of consciousness: awake and sedated    Nausea/Vomiting: no nausea/vomiting    Complications: none    Transfer of care protocol was followed      Last vitals:   Visit Vitals  BP (!) 161/69 (BP Location: Left arm, Patient Position: Lying)   Pulse 71   Temp 37 °C (98.6 °F) (Temporal)   Resp 18   Ht 5' 7" (1.702 m)   Wt 71.2 kg (157 lb)   SpO2 96%   BMI 24.59 kg/m²     "

## 2022-07-27 NOTE — INTERVAL H&P NOTE
The patient has been examined and the H&P has been reviewed:    I concur with the findings and no changes have occurred since H&P was written.    Procedure risks, benefits and alternative options discussed and understood by patient/family.     at bedside, appreciate assistance  Patient has been NPO since 830 pm last night  Patient took his aspirin and Plavix this morning  He has a mesh in his right groin from history of hernia, planned main L CFA access with 6 Fr access to the R groin  Patient has right pinkness >> left , post shave, did not notice it before, not itchy/nontender  Labs in process    Active Hospital Problems    Diagnosis  POA    *VHD (valvular heart disease) [I38]  Yes    Biventricular implantable cardioverter-defibrillator (ICD) in situ [Z95.810]  Unknown     History of HFrEF (2019) with recovered LVEF , Had Bi-V/ICD placed by [facility] in 2019        S/P CABG (coronary artery bypass graft) [Z95.1]  Not Applicable    S/P AVR [Z95.2]  Not Applicable    Prostate cancer [C61]  Yes    Deaf, nonspeaking [H91.3]  Yes    Essential hypertension [I10]  Yes    Chronic diastolic heart failure [I50.32]  Yes    Embolic stroke involving left middle cerebral artery [I63.412]  Yes      Resolved Hospital Problems   No resolved problems to display.

## 2022-07-28 VITALS
HEART RATE: 72 BPM | DIASTOLIC BLOOD PRESSURE: 78 MMHG | WEIGHT: 157 LBS | BODY MASS INDEX: 24.64 KG/M2 | OXYGEN SATURATION: 98 % | HEIGHT: 67 IN | TEMPERATURE: 98 F | SYSTOLIC BLOOD PRESSURE: 149 MMHG | RESPIRATION RATE: 18 BRPM

## 2022-07-28 DIAGNOSIS — Z95.2 S/P TAVR (TRANSCATHETER AORTIC VALVE REPLACEMENT): Primary | ICD-10-CM

## 2022-07-28 LAB
ANION GAP SERPL CALC-SCNC: 3 MMOL/L (ref 8–16)
BASOPHILS # BLD AUTO: 0.03 K/UL (ref 0–0.2)
BASOPHILS NFR BLD: 0.6 % (ref 0–1.9)
BUN SERPL-MCNC: 19 MG/DL (ref 8–23)
CALCIUM SERPL-MCNC: 8.3 MG/DL (ref 8.7–10.5)
CHLORIDE SERPL-SCNC: 111 MMOL/L (ref 95–110)
CO2 SERPL-SCNC: 24 MMOL/L (ref 23–29)
CREAT SERPL-MCNC: 0.8 MG/DL (ref 0.5–1.4)
DIFFERENTIAL METHOD: ABNORMAL
EOSINOPHIL # BLD AUTO: 0.3 K/UL (ref 0–0.5)
EOSINOPHIL NFR BLD: 5.9 % (ref 0–8)
ERYTHROCYTE [DISTWIDTH] IN BLOOD BY AUTOMATED COUNT: 13.2 % (ref 11.5–14.5)
EST. GFR  (AFRICAN AMERICAN): >60 ML/MIN/1.73 M^2
EST. GFR  (NON AFRICAN AMERICAN): >60 ML/MIN/1.73 M^2
GLUCOSE SERPL-MCNC: 92 MG/DL (ref 70–110)
HCT VFR BLD AUTO: 31.5 % (ref 40–54)
HGB BLD-MCNC: 10.7 G/DL (ref 14–18)
IMM GRANULOCYTES # BLD AUTO: 0 K/UL (ref 0–0.04)
IMM GRANULOCYTES NFR BLD AUTO: 0 % (ref 0–0.5)
LYMPHOCYTES # BLD AUTO: 1.2 K/UL (ref 1–4.8)
LYMPHOCYTES NFR BLD: 24.4 % (ref 18–48)
MCH RBC QN AUTO: 31.7 PG (ref 27–31)
MCHC RBC AUTO-ENTMCNC: 34 G/DL (ref 32–36)
MCV RBC AUTO: 93 FL (ref 82–98)
MONOCYTES # BLD AUTO: 0.6 K/UL (ref 0.3–1)
MONOCYTES NFR BLD: 13.4 % (ref 4–15)
NEUTROPHILS # BLD AUTO: 2.6 K/UL (ref 1.8–7.7)
NEUTROPHILS NFR BLD: 55.7 % (ref 38–73)
NRBC BLD-RTO: 0 /100 WBC
PLATELET # BLD AUTO: 88 K/UL (ref 150–450)
PMV BLD AUTO: 11.7 FL (ref 9.2–12.9)
POTASSIUM SERPL-SCNC: 3.8 MMOL/L (ref 3.5–5.1)
RBC # BLD AUTO: 3.38 M/UL (ref 4.6–6.2)
SODIUM SERPL-SCNC: 138 MMOL/L (ref 136–145)
WBC # BLD AUTO: 4.71 K/UL (ref 3.9–12.7)

## 2022-07-28 PROCEDURE — 93005 ELECTROCARDIOGRAM TRACING: CPT

## 2022-07-28 PROCEDURE — 25000003 PHARM REV CODE 250: Performed by: STUDENT IN AN ORGANIZED HEALTH CARE EDUCATION/TRAINING PROGRAM

## 2022-07-28 PROCEDURE — 99239 PR HOSPITAL DISCHARGE DAY,>30 MIN: ICD-10-PCS | Mod: ,,, | Performed by: PHYSICIAN ASSISTANT

## 2022-07-28 PROCEDURE — 80048 BASIC METABOLIC PNL TOTAL CA: CPT | Performed by: STUDENT IN AN ORGANIZED HEALTH CARE EDUCATION/TRAINING PROGRAM

## 2022-07-28 PROCEDURE — 93010 ELECTROCARDIOGRAM REPORT: CPT | Mod: ,,, | Performed by: INTERNAL MEDICINE

## 2022-07-28 PROCEDURE — 99239 HOSP IP/OBS DSCHRG MGMT >30: CPT | Mod: ,,, | Performed by: PHYSICIAN ASSISTANT

## 2022-07-28 PROCEDURE — 93010 EKG 12-LEAD: ICD-10-PCS | Mod: ,,, | Performed by: INTERNAL MEDICINE

## 2022-07-28 PROCEDURE — 85025 COMPLETE CBC W/AUTO DIFF WBC: CPT | Performed by: STUDENT IN AN ORGANIZED HEALTH CARE EDUCATION/TRAINING PROGRAM

## 2022-07-28 RX ADMIN — POTASSIUM BICARBONATE 50 MEQ: 978 TABLET, EFFERVESCENT ORAL at 05:07

## 2022-07-28 RX ADMIN — SOTALOL HYDROCHLORIDE 40 MG: 80 TABLET ORAL at 08:07

## 2022-07-28 RX ADMIN — ASPIRIN 81 MG: 81 TABLET, COATED ORAL at 08:07

## 2022-07-28 RX ADMIN — ATORVASTATIN CALCIUM 40 MG: 20 TABLET, FILM COATED ORAL at 08:07

## 2022-07-28 NOTE — PLAN OF CARE
Cardiac ICU Care Plan    POC reviewed with Michael Mclean and family. Questions and concerns addressed. Pt progressing toward goals. Will continue to monitor. See below and flowsheets for full assessment and VS info.       Neuro:  Ligia Coma Scale  Best Eye Response: 4-->(E4) spontaneous  Best Motor Response: 6-->(M6) obeys commands  Best Verbal Response: 5-->(V5) oriented  [place] Coma Scale Score: 15  Assessment Qualifiers: patient not sedated/intubated  Pupil PERRLA: yes    24 hr Temp:  [97.5 °F (36.4 °C)-98.6 °F (37 °C)]      CV:  Rhythm: normal sinus rhythm   DVT prophylaxis:         Pulses  Right Radial Pulse: 2+ (normal)  Left Radial Pulse: 2+ (normal)  Right Dorsalis Pedis Pulse: 1+ (weak)  Left Dorsalis Pedis Pulse: 1+ (weak)  Right Posterior Tibial Pulse: 1+ (weak)  Left Posterior Tibial Pulse: 1+ (weak)    Resp:  O2 Device (Oxygen Therapy): room air  Flow (L/min): 6       GI/:  GI prophylaxis: not ordered  Diet/Nutrition Received: clear liquid  Last Bowel Movement: 07/26/22      Intake/Output Summary (Last 24 hours) at 7/27/2022 2012  Last data filed at 7/27/2022 1800  Gross per 24 hour   Intake 3413.79 ml   Output 575 ml   Net 2838.79 ml          Labs/Accuchecks:  Recent Labs   Lab 07/25/22  1312 07/27/22  0804   WBC 7.41 6.52   RBC 3.99* 3.80*   HGB 12.6* 11.9*   HCT 38.1* 35.2*   * 112*      Recent Labs   Lab 07/25/22  1312 07/27/22  0804   INR 1.1 1.1   APTT 27.3 27.2      Recent Labs     07/27/22  0804      K 3.8   CO2 25      BUN 23   CREATININE 1.1     No results for input(s): CPK, CPKMB, MB, TROPONINI in the last 168 hours. No results for input(s): PH, PCO2, PO2, HCO3, POCSATURATED, BE in the last 72 hours.    Electrolytes: N/A - electrolytes WDL  Accuchecks: none    Gtts/LDAs:   NORepinephrine bitartrate-D5W Stopped (07/27/22 1230)       Lines/Drains/Airways       Peripheral Intravenous Line  Duration                  Peripheral IV - Single Lumen 07/27/22 0912 20 G  Left;Posterior Forearm <1 day         Peripheral IV - Single Lumen 07/27/22 1300 18 G Right Hand <1 day                    Skin/Wounds     Wounds: No

## 2022-07-28 NOTE — PROGRESS NOTES
Pt admitted to CICU. Pt [facility] approved  present upon admit to unit. Pt vitals WDL and EKG obtained. Pt denies pain, n/v, SOB, HA. No pt distress noted.Pt belongings in possession upon arrival. Daughter to be notified. Will continue to monitor.

## 2022-07-28 NOTE — DISCHARGE SUMMARY
[facility] - Cardiac Intensive Care  Interventional Cardiology  Discharge Summary      Patient Name: Michael Mclean  MRN: [id number]  Admission Date: 7/27/2022  Hospital Length of Stay: 1 days  Discharge Date and Time:  07/28/2022 9:44 AM  Attending Physician: Glenroy Richardson MD  Discharging Provider: Liv Hazel PA-C  Primary Care Physician: Josh Alejo Jr, MD    HPI:  Referring Physician: Dr Shrestha at [facility]  Michael Mclean is a 82 y.o. male with a past medical history of persistent Afib (s/p DCCV in 2017, s/p PVI in 2020), CAD s/p 2v CABG & bioprosthetic aotic valve in 2012, s/p BiV ICD/PPM, h/o SSS, HTN, HLD, CVA, and prostate cancer who presents for evaluation of prosthetic valve insufficiency. He is completely deaf therefore an  was used for this entire stay and workup.      Patient underwent TTE on 5/18/22 with Dr Shrestha which showed normal LV function, moderate to severe AS with SAIDA 0,8, PV 3.8, and MG 29 mmHg. Mild to moderate AI. EF 55%     Patient states that for the past 3 month he has been experiencing dyspnea on exertion with walking. He denies SOB at rest and heart failure admission. He did have a history of blood transfusion about 1 year ago. He is followed by Dr Wilkins for prostate cancer. He is s/p prostatectomy with injections and currently undergoing hormone therapy.    .   Surgery: Dr. Feliberto Cruz at [facility] on 4/29/2010. He is s/p CABG x 2 with LIMA-LAD and SVG-RCA along with aortic valve replacement utilizing 25 mm pericardial bioprosthetic aortic valve.      Michael Mclean is a 82 y.o. male referred by Dr Shrestha for evaluation of failing 25 mm Perimount bioprosthetic valve True ID 23 (NYHA Class III sx).      The patient has undergone the following TAVR work-up:   · ECHO (Date 5/18/22): SAIDA= 0.8 cm2, MG= 29 mmHg, Peak Mehdi= 3.8 m/s, EF= 55%. Moderate AI.  Failing bioprosthetic valve.  · [facility]  (Date 2010): LIMA to LAD, SVG to RCA, 60% LCX.    · STS: 5%   · Frailty: 3/4 (failed  and walk and albumin)    · Iliacs are > 6 on the R with high grade RCI stenosis and > 7.3 on the L with severe tortuosity.  Best for L TF access.  · LVOT area by CTA is:  · Incidental findings on CT:   · CT Surgery risk assessment: Inoperable per Sch[facility]ield  · Rhythm issues: AICD in situ  · PFTs: Deferred  · KCCQ/5 meter walk: Done  · Comorbidities: deaf, hx CVA, s/p AVR/CABG, prostate CA        NYHA: III CCS: 0      Procedure(s) (LRB):  REPLACEMENT, AORTIC VALVE, TRANSCATHETER (TAVR) (N/A)  Cardiac Cath Cosurgeon (N/A)  REPLACEMENT, AORTIC VALVE, TRANSCATHETER (TAVR)     Indwelling Lines/Drains at time of discharge:  Lines/Drains/Airways     None                 Hospital Course:  Michael Mclean was admitted and underwent successful placement of a 26 mm Evolut valve in valve TAVR inside of a failing 25 mm Perimount bioprosthetic valve via TF access under MAC sedation on 7/26/22. Please see full cath report for details. A transthoracic echo was performed immediately post procedure which showed no paravalvular leak. An aortic valve mean gradient of 6 mmHg and a maximum velocity through the aortic valve of 1.4 m/s. He was transported to the CCU in stable condition with an arterial line in place. PPM in situ so EP was not consulted. He remained hemodynamically stable overnight. This morning, he ambulated without difficulty and was eager to go home. It was felt he was stable for discharge and will go home on ASA alone for antithrombotic therapy post TAVR.      Goals of Care Treatment Preferences:  Code Status: Full Code          Significant Diagnostic Studies: Labs:   BMP:   Recent Labs   Lab 07/27/22  0804 07/28/22  0403   GLU 86 92    138   K 3.8 3.8    111*   CO2 25 24   BUN 23 19   CREATININE 1.1 0.8   CALCIUM 9.4 8.3*   , CBC   Recent Labs   Lab 07/27/22  0804 07/28/22  0403   WBC 6.52 4.71   HGB 11.9* 10.7*    HCT 35.2* 31.5*   * 88*    and All labs within the past 24 hours have been reviewed    Pending Diagnostic Studies:     None        * S/P TAVR (transcatheter aortic valve replacement)  Successful 26 mm Evolut TAVR inside of a failing 25 mm Perimount bioprosthetic valve.  No paravalvular leak, peak velocity 1.4 and mean gradient 6 post TAVR by surface echo.     Discharge Plans:  1. Follow up with [facility] in 1 month and 1 year with labs and Echo.  2. ASA indefinitely.   3. No non sterile procedures which could cause endocarditis for 6 months post TAVR including dental work, endoscopy, colonoscopy, and  procedures.   4. SBE prophylaxis for life.  5. Would like to establish cardiology care at [facility] [place]. Ambulatory referral sent to Dr Kiran.     Aortic atherosclerosis  See CTA    Biventricular implantable cardioverter-defibrillator (ICD) in situ  History of HFrEF (2019) with recovered LVEF , Had Bi-V/ICD placed by [facility] in 2019      ICD in situ    S/P AVR  See above    Prostate cancer  Followed by Dr Wilkins [facility] Heme Onc    Acute on chronic diastolic heart failure  Patient presented to TAVR with elevated BNP >500 and increased SOB. Symptoms and physical exam findings improved.     Deaf, nonspeaking  Needs         Discharged Condition: good    Follow Up:    Patient Instructions:      Ambulatory referral/consult to Cardiology   Standing Status: Future   Referral Priority: Routine Referral Type: Consultation   Referral Reason: Specialty Services Required   Referred to Provider: JUNIE KIRAN Requested Specialty: Cardiology   Number of Visits Requested: 1     Notify your health care provider if you experience any of the following:  temperature >100.4     Notify your health care provider if you experience any of the following:  persistent nausea and vomiting or diarrhea     Notify your health care provider if you experience any of the following:  severe uncontrolled pain     Notify your health care  provider if you experience any of the following:  redness, tenderness, or signs of infection (pain, swelling, redness, odor or green/yellow discharge around incision site)     Notify your health care provider if you experience any of the following:  difficulty breathing or increased cough     Notify your health care provider if you experience any of the following:  severe persistent headache     Notify your health care provider if you experience any of the following:  worsening rash     Notify your health care provider if you experience any of the following:  persistent dizziness, light-headedness, or visual disturbances     Notify your health care provider if you experience any of the following:  increased confusion or weakness     Medications:  Reconciled Home Medications:      Medication List      CONTINUE taking these medications    acetylcarnitin HCL-a lipoic ac 400-200 mg Cap  Take 1 capsule by mouth once daily.     albuterol 90 mcg/actuation inhaler  Commonly known as: PROVENTIL/VENTOLIN HFA  Inhale 1-2 puffs into the lungs every 6 (six) hours as needed for Wheezing. Rescue     aspirin 81 MG EC tablet  Commonly known as: ECOTRIN  Take 1 tablet (81 mg total) by mouth once daily.     bicalutamide 50 MG Tab  Commonly known as: CASODEX  Take 1 tablet (50 mg total) by mouth once daily.     cholecalciferol (vitamin D3) 125 mcg (5,000 unit) capsule  Take 5,000 Units by mouth once daily.     clonazePAM 2 MG Tab  Commonly known as: KlonoPIN  TAKE 1 TABLET BY MOUTH ONCE DAILY AS NEEDED FOR ANXIETY     cyanocobalamin (vitamin B-12) 1,000 mcg/mL Kit  Inject 1 mL as directed every 28 days.     docusate sodium 100 MG capsule  Commonly known as: COLACE  Take 100 mg by mouth 2 (two) times daily.     ferrous sulfate 325 mg (65 mg iron) Tab tablet  Commonly known as: FEOSOL  Take 1 tablet (325 mg total) by mouth once daily.     K2 PLUS D3 1,000-100 unit-mcg Tab  Generic drug: vitamin D3-vitamin K2 (MK4)  Take by mouth.      KLOR-CON M20 20 MEQ tablet  Generic drug: potassium chloride SA  Take 20 mEq by mouth once daily.     magnesium oxide 400 mg (241.3 mg magnesium) tablet  Commonly known as: MAG-OX  Take 400 mg by mouth once daily.     meclizine 25 mg tablet  Commonly known as: ANTIVERT  Take 1 tablet (25 mg total) by mouth 3 (three) times daily as needed.     metoprolol succinate 100 MG 24 hr tablet  Commonly known as: TOPROL-XL  Take 50 mg by mouth 2 (two) times a day.     pantoprazole 40 MG tablet  Commonly known as: PROTONIX  Take 1 tablet (40 mg total) by mouth once daily.     rosuvastatin 20 MG tablet  Commonly known as: CRESTOR  Take 20 mg by mouth every evening.     sotaloL 80 MG tablet  Commonly known as: BETAPACE  Take 0.5 tablets (40 mg total) by mouth 2 (two) times daily.     torsemide 20 MG Tab  Commonly known as: DEMADEX  Take 20 mg by mouth every other day.     vitamin D3-folic acid 125 mcg (5,000 unit)-1 mg Tab  Take 1 tablet by mouth once daily.        STOP taking these medications    clopidogreL 75 mg tablet  Commonly known as: PLAVIX            Time spent on the discharge of patient: 40 minutes    Liv Hazel PA-C  Interventional Cardiology  [facility] - Cardiac Intensive Care

## 2022-07-28 NOTE — PLAN OF CARE
[facility] - Card[facility] Int[facility]  Initial Discharge Assessment       Primary Care Provider: Josh Alejo Jr, MD    Admission Diagnosis: Nonrheumatic aortic valve stenosis [I35.0]    Admission Date: 7/27/2022  Expected Discharge Date: 7/28/2022    Discharge Barriers Identified: None    Payor: HU[employer] MANAGED MEDICARE / Plan: HU[employer] MEDICARE PPO / Product Type: Medicare Advantage /     Extended Emergency Contact Information  Primary Emergency Contact: Karen Mclean   [facility]  Home Phone: [phone]  Mobile Phone: [phone]  Relation: Daughter  Preferred language: English   needed? No  Secondary Emergency Contact: Heidy Mlcean  Mobile Phone: [phone]  Relation: Daughter  Preferred language: English   needed? No    Discharge Plan A: Home with family  Discharge Plan B: Home      [facility] [address] [place], [place] - [address]  [address]  [facility] [address]  Phone: [phone] Fax: [phone]    [facility] (Now [facility]) - [facility] [address]  [address] [facility] [address]  Phone: [phone] Fax: [phone]      Initial Assessment (most recent)     Adult Discharge Assessment - 07/28/22 0900        Discharge Assessment    Assessment Type Discharge Planning Assessment     Confirmed/corrected address, phone number and insurance Yes     Confirmed Demographics Correct on Facesheet     Source of Information patient     Communicated KELLEY with patient/caregiver Yes     Lives With alone     Do you expect to return to your current living situation? Yes     Do you have help at home or someone to help you manage your care at home? Yes     Who are your caregiver(s) and their phone number(s)? children     Prior to hospitilization cognitive status: Alert/Oriented     Current cognitive status: Alert/Oriented     Walking or Climbing Stairs Difficulty ambulation difficulty, requires  equipment     Mobility Management DME     Dressing/Bathing Difficulty none     Equipment Currently Used at Home cane, straight     Readmission within 30 days? No     Patient currently being followed by outpatient case management? No     Do you currently have service(s) that help you manage your care at home? No     Do you take prescription medications? Yes     Do you have prescription coverage? Yes     Coverage [employer]     Do you have any problems affording any of your prescribed medications? No     Is the patient taking medications as prescribed? yes     How do you get to doctors appointments? family or friend will provide     Are you on dialysis? No     Do you take coumadin? No     Discharge Plan A Home with family     Discharge Plan B Home     DME Needed Upon Discharge  none     Discharge Plan discussed with: Patient;Adult children     Discharge Barriers Identified None                    Pt admitted 7/27/2022  7:41 AM    For Nonrheumatic aortic valve stenosis [I35.0]       DPEA completed with pt via ASL, pt lives at home alone but his two daughters Karen and Heidy and extended family visit pt daily and care for him. Plan is home with no needs.    Discharge packet provided to pt, all questions answered prior to leaving room and contact number provided to reach CM.      PCP is Josh Alejo Jr, MD  [phone] (p)  [phone] (f)    Future Appointments   Date Time Provider Department Center   8/16/2022  8:45 AM LAB, PPC INTERNAL MEDICINE OPPC INTMED [facility]   8/18/2022  2:40 PM Abbie Martinez NP [facility] UROLOG [facility]   9/1/2022  9:50 AM LAB, APPOINTMENT [facility] LAB VNP [facility]   9/1/2022 10:15 AM ECHO, [facility] ECHOSTR [facility]   9/1/2022 11:00 AM Liv Hazel PA-C [facility] L[facility] RN   Case Management [phone]

## 2022-07-28 NOTE — PLAN OF CARE
Cardiac ICU Care Plan    POC reviewed with Michael Mclean and family. Questions and concerns addressed with patient and family via . Pt remained hemodynamically stable. Pt discharged from CICU by RN with daughter at side. Discharge instructions provided in witting and explained verbally via . EKG obtained prior to discharge. Pt denies pain, n/v, HA, numbness or tingling in hands or toes. No pt distress noted. Pt passed ambulation challenge this AM. Pt belongings in pt and family possession upon discharge. See below and flowsheets for full assessment and VS info.       Neuro:  [place] Coma Scale  Best Eye Response: 4-->(E4) spontaneous  Best Motor Response: 6-->(M6) obeys commands  Best Verbal Response: 5-->(V5) oriented  Ligia Coma Scale Score: 15  Assessment Qualifiers: patient not sedated/intubated, no eye obstruction present  Pupil PERRLA: yes    24 hr Temp:  [97.4 °F (36.3 °C)-98.6 °F (37 °C)]      CV:  Rhythm: normal sinus rhythm   DVT prophylaxis:      Pulses  Right Radial Pulse: 2+ (normal)  Left Radial Pulse: 2+ (normal)  Right Dorsalis Pedis Pulse: 2+ (normal)  Left Dorsalis Pedis Pulse: 2+ (normal)  Right Posterior Tibial Pulse: 2+ (normal)  Left Posterior Tibial Pulse: 2+ (normal)    Resp:  O2 Device (Oxygen Therapy): room air  Flow (L/min): 6       GI/:  GI prophylaxis: Not ordered  Diet/Nutrition Received: clear liquid  Last Bowel Movement: 07/26/22      Intake/Output Summary (Last 24 hours) at 7/28/2022 1051  Last data filed at 7/28/2022 0800  Gross per 24 hour   Intake 3993.79 ml   Output 1150 ml   Net 2843.79 ml          Labs/Accuchecks:  Recent Labs   Lab 07/25/22  1312 07/27/22  0804 07/28/22  0403   WBC 7.41 6.52 4.71   RBC 3.99* 3.80* 3.38*   HGB 12.6* 11.9* 10.7*   HCT 38.1* 35.2* 31.5*   * 112* 88*      Recent Labs   Lab 07/25/22  1312 07/27/22  0804   INR 1.1 1.1   APTT 27.3 27.2      Recent Labs     07/28/22  0403      K 3.8   CO2 24   *    BUN 19   CREATININE 0.8     No results for input(s): CPK, CPKMB, MB, TROPONINI in the last 168 hours. No results for input(s): PH, PCO2, PO2, HCO3, POCSATURATED, BE in the last 72 hours.    Electrolytes: N/A - electrolytes WDL  Accuchecks: none    Gtts/LDAs:   NORepinephrine bitartrate-D5W Stopped (07/27/22 1230)       Lines/Drains/Airways       None                   Skin/Wounds

## 2022-07-28 NOTE — PLAN OF CARE
[facility] - Cardiac Intensive Care  Discharge Final Note    Primary Care Provider: Josh Alejo Jr, MD    Expected Discharge Date: 7/28/2022    Final Discharge Note (most recent)     Final Note - 07/28/22 1125        Final Note    Assessment Type Final Discharge Note     Anticipated Discharge Disposition Home or Self Care               Valeri Bowman LMSW [facility]  [phone]      Future Appointments   Date Time Provider Department Center   8/16/2022  8:45 AM LAB, [facility] INTERNAL MEDICINE [facility]   8/18/2022  2:40 PM Abbie Martinez NP [facility] UROLOGC [facility]   9/1/2022  9:50 AM LAB, APPOINTMENT [place] [facility]   9/1/2022 10:15 AM ECHO, [facility] E[facility]   9/1/2022 11:00 AM Liv Hazel PA-C [facility] CARDVAL [facility]

## 2022-07-28 NOTE — PLAN OF CARE
Cardiac ICU Care Plan    REYDARRENON.  Tentative discharge.     POC reviewed with Michael Mclean and family. Questions and concerns addressed. No acute events today. Pt progressing toward goals. Will continue to monitor. See below and flowsheets for full assessment and VS info.       Neuro:  Ligia Coma Scale  Best Eye Response: 4-->(E4) spontaneous  Best Motor Response: 6-->(M6) obeys commands  Best Verbal Response: 5-->(V5) oriented  Ligia Coma Scale Score: 15  Assessment Qualifiers: patient not sedated/intubated, no eye obstruction present  Pupil PERRLA: yes    24 hr Temp:  [97.4 °F (36.3 °C)-98.6 °F (37 °C)]      CV:  Rhythm: normal sinus rhythm   DVT prophylaxis:                              Pulses  Right Radial Pulse: 2+ (normal)  Left Radial Pulse: 2+ (normal)  Right Dorsalis Pedis Pulse: 2+ (normal)  Left Dorsalis Pedis Pulse: 2+ (normal)  Right Posterior Tibial Pulse: 2+ (normal)  Left Posterior Tibial Pulse: 2+ (normal)    Resp:  O2 Device (Oxygen Therapy): room air  Flow (L/min): 6       GI/:  GI prophylaxis: no    Last Bowel Movement: 07/26/22      Intake/Output Summary (Last 24 hours) at 7/28/2022 0433  Last data filed at 7/28/2022 0000  Gross per 24 hour   Intake 3653.79 ml   Output 825 ml   Net 2828.79 ml          Labs/Accuchecks:  Recent Labs   Lab 07/25/22  1312 07/27/22  0804   WBC 7.41 6.52   RBC 3.99* 3.80*   HGB 12.6* 11.9*   HCT 38.1* 35.2*   * 112*      Recent Labs   Lab 07/25/22  1312 07/27/22  0804   INR 1.1 1.1   APTT 27.3 27.2      Recent Labs     07/27/22  0804      K 3.8   CO2 25      BUN 23   CREATININE 1.1     No results for input(s): CPK, CPKMB, MB, TROPONINI in the last 168 hours. No results for input(s): PH, PCO2, PO2, HCO3, POCSATURATED, BE in the last 72 hours.      Accuchecks: none    Gtts/LDAs:   NORepinephrine bitartrate-D5W Stopped (07/27/22 1230)       Lines/Drains/Airways       Peripheral Intravenous Line  Duration                  Peripheral IV -  Single Lumen 07/27/22 0912 20 G Left;Posterior Forearm <1 day         Peripheral IV - Single Lumen 07/27/22 1300 18 G Right Hand <1 day                    Skin/Wounds     Wounds: No  Wound care consulted: No    Problem: Adult Inpatient Plan of Care  Goal: Plan of Care Review  Outcome: Ongoing, Progressing  Flowsheets (Taken 7/28/2022 1762)  Plan of Care Reviewed With:   patient   daughter

## 2022-08-02 NOTE — PHYSICIAN QUERY
PT Name: Michael Mclean  MR #: [id number]     DOCUMENTATION CLARIFICATION     CDS/: Andrew Brian Jr, RN CCDS              Contact information:[email or web address]  This form is a permanent document in the medical record.     Query Date: August 2, 2022    By submitting this query, we are merely seeking further clarification of documentation.  Please utilize your independent clinical judgment when addressing the question(s) below.    The Medical Record contains the following   Indicators Supporting Clinical Findings Location in Medical Record   x Heart Failure documented Chronic diastolic heart failure     History of HFrEF (2019) with recovered LVEF , Had Bi-V/ICD placed by [facility] in 2019    Acute on chronic diastolic heart failure  Patient presented to TAVR with elevated BNP >500 and increased SOB. Symptoms and physical exam findings improved.  7/27 H&P    7/28 Discharge Summary      7/28 Discharge Summary    x BNP 1369   7/27 Labs   x EF/Echo The left ventricle is mildly enlarged with mildly decreased systolic function. The estimated ejection fraction is 40%. The wall thickness is normal    82 y.o. male w/ a significant PMHx of CHF w/ EF 55% with AICD due to previously reduced EF 15% 6/27 Transthoracic Echo (Previous Encounter)      7/27 Anesthesia Preprocedure Note    Radiology findings     x Subjective/Objective Respiratory Conditions Patient states that for the past 3 month he has been experiencing dyspnea on exertion with walking. He denies SOB at rest and heart failure admission 7/27 H&P    Recent/Current MI      Heart Transplant, LVAD     x Edema, JVD   Right lower leg: No edema.      Left lower leg: No edema.  7/27 H&P    Ascites      Diuretics/Meds     x Other Treatment underwent successful placement of a 26 mm Evolut valve in valve TAVR inside of a failing 25 mm Perimount bioprosthetic valve via TF access under MAC sedation on 7/26/22 7/28 Discharge Summary     Other LVEDP was 32   7/27  Cardiac Cath Report     Heart failure is a clinical diagnosis which includes symptomatic fluid retention, elevated intracardiac pressures, and/or the inability of the heart to deliver adequate blood flow.     Heart Failure with reduced Ejection Fraction (HFrEF) or Systolic Heart Failure (loses ability to contract normally, EF is <40%)     Heart Failure with preserved Ejection Fraction (HFpEF) or Diastolic Heart Failure (stiff ventricles, does not relax properly, EF is >50%)      Heart Failure with Combined Systolic and Diastolic Failure (stiff ventricles, does not relax properly and EF is <50%)     Mid-range or mildly reduced ejection fraction (HFmrEF) is classified as systolic heart failure.   Common clues to acute exacerbation:  Rapidly progressive symptoms (w/in 2 weeks of presentation), using IV diuretics, using supplemental O2, pulmonary edema on Xray, new or worsening pleural effusion, +JVD or other signs of volume overload, MI w/in 4 weeks, and/or BNP >500  The clinical guidelines noted are only system guidelines, and do not replace the providers clinical judgment.    Provider, Due to Documentation Conflict please Clarify the               Type and Acuity of Heart Failure            [ X  ]  Acute on Chronic Systolic Heart Failure (HFrEF or HFmrEF) - worsening of CHF signs/symptoms in preexisting CHF   [   ]  Chronic Systolic Heart Failure (HFrEF or HFmrEF) - preexisting and stable   [   ]  Acute on Chronic Diastolic Heart Failure (HFpEF) - worsening of CHF signs/symptoms in preexisting CHF   [   ]  Chronic Diastolic Heart Failure (HFpEF) - preexisting and stable   [   ]  Other (please specify): chronic severe combined HF   [   ]  Clinically Undetermined           Please document in your progress notes daily for the duration of treatment until resolved and include in your discharge summary.    References:  American Heart Association editorial staff. (2017, May). Ejection Fraction Heart Failure  Measurement. American Heart Association. https://www.heart.org/en/health-topics/heart-failure/diagnosing-heart-failure/ejection-fraction-heart-failure-measurement#:~:text=Ejection%20fraction%20(EF)%20is%20a,pushed%20out%20with%20each%20heartbeat  DINA Santana (2020, December 15). Heart failure with preserved ejection fraction: Clinical manifestations and diagnosis. [employer]. https://www.Molecular Detection.Given.to/contents/heart-failure-with-preserved-ejection-fraction-clinical-manifestations-and-diagnosis.  ICD-10-CM/PCS Coding Clinic Third Quarter ICD-10, Effective with discharges: September 8, 2020 [facility] [employer] § Heart failure with mid-range or mildly reduced ejection fraction (2020).  Form No. 94550

## 2022-08-31 NOTE — PROGRESS NOTES
Subjective:    Patient ID:  Michael Mclean is a 82 y.o. male who presents for follow-up of Aortic Stenosis      Referring Physician: Dr Shrestha at [facility]  Michael Mclean is a 82 y.o. male  with a past medical history of persistent Afib (s/p DCCV in 2017, s/p PVI in 2020), CAD s/p 2v CABG & bioprosthetic aotic valve in 2012, s/p BiV ICD/PPM, h/o SSS, HTN, HLD, CVA, and prostate cancer who presents for TAVR follow up. He is completely deaf therefore an  was used for this entire stay and workup.     TAVR Hospital Course:  Michael Mclean was admitted and underwent successful placement of a 26 mm Evolut valve in valve TAVR inside of a failing 25 mm Perimount bioprosthetic valve via TF access under MAC sedation on 7/26/22. Please see full cath report for details. A transthoracic echo was performed immediately post procedure which showed no paravalvular leak. An aortic valve mean gradient of 6 mmHg and a maximum velocity through the aortic valve of 1.4 m/s. He was transported to the CCU in stable condition with an arterial line in place. PPM in situ so EP was not consulted. He remained hemodynamically stable overnight. This morning, he ambulated without difficulty and was eager to go home. It was felt he was stable for discharge and will go home on ASA alone for antithrombotic therapy post TAVR.    Interval History  Mr Mclean is feeling great since TAVR. He denies SOB. He is eager to get back to the gym.       NYHA:I CCS:0    Review of Systems   Constitutional: Negative for chills and fever.   HENT:  Negative for sore throat.    Eyes:  Negative for blurred vision.   Cardiovascular:  Negative for chest pain, claudication, cyanosis, dyspnea on exertion, irregular heartbeat, leg swelling, near-syncope, orthopnea, palpitations, paroxysmal nocturnal dyspnea and syncope.   Respiratory:  Negative for cough and sputum production.    Hematologic/Lymphatic: Does not bruise/bleed  easily.   Skin:  Negative for itching, rash and suspicious lesions.   Musculoskeletal:  Negative for falls.   Gastrointestinal:  Negative for abdominal pain and change in bowel habit.   Genitourinary:  Negative for dysuria.   Neurological:  Negative for disturbances in coordination, dizziness and loss of balance.   Psychiatric/Behavioral:  Negative for altered mental status.         Past Medical History:   Diagnosis Date    Abdominal hernia     Anemia 02/2021    Anticoagulant long-term use     Arthritis     SHOULDER    Cancer     prostate    Cardiomyopathy     Carotid artery stenosis     left    Coronary artery disease     Deaf     Dizziness     Encounter for blood transfusion     Gastritis 3/2/2021    Hyperlipidemia     Hypertension     Mitral regurgitation     Mitral regurgitation     PAD (peripheral artery disease)     Paroxysmal atrial fibrillation     Stroke 2020    Thyroid disease        Current Outpatient Medications:     aspirin (ECOTRIN) 81 MG EC tablet, Take 1 tablet (81 mg total) by mouth once daily., Disp: 30 tablet, Rfl: 11    cholecalciferol, vitamin D3, 5,000 unit capsule, Take 5,000 Units by mouth once daily. , Disp: , Rfl:     cyanocobalamin, vitamin B-12, 1,000 mcg/mL Kit, Inject 1 mL as directed every 28 days., Disp: , Rfl:     KLOR-CON M20 20 mEq tablet, Take 20 mEq by mouth once daily. , Disp: , Rfl:     metoprolol succinate (TOPROL-XL) 100 MG 24 hr tablet, Take 50 mg by mouth 2 (two) times a day., Disp: , Rfl:     rosuvastatin (CRESTOR) 20 MG tablet, Take 20 mg by mouth every morning., Disp: , Rfl:     sotaloL (BETAPACE) 80 MG tablet, Take 0.5 tablets (40 mg total) by mouth 2 (two) times daily., Disp: 90 tablet, Rfl: 0    torsemide (DEMADEX) 20 MG Tab, Take 20 mg by mouth every other day., Disp: , Rfl:     acetylcarnitin/alpha lipoic ac (ACETYLCARNITIN HCL-A LIPOIC AC) 400-200 mg Cap, Take 1 capsule by mouth once daily., Disp: , Rfl:     bicalutamide (CASODEX) 50 MG Tab, Take 1 tablet (50 mg  total) by mouth once daily., Disp: 30 tablet, Rfl: 3    clonazePAM (KLONOPIN) 2 MG Tab, TAKE 1 TABLET BY MOUTH ONCE DAILY AS NEEDED FOR ANXIETY (Patient not taking: No sig reported), Disp: 30 tablet, Rfl: 0    docusate sodium (COLACE) 100 MG capsule, Take 100 mg by mouth 2 (two) times daily., Disp: , Rfl:     ferrous sulfate (FEOSOL) 325 mg (65 mg iron) Tab tablet, Take 1 tablet (325 mg total) by mouth once daily., Disp: , Rfl: 0    magnesium oxide (MAG-OX) 400 mg tablet, Take 400 mg by mouth once daily., Disp: , Rfl:     meclizine (ANTIVERT) 25 mg tablet, Take 1 tablet (25 mg total) by mouth 3 (three) times daily as needed. (Patient not taking: No sig reported), Disp: 20 tablet, Rfl: 0    pantoprazole (PROTONIX) 40 MG tablet, Take 1 tablet (40 mg total) by mouth once daily., Disp: 30 tablet, Rfl: 11    vitamin D3-folic acid 5,000 unit- 1 mg Tab, Take 1 tablet by mouth once daily., Disp: , Rfl:     vitamin D3-vitamin K2, MK4, (K2 PLUS D3) 1,000-100 unit-mcg Tab, Take by mouth., Disp: , Rfl:     Current Facility-Administered Medications:     leuprolide (6 month) injection 45 mg, 45 mg, Intramuscular, Q6 Months, Abbie Martinez, FARHAT, 45 mg at 09/01/22 1319    Objective:    Physical Exam  Vitals reviewed.   Constitutional:       General: He is not in acute distress.     Appearance: He is well-developed. He is not diaphoretic.   HENT:      Head: Normocephalic and atraumatic.   Neck:      Vascular: No JVD.   Cardiovascular:      Rate and Rhythm: Normal rate and regular rhythm.      Pulses: Intact distal pulses.      Heart sounds: No murmur heard.  Pulmonary:      Effort: Pulmonary effort is normal. No respiratory distress.      Breath sounds: Normal breath sounds.   Musculoskeletal:      Cervical back: Normal range of motion.      Right lower leg: No edema.      Left lower leg: No edema.   Skin:     General: Skin is warm and dry.   Neurological:      Mental Status: He is alert and oriented to person, place, and time.  "          Vitals:    09/01/22 1042 09/01/22 1045   BP: (!) 151/72 (!) 144/61   BP Location: Left arm Right arm   Patient Position: Sitting Sitting   BP Method: Large (Automatic) Large (Automatic)   Pulse: 71 71   SpO2: 97%    Weight: 74.3 kg (163 lb 12.8 oz)    Height: 5' 7" (1.702 m)      Body mass index is 25.66 kg/m².    Test(s) Reviewed  I have reviewed the following in detail:  [] Stress test   [] Angiography   [] Echocardiogram   [] Labs   [] Other       Chemistry        Component Value Date/Time     09/01/2022 0855    K 4.1 09/01/2022 0855     09/01/2022 0855    CO2 28 09/01/2022 0855    BUN 32 (H) 09/01/2022 0855    CREATININE 1.1 09/01/2022 0855    GLU 82 09/01/2022 0855        Component Value Date/Time    CALCIUM 9.6 09/01/2022 0855    ALKPHOS 50 (L) 05/04/2022 1049    AST 26 08/16/2022 0716    ALT 13 08/16/2022 0716    BILITOT 0.5 08/16/2022 0716    ESTGFRAFRICA >60.0 07/28/2022 0403    EGFRNONAA >60.0 07/28/2022 0403            TTE 09/01/2022  The left ventricle is moderately enlarged with eccentric hypertrophy and mildly decreased systolic function.  The estimated ejection fraction is 45%.  There is left ventricular global hypokinesis.  Grade II left ventricular diastolic dysfunction.  There is a 26 mm Evolut transcutaneously-placed aortic bioprosthesis present inside SAVR.  The aortic valve mean gradient is 5 mmHg with a dimensionless index of 0.88.  There is prolapse of the anterior mitral leaflet. Moderate-to-severe mitral regurgitation.  Normal right ventricular size with normal right ventricular systolic function.  Mild tricuspid regurgitation.  The estimated PA systolic pressure is 34 mmHg.  Normal central venous pressure (3 mmHg).  The ascending aorta is mildly dilated.  Severe left atrial enlargement.    Assessment:   S/P TAVR (transcatheter aortic valve replacement)  Successful 26 mm Evolut TAVR inside of a failing 25 mm Perimount bioprosthetic valve. TTE Today showed no PVL with MG " decreased to 5 mmHg.    Mitral regurgitation  Patient with asymptomatic, moderate-severe mitral regurgitation. No need for further intervention at this time. Can consider MitraClip if he develops heart failure symptoms refractory to medical therapy.     S/P AVR  See above    Paroxysmal atrial fibrillation  On Xarelto. No MAZE/MAURICIO ligation in the OP report.     Acute on chronic diastolic heart failure  Chronic. Controlled. Follow up with Cardiology/PCP.    Aortic atherosclerosis  See CTA    Plan:     Follow up with [facility] in 1 month and 1 year with labs and Echo.  Needs to establish care with cardiology. Ambulatory referral as sent to Dr Kiran's office at discharge. I will send them a message.   ASA indefinitely.   No non sterile procedures which could cause endocarditis for 6 months post TAVR including dental work, endoscopy, colonoscopy, and  procedures.   SBE prophylaxis for life.    Orders Placed This Encounter   Procedures    CBC Without Differential    Basic Metabolic Panel    Echo            Liv Hazel PA-C  Valve and Structural Heart Disease  [facility]

## 2022-08-31 NOTE — ASSESSMENT & PLAN NOTE
Successful 26 mm Evolut TAVR inside of a failing 25 mm Perimount bioprosthetic valve.  No paravalvular leak, peak velocity 1.4 and mean gradient 6 post TAVR by surface echo.     Discharge Plans:  1. Follow up with [facility] in 1 month and 1 year with labs and Echo.  2. ASA indefinitely.   3. No non sterile procedures which could cause endocarditis for 6 months post TAVR including dental work, endoscopy, colonoscopy, and  procedures.   4. SBE prophylaxis for life.  5. Would like to establish cardiology care at [facility]. Ambulatory referral sent to Dr Kiran.

## 2022-09-01 ENCOUNTER — OFFICE VISIT (OUTPATIENT)
Dept: CARDIOLOGY | Facility: CLINIC | Age: 82
End: 2022-09-01
Payer: MEDICARE

## 2022-09-01 ENCOUNTER — OFFICE VISIT (OUTPATIENT)
Dept: UROLOGY | Facility: CLINIC | Age: 82
End: 2022-09-01
Payer: MEDICARE

## 2022-09-01 ENCOUNTER — HOSPITAL ENCOUNTER (OUTPATIENT)
Dept: CARDIOLOGY | Facility: HOSPITAL | Age: 82
Discharge: HOME | End: 2022-09-01
Attending: PHYSICIAN ASSISTANT
Payer: MEDICARE

## 2022-09-01 VITALS
DIASTOLIC BLOOD PRESSURE: 80 MMHG | WEIGHT: 163.81 LBS | BODY MASS INDEX: 25.71 KG/M2 | HEART RATE: 70 BPM | OXYGEN SATURATION: 97 % | HEIGHT: 67 IN | BODY MASS INDEX: 24.64 KG/M2 | SYSTOLIC BLOOD PRESSURE: 140 MMHG | WEIGHT: 157 LBS | SYSTOLIC BLOOD PRESSURE: 144 MMHG | DIASTOLIC BLOOD PRESSURE: 61 MMHG | HEIGHT: 67 IN | HEART RATE: 71 BPM

## 2022-09-01 VITALS
BODY MASS INDEX: 25.71 KG/M2 | HEART RATE: 75 BPM | SYSTOLIC BLOOD PRESSURE: 153 MMHG | DIASTOLIC BLOOD PRESSURE: 99 MMHG | WEIGHT: 163.81 LBS | HEIGHT: 67 IN

## 2022-09-01 DIAGNOSIS — Z95.2 S/P TAVR (TRANSCATHETER AORTIC VALVE REPLACEMENT): ICD-10-CM

## 2022-09-01 DIAGNOSIS — C61 PROSTATE CANCER: Primary | ICD-10-CM

## 2022-09-01 DIAGNOSIS — I70.0 AORTIC ATHEROSCLEROSIS: ICD-10-CM

## 2022-09-01 DIAGNOSIS — I48.0 PAROXYSMAL ATRIAL FIBRILLATION: ICD-10-CM

## 2022-09-01 DIAGNOSIS — I50.33 ACUTE ON CHRONIC DIASTOLIC HEART FAILURE: ICD-10-CM

## 2022-09-01 DIAGNOSIS — Z95.2 S/P AVR: ICD-10-CM

## 2022-09-01 DIAGNOSIS — Z92.3 HISTORY OF EXTERNAL BEAM RADIATION THERAPY: ICD-10-CM

## 2022-09-01 DIAGNOSIS — Z90.79 HISTORY OF RADICAL RETROPUBIC PROSTATECTOMY: ICD-10-CM

## 2022-09-01 DIAGNOSIS — R97.21 RISING PSA FOLLOWING TREATMENT FOR MALIGNANT NEOPLASM OF PROSTATE: ICD-10-CM

## 2022-09-01 LAB
ASCENDING AORTA: 4.2 CM
AV INDEX (PROSTH): 0.88
AV MEAN GRADIENT: 5 MMHG
AV PEAK GRADIENT: 8 MMHG
AV VALVE AREA: 4.18 CM2
AV VELOCITY RATIO: 0.66
BSA FOR ECHO PROCEDURE: 1.83 M2
CV ECHO LV RWT: 0.41 CM
DOP CALC AO PEAK VEL: 1.38 M/S
DOP CALC AO VTI: 21 CM
DOP CALC LVOT AREA: 4.8 CM2
DOP CALC LVOT DIAMETER: 2.46 CM
DOP CALC LVOT PEAK VEL: 0.91 M/S
DOP CALC LVOT STROKE VOLUME: 87.79 CM3
DOP CALCLVOT PEAK VEL VTI: 18.48 CM
E WAVE DECELERATION TIME: 162.91 MSEC
E/A RATIO: 1.77
E/E' RATIO: 14.13 M/S
ECHO LV POSTERIOR WALL: 1.2 CM (ref 0.6–1.1)
EJECTION FRACTION: 45 %
FRACTIONAL SHORTENING: 3 % (ref 28–44)
INTERVENTRICULAR SEPTUM: 1.2 CM (ref 0.6–1.1)
LA MAJOR: 5.89 CM
LA MINOR: 5.7 CM
LA WIDTH: 5.87 CM
LEFT ATRIUM SIZE: 5.34 CM
LEFT ATRIUM VOLUME INDEX MOD: 85.7 ML/M2
LEFT ATRIUM VOLUME INDEX: 84.8 ML/M2
LEFT ATRIUM VOLUME MOD: 156 CM3
LEFT ATRIUM VOLUME: 154.36 CM3
LEFT INTERNAL DIMENSION IN SYSTOLE: 5.6 CM (ref 2.1–4)
LEFT VENTRICLE DIASTOLIC VOLUME INDEX: 98.5 ML/M2
LEFT VENTRICLE DIASTOLIC VOLUME: 179.27 ML
LEFT VENTRICLE MASS INDEX: 163 G/M2
LEFT VENTRICLE SYSTOLIC VOLUME INDEX: 84.5 ML/M2
LEFT VENTRICLE SYSTOLIC VOLUME: 153.88 ML
LEFT VENTRICULAR INTERNAL DIMENSION IN DIASTOLE: 5.8 CM (ref 3.5–6)
LEFT VENTRICULAR MASS: 297 G
LV LATERAL E/E' RATIO: 10.6 M/S
LV SEPTAL E/E' RATIO: 21.2 M/S
MV A" WAVE DURATION": 5.71 MSEC
MV PEAK A VEL: 0.6 M/S
MV PEAK E VEL: 1.06 M/S
MV STENOSIS PRESSURE HALF TIME: 47.24 MS
MV VALVE AREA P 1/2 METHOD: 4.66 CM2
PISA MRMAX VEL: 0.05 M/S
PISA TR MAX VEL: 2.77 M/S
PULM VEIN S/D RATIO: 1.07
PV PEAK D VEL: 0.81 M/S
PV PEAK S VEL: 0.87 M/S
RA MAJOR: 5.01 CM
RA PRESSURE: 3 MMHG
RA WIDTH: 4.85 CM
RIGHT VENTRICULAR END-DIASTOLIC DIMENSION: 3.75 CM
RV TISSUE DOPPLER FREE WALL SYSTOLIC VELOCITY 1 (APICAL 4 CHAMBER VIEW): 5.91 CM/S
SINUS: 3.67 CM
STJ: 3.19 CM
TDI LATERAL: 0.1 M/S
TDI SEPTAL: 0.05 M/S
TDI: 0.08 M/S
TR MAX PG: 31 MMHG
TRICUSPID ANNULAR PLANE SYSTOLIC EXCURSION: 1.49 CM
TV REST PULMONARY ARTERY PRESSURE: 34 MMHG

## 2022-09-01 PROCEDURE — 3080F DIAST BP >= 90 MM HG: CPT | Mod: CPTII,S$GLB,, | Performed by: NURSE PRACTITIONER

## 2022-09-01 PROCEDURE — 3077F PR MOST RECENT SYSTOLIC BLOOD PRESSURE >= 140 MM HG: ICD-10-PCS | Mod: CPTII,S$GLB,, | Performed by: NURSE PRACTITIONER

## 2022-09-01 PROCEDURE — 99999 PR PBB SHADOW E&M-EST. PATIENT-LVL III: CPT | Mod: PBBFAC,,, | Performed by: PHYSICIAN ASSISTANT

## 2022-09-01 PROCEDURE — 1159F PR MEDICATION LIST DOCUMENTED IN MEDICAL RECORD: ICD-10-PCS | Mod: CPTII,S$GLB,, | Performed by: NURSE PRACTITIONER

## 2022-09-01 PROCEDURE — 3078F PR MOST RECENT DIASTOLIC BLOOD PRESSURE < 80 MM HG: ICD-10-PCS | Mod: CPTII,S$GLB,, | Performed by: PHYSICIAN ASSISTANT

## 2022-09-01 PROCEDURE — 96402 PR CHEMOTHER HORMON ANTINEOPL SUB-Q/IM: ICD-10-PCS | Mod: S$GLB,,, | Performed by: NURSE PRACTITIONER

## 2022-09-01 PROCEDURE — 3077F PR MOST RECENT SYSTOLIC BLOOD PRESSURE >= 140 MM HG: ICD-10-PCS | Mod: CPTII,S$GLB,, | Performed by: PHYSICIAN ASSISTANT

## 2022-09-01 PROCEDURE — 99999 PR PBB SHADOW E&M-EST. PATIENT-LVL III: ICD-10-PCS | Mod: PBBFAC,,, | Performed by: PHYSICIAN ASSISTANT

## 2022-09-01 PROCEDURE — 1160F RVW MEDS BY RX/DR IN RCRD: CPT | Mod: CPTII,S$GLB,, | Performed by: NURSE PRACTITIONER

## 2022-09-01 PROCEDURE — 99999 PR PBB SHADOW E&M-EST. PATIENT-LVL IV: CPT | Mod: PBBFAC,,, | Performed by: NURSE PRACTITIONER

## 2022-09-01 PROCEDURE — 93306 ECHO (CUPID ONLY): ICD-10-PCS | Mod: 26,,, | Performed by: INTERNAL MEDICINE

## 2022-09-01 PROCEDURE — 1126F AMNT PAIN NOTED NONE PRSNT: CPT | Mod: CPTII,S$GLB,, | Performed by: NURSE PRACTITIONER

## 2022-09-01 PROCEDURE — 99499 NO LOS: ICD-10-PCS | Mod: S$GLB,,, | Performed by: NURSE PRACTITIONER

## 2022-09-01 PROCEDURE — 93306 TTE W/DOPPLER COMPLETE: CPT

## 2022-09-01 PROCEDURE — 96402 CHEMO HORMON ANTINEOPL SQ/IM: CPT | Mod: S$GLB,,, | Performed by: NURSE PRACTITIONER

## 2022-09-01 PROCEDURE — 1159F MED LIST DOCD IN RCRD: CPT | Mod: CPTII,S$GLB,, | Performed by: NURSE PRACTITIONER

## 2022-09-01 PROCEDURE — 1126F PR PAIN SEVERITY QUANTIFIED, NO PAIN PRESENT: ICD-10-PCS | Mod: CPTII,S$GLB,, | Performed by: NURSE PRACTITIONER

## 2022-09-01 PROCEDURE — 99999 PR PBB SHADOW E&M-EST. PATIENT-LVL IV: ICD-10-PCS | Mod: PBBFAC,,, | Performed by: NURSE PRACTITIONER

## 2022-09-01 PROCEDURE — 1126F PR PAIN SEVERITY QUANTIFIED, NO PAIN PRESENT: ICD-10-PCS | Mod: CPTII,S$GLB,, | Performed by: PHYSICIAN ASSISTANT

## 2022-09-01 PROCEDURE — 99214 OFFICE O/P EST MOD 30 MIN: CPT | Mod: S$GLB,,, | Performed by: PHYSICIAN ASSISTANT

## 2022-09-01 PROCEDURE — 3077F SYST BP >= 140 MM HG: CPT | Mod: CPTII,S$GLB,, | Performed by: NURSE PRACTITIONER

## 2022-09-01 PROCEDURE — 1160F PR REVIEW ALL MEDS BY PRESCRIBER/CLIN PHARMACIST DOCUMENTED: ICD-10-PCS | Mod: CPTII,S$GLB,, | Performed by: NURSE PRACTITIONER

## 2022-09-01 PROCEDURE — 99499 UNLISTED E&M SERVICE: CPT | Mod: S$GLB,,, | Performed by: NURSE PRACTITIONER

## 2022-09-01 PROCEDURE — 93306 TTE W/DOPPLER COMPLETE: CPT | Mod: 26,,, | Performed by: INTERNAL MEDICINE

## 2022-09-01 PROCEDURE — 1126F AMNT PAIN NOTED NONE PRSNT: CPT | Mod: CPTII,S$GLB,, | Performed by: PHYSICIAN ASSISTANT

## 2022-09-01 PROCEDURE — 3077F SYST BP >= 140 MM HG: CPT | Mod: CPTII,S$GLB,, | Performed by: PHYSICIAN ASSISTANT

## 2022-09-01 PROCEDURE — 3080F PR MOST RECENT DIASTOLIC BLOOD PRESSURE >= 90 MM HG: ICD-10-PCS | Mod: CPTII,S$GLB,, | Performed by: NURSE PRACTITIONER

## 2022-09-01 PROCEDURE — 3078F DIAST BP <80 MM HG: CPT | Mod: CPTII,S$GLB,, | Performed by: PHYSICIAN ASSISTANT

## 2022-09-01 PROCEDURE — 99214 PR OFFICE/OUTPT VISIT, EST, LEVL IV, 30-39 MIN: ICD-10-PCS | Mod: S$GLB,,, | Performed by: PHYSICIAN ASSISTANT

## 2022-09-01 NOTE — PROGRESS NOTES
CHIEF COMPLAINT:    Michael Mclean is a 82 y.o. male presents today for Prostate Cancer.     HISTORY OF PRESENTING ILLINESS:    Michael Mclean is a 82 y.o. deaf male with a PMH of prostate cancer. He is s/p RRP 05/2012 with Dr. Bartholomew at [facility]  Pathology revealed a [place] 5+4= 9 disease, pT3aN0.  In 10/2013 completed one year of ADT and XRT.   He had been followed there until 2016 before moving here.     He was initially seen in our clinic with Dr. Manrique. Had been receiving leuprolide (lupron) every 6 months. Last office visit with Dr. Ariza 07/08/2020 .   I saw him 08/09/2021.   08/02/2021 PSA was 4.3; previously 0.70 on 01.28/2021  LUPRON 45mg IM given  Plan to recheck PSA and T level; consult Hem/Onc if abnormal.   09/09/2021 PSA was 5.4 with T level of 12. Hem/Onc consult placed.      02/14/2022 CTAP:  -No evidence of metastatic disease in the abdomen or pelvis     02/14/2022 Bone Scan:  - Increased radiotracer accumulation within the right 2nd rib as compared to bone scan of 09/30/2021 suspicious for metastatic disease    Last clinic visit was 02/18/2022.   He is here today for LUPRON 45mg.  PSA was 3.8 on 07/28/2022 (4.5 on 05/19/2022)  No complaints.   Has no urinary complaints today.  Daughter present.     He no showed for Hem/Onc in May; states he forgot.         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           REVIEW OF SYSTEMS:  Review of Systems   Constitutional: Negative.  Negative for chills and fever.        Feeling very well.  Getting more to the gym     Eyes:  Negative for double vision.   Respiratory:  Negative for cough and shortness of breath.    Cardiovascular:  Negative for chest pain and palpitations.        Swelling to feet.     Gastrointestinal:  Negative for nausea and vomiting.   Genitourinary: Negative.  Negative for dysuria, flank pain and hematuria.        Ok with urination     Neurological:  Negative for dizziness.       PATIENT  HISTORY:    Past Medical History:   Diagnosis Date    Abdominal hernia     Anemia 02/2021    Anticoagulant long-term use     Arthritis     SHOULDER    Cancer     prostate    Cardiomyopathy     Carotid artery stenosis     left    Coronary artery disease     Deaf     Dizziness     Encounter for blood transfusion     Gastritis 3/2/2021    Hyperlipidemia     Hypertension     Mitral regurgitation     Mitral regurgitation     PAD (peripheral artery disease)     Paroxysmal atrial fibrillation     Stroke 2020    Thyroid disease        Past Surgical History:   Procedure Laterality Date    ABLATION N/A 7/22/2020    Procedure: Ablation, afib;  Surgeon: Tal Laurent MD;  Location: [facility] CATH;  Service: Cardiology;  Laterality: N/A;  needs covid test;needs ALEXYS ([place])    AORTIC VALVE REPLACEMENT      CARDIAC CATH COSURGEON N/A 7/27/2022    Procedure: Cardiac Cath Cosurgeon;  Surgeon: Glenroy Sam MD;  Location: [facility] CATH LAB;  Service: Cardiovascular;  Laterality: N/A;    CARDIAC CATHETERIZATION      3 HEART STENTS    carotid artery stent Left     COLONOSCOPY N/A 3/2/2021    Procedure: COLONOSCOPY;  Surgeon: Emiliana Cardenas MD;  Location: [facility] ENDO;  Service: General;  Laterality: N/A;  4  8:30am   CL/AM/Bd    CORONARY ARTERY BYPASS GRAFT      ESOPHAGOGASTRODUODENOSCOPY N/A 11/10/2020    Procedure: EGD (ESOPHAGOGASTRODUODENOSCOPY);  Surgeon: Renny Prakash MD;  Location: [facility];  Service: Endoscopy;  Laterality: N/A;    HERNIA REPAIR      HIP REPLACEMENT ARTHROPLASTY Right 11/6/2020    Procedure: EMELY -ARTHROPLASTY, HIP REPLACEMENT (ENDO HIP);  Surgeon: Lino Thomas MD;  Location: [facility] OR;  Service: Orthopedics;  Laterality: Right;    LEFT HEART CATHETERIZATION N/A 6/29/2022    Procedure: Left heart cath;  Surgeon: Glenroy Richardson MD;  Location: [facility] CATH LAB;  Service: Cardiology;  Laterality: N/A;    PACERMAKER      PROSTATECTOMY      SHOULDER SURGERY      TOE SURGERY      TRANSCATHETER AORTIC VALVE  REPLACEMENT (TAVR) N/A 7/27/2022    Procedure: REPLACEMENT, AORTIC VALVE, TRANSCATHETER (TAVR);  Surgeon: Glenroy Richardson MD;  Location: [facility] CATH LAB;  Service: Cardiology;  Laterality: N/A;    TRANSCATHETER AORTIC VALVE REPLACEMENT (TAVR)  7/27/2022    Procedure: REPLACEMENT, AORTIC VALVE, TRANSCATHETER (TAVR);  Surgeon: Glenroy Sam MD;  Location: [facility] CATH LAB;  Service: Cardiovascular;;    TRANSESOPHAGEAL ECHOCARDIOGRAPHY N/A 6/27/2022    Procedure: ECHOCARDIOGRAM, TRANSESOPHAGEAL;  Surgeon: Dosc Diagnostic Provider;  Location: [facility] EP LAB;  Service: Anesthesiology;  Laterality: N/A;    TREATMENT OF CARDIAC ARRHYTHMIA N/A 8/7/2019    Procedure: Cardioversion or Defibrillation;  Surgeon: Tal Laurent MD;  Location: [facility] CATH;  Service: Cardiology;  Laterality: N/A;       Family History   Problem Relation Age of Onset    Heart disease Mother     Heart attack Mother        Social History     Socioeconomic History    Marital status:    Tobacco Use    Smoking status: Never    Smokeless tobacco: Never   Substance and Sexual Activity    Alcohol use: Yes     Alcohol/week: 1.0 standard drink     Types: 1 Glasses of wine per week     Comment: occassionally maybe 3 wk    Drug use: Never       Allergies:  Losartan and Valsartan    Medications:    Current Outpatient Medications:     acetylcarnitin/alpha lipoic ac (ACETYLCARNITIN HCL-A LIPOIC AC) 400-200 mg Cap, Take 1 capsule by mouth once daily., Disp: , Rfl:     aspirin (ECOTRIN) 81 MG EC tablet, Take 1 tablet (81 mg total) by mouth once daily., Disp: 30 tablet, Rfl: 11    bicalutamide (CASODEX) 50 MG Tab, Take 1 tablet (50 mg total) by mouth once daily., Disp: 30 tablet, Rfl: 3    cholecalciferol, vitamin D3, 5,000 unit capsule, Take 5,000 Units by mouth once daily. , Disp: , Rfl:     cyanocobalamin, vitamin B-12, 1,000 mcg/mL Kit, Inject 1 mL as directed every 28 days., Disp: , Rfl:     docusate sodium (COLACE) 100 MG capsule, Take 100 mg by mouth 2  (two) times daily., Disp: , Rfl:     ferrous sulfate (FEOSOL) 325 mg (65 mg iron) Tab tablet, Take 1 tablet (325 mg total) by mouth once daily., Disp: , Rfl: 0    KLOR-CON M20 20 mEq tablet, Take 20 mEq by mouth once daily. , Disp: , Rfl:     magnesium oxide (MAG-OX) 400 mg tablet, Take 400 mg by mouth once daily., Disp: , Rfl:     metoprolol succinate (TOPROL-XL) 100 MG 24 hr tablet, Take 50 mg by mouth 2 (two) times a day., Disp: , Rfl:     rosuvastatin (CRESTOR) 20 MG tablet, Take 20 mg by mouth every morning., Disp: , Rfl:     sotaloL (BETAPACE) 80 MG tablet, Take 0.5 tablets (40 mg total) by mouth 2 (two) times daily., Disp: 90 tablet, Rfl: 0    torsemide (DEMADEX) 20 MG Tab, Take 20 mg by mouth every other day., Disp: , Rfl:     vitamin D3-folic acid 5,000 unit- 1 mg Tab, Take 1 tablet by mouth once daily., Disp: , Rfl:     vitamin D3-vitamin K2, MK4, (K2 PLUS D3) 1,000-100 unit-mcg Tab, Take by mouth., Disp: , Rfl:     clonazePAM (KLONOPIN) 2 MG Tab, TAKE 1 TABLET BY MOUTH ONCE DAILY AS NEEDED FOR ANXIETY (Patient not taking: No sig reported), Disp: 30 tablet, Rfl: 0    meclizine (ANTIVERT) 25 mg tablet, Take 1 tablet (25 mg total) by mouth 3 (three) times daily as needed. (Patient not taking: No sig reported), Disp: 20 tablet, Rfl: 0    pantoprazole (PROTONIX) 40 MG tablet, Take 1 tablet (40 mg total) by mouth once daily., Disp: 30 tablet, Rfl: 11    Current Facility-Administered Medications:     leuprolide (6 month) injection 45 mg, 45 mg, Intramuscular, Q6 Months, Abbie Martinez NP, 45 mg at 09/01/22 1319    PHYSICAL EXAMINATION:  Physical Exam  Vitals and nursing note reviewed.   Constitutional:       General: He is awake.      Appearance: Normal appearance.   HENT:      Head: Normocephalic.      Right Ear: External ear normal.      Left Ear: External ear normal.      Nose: Nose normal.   Cardiovascular:      Rate and Rhythm: Normal rate.   Pulmonary:      Effort: Pulmonary effort is normal. No  respiratory distress.   Abdominal:      Tenderness: There is no abdominal tenderness. There is no right CVA tenderness or left CVA tenderness.   Musculoskeletal:         General: Normal range of motion.      Cervical back: Normal range of motion.      Right foot: Swelling present.      Left foot: Swelling present.   Skin:     General: Skin is warm and dry.   Neurological:      General: No focal deficit present.      Mental Status: He is alert and oriented to person, place, and time.   Psychiatric:         Mood and Affect: Mood normal.         Behavior: Behavior is cooperative.         LABS:      Lab Results   Component Value Date    PSADIAG 3.8 07/25/2022    PSADIAG 4.5 (H) 05/19/2022    PSADIAG 5.4 (H) 09/09/2021             IMPRESSION:    Encounter Diagnoses   Name Primary?    Prostate cancer Yes    History of radical retropubic prostatectomy     History of external beam radiation therapy     Rising PSA following treatment for malignant neoplasm of prostate          Assessment:       1. Prostate cancer    2. History of radical retropubic prostatectomy    3. History of external beam radiation therapy    4. Rising PSA following treatment for malignant neoplasm of prostate        Plan:         I spent 30 minutes with the patient of which more than half was spent in direct consultation with the patient in regards to our treatment and plan.  We addressed the office findings and recent labs.   Education and recommendations of today's plan of care including home remedies and needed follow up with PCP.   Recommended lifestyle modifications with proper, healthy diet, good hydration if no fluid restrictions; reducing bladder irritants.   Benefits of regular exercise approved by PCP.  Elevate his LE prior to bed  LUPRON 45 mg IM today.  RTC 6 months with PSA  F/u with HEME/ONC

## 2022-09-12 ENCOUNTER — TELEPHONE (OUTPATIENT)
Dept: HEMATOLOGY/ONCOLOGY | Facility: CLINIC | Age: 82
End: 2022-09-12
Payer: MEDICARE

## 2022-09-12 PROBLEM — J06.9 URI (UPPER RESPIRATORY INFECTION): Status: ACTIVE | Noted: 2022-09-12

## 2022-09-12 NOTE — TELEPHONE ENCOUNTER
----- Message from Bri Jernigan sent at 9/12/2022  2:50 PM CDT -----  Regarding: Reschedule Existing Appointment    Appt Date:  12/15/22    Type of appt :   labs and EP    Physician: Dr. Wilkins    Reason for rescheduling?   Would like for his appointments moved to March on the same day that he has his Lupron injection    Caller:   Michael    Contact Preference: [phone]

## 2022-09-20 ENCOUNTER — DOCUMENTATION ONLY (OUTPATIENT)
Dept: CARDIOLOGY | Facility: CLINIC | Age: 82
End: 2022-09-20
Payer: MEDICARE

## 2022-09-20 PROBLEM — Z95.0 CARDIAC PACEMAKER IN SITU: Status: ACTIVE | Noted: 2022-09-20

## 2022-09-20 LAB
POC ACTIVATED CLOTTING TIME K: 265 SEC (ref 74–137)
SAMPLE: ABNORMAL

## 2022-09-20 NOTE — PROGRESS NOTES
Mu is going to follow him in clinic.  He is going to start him back on OAC for history of afib and we will offer watchman device at his one month visit.

## 2022-09-20 NOTE — PROGRESS NOTES
Subjective:           Chief Complaint: No chief complaint on file.      HPI    A professional  was requested but the patient prefers to have his daughter interpret for him.    82 year old male with persistent Afib (s/p DCCV in 2017, s/p PVI in 2020), CAD s/p 2v CABG & bioprosthetic aotic valve in 2012 s/p 26 mm Evolut valve in valve TAVR inside of a failing 25 mm Perimount bioprosthetic valve, s/p ICD, h/o SSS, HTN, HLD, CVA and prostate cancer here to establish care.    He has had chronic dizziness, had the flu two weeks ago but has since recovered. He has had decreased appetite and believes he may have lost a few pounds in the last month. He had a fall and broke his hip two years ago following a tri and fall.    He denies chest pain/pressure/tightness/discomfort, dyspnea on exertion, orthopnea, PND, palpitations, syncope or claudication. He feels he can walk a longer distance since his TAVR. He does report right foot edema which resolves with elevation.    Review of Systems   All other systems reviewed and are negative.      Objective:      Vitals:    09/27/22 1030   BP: 135/71   Pulse: 73       Physical Exam  Constitutional:       Appearance: He is well-developed. He is not diaphoretic.   HENT:      Head: Normocephalic and atraumatic.   Eyes:      Pupils: Pupils are equal, round, and reactive to light.   Neck:      Vascular: No carotid bruit or JVD.   Cardiovascular:      Rate and Rhythm: Normal rate and regular rhythm.      Heart sounds: Normal heart sounds. Heart sounds not distant. No murmur heard.    No friction rub. No gallop. No S3 or S4 sounds.   Pulmonary:      Effort: Pulmonary effort is normal. No respiratory distress.      Breath sounds: Normal breath sounds. No wheezing.   Abdominal:      General: Bowel sounds are normal. There is no distension.      Palpations: Abdomen is soft.      Tenderness: There is no abdominal tenderness.   Musculoskeletal:         General: No swelling.       Cervical back: Normal range of motion.   Skin:     General: Skin is warm.      Findings: No erythema.   Neurological:      Mental Status: He is alert and oriented to person, place, and time.   Psychiatric:         Behavior: Behavior normal.       Assessment & Plan     S/P TAVR (transcatheter aortic valve replacement)  Valve in valve TAVR  On ASA  Was on Xarelto, will resume this in addition to ASA due to severe CAD/PAD.    Coronary artery disease, unspecified vessel or lesion type, unspecified whether angina present, unspecified whether native or transplanted heart  Aortic atherosclerosis  CCS 0  Continue ASA, statin, beta blocker  LDL at goal    Persistent atrial fibrillation  CHADS VASc 6  HAS BLED 5  He was on Xarelto but following his CVA he was taken off of anticoagulation (records reviewed, no history of hemorrhagic stroke). Xarelto and eliquis were prohibitively expensive and he does not wish to resume warfarin. He is a watchman candidate which will be discussed with him at his following visit with interventional cardiology, consider DAPT post-watchman.    Essential hypertension  135/71  Continue current medications    Hyperlipidemia, unspecified hyperlipidemia type  Continue statin    Embolic stroke involving left middle cerebral artery  As per AFib    Cardiac pacemaker in situ  He is no longer seeing the implanting physician at [facility]  He would like to establish care with EP here    Chronic kidney disease, unspecified CKD stage   Managed by PCP    Prostate cancer  Managed by urology

## 2022-09-24 NOTE — Clinical Note
A percutaneous stick to the right femoral artery was performed. Ultrasound guidance was used to obtain access.  BLE: 4+/5/grossly assessed due to

## 2022-09-27 ENCOUNTER — OFFICE VISIT (OUTPATIENT)
Dept: CARDIOLOGY | Facility: CLINIC | Age: 82
End: 2022-09-27
Payer: MEDICARE

## 2022-09-27 ENCOUNTER — TELEPHONE (OUTPATIENT)
Dept: ELECTROPHYSIOLOGY | Facility: CLINIC | Age: 82
End: 2022-09-27
Payer: MEDICARE

## 2022-09-27 VITALS
BODY MASS INDEX: 23.04 KG/M2 | HEART RATE: 73 BPM | SYSTOLIC BLOOD PRESSURE: 135 MMHG | HEIGHT: 68 IN | DIASTOLIC BLOOD PRESSURE: 71 MMHG | WEIGHT: 152 LBS

## 2022-09-27 DIAGNOSIS — I48.19 PERSISTENT ATRIAL FIBRILLATION: ICD-10-CM

## 2022-09-27 DIAGNOSIS — N18.9 CHRONIC KIDNEY DISEASE, UNSPECIFIED CKD STAGE: ICD-10-CM

## 2022-09-27 DIAGNOSIS — C61 PROSTATE CANCER: ICD-10-CM

## 2022-09-27 DIAGNOSIS — E78.5 HYPERLIPIDEMIA, UNSPECIFIED HYPERLIPIDEMIA TYPE: ICD-10-CM

## 2022-09-27 DIAGNOSIS — I25.10 CORONARY ARTERY DISEASE, UNSPECIFIED VESSEL OR LESION TYPE, UNSPECIFIED WHETHER ANGINA PRESENT, UNSPECIFIED WHETHER NATIVE OR TRANSPLANTED HEART: ICD-10-CM

## 2022-09-27 DIAGNOSIS — I63.412 EMBOLIC STROKE INVOLVING LEFT MIDDLE CEREBRAL ARTERY: ICD-10-CM

## 2022-09-27 DIAGNOSIS — I10 ESSENTIAL HYPERTENSION: ICD-10-CM

## 2022-09-27 DIAGNOSIS — I48.0 PAROXYSMAL ATRIAL FIBRILLATION: Primary | ICD-10-CM

## 2022-09-27 DIAGNOSIS — Z95.810 BIVENTRICULAR IMPLANTABLE CARDIOVERTER-DEFIBRILLATOR (ICD) IN SITU: ICD-10-CM

## 2022-09-27 DIAGNOSIS — Z95.2 S/P TAVR (TRANSCATHETER AORTIC VALVE REPLACEMENT): Primary | ICD-10-CM

## 2022-09-27 DIAGNOSIS — Z95.0 CARDIAC PACEMAKER IN SITU: ICD-10-CM

## 2022-09-27 DIAGNOSIS — I70.0 AORTIC ATHEROSCLEROSIS: ICD-10-CM

## 2022-09-27 PROCEDURE — 3075F PR MOST RECENT SYSTOLIC BLOOD PRESS GE 130-139MM HG: ICD-10-PCS | Mod: CPTII,S$GLB,, | Performed by: INTERNAL MEDICINE

## 2022-09-27 PROCEDURE — 99999 PR PBB SHADOW E&M-EST. PATIENT-LVL V: CPT | Mod: PBBFAC,,, | Performed by: INTERNAL MEDICINE

## 2022-09-27 PROCEDURE — 99214 OFFICE O/P EST MOD 30 MIN: CPT | Mod: S$GLB,,, | Performed by: INTERNAL MEDICINE

## 2022-09-27 PROCEDURE — 3288F PR FALLS RISK ASSESSMENT DOCUMENTED: ICD-10-PCS | Mod: CPTII,S$GLB,, | Performed by: INTERNAL MEDICINE

## 2022-09-27 PROCEDURE — 1101F PR PT FALLS ASSESS DOC 0-1 FALLS W/OUT INJ PAST YR: ICD-10-PCS | Mod: CPTII,S$GLB,, | Performed by: INTERNAL MEDICINE

## 2022-09-27 PROCEDURE — 1125F AMNT PAIN NOTED PAIN PRSNT: CPT | Mod: CPTII,S$GLB,, | Performed by: INTERNAL MEDICINE

## 2022-09-27 PROCEDURE — 99214 PR OFFICE/OUTPT VISIT, EST, LEVL IV, 30-39 MIN: ICD-10-PCS | Mod: S$GLB,,, | Performed by: INTERNAL MEDICINE

## 2022-09-27 PROCEDURE — 99999 PR PBB SHADOW E&M-EST. PATIENT-LVL V: ICD-10-PCS | Mod: PBBFAC,,, | Performed by: INTERNAL MEDICINE

## 2022-09-27 PROCEDURE — 3075F SYST BP GE 130 - 139MM HG: CPT | Mod: CPTII,S$GLB,, | Performed by: INTERNAL MEDICINE

## 2022-09-27 PROCEDURE — 3288F FALL RISK ASSESSMENT DOCD: CPT | Mod: CPTII,S$GLB,, | Performed by: INTERNAL MEDICINE

## 2022-09-27 PROCEDURE — 1159F PR MEDICATION LIST DOCUMENTED IN MEDICAL RECORD: ICD-10-PCS | Mod: CPTII,S$GLB,, | Performed by: INTERNAL MEDICINE

## 2022-09-27 PROCEDURE — 1125F PR PAIN SEVERITY QUANTIFIED, PAIN PRESENT: ICD-10-PCS | Mod: CPTII,S$GLB,, | Performed by: INTERNAL MEDICINE

## 2022-09-27 PROCEDURE — 3078F PR MOST RECENT DIASTOLIC BLOOD PRESSURE < 80 MM HG: ICD-10-PCS | Mod: CPTII,S$GLB,, | Performed by: INTERNAL MEDICINE

## 2022-09-27 PROCEDURE — 1101F PT FALLS ASSESS-DOCD LE1/YR: CPT | Mod: CPTII,S$GLB,, | Performed by: INTERNAL MEDICINE

## 2022-09-27 PROCEDURE — 3078F DIAST BP <80 MM HG: CPT | Mod: CPTII,S$GLB,, | Performed by: INTERNAL MEDICINE

## 2022-09-27 PROCEDURE — 1159F MED LIST DOCD IN RCRD: CPT | Mod: CPTII,S$GLB,, | Performed by: INTERNAL MEDICINE

## 2022-09-27 NOTE — PROGRESS NOTES
Subjective:    Patient ID:  Michael Mclean is a 82 y.o. male who presents for evaluation of Pacemaker Check      HPI 81 yo male with atrial fibrillation, ventricular fibrillation, CAD s/p CABG, AS s/p sAVR followed by TAVR, Htn, CVA, mitral valve prolapse, ICD.  Previously managed by [facility]. EP was Dr. Laurent. Has switched care to [facility].  Persistent Afib PVI 7/20 (WACA + roof line).  Remains on Sotalol since then.  CAD s/p 2v CABG 2012  Aortic stenosis Bioprosthetic aotic valve in 2012 s/p 26 mm Evolut valve in valve TAVR inside of a failing 25 mm Perimount bioprosthetic valve  VF in setting of EF 15%. Dual chamber ICD implanted 9/6/19  Echo 9/1/22 EF 45% mitral valve anterior leaflet prolapse with moderate to severe MR, YOAN 85.7, well seated aortic prosthesis.    Feeling well overall.  Not on anticoagulation. Has had GI bleeding in the past requiring transfusion. Watchman is being considered.    Device interrogation reveals stable function of the leads, RA pacing 97% RV pacing <1% AF burden 0.3% since 6/20, no sustained ventricular arrhythmias.    Review of Systems   Constitutional: Negative. Negative for fever and malaise/fatigue.   HENT:  Negative for congestion and sore throat.    Cardiovascular:  Negative for chest pain, dyspnea on exertion, irregular heartbeat, leg swelling, near-syncope, orthopnea, palpitations, paroxysmal nocturnal dyspnea and syncope.   Respiratory:  Positive for shortness of breath. Negative for cough.    Gastrointestinal:  Negative for abdominal pain, constipation and diarrhea.   Neurological:  Negative for dizziness, light-headedness and weakness.   Psychiatric/Behavioral:  Negative for depression. The patient is not nervous/anxious.    All other systems reviewed and are negative.     Objective:    Physical Exam  Constitutional:       Appearance: He is well-developed.   Eyes:      General: No scleral icterus.     Conjunctiva/sclera: Conjunctivae normal.   Neck:      Vascular: No  JVD.      Trachea: No tracheal deviation.   Cardiovascular:      Rate and Rhythm: Normal rate and regular rhythm.      Chest Wall: PMI is not displaced.      Heart sounds: Normal heart sounds.   Pulmonary:      Effort: Pulmonary effort is normal. No respiratory distress.      Breath sounds: Normal breath sounds.   Abdominal:      Palpations: Abdomen is soft.      Tenderness: There is no abdominal tenderness.   Musculoskeletal:         General: No tenderness.   Skin:     General: Skin is warm and dry.      Findings: No rash.   Neurological:      Mental Status: He is alert and oriented to person, place, and time.   Psychiatric:         Behavior: Behavior normal.         Assessment:       1. Persistent atrial fibrillation    2. Coronary artery disease, unspecified vessel or lesion type, unspecified whether angina present, unspecified whether native or transplanted heart    3. Acute on chronic diastolic heart failure    4. Essential hypertension    5. S/P CABG (coronary artery bypass graft)    6. S/P TAVR (transcatheter aortic valve replacement)    7. Aortic atherosclerosis    8. AICD (automatic cardioverter/defibrillator) present    9. Embolic stroke involving left middle cerebral artery    10. Ventricular fibrillation    11. Cardiac pacemaker in situ         Plan:       Doing well from an arrhythmia standpoint. We discussed that he is at high risk for recurrence of AF given his mitral valve prolapse.  We discussed WATCHMAN. I agree that this should be considered. He is being worked up by Dr. Richardson.  Continue current settings at this time.  He is going to consider EP follow-up at [facility]. If he prefers this, we will arrange with Dr. Vigil.

## 2022-09-27 NOTE — TELEPHONE ENCOUNTER
Called patient to inform him of a device appointment scheduled for 8am tomorrow. Patient did not answer. I left a message with my call back number. I also tried to call his daughter and could not leave a message.

## 2022-09-28 ENCOUNTER — OFFICE VISIT (OUTPATIENT)
Dept: ELECTROPHYSIOLOGY | Facility: CLINIC | Age: 82
End: 2022-09-28
Payer: MEDICARE

## 2022-09-28 ENCOUNTER — CLINICAL SUPPORT (OUTPATIENT)
Dept: CARDIOLOGY | Facility: HOSPITAL | Age: 82
End: 2022-09-28
Attending: INTERNAL MEDICINE
Payer: MEDICARE

## 2022-09-28 VITALS
HEART RATE: 75 BPM | WEIGHT: 151.88 LBS | DIASTOLIC BLOOD PRESSURE: 80 MMHG | BODY MASS INDEX: 23.02 KG/M2 | HEIGHT: 68 IN | SYSTOLIC BLOOD PRESSURE: 118 MMHG

## 2022-09-28 DIAGNOSIS — Z95.810 BIVENTRICULAR IMPLANTABLE CARDIOVERTER-DEFIBRILLATOR (ICD) IN SITU: ICD-10-CM

## 2022-09-28 DIAGNOSIS — I70.0 AORTIC ATHEROSCLEROSIS: ICD-10-CM

## 2022-09-28 DIAGNOSIS — I63.412 EMBOLIC STROKE INVOLVING LEFT MIDDLE CEREBRAL ARTERY: ICD-10-CM

## 2022-09-28 DIAGNOSIS — Z95.2 S/P TAVR (TRANSCATHETER AORTIC VALVE REPLACEMENT): ICD-10-CM

## 2022-09-28 DIAGNOSIS — I10 ESSENTIAL HYPERTENSION: ICD-10-CM

## 2022-09-28 DIAGNOSIS — I49.01 VENTRICULAR FIBRILLATION: ICD-10-CM

## 2022-09-28 DIAGNOSIS — I25.10 CORONARY ARTERY DISEASE, UNSPECIFIED VESSEL OR LESION TYPE, UNSPECIFIED WHETHER ANGINA PRESENT, UNSPECIFIED WHETHER NATIVE OR TRANSPLANTED HEART: ICD-10-CM

## 2022-09-28 DIAGNOSIS — Z95.1 S/P CABG (CORONARY ARTERY BYPASS GRAFT): ICD-10-CM

## 2022-09-28 DIAGNOSIS — I50.33 ACUTE ON CHRONIC DIASTOLIC HEART FAILURE: ICD-10-CM

## 2022-09-28 DIAGNOSIS — I48.19 PERSISTENT ATRIAL FIBRILLATION: Primary | ICD-10-CM

## 2022-09-28 DIAGNOSIS — Z95.0 CARDIAC PACEMAKER IN SITU: ICD-10-CM

## 2022-09-28 DIAGNOSIS — Z95.810 AICD (AUTOMATIC CARDIOVERTER/DEFIBRILLATOR) PRESENT: ICD-10-CM

## 2022-09-28 PROCEDURE — 99999 PR PBB SHADOW E&M-EST. PATIENT-LVL IV: CPT | Mod: PBBFAC,,, | Performed by: INTERNAL MEDICINE

## 2022-09-28 PROCEDURE — 1126F PR PAIN SEVERITY QUANTIFIED, NO PAIN PRESENT: ICD-10-PCS | Mod: CPTII,S$GLB,, | Performed by: INTERNAL MEDICINE

## 2022-09-28 PROCEDURE — 93283 PRGRMG EVAL IMPLANTABLE DFB: CPT

## 2022-09-28 PROCEDURE — 99214 OFFICE O/P EST MOD 30 MIN: CPT | Mod: S$GLB,,, | Performed by: INTERNAL MEDICINE

## 2022-09-28 PROCEDURE — 99999 PR PBB SHADOW E&M-EST. PATIENT-LVL IV: ICD-10-PCS | Mod: PBBFAC,,, | Performed by: INTERNAL MEDICINE

## 2022-09-28 PROCEDURE — 1101F PT FALLS ASSESS-DOCD LE1/YR: CPT | Mod: CPTII,S$GLB,, | Performed by: INTERNAL MEDICINE

## 2022-09-28 PROCEDURE — 1101F PR PT FALLS ASSESS DOC 0-1 FALLS W/OUT INJ PAST YR: ICD-10-PCS | Mod: CPTII,S$GLB,, | Performed by: INTERNAL MEDICINE

## 2022-09-28 PROCEDURE — 3079F DIAST BP 80-89 MM HG: CPT | Mod: CPTII,S$GLB,, | Performed by: INTERNAL MEDICINE

## 2022-09-28 PROCEDURE — 3074F PR MOST RECENT SYSTOLIC BLOOD PRESSURE < 130 MM HG: ICD-10-PCS | Mod: CPTII,S$GLB,, | Performed by: INTERNAL MEDICINE

## 2022-09-28 PROCEDURE — 3288F FALL RISK ASSESSMENT DOCD: CPT | Mod: CPTII,S$GLB,, | Performed by: INTERNAL MEDICINE

## 2022-09-28 PROCEDURE — 99214 PR OFFICE/OUTPT VISIT, EST, LEVL IV, 30-39 MIN: ICD-10-PCS | Mod: S$GLB,,, | Performed by: INTERNAL MEDICINE

## 2022-09-28 PROCEDURE — 1159F PR MEDICATION LIST DOCUMENTED IN MEDICAL RECORD: ICD-10-PCS | Mod: CPTII,S$GLB,, | Performed by: INTERNAL MEDICINE

## 2022-09-28 PROCEDURE — 3074F SYST BP LT 130 MM HG: CPT | Mod: CPTII,S$GLB,, | Performed by: INTERNAL MEDICINE

## 2022-09-28 PROCEDURE — 1126F AMNT PAIN NOTED NONE PRSNT: CPT | Mod: CPTII,S$GLB,, | Performed by: INTERNAL MEDICINE

## 2022-09-28 PROCEDURE — 1159F MED LIST DOCD IN RCRD: CPT | Mod: CPTII,S$GLB,, | Performed by: INTERNAL MEDICINE

## 2022-09-28 PROCEDURE — 3288F PR FALLS RISK ASSESSMENT DOCUMENTED: ICD-10-PCS | Mod: CPTII,S$GLB,, | Performed by: INTERNAL MEDICINE

## 2022-09-28 PROCEDURE — 3079F PR MOST RECENT DIASTOLIC BLOOD PRESSURE 80-89 MM HG: ICD-10-PCS | Mod: CPTII,S$GLB,, | Performed by: INTERNAL MEDICINE

## 2022-09-28 PROCEDURE — 93283 PRGRMG EVAL IMPLANTABLE DFB: CPT | Mod: 26,,, | Performed by: INTERNAL MEDICINE

## 2022-09-28 PROCEDURE — 93283 CARDIAC DEVICE CHECK - IN CLINIC & HOSPITAL: ICD-10-PCS | Mod: 26,,, | Performed by: INTERNAL MEDICINE

## 2022-09-28 NOTE — Clinical Note
I agree I think Watchman would be a good idea in this gentleman, perhaps sooner than later given he is not on anticoagulation currently.

## 2022-10-03 ENCOUNTER — TELEPHONE (OUTPATIENT)
Dept: UROLOGY | Facility: CLINIC | Age: 82
End: 2022-10-03
Payer: MEDICARE

## 2022-10-03 NOTE — TELEPHONE ENCOUNTER
----- Message from Minoo Burch sent at 10/3/2022  4:09 PM CDT -----  Contact: @[phone]  Pt is calling in to see if he can be seen after is appts on 3/1 sooner than 3 pm due to the pt traveling from [place]. Please call to discuss further.

## 2022-10-03 NOTE — TELEPHONE ENCOUNTER
Call placed to patient. Name and date of birth verified. Patient inquiring scheduled appointment time. Patient provided appointment details and verbalized understanding.

## 2022-10-10 NOTE — DISCHARGE SUMMARY
Discharge Summary  Cardiology      Admit Date: 6/27/2022    Discharge Date :6/27/2022    Attending Physician: Dr Rodriguez    Discharge Physician: Junior Dunn    Discharged Condition: good    Discharge Diagnosis: Aortic valve insufficiency, etiology of cardiac valve disease unspecified [I35.1]    Treatments/Procedures: Procedure(s) (LRB):  ECHOCARDIOGRAM, TRANSESOPHAGEAL (N/A)    Hospital Course:   Michael Mclean is a 82 y.o. male with a past medical history of persistent Afib (s/p DCCV in 2017, s/p PVI in 2020), CAD s/p 2v CABG & bioprosthetic valve in 2010, s/p BiV ICD/PPM, h/o SSS, HTN, HLD, CVA, and prostate cancer. Patient reports LOCKHATR x 3 months. He underwent TTE on 5/18/22 with Dr Shrestha which showed normal LV function, moderate to severe AS, and mild to moderate AI. He was referred to Dr. Richardson for evaluation of failing 25 mm Perimount bioprosthetic valve vs PVL (NYHA Class II sx).  ALEXYS shown below:  83 yo M with hx of CABG x2 and 25 mm Perimount bioprosthetic valve at [facility] in [facility] on 4/29/2010  The left ventricle is mildly enlarged with moderately decreased systolic function. The estimated ejection fraction is 40%.  Normal right ventricular size with normal right ventricular systolic function.  No interatrial septal defect present.  Normal appearing left atrial appendage. No thrombus is present in the appendage. Abnormal appendage velocities.  Moderate, posteriorly directed mitral regurgitation.  There is a 25 mm Perimount bioprosthetic aortic valve present. There is moderate central aortic insufficiency present. No paravalvular leak is identified.    Significant Diagnostic Studies: none    Disposition: Home or Self Care    Diet: Regular    Follow up: Office 10-14 days    Activity: No heavy lifting till seen in office.    Patient Instructions:   Discharge Medication List as of 6/27/2022  1:00 PM        CONTINUE these medications which have NOT CHANGED    Details    bicalutamide (CASODEX) 50 MG Tab Take 1 tablet (50 mg total) by mouth once daily., Starting Wed 6/1/2022, Until Thu 6/1/2023, Normal      cholecalciferol, vitamin D3, 5,000 unit capsule Take 5,000 Units by mouth once daily. , Historical Med      ferrous sulfate (FEOSOL) 325 mg (65 mg iron) Tab tablet Take 1 tablet (325 mg total) by mouth once daily., Starting Tue 10/13/2020, No Print      KLOR-CON M20 20 mEq tablet Take 20 mEq by mouth once daily. , Starting Tue 12/11/2018, Historical Med      magnesium oxide (MAG-OX) 400 mg tablet Take 400 mg by mouth once daily., Historical Med      metoprolol succinate (TOPROL-XL) 100 MG 24 hr tablet Take 50 mg by mouth 2 (two) times a day., Historical Med      pantoprazole (PROTONIX) 40 MG tablet Take 1 tablet (40 mg total) by mouth once daily., Starting Tue 3/2/2021, Until Mon 6/27/2022, Normal      rosuvastatin (CRESTOR) 20 MG tablet Take 20 mg by mouth every evening., Historical Med      sotaloL (BETAPACE) 80 MG tablet Take 0.5 tablets (40 mg total) by mouth 2 (two) times daily., Starting Wed 11/24/2021, Until Mon 6/27/2022, Print      torsemide (DEMADEX) 20 MG Tab Take 20 mg by mouth every other day., Historical Med      albuterol (PROVENTIL/VENTOLIN HFA) 90 mcg/actuation inhaler Inhale 1-2 puffs into the lungs every 6 (six) hours as needed for Wheezing. Rescue, Starting Wed 5/4/2022, Normal      clonazePAM (KLONOPIN) 2 MG Tab TAKE 1 TABLET BY MOUTH ONCE DAILY AS NEEDED FOR ANXIETY, Normal      acetylcarnitin/alpha lipoic ac (ACETYLCARNITIN HCL-A LIPOIC AC) 400-200 mg Cap Take 1 capsule by mouth once daily., Historical Med      aspirin (ECOTRIN) 81 MG EC tablet Take 1 tablet (81 mg total) by mouth once daily., Starting Mon 6/27/2022, Until Tue 6/27/2023, Normal      cyanocobalamin, vitamin B-12, 1,000 mcg/mL Kit Inject 1 mL as directed every 28 days., Historical Med      meclizine (ANTIVERT) 25 mg tablet Take 1 tablet (25 mg total) by mouth 3 (three) times daily as  needed., Starting Thu 6/24/2021, Normal      vitamin D3-folic acid 5,000 unit- 1 mg Tab Take 1 tablet by mouth once daily., Historical Med      clopidogreL (PLAVIX) 75 mg tablet Take 1 tablet (75 mg total) by mouth once daily., Starting Mon 6/27/2022, Until Tue 6/27/2023, Normal             Signed:  Junior Dunn MD  Page # [phone]  Cardiovascular Fellow  [facility]

## 2022-11-14 ENCOUNTER — LAB VISIT (OUTPATIENT)
Dept: LAB | Facility: HOSPITAL | Age: 82
End: 2022-11-14
Payer: MEDICARE

## 2022-11-14 ENCOUNTER — OFFICE VISIT (OUTPATIENT)
Dept: CARDIOLOGY | Facility: CLINIC | Age: 82
End: 2022-11-14
Payer: MEDICARE

## 2022-11-14 ENCOUNTER — EDUCATION (OUTPATIENT)
Dept: CARDIOLOGY | Facility: CLINIC | Age: 82
End: 2022-11-14
Payer: MEDICARE

## 2022-11-14 VITALS
DIASTOLIC BLOOD PRESSURE: 65 MMHG | HEIGHT: 67 IN | HEART RATE: 71 BPM | WEIGHT: 165.13 LBS | OXYGEN SATURATION: 99 % | BODY MASS INDEX: 25.92 KG/M2 | SYSTOLIC BLOOD PRESSURE: 144 MMHG

## 2022-11-14 DIAGNOSIS — I48.11 LONGSTANDING PERSISTENT ATRIAL FIBRILLATION: ICD-10-CM

## 2022-11-14 DIAGNOSIS — I48.0 PAROXYSMAL ATRIAL FIBRILLATION: Primary | ICD-10-CM

## 2022-11-14 DIAGNOSIS — I50.33 ACUTE ON CHRONIC DIASTOLIC HEART FAILURE: ICD-10-CM

## 2022-11-14 DIAGNOSIS — I48.11 LONGSTANDING PERSISTENT ATRIAL FIBRILLATION: Primary | ICD-10-CM

## 2022-11-14 DIAGNOSIS — I70.0 AORTIC ATHEROSCLEROSIS: ICD-10-CM

## 2022-11-14 DIAGNOSIS — E78.5 HYPERLIPIDEMIA, UNSPECIFIED HYPERLIPIDEMIA TYPE: ICD-10-CM

## 2022-11-14 DIAGNOSIS — Z00.6 ENCOUNTER FOR EXAMINATION FOR NORMAL COMPARISON AND CONTROL IN CLINICAL RESEARCH PROGRAM: ICD-10-CM

## 2022-11-14 LAB
ANION GAP SERPL CALC-SCNC: 9 MMOL/L (ref 8–16)
APTT BLDCRRT: 28.4 SEC (ref 21–32)
BASOPHILS # BLD AUTO: 0.04 K/UL (ref 0–0.2)
BASOPHILS NFR BLD: 0.7 % (ref 0–1.9)
BUN SERPL-MCNC: 24 MG/DL (ref 8–23)
CALCIUM SERPL-MCNC: 9.5 MG/DL (ref 8.7–10.5)
CHLORIDE SERPL-SCNC: 105 MMOL/L (ref 95–110)
CO2 SERPL-SCNC: 27 MMOL/L (ref 23–29)
CREAT SERPL-MCNC: 0.9 MG/DL (ref 0.5–1.4)
DIFFERENTIAL METHOD: ABNORMAL
EOSINOPHIL # BLD AUTO: 0.5 K/UL (ref 0–0.5)
EOSINOPHIL NFR BLD: 7.8 % (ref 0–8)
ERYTHROCYTE [DISTWIDTH] IN BLOOD BY AUTOMATED COUNT: 14.3 % (ref 11.5–14.5)
EST. GFR  (NO RACE VARIABLE): >60 ML/MIN/1.73 M^2
GLUCOSE SERPL-MCNC: 84 MG/DL (ref 70–110)
HCT VFR BLD AUTO: 38.9 % (ref 40–54)
HGB BLD-MCNC: 12.6 G/DL (ref 14–18)
IMM GRANULOCYTES # BLD AUTO: 0.01 K/UL (ref 0–0.04)
IMM GRANULOCYTES NFR BLD AUTO: 0.2 % (ref 0–0.5)
INR PPP: 1.1 (ref 0.8–1.2)
LYMPHOCYTES # BLD AUTO: 1.8 K/UL (ref 1–4.8)
LYMPHOCYTES NFR BLD: 30.7 % (ref 18–48)
MCH RBC QN AUTO: 32 PG (ref 27–31)
MCHC RBC AUTO-ENTMCNC: 32.4 G/DL (ref 32–36)
MCV RBC AUTO: 99 FL (ref 82–98)
MONOCYTES # BLD AUTO: 0.8 K/UL (ref 0.3–1)
MONOCYTES NFR BLD: 13.7 % (ref 4–15)
NEUTROPHILS # BLD AUTO: 2.7 K/UL (ref 1.8–7.7)
NEUTROPHILS NFR BLD: 46.9 % (ref 38–73)
NRBC BLD-RTO: 0 /100 WBC
PLATELET # BLD AUTO: 150 K/UL (ref 150–450)
PMV BLD AUTO: 10.9 FL (ref 9.2–12.9)
POTASSIUM SERPL-SCNC: 4.2 MMOL/L (ref 3.5–5.1)
PROTHROMBIN TIME: 10.9 SEC (ref 9–12.5)
RBC # BLD AUTO: 3.94 M/UL (ref 4.6–6.2)
SODIUM SERPL-SCNC: 141 MMOL/L (ref 136–145)
WBC # BLD AUTO: 5.77 K/UL (ref 3.9–12.7)

## 2022-11-14 PROCEDURE — 99214 PR OFFICE/OUTPT VISIT, EST, LEVL IV, 30-39 MIN: ICD-10-PCS | Mod: S$GLB,,, | Performed by: INTERNAL MEDICINE

## 2022-11-14 PROCEDURE — 1159F MED LIST DOCD IN RCRD: CPT | Mod: CPTII,S$GLB,, | Performed by: INTERNAL MEDICINE

## 2022-11-14 PROCEDURE — 36415 COLL VENOUS BLD VENIPUNCTURE: CPT | Performed by: INTERNAL MEDICINE

## 2022-11-14 PROCEDURE — 99999 PR PBB SHADOW E&M-EST. PATIENT-LVL IV: CPT | Mod: PBBFAC,,, | Performed by: INTERNAL MEDICINE

## 2022-11-14 PROCEDURE — 1126F PR PAIN SEVERITY QUANTIFIED, NO PAIN PRESENT: ICD-10-PCS | Mod: CPTII,S$GLB,, | Performed by: INTERNAL MEDICINE

## 2022-11-14 PROCEDURE — 80048 BASIC METABOLIC PNL TOTAL CA: CPT | Performed by: INTERNAL MEDICINE

## 2022-11-14 PROCEDURE — 85025 COMPLETE CBC W/AUTO DIFF WBC: CPT | Performed by: INTERNAL MEDICINE

## 2022-11-14 PROCEDURE — 99999 PR PBB SHADOW E&M-EST. PATIENT-LVL IV: ICD-10-PCS | Mod: PBBFAC,,, | Performed by: INTERNAL MEDICINE

## 2022-11-14 PROCEDURE — 3078F PR MOST RECENT DIASTOLIC BLOOD PRESSURE < 80 MM HG: ICD-10-PCS | Mod: CPTII,S$GLB,, | Performed by: INTERNAL MEDICINE

## 2022-11-14 PROCEDURE — 3077F PR MOST RECENT SYSTOLIC BLOOD PRESSURE >= 140 MM HG: ICD-10-PCS | Mod: CPTII,S$GLB,, | Performed by: INTERNAL MEDICINE

## 2022-11-14 PROCEDURE — 85730 THROMBOPLASTIN TIME PARTIAL: CPT | Performed by: INTERNAL MEDICINE

## 2022-11-14 PROCEDURE — 1126F AMNT PAIN NOTED NONE PRSNT: CPT | Mod: CPTII,S$GLB,, | Performed by: INTERNAL MEDICINE

## 2022-11-14 PROCEDURE — 3078F DIAST BP <80 MM HG: CPT | Mod: CPTII,S$GLB,, | Performed by: INTERNAL MEDICINE

## 2022-11-14 PROCEDURE — 1159F PR MEDICATION LIST DOCUMENTED IN MEDICAL RECORD: ICD-10-PCS | Mod: CPTII,S$GLB,, | Performed by: INTERNAL MEDICINE

## 2022-11-14 PROCEDURE — 99214 OFFICE O/P EST MOD 30 MIN: CPT | Mod: S$GLB,,, | Performed by: INTERNAL MEDICINE

## 2022-11-14 PROCEDURE — 3077F SYST BP >= 140 MM HG: CPT | Mod: CPTII,S$GLB,, | Performed by: INTERNAL MEDICINE

## 2022-11-14 PROCEDURE — 85610 PROTHROMBIN TIME: CPT | Performed by: INTERNAL MEDICINE

## 2022-11-14 RX ORDER — SODIUM CHLORIDE 9 MG/ML
INJECTION, SOLUTION INTRAVENOUS CONTINUOUS
Status: CANCELLED | OUTPATIENT
Start: 2022-11-14 | End: 2022-11-14

## 2022-11-14 RX ORDER — DIPHENHYDRAMINE HCL 50 MG
50 CAPSULE ORAL ONCE
Status: CANCELLED | OUTPATIENT
Start: 2022-11-14 | End: 2022-11-14

## 2022-11-14 NOTE — PROGRESS NOTES
You have been scheduled for your Watchman procedure on Tuesday, November 22, 2022.  Please report to the Cardiology Waiting Area on the Third floor of the hospital and check in at 10 AM. You will then be taken to the SSCU (Short Stay Cardiac Unit) and prepared for your procedure. Please be aware that this is not the time of your procedure but the time that you are to arrive.     Preperations for your procedure:  Shower with Dial soap the night before and the morning of your procedure.  No solid foods 8 hours prior to your procedure.  You may have clear liquids until the time of your admission which should be 2 hours prior to your procedure.  You are encouraged to have at least 8 ounces of clear liquids prior to your admission to SSCU.  Patients with gastric emptying issues should be fasting for 6- 8 hours prior to the procedure.  Clear liquids include water, black coffee, clear juices, and performance drinks - no pulp or milk.    Heart failure or dialysis patients will be limited to 8 ounces (1 cup) of clear liquids up until 2 hours of the procedure.  You may take your regular morning medications         with as much water as necessary.   Bring your cane and/or walker with you to the hospital.  Bring CPAP/BiPAP, or oxygen if you are on oxygen at home.  Call the office for any signs of infection ( fever, cough, pneumonia, urinary tract, etc.) We cannot implant a device in an infected patient.  If you are on Pradaxa, Eliquis, Xeralto, or Coumadin, you do not have to stop them.    The entire procedure may take up to three hours. After the procedure, you will return to your room on the third floor where you will be monitored closely for the next several hours.     Your length of stay in the hospital will be determined by your physician. You may be discharged the same day if your physician is satisfied with your progress.     Follow up After Your Procedure  About 45 days after your procedure you will be required to have  a Transesophageal echo and a clinic visit with your physician at [facility] in [place].   At 6 months, 1 year, and 2 years after your procedure, you will receive an appointment for a virtual visit for your follow up.  You can follow up with your regular Cardiologist regarding any other heart issues.     If you should have any questions, concerns, or need to change the date of your procedure, please call  MEMO Carlson @ [phone]            Special Instructions:  Continue on meds      THE ABOVE INSTRUCTIONS WERE GIVEN TO THE PATIENT VERBALLY AND THEY VERBALIZED UNDERSTANDING.  THEY DO NOT REQUIRE ANY SPECIAL NEEDS AND DO NOT HAVE ANY LEARNING BARRIERS.          Directions for Reporting to Cardiology Waiting Area in the Hospital  If you park in the Parking Garage:  Take elevators to the1st floor of the parking garage.  Continue past the gift shop, coffee shop, and piano.  Take a right and go to the gold elevators. (Elevator B)  Take the elevator to the 3rd floor.  Follow the arrow on the sign on the wall that says Cath Lab Registration/EP Lab Registration.  Follow the long hallway all the way around until you come to a big open area.  This is the registration area.  Check in at Reception Desk.    OR    If family is dropping you off:  Have them drop you off at the front of the Hospital under the green overhang.  Enter through the doors and take a right.  Take the E elevators to the 3rd floor Cardiology Waiting Area.  Check in at the Reception Desk in the waiting room.

## 2022-11-14 NOTE — PROGRESS NOTES
Interventional Cardiology Clinic Note  Reason for Visit: Afib/Watchman  Referring: Josh Marquezcassandra    HPI:    82 y.o. male  with a past medical history of paroxysmal Afib (s/p DCCV in 2017, s/p PVI in 2020), CAD s/p 2v CABG & bioprosthetic aotic valve in 2012, ICM s/p BiV ICD, h/o SSS, HTN, HLD, CVA, prostate cancer, severe AS s/p TAVR (7/26/2022) who presents for Watchman evaluation    Pt is hearing impaired and clinically deaf.  A  was in the room and brought by the pt - she was used to communicate with the pt.    Pt says that he has been requiring 1 pRBC transfusion per month due to low hgb.  No evidence of GIB or hematuria.  Has had GI bleeding in the past requiring transfusion.  Has been taking Fe as well.  He was on Xarelto but following his CVA he was taken off of anticoagulation (records reviewed, no history of hemorrhagic stroke). Xarelto and eliquis were prohibitively expensive and he does not wish to resume warfarin.  ICD last interrogated in 9/2022 and showed no treated events since implant, pAF with max duration of 5 hrs 23 min since June 2020.  Pt was seen by Dr. Boo recently who recommended Watchman placement.    ROS:    Constitution: Negative for fever, chills, weight loss or gain.   HENT: Negative for sore throat, rhinorrhea, or headache.  Eyes: Negative for blurred or double vision.   Cardiovascular: See above  Pulmonary: Negative for SOB   Gastrointestinal: Negative for abdominal pain, nausea, vomiting, or diarrhea.   : Negative for dysuria.   Neurological: Negative for focal weakness or sensory changes.  PMH:     Past Medical History:   Diagnosis Date    Abdominal hernia     Anemia 02/2021    Anticoagulant long-term use     Arthritis     SHOULDER    Cancer     prostate    Cardiomyopathy     Carotid artery stenosis     left    Coronary artery disease     Deaf     Dizziness     Encounter for blood transfusion     Gastritis 3/2/2021    Hyperlipidemia     Hypertension      Mitral regurgitation     Mitral regurgitation     PAD (peripheral artery disease)     Paroxysmal atrial fibrillation     Stroke 2020    Thyroid disease      Past Surgical History:   Procedure Laterality Date    ABLATION N/A 7/22/2020    Procedure: Ablation, afib;  Surgeon: Tal Laurent MD;  Location: [facility] CATH;  Service: Cardiology;  Laterality: N/A;  needs covid test;needs ALEXYS ([place])    AORTIC VALVE REPLACEMENT      CARDIAC CATH COSURGEON N/A 7/27/2022    Procedure: Cardiac Cath Cosurgeon;  Surgeon: Glenroy Sam MD;  Location: [facility] CATH LAB;  Service: Cardiovascular;  Laterality: N/A;    CARDIAC CATHETERIZATION      3 HEART STENTS    carotid artery stent Left     COLONOSCOPY N/A 3/2/2021    Procedure: COLONOSCOPY;  Surgeon: Emiliana Cardenas MD;  Location: [facility] ENDO;  Service: General;  Laterality: N/A;  4  8:30am   CL/AM/Bd    CORONARY ARTERY BYPASS GRAFT      ESOPHAGOGASTRODUODENOSCOPY N/A 11/10/2020    Procedure: EGD (ESOPHAGOGASTRODUODENOSCOPY);  Surgeon: Renny Prakash MD;  Location: [facility] ENDO;  Service: Endoscopy;  Laterality: N/A;    HERNIA REPAIR      HIP REPLACEMENT ARTHROPLASTY Right 11/6/2020    Procedure: EMELY -ARTHROPLASTY, HIP REPLACEMENT (ENDO HIP);  Surgeon: iLno Thomas MD;  Location: [facility] OR;  Service: Orthopedics;  Laterality: Right;    LEFT HEART CATHETERIZATION N/A 6/29/2022    Procedure: Left heart cath;  Surgeon: Glenroy Richardson MD;  Location: [facility] CATH LAB;  Service: Cardiology;  Laterality: N/A;    PACERMAKER      PROSTATECTOMY      SHOULDER SURGERY      TOE SURGERY      TRANSCATHETER AORTIC VALVE REPLACEMENT (TAVR) N/A 7/27/2022    Procedure: REPLACEMENT, AORTIC VALVE, TRANSCATHETER (TAVR);  Surgeon: Glenroy Richardson MD;  Location: [facility] CATH LAB;  Service: Cardiology;  Laterality: N/A;    TRANSCATHETER AORTIC VALVE REPLACEMENT (TAVR)  7/27/2022    Procedure: REPLACEMENT, AORTIC VALVE, TRANSCATHETER (TAVR);  Surgeon: Glenroy Sam MD;  Location: [facility] CATH  LAB;  Service: Cardiovascular;;    TRANSESOPHAGEAL ECHOCARDIOGRAPHY N/A 6/27/2022    Procedure: ECHOCARDIOGRAM, TRANSESOPHAGEAL;  Surgeon: Fracisco Diagnostic Provider;  Location: [facility] EP LAB;  Service: Anesthesiology;  Laterality: N/A;    TREATMENT OF CARDIAC ARRHYTHMIA N/A 8/7/2019    Procedure: Cardioversion or Defibrillation;  Surgeon: Tal Laurent MD;  Location: [facility] CATH;  Service: Cardiology;  Laterality: N/A;     Allergies:     Review of patient's allergies indicates:   Allergen Reactions    Losartan Hives    Valsartan Hives     Medications:     Current Outpatient Medications on File Prior to Visit   Medication Sig Dispense Refill    aspirin (ECOTRIN) 81 MG EC tablet Take 1 tablet (81 mg total) by mouth once daily. 30 tablet 11    bicalutamide (CASODEX) 50 MG Tab Take 1 tablet (50 mg total) by mouth once daily. 30 tablet 3    budesonide (PULMICORT) 0.5 mg/2 mL nebulizer solution Take 2 mLs (0.5 mg total) by nebulization 2 (two) times a day. Controller 120 mL 0    cholecalciferol, vitamin D3, 5,000 unit capsule Take 5,000 Units by mouth once daily.       clonazePAM (KLONOPIN) 2 MG Tab TAKE 1 TABLET BY MOUTH ONCE DAILY AS NEEDED FOR ANXIETY 30 tablet 0    cyanocobalamin, vitamin B-12, 1,000 mcg/mL Kit Inject 1 mL as directed every 28 days.      docusate sodium (COLACE) 100 MG capsule Take 100 mg by mouth 2 (two) times daily.      ferrous sulfate (FEOSOL) 325 mg (65 mg iron) Tab tablet Take 1 tablet (325 mg total) by mouth once daily.  0    KLOR-CON M20 20 mEq tablet Take 20 mEq by mouth once daily.       magnesium oxide (MAG-OX) 400 mg tablet Take 400 mg by mouth once daily.      metoprolol succinate (TOPROL-XL) 100 MG 24 hr tablet Take 50 mg by mouth 2 (two) times a day.      pantoprazole (PROTONIX) 40 MG tablet Take 1 tablet (40 mg total) by mouth once daily. 30 tablet 11    rosuvastatin (CRESTOR) 20 MG tablet Take 20 mg by mouth every morning.      sotaloL (BETAPACE) 80 MG tablet Take 0.5 tablets (40 mg  "total) by mouth 2 (two) times daily. 90 tablet 0    torsemide (DEMADEX) 20 MG Tab Take 20 mg by mouth as needed.      acetylcarnitin/alpha lipoic ac (ACETYLCARNITIN HCL-A LIPOIC AC) 400-200 mg Cap Take 1 capsule by mouth once daily.      albuterol-ipratropium (DUO-NEB) 2.5 mg-0.5 mg/3 mL nebulizer solution Take 3 mLs by nebulization every 6 (six) hours as needed for Wheezing. Rescue (Patient not taking: Reported on 9/28/2022) 1 each 0    meclizine (ANTIVERT) 25 mg tablet Take 1 tablet (25 mg total) by mouth 3 (three) times daily as needed. (Patient not taking: Reported on 11/14/2022) 20 tablet 0    vitamin D3-folic acid 5,000 unit- 1 mg Tab Take 1 tablet by mouth once daily.      vitamin D3-vitamin K2, MK4, (K2 PLUS D3) 1,000-100 unit-mcg Tab Take by mouth.      [DISCONTINUED] clonazePAM (KLONOPIN) 2 MG Tab TAKE 1 TABLET BY MOUTH ONCE DAILY AS NEEDED FOR ANXIETY 30 tablet 0    [DISCONTINUED] clopidogreL (PLAVIX) 75 mg tablet Take 1 tablet (75 mg total) by mouth once daily. 30 tablet 11     Current Facility-Administered Medications on File Prior to Visit   Medication Dose Route Frequency Provider Last Rate Last Admin    leuprolide (6 month) injection 45 mg  45 mg Intramuscular Q6 Months Abbie Martinez NP   45 mg at 09/01/22 1319     Social History:     Social History     Tobacco Use    Smoking status: Never    Smokeless tobacco: Never   Substance Use Topics    Alcohol use: Yes     Alcohol/week: 1.0 standard drink     Types: 1 Glasses of wine per week     Comment: occassionally maybe 3 wk     Family History:     Family History   Problem Relation Age of Onset    Heart disease Mother     Heart attack Mother      Physical Exam:   BP (!) 144/65 (BP Location: Right arm, Patient Position: Sitting, BP Method: Large (Automatic))   Pulse 71   Ht 5' 7" (1.702 m)   Wt 74.9 kg (165 lb 2 oz)   SpO2 99%   BMI 25.86 kg/m²    Wt Readings from Last 4 Encounters:   11/14/22 74.9 kg (165 lb 2 oz)   09/28/22 68.9 kg (151 lb 14.4 " oz)   09/27/22 68.9 kg (152 lb)   09/12/22 73.9 kg (163 lb)         Constitutional: No distress, obese, conversant  HEENT: Sclera anicteric, PERRLA, EOMI  Neck: No JVD, no masses, good movement  CV: RRR, S1 and S2 normal, no additional heart sounds or murmurs. Pulses 2+ and equal bilaterally in radial arteries, Nicholas's normal on right. Distal pulses are 2+ and equal in the femoral, DP and PT areas bilaterally  Pulm: Clear to auscultation bilaterally with symmetrical expansion. Chest wall palpated for reproduction of pain symptoms, and no pain was able to be produced on palpation or resistance exercises  GI: Abdomen soft, non-tender, good bowel sounds  Extremities: Both extremities intact and grossly normal, skin is warm, no edema noted  Skin: No ecchymosis, erythema, or ulcers  Psych: AOx3, appropriate affect  Neuro: CNII-XII intact, no focal deficits      Labs:     Lab Results   Component Value Date     09/01/2022    K 4.1 09/01/2022     09/01/2022    CO2 28 09/01/2022    BUN 32 (H) 09/01/2022    CREATININE 1.1 09/01/2022    ANIONGAP 8 09/01/2022     Lab Results   Component Value Date    HGBA1C 5.0 08/16/2022     Lab Results   Component Value Date    BNP 1,369 (H) 07/27/2022    Lab Results   Component Value Date    WBC 6.21 09/01/2022    HGB 11.9 (L) 09/01/2022    HCT 36.4 (L) 09/01/2022     (L) 09/01/2022    GRAN 2.6 07/28/2022    GRAN 55.7 07/28/2022     Lab Results   Component Value Date    CHOL 136 08/16/2022    HDL 49 08/16/2022    LDLCALC 69 08/16/2022    TRIG 95 08/16/2022          Imaging:   CTA TAVR:  1. TAVR measurements as detailed above.  2. Scattered severe calcific atherosclerosis of the abdominal aorta.  Moderate to severe stenosis of the proximal left renal artery with associated atrophy of the left kidney.  Extensive calcified plaque in the right common iliac artery resulting in multifocal areas of moderate to severe stenosis.  3. Fusiform ectasia of the ascending aorta.  4.  Multivessel coronary artery calcific atherosclerosis and postoperative change of CABG.  5. Cardiomegaly.  Postoperative change of aortic valve replacement.  6. Mosaic attenuation pattern of the lungs, a nonspecific finding which can be seen with pulmonary edema or small airways disease.  7. Cholelithiasis.  8. Diverticulosis coli without evidence acute diverticulitis.  9. Sclerotic focus in the right lateral 2nd rib at the site of uptake on whole-body bone scan 02/14/2022 and concerning for metastatic disease in this patient with history of prostate cancer, similar to prior.  10. Additional findings as above.    EF   Date Value Ref Range Status   09/01/2022 45 % Final   06/27/2022 40 % Final     TTE:   The left ventricle is moderately enlarged with eccentric hypertrophy and mildly decreased systolic function.  The estimated ejection fraction is 45%.  There is left ventricular global hypokinesis.  Grade II left ventricular diastolic dysfunction.  There is a 26 mm Evolut transcutaneously-placed aortic bioprosthesis present inside SAVR.  The aortic valve mean gradient is 5 mmHg with a dimensionless index of 0.88.  There is prolapse of the anterior mitral leaflet. Moderate-to-severe mitral regurgitation.  Normal right ventricular size with normal right ventricular systolic function.  Mild tricuspid regurgitation.  The estimated PA systolic pressure is 34 mmHg.  Normal central venous pressure (3 mmHg).  The ascending aorta is mildly dilated.  Severe left atrial enlargement.    Coronary Angiography:  1. Left a was free of disease.  The LAD was occluded in its midportion.  The left circumflex was patent with a 60% proximal stenosis that was eccentric and calcified.  2. The RCA was occluded proximally.  3. The SVG to RCA was patent.  4. The JAIN to LAD was patent.  5. There was severe tortuosity and calcification of the descending aorta and aortoiliac arteries with stenosis in the right common iliac.  The left iliac  appeared better as an access site for TAVR.  6. The left subclavian artery was also very tortuous but not calcified.  There appeared to be a candidate for left trans axillary TAVR.      Assessment:    82 y.o. male  with a past medical history of persistent Afib (s/p DCCV in 2017, s/p PVI in 2020), CAD s/p 2v CABG & bioprosthetic aotic valve in 2012, ICM s/p BiV ICD, h/o SSS, HTN, HLD, CVA, prostate cancer, severe AS s/p TAVR (7/26/2022) who presents for Watchman evaluation    Plan:     Paroxysmal atrial fibrillation  S/p DCCV (2017) and PVI (2020)  - no overt signs of bleeding however pt is requiring monthly pRBC transfusions  --Watchman 30mm candidate  - Anti-platelet Therapy: ASA monotherapy  - Access: Right CFV  - Catheters: Watchman catheters  - Creatinine/CrCl: 1.0  - Allergies: No shellfish / Iodine allergy  - Pre-Hydration: NS  - Pre-Op Med: Bendaryl 50mg pO   - All patient's questions were answered.  -The risks, benefits and alternatives of the procedure were explained to the patient.   -The risks of left atrial appendage occluding device include but are not limited to: bleeding, infection, heart rhythm abnormalities, allergic reactions, kidney injury and potential need for dialysis, stroke and death.   - Additionally, pt is aware that non-compliance is likely to result in device clotting with heart attack, heart failure, and/or death  -The risks of moderate sedation include hypotension, respiratory depression, arrhythmias, bronchospasm, and death.   - Informed consent was obtained and the  patient is agreeable to proceed with the procedure.    S/P TAVR (transcatheter aortic valve replacement)  Successful 26 mm Evolut TAVR inside of a failing 25 mm Perimount bioprosthetic valve. TTE showed no PVL with MG decreased to 5 mmHg.     Mitral regurgitation  Patient with asymptomatic, moderate-severe mitral regurgitation. No need for further intervention at this time. Can consider MitraClip if he develops heart  failure symptoms refractory to medical therapy.      S/P AVR  See above     Acute on chronic diastolic heart failure  Chronic. Controlled. Follow up with Cardiology/PCP.     Aortic atherosclerosis  See CTA      Signed:  Devan hCung MD  Cardiology Fellow  Pager - [phone]  [facility]  11/14/2022 3:09 PM    Staff:  I have personally taken the history and examined this patient and agree with the fellow's note as stated above and amended it accordingly :-)  He is no longer on OAC because of bleeding and cost and refuses warfarin.  He is a candidate for LAAO with a 31mm Watchman on plavix alone or ASA/Plavix based on CT scan.

## 2022-11-21 ENCOUNTER — ANESTHESIA EVENT (OUTPATIENT)
Dept: MEDSURG UNIT | Facility: HOSPITAL | Age: 82
DRG: 274 | End: 2022-11-21
Payer: MEDICARE

## 2022-11-21 NOTE — ANESTHESIA PREPROCEDURE EVALUATION
[facility]  Anesthesia Pre-Operative Evaluation         Patient Name: Michael Mclean  YOB: 1940  MRN: [id number]    SUBJECTIVE:     Pre-operative evaluation for Procedure(s) (LRB):  Left atrial appendage closure device (N/A)  ECHOCARDIOGRAM, TRANSESOPHAGEAL (N/A)     11/21/2022    Michael Mclean is a 82 y.o. male w/ a significant PMHx of PAF s/p DCCV 2017 and PVI 2020, CAD s/p 2v CABG + bioprostehtic aortic valve 2012, ICM s/p BiV ICD, h/o SSS, HTN, HLD, CVA, prostate ca, severe AS s/p TAVR 7/26/22 who presents for the above procedure.    Pt is clinically deaf and  is needed. Has been requiring 1 u prbc per month d/t low Hgb. No evidence of GIB or hematuria, has had GIB in past. On xarelto but taken off following CVA.     TTE 7/28/22   The left ventricle is moderately enlarged with eccentric hypertrophy and mildly decreased systolic function.   The estimated ejection fraction is 45%.   There is left ventricular global hypokinesis.   Grade II left ventricular diastolic dysfunction.   There is a 26 mm Evolut transcutaneously-placed aortic bioprosthesis present inside SAVR.   The aortic valve mean gradient is 5 mmHg with a dimensionless index of 0.88.   There is prolapse of the anterior mitral leaflet. Moderate-to-severe mitral regurgitation.   Normal right ventricular size with normal right ventricular systolic function.   Mild tricuspid regurgitation.   The estimated PA systolic pressure is 34 mmHg.   Normal central venous pressure (3 mmHg).   The ascending aorta is mildly dilated.   Severe left atrial enlargement.      LDA: None documented.    Prev airway: None documented.     Drips: None documented.    Patient Active Problem List   Diagnosis    Embolic stroke involving left middle cerebral artery    Deaf, nonspeaking    Essential hypertension    Acute on chronic diastolic heart failure    Aphasia     Persistent atrial fibrillation    Ventricular fibrillation    Weakness    Symptomatic anemia    Closed displaced fracture of right femoral neck    CAD (coronary artery disease)    Iron deficiency anemia    Prostate cancer    Elevated PSA    Anemia    Gastritis    Reflux esophagitis    Diverticulosis    GI bleed    Edema    VHD (valvular heart disease)    MELISSA (acute kidney injury)    Hyperlipidemia    Dizziness    H. pylori infection    Fatigue    Cerumen impaction    S/P AVR    S/P CABG (coronary artery bypass graft)    AICD (automatic cardioverter/defibrillator) present    S/P TAVR (transcatheter aortic valve replacement)    Aortic atherosclerosis    URI (upper respiratory infection)       Review of patient's allergies indicates:   Allergen Reactions    Losartan Hives    Valsartan Hives       Current Inpatient Medications:   leuprolide acetate (6 month)  45 mg Intramuscular Q6 Months       Current Facility-Administered Medications on File Prior to Encounter   Medication Dose Route Frequency Provider Last Rate Last Admin    leuprolide (6 month) injection 45 mg  45 mg Intramuscular Q6 Months Abbie Martinez NP   45 mg at 09/01/22 1319     Current Outpatient Medications on File Prior to Encounter   Medication Sig Dispense Refill    acetylcarnitin/alpha lipoic ac (ACETYLCARNITIN HCL-A LIPOIC AC) 400-200 mg Cap Take 1 capsule by mouth once daily.      albuterol-ipratropium (DUO-NEB) 2.5 mg-0.5 mg/3 mL nebulizer solution Take 3 mLs by nebulization every 6 (six) hours as needed for Wheezing. Rescue (Patient not taking: Reported on 9/28/2022) 1 each 0    aspirin (ECOTRIN) 81 MG EC tablet Take 1 tablet (81 mg total) by mouth once daily. 30 tablet 11    budesonide (PULMICORT) 0.5 mg/2 mL nebulizer solution Take 2 mLs (0.5 mg total) by nebulization 2 (two) times a day. Controller 120 mL 0    cholecalciferol, vitamin D3, 5,000 unit capsule Take 5,000 Units by mouth once daily.        cyanocobalamin, vitamin B-12, 1,000 mcg/mL Kit Inject 1 mL as directed every 28 days.      docusate sodium (COLACE) 100 MG capsule Take 100 mg by mouth 2 (two) times daily.      ferrous sulfate (FEOSOL) 325 mg (65 mg iron) Tab tablet Take 1 tablet (325 mg total) by mouth once daily.  0    KLOR-CON M20 20 mEq tablet Take 20 mEq by mouth once daily.       magnesium oxide (MAG-OX) 400 mg tablet Take 400 mg by mouth once daily.      meclizine (ANTIVERT) 25 mg tablet Take 1 tablet (25 mg total) by mouth 3 (three) times daily as needed. (Patient not taking: Reported on 11/14/2022) 20 tablet 0    metoprolol succinate (TOPROL-XL) 100 MG 24 hr tablet Take 50 mg by mouth 2 (two) times a day.      pantoprazole (PROTONIX) 40 MG tablet Take 1 tablet (40 mg total) by mouth once daily. 30 tablet 11    rosuvastatin (CRESTOR) 20 MG tablet Take 20 mg by mouth every morning.      sotaloL (BETAPACE) 80 MG tablet Take 0.5 tablets (40 mg total) by mouth 2 (two) times daily. 90 tablet 0    torsemide (DEMADEX) 20 MG Tab Take 20 mg by mouth as needed.      vitamin D3-folic acid 5,000 unit- 1 mg Tab Take 1 tablet by mouth once daily.      vitamin D3-vitamin K2, MK4, (K2 PLUS D3) 1,000-100 unit-mcg Tab Take by mouth.      [DISCONTINUED] clopidogreL (PLAVIX) 75 mg tablet Take 1 tablet (75 mg total) by mouth once daily. 30 tablet 11       Past Surgical History:   Procedure Laterality Date    ABLATION N/A 7/22/2020    Procedure: Ablation, afib;  Surgeon: Tal Laurent MD;  Location: [facility] CATH;  Service: Cardiology;  Laterality: N/A;  needs covid test;needs ALEXYS ([place])    AORTIC VALVE REPLACEMENT      CARDIAC CATH COSURGEON N/A 7/27/2022    Procedure: Cardiac Cath Cosurgeon;  Surgeon: Glenroy Sam MD;  Location: [facility] CATH LAB;  Service: Cardiovascular;  Laterality: N/A;    CARDIAC CATHETERIZATION      3 HEART STENTS    carotid artery stent Left     COLONOSCOPY N/A 3/2/2021    Procedure: COLONOSCOPY;  Surgeon: Emiliana  SELENE Cardenas MD;  Location: [facility] ENDO;  Service: General;  Laterality: N/A;  4  8:30am   CL/AM/Bd    CORONARY ARTERY BYPASS GRAFT      ESOPHAGOGASTRODUODENOSCOPY N/A 11/10/2020    Procedure: EGD (ESOPHAGOGASTRODUODENOSCOPY);  Surgeon: Renny Prakash MD;  Location: [facility] ENDO;  Service: Endoscopy;  Laterality: N/A;    HERNIA REPAIR      HIP REPLACEMENT ARTHROPLASTY Right 11/6/2020    Procedure: EMELY -ARTHROPLASTY, HIP REPLACEMENT (ENDO HIP);  Surgeon: Lino Thomas MD;  Location: [facility] OR;  Service: Orthopedics;  Laterality: Right;    LEFT HEART CATHETERIZATION N/A 6/29/2022    Procedure: Left heart cath;  Surgeon: Glenroy Richardson MD;  Location: [facility] CATH LAB;  Service: Cardiology;  Laterality: N/A;    PACERMAKER      PROSTATECTOMY      SHOULDER SURGERY      TOE SURGERY      TRANSCATHETER AORTIC VALVE REPLACEMENT (TAVR) N/A 7/27/2022    Procedure: REPLACEMENT, AORTIC VALVE, TRANSCATHETER (TAVR);  Surgeon: Glenroy Richardson MD;  Location: [facility] CATH LAB;  Service: Cardiology;  Laterality: N/A;    TRANSCATHETER AORTIC VALVE REPLACEMENT (TAVR)  7/27/2022    Procedure: REPLACEMENT, AORTIC VALVE, TRANSCATHETER (TAVR);  Surgeon: Glenroy Sam MD;  Location: [facility] CATH LAB;  Service: Cardiovascular;;    TRANSESOPHAGEAL ECHOCARDIOGRAPHY N/A 6/27/2022    Procedure: ECHOCARDIOGRAM, TRANSESOPHAGEAL;  Surgeon: Fracisco Diagnostic Provider;  Location: [facility] EP LAB;  Service: Anesthesiology;  Laterality: N/A;    TREATMENT OF CARDIAC ARRHYTHMIA N/A 8/7/2019    Procedure: Cardioversion or Defibrillation;  Surgeon: Tal Laurent MD;  Location: [facility] CATH;  Service: Cardiology;  Laterality: N/A;       Social History     Socioeconomic History    Marital status:    Tobacco Use    Smoking status: Never    Smokeless tobacco: Never   Substance and Sexual Activity    Alcohol use: Yes     Alcohol/week: 1.0 standard drink     Types: 1 Glasses of wine per week     Comment: occassionally maybe 3 wk    Drug use:  Never       OBJECTIVE:     Vital Signs Range (Last 24H):         CBC:   No results for input(s): WBC, RBC, HGB, HCT, PLT, MCV, MCH, MCHC in the last 72 hours.    CMP: No results for input(s): NA, K, CL, CO2, BUN, CREATININE, GLU, MG, PHOS, CALCIUM, ALBUMIN, PROT, ALKPHOS, ALT, AST, BILITOT in the last 72 hours.    INR:  No results for input(s): PT, INR, PROTIME, APTT in the last 72 hours.    Diagnostic Studies: No relevant studies.    EKG: No recent studies available.    2D ECHO:  No results found for this or any previous visit.      ASSESSMENT/PLAN:           Pre-op Assessment    I have reviewed the Patient Summary Reports.     I have reviewed the Nursing Notes. I have reviewed the NPO Status.   I have reviewed the Medications.     Review of Systems  Anesthesia Hx:  No problems with previous Anesthesia    Hematology/Oncology:  Hematology Normal   Oncology Normal     EENT/Dental:EENT/Dental Normal   Cardiovascular:   Hypertension CAD      Pulmonary:  Pulmonary Normal    Renal/:   Chronic Renal Disease    Hepatic/GI:  Hepatic/GI Normal    Neurological:   CVA    Endocrine:  Endocrine Normal    Psych:  Psychiatric Normal           Physical Exam  General: Well nourished, Alert, Oriented and Cooperative    Airway:  Mallampati: III / II  Mouth Opening: Normal  TM Distance: Normal  Tongue: Normal  Neck ROM: Normal ROM    Dental:  Intact    Chest/Lungs:  Normal Respiratory Rate    Heart:  Rate: Normal        Anesthesia Plan  Type of Anesthesia, risks & benefits discussed:    Anesthesia Type: MAC, Gen Natural Airway  Intra-op Monitoring Plan: Standard ASA Monitors and Art Line  Post Op Pain Control Plan: multimodal analgesia and IV/PO Opioids PRN  Induction:  IV  Airway Plan: Direct, Post-Induction  Informed Consent: Informed consent signed with the Patient and all parties understand the risks and agree with anesthesia plan.  All questions answered. Patient consented to blood products? Yes  ASA Score: 4  Day of Surgery  Review of History & Physical: H&P Update referred to the surgeon/provider.  Anesthesia Plan Notes: Pt consented via .     Ready For Surgery From Anesthesia Perspective.     .

## 2022-11-22 ENCOUNTER — TELEPHONE (OUTPATIENT)
Dept: HEMATOLOGY/ONCOLOGY | Facility: CLINIC | Age: 82
End: 2022-11-22
Payer: MEDICARE

## 2022-11-22 ENCOUNTER — HOSPITAL ENCOUNTER (INPATIENT)
Facility: HOSPITAL | Age: 82
LOS: 1 days | Discharge: HOME | DRG: 274 | End: 2022-11-22
Attending: INTERNAL MEDICINE | Admitting: INTERNAL MEDICINE
Payer: MEDICARE

## 2022-11-22 ENCOUNTER — ANESTHESIA (OUTPATIENT)
Dept: MEDSURG UNIT | Facility: HOSPITAL | Age: 82
DRG: 274 | End: 2022-11-22
Payer: MEDICARE

## 2022-11-22 VITALS
BODY MASS INDEX: 24.17 KG/M2 | DIASTOLIC BLOOD PRESSURE: 78 MMHG | TEMPERATURE: 98 F | WEIGHT: 154 LBS | HEART RATE: 70 BPM | RESPIRATION RATE: 21 BRPM | SYSTOLIC BLOOD PRESSURE: 130 MMHG | HEIGHT: 67 IN | OXYGEN SATURATION: 100 %

## 2022-11-22 DIAGNOSIS — I48.11 LONGSTANDING PERSISTENT ATRIAL FIBRILLATION: ICD-10-CM

## 2022-11-22 DIAGNOSIS — Z00.6 ENCOUNTER FOR EXAMINATION FOR NORMAL COMPARISON AND CONTROL IN CLINICAL RESEARCH PROGRAM: ICD-10-CM

## 2022-11-22 DIAGNOSIS — I48.91 ATRIAL FIBRILLATION: ICD-10-CM

## 2022-11-22 LAB
ABO + RH BLD: NORMAL
ANION GAP SERPL CALC-SCNC: 9 MMOL/L (ref 8–16)
APTT BLDCRRT: 27.2 SEC (ref 21–32)
BLD GP AB SCN CELLS X3 SERPL QL: NORMAL
BUN SERPL-MCNC: 29 MG/DL (ref 8–23)
CALCIUM SERPL-MCNC: 9.8 MG/DL (ref 8.7–10.5)
CHLORIDE SERPL-SCNC: 100 MMOL/L (ref 95–110)
CO2 SERPL-SCNC: 30 MMOL/L (ref 23–29)
CREAT SERPL-MCNC: 0.9 MG/DL (ref 0.5–1.4)
ERYTHROCYTE [DISTWIDTH] IN BLOOD BY AUTOMATED COUNT: 14.4 % (ref 11.5–14.5)
EST. GFR  (NO RACE VARIABLE): >60 ML/MIN/1.73 M^2
GLUCOSE SERPL-MCNC: 86 MG/DL (ref 70–110)
HCT VFR BLD AUTO: 41.7 % (ref 40–54)
HGB BLD-MCNC: 13.8 G/DL (ref 14–18)
INR PPP: 1.1 (ref 0.8–1.2)
MCH RBC QN AUTO: 32.2 PG (ref 27–31)
MCHC RBC AUTO-ENTMCNC: 33.1 G/DL (ref 32–36)
MCV RBC AUTO: 97 FL (ref 82–98)
PLATELET # BLD AUTO: 164 K/UL (ref 150–450)
PMV BLD AUTO: 11.3 FL (ref 9.2–12.9)
POTASSIUM SERPL-SCNC: 3.6 MMOL/L (ref 3.5–5.1)
PROTHROMBIN TIME: 11.1 SEC (ref 9–12.5)
RBC # BLD AUTO: 4.29 M/UL (ref 4.6–6.2)
SODIUM SERPL-SCNC: 139 MMOL/L (ref 136–145)
WBC # BLD AUTO: 6.65 K/UL (ref 3.9–12.7)

## 2022-11-22 PROCEDURE — 25000003 PHARM REV CODE 250: Performed by: STUDENT IN AN ORGANIZED HEALTH CARE EDUCATION/TRAINING PROGRAM

## 2022-11-22 PROCEDURE — 33340 PERQ CLSR TCAT L ATR APNDGE: CPT | Performed by: INTERNAL MEDICINE

## 2022-11-22 PROCEDURE — 25000003 PHARM REV CODE 250: Performed by: INTERNAL MEDICINE

## 2022-11-22 PROCEDURE — 37000009 HC ANESTHESIA EA ADD 15 MINS: Performed by: INTERNAL MEDICINE

## 2022-11-22 PROCEDURE — 85730 THROMBOPLASTIN TIME PARTIAL: CPT | Performed by: INTERNAL MEDICINE

## 2022-11-22 PROCEDURE — 80048 BASIC METABOLIC PNL TOTAL CA: CPT | Performed by: INTERNAL MEDICINE

## 2022-11-22 PROCEDURE — 93010 EKG 12-LEAD: ICD-10-PCS | Mod: ,,, | Performed by: INTERNAL MEDICINE

## 2022-11-22 PROCEDURE — 85027 COMPLETE CBC AUTOMATED: CPT | Performed by: INTERNAL MEDICINE

## 2022-11-22 PROCEDURE — 25500020 PHARM REV CODE 255: Performed by: INTERNAL MEDICINE

## 2022-11-22 PROCEDURE — 27201423 OPTIME MED/SURG SUP & DEVICES STERILE SUPPLY: Performed by: INTERNAL MEDICINE

## 2022-11-22 PROCEDURE — 11000001 HC ACUTE MED/SURG PRIVATE ROOM

## 2022-11-22 PROCEDURE — C1760 CLOSURE DEV, VASC: HCPCS | Performed by: INTERNAL MEDICINE

## 2022-11-22 PROCEDURE — 33340 PERQ CLSR TCAT L ATR APNDGE: CPT | Mod: Q0,,, | Performed by: INTERNAL MEDICINE

## 2022-11-22 PROCEDURE — 63600175 PHARM REV CODE 636 W HCPCS: Performed by: STUDENT IN AN ORGANIZED HEALTH CARE EDUCATION/TRAINING PROGRAM

## 2022-11-22 PROCEDURE — D9220A PRA ANESTHESIA: ICD-10-PCS | Mod: ,,, | Performed by: ANESTHESIOLOGY

## 2022-11-22 PROCEDURE — 36620 PR INSERT CATH,ART,PERCUT,SHORTTERM: ICD-10-PCS | Mod: 59,,, | Performed by: ANESTHESIOLOGY

## 2022-11-22 PROCEDURE — 33340 PR TRANSCATH CLOSURE, PERC, LEFT ATRIAL APPENDAGE, W/IMPLANT: ICD-10-PCS | Mod: Q0,,, | Performed by: INTERNAL MEDICINE

## 2022-11-22 PROCEDURE — C1769 GUIDE WIRE: HCPCS | Performed by: INTERNAL MEDICINE

## 2022-11-22 PROCEDURE — 36620 INSERTION CATHETER ARTERY: CPT | Mod: 59,,, | Performed by: ANESTHESIOLOGY

## 2022-11-22 PROCEDURE — D9220A PRA ANESTHESIA: Mod: ,,, | Performed by: ANESTHESIOLOGY

## 2022-11-22 PROCEDURE — 93005 ELECTROCARDIOGRAM TRACING: CPT

## 2022-11-22 PROCEDURE — 37000008 HC ANESTHESIA 1ST 15 MINUTES: Performed by: INTERNAL MEDICINE

## 2022-11-22 PROCEDURE — 63600175 PHARM REV CODE 636 W HCPCS: Performed by: INTERNAL MEDICINE

## 2022-11-22 PROCEDURE — C1894 INTRO/SHEATH, NON-LASER: HCPCS | Performed by: INTERNAL MEDICINE

## 2022-11-22 PROCEDURE — 27800903 OPTIME MED/SURG SUP & DEVICES OTHER IMPLANTS: Performed by: INTERNAL MEDICINE

## 2022-11-22 PROCEDURE — 93010 ELECTROCARDIOGRAM REPORT: CPT | Mod: ,,, | Performed by: INTERNAL MEDICINE

## 2022-11-22 PROCEDURE — 85610 PROTHROMBIN TIME: CPT | Performed by: INTERNAL MEDICINE

## 2022-11-22 PROCEDURE — 86901 BLOOD TYPING SEROLOGIC RH(D): CPT | Performed by: INTERNAL MEDICINE

## 2022-11-22 DEVICE — LEFT ATRIAL APPENDAGE CLOSURE DEVICE WITH DELIVERY SYSTEM
Type: IMPLANTABLE DEVICE | Site: HEART | Status: FUNCTIONAL
Brand: WATCHMAN FLX™

## 2022-11-22 RX ORDER — FENTANYL CITRATE 50 UG/ML
INJECTION, SOLUTION INTRAMUSCULAR; INTRAVENOUS
Status: DISCONTINUED | OUTPATIENT
Start: 2022-11-22 | End: 2022-11-22

## 2022-11-22 RX ORDER — SODIUM CHLORIDE 9 MG/ML
INJECTION, SOLUTION INTRAVENOUS CONTINUOUS PRN
Status: DISCONTINUED | OUTPATIENT
Start: 2022-11-22 | End: 2022-11-22

## 2022-11-22 RX ORDER — SODIUM CHLORIDE 9 MG/ML
INJECTION, SOLUTION INTRAVENOUS CONTINUOUS
Status: ACTIVE | OUTPATIENT
Start: 2022-11-22 | End: 2022-11-22

## 2022-11-22 RX ORDER — DEXMEDETOMIDINE HYDROCHLORIDE 100 UG/ML
INJECTION, SOLUTION INTRAVENOUS CONTINUOUS PRN
Status: DISCONTINUED | OUTPATIENT
Start: 2022-11-22 | End: 2022-11-22

## 2022-11-22 RX ORDER — PROCHLORPERAZINE EDISYLATE 5 MG/ML
5 INJECTION INTRAMUSCULAR; INTRAVENOUS EVERY 30 MIN PRN
Status: CANCELLED | OUTPATIENT
Start: 2022-11-22

## 2022-11-22 RX ORDER — PROTAMINE SULFATE 10 MG/ML
INJECTION, SOLUTION INTRAVENOUS
Status: DISCONTINUED | OUTPATIENT
Start: 2022-11-22 | End: 2022-11-22

## 2022-11-22 RX ORDER — CEFAZOLIN SODIUM 1 G/3ML
INJECTION, POWDER, FOR SOLUTION INTRAMUSCULAR; INTRAVENOUS
Status: DISCONTINUED | OUTPATIENT
Start: 2022-11-22 | End: 2022-11-22

## 2022-11-22 RX ORDER — SODIUM CHLORIDE 0.9 % (FLUSH) 0.9 %
10 SYRINGE (ML) INJECTION
Status: CANCELLED | OUTPATIENT
Start: 2022-11-22

## 2022-11-22 RX ORDER — FENTANYL CITRATE 50 UG/ML
25 INJECTION, SOLUTION INTRAMUSCULAR; INTRAVENOUS EVERY 5 MIN PRN
Status: DISCONTINUED | OUTPATIENT
Start: 2022-11-22 | End: 2022-11-22 | Stop reason: HOSPADM

## 2022-11-22 RX ORDER — DIPHENHYDRAMINE HCL 50 MG
50 CAPSULE ORAL ONCE
Status: COMPLETED | OUTPATIENT
Start: 2022-11-22 | End: 2022-11-22

## 2022-11-22 RX ORDER — LIDOCAINE HYDROCHLORIDE 20 MG/ML
INJECTION, SOLUTION INFILTRATION; PERINEURAL
Status: DISCONTINUED | OUTPATIENT
Start: 2022-11-22 | End: 2022-11-22 | Stop reason: HOSPADM

## 2022-11-22 RX ORDER — MIDAZOLAM HYDROCHLORIDE 1 MG/ML
INJECTION INTRAMUSCULAR; INTRAVENOUS
Status: DISCONTINUED | OUTPATIENT
Start: 2022-11-22 | End: 2022-11-22

## 2022-11-22 RX ORDER — PHENYLEPHRINE HCL IN 0.9% NACL 1 MG/10 ML
SYRINGE (ML) INTRAVENOUS
Status: DISCONTINUED | OUTPATIENT
Start: 2022-11-22 | End: 2022-11-22

## 2022-11-22 RX ORDER — PROPOFOL 10 MG/ML
VIAL (ML) INTRAVENOUS
Status: DISCONTINUED | OUTPATIENT
Start: 2022-11-22 | End: 2022-11-22

## 2022-11-22 RX ORDER — HEPARIN SOD,PORCINE/0.9 % NACL 1000/500ML
INTRAVENOUS SOLUTION INTRAVENOUS
Status: DISCONTINUED | OUTPATIENT
Start: 2022-11-22 | End: 2022-11-22 | Stop reason: HOSPADM

## 2022-11-22 RX ORDER — ONDANSETRON 2 MG/ML
4 INJECTION INTRAMUSCULAR; INTRAVENOUS DAILY PRN
Status: DISCONTINUED | OUTPATIENT
Start: 2022-11-22 | End: 2022-11-22 | Stop reason: HOSPADM

## 2022-11-22 RX ORDER — HEPARIN SODIUM 1000 [USP'U]/ML
INJECTION, SOLUTION INTRAVENOUS; SUBCUTANEOUS
Status: DISCONTINUED | OUTPATIENT
Start: 2022-11-22 | End: 2022-11-22

## 2022-11-22 RX ADMIN — CEFAZOLIN 2 G: 330 INJECTION, POWDER, FOR SOLUTION INTRAMUSCULAR; INTRAVENOUS at 03:11

## 2022-11-22 RX ADMIN — FENTANYL CITRATE 100 MCG: 0.05 INJECTION, SOLUTION INTRAMUSCULAR; INTRAVENOUS at 03:11

## 2022-11-22 RX ADMIN — Medication 100 MCG: at 03:11

## 2022-11-22 RX ADMIN — HEPARIN SODIUM 9000 UNITS: 1000 INJECTION, SOLUTION INTRAVENOUS; SUBCUTANEOUS at 03:11

## 2022-11-22 RX ADMIN — HEPARIN SODIUM 1000 UNITS: 1000 INJECTION, SOLUTION INTRAVENOUS; SUBCUTANEOUS at 04:11

## 2022-11-22 RX ADMIN — DIPHENHYDRAMINE HYDROCHLORIDE 50 MG: 50 CAPSULE ORAL at 12:11

## 2022-11-22 RX ADMIN — SODIUM CHLORIDE: 0.9 INJECTION, SOLUTION INTRAVENOUS at 12:11

## 2022-11-22 RX ADMIN — PROPOFOL 30 MG: 10 INJECTION, EMULSION INTRAVENOUS at 03:11

## 2022-11-22 RX ADMIN — SODIUM CHLORIDE: 0.9 INJECTION, SOLUTION INTRAVENOUS at 02:11

## 2022-11-22 RX ADMIN — PROTAMINE SULFATE 60 MG: 10 INJECTION, SOLUTION INTRAVENOUS at 04:11

## 2022-11-22 RX ADMIN — Medication 50 MCG: at 03:11

## 2022-11-22 RX ADMIN — MIDAZOLAM HYDROCHLORIDE 2 MG: 1 INJECTION, SOLUTION INTRAMUSCULAR; INTRAVENOUS at 03:11

## 2022-11-22 RX ADMIN — DEXMEDETOMIDINE HYDROCHLORIDE 1 MCG/KG/HR: 100 INJECTION, SOLUTION INTRAVENOUS at 03:11

## 2022-11-22 RX ADMIN — SODIUM CHLORIDE: 0.9 INJECTION, SOLUTION INTRAVENOUS at 05:11

## 2022-11-22 RX ADMIN — SODIUM CHLORIDE: 0.9 INJECTION, SOLUTION INTRAVENOUS at 04:11

## 2022-11-22 NOTE — Clinical Note
The DP pulses were 1+ bilaterally. The PT pulses were 1+ bilaterally. The radial pulses were +2 bilaterally.

## 2022-11-22 NOTE — ASSESSMENT & PLAN NOTE
1. ALEXYS for evaluation prior to watchmen placement   -No absolute contraindications of esophageal stricture, tumor, perforation, laceration,or diverticulum and/or active GI bleed  -The risks, benefits & alternatives of the procedure were explained to the patient.   -The risks of transesophageal echo include but are not limited to:  Dental trauma, esophageal trauma/perforation, bleeding, laryngospasm/brochospasm, aspiration, sore throat/hoarseness, & dislodgement of the endotracheal tube/nasogastric tube (where applicable).    -The risks of moderate sedation include hypotension, respiratory depression, arrhythmias, bronchospasm, & death.    -Informed consent was obtained. The patient is agreeable to proceed with the procedure and all questions and concerns addressed.    Case discussed with an attending in echocardiography lab.     Further recommendations per attending addendum

## 2022-11-22 NOTE — ANESTHESIA PROCEDURE NOTES
Arterial    Diagnosis: atrial fibrillation    Patient location during procedure: done in OR    Staffing  Authorizing Provider: Nelsy Vang MD  Performing Provider: Ricardo Norton MD    Anesthesiologist was present at the time of the procedure.    Preanesthetic Checklist  Completed: patient identified, IV checked, site marked, risks and benefits discussed, surgical consent, monitors and equipment checked, pre-op evaluation, timeout performed and anesthesia consent givenArterial  Skin Prep: chlorhexidine gluconate  Local Infiltration: lidocaine  Orientation: right  Location: radial    Catheter Size: 20 G  Catheter placement by Ultrasound guidance. Heme positive aspiration all ports.   Vessel Caliber: patent  Needle advanced into vessel with real time Ultrasound guidance.Insertion Attempts: 1  Assessment  Dressing: secured with tape and tegaderm  Patient: Tolerated well

## 2022-11-22 NOTE — Clinical Note
75 ml of contrast were injected throughout the case. 125 mL of contrast was the total wasted during the case. 200 mL was the total amount used during the case.

## 2022-11-22 NOTE — SUBJECTIVE & OBJECTIVE
Past Medical History:   Diagnosis Date    Abdominal hernia     Anemia 02/2021    Anticoagulant long-term use     Arthritis     SHOULDER    Cancer     prostate    Cardiomyopathy     Carotid artery stenosis     left    Coronary artery disease     Deaf     Dizziness     Encounter for blood transfusion     Gastritis 3/2/2021    Hyperlipidemia     Hypertension     Mitral regurgitation     Mitral regurgitation     PAD (peripheral artery disease)     Paroxysmal atrial fibrillation     Stroke 2020    Thyroid disease        Past Surgical History:   Procedure Laterality Date    ABLATION N/A 7/22/2020    Procedure: Ablation, afib;  Surgeon: Tal Laurent MD;  Location: [facility] CATH;  Service: Cardiology;  Laterality: N/A;  needs covid test;needs ALEXYS ([place])    AORTIC VALVE REPLACEMENT      CARDIAC CATH COSURGEON N/A 7/27/2022    Procedure: Cardiac Cath Cosurgeon;  Surgeon: Glenroy Sam MD;  Location: [facility] CATH LAB;  Service: Cardiovascular;  Laterality: N/A;    CARDIAC CATHETERIZATION      3 HEART STENTS    carotid artery stent Left     COLONOSCOPY N/A 3/2/2021    Procedure: COLONOSCOPY;  Surgeon: Emiliana Cardenas MD;  Location: [facility] ENDO;  Service: General;  Laterality: N/A;  4  8:30am   CL/AM/Bd    CORONARY ARTERY BYPASS GRAFT      ESOPHAGOGASTRODUODENOSCOPY N/A 11/10/2020    Procedure: EGD (ESOPHAGOGASTRODUODENOSCOPY);  Surgeon: Renny Prakash MD;  Location: [facility] ENDO;  Service: Endoscopy;  Laterality: N/A;    HERNIA REPAIR      HIP REPLACEMENT ARTHROPLASTY Right 11/6/2020    Procedure: EMELY -ARTHROPLASTY, HIP REPLACEMENT (ENDO HIP);  Surgeon: Lino Thomas MD;  Location: [facility] OR;  Service: Orthopedics;  Laterality: Right;    LEFT HEART CATHETERIZATION N/A 6/29/2022    Procedure: Left heart cath;  Surgeon: Glenroy Richardson MD;  Location: [facility] CATH LAB;  Service: Cardiology;  Laterality: N/A;    PACERMAKER      PROSTATECTOMY      SHOULDER SURGERY      TOE SURGERY      TRANSCATHETER AORTIC VALVE REPLACEMENT  (TAVR) N/A 7/27/2022    Procedure: REPLACEMENT, AORTIC VALVE, TRANSCATHETER (TAVR);  Surgeon: Glenroy Richardson MD;  Location: [facility] CATH LAB;  Service: Cardiology;  Laterality: N/A;    TRANSCATHETER AORTIC VALVE REPLACEMENT (TAVR)  7/27/2022    Procedure: REPLACEMENT, AORTIC VALVE, TRANSCATHETER (TAVR);  Surgeon: Glenroy Sam MD;  Location: [facility] CATH LAB;  Service: Cardiovascular;;    TRANSESOPHAGEAL ECHOCARDIOGRAPHY N/A 6/27/2022    Procedure: ECHOCARDIOGRAM, TRANSESOPHAGEAL;  Surgeon: Dosc Diagnostic Provider;  Location: [facility] EP LAB;  Service: Anesthesiology;  Laterality: N/A;    TREATMENT OF CARDIAC ARRHYTHMIA N/A 8/7/2019    Procedure: Cardioversion or Defibrillation;  Surgeon: Tal Laurent MD;  Location: [facility] CATH;  Service: Cardiology;  Laterality: N/A;       Review of patient's allergies indicates:   Allergen Reactions    Losartan Hives    Valsartan Hives       Facility-Administered Medications Prior to Admission   Medication    leuprolide (6 month) injection 45 mg     PTA Medications   Medication Sig    acetylcarnitin/alpha lipoic ac (ACETYLCARNITIN HCL-A LIPOIC AC) 400-200 mg Cap Take 1 capsule by mouth once daily.    albuterol-ipratropium (DUO-NEB) 2.5 mg-0.5 mg/3 mL nebulizer solution Take 3 mLs by nebulization every 6 (six) hours as needed for Wheezing. Rescue (Patient not taking: Reported on 9/28/2022)    aspirin (ECOTRIN) 81 MG EC tablet Take 1 tablet (81 mg total) by mouth once daily.    bicalutamide (CASODEX) 50 MG Tab TAKE 1 TABLET EVERY DAY    budesonide (PULMICORT) 0.5 mg/2 mL nebulizer solution Take 2 mLs (0.5 mg total) by nebulization 2 (two) times a day. Controller    cholecalciferol, vitamin D3, 5,000 unit capsule Take 5,000 Units by mouth once daily.     clonazePAM (KLONOPIN) 2 MG Tab Take 1 tablet (2 mg total) by mouth daily as needed (anxiety).    cyanocobalamin, vitamin B-12, 1,000 mcg/mL Kit Inject 1 mL as directed every 28 days.    docusate sodium (COLACE) 100 MG capsule Take  100 mg by mouth 2 (two) times daily.    ferrous sulfate (FEOSOL) 325 mg (65 mg iron) Tab tablet Take 1 tablet (325 mg total) by mouth once daily.    KLOR-CON M20 20 mEq tablet Take 20 mEq by mouth once daily.     magnesium oxide (MAG-OX) 400 mg tablet Take 400 mg by mouth once daily.    meclizine (ANTIVERT) 25 mg tablet Take 1 tablet (25 mg total) by mouth 3 (three) times daily as needed. (Patient not taking: Reported on 11/14/2022)    metoprolol succinate (TOPROL-XL) 100 MG 24 hr tablet Take 50 mg by mouth 2 (two) times a day.    pantoprazole (PROTONIX) 40 MG tablet Take 1 tablet (40 mg total) by mouth once daily.    rosuvastatin (CRESTOR) 20 MG tablet Take 20 mg by mouth every morning.    sotaloL (BETAPACE) 80 MG tablet Take 0.5 tablets (40 mg total) by mouth 2 (two) times daily.    torsemide (DEMADEX) 20 MG Tab Take 20 mg by mouth as needed.    vitamin D3-folic acid 5,000 unit- 1 mg Tab Take 1 tablet by mouth once daily.    vitamin D3-vitamin K2, MK4, (K2 PLUS D3) 1,000-100 unit-mcg Tab Take by mouth.     Family History       Problem Relation (Age of Onset)    Heart attack Mother    Heart disease Mother          Tobacco Use    Smoking status: Never    Smokeless tobacco: Never   Substance and Sexual Activity    Alcohol use: Yes     Alcohol/week: 1.0 standard drink     Types: 1 Glasses of wine per week     Comment: occassionally maybe 3 wk    Drug use: Never    Sexual activity: Not on file     Comment:      Review of Systems   Constitutional: Negative for fever and malaise/fatigue.   HENT:  Negative for congestion and sore throat.    Eyes:  Negative for blurred vision, vision loss in left eye and vision loss in right eye.   Cardiovascular:  Negative for chest pain, dyspnea on exertion, irregular heartbeat, leg swelling, near-syncope, palpitations and syncope.   Respiratory:  Negative for shortness of breath and wheezing.    Endocrine: Negative.    Hematologic/Lymphatic: Negative.    Skin: Negative.     Musculoskeletal:  Negative for arthritis, back pain, joint pain and myalgias.   Gastrointestinal:  Negative for abdominal pain, hematemesis, hematochezia and melena.   Genitourinary: Negative.    Neurological:  Negative for light-headedness and loss of balance.   Psychiatric/Behavioral: Negative.     Objective:     Vital Signs (Most Recent):    Vital Signs (24h Range):           There is no height or weight on file to calculate BMI.            No intake or output data in the 24 hours ending 11/22/22 1144    Lines/Drains/Airways       None                   Physical Exam  Vitals and nursing note reviewed.   Constitutional:       Appearance: Normal appearance. He is normal weight. He is not toxic-appearing.   HENT:      Head: Normocephalic and atraumatic.   Eyes:      General: No scleral icterus.     Extraocular Movements: Extraocular movements intact.   Neck:      Vascular: No carotid bruit.   Cardiovascular:      Rate and Rhythm: Normal rate and regular rhythm.      Pulses: Normal pulses.      Heart sounds: Normal heart sounds. No murmur heard.    No gallop.   Pulmonary:      Effort: Pulmonary effort is normal. No respiratory distress.      Breath sounds: Normal breath sounds. No wheezing or rales.   Abdominal:      General: Abdomen is flat. Bowel sounds are normal.      Palpations: Abdomen is soft. There is no mass.   Musculoskeletal:      Cervical back: Normal range of motion.      Right lower leg: No edema.      Left lower leg: No edema.   Skin:     General: Skin is warm and dry.      Capillary Refill: Capillary refill takes less than 2 seconds.      Findings: No rash.   Neurological:      General: No focal deficit present.      Mental Status: He is alert and oriented to person, place, and time. Mental status is at baseline.       Significant Labs: All pertinent lab results from the last 24 hours have been reviewed.    Significant Imaging: Echocardiogram: Transthoracic echo (TTE) complete (Cupid Only):  "  Results for orders placed or performed during the hospital encounter of 09/01/22   Echo   Result Value Ref Range    BSA 1.83 m2    TDI SEPTAL 0.05 m/s    LV LATERAL E/E' RATIO 10.60 m/s    LV SEPTAL E/E' RATIO 21.20 m/s    LA WIDTH 5.87 cm    TDI LATERAL 0.10 m/s    LVIDd 5.80 3.5 - 6.0 cm    IVS 1.20 (A) 0.6 - 1.1 cm    Posterior Wall 1.20 0.6 - 1.1 cm    LVIDs 5.60 (A) 2.1 - 4.0 cm    FS 3 28 - 44 %    LA volume 154.36 cm3    Sinus 3.67 cm    STJ 3.19 cm    Ascending aorta 4.20 cm    LV mass 297.00 g    LA size 5.34 cm    RVDD 3.75 cm    TAPSE 1.49 cm    RV S' 5.91 cm/s    Left Ventricle Relative Wall Thickness 0.41 cm    AV mean gradient 5 mmHg    AV valve area 4.18 cm2    AV Velocity Ratio 0.66     AV index (prosthetic) 0.88     MV valve area p 1/2 method 4.66 cm2    E/A ratio 1.77     Mean e' 0.08 m/s    E wave deceleration time 162.91 msec    MV "A" wave duration 5.71 msec    Pulm vein S/D ratio 1.07     LVOT diameter 2.46 cm    LVOT area 4.8 cm2    LVOT peak mehdi 0.91 m/s    LVOT peak VTI 18.48 cm    Ao peak mehdi 1.38 m/s    Ao VTI 21.00 cm    Mr max mehdi 0.05 m/s    LVOT stroke volume 87.79 cm3    AV peak gradient 8 mmHg    E/E' ratio 14.13 m/s    MV Peak E Mehdi 1.06 m/s    TR Max Mehdi 2.77 m/s    MV stenosis pressure 1/2 time 47.24 ms    MV Peak A Mehdi 0.60 m/s    PV Peak S Mehdi 0.87 m/s    PV Peak D Mehdi 0.81 m/s    LV Systolic Volume 153.88 mL    LV Systolic Volume Index 84.5 mL/m2    LV Diastolic Volume 179.27 mL    LV Diastolic Volume Index 98.50 mL/m2    LA Volume Index 84.8 mL/m2    LV Mass Index 163 g/m2    RA Major Axis 5.01 cm    Left Atrium Minor Axis 5.70 cm    Left Atrium Major Axis 5.89 cm    Triscuspid Valve Regurgitation Peak Gradient 31 mmHg    LA Volume Index (Mod) 85.7 mL/m2    LA volume (mod) 156.00 cm3    RA Width 4.85 cm    Right Atrial Pressure (from IVC) 3 mmHg    EF 45 %    TV rest pulmonary artery pressure 34 mmHg    Narrative    · The left ventricle is moderately enlarged with eccentric " hypertrophy and   mildly decreased systolic function.  · The estimated ejection fraction is 45%.  · There is left ventricular global hypokinesis.  · Grade II left ventricular diastolic dysfunction.  · There is a 26 mm Evolut transcutaneously-placed aortic bioprosthesis   present inside SAVR.  · The aortic valve mean gradient is 5 mmHg with a dimensionless index of   0.88.  · There is prolapse of the anterior mitral leaflet. Moderate-to-severe   mitral regurgitation.  · Normal right ventricular size with normal right ventricular systolic   function.  · Mild tricuspid regurgitation.  · The estimated PA systolic pressure is 34 mmHg.  · Normal central venous pressure (3 mmHg).  · The ascending aorta is mildly dilated.  · Severe left atrial enlargement.

## 2022-11-22 NOTE — DISCHARGE SUMMARY
[facility]  Interventional Cardiology  Discharge Summary      Patient Name: Michael Mclean  MRN: [id number]  Admission Date: 11/22/2022  Hospital Length of Stay: 0 days  Discharge Date and Time:  11/22/2022 4:54 PM  Attending Physician: Glenroy Richardson MD  Discharging Provider: Devan Chung MD  Primary Care Physician: Josh Alejo Jr, MD    HPI:  82 y.o. male  with a past medical history of paroxysmal Afib (s/p DCCV in 2017, s/p PVI in 2020), CAD s/p 2v CABG & bioprosthetic aotic valve in 2012, ICM s/p BiV ICD, h/o SSS, HTN, HLD, CVA, prostate cancer, severe AS s/p TAVR (7/26/2022) who presents for Watchman placement.      Pt is hearing impaired and clinically deaf.  A  was in the room and brought by the pt - she was used to communicate with the pt.     Pt says that he has been requiring 1 pRBC transfusion per month due to low hgb.  No evidence of GIB or hematuria.  Has had GI bleeding in the past requiring transfusion.  Has been taking Fe as well.  He was on Xarelto but following his CVA he was taken off of anticoagulation (records reviewed, no history of hemorrhagic stroke). Xarelto and eliquis were prohibitively expensive and he does not wish to resume warfarin.  ICD last interrogated in 9/2022 and showed no treated events since implant, pAF with max duration of 5 hrs 23 min since June 2020.  Pt was seen by Dr. Richardson recently and presents for Watchman placement      Procedure(s) (LRB):  Left atrial appendage closure device (N/A)  ECHOCARDIOGRAM, TRANSESOPHAGEAL (N/A)     Indwelling Lines/Drains at time of discharge:  Lines/Drains/Airways     Arterial Line  Duration           Arterial Line 11/22/22 1519 Right Radial <1 day                Hospital Course:  Pt brought to the cath lab.  ALEXYS performed and MAURICIO clear.  R CFV access obtained.  Under ALEXYS guidance, a 27mm Watchman flex was successfully deployed.  PASS criteria met.  Perclose of the CFV.  Pt tolerated  procedure well without any complications and recovered without any issues.  Discharged home.      Goals of Care Treatment Preferences:  Code Status: Full Code          Significant Diagnostic Studies: Angiography: s/p 27mm Watchman flex    Pending Diagnostic Studies:     None        No new Assessment & Plan notes have been filed under this hospital service since the last note was generated.  Service: Interventional Cardiology      Discharged Condition: good    Follow Up:    Patient Instructions:   No discharge procedures on file.  Medications:  Reconciled Home Medications:      Medication List      CONTINUE taking these medications    acetylcarnitin HCL-a lipoic ac 400-200 mg Cap  Take 1 capsule by mouth once daily.     aspirin 81 MG EC tablet  Commonly known as: ECOTRIN  Take 1 tablet (81 mg total) by mouth once daily.     bicalutamide 50 MG Tab  Commonly known as: CASODEX  TAKE 1 TABLET EVERY DAY     budesonide 0.5 mg/2 mL nebulizer solution  Commonly known as: PULMICORT  Take 2 mLs (0.5 mg total) by nebulization 2 (two) times a day. Controller     cholecalciferol (vitamin D3) 125 mcg (5,000 unit) capsule  Take 5,000 Units by mouth once daily.     clonazePAM 2 MG Tab  Commonly known as: KlonoPIN  Take 1 tablet (2 mg total) by mouth daily as needed (anxiety).     cyanocobalamin (vitamin B-12) 1,000 mcg/mL Kit  Inject 1 mL as directed every 28 days.     docusate sodium 100 MG capsule  Commonly known as: COLACE  Take 100 mg by mouth 2 (two) times daily.     ferrous sulfate 325 mg (65 mg iron) Tab tablet  Commonly known as: FEOSOL  Take 1 tablet (325 mg total) by mouth once daily.     K2 PLUS D3 1,000-100 unit-mcg Tab  Generic drug: vitamin D3-vitamin K2 (MK4)  Take by mouth.     KLOR-CON M20 20 MEQ tablet  Generic drug: potassium chloride SA  Take 20 mEq by mouth once daily.     magnesium oxide 400 mg (241.3 mg magnesium) tablet  Commonly known as: MAG-OX  Take 400 mg by mouth once daily.     metoprolol succinate 100  MG 24 hr tablet  Commonly known as: TOPROL-XL  Take 50 mg by mouth 2 (two) times a day.     pantoprazole 40 MG tablet  Commonly known as: PROTONIX  Take 1 tablet (40 mg total) by mouth once daily.     rosuvastatin 20 MG tablet  Commonly known as: CRESTOR  Take 20 mg by mouth every morning.     sotaloL 80 MG tablet  Commonly known as: BETAPACE  Take 0.5 tablets (40 mg total) by mouth 2 (two) times daily.     torsemide 20 MG Tab  Commonly known as: DEMADEX  Take 20 mg by mouth as needed.     vitamin D3-folic acid 125 mcg (5,000 unit)-1 mg Tab  Take 1 tablet by mouth once daily.        ASK your doctor about these medications    albuterol-ipratropium 2.5 mg-0.5 mg/3 mL nebulizer solution  Commonly known as: DUO-NEB  Take 3 mLs by nebulization every 6 (six) hours as needed for Wheezing. Rescue     meclizine 25 mg tablet  Commonly known as: ANTIVERT  Take 1 tablet (25 mg total) by mouth 3 (three) times daily as needed.            Time spent on the discharge of patient: 31 minutes    Devan Chung MD  Interventional Cardiology  [facility]

## 2022-11-22 NOTE — TELEPHONE ENCOUNTER
----- Message from Minoo Burch sent at 11/22/2022 10:23 AM CST -----  Contact: @[phone]  Caller is calling in requesting a appt 3/1 if available, please call to discuss further.

## 2022-11-22 NOTE — Clinical Note
A pre-sedation assessment was completed by the Anesthesia; All sedation and sedation monitoring done by anesthesia.

## 2022-11-22 NOTE — BRIEF OP NOTE
Post Cath Note  Preoperative Diagnosis: Longstanding persistent atrial fibrillation [I48.11]  Encounter for examination for normal comparison and control in clinical research program [Z00.6]   Postop Diagnosis: Longstanding persistent atrial fibrillation [I48.11]  Encounter for examination for normal comparison and control in clinical research program [Z00.6]  Referring Physician: Glenroy Richardson     Procedure: Left atrial appendage closure device (N/A), ECHOCARDIOGRAM, TRANSESOPHAGEAL (N/A)       Access: Right CFV  : Glenroy Richardson MD   All Operators: Surgeon(s):  MD Annabella Tirado MD Salima Qamruddin, MD Stephen R. Ramee, MD Venkat Subramaniam, MD       See full report for further details    Intervention:     - S/P Watchman    Treatments/Procedures: Procedure(s) (LRB):  Left atrial appendage closure device (N/A)  ECHOCARDIOGRAM, TRANSESOPHAGEAL (N/A)     -Closure device: Perclosure    Findings: s/p 27mm Watchman flex placement    Estimated Blood loss: 20 cc    Specimens removed: No  Post Cath Exam:     Vitals:    11/22/22 1230   BP: (!) 165/88   Pulse:    Resp:    Temp:      No unusual pain, hematoma, thrill or bruit at vascular access site.  Distal pulse present without signs of ischemia.    Recommendations:   - Patient tolerated procedure well. No immediate complications  - Routine post-cath care  - Continue aspirin     - Routine post cath protocol  - Maximize medical management  - Further care by the primary team  - IVF at  125cc/hr x2 hours    - Continue medical management, Follow-up with outpatient cardiologist    Devan Chung - Pager# [phone]  11/22/2022  4:51 PM  Cardiovascular Fellow  [facility]

## 2022-11-22 NOTE — NURSING
remained at bedside for entire admission process.  Consents completed.  Family at bedside, no questions at this time.  Contact for  is Chuckie--[place]--[phone].

## 2022-11-22 NOTE — HPI
82 y.o. male  with a past medical history of paroxysmal Afib (s/p DCCV in 2017, s/p PVI in 2020), CAD s/p 2v CABG & bioprosthetic aotic valve in 2012, ICM s/p BiV ICD, h/o SSS, HTN, HLD, CVA, prostate cancer, severe AS s/p TAVR (7/26/2022) who presents for Watchman placement.      Pt is hearing impaired and clinically deaf.  A  was in the room and brought by the pt - she was used to communicate with the pt.     Pt says that he has been requiring 1 pRBC transfusion per month due to low hgb.  No evidence of GIB or hematuria.  Has had GI bleeding in the past requiring transfusion.  Has been taking Fe as well.  He was on Xarelto but following his CVA he was taken off of anticoagulation (records reviewed, no history of hemorrhagic stroke). Xarelto and eliquis were prohibitively expensive and he does not wish to resume warfarin.  ICD last interrogated in 9/2022 and showed no treated events since implant, pAF with max duration of 5 hrs 23 min since June 2020.  Pt was seen by Dr. Boo recently who recommended Watchman placement.    Dysphagia or odynophagia:  No  Liver Disease, esophageal disease, or known varices:  No  Upper GI Bleeding: No  Snoring:  No  Sleep Apnea:  No  Prior neck surgery or radiation:  No  History of anesthetic difficulties:  No  Family history of anesthetic difficulties:  No  Last oral intake:  12 hours ago  Able to move neck in all directions:  Yes    9/1/22 TTE   The left ventricle is moderately enlarged with eccentric hypertrophy and mildly decreased systolic function.  The estimated ejection fraction is 45%.  There is left ventricular global hypokinesis.  Grade II left ventricular diastolic dysfunction.  There is a 26 mm Evolut transcutaneously-placed aortic bioprosthesis present inside SAVR.  The aortic valve mean gradient is 5 mmHg with a dimensionless index of 0.88.  There is prolapse of the anterior mitral leaflet. Moderate-to-severe mitral regurgitation.  Normal  right ventricular size with normal right ventricular systolic function.  Mild tricuspid regurgitation.  The estimated PA systolic pressure is 34 mmHg.  Normal central venous pressure (3 mmHg).  The ascending aorta is mildly dilated.  Severe left atrial enlargement.

## 2022-11-22 NOTE — HPI
82 y.o. male  with a past medical history of paroxysmal Afib (s/p DCCV in 2017, s/p PVI in 2020), CAD s/p 2v CABG & bioprosthetic aotic valve in 2012, ICM s/p BiV ICD, h/o SSS, HTN, HLD, CVA, prostate cancer, severe AS s/p TAVR (7/26/2022) who presents for Watchman placement.      Pt is hearing impaired and clinically deaf.  A  was in the room and brought by the pt - she was used to communicate with the pt.     Pt says that he has been requiring 1 pRBC transfusion per month due to low hgb.  No evidence of GIB or hematuria.  Has had GI bleeding in the past requiring transfusion.  Has been taking Fe as well.  He was on Xarelto but following his CVA he was taken off of anticoagulation (records reviewed, no history of hemorrhagic stroke). Xarelto and eliquis were prohibitively expensive and he does not wish to resume warfarin.  ICD last interrogated in 9/2022 and showed no treated events since implant, pAF with max duration of 5 hrs 23 min since June 2020.  Pt was seen by Dr. Richardson recently and presents for Watchman placement

## 2022-11-22 NOTE — HOSPITAL COURSE
Pt brought to the cath lab.  ALEXYS performed and MAURICIO clear.  R CFV access obtained.  Under ALEXYS guidance, a 27mm Watchman flex was successfully deployed.  PASS criteria met.  Perclose of the CFV.  Pt tolerated procedure well without any complications and recovered without any issues.  Discharged home.

## 2022-11-22 NOTE — CONSULTS
[facility]  Cardiology  Consult Note    Patient Name: Michael Mclean  MRN: [id number]  Admission Date: 11/22/2022  Hospital Length of Stay: 0 days  Code Status: Prior   Attending Provider: Glenroy Richardson MD   Consulting Provider: Gualberto Garnett MD  Primary Care Physician: Josh Alejo Jr, MD  Principal Problem:<principal problem not specified>    Patient information was obtained from patient and ER records.     Consults  Subjective:     Chief Complaint:  AF      HPI:    82 y.o. male  with a past medical history of paroxysmal Afib (s/p DCCV in 2017, s/p PVI in 2020), CAD s/p 2v CABG & bioprosthetic aotic valve in 2012, ICM s/p BiV ICD, h/o SSS, HTN, HLD, CVA, prostate cancer, severe AS s/p TAVR (7/26/2022) who presents for Watchman placement.      Pt is hearing impaired and clinically deaf.  A  was in the room and brought by the pt - she was used to communicate with the pt.     Pt says that he has been requiring 1 pRBC transfusion per month due to low hgb.  No evidence of GIB or hematuria.  Has had GI bleeding in the past requiring transfusion.  Has been taking Fe as well.  He was on Xarelto but following his CVA he was taken off of anticoagulation (records reviewed, no history of hemorrhagic stroke). Xarelto and eliquis were prohibitively expensive and he does not wish to resume warfarin.  ICD last interrogated in 9/2022 and showed no treated events since implant, pAF with max duration of 5 hrs 23 min since June 2020.  Pt was seen by Dr. Boo recently who recommended Watchman placement.    Dysphagia or odynophagia:  No  Liver Disease, esophageal disease, or known varices:  No  Upper GI Bleeding: No  Snoring:  No  Sleep Apnea:  No  Prior neck surgery or radiation:  No  History of anesthetic difficulties:  No  Family history of anesthetic difficulties:  No  Last oral intake:  12 hours ago  Able to move neck in all directions:  Yes    9/1/22 TTE    The  left ventricle is moderately enlarged with eccentric hypertrophy and mildly decreased systolic function.   The estimated ejection fraction is 45%.   There is left ventricular global hypokinesis.   Grade II left ventricular diastolic dysfunction.   There is a 26 mm Evolut transcutaneously-placed aortic bioprosthesis present inside SAVR.   The aortic valve mean gradient is 5 mmHg with a dimensionless index of 0.88.   There is prolapse of the anterior mitral leaflet. Moderate-to-severe mitral regurgitation.   Normal right ventricular size with normal right ventricular systolic function.   Mild tricuspid regurgitation.   The estimated PA systolic pressure is 34 mmHg.   Normal central venous pressure (3 mmHg).   The ascending aorta is mildly dilated.   Severe left atrial enlargement.            No new subjective & objective note has been filed under this hospital service since the last note was generated.    Assessment and Plan:     Persistent atrial fibrillation  1. ALEXYS for evaluation prior to watchmen placement   -No absolute contraindications of esophageal stricture, tumor, perforation, laceration,or diverticulum and/or active GI bleed  -The risks, benefits & alternatives of the procedure were explained to the patient.   -The risks of transesophageal echo include but are not limited to:  Dental trauma, esophageal trauma/perforation, bleeding, laryngospasm/brochospasm, aspiration, sore throat/hoarseness, & dislodgement of the endotracheal tube/nasogastric tube (where applicable).    -The risks of moderate sedation include hypotension, respiratory depression, arrhythmias, bronchospasm, & death.    -Informed consent was obtained. The patient is agreeable to proceed with the procedure and all questions and concerns addressed.    Case discussed with an attending in echocardiography lab.     Further recommendations per attending addendum          VTE Risk Mitigation (From admission, onward)    None           Thank you for your consult. I will follow-up with patient. Please contact us if you have any additional questions.    Gualberto Garnett MD  Cardiology   [facility] Card[facility]

## 2022-11-22 NOTE — TRANSFER OF CARE
"Anesthesia Transfer of Care Note    Patient: Michael Mclean    Procedure(s) Performed: Procedure(s) (LRB):  Left atrial appendage closure device (N/A)  ECHOCARDIOGRAM, TRANSESOPHAGEAL (N/A)    Patient location: PACU    Anesthesia Type: MAC    Transport from OR: Transported from OR on 6-10 L/min O2 by face mask with adequate spontaneous ventilation    Post pain: adequate analgesia    Post assessment: no apparent anesthetic complications    Post vital signs: stable    Level of consciousness: awake    Nausea/Vomiting: no nausea/vomiting    Complications: none    Transfer of care protocol was followed      Last vitals:   Visit Vitals  BP (!) 165/88 (BP Location: Left arm, Patient Position: Lying)   Pulse 76   Temp 36.8 °C (98.2 °F)   Resp 18   Ht 5' 7" (1.702 m)   Wt 69.9 kg (154 lb)   SpO2 97%   BMI 24.12 kg/m²     "

## 2022-11-22 NOTE — H&P
[facility] - Short Stay Cardiac Unit  Interventional Cardiology  H&P    Patient Name: Michael Mclean  MRN: [id number]  Admission Date: 11/22/2022  Code Status: Prior   Attending Provider: Glenroy Richardson MD   Primary Care Physician: Josh Alejo Jr, MD  Principal Problem:Persistent atrial fibrillation    Patient information was obtained from patient and ER records.     Subjective:     Chief Complaint:  Watchman     HPI:  82 y.o. male  with a past medical history of paroxysmal Afib (s/p DCCV in 2017, s/p PVI in 2020), CAD s/p 2v CABG & bioprosthetic aotic valve in 2012, ICM s/p BiV ICD, h/o SSS, HTN, HLD, CVA, prostate cancer, severe AS s/p TAVR (7/26/2022) who presents for Watchman placement.      Pt is hearing impaired and clinically deaf.  A  was in the room and brought by the pt - she was used to communicate with the pt.     Pt says that he has been requiring 1 pRBC transfusion per month due to low hgb.  No evidence of GIB or hematuria.  Has had GI bleeding in the past requiring transfusion.  Has been taking Fe as well.  He was on Xarelto but following his CVA he was taken off of anticoagulation (records reviewed, no history of hemorrhagic stroke). Xarelto and eliquis were prohibitively expensive and he does not wish to resume warfarin.  ICD last interrogated in 9/2022 and showed no treated events since implant, pAF with max duration of 5 hrs 23 min since June 2020.  Pt was seen by Dr. Richardson recently and presents for Watchman placement      Past Medical History:   Diagnosis Date    Abdominal hernia     Anemia 02/2021    Anticoagulant long-term use     Arthritis     SHOULDER    Cancer     prostate    Cardiomyopathy     Carotid artery stenosis     left    Coronary artery disease     Deaf     Dizziness     Encounter for blood transfusion     Gastritis 3/2/2021    Hyperlipidemia     Hypertension     Mitral regurgitation     Mitral regurgitation     PAD (peripheral  artery disease)     Paroxysmal atrial fibrillation     Stroke 2020    Thyroid disease        Past Surgical History:   Procedure Laterality Date    ABLATION N/A 7/22/2020    Procedure: Ablation, afib;  Surgeon: Tal Laurent MD;  Location: [facility] CATH;  Service: Cardiology;  Laterality: N/A;  needs covid test;needs ALEXYS ([place])    AORTIC VALVE REPLACEMENT      CARDIAC CATH COSURGEON N/A 7/27/2022    Procedure: Cardiac Cath Cosurgeon;  Surgeon: Glenroy Sam MD;  Location: [facility] CATH LAB;  Service: Cardiovascular;  Laterality: N/A;    CARDIAC CATHETERIZATION      3 HEART STENTS    carotid artery stent Left     COLONOSCOPY N/A 3/2/2021    Procedure: COLONOSCOPY;  Surgeon: Emiliana Cardenas MD;  Location: [facility] ENDO;  Service: General;  Laterality: N/A;  4  8:30am   CL/AM/Bd    CORONARY ARTERY BYPASS GRAFT      ESOPHAGOGASTRODUODENOSCOPY N/A 11/10/2020    Procedure: EGD (ESOPHAGOGASTRODUODENOSCOPY);  Surgeon: Renny Prakash MD;  Location: [facility] ENDO;  Service: Endoscopy;  Laterality: N/A;    HERNIA REPAIR      HIP REPLACEMENT ARTHROPLASTY Right 11/6/2020    Procedure: EMELY -ARTHROPLASTY, HIP REPLACEMENT (ENDO HIP);  Surgeon: Lino Thomas MD;  Location: [facility] OR;  Service: Orthopedics;  Laterality: Right;    LEFT HEART CATHETERIZATION N/A 6/29/2022    Procedure: Left heart cath;  Surgeon: Glenroy Richardson MD;  Location: [facility] CATH LAB;  Service: Cardiology;  Laterality: N/A;    PACERMAKER      PROSTATECTOMY      SHOULDER SURGERY      TOE SURGERY      TRANSCATHETER AORTIC VALVE REPLACEMENT (TAVR) N/A 7/27/2022    Procedure: REPLACEMENT, AORTIC VALVE, TRANSCATHETER (TAVR);  Surgeon: Glenroy Richardson MD;  Location: [facility] CATH LAB;  Service: Cardiology;  Laterality: N/A;    TRANSCATHETER AORTIC VALVE REPLACEMENT (TAVR)  7/27/2022    Procedure: REPLACEMENT, AORTIC VALVE, TRANSCATHETER (TAVR);  Surgeon: Glenroy Sam MD;  Location: [facility] CATH LAB;  Service: Cardiovascular;;     TRANSESOPHAGEAL ECHOCARDIOGRAPHY N/A 6/27/2022    Procedure: ECHOCARDIOGRAM, TRANSESOPHAGEAL;  Surgeon: Fracisco Diagnostic Provider;  Location: [facility] EP LAB;  Service: Anesthesiology;  Laterality: N/A;    TREATMENT OF CARDIAC ARRHYTHMIA N/A 8/7/2019    Procedure: Cardioversion or Defibrillation;  Surgeon: Tal Laurent MD;  Location: [facility] CATH;  Service: Cardiology;  Laterality: N/A;       Review of patient's allergies indicates:   Allergen Reactions    Losartan Hives    Valsartan Hives       Facility-Administered Medications Prior to Admission   Medication    leuprolide (6 month) injection 45 mg     PTA Medications   Medication Sig    acetylcarnitin/alpha lipoic ac (ACETYLCARNITIN HCL-A LIPOIC AC) 400-200 mg Cap Take 1 capsule by mouth once daily.    albuterol-ipratropium (DUO-NEB) 2.5 mg-0.5 mg/3 mL nebulizer solution Take 3 mLs by nebulization every 6 (six) hours as needed for Wheezing. Rescue (Patient not taking: Reported on 9/28/2022)    aspirin (ECOTRIN) 81 MG EC tablet Take 1 tablet (81 mg total) by mouth once daily.    bicalutamide (CASODEX) 50 MG Tab TAKE 1 TABLET EVERY DAY    budesonide (PULMICORT) 0.5 mg/2 mL nebulizer solution Take 2 mLs (0.5 mg total) by nebulization 2 (two) times a day. Controller    cholecalciferol, vitamin D3, 5,000 unit capsule Take 5,000 Units by mouth once daily.     clonazePAM (KLONOPIN) 2 MG Tab Take 1 tablet (2 mg total) by mouth daily as needed (anxiety).    cyanocobalamin, vitamin B-12, 1,000 mcg/mL Kit Inject 1 mL as directed every 28 days.    docusate sodium (COLACE) 100 MG capsule Take 100 mg by mouth 2 (two) times daily.    ferrous sulfate (FEOSOL) 325 mg (65 mg iron) Tab tablet Take 1 tablet (325 mg total) by mouth once daily.    KLOR-CON M20 20 mEq tablet Take 20 mEq by mouth once daily.     magnesium oxide (MAG-OX) 400 mg tablet Take 400 mg by mouth once daily.    meclizine (ANTIVERT) 25 mg tablet Take 1 tablet (25 mg total) by mouth 3 (three) times  daily as needed. (Patient not taking: Reported on 11/14/2022)    metoprolol succinate (TOPROL-XL) 100 MG 24 hr tablet Take 50 mg by mouth 2 (two) times a day.    pantoprazole (PROTONIX) 40 MG tablet Take 1 tablet (40 mg total) by mouth once daily.    rosuvastatin (CRESTOR) 20 MG tablet Take 20 mg by mouth every morning.    sotaloL (BETAPACE) 80 MG tablet Take 0.5 tablets (40 mg total) by mouth 2 (two) times daily.    torsemide (DEMADEX) 20 MG Tab Take 20 mg by mouth as needed.    vitamin D3-folic acid 5,000 unit- 1 mg Tab Take 1 tablet by mouth once daily.    vitamin D3-vitamin K2, MK4, (K2 PLUS D3) 1,000-100 unit-mcg Tab Take by mouth.     Family History       Problem Relation (Age of Onset)    Heart attack Mother    Heart disease Mother          Tobacco Use    Smoking status: Never    Smokeless tobacco: Never   Substance and Sexual Activity    Alcohol use: Yes     Alcohol/week: 1.0 standard drink     Types: 1 Glasses of wine per week     Comment: occassionally maybe 3 wk    Drug use: Never    Sexual activity: Not on file     Comment:      Review of Systems   Constitutional: Negative for fever and malaise/fatigue.   HENT:  Negative for congestion and sore throat.    Eyes:  Negative for blurred vision, vision loss in left eye and vision loss in right eye.   Cardiovascular:  Negative for chest pain, dyspnea on exertion, irregular heartbeat, leg swelling, near-syncope, palpitations and syncope.   Respiratory:  Negative for shortness of breath and wheezing.    Endocrine: Negative.    Hematologic/Lymphatic: Negative.    Skin: Negative.    Musculoskeletal:  Negative for arthritis, back pain, joint pain and myalgias.   Gastrointestinal:  Negative for abdominal pain, hematemesis, hematochezia and melena.   Genitourinary: Negative.    Neurological:  Negative for light-headedness and loss of balance.   Psychiatric/Behavioral: Negative.     Objective:     Vital Signs (Most Recent):    Vital Signs (24h  Range):           There is no height or weight on file to calculate BMI.            No intake or output data in the 24 hours ending 11/22/22 1144    Lines/Drains/Airways       None                   Physical Exam  Vitals and nursing note reviewed.   Constitutional:       Appearance: Normal appearance. He is normal weight. He is not toxic-appearing.   HENT:      Head: Normocephalic and atraumatic.   Eyes:      General: No scleral icterus.     Extraocular Movements: Extraocular movements intact.   Neck:      Vascular: No carotid bruit.   Cardiovascular:      Rate and Rhythm: Normal rate and regular rhythm.      Pulses: Normal pulses.      Heart sounds: Normal heart sounds. No murmur heard.    No gallop.   Pulmonary:      Effort: Pulmonary effort is normal. No respiratory distress.      Breath sounds: Normal breath sounds. No wheezing or rales.   Abdominal:      General: Abdomen is flat. Bowel sounds are normal.      Palpations: Abdomen is soft. There is no mass.   Musculoskeletal:      Cervical back: Normal range of motion.      Right lower leg: No edema.      Left lower leg: No edema.   Skin:     General: Skin is warm and dry.      Capillary Refill: Capillary refill takes less than 2 seconds.      Findings: No rash.   Neurological:      General: No focal deficit present.      Mental Status: He is alert and oriented to person, place, and time. Mental status is at baseline.       Significant Labs: All pertinent lab results from the last 24 hours have been reviewed.    Significant Imaging: Echocardiogram: Transthoracic echo (TTE) complete (Cupid Only):   Results for orders placed or performed during the hospital encounter of 09/01/22   Echo   Result Value Ref Range    BSA 1.83 m2    TDI SEPTAL 0.05 m/s    LV LATERAL E/E' RATIO 10.60 m/s    LV SEPTAL E/E' RATIO 21.20 m/s    LA WIDTH 5.87 cm    TDI LATERAL 0.10 m/s    LVIDd 5.80 3.5 - 6.0 cm    IVS 1.20 (A) 0.6 - 1.1 cm    Posterior Wall 1.20 0.6 - 1.1 cm    LVIDs 5.60  "(A) 2.1 - 4.0 cm    FS 3 28 - 44 %    LA volume 154.36 cm3    Sinus 3.67 cm    STJ 3.19 cm    Ascending aorta 4.20 cm    LV mass 297.00 g    LA size 5.34 cm    RVDD 3.75 cm    TAPSE 1.49 cm    RV S' 5.91 cm/s    Left Ventricle Relative Wall Thickness 0.41 cm    AV mean gradient 5 mmHg    AV valve area 4.18 cm2    AV Velocity Ratio 0.66     AV index (prosthetic) 0.88     MV valve area p 1/2 method 4.66 cm2    E/A ratio 1.77     Mean e' 0.08 m/s    E wave deceleration time 162.91 msec    MV "A" wave duration 5.71 msec    Pulm vein S/D ratio 1.07     LVOT diameter 2.46 cm    LVOT area 4.8 cm2    LVOT peak mehdi 0.91 m/s    LVOT peak VTI 18.48 cm    Ao peak mehdi 1.38 m/s    Ao VTI 21.00 cm    Mr max mehdi 0.05 m/s    LVOT stroke volume 87.79 cm3    AV peak gradient 8 mmHg    E/E' ratio 14.13 m/s    MV Peak E Mehdi 1.06 m/s    TR Max Mehdi 2.77 m/s    MV stenosis pressure 1/2 time 47.24 ms    MV Peak A Mehdi 0.60 m/s    PV Peak S Mehdi 0.87 m/s    PV Peak D Mehdi 0.81 m/s    LV Systolic Volume 153.88 mL    LV Systolic Volume Index 84.5 mL/m2    LV Diastolic Volume 179.27 mL    LV Diastolic Volume Index 98.50 mL/m2    LA Volume Index 84.8 mL/m2    LV Mass Index 163 g/m2    RA Major Axis 5.01 cm    Left Atrium Minor Axis 5.70 cm    Left Atrium Major Axis 5.89 cm    Triscuspid Valve Regurgitation Peak Gradient 31 mmHg    LA Volume Index (Mod) 85.7 mL/m2    LA volume (mod) 156.00 cm3    RA Width 4.85 cm    Right Atrial Pressure (from IVC) 3 mmHg    EF 45 %    TV rest pulmonary artery pressure 34 mmHg    Narrative    · The left ventricle is moderately enlarged with eccentric hypertrophy and   mildly decreased systolic function.  · The estimated ejection fraction is 45%.  · There is left ventricular global hypokinesis.  · Grade II left ventricular diastolic dysfunction.  · There is a 26 mm Evolut transcutaneously-placed aortic bioprosthesis   present inside SAVR.  · The aortic valve mean gradient is 5 mmHg with a dimensionless index of "   0.88.  · There is prolapse of the anterior mitral leaflet. Moderate-to-severe   mitral regurgitation.  · Normal right ventricular size with normal right ventricular systolic   function.  · Mild tricuspid regurgitation.  · The estimated PA systolic pressure is 34 mmHg.  · Normal central venous pressure (3 mmHg).  · The ascending aorta is mildly dilated.  · Severe left atrial enlargement.        Assessment and Plan:     * Persistent atrial fibrillation  Presents for ALEXYS guided Watchman placement with Dr. Richardson  - keep NPO  - to cath lab with Dr. Richardson for 31mm Watchman placement  - access: R CFV          VTE Risk Mitigation (From admission, onward)    None          Devan Chung MD  Interventional Cardiology   [facility] - Short Stay Cardiac Unit

## 2022-11-22 NOTE — Clinical Note
The catheter was repositioned into the MAURICIO. An angiography was performed of the MAURICIO. done, please fax to patient's DME company thank you

## 2022-11-22 NOTE — Clinical Note
The sheath was inserted through the larger sheath in the right femoral vein. And tip was repositioned to RA. (REINSERTED)

## 2022-11-22 NOTE — ASSESSMENT & PLAN NOTE
Presents for ALEXYS guided Watchman placement with Dr. Richardson  - keep NPO  - to cath lab with Dr. Richardson for 31mm Watchman placement  - access: R SINTIAV

## 2022-11-23 LAB
POC ACTIVATED CLOTTING TIME K: 138 SEC (ref 74–137)
POC ACTIVATED CLOTTING TIME K: 248 SEC (ref 74–137)
POC ACTIVATED CLOTTING TIME K: 294 SEC (ref 74–137)
SAMPLE: ABNORMAL

## 2022-11-23 NOTE — PROGRESS NOTES
Pt IVs removed. No bleeding or redness noted. Pt and family educated regarding follow up appointments, post-surgical care, and dental antibiotic card. Pt and family verbalized understanding of education.  at bedside assisting with translation

## 2022-11-23 NOTE — ANESTHESIA POSTPROCEDURE EVALUATION
Anesthesia Post Evaluation    Patient: Michael Mclean    Procedure(s) Performed: Procedure(s) (LRB):  Left atrial appendage closure device (N/A)  ECHOCARDIOGRAM, TRANSESOPHAGEAL (N/A)    Final Anesthesia Type: general      Patient location during evaluation: PACU  Patient participation: Yes- Able to Participate  Level of consciousness: awake and alert  Post-procedure vital signs: reviewed and stable  Pain management: adequate  Airway patency: patent    PONV status at discharge: No PONV  Anesthetic complications: no      Cardiovascular status: hemodynamically stable  Respiratory status: spontaneous ventilation  Follow-up not needed.          Vitals Value Taken Time   /78 11/22/22 1916   Temp 36.4 °C (97.6 °F) 11/22/22 1855   Pulse 70 11/22/22 1916   Resp 14 11/22/22 1916   SpO2 99 % 11/22/22 1916   Vitals shown include unvalidated device data.      No case tracking events are documented in the log.      Pain/Maura Score: Maura Score: 10 (11/22/2022  5:54 PM)

## 2022-11-23 NOTE — NURSING TRANSFER
Nursing Transfer Note      11/22/2022     Reason patient is being transferred: Meets criteria    Transfer To: home    Transfer via wheelchair    Transfer with discharge paperwork    Transported by Transport    Medicines sent: None    Any special needs or follow-up needed: Discharge instructions reviewed with family and pt    Chart send with patient: No    Notified: daughter    Patient reassessed at: 11/22

## 2022-11-28 ENCOUNTER — PATIENT MESSAGE (OUTPATIENT)
Dept: CARDIOLOGY | Facility: CLINIC | Age: 82
End: 2022-11-28
Payer: MEDICARE

## 2022-11-28 ENCOUNTER — EDUCATION (OUTPATIENT)
Dept: CARDIOLOGY | Facility: CLINIC | Age: 82
End: 2022-11-28
Payer: MEDICARE

## 2022-11-28 DIAGNOSIS — I48.11 LONGSTANDING PERSISTENT ATRIAL FIBRILLATION: Primary | ICD-10-CM

## 2022-11-28 NOTE — PROGRESS NOTES
You have been scheduled for a procedure in the echo lab on Thursday, January 5, 2023.     Please report to the Cardiology Waiting Area on the Third floor of the hospital and check in at 10 AM.     You will then be taken to the SSCU (Short Stay Cardiac Unit) and prepared for your procedure. Please be aware that this is not the time of your procedure but the time you are to arrive. The procedures are scheduled on an hourly basis; however, emergency cases take precedence over all other cases.     You may not have anything to eat or drink after midnight the night before your test. You may take your regular morning medications with water.    The procedure will take 1-2 hours to perform. After the procedure, you will return to SSCU on the third floor of the hospital. Your family may remain in the room with you during this time.     You will be discharged home that same afternoon. You must have someone to drive you home.  Your doctor will determine, based on your progress, the time of your discharge. The results of your procedure will be discussed with you before you are discharged. Any further testing or procedures will be scheduled for you either before you leave or you will be called with these appointments.       If you should have any questions, concerns, or need to change the date of your procedure, please call Robyn Bush RN      Special Instructions:  none        THE ABOVE INSTRUCTIONS WERE GIVEN TO THE PATIENT VERBALLY AND THEY VERBALIZED UNDERSTANDING.  THEY DO NOT REQUIRE ANY SPECIAL NEEDS AND DO NOT HAVE ANY LEARNING BARRIERS.          Directions for Reporting to Cardiology Waiting Area in the Hospital  If you park in the Parking Garage:  Take elevators to the1st floor of the parking garage.  Continue past the gift shop, coffee shop, and piano.  Take a right and go to the gold elevators. (Elevator B)  Take the elevator to the 3rd floor.  Follow the arrow on the sign on the wall that says Cath Lab  Registration/EP Lab Registration.  Follow the long hallway all the way around until you come to a big open area.  This is the registration area.  Check in at Reception Desk.    OR    If family is dropping you off:  Have them drop you off at the front of the Hospital under the green overhang.  Enter through the doors and take a right.  Take the E elevators to the 3rd floor Cardiology Waiting Area.  Check in at the Reception Desk in the waiting room.

## 2022-12-01 ENCOUNTER — LAB VISIT (OUTPATIENT)
Dept: LAB | Facility: HOSPITAL | Age: 82
End: 2022-12-01
Attending: INTERNAL MEDICINE
Payer: MEDICARE

## 2022-12-01 DIAGNOSIS — R97.21 RISING PSA FOLLOWING TREATMENT FOR MALIGNANT NEOPLASM OF PROSTATE: ICD-10-CM

## 2022-12-01 PROBLEM — J06.9 URI (UPPER RESPIRATORY INFECTION): Status: RESOLVED | Noted: 2022-09-12 | Resolved: 2022-12-01

## 2022-12-01 PROBLEM — H61.20 CERUMEN IMPACTION: Status: RESOLVED | Noted: 2021-06-10 | Resolved: 2022-12-01

## 2022-12-01 LAB
ALBUMIN SERPL BCP-MCNC: 3.7 G/DL (ref 3.5–5.2)
ALP SERPL-CCNC: 63 U/L (ref 55–135)
ALT SERPL W/O P-5'-P-CCNC: 20 U/L (ref 10–44)
ANION GAP SERPL CALC-SCNC: 4 MMOL/L (ref 8–16)
AST SERPL-CCNC: 27 U/L (ref 10–40)
BASOPHILS # BLD AUTO: 0.06 K/UL (ref 0–0.2)
BASOPHILS NFR BLD: 0.8 % (ref 0–1.9)
BILIRUB SERPL-MCNC: 0.5 MG/DL (ref 0.1–1)
BUN SERPL-MCNC: 22 MG/DL (ref 8–23)
CALCIUM SERPL-MCNC: 9.2 MG/DL (ref 8.7–10.5)
CHLORIDE SERPL-SCNC: 108 MMOL/L (ref 95–110)
CO2 SERPL-SCNC: 29 MMOL/L (ref 23–29)
COMPLEXED PSA SERPL-MCNC: 7.4 NG/ML (ref 0–4)
CREAT SERPL-MCNC: 1.1 MG/DL (ref 0.5–1.4)
DIFFERENTIAL METHOD: ABNORMAL
EOSINOPHIL # BLD AUTO: 0.5 K/UL (ref 0–0.5)
EOSINOPHIL NFR BLD: 6.6 % (ref 0–8)
ERYTHROCYTE [DISTWIDTH] IN BLOOD BY AUTOMATED COUNT: 13.9 % (ref 11.5–14.5)
EST. GFR  (NO RACE VARIABLE): >60 ML/MIN/1.73 M^2
GLUCOSE SERPL-MCNC: 106 MG/DL (ref 70–110)
HCT VFR BLD AUTO: 40.3 % (ref 40–54)
HGB BLD-MCNC: 13.6 G/DL (ref 14–18)
IMM GRANULOCYTES # BLD AUTO: 0.01 K/UL (ref 0–0.04)
IMM GRANULOCYTES NFR BLD AUTO: 0.1 % (ref 0–0.5)
LYMPHOCYTES # BLD AUTO: 2.3 K/UL (ref 1–4.8)
LYMPHOCYTES NFR BLD: 28.5 % (ref 18–48)
MCH RBC QN AUTO: 31.7 PG (ref 27–31)
MCHC RBC AUTO-ENTMCNC: 33.7 G/DL (ref 32–36)
MCV RBC AUTO: 94 FL (ref 82–98)
MONOCYTES # BLD AUTO: 1 K/UL (ref 0.3–1)
MONOCYTES NFR BLD: 12 % (ref 4–15)
NEUTROPHILS # BLD AUTO: 4.1 K/UL (ref 1.8–7.7)
NEUTROPHILS NFR BLD: 52 % (ref 38–73)
NRBC BLD-RTO: 0 /100 WBC
PLATELET # BLD AUTO: 161 K/UL (ref 150–450)
PMV BLD AUTO: 10.5 FL (ref 9.2–12.9)
POTASSIUM SERPL-SCNC: 4.5 MMOL/L (ref 3.5–5.1)
PROT SERPL-MCNC: 7.4 G/DL (ref 6–8.4)
RBC # BLD AUTO: 4.29 M/UL (ref 4.6–6.2)
SODIUM SERPL-SCNC: 141 MMOL/L (ref 136–145)
WBC # BLD AUTO: 7.92 K/UL (ref 3.9–12.7)

## 2022-12-01 PROCEDURE — 84153 ASSAY OF PSA TOTAL: CPT | Performed by: INTERNAL MEDICINE

## 2022-12-01 PROCEDURE — 85025 COMPLETE CBC W/AUTO DIFF WBC: CPT | Performed by: INTERNAL MEDICINE

## 2022-12-01 PROCEDURE — 36415 COLL VENOUS BLD VENIPUNCTURE: CPT | Performed by: INTERNAL MEDICINE

## 2022-12-01 PROCEDURE — 80053 COMPREHEN METABOLIC PANEL: CPT | Performed by: INTERNAL MEDICINE

## 2023-01-05 ENCOUNTER — HOSPITAL ENCOUNTER (OUTPATIENT)
Facility: HOSPITAL | Age: 83
Discharge: HOME | End: 2023-01-05
Attending: INTERNAL MEDICINE | Admitting: INTERNAL MEDICINE
Payer: MEDICARE

## 2023-01-05 ENCOUNTER — HOSPITAL ENCOUNTER (OUTPATIENT)
Dept: CARDIOLOGY | Facility: HOSPITAL | Age: 83
Discharge: HOME | End: 2023-01-05
Attending: INTERNAL MEDICINE | Admitting: INTERNAL MEDICINE
Payer: MEDICARE

## 2023-01-05 ENCOUNTER — ANESTHESIA EVENT (OUTPATIENT)
Dept: MEDSURG UNIT | Facility: HOSPITAL | Age: 83
End: 2023-01-05
Payer: MEDICARE

## 2023-01-05 ENCOUNTER — ANESTHESIA (OUTPATIENT)
Dept: MEDSURG UNIT | Facility: HOSPITAL | Age: 83
End: 2023-01-05
Payer: MEDICARE

## 2023-01-05 ENCOUNTER — OFFICE VISIT (OUTPATIENT)
Dept: CARDIOLOGY | Facility: CLINIC | Age: 83
End: 2023-01-05
Payer: MEDICARE

## 2023-01-05 VITALS
HEIGHT: 67 IN | SYSTOLIC BLOOD PRESSURE: 140 MMHG | RESPIRATION RATE: 29 BRPM | BODY MASS INDEX: 24.8 KG/M2 | DIASTOLIC BLOOD PRESSURE: 72 MMHG | HEART RATE: 71 BPM | OXYGEN SATURATION: 98 % | TEMPERATURE: 97 F | WEIGHT: 158 LBS

## 2023-01-05 VITALS — DIASTOLIC BLOOD PRESSURE: 72 MMHG | SYSTOLIC BLOOD PRESSURE: 137 MMHG | OXYGEN SATURATION: 97 % | HEART RATE: 71 BPM

## 2023-01-05 VITALS
HEIGHT: 67 IN | SYSTOLIC BLOOD PRESSURE: 169 MMHG | BODY MASS INDEX: 24.8 KG/M2 | DIASTOLIC BLOOD PRESSURE: 81 MMHG | WEIGHT: 158 LBS

## 2023-01-05 DIAGNOSIS — I48.19 PERSISTENT ATRIAL FIBRILLATION: ICD-10-CM

## 2023-01-05 DIAGNOSIS — I48.19 PERSISTENT ATRIAL FIBRILLATION: Primary | ICD-10-CM

## 2023-01-05 DIAGNOSIS — Z95.818 PRESENCE OF WATCHMAN LEFT ATRIAL APPENDAGE CLOSURE DEVICE: ICD-10-CM

## 2023-01-05 DIAGNOSIS — Z95.2 S/P TAVR (TRANSCATHETER AORTIC VALVE REPLACEMENT): ICD-10-CM

## 2023-01-05 DIAGNOSIS — I50.32 CHRONIC DIASTOLIC HEART FAILURE: ICD-10-CM

## 2023-01-05 DIAGNOSIS — I70.0 AORTIC ATHEROSCLEROSIS: ICD-10-CM

## 2023-01-05 DIAGNOSIS — I48.11 LONGSTANDING PERSISTENT ATRIAL FIBRILLATION: ICD-10-CM

## 2023-01-05 LAB
ASCENDING AORTA: 3.9 CM
BSA FOR ECHO PROCEDURE: 1.84 M2
EJECTION FRACTION: 55 %

## 2023-01-05 PROCEDURE — 37000009 HC ANESTHESIA EA ADD 15 MINS

## 2023-01-05 PROCEDURE — D9220A PRA ANESTHESIA: ICD-10-PCS | Mod: ANES,,, | Performed by: ANESTHESIOLOGY

## 2023-01-05 PROCEDURE — D9220A PRA ANESTHESIA: Mod: CRNA,,, | Performed by: NURSE ANESTHETIST, CERTIFIED REGISTERED

## 2023-01-05 PROCEDURE — 63600175 PHARM REV CODE 636 W HCPCS: Performed by: NURSE ANESTHETIST, CERTIFIED REGISTERED

## 2023-01-05 PROCEDURE — D9220A PRA ANESTHESIA: Mod: ANES,,, | Performed by: ANESTHESIOLOGY

## 2023-01-05 PROCEDURE — 37000008 HC ANESTHESIA 1ST 15 MINUTES

## 2023-01-05 PROCEDURE — 93325 TRANSESOPHAGEAL ECHO (TEE) (CUPID ONLY): ICD-10-PCS | Mod: 26,,, | Performed by: INTERNAL MEDICINE

## 2023-01-05 PROCEDURE — 3075F PR MOST RECENT SYSTOLIC BLOOD PRESS GE 130-139MM HG: ICD-10-PCS | Mod: CPTII,S$GLB,, | Performed by: PHYSICIAN ASSISTANT

## 2023-01-05 PROCEDURE — 3078F PR MOST RECENT DIASTOLIC BLOOD PRESSURE < 80 MM HG: ICD-10-PCS | Mod: CPTII,S$GLB,, | Performed by: PHYSICIAN ASSISTANT

## 2023-01-05 PROCEDURE — 3078F DIAST BP <80 MM HG: CPT | Mod: CPTII,S$GLB,, | Performed by: PHYSICIAN ASSISTANT

## 2023-01-05 PROCEDURE — 99999 PR PBB SHADOW E&M-EST. PATIENT-LVL I: ICD-10-PCS | Mod: PBBFAC,,, | Performed by: PHYSICIAN ASSISTANT

## 2023-01-05 PROCEDURE — 93312 ECHO TRANSESOPHAGEAL: CPT | Mod: 26,,, | Performed by: INTERNAL MEDICINE

## 2023-01-05 PROCEDURE — 25000003 PHARM REV CODE 250: Performed by: NURSE ANESTHETIST, CERTIFIED REGISTERED

## 2023-01-05 PROCEDURE — 99214 OFFICE O/P EST MOD 30 MIN: CPT | Mod: S$GLB,,, | Performed by: PHYSICIAN ASSISTANT

## 2023-01-05 PROCEDURE — 93325 DOPPLER ECHO COLOR FLOW MAPG: CPT

## 2023-01-05 PROCEDURE — D9220A PRA ANESTHESIA: ICD-10-PCS | Mod: CRNA,,, | Performed by: NURSE ANESTHETIST, CERTIFIED REGISTERED

## 2023-01-05 PROCEDURE — 99214 PR OFFICE/OUTPT VISIT, EST, LEVL IV, 30-39 MIN: ICD-10-PCS | Mod: S$GLB,,, | Performed by: PHYSICIAN ASSISTANT

## 2023-01-05 PROCEDURE — 93325 DOPPLER ECHO COLOR FLOW MAPG: CPT | Mod: 26,,, | Performed by: INTERNAL MEDICINE

## 2023-01-05 PROCEDURE — 3075F SYST BP GE 130 - 139MM HG: CPT | Mod: CPTII,S$GLB,, | Performed by: PHYSICIAN ASSISTANT

## 2023-01-05 PROCEDURE — 93312 TRANSESOPHAGEAL ECHO (TEE) (CUPID ONLY): ICD-10-PCS | Mod: 26,,, | Performed by: INTERNAL MEDICINE

## 2023-01-05 PROCEDURE — 99999 PR PBB SHADOW E&M-EST. PATIENT-LVL I: CPT | Mod: PBBFAC,,, | Performed by: PHYSICIAN ASSISTANT

## 2023-01-05 PROCEDURE — 93320 TRANSESOPHAGEAL ECHO (TEE) (CUPID ONLY): ICD-10-PCS | Mod: 26,,, | Performed by: INTERNAL MEDICINE

## 2023-01-05 PROCEDURE — 93320 DOPPLER ECHO COMPLETE: CPT | Mod: 26,,, | Performed by: INTERNAL MEDICINE

## 2023-01-05 RX ORDER — ONDANSETRON 2 MG/ML
4 INJECTION INTRAMUSCULAR; INTRAVENOUS ONCE AS NEEDED
Status: CANCELLED | OUTPATIENT
Start: 2023-01-05 | End: 2034-06-03

## 2023-01-05 RX ORDER — HYDROMORPHONE HYDROCHLORIDE 1 MG/ML
0.2 INJECTION, SOLUTION INTRAMUSCULAR; INTRAVENOUS; SUBCUTANEOUS EVERY 5 MIN PRN
Status: CANCELLED | OUTPATIENT
Start: 2023-01-05

## 2023-01-05 RX ORDER — PROPOFOL 10 MG/ML
VIAL (ML) INTRAVENOUS CONTINUOUS PRN
Status: DISCONTINUED | OUTPATIENT
Start: 2023-01-05 | End: 2023-01-05

## 2023-01-05 RX ORDER — SODIUM CHLORIDE 0.9 % (FLUSH) 0.9 %
10 SYRINGE (ML) INJECTION
Status: DISCONTINUED | OUTPATIENT
Start: 2023-01-05 | End: 2023-01-05 | Stop reason: HOSPADM

## 2023-01-05 RX ORDER — PROPOFOL 10 MG/ML
VIAL (ML) INTRAVENOUS
Status: DISCONTINUED | OUTPATIENT
Start: 2023-01-05 | End: 2023-01-05

## 2023-01-05 RX ORDER — DIPHENHYDRAMINE HYDROCHLORIDE 50 MG/ML
25 INJECTION INTRAMUSCULAR; INTRAVENOUS EVERY 6 HOURS PRN
Status: CANCELLED | OUTPATIENT
Start: 2023-01-05

## 2023-01-05 RX ORDER — PHENYLEPHRINE HYDROCHLORIDE 10 MG/ML
INJECTION INTRAVENOUS
Status: DISCONTINUED | OUTPATIENT
Start: 2023-01-05 | End: 2023-01-05

## 2023-01-05 RX ORDER — FENTANYL CITRATE 50 UG/ML
25 INJECTION, SOLUTION INTRAMUSCULAR; INTRAVENOUS EVERY 5 MIN PRN
Status: CANCELLED | OUTPATIENT
Start: 2023-01-05

## 2023-01-05 RX ORDER — CLOPIDOGREL BISULFATE 75 MG/1
75 TABLET ORAL DAILY
Qty: 30 TABLET | Refills: 5 | Status: SHIPPED | OUTPATIENT
Start: 2023-01-05 | End: 2023-10-11

## 2023-01-05 RX ORDER — LIDOCAINE HYDROCHLORIDE 20 MG/ML
INJECTION INTRAVENOUS
Status: DISCONTINUED | OUTPATIENT
Start: 2023-01-05 | End: 2023-01-05

## 2023-01-05 RX ORDER — FENTANYL CITRATE 50 UG/ML
INJECTION, SOLUTION INTRAMUSCULAR; INTRAVENOUS
Status: DISCONTINUED | OUTPATIENT
Start: 2023-01-05 | End: 2023-01-05

## 2023-01-05 RX ADMIN — PROPOFOL 40 MG: 10 INJECTION, EMULSION INTRAVENOUS at 11:01

## 2023-01-05 RX ADMIN — PHENYLEPHRINE HYDROCHLORIDE 100 MCG: 10 INJECTION INTRAVENOUS at 11:01

## 2023-01-05 RX ADMIN — SODIUM CHLORIDE: 9 INJECTION, SOLUTION INTRAVENOUS at 10:01

## 2023-01-05 RX ADMIN — Medication 100 MCG/KG/MIN: at 11:01

## 2023-01-05 RX ADMIN — LIDOCAINE HYDROCHLORIDE 50 MG: 20 INJECTION INTRAVENOUS at 11:01

## 2023-01-05 RX ADMIN — FENTANYL CITRATE 50 MCG: 0.05 INJECTION, SOLUTION INTRAMUSCULAR; INTRAVENOUS at 11:01

## 2023-01-05 NOTE — PLAN OF CARE
Received to [facility] in recovery. Awake and alert.  is at bedside. Introductions made. Vss. Explained poc to patient. Understanding acknowledged. Family notified patient is back in recovery. Sr up x 2 and bll.

## 2023-01-05 NOTE — ANESTHESIA PREPROCEDURE EVALUATION
01/05/2023  Michael Mclean is a 82 y.o., male.  Patient Active Problem List   Diagnosis    Embolic stroke involving left middle cerebral artery    Deaf, nonspeaking    Essential hypertension    Chronic diastolic heart failure    Aphasia    Persistent atrial fibrillation    Ventricular fibrillation    Weakness    Symptomatic anemia    Closed displaced fracture of right femoral neck    CAD (coronary artery disease)    Iron deficiency anemia    Prostate cancer    Elevated PSA    Anemia    Gastritis    Reflux esophagitis    Diverticulosis    GI bleed    Edema    VHD (valvular heart disease)    MELISSA (acute kidney injury)    Hyperlipidemia    Dizziness    H. pylori infection    Fatigue    S/P AVR    S/P CABG (coronary artery bypass graft)    AICD (automatic cardioverter/defibrillator) present    S/P TAVR (transcatheter aortic valve replacement)    Aortic atherosclerosis    Presence of Watchman left atrial appendage closure device           Pre-op Assessment          Review of Systems      Physical Exam    Airway:  No airway management difficulties anticipated  Dental:No active dental issues noted  Chest/Lungs:  Clear to auscultation    Heart:  Rate: Normal  Rhythm: Regular Rhythm  Sounds: Normal        Anesthesia Plan  Type of Anesthesia, risks & benefits discussed:    Anesthesia Type: Gen Natural Airway  Informed Consent: Informed consent signed with the Patient and all parties understand the risks and agree with anesthesia plan.  All questions answered.   ASA Score: 3  Anesthesia Plan Notes: Chart reviewed. Patient seen and examined. Anesthesia plan discussed and questions answered. E-consent signed. Charlie Cordova MD    Ready For Surgery From Anesthesia Perspective.     .

## 2023-01-05 NOTE — TRANSFER OF CARE
"Anesthesia Transfer of Care Note    Patient: Michael Mclean    Procedure(s) Performed: Procedure(s) (LRB):  ECHOCARDIOGRAM, TRANSESOPHAGEAL (N/A)    Patient location: PACU    Anesthesia Type: general    Transport from OR: Transported from OR on 2-3 L/min O2 by NC with adequate spontaneous ventilation    Post pain: adequate analgesia    Post assessment: no apparent anesthetic complications and tolerated procedure well    Post vital signs: stable    Level of consciousness: awake, alert and oriented    Nausea/Vomiting: no nausea/vomiting    Complications: none    Transfer of care protocol was followed      Last vitals:   Visit Vitals  /59   Pulse 72   Temp 36.5 °C (97.7 °F) (Temporal)   Resp 20   Ht 5' 7" (1.702 m)   Wt 71.7 kg (158 lb)   SpO2 99%   BMI 24.75 kg/m²     "

## 2023-01-05 NOTE — HPI
82 y.o. male  with a past medical history of paroxysmal Afib (s/p DCCV in 2017, s/p PVI in 2020), s/p Watchman 11/22/22, CAD s/p 2v CABG & bioprosthetic aotic valve in 2012, ICM s/p BiV ICD, h/o SSS, MVP with mod-severe MR, HTN, HLD, CVA, prostate cancer, and severe AS s/p TAVR (7/26/2022).      Pt is hearing impaired and clinically deaf.  A  was in the room and brought by the pt - she was used to communicate with the pt.     Pt has been requiring 1 pRBC transfusion per month due to low hgb.  No evidence of GIB or hematuria.  Has had GI bleeding in the past requiring transfusion.  Has been taking Fe as well.  Xarelto and eliquis were prohibitively expensive and he does not wish to resume warfarin.  ICD last interrogations show pAF.  Pt is following with Dr. Richardson who recommended Watchman placement. He underwent successful LAAO with 27mm Watchman FLX device on 11/22/22. He was discharged on Plavix alone since he is intolerant to anticoagulation.      ALEXYS 11/22/22  3D MAURICIO OS measurement 2.3 x 2.7 cm  ALEXYS for LAAO. No thrombus is present in the appendage. S/p Watchman 27mm FLX, 20% compression. Presence of anteromedical shelf with part of the device underneath the shelf. Small area of the smooth part of the appendage does not appear to be covered at 0 degrees. No peridevice leak and no pericardial effusion.  Post procedure, there is an presence of 2 iatrogenic interatrial septal defect present.  The estimated ejection fraction is 50%.  The left ventricle is normal in size with low normal systolic function.  Normal right ventricular size with normal right ventricular systolic function.  There is a 26 mm Evolut transcutaneously-placed aortic bioprosthesis present in a prior 25 mm Perimount bioproshetic valve. There is no paravalvular leak.  There is prolapse of the [id number]. There is posteriorly directed moderate to severe eccentric mitral regurgitation visually. Quantification not  perform.  Mild tricuspid regurgitation.  The ascending aorta is mildly dilated.  Grade 1 plaque present.    Dysphagia or odynophagia:  No  Liver Disease, esophageal disease, or known varices:  No  Upper GI Bleeding: No  Snoring:  Yes  Sleep Apnea:  No  Prior neck surgery or radiation:  No  History of anesthetic difficulties:  No  Family history of anesthetic difficulties:  No  Last oral intake:  12 hours ago  Able to move neck in all directions:  Yes

## 2023-01-05 NOTE — ASSESSMENT & PLAN NOTE
Successful 26 mm Evolut TAVR inside of a failing 25 mm Perimount bioprosthetic valve. Follow up as scheduled.

## 2023-01-05 NOTE — PLAN OF CARE
Reviewed discharge material with patient and family with  present. Understanding noted and copy of paperwork given.

## 2023-01-05 NOTE — PROGRESS NOTES
Subjective:     Referring Physician:    MANUEL  Michael Mclean is a 82 y.o. male with persistent Afib (s/p DCCV in 2017, s/p PVI in 2020), CAD s/p 2v CABG & bioprosthetic aotic valve in 2012 s/p 26 mm Evolut valve in valve TAVR inside of a failing 25 mm Perimount bioprosthetic valve, s/p ICD, h/o SSS, HTN, HLD, CVA and prostate cancer who presents for post op following Watchman.     Patient underwent Successful deployment of a 27 mm watchman device in a patient with paroxysmal atrial fibrillation was intolerant to anticoagulation. The edge of his MAURICIO was not trabeculated and therefore left uncovered. This area is unlikely to produce clots.     Pt is hearing impaired and clinically deaf.  A  was in the room and brought by the pt - she was used to communicate with the pt.    Today he is feeling well overall. He is without complaints. She was continued on ASA alone post procedure.       NYHA: I CCS: 0    Review of Systems   Constitutional: Negative for chills and fever.   HENT:  Negative for sore throat.    Eyes:  Negative for blurred vision.   Cardiovascular:  Negative for chest pain, claudication, cyanosis, dyspnea on exertion, irregular heartbeat, leg swelling, near-syncope, orthopnea, palpitations, paroxysmal nocturnal dyspnea and syncope.   Respiratory:  Negative for cough and sputum production.    Hematologic/Lymphatic: Does not bruise/bleed easily.   Skin:  Negative for itching, rash and suspicious lesions.   Musculoskeletal:  Negative for falls.   Gastrointestinal:  Negative for abdominal pain and change in bowel habit.   Genitourinary:  Negative for dysuria.   Neurological:  Negative for disturbances in coordination, dizziness and loss of balance.   Psychiatric/Behavioral:  Negative for altered mental status.         Past Medical History:   Diagnosis Date    Abdominal hernia     Anemia 02/2021    Anticoagulant long-term use     Arthritis     SHOULDER    Cancer     prostate     Cardiomyopathy     Carotid artery stenosis     left    Coronary artery disease     Deaf     Dizziness     Encounter for blood transfusion     Gastritis 3/2/2021    Hyperlipidemia     Hypertension     Mitral regurgitation     Mitral regurgitation     PAD (peripheral artery disease)     Paroxysmal atrial fibrillation     Stroke 2020    Thyroid disease      No current facility-administered medications for this visit.  No current outpatient medications on file.    Facility-Administered Medications Ordered in Other Visits:     fentaNYL 50 mcg/mL injection, , Intravenous, PRN, Nora Burmaster, CRNA, 50 mcg at 01/05/23 1110    LIDOcaine (cardiac) injection, , Intravenous, PRN, Nora Burmaster, CRNA, 50 mg at 01/05/23 1110    PHENYLephrine injection, , Intravenous, PRN, Nora Burmaster, CRNA, 100 mcg at 01/05/23 1128    propofol (DIPRIVAN) 10 mg/mL infusion, , Intravenous, PRN, Nora Burmaster, CRNA, 40 mg at 01/05/23 1110    propofol (DIPRIVAN) 10 mg/mL infusion, , Intravenous, Continuous PRN, Nora Burmaster, CRNA, Last Rate: 25.812 mL/hr at 01/05/23 1128, 60 mcg/kg/min at 01/05/23 1128    sodium chloride 0.9% infusion, , Intravenous, Continuous PRN, Nora Burmaster, CRNA, New Bag at 01/05/23 1056    Objective:    Physical Exam  Vitals reviewed.   Constitutional:       General: He is not in acute distress.     Appearance: He is well-developed. He is not diaphoretic.   HENT:      Head: Normocephalic and atraumatic.   Neck:      Vascular: No JVD.   Cardiovascular:      Rate and Rhythm: Normal rate and regular rhythm.      Pulses: Intact distal pulses.      Heart sounds: No murmur heard.  Pulmonary:      Effort: Pulmonary effort is normal. No respiratory distress.   Musculoskeletal:      Cervical back: Normal range of motion.      Right lower leg: No edema.      Left lower leg: No edema.   Skin:     General: Skin is warm and dry.   Neurological:      Mental Status: He is alert and oriented to person, place, and  time.           Vitals:    01/05/23 1128   BP: 137/72   Pulse: 71   SpO2: 97%     There is no height or weight on file to calculate BMI.    Test(s) Reviewed  I have reviewed the following in detail:  [] Stress test   [] Angiography   [] Echocardiogram   [] Labs   [] Other       Chemistry        Component Value Date/Time     12/01/2022 1612    K 4.5 12/01/2022 1612     12/01/2022 1612    CO2 29 12/01/2022 1612    BUN 22 12/01/2022 1612    CREATININE 1.1 12/01/2022 1612     12/01/2022 1612        Component Value Date/Time    CALCIUM 9.2 12/01/2022 1612    ALKPHOS 63 12/01/2022 1612    AST 27 12/01/2022 1612    ALT 20 12/01/2022 1612    BILITOT 0.5 12/01/2022 1612    ESTGFRAFRICA >60.0 07/28/2022 0403    EGFRNONAA >60.0 07/28/2022 0403            Assessment:   Persistent atrial fibrillation  Successful MAURICIO closure with Watchman. ALEXYS today. If there is <5 mm chris-device leak will plan to switch to Plavix alone per Dr Richardson. Please see full plan below.     S/P TAVR (transcatheter aortic valve replacement)  Successful 26 mm Evolut TAVR inside of a failing 25 mm Perimount bioprosthetic valve. Follow up as scheduled.     Chronic diastolic heart failure  Chronic. Controlled. Follow up with Cardiology/PCP.    Aortic atherosclerosis  See imaging. Follow up with Cardiology.     Plan:     ALEXYS today. If < 5 mm chris-device leak, then switch to Plavix alone, per Dr Richardson (ordered). Will switch back to ASA alone at 6 month follow up.    Virtual follow up at 6 months, 1 year, and 2 years.  SBE prophylaxis for life.              Liv Hazel PA-C  Valve and Structural Heart Disease  [facility]

## 2023-01-05 NOTE — H&P
[facility] Cardiac [facility]  Cardiology  History and Physical     Patient Name: Michael Mclean  MRN: [id number]  Admission Date: 1/5/2023  Code Status: Prior   Attending Provider: Glenroy Richardson MD   Primary Care Physician: Josh Alejo Jr, MD  Principal Problem:<principal problem not specified>    Patient information was obtained from patient, caregiver / friend and past medical records.     Subjective:     Chief Complaint: S/p Watchman device    HPI:  82 y.o. male  with a past medical history of paroxysmal Afib (s/p DCCV in 2017, s/p PVI in 2020), s/p Watchman 11/22/22, CAD s/p 2v CABG & bioprosthetic aotic valve in 2012, ICM s/p BiV ICD, h/o SSS, MVP with mod-severe MR, HTN, HLD, CVA, prostate cancer, and severe AS s/p TAVR (7/26/2022).      Pt is hearing impaired and clinically deaf.  A  was in the room and brought by the pt - she was used to communicate with the pt.     Pt has been requiring 1 pRBC transfusion per month due to low hgb.  No evidence of GIB or hematuria.  Has had GI bleeding in the past requiring transfusion.  Has been taking Fe as well.  Xarelto and eliquis were prohibitively expensive and he does not wish to resume warfarin.  ICD last interrogations show pAF.  Pt is following with Dr. Richardson who recommended Watchman placement. He underwent successful LAAO with 27mm Watchman FLX device on 11/22/22. He was discharged on Plavix alone since he is intolerant to anticoagulation.      ALEXYS 11/22/22   3D MAURICIO OS measurement 2.3 x 2.7 cm   ALEXYS for LAAO. No thrombus is present in the appendage. S/p Watchman 27mm FLX, 20% compression. Presence of anteromedical shelf with part of the device underneath the shelf. Small area of the smooth part of the appendage does not appear to be covered at 0 degrees. No peridevice leak and no pericardial effusion.   Post procedure, there is an presence of 2 iatrogenic interatrial septal defect present.   The estimated ejection  fraction is 50%.   The left ventricle is normal in size with low normal systolic function.   Normal right ventricular size with normal right ventricular systolic function.   There is a 26 mm Evolut transcutaneously-placed aortic bioprosthesis present in a prior 25 mm Perimount bioproshetic valve. There is no paravalvular leak.   There is prolapse of the [id number]. There is posteriorly directed moderate to severe eccentric mitral regurgitation visually. Quantification not perform.   Mild tricuspid regurgitation.   The ascending aorta is mildly dilated.   Grade 1 plaque present.    Dysphagia or odynophagia:  No  Liver Disease, esophageal disease, or known varices:  No  Upper GI Bleeding: No  Snoring:  Yes  Sleep Apnea:  No  Prior neck surgery or radiation:  No  History of anesthetic difficulties:  No  Family history of anesthetic difficulties:  No  Last oral intake:  12 hours ago  Able to move neck in all directions:  Yes      Past Medical History:   Diagnosis Date    Abdominal hernia     Anemia 02/2021    Anticoagulant long-term use     Arthritis     SHOULDER    Cancer     prostate    Cardiomyopathy     Carotid artery stenosis     left    Coronary artery disease     Deaf     Dizziness     Encounter for blood transfusion     Gastritis 3/2/2021    Hyperlipidemia     Hypertension     Mitral regurgitation     Mitral regurgitation     PAD (peripheral artery disease)     Paroxysmal atrial fibrillation     Stroke 2020    Thyroid disease        Past Surgical History:   Procedure Laterality Date    ABLATION N/A 7/22/2020    Procedure: Ablation, afib;  Surgeon: Tal Laurent MD;  Location: [facility] CATH;  Service: Cardiology;  Laterality: N/A;  needs covid test;needs ALEXYS ([place])    AORTIC VALVE REPLACEMENT      CARDIAC CATH COSURGEON N/A 7/27/2022    Procedure: Cardiac Cath Cosurgeon;  Surgeon: Glenroy Sam MD;  Location: [facility] CATH LAB;  Service: Cardiovascular;  Laterality: N/A;     CARDIAC CATHETERIZATION      3 HEART STENTS    carotid artery stent Left     CLOSURE OF LEFT ATRIAL APPENDAGE USING DEVICE N/A 11/22/2022    Procedure: Left atrial appendage closure device;  Surgeon: Glenroy Richardson MD;  Location: [facility] CATH LAB;  Service: Cardiology;  Laterality: N/A;    COLONOSCOPY N/A 3/2/2021    Procedure: COLONOSCOPY;  Surgeon: Emiliana Cardenas MD;  Location: [facility] ENDO;  Service: General;  Laterality: N/A;  4  8:30am   CL/AM/Bd    CORONARY ARTERY BYPASS GRAFT      ESOPHAGOGASTRODUODENOSCOPY N/A 11/10/2020    Procedure: EGD (ESOPHAGOGASTRODUODENOSCOPY);  Surgeon: Renny Prakash MD;  Location: [facility] ENDO;  Service: Endoscopy;  Laterality: N/A;    HERNIA REPAIR      HIP REPLACEMENT ARTHROPLASTY Right 11/6/2020    Procedure: EMELY -ARTHROPLASTY, HIP REPLACEMENT (ENDO HIP);  Surgeon: Lino Thomas MD;  Location: [facility] OR;  Service: Orthopedics;  Laterality: Right;    LEFT HEART CATHETERIZATION N/A 6/29/2022    Procedure: Left heart cath;  Surgeon: Glenroy Richardson MD;  Location: [facility] CATH LAB;  Service: Cardiology;  Laterality: N/A;    PACERMAKER      PROSTATECTOMY      SHOULDER SURGERY      TOE SURGERY      TRANSCATHETER AORTIC VALVE REPLACEMENT (TAVR) N/A 7/27/2022    Procedure: REPLACEMENT, AORTIC VALVE, TRANSCATHETER (TAVR);  Surgeon: Glenroy Richardson MD;  Location: [facility] CATH LAB;  Service: Cardiology;  Laterality: N/A;    TRANSCATHETER AORTIC VALVE REPLACEMENT (TAVR)  7/27/2022    Procedure: REPLACEMENT, AORTIC VALVE, TRANSCATHETER (TAVR);  Surgeon: Glenroy Sam MD;  Location: [facility] CATH LAB;  Service: Cardiovascular;;    TRANSESOPHAGEAL ECHOCARDIOGRAPHY N/A 6/27/2022    Procedure: ECHOCARDIOGRAM, TRANSESOPHAGEAL;  Surgeon: [facility] Provider;  Location: [facility] EP LAB;  Service: Anesthesiology;  Laterality: N/A;    TRANSESOPHAGEAL ECHOCARDIOGRAPHY N/A 11/22/2022    Procedure: ECHOCARDIOGRAM, TRANSESOPHAGEAL;  Surgeon: Leila Arango MD;  Location: [facility] CATH  LAB;  Service: Cardiology;  Laterality: N/A;    TREATMENT OF CARDIAC ARRHYTHMIA N/A 8/7/2019    Procedure: Cardioversion or Defibrillation;  Surgeon: Tal Laurent MD;  Location: [facility];  Service: Cardiology;  Laterality: N/A;       Review of patient's allergies indicates:   Allergen Reactions    Losartan Hives     Patient and daughter denies allergy    Valsartan Hives     Patient and daughter denies allergy       No current facility-administered medications on file prior to encounter.     Current Outpatient Medications on File Prior to Encounter   Medication Sig    acetylcarnitin/alpha lipoic ac (ACETYLCARNITIN HCL-A LIPOIC AC) 400-200 mg Cap Take 1 capsule by mouth once daily.    aspirin (ECOTRIN) 81 MG EC tablet Take 1 tablet (81 mg total) by mouth once daily.    bicalutamide (CASODEX) 50 MG Tab TAKE 1 TABLET EVERY DAY    budesonide (PULMICORT) 0.5 mg/2 mL nebulizer solution Take 2 mLs (0.5 mg total) by nebulization 2 (two) times a day. Controller    cholecalciferol, vitamin D3, 5,000 unit capsule Take 5,000 Units by mouth once daily.     cyanocobalamin, vitamin B-12, 1,000 mcg/mL Kit Inject 1 mL as directed every 28 days.    docusate sodium (COLACE) 100 MG capsule Take 100 mg by mouth 2 (two) times daily.    ferrous sulfate (FEOSOL) 325 mg (65 mg iron) Tab tablet Take 1 tablet (325 mg total) by mouth once daily.    KLOR-CON M20 20 mEq tablet Take 20 mEq by mouth once daily.     magnesium oxide (MAG-OX) 400 mg tablet Take 400 mg by mouth once daily.    metoprolol succinate (TOPROL-XL) 100 MG 24 hr tablet Take 50 mg by mouth 2 (two) times a day.    pantoprazole (PROTONIX) 40 MG tablet Take 1 tablet (40 mg total) by mouth once daily.    rosuvastatin (CRESTOR) 20 MG tablet Take 20 mg by mouth every morning.    sotaloL (BETAPACE) 80 MG tablet Take 0.5 tablets (40 mg total) by mouth 2 (two) times daily.    torsemide (DEMADEX) 20 MG Tab Take 20 mg by mouth as needed.    vitamin D3-folic acid  5,000 unit- 1 mg Tab Take 1 tablet by mouth once daily.    vitamin D3-vitamin K2, MK4, (K2 PLUS D3) 1,000-100 unit-mcg Tab Take by mouth.    [DISCONTINUED] clopidogreL (PLAVIX) 75 mg tablet Take 1 tablet (75 mg total) by mouth once daily.     Family History       Problem Relation (Age of Onset)    Heart attack Mother    Heart disease Mother          Tobacco Use    Smoking status: Never    Smokeless tobacco: Never   Substance and Sexual Activity    Alcohol use: Yes     Alcohol/week: 7.0 standard drinks     Types: 7 Glasses of wine per week    Drug use: Never    Sexual activity: Not on file     Comment:      Review of Systems   Constitutional: Negative for diaphoresis, malaise/fatigue, weight gain and weight loss.   HENT:  Positive for hearing loss. Negative for nosebleeds.    Eyes:  Negative for vision loss in left eye, vision loss in right eye and visual disturbance.   Cardiovascular:  Negative for chest pain, claudication, dyspnea on exertion, irregular heartbeat, leg swelling, near-syncope, orthopnea, palpitations, paroxysmal nocturnal dyspnea and syncope.   Respiratory:  Negative for cough, shortness of breath, sleep disturbances due to breathing, snoring and wheezing.    Hematologic/Lymphatic: Negative for bleeding problem. Bruises/bleeds easily.   Skin:  Negative for poor wound healing and rash.   Musculoskeletal:  Negative for muscle cramps and myalgias.   Gastrointestinal:  Negative for bloating, abdominal pain, diarrhea, heartburn, melena, nausea and vomiting.   Genitourinary:  Negative for hematuria and nocturia.   Neurological:  Negative for brief paralysis, dizziness, headaches, light-headedness, numbness and weakness.   Psychiatric/Behavioral:  Negative for depression.    Allergic/Immunologic: Negative for hives.   Objective:     Vital Signs (Most Recent):    Vital Signs (24h Range):           There is no height or weight on file to calculate BMI.            No intake or output data in the  24 hours ending 01/05/23 0924    Lines/Drains/Airways       None                   Physical Exam  Vitals and nursing note reviewed.   Constitutional:       Appearance: He is well-developed. He is not diaphoretic.   HENT:      Head: Normocephalic and atraumatic.   Eyes:      Conjunctiva/sclera: Conjunctivae normal.   Neck:      Vascular: No carotid bruit or JVD.   Cardiovascular:      Rate and Rhythm: Normal rate and regular rhythm.      Pulses: Normal pulses and intact distal pulses.      Heart sounds: Normal heart sounds. No murmur heard.    No friction rub. No gallop.   Pulmonary:      Effort: Pulmonary effort is normal.      Breath sounds: Normal breath sounds. No rales.   Chest:      Chest wall: No tenderness.   Abdominal:      General: There is no distension.      Palpations: Abdomen is soft. There is no mass.      Tenderness: There is no abdominal tenderness.   Skin:     General: Skin is warm and dry.      Coloration: Skin is not pale.   Neurological:      Mental Status: He is alert and oriented to person, place, and time.       Significant Labs: All pertinent lab results from the last 24 hours have been reviewed.    Assessment and Plan:     Presence of Watchman left atrial appendage closure device  Here today for ALEXYS to evaluate Watchman device for clot or chris-device leaks.   -No absolute contraindications of esophageal stricture, tumor, perforation, laceration,or diverticulum and/or active GI bleed  -The risks, benefits & alternatives of the procedure were explained to the patient.   -The risks of transesophageal echo include but are not limited to:  Dental trauma, esophageal trauma/perforation, bleeding, laryngospasm/brochospasm, aspiration, sore throat/hoarseness, & dislodgement of the endotracheal tube/nasogastric tube (where applicable).    -The risks of ANES monitored sedation include hypotension, respiratory depression, arrhythmias, bronchospasm, & death.    -Informed consent was obtained. The patient  is agreeable to proceed with the procedure and all questions and concerns addressed.    Case discussed with an attending in echocardiography lab.    Further recommendations per attending addendum          VTE Risk Mitigation (From admission, onward)    None          Keeley Vega PA-C  Cardiology   [facility] - Short Stay Cardiac Unit

## 2023-01-05 NOTE — SUBJECTIVE & OBJECTIVE
Past Medical History:   Diagnosis Date    Abdominal hernia     Anemia 02/2021    Anticoagulant long-term use     Arthritis     SHOULDER    Cancer     prostate    Cardiomyopathy     Carotid artery stenosis     left    Coronary artery disease     Deaf     Dizziness     Encounter for blood transfusion     Gastritis 3/2/2021    Hyperlipidemia     Hypertension     Mitral regurgitation     Mitral regurgitation     PAD (peripheral artery disease)     Paroxysmal atrial fibrillation     Stroke 2020    Thyroid disease        Past Surgical History:   Procedure Laterality Date    ABLATION N/A 7/22/2020    Procedure: Ablation, afib;  Surgeon: Tal Laurent MD;  Location: [facility] CATH;  Service: Cardiology;  Laterality: N/A;  needs covid test;needs ALEXYS ([place])    AORTIC VALVE REPLACEMENT      CARDIAC CATH COSURGEON N/A 7/27/2022    Procedure: Cardiac Cath Cosurgeon;  Surgeon: Glenroy Sam MD;  Location: [facility] CATH LAB;  Service: Cardiovascular;  Laterality: N/A;    CARDIAC CATHETERIZATION      3 HEART STENTS    carotid artery stent Left     CLOSURE OF LEFT ATRIAL APPENDAGE USING DEVICE N/A 11/22/2022    Procedure: Left atrial appendage closure device;  Surgeon: Glenroy Richardson MD;  Location: [facility] CATH LAB;  Service: Cardiology;  Laterality: N/A;    COLONOSCOPY N/A 3/2/2021    Procedure: COLONOSCOPY;  Surgeon: Emiliana Cardenas MD;  Location: [facility] ENDO;  Service: General;  Laterality: N/A;  4  8:30am   CL/AM/Bd    CORONARY ARTERY BYPASS GRAFT      ESOPHAGOGASTRODUODENOSCOPY N/A 11/10/2020    Procedure: EGD (ESOPHAGOGASTRODUODENOSCOPY);  Surgeon: Renny Prakash MD;  Location: [facility] ENDO;  Service: Endoscopy;  Laterality: N/A;    HERNIA REPAIR      HIP REPLACEMENT ARTHROPLASTY Right 11/6/2020    Procedure: EMELY -ARTHROPLASTY, HIP REPLACEMENT (ENDO HIP);  Surgeon: Lino Thomas MD;  Location: [facility] OR;  Service: Orthopedics;  Laterality: Right;    LEFT HEART CATHETERIZATION N/A 6/29/2022    Procedure: Left heart cath;   Surgeon: Glenroy Richardson MD;  Location: [facility] CATH LAB;  Service: Cardiology;  Laterality: N/A;    PACERMAKER      PROSTATECTOMY      SHOULDER SURGERY      TOE SURGERY      TRANSCATHETER AORTIC VALVE REPLACEMENT (TAVR) N/A 7/27/2022    Procedure: REPLACEMENT, AORTIC VALVE, TRANSCATHETER (TAVR);  Surgeon: Glneroy Richardson MD;  Location: [facility] CATH LAB;  Service: Cardiology;  Laterality: N/A;    TRANSCATHETER AORTIC VALVE REPLACEMENT (TAVR)  7/27/2022    Procedure: REPLACEMENT, AORTIC VALVE, TRANSCATHETER (TAVR);  Surgeon: Glenroy Sam MD;  Location: [facility] CATH LAB;  Service: Cardiovascular;;    TRANSESOPHAGEAL ECHOCARDIOGRAPHY N/A 6/27/2022    Procedure: ECHOCARDIOGRAM, TRANSESOPHAGEAL;  Surgeon: Fracisco Diagnostic Provider;  Location: [facility] EP LAB;  Service: Anesthesiology;  Laterality: N/A;    TRANSESOPHAGEAL ECHOCARDIOGRAPHY N/A 11/22/2022    Procedure: ECHOCARDIOGRAM, TRANSESOPHAGEAL;  Surgeon: Leila Arango MD;  Location: [facility] CATH LAB;  Service: Cardiology;  Laterality: N/A;    TREATMENT OF CARDIAC ARRHYTHMIA N/A 8/7/2019    Procedure: Cardioversion or Defibrillation;  Surgeon: Tal Laurent MD;  Location: [facility] CATH;  Service: Cardiology;  Laterality: N/A;       Review of patient's allergies indicates:   Allergen Reactions    Losartan Hives     Patient and daughter denies allergy    Valsartan Hives     Patient and daughter denies allergy       No current facility-administered medications on file prior to encounter.     Current Outpatient Medications on File Prior to Encounter   Medication Sig    acetylcarnitin/alpha lipoic ac (ACETYLCARNITIN HCL-A LIPOIC AC) 400-200 mg Cap Take 1 capsule by mouth once daily.    aspirin (ECOTRIN) 81 MG EC tablet Take 1 tablet (81 mg total) by mouth once daily.    bicalutamide (CASODEX) 50 MG Tab TAKE 1 TABLET EVERY DAY    budesonide (PULMICORT) 0.5 mg/2 mL nebulizer solution Take 2 mLs (0.5 mg total) by nebulization 2 (two) times a day. Controller    cholecalciferol,  vitamin D3, 5,000 unit capsule Take 5,000 Units by mouth once daily.     cyanocobalamin, vitamin B-12, 1,000 mcg/mL Kit Inject 1 mL as directed every 28 days.    docusate sodium (COLACE) 100 MG capsule Take 100 mg by mouth 2 (two) times daily.    ferrous sulfate (FEOSOL) 325 mg (65 mg iron) Tab tablet Take 1 tablet (325 mg total) by mouth once daily.    KLOR-CON M20 20 mEq tablet Take 20 mEq by mouth once daily.     magnesium oxide (MAG-OX) 400 mg tablet Take 400 mg by mouth once daily.    metoprolol succinate (TOPROL-XL) 100 MG 24 hr tablet Take 50 mg by mouth 2 (two) times a day.    pantoprazole (PROTONIX) 40 MG tablet Take 1 tablet (40 mg total) by mouth once daily.    rosuvastatin (CRESTOR) 20 MG tablet Take 20 mg by mouth every morning.    sotaloL (BETAPACE) 80 MG tablet Take 0.5 tablets (40 mg total) by mouth 2 (two) times daily.    torsemide (DEMADEX) 20 MG Tab Take 20 mg by mouth as needed.    vitamin D3-folic acid 5,000 unit- 1 mg Tab Take 1 tablet by mouth once daily.    vitamin D3-vitamin K2, MK4, (K2 PLUS D3) 1,000-100 unit-mcg Tab Take by mouth.    [DISCONTINUED] clopidogreL (PLAVIX) 75 mg tablet Take 1 tablet (75 mg total) by mouth once daily.     Family History       Problem Relation (Age of Onset)    Heart attack Mother    Heart disease Mother          Tobacco Use    Smoking status: Never    Smokeless tobacco: Never   Substance and Sexual Activity    Alcohol use: Yes     Alcohol/week: 7.0 standard drinks     Types: 7 Glasses of wine per week    Drug use: Never    Sexual activity: Not on file     Comment:      Review of Systems   Constitutional: Negative for diaphoresis, malaise/fatigue, weight gain and weight loss.   HENT:  Positive for hearing loss. Negative for nosebleeds.    Eyes:  Negative for vision loss in left eye, vision loss in right eye and visual disturbance.   Cardiovascular:  Negative for chest pain, claudication, dyspnea on exertion, irregular heartbeat, leg swelling,  near-syncope, orthopnea, palpitations, paroxysmal nocturnal dyspnea and syncope.   Respiratory:  Negative for cough, shortness of breath, sleep disturbances due to breathing, snoring and wheezing.    Hematologic/Lymphatic: Negative for bleeding problem. Bruises/bleeds easily.   Skin:  Negative for poor wound healing and rash.   Musculoskeletal:  Negative for muscle cramps and myalgias.   Gastrointestinal:  Negative for bloating, abdominal pain, diarrhea, heartburn, melena, nausea and vomiting.   Genitourinary:  Negative for hematuria and nocturia.   Neurological:  Negative for brief paralysis, dizziness, headaches, light-headedness, numbness and weakness.   Psychiatric/Behavioral:  Negative for depression.    Allergic/Immunologic: Negative for hives.   Objective:     Vital Signs (Most Recent):    Vital Signs (24h Range):           There is no height or weight on file to calculate BMI.            No intake or output data in the 24 hours ending 01/05/23 0924    Lines/Drains/Airways       None                   Physical Exam  Vitals and nursing note reviewed.   Constitutional:       Appearance: He is well-developed. He is not diaphoretic.   HENT:      Head: Normocephalic and atraumatic.   Eyes:      Conjunctiva/sclera: Conjunctivae normal.   Neck:      Vascular: No carotid bruit or JVD.   Cardiovascular:      Rate and Rhythm: Normal rate and regular rhythm.      Pulses: Normal pulses and intact distal pulses.      Heart sounds: Normal heart sounds. No murmur heard.    No friction rub. No gallop.   Pulmonary:      Effort: Pulmonary effort is normal.      Breath sounds: Normal breath sounds. No rales.   Chest:      Chest wall: No tenderness.   Abdominal:      General: There is no distension.      Palpations: Abdomen is soft. There is no mass.      Tenderness: There is no abdominal tenderness.   Skin:     General: Skin is warm and dry.      Coloration: Skin is not pale.   Neurological:      Mental Status: He is alert and  oriented to person, place, and time.       Significant Labs: All pertinent lab results from the last 24 hours have been reviewed.

## 2023-01-05 NOTE — DISCHARGE SUMMARY
Discharge Summary  Cardiology      Admit Date: 1/5/2023    Discharge Date :1/5/2023    Attending Physician: Dr. Clancy     Discharge Physician: Junior Pearson    Discharged Condition: good    Discharge Diagnosis: Longstanding persistent atrial fibrillation [I48.11]    Treatments/Procedures: Procedure(s) (LRB):  ECHOCARDIOGRAM, TRANSESOPHAGEAL (N/A)    Hospital Course: Patient presented for post-Watchman evaluation. No chris-device leak noted. He will be discharged on Plavix for 6 months as noted in Dr. Richardson's cath report.     Significant Diagnostic Studies: none    Disposition: Home or Self Care    Diet: Cardiac    Follow up: With Interventional Cardiology    Activity: As tolerated    Patient Instructions:   Current Discharge Medication List        CONTINUE these medications which have NOT CHANGED    Details   bicalutamide (CASODEX) 50 MG Tab TAKE 1 TABLET EVERY DAY  Qty: 30 tablet, Refills: 4    Associated Diagnoses: Prostate cancer      clonazePAM (KLONOPIN) 2 MG Tab Take 1 tablet (2 mg total) by mouth daily as needed (anxiety).  Qty: 30 tablet, Refills: 0    Associated Diagnoses: Anxiety      ferrous sulfate (FEOSOL) 325 mg (65 mg iron) Tab tablet Take 1 tablet (325 mg total) by mouth once daily.  Refills: 0      KLOR-CON M20 20 mEq tablet Take 20 mEq by mouth once daily.       metoprolol succinate (TOPROL-XL) 100 MG 24 hr tablet Take 50 mg by mouth 2 (two) times a day.    Comments: .      rosuvastatin (CRESTOR) 20 MG tablet Take 20 mg by mouth every morning.      sotaloL (BETAPACE) 80 MG tablet Take 0.5 tablets (40 mg total) by mouth 2 (two) times daily.  Qty: 90 tablet, Refills: 0    Comments: Along with the metoprolol  Associated Diagnoses: Essential hypertension      torsemide (DEMADEX) 20 MG Tab Take 20 mg by mouth as needed.    Comments: .      vitamin D3-folic acid 5,000 unit- 1 mg Tab Take 1 tablet by mouth once daily.      vitamin D3-vitamin K2, MK4, (K2 PLUS D3) 1,000-100 unit-mcg Tab Take by  mouth.      acetylcarnitin/alpha lipoic ac (ACETYLCARNITIN HCL-A LIPOIC AC) 400-200 mg Cap Take 1 capsule by mouth once daily.      budesonide (PULMICORT) 0.5 mg/2 mL nebulizer solution Take 2 mLs (0.5 mg total) by nebulization 2 (two) times a day. Controller  Qty: 120 mL, Refills: 0      cholecalciferol, vitamin D3, 5,000 unit capsule Take 5,000 Units by mouth once daily.       clopidogreL (PLAVIX) 75 mg tablet Take 1 tablet (75 mg total) by mouth once daily.  Qty: 30 tablet, Refills: 5      cyanocobalamin, vitamin B-12, 1,000 mcg/mL Kit Inject 1 mL as directed every 28 days.      docusate sodium (COLACE) 100 MG capsule Take 100 mg by mouth 2 (two) times daily.      magnesium oxide (MAG-OX) 400 mg tablet Take 400 mg by mouth once daily.      pantoprazole (PROTONIX) 40 MG tablet Take 1 tablet (40 mg total) by mouth once daily.  Qty: 30 tablet, Refills: 11           STOP taking these medications       aspirin (ECOTRIN) 81 MG EC tablet Comments:   Reason for Stopping:               Discharge Procedure Orders   Diet Cardiac     Activity as tolerated         Wayne Pearson MD  [facility] PGY-5

## 2023-01-05 NOTE — ASSESSMENT & PLAN NOTE
Successful MAURICIO closure with Watchman. ALEXYS today. If there is <5 mm chris-device leak will plan to continue ASA alone. Please see full plan below.

## 2023-01-05 NOTE — ANESTHESIA POSTPROCEDURE EVALUATION
Anesthesia Post Evaluation    Patient: Michael Mclean    Procedure(s) Performed: Procedure(s) (LRB):  ECHOCARDIOGRAM, TRANSESOPHAGEAL (N/A)    Final Anesthesia Type: general      Level of consciousness: awake and alert  Post-procedure vital signs: reviewed and stable  Pain control: Pain has been treated.  Airway patency: patent    PONV status: Absent or treated.  Anesthetic complications: no      Cardiovascular status: hemodynamically stable  Respiratory status: unassisted  Hydration status: euvolemic            Vitals Value Taken Time   /72 01/05/23 1231   Temp 36.3 °C (97.3 °F) 01/05/23 1152   Pulse 71 01/05/23 1240   Resp 29 01/05/23 1240   SpO2 98 % 01/05/23 1240         No case tracking events are documented in the log.      Pain/Maura Score: Maura Score: 10 (1/5/2023 12:40 PM)

## 2023-01-05 NOTE — ASSESSMENT & PLAN NOTE
Here today for ALEXYS to evaluate Watchman device for clot or chris-device leaks.   -No absolute contraindications of esophageal stricture, tumor, perforation, laceration,or diverticulum and/or active GI bleed  -The risks, benefits & alternatives of the procedure were explained to the patient.   -The risks of transesophageal echo include but are not limited to:  Dental trauma, esophageal trauma/perforation, bleeding, laryngospasm/brochospasm, aspiration, sore throat/hoarseness, & dislodgement of the endotracheal tube/nasogastric tube (where applicable).    -The risks of ANES monitored sedation include hypotension, respiratory depression, arrhythmias, bronchospasm, & death.    -Informed consent was obtained. The patient is agreeable to proceed with the procedure and all questions and concerns addressed.    Case discussed with an attending in echocardiography lab.    Further recommendations per attending addendum

## 2023-02-28 ENCOUNTER — LAB VISIT (OUTPATIENT)
Dept: LAB | Facility: HOSPITAL | Age: 83
End: 2023-02-28
Attending: NURSE PRACTITIONER
Payer: MEDICARE

## 2023-02-28 DIAGNOSIS — R97.21 RISING PSA FOLLOWING TREATMENT FOR MALIGNANT NEOPLASM OF PROSTATE: ICD-10-CM

## 2023-02-28 DIAGNOSIS — Z90.79 HISTORY OF RADICAL RETROPUBIC PROSTATECTOMY: ICD-10-CM

## 2023-02-28 DIAGNOSIS — C61 PROSTATE CANCER: ICD-10-CM

## 2023-02-28 DIAGNOSIS — Z92.3 HISTORY OF EXTERNAL BEAM RADIATION THERAPY: ICD-10-CM

## 2023-02-28 LAB — COMPLEXED PSA SERPL-MCNC: 13.3 NG/ML (ref 0–4)

## 2023-02-28 PROCEDURE — 84153 ASSAY OF PSA TOTAL: CPT | Performed by: NURSE PRACTITIONER

## 2023-02-28 PROCEDURE — 36415 COLL VENOUS BLD VENIPUNCTURE: CPT | Performed by: NURSE PRACTITIONER

## 2023-02-28 NOTE — PROGRESS NOTES
Subjective:    Patient ID:  Michael Mclean is a 83 y.o. male who presents for follow-up of Atrial Fibrillation      HPI  82 yo male with atrial fibrillation, ventricular fibrillation, CAD s/p CABG, AS s/p sAVR followed by TAVR, Htn, CVA, mitral valve prolapse, ICD.    Background:  Previously managed by [facility]. EP was Dr. Laurent. Has switched care to [facility].  Persistent Afib PVI 7/20 (WACA + roof line).  Remains on Sotalol since then.  CAD s/p 2v CABG 2012  Aortic stenosis Bioprosthetic aotic valve in 2012 s/p 26 mm Evolut valve in valve TAVR inside of a failing 25 mm Perimount bioprosthetic valve  VF in setting of EF 15%. Dual chamber ICD implanted 9/6/19  Echo 9/1/22 EF 45% mitral valve anterior leaflet prolapse with moderate to severe MR, YOAN 85.7, well seated aortic prosthesis.     Feeling well overall.  Not on anticoagulation. Has had GI bleeding in the past requiring transfusion.   MAURICIO occlusion 11/22/22    Update:  Feeling well overall. Denies significant dyspnea, syncope, ICD shocks.  Device interrogation reveals stable function of the leads, RA pacing 99% RV pacing 1%, AF burden 1% longest duration 18 hours 1/23/23 one episode of monomorphic VT ATP x 1 successful.      Review of Systems   Constitutional: Negative. Negative for fever and malaise/fatigue.   HENT:  Negative for congestion and sore throat.    Cardiovascular:  Negative for chest pain, dyspnea on exertion, irregular heartbeat, leg swelling, near-syncope, orthopnea, palpitations, paroxysmal nocturnal dyspnea and syncope.   Respiratory:  Negative for cough and shortness of breath.    Gastrointestinal:  Negative for abdominal pain, constipation and diarrhea.   Neurological:  Negative for dizziness, light-headedness and weakness.   Psychiatric/Behavioral:  Negative for depression. The patient is not nervous/anxious.    All other systems reviewed and are negative.     Objective:    Physical Exam  Constitutional:       Appearance: He is  well-developed.   Eyes:      General: No scleral icterus.     Conjunctiva/sclera: Conjunctivae normal.   Neck:      Vascular: No JVD.      Trachea: No tracheal deviation.   Cardiovascular:      Rate and Rhythm: Normal rate and regular rhythm.      Chest Wall: PMI is not displaced.      Heart sounds: Normal heart sounds.   Pulmonary:      Effort: Pulmonary effort is normal. No respiratory distress.      Breath sounds: Normal breath sounds.   Abdominal:      Palpations: Abdomen is soft.      Tenderness: There is no abdominal tenderness.   Musculoskeletal:         General: No tenderness.   Skin:     General: Skin is warm and dry.      Findings: No rash.   Neurological:      Mental Status: He is alert and oriented to person, place, and time.   Psychiatric:         Behavior: Behavior normal.         Assessment:       1. Persistent atrial fibrillation    2. Ventricular fibrillation    3. Coronary artery disease, unspecified vessel or lesion type, unspecified whether angina present, unspecified whether native or transplanted heart    4. S/P AVR    5. AICD (automatic cardioverter/defibrillator) present    6. Aortic atherosclerosis    7. Essential hypertension    8. Chronic diastolic heart failure    9. Presence of Watchman left atrial appendage closure device    10. Prostate cancer    11. Gastrointestinal hemorrhage, unspecified gastrointestinal hemorrhage type         Plan:           Rare paroxysmal AF, not symptomatic. Now s/p Watchman.  One episode of VT, pace-terminated, asymptomatic.  Continue Sotalol and Toprol.  Continue current settings and medications.  F/u with monitoring as scheduled (with us), and with me in 6 months.

## 2023-03-01 ENCOUNTER — TELEPHONE (OUTPATIENT)
Dept: ELECTROPHYSIOLOGY | Facility: CLINIC | Age: 83
End: 2023-03-01
Payer: MEDICARE

## 2023-03-01 ENCOUNTER — CLINICAL SUPPORT (OUTPATIENT)
Dept: CARDIOLOGY | Facility: HOSPITAL | Age: 83
End: 2023-03-01
Attending: INTERNAL MEDICINE
Payer: MEDICARE

## 2023-03-01 ENCOUNTER — OFFICE VISIT (OUTPATIENT)
Dept: ELECTROPHYSIOLOGY | Facility: CLINIC | Age: 83
End: 2023-03-01
Payer: MEDICARE

## 2023-03-01 ENCOUNTER — OFFICE VISIT (OUTPATIENT)
Dept: UROLOGY | Facility: CLINIC | Age: 83
End: 2023-03-01
Payer: MEDICARE

## 2023-03-01 VITALS
DIASTOLIC BLOOD PRESSURE: 84 MMHG | HEART RATE: 77 BPM | HEIGHT: 67 IN | SYSTOLIC BLOOD PRESSURE: 131 MMHG | BODY MASS INDEX: 24.8 KG/M2 | WEIGHT: 158 LBS

## 2023-03-01 VITALS
BODY MASS INDEX: 24.75 KG/M2 | SYSTOLIC BLOOD PRESSURE: 136 MMHG | DIASTOLIC BLOOD PRESSURE: 70 MMHG | HEIGHT: 67 IN | OXYGEN SATURATION: 100 % | HEART RATE: 82 BPM

## 2023-03-01 DIAGNOSIS — I48.19 PERSISTENT ATRIAL FIBRILLATION: Primary | ICD-10-CM

## 2023-03-01 DIAGNOSIS — I25.10 CORONARY ARTERY DISEASE, UNSPECIFIED VESSEL OR LESION TYPE, UNSPECIFIED WHETHER ANGINA PRESENT, UNSPECIFIED WHETHER NATIVE OR TRANSPLANTED HEART: ICD-10-CM

## 2023-03-01 DIAGNOSIS — Z90.79 HISTORY OF RADICAL RETROPUBIC PROSTATECTOMY: ICD-10-CM

## 2023-03-01 DIAGNOSIS — R97.20 ELEVATED PSA, BETWEEN 10 AND LESS THAN 20 NG/ML: ICD-10-CM

## 2023-03-01 DIAGNOSIS — C61 PROSTATE CANCER: ICD-10-CM

## 2023-03-01 DIAGNOSIS — K92.2 GASTROINTESTINAL HEMORRHAGE, UNSPECIFIED GASTROINTESTINAL HEMORRHAGE TYPE: ICD-10-CM

## 2023-03-01 DIAGNOSIS — Z95.810 AICD (AUTOMATIC CARDIOVERTER/DEFIBRILLATOR) PRESENT: ICD-10-CM

## 2023-03-01 DIAGNOSIS — Z95.810 BIVENTRICULAR IMPLANTABLE CARDIOVERTER-DEFIBRILLATOR (ICD) IN SITU: ICD-10-CM

## 2023-03-01 DIAGNOSIS — I49.01 VENTRICULAR FIBRILLATION: ICD-10-CM

## 2023-03-01 DIAGNOSIS — I50.32 CHRONIC DIASTOLIC HEART FAILURE: ICD-10-CM

## 2023-03-01 DIAGNOSIS — Z95.818 PRESENCE OF WATCHMAN LEFT ATRIAL APPENDAGE CLOSURE DEVICE: ICD-10-CM

## 2023-03-01 DIAGNOSIS — C61 PROSTATE CANCER: Primary | ICD-10-CM

## 2023-03-01 DIAGNOSIS — I10 ESSENTIAL HYPERTENSION: ICD-10-CM

## 2023-03-01 DIAGNOSIS — Z92.3 HISTORY OF EXTERNAL BEAM RADIATION THERAPY: ICD-10-CM

## 2023-03-01 DIAGNOSIS — Z95.2 S/P AVR: ICD-10-CM

## 2023-03-01 DIAGNOSIS — I70.0 AORTIC ATHEROSCLEROSIS: ICD-10-CM

## 2023-03-01 DIAGNOSIS — R97.21 RISING PSA FOLLOWING TREATMENT FOR MALIGNANT NEOPLASM OF PROSTATE: ICD-10-CM

## 2023-03-01 PROCEDURE — 3288F FALL RISK ASSESSMENT DOCD: CPT | Mod: CPTII,S$GLB,ICN, | Performed by: NURSE PRACTITIONER

## 2023-03-01 PROCEDURE — 3288F FALL RISK ASSESSMENT DOCD: CPT | Mod: CPTII,S$GLB,, | Performed by: INTERNAL MEDICINE

## 2023-03-01 PROCEDURE — 93283 CARDIAC DEVICE CHECK - IN CLINIC & HOSPITAL: ICD-10-PCS | Mod: 26,,, | Performed by: INTERNAL MEDICINE

## 2023-03-01 PROCEDURE — 3288F PR FALLS RISK ASSESSMENT DOCUMENTED: ICD-10-PCS | Mod: CPTII,S$GLB,, | Performed by: INTERNAL MEDICINE

## 2023-03-01 PROCEDURE — 3078F PR MOST RECENT DIASTOLIC BLOOD PRESSURE < 80 MM HG: ICD-10-PCS | Mod: CPTII,S$GLB,, | Performed by: INTERNAL MEDICINE

## 2023-03-01 PROCEDURE — 1159F MED LIST DOCD IN RCRD: CPT | Mod: CPTII,S$GLB,, | Performed by: INTERNAL MEDICINE

## 2023-03-01 PROCEDURE — 3288F PR FALLS RISK ASSESSMENT DOCUMENTED: ICD-10-PCS | Mod: CPTII,S$GLB,ICN, | Performed by: NURSE PRACTITIONER

## 2023-03-01 PROCEDURE — 3075F SYST BP GE 130 - 139MM HG: CPT | Mod: CPTII,S$GLB,ICN, | Performed by: NURSE PRACTITIONER

## 2023-03-01 PROCEDURE — 1159F PR MEDICATION LIST DOCUMENTED IN MEDICAL RECORD: ICD-10-PCS | Mod: CPTII,S$GLB,, | Performed by: INTERNAL MEDICINE

## 2023-03-01 PROCEDURE — 99215 OFFICE O/P EST HI 40 MIN: CPT | Mod: 25,S$GLB,ICN, | Performed by: NURSE PRACTITIONER

## 2023-03-01 PROCEDURE — 1126F PR PAIN SEVERITY QUANTIFIED, NO PAIN PRESENT: ICD-10-PCS | Mod: CPTII,S$GLB,ICN, | Performed by: NURSE PRACTITIONER

## 2023-03-01 PROCEDURE — 1126F AMNT PAIN NOTED NONE PRSNT: CPT | Mod: CPTII,S$GLB,, | Performed by: INTERNAL MEDICINE

## 2023-03-01 PROCEDURE — 1101F PT FALLS ASSESS-DOCD LE1/YR: CPT | Mod: CPTII,S$GLB,, | Performed by: INTERNAL MEDICINE

## 2023-03-01 PROCEDURE — 3079F DIAST BP 80-89 MM HG: CPT | Mod: CPTII,S$GLB,ICN, | Performed by: NURSE PRACTITIONER

## 2023-03-01 PROCEDURE — 96402 PR CHEMOTHER HORMON ANTINEOPL SUB-Q/IM: ICD-10-PCS | Mod: S$GLB,ICN,, | Performed by: NURSE PRACTITIONER

## 2023-03-01 PROCEDURE — 1126F PR PAIN SEVERITY QUANTIFIED, NO PAIN PRESENT: ICD-10-PCS | Mod: CPTII,S$GLB,, | Performed by: INTERNAL MEDICINE

## 2023-03-01 PROCEDURE — 99215 OFFICE O/P EST HI 40 MIN: CPT | Mod: S$GLB,,, | Performed by: INTERNAL MEDICINE

## 2023-03-01 PROCEDURE — 99999 PR PBB SHADOW E&M-EST. PATIENT-LVL III: ICD-10-PCS | Mod: PBBFAC,,, | Performed by: INTERNAL MEDICINE

## 2023-03-01 PROCEDURE — 93284 PRGRMG EVAL IMPLANTABLE DFB: CPT

## 2023-03-01 PROCEDURE — 3078F DIAST BP <80 MM HG: CPT | Mod: CPTII,S$GLB,, | Performed by: INTERNAL MEDICINE

## 2023-03-01 PROCEDURE — 1159F MED LIST DOCD IN RCRD: CPT | Mod: CPTII,S$GLB,ICN, | Performed by: NURSE PRACTITIONER

## 2023-03-01 PROCEDURE — 99999 PR PBB SHADOW E&M-EST. PATIENT-LVL IV: CPT | Mod: PBBFAC,,, | Performed by: NURSE PRACTITIONER

## 2023-03-01 PROCEDURE — 93283 PRGRMG EVAL IMPLANTABLE DFB: CPT | Mod: 26,,, | Performed by: INTERNAL MEDICINE

## 2023-03-01 PROCEDURE — 1101F PR PT FALLS ASSESS DOC 0-1 FALLS W/OUT INJ PAST YR: ICD-10-PCS | Mod: CPTII,S$GLB,ICN, | Performed by: NURSE PRACTITIONER

## 2023-03-01 PROCEDURE — 1101F PR PT FALLS ASSESS DOC 0-1 FALLS W/OUT INJ PAST YR: ICD-10-PCS | Mod: CPTII,S$GLB,, | Performed by: INTERNAL MEDICINE

## 2023-03-01 PROCEDURE — 1160F PR REVIEW ALL MEDS BY PRESCRIBER/CLIN PHARMACIST DOCUMENTED: ICD-10-PCS | Mod: CPTII,S$GLB,ICN, | Performed by: NURSE PRACTITIONER

## 2023-03-01 PROCEDURE — 3079F PR MOST RECENT DIASTOLIC BLOOD PRESSURE 80-89 MM HG: ICD-10-PCS | Mod: CPTII,S$GLB,ICN, | Performed by: NURSE PRACTITIONER

## 2023-03-01 PROCEDURE — 1101F PT FALLS ASSESS-DOCD LE1/YR: CPT | Mod: CPTII,S$GLB,ICN, | Performed by: NURSE PRACTITIONER

## 2023-03-01 PROCEDURE — 3075F PR MOST RECENT SYSTOLIC BLOOD PRESS GE 130-139MM HG: ICD-10-PCS | Mod: CPTII,S$GLB,, | Performed by: INTERNAL MEDICINE

## 2023-03-01 PROCEDURE — 99215 PR OFFICE/OUTPT VISIT, EST, LEVL V, 40-54 MIN: ICD-10-PCS | Mod: S$GLB,,, | Performed by: INTERNAL MEDICINE

## 2023-03-01 PROCEDURE — 99999 PR PBB SHADOW E&M-EST. PATIENT-LVL IV: ICD-10-PCS | Mod: PBBFAC,,, | Performed by: NURSE PRACTITIONER

## 2023-03-01 PROCEDURE — 3075F PR MOST RECENT SYSTOLIC BLOOD PRESS GE 130-139MM HG: ICD-10-PCS | Mod: CPTII,S$GLB,ICN, | Performed by: NURSE PRACTITIONER

## 2023-03-01 PROCEDURE — 99215 PR OFFICE/OUTPT VISIT, EST, LEVL V, 40-54 MIN: ICD-10-PCS | Mod: 25,S$GLB,ICN, | Performed by: NURSE PRACTITIONER

## 2023-03-01 PROCEDURE — 96402 CHEMO HORMON ANTINEOPL SQ/IM: CPT | Mod: S$GLB,ICN,, | Performed by: NURSE PRACTITIONER

## 2023-03-01 PROCEDURE — 1160F RVW MEDS BY RX/DR IN RCRD: CPT | Mod: CPTII,S$GLB,ICN, | Performed by: NURSE PRACTITIONER

## 2023-03-01 PROCEDURE — 1126F AMNT PAIN NOTED NONE PRSNT: CPT | Mod: CPTII,S$GLB,ICN, | Performed by: NURSE PRACTITIONER

## 2023-03-01 PROCEDURE — 99999 PR PBB SHADOW E&M-EST. PATIENT-LVL III: CPT | Mod: PBBFAC,,, | Performed by: INTERNAL MEDICINE

## 2023-03-01 PROCEDURE — 1159F PR MEDICATION LIST DOCUMENTED IN MEDICAL RECORD: ICD-10-PCS | Mod: CPTII,S$GLB,ICN, | Performed by: NURSE PRACTITIONER

## 2023-03-01 PROCEDURE — 3075F SYST BP GE 130 - 139MM HG: CPT | Mod: CPTII,S$GLB,, | Performed by: INTERNAL MEDICINE

## 2023-03-01 NOTE — TELEPHONE ENCOUNTER
Pt wishes to have [facility] monitoring followed at [facility]-Dr. Boo  Put in [facility] transfer and called Patricia Curtis at [phone] and asked for release. Provided Patricia daughter Karen's ph number if needs consent to release account as pt is Deaf.

## 2023-03-01 NOTE — PROGRESS NOTES
CHIEF COMPLAINT:    Michael Mclean is a 83 y.o. male presents today for Prostate Cancer.     HISTORY OF PRESENTING ILLINESS:    Michael Mclean is a 83 y.o. deaf male with a PMH of prostate cancer. He is s/p RRP 05/2012 with Dr. Bartholomew at [facility]  Pathology revealed a [place] 5+4= 9 disease, pT3aN0.  In 10/2013 completed one year of ADT and XRT.   He had been followed there until 2016 before moving here.      He was initially seen in our clinic with Dr. Manrique. Had been receiving leuprolide (lupron) every 6 months.   Last office visit with Dr. Ariza 07/08/2020 .   I saw him 08/09/2021.   08/02/2021 PSA was 4.3; previously 0.70 on 01.28/2021  LUPRON 45mg IM given  Plan to recheck PSA and T level; consult Hem/Onc if abnormal.   09/09/2021 PSA was 5.4 with T level of 12. Hem/Onc consult placed.      02/14/2022 CTAP:  -No evidence of metastatic disease in the abdomen or pelvis     02/14/2022 Bone Scan:  - Increased radiotracer accumulation within the right 2nd rib as compared to bone scan of 09/30/2021 suspicious for metastatic disease     Last clinic visit was 09/01/2022 with a PSA was 3.8.    He is here today for LUPRON 45mg.  PSA now 13.3 on 02/28/2023 (7.4 on 12/01/2022)  No complaints.   Has no urinary complaints today.  Translater & Daughter present.      He no showed for Hem/Onc in May; states he forgot.          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               REVIEW OF SYSTEMS:  Review of Systems   Constitutional: Negative.  Negative for chills and fever.   Eyes:  Negative for double vision.   Respiratory:  Negative for cough and shortness of breath.    Cardiovascular:  Negative for chest pain.   Gastrointestinal:  Negative for abdominal pain, constipation, diarrhea, nausea and vomiting.        Denies any abdominal pain     Genitourinary: Negative.  Negative for dysuria, flank pain and hematuria.        Ok with urination  FOS is good for him.      Musculoskeletal: Negative.         He denies any bone/shoulder/hip pain     Neurological:  Negative for dizziness and seizures.   Endo/Heme/Allergies:  Negative for polydipsia.       PATIENT HISTORY:    Past Medical History:   Diagnosis Date    Abdominal hernia     Anemia 02/2021    Anticoagulant long-term use     Arthritis     SHOULDER    Cancer     prostate    Cardiomyopathy     Carotid artery stenosis     left    Coronary artery disease     Deaf     Dizziness     Encounter for blood transfusion     Gastritis 3/2/2021    Hyperlipidemia     Hypertension     Mitral regurgitation     Mitral regurgitation     PAD (peripheral artery disease)     Paroxysmal atrial fibrillation     Stroke 2020    Thyroid disease        Past Surgical History:   Procedure Laterality Date    ABLATION N/A 7/22/2020    Procedure: Ablation, afib;  Surgeon: Tal Laurent MD;  Location: [facility] CATH;  Service: Cardiology;  Laterality: N/A;  needs covid test;needs ALEXYS ([place])    AORTIC VALVE REPLACEMENT      CARDIAC CATH COSURGEON N/A 7/27/2022    Procedure: Cardiac Cath Cosurgeon;  Surgeon: Glenroy Sam MD;  Location: [facility] CATH LAB;  Service: Cardiovascular;  Laterality: N/A;    CARDIAC CATHETERIZATION      3 HEART STENTS    carotid artery stent Left     CLOSURE OF LEFT ATRIAL APPENDAGE USING DEVICE N/A 11/22/2022    Procedure: Left atrial appendage closure device;  Surgeon: Glenroy Richardson MD;  Location: [facility] CATH LAB;  Service: Cardiology;  Laterality: N/A;    COLONOSCOPY N/A 3/2/2021    Procedure: COLONOSCOPY;  Surgeon: Emiliana Cardenas MD;  Location: [facility];  Service: General;  Laterality: N/A;  4  8:30am   CL/AM/Bd    CORONARY ARTERY BYPASS GRAFT      ESOPHAGOGASTRODUODENOSCOPY N/A 11/10/2020    Procedure: EGD (ESOPHAGOGASTRODUODENOSCOPY);  Surgeon: Renny Prakash MD;  Location: [facility];  Service: Endoscopy;  Laterality: N/A;    HERNIA REPAIR      HIP REPLACEMENT ARTHROPLASTY Right  11/6/2020    Procedure: EMELY -ARTHROPLASTY, HIP REPLACEMENT (ENDO HIP);  Surgeon: Lino Thomas MD;  Location: [facility] OR;  Service: Orthopedics;  Laterality: Right;    LEFT HEART CATHETERIZATION N/A 6/29/2022    Procedure: Left heart cath;  Surgeon: Glenroy Richardson MD;  Location: [facility] CATH LAB;  Service: Cardiology;  Laterality: N/A;    PACERMAKER      PROSTATECTOMY      SHOULDER SURGERY      TOE SURGERY      TRANSCATHETER AORTIC VALVE REPLACEMENT (TAVR) N/A 7/27/2022    Procedure: REPLACEMENT, AORTIC VALVE, TRANSCATHETER (TAVR);  Surgeon: Glenroy Richardson MD;  Location: [facility] CATH LAB;  Service: Cardiology;  Laterality: N/A;    TRANSCATHETER AORTIC VALVE REPLACEMENT (TAVR)  7/27/2022    Procedure: REPLACEMENT, AORTIC VALVE, TRANSCATHETER (TAVR);  Surgeon: Glenroy Sam MD;  Location: [facility] CATH LAB;  Service: Cardiovascular;;    TRANSESOPHAGEAL ECHOCARDIOGRAPHY N/A 6/27/2022    Procedure: ECHOCARDIOGRAM, TRANSESOPHAGEAL;  Surgeon: Fracisco Diagnostic Provider;  Location: [facility] EP LAB;  Service: Anesthesiology;  Laterality: N/A;    TRANSESOPHAGEAL ECHOCARDIOGRAPHY N/A 11/22/2022    Procedure: ECHOCARDIOGRAM, TRANSESOPHAGEAL;  Surgeon: Leila Arango MD;  Location: [facility] CATH LAB;  Service: Cardiology;  Laterality: N/A;    TRANSESOPHAGEAL ECHOCARDIOGRAPHY N/A 1/5/2023    Procedure: ECHOCARDIOGRAM, TRANSESOPHAGEAL;  Surgeon: Fracisco Diagnostic Provider;  Location: [facility] EP LAB;  Service: Anesthesiology;  Laterality: N/A;    TREATMENT OF CARDIAC ARRHYTHMIA N/A 8/7/2019    Procedure: Cardioversion or Defibrillation;  Surgeon: Tal Laurent MD;  Location: [facility] CATH;  Service: Cardiology;  Laterality: N/A;       Family History   Problem Relation Age of Onset    Heart disease Mother     Heart attack Mother        Social History     Socioeconomic History    Marital status:    Tobacco Use    Smoking status: Never    Smokeless tobacco: Never   Substance and Sexual Activity    Alcohol use: Yes      Alcohol/week: 7.0 standard drinks     Types: 7 Glasses of wine per week    Drug use: Never       Allergies:  Patient has no known allergies.    Medications:    Current Outpatient Medications:     acetylcarnitin/alpha lipoic ac (ACETYLCARNITIN HCL-A LIPOIC AC) 400-200 mg Cap, Take 1 capsule by mouth once daily., Disp: , Rfl:     bicalutamide (CASODEX) 50 MG Tab, TAKE 1 TABLET EVERY DAY, Disp: 30 tablet, Rfl: 4    budesonide (PULMICORT) 0.5 mg/2 mL nebulizer solution, Take 2 mLs (0.5 mg total) by nebulization 2 (two) times a day. Controller (Patient not taking: Reported on 3/1/2023), Disp: 120 mL, Rfl: 0    cholecalciferol, vitamin D3, 5,000 unit capsule, Take 5,000 Units by mouth once daily. , Disp: , Rfl:     clonazePAM (KLONOPIN) 2 MG Tab, Take 1 tablet (2 mg total) by mouth daily as needed (anxiety)., Disp: 30 tablet, Rfl: 0    clopidogreL (PLAVIX) 75 mg tablet, Take 1 tablet (75 mg total) by mouth once daily., Disp: 30 tablet, Rfl: 5    cyanocobalamin, vitamin B-12, 1,000 mcg/mL Kit, Inject 1 mL as directed every 28 days., Disp: , Rfl:     docusate sodium (COLACE) 100 MG capsule, Take 100 mg by mouth 2 (two) times daily., Disp: , Rfl:     ferrous sulfate (FEOSOL) 325 mg (65 mg iron) Tab tablet, Take 1 tablet (325 mg total) by mouth once daily., Disp: , Rfl: 0    KLOR-CON M20 20 mEq tablet, Take 20 mEq by mouth once daily. , Disp: , Rfl:     magnesium oxide (MAG-OX) 400 mg tablet, Take 400 mg by mouth once daily., Disp: , Rfl:     metoprolol succinate (TOPROL-XL) 100 MG 24 hr tablet, Take 50 mg by mouth 2 (two) times a day., Disp: , Rfl:     pantoprazole (PROTONIX) 40 MG tablet, Take 1 tablet (40 mg total) by mouth once daily. (Patient not taking: Reported on 3/1/2023), Disp: 30 tablet, Rfl: 11    rosuvastatin (CRESTOR) 20 MG tablet, Take 20 mg by mouth every morning., Disp: , Rfl:     sotaloL (BETAPACE) 80 MG tablet, Take 0.5 tablets (40 mg total) by mouth 2 (two) times daily., Disp: 90 tablet,  Rfl: 0    torsemide (DEMADEX) 20 MG Tab, Take 20 mg by mouth as needed., Disp: , Rfl:     vitamin D3-folic acid 5,000 unit- 1 mg Tab, Take 1 tablet by mouth once daily., Disp: , Rfl:     vitamin D3-vitamin K2, MK4, (K2 PLUS D3) 1,000-100 unit-mcg Tab, Take by mouth., Disp: , Rfl:     Current Facility-Administered Medications:     leuprolide (6 month) injection 45 mg, 45 mg, Intramuscular, Q6 Months, Abbie Martinez NP, 45 mg at 09/01/22 1319    PHYSICAL EXAMINATION:  Physical Exam  Vitals and nursing note reviewed.   Constitutional:       General: He is awake.      Appearance: Normal appearance.   HENT:      Head: Normocephalic.      Right Ear: External ear normal.      Left Ear: External ear normal.      Nose: Nose normal.   Cardiovascular:      Rate and Rhythm: Normal rate.   Pulmonary:      Effort: Pulmonary effort is normal. No respiratory distress.   Abdominal:      General: Abdomen is flat.      Palpations: Abdomen is soft.      Tenderness: There is no abdominal tenderness. There is no right CVA tenderness or left CVA tenderness.   Genitourinary:     Penis: Normal.       Testes: Normal.   Musculoskeletal:         General: Normal range of motion.      Cervical back: Normal range of motion.   Skin:     General: Skin is warm and dry.   Neurological:      General: No focal deficit present.      Mental Status: He is alert and oriented to person, place, and time.   Psychiatric:         Mood and Affect: Mood normal.         Behavior: Behavior is cooperative.         LABS:          Lab Results   Component Value Date    PSADIAG 13.3 (H) 02/28/2023    PSADIAG 7.4 (H) 12/01/2022    PSADIAG 3.8 07/25/2022       Lab Results   Component Value Date    CREATININE 1.1 12/01/2022    EGFRNORACEVR >60.0 12/01/2022             IMPRESSION:    Encounter Diagnoses   Name Primary?    Prostate cancer Yes    Biventricular implantable cardioverter-defibrillator (ICD) in situ     History of radical retropubic prostatectomy      History of external beam radiation therapy     Rising PSA following treatment for malignant neoplasm of prostate     Elevated PSA, between 10 and less than 20 ng/ml          Assessment:       1. Prostate cancer    2. Biventricular implantable cardioverter-defibrillator (ICD) in situ    3. History of radical retropubic prostatectomy    4. History of external beam radiation therapy    5. Rising PSA following treatment for malignant neoplasm of prostate    6. Elevated PSA, between 10 and less than 20 ng/ml          Plan:         I spent 40 minutes with the patient of which more than half was spent in direct consultation with the patient in regards to our treatment and plan.  We addressed the office findings and recent labs.   Education and recommendations of today's plan of care including home remedies and needed follow up with PCP.   We discussed his rising PSA and the possible contributory factors.   Discussed his prostate cancer history and current.   Emphasized that with his rising PSA, it is time to take the next step of treatment. His PCa is aggressive/basically is not giving up.  Reassured that we are not either/still manageable but needs to be managed.  Discussed plan with him and his daughter.  LUPRON 45mg today (and was given/tolerated well)  Orders placed for a PSMA scan  Reaching out to hem/onc navigator to coordinate appt with Dr. Wilkins.   We will get labs (can be done closer to his home) prior to his visit.  They all voiced understanding

## 2023-03-09 ENCOUNTER — PATIENT MESSAGE (OUTPATIENT)
Dept: UROLOGY | Facility: CLINIC | Age: 83
End: 2023-03-09
Payer: MEDICARE

## 2023-03-21 RX ORDER — TORSEMIDE 20 MG/1
20 TABLET ORAL
Qty: 30 TABLET | Refills: 3 | Status: SHIPPED | OUTPATIENT
Start: 2023-03-21 | End: 2024-01-11

## 2023-03-24 ENCOUNTER — HOSPITAL ENCOUNTER (OUTPATIENT)
Dept: RADIOLOGY | Facility: HOSPITAL | Age: 83
Discharge: HOME | End: 2023-03-24
Attending: NURSE PRACTITIONER
Payer: MEDICARE

## 2023-03-24 DIAGNOSIS — C61 PROSTATE CANCER: ICD-10-CM

## 2023-03-24 DIAGNOSIS — R97.21 RISING PSA FOLLOWING TREATMENT FOR MALIGNANT NEOPLASM OF PROSTATE: ICD-10-CM

## 2023-03-24 DIAGNOSIS — Z95.810 BIVENTRICULAR IMPLANTABLE CARDIOVERTER-DEFIBRILLATOR (ICD) IN SITU: ICD-10-CM

## 2023-03-24 DIAGNOSIS — Z90.79 HISTORY OF RADICAL RETROPUBIC PROSTATECTOMY: ICD-10-CM

## 2023-03-24 DIAGNOSIS — Z92.3 HISTORY OF EXTERNAL BEAM RADIATION THERAPY: ICD-10-CM

## 2023-03-24 DIAGNOSIS — R97.20 ELEVATED PSA, BETWEEN 10 AND LESS THAN 20 NG/ML: ICD-10-CM

## 2023-03-24 PROCEDURE — 78815 PET IMAGE W/CT SKULL-THIGH: CPT | Mod: 26,PI,, | Performed by: STUDENT IN AN ORGANIZED HEALTH CARE EDUCATION/TRAINING PROGRAM

## 2023-03-24 PROCEDURE — 78815 NM PET CT F 18 PYL PSMA, MIDTHIGH TO VERTEX: ICD-10-PCS | Mod: 26,PI,, | Performed by: STUDENT IN AN ORGANIZED HEALTH CARE EDUCATION/TRAINING PROGRAM

## 2023-03-24 PROCEDURE — 78815 PET IMAGE W/CT SKULL-THIGH: CPT | Mod: TC

## 2023-03-28 ENCOUNTER — TELEPHONE (OUTPATIENT)
Dept: UROLOGY | Facility: CLINIC | Age: 83
End: 2023-03-28
Payer: MEDICARE

## 2023-03-29 ENCOUNTER — TELEPHONE (OUTPATIENT)
Dept: UROLOGY | Facility: CLINIC | Age: 83
End: 2023-03-29
Payer: MEDICARE

## 2023-03-30 ENCOUNTER — TELEPHONE (OUTPATIENT)
Dept: HEMATOLOGY/ONCOLOGY | Facility: CLINIC | Age: 83
End: 2023-03-30
Payer: MEDICARE

## 2023-03-30 ENCOUNTER — OFFICE VISIT (OUTPATIENT)
Dept: HEMATOLOGY/ONCOLOGY | Facility: CLINIC | Age: 83
End: 2023-03-30
Payer: MEDICARE

## 2023-03-30 VITALS
HEIGHT: 67 IN | OXYGEN SATURATION: 98 % | HEART RATE: 112 BPM | SYSTOLIC BLOOD PRESSURE: 135 MMHG | TEMPERATURE: 99 F | DIASTOLIC BLOOD PRESSURE: 96 MMHG | BODY MASS INDEX: 25.71 KG/M2 | WEIGHT: 163.81 LBS | RESPIRATION RATE: 17 BRPM

## 2023-03-30 DIAGNOSIS — I63.412 EMBOLIC STROKE INVOLVING LEFT MIDDLE CEREBRAL ARTERY: ICD-10-CM

## 2023-03-30 DIAGNOSIS — H91.3 DEAF, NONSPEAKING: ICD-10-CM

## 2023-03-30 DIAGNOSIS — Z95.810 AICD (AUTOMATIC CARDIOVERTER/DEFIBRILLATOR) PRESENT: ICD-10-CM

## 2023-03-30 DIAGNOSIS — C61 PROSTATE CANCER: Primary | ICD-10-CM

## 2023-03-30 DIAGNOSIS — Z95.1 S/P CABG (CORONARY ARTERY BYPASS GRAFT): ICD-10-CM

## 2023-03-30 DIAGNOSIS — I48.19 PERSISTENT ATRIAL FIBRILLATION: ICD-10-CM

## 2023-03-30 PROCEDURE — 99214 PR OFFICE/OUTPT VISIT, EST, LEVL IV, 30-39 MIN: ICD-10-PCS | Mod: S$GLB,,, | Performed by: INTERNAL MEDICINE

## 2023-03-30 PROCEDURE — 99214 OFFICE O/P EST MOD 30 MIN: CPT | Mod: S$GLB,,, | Performed by: INTERNAL MEDICINE

## 2023-03-30 PROCEDURE — 1159F MED LIST DOCD IN RCRD: CPT | Mod: CPTII,S$GLB,, | Performed by: INTERNAL MEDICINE

## 2023-03-30 PROCEDURE — 3075F SYST BP GE 130 - 139MM HG: CPT | Mod: CPTII,S$GLB,, | Performed by: INTERNAL MEDICINE

## 2023-03-30 PROCEDURE — 99999 PR PBB SHADOW E&M-EST. PATIENT-LVL IV: ICD-10-PCS | Mod: PBBFAC,,, | Performed by: INTERNAL MEDICINE

## 2023-03-30 PROCEDURE — 99999 PR PBB SHADOW E&M-EST. PATIENT-LVL IV: CPT | Mod: PBBFAC,,, | Performed by: INTERNAL MEDICINE

## 2023-03-30 PROCEDURE — 1160F PR REVIEW ALL MEDS BY PRESCRIBER/CLIN PHARMACIST DOCUMENTED: ICD-10-PCS | Mod: CPTII,S$GLB,, | Performed by: INTERNAL MEDICINE

## 2023-03-30 PROCEDURE — 1159F PR MEDICATION LIST DOCUMENTED IN MEDICAL RECORD: ICD-10-PCS | Mod: CPTII,S$GLB,, | Performed by: INTERNAL MEDICINE

## 2023-03-30 PROCEDURE — 1125F AMNT PAIN NOTED PAIN PRSNT: CPT | Mod: CPTII,S$GLB,, | Performed by: INTERNAL MEDICINE

## 2023-03-30 PROCEDURE — 1160F RVW MEDS BY RX/DR IN RCRD: CPT | Mod: CPTII,S$GLB,, | Performed by: INTERNAL MEDICINE

## 2023-03-30 PROCEDURE — 1125F PR PAIN SEVERITY QUANTIFIED, PAIN PRESENT: ICD-10-PCS | Mod: CPTII,S$GLB,, | Performed by: INTERNAL MEDICINE

## 2023-03-30 PROCEDURE — 3080F DIAST BP >= 90 MM HG: CPT | Mod: CPTII,S$GLB,, | Performed by: INTERNAL MEDICINE

## 2023-03-30 PROCEDURE — 3075F PR MOST RECENT SYSTOLIC BLOOD PRESS GE 130-139MM HG: ICD-10-PCS | Mod: CPTII,S$GLB,, | Performed by: INTERNAL MEDICINE

## 2023-03-30 PROCEDURE — 3080F PR MOST RECENT DIASTOLIC BLOOD PRESSURE >= 90 MM HG: ICD-10-PCS | Mod: CPTII,S$GLB,, | Performed by: INTERNAL MEDICINE

## 2023-03-30 NOTE — TELEPHONE ENCOUNTER
"----- Message from Kenisha Arango sent at 3/30/2023  8:29 AM CDT -----  Regarding: Reschedule Existing Appointment        Appt Date:     03/30    Type of appt:    Follow Up, Ep    Physician:   Dr. Monica Wilkins    Reason for rescheduling?   Pt has to  their grandchildren from school. They would like to come in earlier today if possible.    Caller:    Brittney (Daughter)    Contact Preference:   [phone]      "Thank you for all that you do for our patients"     "

## 2023-03-30 NOTE — TELEPHONE ENCOUNTER
"----- Message from Kenisha Arango sent at 3/30/2023  9:33 AM CDT -----  Regarding: Consult/Advisory:      Name Of Caller:    Stevie (Son-In-Law)      Contact Preference?:   [phone]      What is the nature of the call?: Returning call to office. Pt wants to come in early for their appt today. Their ride and  won't be available to go to the appt with them.      "Thank you for all that you do for our patients"       "

## 2023-03-30 NOTE — PROGRESS NOTES
Subjective     Patient ID: Michael Mclean is a 83 y.o. male.    Chief Complaint: Prostate Cancer    HPI    Re-presents after multiple missed appointments  Hearing impaired and daughter provides sign language     He did see Urology recently and restarted Lupron after missed dosing  States he has remained compliant on Casodex with refills    In the interval he has had a rising PSA and was sent for further imaging (see PSMA PET below) revealing progression    Reports clinically that he is overall doing well  Notes numbness with positional change and comes and goes and chronic in right leg  Pain in prior right hip surgery area (has sue) from 2 yrs ago- chronic  Denies any other pain  Weight stable  Not eating as well\  Reports good energy level  In a wheelchair today as a long walk; walks at home and drives    3/6/2023 PSA = 11.2 ng/ml    - 3/24/2023 PET PSMA:  FINDINGS:  In the head and neck, there is physiologic uptake in the lacrimal and salivary glands, and there are no tracer avid lesions suspicious for malignancy.  In the chest, there are no tracer avid lesions suspicious for malignancy.  Multiple subcentimeter pulmonary nodules which are below size threshold for evaluation with PET.  For example: 0.7 cm nodule in the periphery the left upper lobe with mildly increased radiotracer uptake SUV max of 3.3 (series 3, image 28), likely present on prior CTA although slightly more prominent on today's exam.  0.3 cm pulmonary nodule in the right lower lobe (series 3, image 194), unchanged from CT 09/30/2021  In the abdomen and pelvis, there is physiologic distribution in the liver, spleen, bowel, and genitourinary tract, and there are no anatomic or tracer avid lesions suspicious for malignancy.  Specifically, there are no pathologically enlarged or tracer avid pelvic or retroperitoneal lymph nodes.  In the bones, there are several tracer avid lesions concerning for bony metastases:  * right 2nd rib long segment  of tracer uptake (axial series images 225-239) with max SUV 18.  Mildly increased sclerosis is noted on CT.  * right scapula lytic lesion (3-202) with max SUV of 15.  * right iliac bone tracer avid lesion with max SUV of 7.9 (axial series image 86), corresponding to area of subtle cortical disruption on CT (3-87), though this is unchanged from CT 11/05/2020.  * T10 inferior endplate hypermetabolic lesion with SUV max of 4.8 (fused axial series image 173) without definite underlying CT correlate.  * C3 vertebral body a tracer avid lesion with max SUV 11.0 (fused axial series image 264 without definite underlying CT correlate).  Additional CT findings:  Right shoulder arthroplasty.  Left hip arthroplasty.  Left chest wall pacer device.  Postoperative change of sternotomy. Left atrial appendage occlusion device.  Change related to aortic valve replacement.  Dense calcific atherosclerosis of the coronary arteries.  Prominent calcific atherosclerosis.  Cholelithiasis.  Left kidney is atrophic with two stable fluid attenuating cysts. Prostate is surgically absent.  Colonic diverticulosis.  Bilateral scrotal/testicular calcifications.  Degenerative changes.  Impression:  In this patient with prostate cancer status post prostatectomy, there are multiple foci of increased uptake throughout osseous structures with index lesions detailed above.  Few of these lesions correspond to underlying lytic and sclerotic lesions. Findings are overall concerning for osseous metastases.  No evidence of local tumor recurrence at prostatectomy site or driss involvement of disease.  Multiple subcentimeter pulmonary nodules including nodule in the left upper lobe with mildly increased radiotracer uptake, nonspecific.    Oncology History:  - negative prostate biopsy in 2011 (PSA = 15 ng.ml)     - followed for a year and PSA was around 7 ng/ml     - 2012, PSA increased to 13.49 ng/ml     - 3/20/2012 prostate biopsy:  Pathology revealed Elliott  4+3=7 in 1 core involving 80% of that core, Terril 3+4=7 in 3 cores involving 3 cores involving 70% to 80% of those cores and Terril 3+3=6 in 1 core involving 5% of that core.      - saw Dr. Bartholomew at [facility] and underwent a robotic prostatectomy with lymphadenectomy on 5/22/2012.   Pathology revealed a Terril 5+4= 9 disease, pT3aN0.      - Unfortunately, Mr. Mclean's PSA did not become undetectable and he completed further restaging that was without evidence of metastatic disease. Mr. Mclean opted to proceed with salvage therapy and completed one year of combined ADT and EBRT in 10/2013.   Notes at [facility] note that main side effects were weight gain and hot flashes. He has had Testosterone recovery and improved stamina and has been able to loose a little weight since completing therapy. His PSA became detectable again in September 2015 and has been slowly rising since without clinical symptoms.    PSA History at [facility]:  12/06/2016 1.67 OFF   09/12/2016 1.94  07/11/2016 2.48 Test 221  04/11/2016 1.76 OFF  12/08/2015 1.36   10/06/2015 1.43 OFF  09/01/2015 0.84  05/15/2015 0.08 Test 139  11/06/2014 0.02 Test 128  06/30/2014 <0.01 Test 181  03/31/2014 <0.01 Test 119  01/06/2014 0.01 Test 67  10/07/2013 <0.01 Casodex and LHRHa discontinued  07/01/2013 <0.01  01/08/2013 0.02  10/09/2012 0.82 Salvage CAA Started Test 296  09/19/2012 0.56  08/22/2012 0.43  06/13/2012 0.49  05/08/2012 13.49 PTX 5/22/2012, Terril 9, pT3aN0     We evaluated in 2022 with rising PSA  - 2/14/2022 CT A/P:  FINDINGS:  No suspicious mass, lymphadenopathy, or change from the previous examination of 09/30/2021.  Scattered small aortocaval lymph nodes, unchanged.  Left renal cysts and left renal atrophy, unchanged.  Somewhat small liver.  Stones within an otherwise unremarkable gallbladder.  Unremarkable right kidney, adrenals, spleen and pancreas.  Artifact secondary to a right hip prosthesis.  Impression:  No evidence of metastatic disease in the  abdomen or pelvis     2/14- /2022 Bone scan:  FINDINGS:  Radiotracer accumulation within the right 2nd rib has increased since 09/30/2021.  Otherwise, additional sites of radiotracer accumulation have not significantly changed in appearance since the prior exam other than resolution of the activity right skull base.  Patchy, suspected degenerative type uptake, cervical spine, lumbar spine, both knees and about right hip and shoulder prostheses.  Impression:  1. Increased radiotracer accumulation within the right 2nd rib as compared to bone scan of 09/30/2021 suspicious for metastatic disease  2. Otherwise, suspected degenerative type uptake within cervical spine, lumbar spine, both knees and about right shoulder and knee prostheses appears similar     - was started on Lupron and casodex  Hesitation around newer generation ADT with cardiac history     - issues with follow up  Lives in [place]- several missed appointments    Past Medical History:   Carotid artery stenosis - endarterectomy on left, monitored on right  - Prior CVA x 2, slight facial droop residual   Deaf - need an     Essential hypertension, benign    Hyperlipidemia    Mitral valve regurgitation   severe MVP, moderately to severe MR    MVP (mitral valve prolapse) 8/25/11   severe MVP, moderately to severe MR    PAD (peripheral artery disease)      FH:  No prostate cancer  No breast, ovarian cancer  No cancers     SH:  Worked for [employer]  Professional  - took steroids  No tobacco  No EtOH (rare)  Lives alone- 7 children    Review of Systems   Constitutional:  Negative for activity change, appetite change, chills, fatigue, fever and unexpected weight change (gained weight).   HENT:  Positive for hearing loss.    Eyes:  Negative for visual disturbance.   Respiratory:  Negative for cough, shortness of breath and wheezing.    Cardiovascular:  Negative for chest pain, palpitations and leg swelling.   Gastrointestinal:   Negative for abdominal distention, abdominal pain, blood in stool, change in bowel habit, constipation, diarrhea, nausea, vomiting and change in bowel habit.   Genitourinary:  Negative for bladder incontinence, decreased urine volume, difficulty urinating, frequency and urgency.   Musculoskeletal:  Negative for arthralgias, back pain, gait problem and leg pain.   Neurological:  Positive for dizziness (at times with cardiac condition). Negative for weakness, numbness and headaches.   Hematological:  Negative for adenopathy. Does not bruise/bleed easily.   Psychiatric/Behavioral:  Negative for dysphoric mood. The patient is not nervous/anxious.         Objective     Physical Exam  Vitals and nursing note reviewed.   Constitutional:       General: He is not in acute distress.     Appearance: Normal appearance. He is normal weight. He is not ill-appearing.      Comments: ECOG= 1  Presents with daughter  In a wheelchair   Neurological:      Mental Status: He is alert.          Assessment and Plan     Problem List Items Addressed This Visit       S/P CABG (coronary artery bypass graft)    Prostate cancer - Primary    Persistent atrial fibrillation    Embolic stroke involving left middle cerebral artery    Deaf, nonspeaking    AICD (automatic cardioverter/defibrillator) present         BMD  Xgeva  CABG, CVA, HTN, a fib- managed locally by PCP and Cardiologist    Prostate Cancer  We reviewed progression and options  He has not been compliant on full ADT  Just received Lupron again after missed months  States compliant on Casodex - apparently requested refills   Options: see if still hormone sensitive and recheck PSA in 4-6 weeks to refect better medication adherence/compliance  Or change to new generation ADT and reassess   He opts for 1st option  Recheck PSA in [place] in 4-6 weeks  He also needs to initiate Xgeva and we reviewed the rationale- as it is q4 weeks he would like to see if local optiosn here    Route Chart  for Scheduling    Med Onc Chart Routing  Urgent    Follow up with physician . Is thera n infusion site near his home to receive Xgeva? if so can you let me know where and I can place orders, if not please let me know so we can arrange closest site; Needs PSA in 4-6 weeks locally with an audio visit (no charge planned) after; RTC 3 months here with cbc, cmp and PSA   Follow up with [facility]    Infusion scheduling note    Injection scheduling note    Labs    Imaging    Pharmacy appointment    Other referrals

## 2023-05-09 ENCOUNTER — OFFICE VISIT (OUTPATIENT)
Dept: HEMATOLOGY/ONCOLOGY | Facility: CLINIC | Age: 83
End: 2023-05-09
Payer: MEDICARE

## 2023-05-09 ENCOUNTER — LAB VISIT (OUTPATIENT)
Dept: LAB | Facility: HOSPITAL | Age: 83
End: 2023-05-09
Attending: INTERNAL MEDICINE
Payer: MEDICARE

## 2023-05-09 DIAGNOSIS — C61 PROSTATE CANCER: Primary | ICD-10-CM

## 2023-05-09 DIAGNOSIS — C61 PROSTATE CANCER: ICD-10-CM

## 2023-05-09 LAB — COMPLEXED PSA SERPL-MCNC: 16.4 NG/ML (ref 0–4)

## 2023-05-09 PROCEDURE — 84153 ASSAY OF PSA TOTAL: CPT | Performed by: INTERNAL MEDICINE

## 2023-05-09 PROCEDURE — 99441 PR PHYSICIAN TELEPHONE EVALUATION 5-10 MIN: ICD-10-PCS | Mod: 95,,, | Performed by: INTERNAL MEDICINE

## 2023-05-09 PROCEDURE — 99441 PR PHYSICIAN TELEPHONE EVALUATION 5-10 MIN: CPT | Mod: 95,,, | Performed by: INTERNAL MEDICINE

## 2023-05-09 PROCEDURE — 36415 COLL VENOUS BLD VENIPUNCTURE: CPT | Performed by: INTERNAL MEDICINE

## 2023-05-09 RX ORDER — ABIRATERONE ACETATE 250 MG/1
250 TABLET ORAL DAILY
Qty: 30 TABLET | Refills: 11 | Status: ACTIVE | OUTPATIENT
Start: 2023-05-09 | End: 2023-05-15 | Stop reason: SDUPTHER

## 2023-05-09 NOTE — PROGRESS NOTES
Established Patient - Audio Only Telehealth Visit     The patient location is: home (uses - deaf)    The chief complaint leading to consultation is: follow up PSA  Visit type: Virtual visit with audio only (telephone)  Total time spent with patient: 7 minutes- 15 miutes total on ecnounter  The reason for the audio only service rather than synchronous audio and video virtual visit was related to technical difficulties or patient preference/necessity.-- he requires a phone       Each patient to whom I provide medical services by telemedicine is:  (1) informed of the relationship between the physician and patient and the respective role of any other health care provider with respect to management of the patient; and (2) notified that they may decline to receive medical services by telemedicine and may withdraw from such care at any time. Patient verbally consented to receive this service via voice-only telephone call.       HPI: no changes     Assessment and plan:    Change to Zytiga 250 mg with low fat diet  Recheck PSA 4-6 weeks later       This service was not originating from a related E/M service provided within the previous 7 days nor will  to an E/M service or procedure within the next 24 hours or my soonest available appointment.  Prevailing standard of care was able to be met in this audio-only visit.        Route Chart for Scheduling    Med Onc Chart Routing      Follow up with physician . 5-6 weeks with cbc, cmp, psa   Follow up with KALYAN    Infusion scheduling note    Injection scheduling note    Labs    Imaging    Pharmacy appointment    Other referrals

## 2023-05-10 ENCOUNTER — SPECIALTY PHARMACY (OUTPATIENT)
Dept: PHARMACY | Facility: CLINIC | Age: 83
End: 2023-05-10
Payer: MEDICARE

## 2023-05-10 ENCOUNTER — DOCUMENTATION ONLY (OUTPATIENT)
Dept: HEMATOLOGY/ONCOLOGY | Facility: CLINIC | Age: 83
End: 2023-05-10
Payer: MEDICARE

## 2023-05-10 ENCOUNTER — PATIENT MESSAGE (OUTPATIENT)
Dept: PHARMACY | Facility: CLINIC | Age: 83
End: 2023-05-10
Payer: MEDICARE

## 2023-05-10 ENCOUNTER — TELEPHONE (OUTPATIENT)
Dept: PHARMACY | Facility: CLINIC | Age: 83
End: 2023-05-10
Payer: MEDICARE

## 2023-05-10 DIAGNOSIS — C61 PROSTATE CANCER: Primary | ICD-10-CM

## 2023-05-10 RX ORDER — PREDNISONE 5 MG/1
5 TABLET ORAL DAILY
Qty: 30 TABLET | Refills: 6 | Status: ACTIVE | OUTPATIENT
Start: 2023-05-10 | End: 2023-12-18 | Stop reason: SDUPTHER

## 2023-05-10 NOTE — TELEPHONE ENCOUNTER
Lindsey, this is OCTAVIANO TROTTER, clinical pharmacist with [facility] that is part of your care team.  We have begun working on your prescription that your doctor has sent us. Our next steps include:     Working with your insurance company to obtain approval for your medication  Working with you to ensure your medication is affordable     We will be calling you along the way with updates on your medication but if you have any concerns or receive information that you would like to discuss please reach us at [phone].    Welcome call outcome: Patient/caregiver reached via [employer] message.

## 2023-05-10 NOTE — PROGRESS NOTES
Received an In Basket message from Dr. Wilkins's nurse re: patient fearful of the cost of his new treatment medication that is being ordered (Zytiga). Replied back that the staff at [facility] will assist with the insurance authorization process and copay assistance applications. Attempted to call patient at [phone] and left a message with the assistance of the automated . Missed patient's return call as I was on the phone and called back but the  said he wasn't answering x 2 attempted calls. The  took my message to send to patient and I asked that patient call me back after 3 PM as I will be in a meeting starting at 2 PM today. Will continue to follow and assist as needs are identified.

## 2023-05-10 NOTE — PROGRESS NOTES
Received an additional message from Dr. Wilkins's nurse Divina with request to call patient's daughter, Karen Mclean, [phone], re: copay assistance.  Called patient's daughter and explained to her that the Zytiga prescription was sent to [facility], this medication has to be filled through a specialty pharmacy, and that the staff in the pharmacy will work on obtaining prior authorization, then processing the prescription and if the copay is high and unaffordable they will search the copay assistance [employer] for an open prostate cancer fund and help patient apply, or apply to the pharmaceutical  program if there are no open funds. Answered her questions. Informed her that the staff in [facility] is working on the prescription per Mable's notes this afternoon and she has sent a portal message to patient. Informed her that I will send a message to Mable and include her name and number and ask Mable to call her. She stated understanding and appreciation. Provided her with my direct number and encouraged for them to contact me as needed. Sent the message in Epic to Mable, and a reply message to Divina. Will continue to follow and assist as needs are identified.

## 2023-05-11 ENCOUNTER — PATIENT MESSAGE (OUTPATIENT)
Dept: HEMATOLOGY/ONCOLOGY | Facility: CLINIC | Age: 83
End: 2023-05-11
Payer: MEDICARE

## 2023-05-12 ENCOUNTER — PATIENT MESSAGE (OUTPATIENT)
Dept: PHARMACY | Facility: CLINIC | Age: 83
End: 2023-05-12
Payer: MEDICARE

## 2023-05-15 DIAGNOSIS — C61 PROSTATE CANCER: ICD-10-CM

## 2023-05-15 DIAGNOSIS — R97.21 RISING PSA FOLLOWING TREATMENT FOR MALIGNANT NEOPLASM OF PROSTATE: Primary | ICD-10-CM

## 2023-05-15 RX ORDER — ABIRATERONE ACETATE 250 MG/1
250 TABLET ORAL DAILY
Qty: 30 TABLET | Refills: 11 | Status: CANCELLED | OUTPATIENT
Start: 2023-05-15 | End: 2024-05-14

## 2023-05-15 RX ORDER — PREDNISONE 5 MG/1
5 TABLET ORAL DAILY
Qty: 30 TABLET | Refills: 6 | Status: CANCELLED | OUTPATIENT
Start: 2023-05-15

## 2023-05-15 RX ORDER — ABIRATERONE ACETATE 250 MG/1
250 TABLET ORAL DAILY
Qty: 30 TABLET | Refills: 11 | Status: SHIPPED | OUTPATIENT
Start: 2023-05-15 | End: 2023-05-17 | Stop reason: SDUPTHER

## 2023-05-16 NOTE — TELEPHONE ENCOUNTER
----- Message from Sherry Morales sent at 5/16/2023 10:33 AM CDT -----  Regarding: Refill          Name of Caller: Michael Mclean         Contact Preference: [phone]        Nature of Call: Patient requesting a call back to discuss price issues of medication (abiraterone (ZYTIGA) 250 mg Tab) and (bicalutamide (CASODEX) 50 MG Tab) states it is 185 can not afford it was supposed to get assistance to off set pricing would like to know status of the program

## 2023-05-17 DIAGNOSIS — C61 PROSTATE CANCER: ICD-10-CM

## 2023-05-17 RX ORDER — ABIRATERONE ACETATE 250 MG/1
250 TABLET ORAL DAILY
Qty: 30 TABLET | Refills: 11 | Status: SHIPPED | OUTPATIENT
Start: 2023-05-17 | End: 2023-11-01 | Stop reason: SDUPTHER

## 2023-05-17 NOTE — TELEPHONE ENCOUNTER
Provider's office agreed to sent abiraterone to [facility]. I have transferred prednisone 5 mg QD to [facility] in [place], [place]. I will follow up in 1 week to confirm pt has received shipment and this close encounter

## 2023-05-31 ENCOUNTER — CLINICAL SUPPORT (OUTPATIENT)
Dept: CARDIOLOGY | Facility: HOSPITAL | Age: 83
End: 2023-05-31
Payer: MEDICARE

## 2023-05-31 DIAGNOSIS — Z95.810 PRESENCE OF AUTOMATIC (IMPLANTABLE) CARDIAC DEFIBRILLATOR: ICD-10-CM

## 2023-05-31 PROCEDURE — 93296 REM INTERROG EVL PM/IDS: CPT | Performed by: INTERNAL MEDICINE

## 2023-05-31 PROCEDURE — 93295 CARDIAC DEVICE CHECK - REMOTE: ICD-10-PCS | Mod: ,,, | Performed by: INTERNAL MEDICINE

## 2023-05-31 PROCEDURE — 93295 DEV INTERROG REMOTE 1/2/MLT: CPT | Mod: ,,, | Performed by: INTERNAL MEDICINE

## 2023-06-08 ENCOUNTER — TELEPHONE (OUTPATIENT)
Dept: SURGERY | Facility: CLINIC | Age: 83
End: 2023-06-08
Payer: MEDICARE

## 2023-06-08 NOTE — TELEPHONE ENCOUNTER
----- Message from Tremontana Chevalier sent at 6/8/2023  9:44 AM CDT -----  Regarding: appt/sound language relay  Reschedule Existing Appointment    Current appt date:  07/06    Type of appt :  lab, visit    Physician: Dr. Wilkins    Reason for rescheduling: pt wants appts on 08/29 scheduled around his other appts on that day    Date/Time Preference:    Caller: pt and sound language relay    Contact Preference:  Pt prefers calls to [phone] ( service)    Comments/notes:

## 2023-07-03 ENCOUNTER — TELEPHONE (OUTPATIENT)
Dept: ELECTROPHYSIOLOGY | Facility: CLINIC | Age: 83
End: 2023-07-03
Payer: MEDICARE

## 2023-07-03 ENCOUNTER — PATIENT MESSAGE (OUTPATIENT)
Dept: ELECTROPHYSIOLOGY | Facility: CLINIC | Age: 83
End: 2023-07-03
Payer: MEDICARE

## 2023-07-03 NOTE — TELEPHONE ENCOUNTER
90 day transmission received, atrial fibrillation noted during device remote check:    Overall burden:  4.9%    Episode in progress since 6/29/23    Ventricular rates:   Controlled        Anticoagulation status:  Watchman    7/3/23 PRESENTING RHYTHM SHOWS AF/    Patient symptoms:  Spoke with patient via .  States some intermittent dizziness.  Denies SOB, LH, fatigue.   States taking Sotalol 80mg BID (chart says 40mg) and Metoprolol 100mg BID (chart says 50mg BID).                        
None

## 2023-07-23 ENCOUNTER — NURSE TRIAGE (OUTPATIENT)
Dept: ADMINISTRATIVE | Facility: CLINIC | Age: 83
End: 2023-07-23
Payer: MEDICARE

## 2023-07-23 NOTE — TELEPHONE ENCOUNTER
- [id number] [facility] Video Relay Service    Patient has a defibrillator. He reports a flashing light from his home device. Denies any symptoms or being shocked. Per protocol will contact the on call provider. Per Dr. Montero the patient should contact the arrhythmia clinic tomorrow. Patient should be seen if he is shocked. Patient was advised and verbalizes understanding. Advised the patient to call back with any further questions or if symptoms develop.           Reason for Disposition   [1] Caller has URGENT question AND [2] triager unable to answer question    Additional Information   Negative: Received 2 or more ICD SHOCKS within a 4-hour period   Negative: [1] Received an ICD SHOCK AND [2] chest pain before or after  (Exception: The momentary ICD shock sensation that the patient feels in their chest.)   Negative: [1] Received an ICD SHOCK AND [2] any concerning symptoms (e.g., extreme fatigue, lightheadedness, palpitations, shortness of breath)   Negative: [1] Received an ICD SHOCK AND [2] feels unwell afterwards   Negative: Difficult to awaken or acting confused (e.g., disoriented, slurred speech)   Negative: Passed out (i.e., lost consciousness, collapsed and was not responding)   Negative: Sounds like a life-threatening emergency to the triager   Negative: Heart beating < 60 beats per minute OR > 140 beats per minute  (Exception: Heart rate is normal for patient, such as in a well-trained athlete or during vigorous exercise.)   Negative: Incision gaping open   Negative: [1] Received 2 or more ICD SHOCKS in a 24-hour period AND [2] feels well   Negative: Patient sounds very sick or weak to the triager   Negative: Incision looks infected (spreading redness, pain)   Negative: [1] [place] or felt device ALARM (e.g., beeping or buzzing) more than once today  (Exception: Caused by a magnet being near the device) AND [2] feels unwell aftewards    Protocols used: ICD and Pacemaker Symptoms and  [id number]

## 2023-07-24 ENCOUNTER — TELEPHONE (OUTPATIENT)
Dept: ELECTROPHYSIOLOGY | Facility: CLINIC | Age: 83
End: 2023-07-24
Payer: MEDICARE

## 2023-07-24 NOTE — TELEPHONE ENCOUNTER
Attempted to contact the pt in relation to a message received via the After hours Triage RN from 7/23/23.  Message was left with the  (Sign Language service) asking that the pt call the Device Clinic.  The contact # was provided to the .

## 2023-07-24 NOTE — CONSULTS
Clinically stable overall.  Continue to monitor off medication.   Food & Nutrition  Education    Diet Education: Low sodium diet  Time Spent: 5 minutes  Learners: patient      Nutrition Education provided with handouts: low sodium diet handout      Comments: Pt is deaf and normally uses sign language but is unable to proficiently use it at this time due to stroke. Pt was working with PT and . Left low sodium diet handout by bedside for his family.      All questions and concerns answered. Dietitian's contact information provided.       Follow-Up: yes    Please Re-consult as needed  Mali Ventura RD  Ext [phone]    Thanks!

## 2023-07-24 NOTE — TELEPHONE ENCOUNTER
----- Message from Belén Bean MA sent at 7/24/2023  3:06 PM CDT -----  The patient need to talk to someone about his device the light is on please call [phone]. Thank you.

## 2023-07-24 NOTE — TELEPHONE ENCOUNTER
Returned the call and informed the pt via the [facility] interpretor that his remote monitor will never alert him that something is going on with his ICD and that there may have been a power surge causing the monitor to attempt to reset.  Instructions given for pt to call [employer] Stay Connected for assistance with troubleshooting his remote ICD home monitor. Understanding was stated.  Pt appreciated the call.

## 2023-07-25 ENCOUNTER — TELEPHONE (OUTPATIENT)
Dept: ELECTROPHYSIOLOGY | Facility: CLINIC | Age: 83
End: 2023-07-25
Payer: MEDICARE

## 2023-07-25 NOTE — TELEPHONE ENCOUNTER
Patient is status post PVI on July 2020 with [facility]/Dr. Laurent.    Anticoagulant:  Watchman    [employer] transmission received 7/24/23, shows AF to be persistent since 6/29/23.  When I contacted patient 7/3/23, he was asymptomatic and compliant with Sotalol and Metoprolol.    AF burden 100%  Rate controlled.  V pacing previously at 1-2%, now at 34%  Last clinic visit 3/1/23

## 2023-07-26 ENCOUNTER — PATIENT MESSAGE (OUTPATIENT)
Dept: ELECTROPHYSIOLOGY | Facility: CLINIC | Age: 83
End: 2023-07-26
Payer: MEDICARE

## 2023-07-26 ENCOUNTER — TELEPHONE (OUTPATIENT)
Dept: ELECTROPHYSIOLOGY | Facility: CLINIC | Age: 83
End: 2023-07-26
Payer: MEDICARE

## 2023-07-26 DIAGNOSIS — I48.19 PERSISTENT ATRIAL FIBRILLATION: Primary | ICD-10-CM

## 2023-08-14 PROBLEM — Z00.00 ROUTINE GENERAL MEDICAL EXAMINATION AT A HEALTH CARE FACILITY: Status: RESOLVED | Noted: 2021-10-14 | Resolved: 2023-08-14

## 2023-08-28 NOTE — PROGRESS NOTES
Subjective     Patient ID: Michael Mclean is a 83 y.o. male.    Chief Complaint: No chief complaint on file.    HPI    Presents to clinic for follow up   Doing well overall   Energy level good  Uses wheelchair for longer walks/distances   Appetite remains lower  Generally has protein shake daily to supplement  Weight stable since last visit   Reports compliance with Zytiga + prendisone  No significant issues with medications  Denies hot flashes   Saw urology this morning and received Lupron injection   No fevers or chills  No CP, SOB, or palpitations  Occasional dizziness that comes and goes, no pattern or triggers known   No nausea, vomiting, constipation or diarrhea  Denies blood in urine or stool  No new urinary complaints today   No new pains     In-person  Monique present during visit.     3/6/2023 PSA = 11.2 ng/ml    - 3/24/2023 PET PSMA:  FINDINGS:  In the head and neck, there is physiologic uptake in the lacrimal and salivary glands, and there are no tracer avid lesions suspicious for malignancy.  In the chest, there are no tracer avid lesions suspicious for malignancy.  Multiple subcentimeter pulmonary nodules which are below size threshold for evaluation with PET.  For example: 0.7 cm nodule in the periphery the left upper lobe with mildly increased radiotracer uptake SUV max of 3.3 (series 3, image 28), likely present on prior CTA although slightly more prominent on today's exam.  0.3 cm pulmonary nodule in the right lower lobe (series 3, image 194), unchanged from CT 09/30/2021  In the abdomen and pelvis, there is physiologic distribution in the liver, spleen, bowel, and genitourinary tract, and there are no anatomic or tracer avid lesions suspicious for malignancy.  Specifically, there are no pathologically enlarged or tracer avid pelvic or retroperitoneal lymph nodes.  In the bones, there are several tracer avid lesions concerning for bony metastases:  * right 2nd rib long segment  of tracer uptake (axial series images 225-239) with max SUV 18.  Mildly increased sclerosis is noted on CT.  * right scapula lytic lesion (3-202) with max SUV of 15.  * right iliac bone tracer avid lesion with max SUV of 7.9 (axial series image 86), corresponding to area of subtle cortical disruption on CT (3-87), though this is unchanged from CT 11/05/2020.  * T10 inferior endplate hypermetabolic lesion with SUV max of 4.8 (fused axial series image 173) without definite underlying CT correlate.  * C3 vertebral body a tracer avid lesion with max SUV 11.0 (fused axial series image 264 without definite underlying CT correlate).  Additional CT findings:  Right shoulder arthroplasty.  Left hip arthroplasty.  Left chest wall pacer device.  Postoperative change of sternotomy. Left atrial appendage occlusion device.  Change related to aortic valve replacement.  Dense calcific atherosclerosis of the coronary arteries.  Prominent calcific atherosclerosis.  Cholelithiasis.  Left kidney is atrophic with two stable fluid attenuating cysts. Prostate is surgically absent.  Colonic diverticulosis.  Bilateral scrotal/testicular calcifications.  Degenerative changes.  Impression:  In this patient with prostate cancer status post prostatectomy, there are multiple foci of increased uptake throughout osseous structures with index lesions detailed above.  Few of these lesions correspond to underlying lytic and sclerotic lesions. Findings are overall concerning for osseous metastases.  No evidence of local tumor recurrence at prostatectomy site or driss involvement of disease.  Multiple subcentimeter pulmonary nodules including nodule in the left upper lobe with mildly increased radiotracer uptake, nonspecific.    Oncology History:  - negative prostate biopsy in 2011 (PSA = 15 ng.ml)     - followed for a year and PSA was around 7 ng/ml     - 2012, PSA increased to 13.49 ng/ml     - 3/20/2012 prostate biopsy:  Pathology revealed [place]  4+3=7 in 1 core involving 80% of that core, [place] 3+4=7 in 3 cores involving 3 cores involving 70% to 80% of those cores and [place] 3+3=6 in 1 core involving 5% of that core.      - saw Dr. Bartholomew at [facility] and underwent a robotic prostatectomy with lymphadenectomy on 5/22/2012.   Pathology revealed a [place] 5+4= 9 disease, pT3aN0.      - Unfortunately, Mr. Mclean's PSA did not become undetectable and he completed further restaging that was without evidence of metastatic disease. Mr. Mclean opted to proceed with salvage therapy and completed one year of combined ADT and EBRT in 10/2013.   Notes at [facility] note that main side effects were weight gain and hot flashes. He has had Testosterone recovery and improved stamina and has been able to loose a little weight since completing therapy. His PSA became detectable again in September 2015 and has been slowly rising since without clinical symptoms.    PSA History at [facility]:  12/06/2016 1.67 OFF   09/12/2016 1.94  07/11/2016 2.48 Test 221  04/11/2016 1.76 OFF  12/08/2015 1.36   10/06/2015 1.43 OFF  09/01/2015 0.84  05/15/2015 0.08 Test 139  11/06/2014 0.02 Test 128  06/30/2014 <0.01 Test 181  03/31/2014 <0.01 Test 119  01/06/2014 0.01 Test 67  10/07/2013 <0.01 Casodex and LHRHa discontinued  07/01/2013 <0.01  01/08/2013 0.02  10/09/2012 0.82 Salvage CAA Started Test 296  09/19/2012 0.56  08/22/2012 0.43  06/13/2012 0.49  05/08/2012 13.49 PTX 5/22/2012, [place] 9, pT3aN0     We evaluated in 2022 with rising PSA  - 2/14/2022 CT A/P:  FINDINGS:  No suspicious mass, lymphadenopathy, or change from the previous examination of 09/30/2021.  Scattered small aortocaval lymph nodes, unchanged.  Left renal cysts and left renal atrophy, unchanged.  Somewhat small liver.  Stones within an otherwise unremarkable gallbladder.  Unremarkable right kidney, adrenals, spleen and pancreas.  Artifact secondary to a right hip prosthesis.  Impression:  No evidence of metastatic disease in the  abdomen or pelvis     2/14- /2022 Bone scan:  FINDINGS:  Radiotracer accumulation within the right 2nd rib has increased since 09/30/2021.  Otherwise, additional sites of radiotracer accumulation have not significantly changed in appearance since the prior exam other than resolution of the activity right skull base.  Patchy, suspected degenerative type uptake, cervical spine, lumbar spine, both knees and about right hip and shoulder prostheses.  Impression:  1. Increased radiotracer accumulation within the right 2nd rib as compared to bone scan of 09/30/2021 suspicious for metastatic disease  2. Otherwise, suspected degenerative type uptake within cervical spine, lumbar spine, both knees and about right shoulder and knee prostheses appears similar     - was started on Lupron and casodex  Hesitation around newer generation ADT with cardiac history     - issues with follow up  Lives in [place]- several missed appointments    Past Medical History:   Carotid artery stenosis - endarterectomy on left, monitored on right  - Prior CVA x 2, slight facial droop residual   Deaf - need an     Essential hypertension, benign    Hyperlipidemia    Mitral valve regurgitation   severe MVP, moderately to severe MR    MVP (mitral valve prolapse) 8/25/11   severe MVP, moderately to severe MR    PAD (peripheral artery disease)      FH:  No prostate cancer  No breast, ovarian cancer  No cancers     SH:  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   HENT:  Positive for hearing loss.    Eyes:  Negative for visual disturbance.   Respiratory:  Negative for cough, shortness of breath and wheezing.    Cardiovascular:  Negative for chest pain, palpitations and leg swelling.   Gastrointestinal:   Negative for abdominal distention, abdominal pain, blood in stool, change in bowel habit, constipation, diarrhea, nausea, vomiting and change in bowel habit.   Genitourinary:  Negative for bladder incontinence, decreased urine volume, difficulty urinating, frequency and urgency.   Musculoskeletal:  Negative for arthralgias, back pain, gait problem and leg pain.   Neurological:  Positive for dizziness (at times with cardiac condition). Negative for weakness, numbness and headaches.   Hematological:  Negative for adenopathy. Does not bruise/bleed easily.   Psychiatric/Behavioral:  Negative for dysphoric mood. The patient is not nervous/anxious.       Objective     Physical Exam  Vitals and nursing note reviewed.   Constitutional:       General: He is not in acute distress.     Appearance: Normal appearance. He is normal weight. He is not ill-appearing, toxic-appearing or diaphoretic.      Comments: ECOG= 1  Presents with daughter and son   In a wheelchair   HENT:      Head: Normocephalic and atraumatic.      Right Ear: External ear normal.      Left Ear: External ear normal.      Nose: Nose normal. No congestion.      Mouth/Throat:      Pharynx: Oropharynx is clear. No oropharyngeal exudate.   Eyes:      General: No scleral icterus.     Conjunctiva/sclera: Conjunctivae normal.   Cardiovascular:      Rate and Rhythm: Normal rate.   Pulmonary:      Effort: Pulmonary effort is normal. No respiratory distress.   Abdominal:      General: There is no distension.      Tenderness: There is no abdominal tenderness.   Musculoskeletal:      Right lower leg: No edema.      Left lower leg: No edema.   Skin:     General: Skin is warm and dry.      Coloration: Skin is not jaundiced or pale.      Findings: No bruising, erythema or rash.   Neurological:      Mental Status: He is alert and oriented to person, place, and time. Mental status is at baseline.   Psychiatric:         Mood and Affect: Mood normal.         Behavior: Behavior  normal.         Thought Content: Thought content normal.         Judgment: Judgment normal.        Assessment and Plan     Problem List Items Addressed This Visit          Neuro    Embolic stroke involving left middle cerebral artery       ENT    Deaf, nonspeaking       Cardiac/Vascular    Persistent atrial fibrillation    S/P CABG (coronary artery bypass graft)    AICD (automatic cardioverter/defibrillator) present       Oncology    Prostate cancer - Primary    Relevant Orders    DXA Bone Density Axial Skeleton 1 or more sites     Other Visit Diagnoses       Encounter for monitoring androgen deprivation therapy        Relevant Orders    DXA Bone Density Axial Skeleton 1 or more sites    History of radical retropubic prostatectomy        History of external beam radiation therapy        Immunodeficiency secondary to neoplasm              CABG, CVA, HTN, a fib-  Managed locally by PCP and Cardiologist    Prostate Cancer-   We reviewed progression and options at previous visit   Changed to new generation ADT with zytiga/predisone in May 2023   PSA slightly decreased, now 15.9 ng/ml (was 16.4 in May 2023)  Has not been taking with food. Reviewed recommendations to take with low fat meal. Handout provided with examples of low fat meals.   Will continue regimen and reassess in ~8 weeks     BMD needed - orders placed. Discussed with patient will get scheduled.     He also needs to initiate Xgeva and we again reviewed the rationale  As it is given q4 weeks they would prefer local administration  Will check on infusion centers close to home  He will start calcium + vitamin D in the meantime - Rx sent today.     Patient is in agreement with the proposed treatment plan. All questions were answered to the patient's satisfaction. Pt knows to call clinic if anything is needed before the next clinic visit.    Patient discussed with collaborating physician, Dr. Wilkins.    At least 40 minutes were spent today on this encounter  including face to face time with the patient, data gathering/interpretation and documentation.       Mariya Fragoso, MSN, APRN, ACCNS-AG  Hematology and Medical Oncology  Clinical Nurse Specialist to Dr. Carrera, Dr. Wilkins & Dr. Sparks    Route Chart for Scheduling    Med Onc Chart Routing      Follow up with physician . Rtc in 8 weeks with labs to see Dr. Wilkins in clinic - please request  Monique   Follow up with KALYAN . Rtc in 16 weeks with labs to see KALYAN in clinic - please request  Monique   Infusion scheduling note    Injection scheduling note ** Please see if there is infusion center closer to patient's home to give Xgeva every 4 weeks - we will need to place orders once this is known   Labs CBC, CMP and PSA   Scheduling:  Preferred lab:  Lab interval:     Imaging DXA scan   please see if able to schedule same day as other visits to limit travel to [place]   Pharmacy appointment    Other referrals

## 2023-08-29 ENCOUNTER — OFFICE VISIT (OUTPATIENT)
Dept: HEMATOLOGY/ONCOLOGY | Facility: CLINIC | Age: 83
End: 2023-08-29
Payer: MEDICARE

## 2023-08-29 ENCOUNTER — HOSPITAL ENCOUNTER (OUTPATIENT)
Dept: CARDIOLOGY | Facility: HOSPITAL | Age: 83
Discharge: HOME | End: 2023-08-29
Attending: PHYSICIAN ASSISTANT
Payer: MEDICARE

## 2023-08-29 ENCOUNTER — OFFICE VISIT (OUTPATIENT)
Dept: CARDIOLOGY | Facility: CLINIC | Age: 83
End: 2023-08-29
Payer: MEDICARE

## 2023-08-29 ENCOUNTER — OFFICE VISIT (OUTPATIENT)
Dept: UROLOGY | Facility: CLINIC | Age: 83
End: 2023-08-29
Payer: MEDICARE

## 2023-08-29 ENCOUNTER — NURSE TRIAGE (OUTPATIENT)
Dept: ADMINISTRATIVE | Facility: CLINIC | Age: 83
End: 2023-08-29
Payer: MEDICARE

## 2023-08-29 ENCOUNTER — CLINICAL SUPPORT (OUTPATIENT)
Dept: CARDIOLOGY | Facility: HOSPITAL | Age: 83
End: 2023-08-29
Payer: MEDICARE

## 2023-08-29 VITALS
SYSTOLIC BLOOD PRESSURE: 167 MMHG | DIASTOLIC BLOOD PRESSURE: 82 MMHG | BODY MASS INDEX: 25.64 KG/M2 | HEART RATE: 84 BPM | OXYGEN SATURATION: 99 % | HEIGHT: 67 IN | WEIGHT: 163.38 LBS

## 2023-08-29 VITALS
BODY MASS INDEX: 24.15 KG/M2 | DIASTOLIC BLOOD PRESSURE: 86 MMHG | RESPIRATION RATE: 18 BRPM | HEART RATE: 78 BPM | TEMPERATURE: 98 F | HEIGHT: 67 IN | OXYGEN SATURATION: 97 % | WEIGHT: 153.88 LBS | SYSTOLIC BLOOD PRESSURE: 164 MMHG

## 2023-08-29 VITALS
BODY MASS INDEX: 24.92 KG/M2 | SYSTOLIC BLOOD PRESSURE: 112 MMHG | WEIGHT: 158.75 LBS | HEART RATE: 97 BPM | DIASTOLIC BLOOD PRESSURE: 79 MMHG | HEIGHT: 67 IN

## 2023-08-29 VITALS
WEIGHT: 159 LBS | SYSTOLIC BLOOD PRESSURE: 135 MMHG | DIASTOLIC BLOOD PRESSURE: 80 MMHG | BODY MASS INDEX: 24.96 KG/M2 | HEIGHT: 67 IN | HEART RATE: 70 BPM

## 2023-08-29 DIAGNOSIS — D84.81 IMMUNODEFICIENCY SECONDARY TO NEOPLASM: ICD-10-CM

## 2023-08-29 DIAGNOSIS — I48.19 PERSISTENT ATRIAL FIBRILLATION: ICD-10-CM

## 2023-08-29 DIAGNOSIS — C61 PROSTATE CANCER: Primary | ICD-10-CM

## 2023-08-29 DIAGNOSIS — R97.21 RISING PSA FOLLOWING TREATMENT FOR MALIGNANT NEOPLASM OF PROSTATE: Primary | ICD-10-CM

## 2023-08-29 DIAGNOSIS — R97.20 ELEVATED PSA, BETWEEN 10 AND LESS THAN 20 NG/ML: ICD-10-CM

## 2023-08-29 DIAGNOSIS — I70.0 AORTIC ATHEROSCLEROSIS: ICD-10-CM

## 2023-08-29 DIAGNOSIS — Z95.2 S/P TAVR (TRANSCATHETER AORTIC VALVE REPLACEMENT): ICD-10-CM

## 2023-08-29 DIAGNOSIS — Z95.810 PRESENCE OF AUTOMATIC (IMPLANTABLE) CARDIAC DEFIBRILLATOR: ICD-10-CM

## 2023-08-29 DIAGNOSIS — I63.412 EMBOLIC STROKE INVOLVING LEFT MIDDLE CEREBRAL ARTERY: ICD-10-CM

## 2023-08-29 DIAGNOSIS — Z90.79 HISTORY OF RADICAL RETROPUBIC PROSTATECTOMY: ICD-10-CM

## 2023-08-29 DIAGNOSIS — D49.9 IMMUNODEFICIENCY SECONDARY TO NEOPLASM: ICD-10-CM

## 2023-08-29 DIAGNOSIS — Z95.1 S/P CABG (CORONARY ARTERY BYPASS GRAFT): ICD-10-CM

## 2023-08-29 DIAGNOSIS — Z79.818 ENCOUNTER FOR MONITORING ANDROGEN DEPRIVATION THERAPY: ICD-10-CM

## 2023-08-29 DIAGNOSIS — I48.19 PERSISTENT ATRIAL FIBRILLATION: Primary | ICD-10-CM

## 2023-08-29 DIAGNOSIS — H91.3 DEAF, NONSPEAKING: ICD-10-CM

## 2023-08-29 DIAGNOSIS — Z95.810 AICD (AUTOMATIC CARDIOVERTER/DEFIBRILLATOR) PRESENT: ICD-10-CM

## 2023-08-29 DIAGNOSIS — C61 PROSTATE CANCER: ICD-10-CM

## 2023-08-29 DIAGNOSIS — Z92.3 HISTORY OF EXTERNAL BEAM RADIATION THERAPY: ICD-10-CM

## 2023-08-29 DIAGNOSIS — I50.32 CHRONIC DIASTOLIC HEART FAILURE: ICD-10-CM

## 2023-08-29 LAB
ASCENDING AORTA: 4.03 CM
AV INDEX (PROSTH): 0.78
AV MEAN GRADIENT: 2 MMHG
AV PEAK GRADIENT: 4 MMHG
AV VALVE AREA BY VELOCITY RATIO: 3.78 CM²
AV VALVE AREA: 4.57 CM²
AV VELOCITY RATIO: 0.64
BSA FOR ECHO PROCEDURE: 1.85 M2
CV ECHO LV RWT: 0.19 CM
DOP CALC AO PEAK VEL: 0.95 M/S
DOP CALC AO VTI: 13.52 CM
DOP CALC LVOT AREA: 5.9 CM2
DOP CALC LVOT DIAMETER: 2.74 CM
DOP CALC LVOT PEAK VEL: 0.61 M/S
DOP CALC LVOT STROKE VOLUME: 61.82 CM3
DOP CALCLVOT PEAK VEL VTI: 10.49 CM
E WAVE DECELERATION TIME: 154.53 MSEC
E/A RATIO: 2.85
E/E' RATIO: 8.82 M/S
ECHO LV POSTERIOR WALL: 0.62 CM (ref 0.6–1.1)
FRACTIONAL SHORTENING: 27 % (ref 28–44)
INTERVENTRICULAR SEPTUM: 0.89 CM (ref 0.6–1.1)
LA MAJOR: 5.54 CM
LA MINOR: 4.98 CM
LA WIDTH: 5.38 CM
LEFT ATRIUM SIZE: 5.31 CM
LEFT ATRIUM VOLUME INDEX MOD: 56 ML/M2
LEFT ATRIUM VOLUME INDEX: 68.5 ML/M2
LEFT ATRIUM VOLUME MOD: 104.11 CM3
LEFT ATRIUM VOLUME: 127.36 CM3
LEFT INTERNAL DIMENSION IN SYSTOLE: 4.76 CM (ref 2.1–4)
LEFT VENTRICLE DIASTOLIC VOLUME INDEX: 116.66 ML/M2
LEFT VENTRICLE DIASTOLIC VOLUME: 216.98 ML
LEFT VENTRICLE MASS INDEX: 108 G/M2
LEFT VENTRICLE SYSTOLIC VOLUME INDEX: 56.8 ML/M2
LEFT VENTRICLE SYSTOLIC VOLUME: 105.66 ML
LEFT VENTRICULAR INTERNAL DIMENSION IN DIASTOLE: 6.51 CM (ref 3.5–6)
LEFT VENTRICULAR MASS: 200.24 G
LV LATERAL E/E' RATIO: 6.93 M/S
LV SEPTAL E/E' RATIO: 12.13 M/S
MV PEAK A VEL: 0.34 M/S
MV PEAK E VEL: 0.97 M/S
MV STENOSIS PRESSURE HALF TIME: 44.81 MS
MV VALVE AREA P 1/2 METHOD: 4.91 CM2
PISA TR MAX VEL: 2.55 M/S
RA MAJOR: 4.9 CM
RA PRESSURE ESTIMATED: 8 MMHG
RA WIDTH: 5 CM
RIGHT VENTRICULAR END-DIASTOLIC DIMENSION: 5.26 CM
RV TB RVSP: 11 MMHG
SINUS: 3.47 CM
STJ: 3.2 CM
TDI LATERAL: 0.14 M/S
TDI SEPTAL: 0.08 M/S
TDI: 0.11 M/S
TR MAX PG: 26 MMHG
TRICUSPID ANNULAR PLANE SYSTOLIC EXCURSION: 1.06 CM
TV REST PULMONARY ARTERY PRESSURE: 34 MMHG
Z-SCORE OF LEFT VENTRICULAR DIMENSION IN END DIASTOLE: 2.29
Z-SCORE OF LEFT VENTRICULAR DIMENSION IN END SYSTOLE: 3.16

## 2023-08-29 PROCEDURE — 3288F FALL RISK ASSESSMENT DOCD: CPT | Mod: CPTII,S$GLB,, | Performed by: NURSE PRACTITIONER

## 2023-08-29 PROCEDURE — 99999 PR PBB SHADOW E&M-EST. PATIENT-LVL III: CPT | Mod: PBBFAC,,, | Performed by: NURSE PRACTITIONER

## 2023-08-29 PROCEDURE — 1159F MED LIST DOCD IN RCRD: CPT | Mod: CPTII,S$GLB,, | Performed by: REGISTERED NURSE

## 2023-08-29 PROCEDURE — 99999 PR PBB SHADOW E&M-EST. PATIENT-LVL III: ICD-10-PCS | Mod: PBBFAC,,, | Performed by: PHYSICIAN ASSISTANT

## 2023-08-29 PROCEDURE — 99214 OFFICE O/P EST MOD 30 MIN: CPT | Mod: S$GLB,,, | Performed by: PHYSICIAN ASSISTANT

## 2023-08-29 PROCEDURE — 1101F PR PT FALLS ASSESS DOC 0-1 FALLS W/OUT INJ PAST YR: ICD-10-PCS | Mod: CPTII,S$GLB,, | Performed by: NURSE PRACTITIONER

## 2023-08-29 PROCEDURE — 1160F PR REVIEW ALL MEDS BY PRESCRIBER/CLIN PHARMACIST DOCUMENTED: ICD-10-PCS | Mod: CPTII,S$GLB,, | Performed by: REGISTERED NURSE

## 2023-08-29 PROCEDURE — 96402 PR CHEMOTHER HORMON ANTINEOPL SUB-Q/IM: ICD-10-PCS | Mod: S$GLB,,, | Performed by: NURSE PRACTITIONER

## 2023-08-29 PROCEDURE — 99499 UNLISTED E&M SERVICE: CPT | Mod: S$GLB,,, | Performed by: NURSE PRACTITIONER

## 2023-08-29 PROCEDURE — 1160F RVW MEDS BY RX/DR IN RCRD: CPT | Mod: CPTII,S$GLB,, | Performed by: NURSE PRACTITIONER

## 2023-08-29 PROCEDURE — 1160F RVW MEDS BY RX/DR IN RCRD: CPT | Mod: CPTII,S$GLB,, | Performed by: REGISTERED NURSE

## 2023-08-29 PROCEDURE — 93306 ECHO (CUPID ONLY): ICD-10-PCS | Mod: 26,,, | Performed by: INTERNAL MEDICINE

## 2023-08-29 PROCEDURE — 1125F PR PAIN SEVERITY QUANTIFIED, PAIN PRESENT: ICD-10-PCS | Mod: CPTII,S$GLB,, | Performed by: NURSE PRACTITIONER

## 2023-08-29 PROCEDURE — 93306 TTE W/DOPPLER COMPLETE: CPT

## 2023-08-29 PROCEDURE — 3078F PR MOST RECENT DIASTOLIC BLOOD PRESSURE < 80 MM HG: ICD-10-PCS | Mod: CPTII,S$GLB,, | Performed by: NURSE PRACTITIONER

## 2023-08-29 PROCEDURE — 1101F PT FALLS ASSESS-DOCD LE1/YR: CPT | Mod: CPTII,S$GLB,, | Performed by: REGISTERED NURSE

## 2023-08-29 PROCEDURE — 3288F FALL RISK ASSESSMENT DOCD: CPT | Mod: CPTII,S$GLB,, | Performed by: REGISTERED NURSE

## 2023-08-29 PROCEDURE — 3079F PR MOST RECENT DIASTOLIC BLOOD PRESSURE 80-89 MM HG: ICD-10-PCS | Mod: CPTII,S$GLB,, | Performed by: PHYSICIAN ASSISTANT

## 2023-08-29 PROCEDURE — 93306 TTE W/DOPPLER COMPLETE: CPT | Mod: 26,,, | Performed by: INTERNAL MEDICINE

## 2023-08-29 PROCEDURE — 3079F PR MOST RECENT DIASTOLIC BLOOD PRESSURE 80-89 MM HG: ICD-10-PCS | Mod: CPTII,S$GLB,, | Performed by: REGISTERED NURSE

## 2023-08-29 PROCEDURE — 99999 PR PBB SHADOW E&M-EST. PATIENT-LVL III: CPT | Mod: PBBFAC,,, | Performed by: PHYSICIAN ASSISTANT

## 2023-08-29 PROCEDURE — 3074F SYST BP LT 130 MM HG: CPT | Mod: CPTII,S$GLB,, | Performed by: NURSE PRACTITIONER

## 2023-08-29 PROCEDURE — 1159F MED LIST DOCD IN RCRD: CPT | Mod: CPTII,S$GLB,, | Performed by: NURSE PRACTITIONER

## 2023-08-29 PROCEDURE — 99215 PR OFFICE/OUTPT VISIT, EST, LEVL V, 40-54 MIN: ICD-10-PCS | Mod: S$GLB,,, | Performed by: REGISTERED NURSE

## 2023-08-29 PROCEDURE — 3077F PR MOST RECENT SYSTOLIC BLOOD PRESSURE >= 140 MM HG: ICD-10-PCS | Mod: CPTII,S$GLB,, | Performed by: PHYSICIAN ASSISTANT

## 2023-08-29 PROCEDURE — 99499 NO LOS: ICD-10-PCS | Mod: S$GLB,,, | Performed by: NURSE PRACTITIONER

## 2023-08-29 PROCEDURE — 96402 CHEMO HORMON ANTINEOPL SQ/IM: CPT | Mod: S$GLB,,, | Performed by: NURSE PRACTITIONER

## 2023-08-29 PROCEDURE — 3079F DIAST BP 80-89 MM HG: CPT | Mod: CPTII,S$GLB,, | Performed by: PHYSICIAN ASSISTANT

## 2023-08-29 PROCEDURE — 99215 OFFICE O/P EST HI 40 MIN: CPT | Mod: S$GLB,,, | Performed by: REGISTERED NURSE

## 2023-08-29 PROCEDURE — 93296 REM INTERROG EVL PM/IDS: CPT | Performed by: INTERNAL MEDICINE

## 2023-08-29 PROCEDURE — 1126F PR PAIN SEVERITY QUANTIFIED, NO PAIN PRESENT: ICD-10-PCS | Mod: CPTII,S$GLB,, | Performed by: PHYSICIAN ASSISTANT

## 2023-08-29 PROCEDURE — 99999 PR PBB SHADOW E&M-EST. PATIENT-LVL V: CPT | Mod: PBBFAC,,, | Performed by: REGISTERED NURSE

## 2023-08-29 PROCEDURE — 1160F PR REVIEW ALL MEDS BY PRESCRIBER/CLIN PHARMACIST DOCUMENTED: ICD-10-PCS | Mod: CPTII,S$GLB,, | Performed by: NURSE PRACTITIONER

## 2023-08-29 PROCEDURE — 1159F PR MEDICATION LIST DOCUMENTED IN MEDICAL RECORD: ICD-10-PCS | Mod: CPTII,S$GLB,, | Performed by: NURSE PRACTITIONER

## 2023-08-29 PROCEDURE — 3079F DIAST BP 80-89 MM HG: CPT | Mod: CPTII,S$GLB,, | Performed by: REGISTERED NURSE

## 2023-08-29 PROCEDURE — 1125F AMNT PAIN NOTED PAIN PRSNT: CPT | Mod: CPTII,S$GLB,, | Performed by: NURSE PRACTITIONER

## 2023-08-29 PROCEDURE — 1101F PR PT FALLS ASSESS DOC 0-1 FALLS W/OUT INJ PAST YR: ICD-10-PCS | Mod: CPTII,S$GLB,, | Performed by: REGISTERED NURSE

## 2023-08-29 PROCEDURE — 3288F PR FALLS RISK ASSESSMENT DOCUMENTED: ICD-10-PCS | Mod: CPTII,S$GLB,, | Performed by: REGISTERED NURSE

## 2023-08-29 PROCEDURE — 1101F PT FALLS ASSESS-DOCD LE1/YR: CPT | Mod: CPTII,S$GLB,, | Performed by: NURSE PRACTITIONER

## 2023-08-29 PROCEDURE — 1159F PR MEDICATION LIST DOCUMENTED IN MEDICAL RECORD: ICD-10-PCS | Mod: CPTII,S$GLB,, | Performed by: REGISTERED NURSE

## 2023-08-29 PROCEDURE — 3077F PR MOST RECENT SYSTOLIC BLOOD PRESSURE >= 140 MM HG: ICD-10-PCS | Mod: CPTII,S$GLB,, | Performed by: REGISTERED NURSE

## 2023-08-29 PROCEDURE — 3074F PR MOST RECENT SYSTOLIC BLOOD PRESSURE < 130 MM HG: ICD-10-PCS | Mod: CPTII,S$GLB,, | Performed by: NURSE PRACTITIONER

## 2023-08-29 PROCEDURE — 1126F PR PAIN SEVERITY QUANTIFIED, NO PAIN PRESENT: ICD-10-PCS | Mod: CPTII,S$GLB,, | Performed by: REGISTERED NURSE

## 2023-08-29 PROCEDURE — 3078F DIAST BP <80 MM HG: CPT | Mod: CPTII,S$GLB,, | Performed by: NURSE PRACTITIONER

## 2023-08-29 PROCEDURE — 1126F AMNT PAIN NOTED NONE PRSNT: CPT | Mod: CPTII,S$GLB,, | Performed by: PHYSICIAN ASSISTANT

## 2023-08-29 PROCEDURE — 1126F AMNT PAIN NOTED NONE PRSNT: CPT | Mod: CPTII,S$GLB,, | Performed by: REGISTERED NURSE

## 2023-08-29 PROCEDURE — 3077F SYST BP >= 140 MM HG: CPT | Mod: CPTII,S$GLB,, | Performed by: PHYSICIAN ASSISTANT

## 2023-08-29 PROCEDURE — 99999 PR PBB SHADOW E&M-EST. PATIENT-LVL V: ICD-10-PCS | Mod: PBBFAC,,, | Performed by: REGISTERED NURSE

## 2023-08-29 PROCEDURE — 99214 PR OFFICE/OUTPT VISIT, EST, LEVL IV, 30-39 MIN: ICD-10-PCS | Mod: S$GLB,,, | Performed by: PHYSICIAN ASSISTANT

## 2023-08-29 PROCEDURE — 3077F SYST BP >= 140 MM HG: CPT | Mod: CPTII,S$GLB,, | Performed by: REGISTERED NURSE

## 2023-08-29 PROCEDURE — 3288F PR FALLS RISK ASSESSMENT DOCUMENTED: ICD-10-PCS | Mod: CPTII,S$GLB,, | Performed by: NURSE PRACTITIONER

## 2023-08-29 PROCEDURE — 99999 PR PBB SHADOW E&M-EST. PATIENT-LVL III: ICD-10-PCS | Mod: PBBFAC,,, | Performed by: NURSE PRACTITIONER

## 2023-08-29 RX ORDER — FERROUS SULFATE, DRIED 160(50) MG
1 TABLET, EXTENDED RELEASE ORAL 2 TIMES DAILY
Qty: 180 TABLET | Refills: 3 | Status: SHIPPED | OUTPATIENT
Start: 2023-08-29 | End: 2023-10-11 | Stop reason: SDUPTHER

## 2023-08-29 NOTE — PROGRESS NOTES
Subjective:     Referring Physician:    MANUEL  Michael Mclean is a 83 y.o. male with persistent Afib (s/p DCCV in 2017, s/p PVI in 2020), CAD s/p 2v CABG & bioprosthetic aotic valve in 2012 s/p 26 mm Evolut valve in valve TAVR inside of a failing 25 mm Perimount bioprosthetic valve, s/p ICD, h/o SSS, HTN, HLD, CVA, s/p 27 mm Watchman Device for MAURICIO occlusion, and prostate cancer who presents for follow up.     Pt is hearing impaired and clinically deaf.  A  was in the room and brought by the pt - she was used to communicate with the pt.    Today he is feeling well overall. He is without complaints. He stopped taking Plavix about 2 months ago and continues on ASA alone.       NYHA: I CCS: 0    Review of Systems   Constitutional: Negative for chills and fever.   HENT:  Negative for sore throat.    Eyes:  Negative for blurred vision.   Cardiovascular:  Negative for chest pain, claudication, cyanosis, dyspnea on exertion, irregular heartbeat, leg swelling, near-syncope, orthopnea, palpitations, paroxysmal nocturnal dyspnea and syncope.   Respiratory:  Negative for cough and sputum production.    Hematologic/Lymphatic: Does not bruise/bleed easily.   Skin:  Negative for itching, rash and suspicious lesions.   Musculoskeletal:  Negative for falls.   Gastrointestinal:  Negative for abdominal pain and change in bowel habit.   Genitourinary:  Negative for dysuria.   Neurological:  Negative for disturbances in coordination, dizziness and loss of balance.   Psychiatric/Behavioral:  Negative for altered mental status.           Past Medical History:   Diagnosis Date    Abdominal hernia     Anemia 02/2021    Anticoagulant long-term use     Arthritis     SHOULDER    Cancer     prostate    Cardiomyopathy     Carotid artery stenosis     left    Coronary artery disease     Deaf     Dizziness     Encounter for blood transfusion     Gastritis 3/2/2021    Hyperlipidemia     Hypertension     Mitral  regurgitation     Mitral regurgitation     PAD (peripheral artery disease)     Paroxysmal atrial fibrillation     Stroke 2020    Thyroid disease        Current Outpatient Medications:     abiraterone (ZYTIGA) 250 mg Tab, Take 1 tablet (250 mg total) by mouth once daily., Disp: 30 tablet, Rfl: 11    acetylcarnitin/alpha lipoic ac (ACETYLCARNITIN HCL-A LIPOIC AC) 400-200 mg Cap, Take 1 capsule by mouth once daily., Disp: , Rfl:     bicalutamide (CASODEX) 50 MG Tab, TAKE 1 TABLET EVERY DAY, Disp: 30 tablet, Rfl: 4    cholecalciferol, vitamin D3, 5,000 unit capsule, Take 5,000 Units by mouth once daily. , Disp: , Rfl:     clonazePAM (KLONOPIN) 2 MG Tab, Take 1 tablet (2 mg total) by mouth every evening., Disp: 30 tablet, Rfl: 0    clopidogreL (PLAVIX) 75 mg tablet, Take 1 tablet (75 mg total) by mouth once daily., Disp: 30 tablet, Rfl: 5    cyanocobalamin, vitamin B-12, 1,000 mcg/mL Kit, Inject 1 mL as directed every 28 days., Disp: , Rfl:     docusate sodium (COLACE) 100 MG capsule, Take 100 mg by mouth 2 (two) times daily., Disp: , Rfl:     ferrous sulfate (FEOSOL) 325 mg (65 mg iron) Tab tablet, Take 1 tablet (325 mg total) by mouth once daily., Disp: , Rfl: 0    KLOR-CON M20 20 mEq tablet, Take 20 mEq by mouth once daily. , Disp: , Rfl:     magnesium oxide (MAG-OX) 400 mg tablet, Take 400 mg by mouth once daily., Disp: , Rfl:     metoprolol succinate (TOPROL-XL) 100 MG 24 hr tablet, Take 50 mg by mouth 2 (two) times a day., Disp: , Rfl:     predniSONE (DELTASONE) 5 MG tablet, Take 1 tablet (5 mg total) by mouth once daily., Disp: 30 tablet, Rfl: 6    rosuvastatin (CRESTOR) 20 MG tablet, Take 20 mg by mouth every morning., Disp: , Rfl:     sotaloL (BETAPACE) 80 MG tablet, Take 0.5 tablets (40 mg total) by mouth 2 (two) times daily., Disp: 90 tablet, Rfl: 0    torsemide (DEMADEX) 20 MG Tab, Take 1 tablet (20 mg total) by mouth as needed., Disp: 30 tablet, Rfl: 3    vitamin D3-folic acid 5,000 unit- 1 mg Tab, Take 1  "tablet by mouth once daily., Disp: , Rfl:     vitamin D3-vitamin K2, MK4, (K2 PLUS D3) 1,000-100 unit-mcg Tab, Take by mouth., Disp: , Rfl:   No current facility-administered medications for this visit.    Objective:    Physical Exam  Vitals reviewed.   Constitutional:       General: He is not in acute distress.     Appearance: He is well-developed. He is not diaphoretic.   HENT:      Head: Normocephalic and atraumatic.   Neck:      Vascular: No JVD.   Cardiovascular:      Rate and Rhythm: Normal rate and regular rhythm.      Pulses: Intact distal pulses.      Heart sounds: No murmur heard.  Pulmonary:      Effort: Pulmonary effort is normal. No respiratory distress.   Musculoskeletal:      Cervical back: Normal range of motion.      Right lower leg: No edema.      Left lower leg: No edema.   Skin:     General: Skin is warm and dry.   Neurological:      Mental Status: He is alert and oriented to person, place, and time.             Vitals:    08/29/23 1327 08/29/23 1330   BP: (!) 152/86 (!) 167/82   BP Location: Right arm Left arm   Patient Position: Sitting Sitting   BP Method: Large (Automatic) Large (Automatic)   Pulse: 84 84   SpO2: 99%    Weight: 74.1 kg (163 lb 5.8 oz)    Height: 5' 7" (1.702 m)        Body mass index is 25.59 kg/m².    Test(s) Reviewed  I have reviewed the following in detail:  [] Stress test   [] Angiography   [] Echocardiogram   [] Labs   [] Other       Chemistry        Component Value Date/Time     08/29/2023 1112     08/29/2023 1112    K 4.4 08/29/2023 1112    K 4.4 08/29/2023 1112     08/29/2023 1112     08/29/2023 1112    CO2 26 08/29/2023 1112    CO2 26 08/29/2023 1112    BUN 21 08/29/2023 1112    BUN 21 08/29/2023 1112    CREATININE 0.9 08/29/2023 1112    CREATININE 0.9 08/29/2023 1112     08/29/2023 1112     08/29/2023 1112        Component Value Date/Time    CALCIUM 9.5 08/29/2023 1112    CALCIUM 9.5 08/29/2023 1112    ALKPHOS 59 08/29/2023 1112 "    AST 24 08/29/2023 1112    ALT 15 08/29/2023 1112    BILITOT 0.6 08/29/2023 1112    ESTGFRAFRICA >60.0 07/28/2022 0403    EGFRNONAA >60.0 07/28/2022 0403        TTE Today    Left Ventricle: The left ventricle is normal in size. Normal wall thickness. Normal wall motion. There is normal systolic function with a visually estimated ejection fraction of 60 - 65%. There is normal diastolic function.    Right Ventricle: Normal right ventricular cavity size. Wall thickness is normal. Right ventricle wall motion  is normal. Systolic function is normal.    Left Atrium: Left atrium is severely dilated.    Aortic Valve: There is a transcatheter valve replacement in the aortic position. It is reported to be a 26 mm Evolut valve. Aortic valve peak velocity is 0.95 m/s. Mean gradient is 2 mmHg.    Mitral Valve: Mild mitral annular calcification. There is mild to moderate regurgitation. Anterior leaflet is long with mild prolapase causing eccentric posteriorly directed jet. MR appears unchanged from prior ALEXYS.    Tricuspid Valve: There is mild to moderate regurgitation.    Pulmonary Artery: The estimated pulmonary artery systolic pressure is 34 mmHg.    IVC/SVC: Intermediate venous pressure at 8 mmHg.    Assessment:   S/P TAVR (transcatheter aortic valve replacement)  Successful 26 mm Evolut TAVR inside of a failing 25 mm Perimount bioprosthetic valve. TTE today shows a well functioning valve.     Persistent atrial fibrillation  Successful MAURICIO closure with Watchman. Continue on ASA    Chronic diastolic heart failure  Chronic. Controlled. Follow up with Cardiology/PCP.    Aortic atherosclerosis  See imaging. Follow up with Cardiology.     Plan:     OK to switch to ASA alone indefinitely.   Follow up with Dr Kiran for routine cardiology follow up with yearly TTE.   Follow up with us as needed.   SBE prophylaxis for life.              Liv Hazel PA-C  Valve and Structural Heart Disease  [facility]  [place]

## 2023-08-29 NOTE — TELEPHONE ENCOUNTER
Call in through a . Caller is wondering if he should be fasting for a urine lab test  this morning? Triage nurse reviewed all scheduled appt and labs for today and no indication for fasting noted. Advised no need to fast for labs.   Reason for Disposition   Question about upcoming scheduled test, no triage required and triager able to answer question    Protocols used: Information Only Call - No Triage-A-

## 2023-08-29 NOTE — PROGRESS NOTES
CHIEF COMPLAINT:    Michael Mclean is a 83 y.o. male who presents today for Prostate Cancer.     HISTORY OF PRESENTING ILLINESS:    Michael Mclean is a 83 y.o. deaf male with a PMH of prostate cancer. He is s/p RRP 05/2012 with Dr. Bartholomew at [facility]  Pathology revealed a Elliott 5+4= 9 disease, pT3aN0.  In 10/2013 completed one year of ADT and XRT.   He had been followed there until 2016 before moving here.      He was initially seen in our clinic with Dr. Manrique. Had been receiving leuprolide (lupron) every 6 months.   Last office visit with Dr. Ariza 07/08/2020 .   I saw him 08/09/2021.   08/02/2021 PSA was 4.3; previously 0.70 on 01.28/2021  LUPRON 45mg IM given  Plan to recheck PSA and T level; consult Hem/Onc if abnormal.   09/09/2021 PSA was 5.4 with T level of 12. Hem/Onc consult placed.      02/14/2022 CTAP:  -No evidence of metastatic disease in the abdomen or pelvis     02/14/2022 Bone Scan:  - Increased radiotracer accumulation within the right 2nd rib as compared to bone scan of 09/30/2021 suspicious for metastatic disease  -09/01/2022 with a PSA was 3.8.     Last clinic visit was 03/01/2023.  PSA was 13.3 on 02/28/2023 (7.4 on 12/01/2022)  LUPRON 45mg IM given    03/30/2023 was last clinic visit with Dr. Wilkins.   05/17/2023 he started abiraterone with Dr. Wilkins.    He is here today for LUPRON 45mg.  No complaints.   Has no urinary complaints today.  Daughter present.            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               REVIEW OF SYSTEMS:  Review of Systems   Constitutional: Negative.  Negative for chills and fever.   Eyes:  Negative for double vision.   Respiratory:  Negative for cough and shortness of breath.    Cardiovascular:  Negative for chest pain.   Gastrointestinal:  Negative for abdominal pain, constipation, diarrhea, nausea and vomiting.   Genitourinary: Negative.  Negative for dysuria, flank pain and hematuria.        Ok with  urination.      Musculoskeletal:  Positive for joint pain.        Right knee pain.   Has a local orthopedist he will discuss.     Neurological:  Negative for dizziness and seizures.   Endo/Heme/Allergies:  Negative for polydipsia.         PATIENT HISTORY:    Past Medical History:   Diagnosis Date    Abdominal hernia     Anemia 02/2021    Anticoagulant long-term use     Arthritis     SHOULDER    Cancer     prostate    Cardiomyopathy     Carotid artery stenosis     left    Coronary artery disease     Deaf     Dizziness     Encounter for blood transfusion     Gastritis 3/2/2021    Hyperlipidemia     Hypertension     Mitral regurgitation     Mitral regurgitation     PAD (peripheral artery disease)     Paroxysmal atrial fibrillation     Stroke 2020    Thyroid disease        Past Surgical History:   Procedure Laterality Date    ABLATION N/A 7/22/2020    Procedure: Ablation, afib;  Surgeon: Tal Laurent MD;  [facility];  Service: Cardiology;  Laterality: N/A;  needs covid test;needs ALEXYS ([place])    AORTIC VALVE REPLACEMENT      CARDIAC CATH COSURGEON N/A 7/27/2022    Procedure: Cardiac Cath Cosurgeon;  Surgeon: Glenroy Sam MD;  Location: [facility] CATH LAB;  Service: Cardiovascular;  Laterality: N/A;    CARDIAC CATHETERIZATION      3 HEART STENTS    carotid artery stent Left     CLOSURE OF LEFT ATRIAL APPENDAGE USING DEVICE N/A 11/22/2022    Procedure: Left atrial appendage closure device;  Surgeon: Glenroy Richardson MD;  Location: [facility] CATH LAB;  Service: Cardiology;  Laterality: N/A;    COLONOSCOPY N/A 3/2/2021    Procedure: COLONOSCOPY;  Surgeon: Emiliana Cardenas MD;  Location: [facility];  Service: General;  Laterality: N/A;  4  8:30am   CL/AM/Bd    CORONARY ARTERY BYPASS GRAFT      ESOPHAGOGASTRODUODENOSCOPY N/A 11/10/2020    Procedure: EGD (ESOPHAGOGASTRODUODENOSCOPY);  Surgeon: Renny Prakash MD;  [facility];  Service: Endoscopy;  Laterality: N/A;    HERNIA REPAIR      HIP REPLACEMENT  ARTHROPLASTY Right 11/6/2020    Procedure: EMELY -ARTHROPLASTY, HIP REPLACEMENT (ENDO HIP);  Surgeon: Lino Thomas MD;  Location: [facility] OR;  Service: Orthopedics;  Laterality: Right;    LEFT HEART CATHETERIZATION N/A 6/29/2022    Procedure: Left heart cath;  Surgeon: Glenroy Richardson MD;  Location: [facility] CATH LAB;  Service: Cardiology;  Laterality: N/A;    PACERMAKER      PROSTATECTOMY      SHOULDER SURGERY      TOE SURGERY      TRANSCATHETER AORTIC VALVE REPLACEMENT (TAVR) N/A 7/27/2022    Procedure: REPLACEMENT, AORTIC VALVE, TRANSCATHETER (TAVR);  Surgeon: Glenroy Richardson MD;  Location: [facility] CATH LAB;  Service: Cardiology;  Laterality: N/A;    TRANSCATHETER AORTIC VALVE REPLACEMENT (TAVR)  7/27/2022    Procedure: REPLACEMENT, AORTIC VALVE, TRANSCATHETER (TAVR);  Surgeon: Glenroy Sam MD;  Location: [facility] CATH LAB;  Service: Cardiovascular;;    TRANSESOPHAGEAL ECHOCARDIOGRAPHY N/A 6/27/2022    Procedure: ECHOCARDIOGRAM, TRANSESOPHAGEAL;  Surgeon: Fracisco Diagnostic Provider;  Location: [facility] EP LAB;  Service: Anesthesiology;  Laterality: N/A;    TRANSESOPHAGEAL ECHOCARDIOGRAPHY N/A 11/22/2022    Procedure: ECHOCARDIOGRAM, TRANSESOPHAGEAL;  Surgeon: Leila Arango MD;  Location: [facility] CATH LAB;  Service: Cardiology;  Laterality: N/A;    TRANSESOPHAGEAL ECHOCARDIOGRAPHY N/A 1/5/2023    Procedure: ECHOCARDIOGRAM, TRANSESOPHAGEAL;  Surgeon: Fracisco Diagnostic Provider;  Location: [facility] EP LAB;  Service: Anesthesiology;  Laterality: N/A;    TREATMENT OF CARDIAC ARRHYTHMIA N/A 8/7/2019    Procedure: Cardioversion or Defibrillation;  Surgeon: Tal Laurent MD;  Location: [facility] CATH;  Service: Cardiology;  Laterality: N/A;       Family History   Problem Relation Age of Onset    Heart disease Mother     Heart attack Mother        Social History     Socioeconomic History    Marital status:    Tobacco Use    Smoking status: Never    Smokeless tobacco: Never   Substance and Sexual Activity    Alcohol use: Yes      Alcohol/week: 7.0 standard drinks of alcohol     Types: 7 Glasses of wine per week    Drug use: Never       Allergies:  Patient has no known allergies.    Medications:    Current Outpatient Medications:     abiraterone (ZYTIGA) 250 mg Tab, Take 1 tablet (250 mg total) by mouth once daily., Disp: 30 tablet, Rfl: 11    acetylcarnitin/alpha lipoic ac (ACETYLCARNITIN HCL-A LIPOIC AC) 400-200 mg Cap, Take 1 capsule by mouth once daily., Disp: , Rfl:     bicalutamide (CASODEX) 50 MG Tab, TAKE 1 TABLET EVERY DAY, Disp: 30 tablet, Rfl: 4    cholecalciferol, vitamin D3, 5,000 unit capsule, Take 5,000 Units by mouth once daily. , Disp: , Rfl:     clonazePAM (KLONOPIN) 2 MG Tab, Take 1 tablet (2 mg total) by mouth every evening., Disp: 30 tablet, Rfl: 0    cyanocobalamin, vitamin B-12, 1,000 mcg/mL Kit, Inject 1 mL as directed every 28 days., Disp: , Rfl:     docusate sodium (COLACE) 100 MG capsule, Take 100 mg by mouth 2 (two) times daily., Disp: , Rfl:     ferrous sulfate (FEOSOL) 325 mg (65 mg iron) Tab tablet, Take 1 tablet (325 mg total) by mouth once daily., Disp: , Rfl: 0    KLOR-CON M20 20 mEq tablet, Take 20 mEq by mouth once daily. , Disp: , Rfl:     magnesium oxide (MAG-OX) 400 mg tablet, Take 400 mg by mouth once daily., Disp: , Rfl:     metoprolol succinate (TOPROL-XL) 100 MG 24 hr tablet, Take 50 mg by mouth 2 (two) times a day., Disp: , Rfl:     predniSONE (DELTASONE) 5 MG tablet, Take 1 tablet (5 mg total) by mouth once daily., Disp: 30 tablet, Rfl: 6    rosuvastatin (CRESTOR) 20 MG tablet, Take 20 mg by mouth every morning., Disp: , Rfl:     torsemide (DEMADEX) 20 MG Tab, Take 1 tablet (20 mg total) by mouth as needed., Disp: 30 tablet, Rfl: 3    vitamin D3-folic acid 5,000 unit- 1 mg Tab, Take 1 tablet by mouth once daily., Disp: , Rfl:     vitamin D3-vitamin K2, MK4, (K2 PLUS D3) 1,000-100 unit-mcg Tab, Take by mouth., Disp: , Rfl:     clopidogreL (PLAVIX) 75 mg tablet, Take 1 tablet (75 mg total) by mouth  once daily., Disp: 30 tablet, Rfl: 5    sotaloL (BETAPACE) 80 MG tablet, Take 0.5 tablets (40 mg total) by mouth 2 (two) times daily., Disp: 90 tablet, Rfl: 0  No current facility-administered medications for this visit.    PHYSICAL EXAMINATION:  Physical Exam  Vitals and nursing note reviewed.   Constitutional:       General: He is awake.      Appearance: Normal appearance.   HENT:      Head: Normocephalic.      Right Ear: External ear normal.      Left Ear: External ear normal.      Nose: Nose normal.   Cardiovascular:      Rate and Rhythm: Normal rate.   Pulmonary:      Effort: Pulmonary effort is normal. No respiratory distress.   Abdominal:      Tenderness: There is no abdominal tenderness. There is no right CVA tenderness or left CVA tenderness.   Musculoskeletal:         General: Normal range of motion.      Cervical back: Normal range of motion.   Skin:     General: Skin is warm and dry.   Neurological:      General: No focal deficit present.      Mental Status: He is alert and oriented to person, place, and time.   Psychiatric:         Mood and Affect: Mood normal.         Behavior: Behavior is cooperative.           LABS:      In office UA today was clear of active infection and GH.       Lab Results   Component Value Date    PSADIAG 16.4 (H) 05/09/2023    PSADIAG 13.3 (H) 02/28/2023    PSADIAG 7.4 (H) 12/01/2022       Lab Results   Component Value Date    CREATININE 1.04 03/06/2023    EGFRNORACEVR 70 03/06/2023             IMPRESSION:    Encounter Diagnoses   Name Primary?    Rising PSA following treatment for malignant neoplasm of prostate Yes    Prostate cancer     History of radical retropubic prostatectomy     History of external beam radiation therapy     Elevated PSA, between 10 and less than 20 ng/ml          Assessment:       1. Rising PSA following treatment for malignant neoplasm of prostate    2. Prostate cancer    3. History of radical retropubic prostatectomy    4. History of external beam  radiation therapy    5. Elevated PSA, between 10 and less than 20 ng/ml        Plan:         I spent 30 minutes with the patient of which more than half was spent in direct consultation with the patient in regards to our treatment and plan.  We addressed the office findings and recent labs.   Education and recommendations of today's plan of care including home remedies and needed follow up with PCP.   We discussed the chief complaint; reviewed the LUTS and the possible contributory factors.   Reassurance no infection in urine.   Labs today will show the effectiveness of current meds.   LUPRON 45 mg today.   Recommended lifestyle modifications with a proper, healthy diet, good hydration but during the day. Reducing bladder irritants.   Benefits of regular exercise.  Encouraged f/u with Hem/onc  RTC 6 months with PSA; LUPRON, HEM/Onc

## 2023-08-31 ENCOUNTER — NURSE TRIAGE (OUTPATIENT)
Dept: ADMINISTRATIVE | Facility: CLINIC | Age: 83
End: 2023-08-31
Payer: MEDICARE

## 2023-08-31 NOTE — TELEPHONE ENCOUNTER
on the line. Pt stated He received an injection 2 days ago and the site is still store. Leuprolide injection for prostate cancer. Received injection in left hip. Pain is 7-8/10. No swelling. Redness at site. Pt has been applying cold compressions but wants to know if cold or hot was better. Pt ended triage call during intake process. Explained to pt the questions are needed to give care advice. Pt stated he will continue to ice it. Pt ended triage call.  Reason for Disposition   Nursing judgment    Additional Information   Negative: SEVERE difficulty breathing (e.g., struggling for each breath, speaks in single words)   Negative: Shock suspected (e.g., cold/pale/clammy skin, too weak to stand, low BP, rapid pulse)   Negative: Sounds like a life-threatening emergency to the triager   Negative: [1] Difficulty breathing AND [2] not severe   Negative: Arm is swollen, new-onset (or leg swelling if IV in lower extremity)   Negative: Fever > 100.4 F (38.0 C)   Negative: Patient sounds very sick or weak to the triager   Negative: Pus or cloudy fluid from IV site   Negative: [1] Red streak at IV site AND [2] palpable cord   Negative: [1] Red streak at IV site AND [2] longer than 1 inch (2.5 cm)   Negative: [1] Skin redness AND [2] extends > 1 inch (2.5 cm) from IV site   Negative: Skin swelling at IV site (Exception: IV recently removed and small area of skin swelling)   Negative: [1] Raised bruise at IV site AND [2] size > 2 inches (5 cm) AND [3] expanding   Negative: [1] Pain at IV site or shooting up arm AND [2] IV running slowly    Protocols used: IV Site (Skin) Symptoms-A-AH, No Guideline or Reference [id number]

## 2023-09-14 ENCOUNTER — TELEPHONE (OUTPATIENT)
Dept: HEMATOLOGY/ONCOLOGY | Facility: CLINIC | Age: 83
End: 2023-09-14
Payer: MEDICARE

## 2023-09-14 NOTE — TELEPHONE ENCOUNTER
Feels pain in jaw and tooth pain for last month that comes and goes, unable to pay to see a dentist. Would like to know if he can take tylenol.     Describes it as sensitivity, left lower jaw.

## 2023-09-14 NOTE — TELEPHONE ENCOUNTER
"----- Message from Michelle Coates sent at 9/14/2023  9:48 AM CDT -----  Regarding: Pt advice  Contact: Pt     Pt requesting call back in regards to jaw pain he is having. Pt stated he would like to know what meds should he betaking tylenol or amoxicillin.   Please call and adv @       Confirmed contact below:   Contact Name: Michael Mclean  Phone Number: [phone]               Additional Notes:  "Thank you for all that you do for our patients"                                           "

## 2023-09-23 ENCOUNTER — NURSE TRIAGE (OUTPATIENT)
Dept: ADMINISTRATIVE | Facility: CLINIC | Age: 83
End: 2023-09-23
Payer: MEDICARE

## 2023-09-23 NOTE — TELEPHONE ENCOUNTER
"Interperter 1284 on the line with the pt, .  Pt states he has no complaints but his bedside monitor was blinking earlier today.  [employer] manual states to unplug and re plug, pt did this and "light" is still on states light was white but now blue but did go off during the triage call.  Care advice states home care.  Patient verbally understands, per , all questions answered, advised to call back for any worsening symptoms or further needs.      Reason for Disposition   Magnetic and electrical interference, questions about    Additional Information   Negative: Received 2 or more ICD SHOCKS within a 4-hour period   Negative: [1] Received an ICD SHOCK AND [2] chest pain before or after  (Exception: The momentary ICD shock sensation that the patient feels in their chest.)   Negative: [1] Received an ICD SHOCK AND [2] any concerning symptoms (e.g., extreme fatigue, lightheadedness, palpitations, shortness of breath)   Negative: [1] Received an ICD SHOCK AND [2] feels unwell afterwards   Negative: Difficult to awaken or acting confused (e.g., disoriented, slurred speech)   Negative: Passed out (i.e., lost consciousness, collapsed and was not responding)   Negative: Sounds like a life-threatening emergency to the triager   Negative: Heart beating < 60 beats per minute OR > 140 beats per minute  (Exception: Heart rate is normal for patient, such as in a well-trained athlete or during vigorous exercise.)   Negative: Incision gaping open   Negative: [1] Received 2 or more ICD SHOCKS in a 24-hour period AND [2] feels well   Negative: Patient sounds very sick or weak to the triager   Negative: Incision looks infected (spreading redness, pain)   Negative: [1] [place] or felt device ALARM (e.g., beeping or buzzing) more than once today  (Exception: Caused by a magnet being near the device) AND [2] feels unwell aftewards   Negative: [1] Caller has URGENT question AND [2] triager unable to answer " question   Negative: New-onset of palpitations (skipped beats or fluttering in chest)   Negative: [1] Received a single SHOCK from implantable cardiac defibrillator AND [2] now feels well   Negative: [1] [place] or felt device ALARM (e.g., beeping or buzzing) once, or once each day  (Exception: Caused by a magnet being near the device.) AND [2] patient feels well   Negative: [1] Caller has NON-URGENT question AND [2] triager unable to answer question   Negative: Anxiety or stress related to device   Negative: Shocks (electrical discharges) from an ICD, questions about   Negative: Activity for people with an ICD or pacemaker, questions about    Protocols used: ICD and Pacemaker Symptoms and [id number]

## 2023-09-26 ENCOUNTER — TELEPHONE (OUTPATIENT)
Dept: CARDIOLOGY | Facility: HOSPITAL | Age: 83
End: 2023-09-26
Payer: MEDICARE

## 2023-09-26 NOTE — TELEPHONE ENCOUNTER
Spoke with patient through his .  He wanted to make sure his home monitor is working correctly.  I informed him that his monitor and his device is working fine.  I asked patient how he is feeling and he stated he is feeling fine.  Patient asked if he should bring his monitor in when he comes in October.  I informed him that he dose not need to bring in his monitor when he comes.  Patient stated understanding.  I asked him to please give us a call if he should have any further questions or concerns.  Patient stated understanding.

## 2023-10-09 ENCOUNTER — TELEPHONE (OUTPATIENT)
Dept: ELECTROPHYSIOLOGY | Facility: CLINIC | Age: 83
End: 2023-10-09
Payer: MEDICARE

## 2023-10-09 NOTE — PROGRESS NOTES
Mr. Mclean is a patient of Dr. Boo and was last seen in clinic 3/1/2023.      Subjective:   Patient ID:  Michael Mclean is an 83 y.o. male who presents for follow up of Atrial Fibrillation and ICD  .     HPI:    Mr. Mclean is an 83 y.o. male with atrial fibrillation, ventricular fibrillation, CAD s/p CABG, AS s/p sAVR followed by TAVR, Htn, CVA, mitral valve prolapse, ICD here for follow up.     Background:    Previously managed by [facility]. EP was Dr. Laurent. Has switched care to [facility].  Persistent Afib PVI 7/20 ([facility] + roof line).  Remains on Sotalol since then.  CAD s/p 2v CABG 2012  Aortic stenosis Bioprosthetic aotic valve in 2012 s/p 26 mm Evolut valve in valve TAVR inside of a failing 25 mm Perimount bioprosthetic valve  VF in setting of EF 15%. Dual chamber ICD implanted 9/6/19  Echo 9/1/22 EF 45% mitral valve anterior leaflet prolapse with moderate to severe MR, YOAN 85.7, well seated aortic prosthesis.     Feeling well overall.  Not on anticoagulation. Has had GI bleeding in the past requiring transfusion.   MAURICIO occlusion 11/22/22    3/1/2023: Feeling well overall. Denies significant dyspnea, syncope, ICD shocks.  Device interrogation reveals stable function of the leads, RA pacing 99% RV pacing 1%, AF burden 1% longest duration 18 hours 1/23/23 one episode of monomorphic V1T ATP x 1 successful.  Rare paroxysmal AF, not symptomatic. Now s/p Watchman.  One episode of VT, pace-terminated, asymptomatic.  Continue Sotalol and Toprol.  Continue current settings and medications.  F/u with monitoring as scheduled (with us), and with me in 6 months.    Update (10/11/2023):    7/25/2023: Persistent AF since 6/29/2023: V pacing increased from 1% to 34%.    Today he is here with a  and family assisting with communication. Denies LOCKHART, palps. No CP, syncope reported. He does report episodes of LH sometimes.    On ASA s/p watchman. Patient confirms he is on sotalol 80mg BID and toprol  100mg BID (not 50)    Device Interrogation (10/11/2023) reveals an intrinsic AF with stable lead and device function. 100% AF since 6/29/23/ >25% RVR. No ventricular arrhythmias or treated episodes were noted. He paces 52% in the RA and 15.4% in the RV. Estimated battery longevity 5.2 years.     I have personally reviewed the patient's EKG today, which shows AF with IVCD at 98bpm. QRS is 130. QTc is 485.    Relevant Cardiac Test Results:    2D Echo (8/29/2023):    Left Ventricle: The left ventricle is normal in size. Normal wall thickness. Normal wall motion. There is normal systolic function with a visually estimated ejection fraction of 60 - 65%. There is normal diastolic function.    Right Ventricle: Normal right ventricular cavity size. Wall thickness is normal. Right ventricle wall motion  is normal. Systolic function is normal.    Left Atrium: Left atrium is severely dilated.    Aortic Valve: There is a transcatheter valve replacement in the aortic position. It is reported to be a 26 mm Evolut valve. Aortic valve peak velocity is 0.95 m/s. Mean gradient is 2 mmHg.    Mitral Valve: Mild mitral annular calcification. There is mild to moderate regurgitation. Anterior leaflet is long with mild prolapase causing eccentric posteriorly directed jet. MR appears unchanged from prior ALEXYS.    Tricuspid Valve: There is mild to moderate regurgitation.    Pulmonary Artery: The estimated pulmonary artery systolic pressure is 34 mmHg.    IVC/SVC: Intermediate venous pressure at 8 mmHg.    Current Outpatient Medications   Medication Sig    abiraterone (ZYTIGA) 250 mg Tab Take 1 tablet (250 mg total) by mouth once daily.    calcium-vitamin D3 (OYSTER SHELL CALCIUM-VIT D3) 500 mg-5 mcg (200 unit) per tablet Take 1 tablet by mouth 2 (two) times daily.    clonazePAM (KLONOPIN) 2 MG Tab Take 1 tablet (2 mg total) by mouth every evening.    cyanocobalamin, vitamin B-12, 1,000 mcg/mL Kit Inject 1 mL as directed every 28 days.     docusate sodium (COLACE) 100 MG capsule Take 100 mg by mouth 2 (two) times daily.    ferrous sulfate (FEOSOL) 325 mg (65 mg iron) Tab tablet Take 1 tablet (325 mg total) by mouth once daily.    KLOR-CON M20 20 mEq tablet Take 20 mEq by mouth once daily.     metoprolol succinate (TOPROL-XL) 100 MG 24 hr tablet Take 100 mg by mouth 2 (two) times a day.    predniSONE (DELTASONE) 5 MG tablet Take 1 tablet (5 mg total) by mouth once daily.    rosuvastatin (CRESTOR) 20 MG tablet Take 20 mg by mouth every morning.    sotaloL (BETAPACE) 80 MG tablet Take 0.5 tablets (40 mg total) by mouth 2 (two) times daily. (Patient taking differently: Take 80 mg by mouth 2 (two) times daily.)    torsemide (DEMADEX) 20 MG Tab Take 1 tablet (20 mg total) by mouth as needed.    vitamin D3-folic acid 5,000 unit- 1 mg Tab Take 1 tablet by mouth once daily.    vitamin D3-vitamin K2, MK4, (K2 PLUS D3) 1,000-100 unit-mcg Tab Take by mouth.    acetylcarnitin/alpha lipoic ac (ACETYLCARNITIN HCL-A LIPOIC AC) 400-200 mg Cap Take 1 capsule by mouth once daily.    bicalutamide (CASODEX) 50 MG Tab TAKE 1 TABLET EVERY DAY (Patient not taking: Reported on 10/11/2023.)    cholecalciferol, vitamin D3, 5,000 unit capsule Take 5,000 Units by mouth once daily.     magnesium oxide (MAG-OX) 400 mg tablet Take 400 mg by mouth once daily.     No current facility-administered medications for this visit.       Review of Systems   Constitutional: Negative for malaise/fatigue.   Cardiovascular:  Negative for chest pain, dyspnea on exertion, irregular heartbeat, leg swelling and palpitations.   Respiratory:  Negative for shortness of breath.    Hematologic/Lymphatic: Negative for bleeding problem.   Skin:  Negative for rash.   Musculoskeletal:  Negative for myalgias.   Gastrointestinal:  Negative for hematemesis, hematochezia and nausea.   Genitourinary:  Negative for hematuria.   Neurological:  Positive for light-headedness.   Psychiatric/Behavioral:  Negative for  "altered mental status.    Allergic/Immunologic: Negative for persistent infections.       Objective:          /83   Pulse 98   Ht 5' 7" (1.702 m)   Wt 71.2 kg (157 lb)   BMI 24.59 kg/m²     Physical Exam  Vitals and nursing note reviewed.   Constitutional:       Appearance: Normal appearance. He is well-developed.   HENT:      Head: Normocephalic.      Nose: Nose normal.   Eyes:      Pupils: Pupils are equal, round, and reactive to light.   Cardiovascular:      Rate and Rhythm: Normal rate. Rhythm irregularly irregular.   Pulmonary:      Effort: No respiratory distress.      Breath sounds: Normal breath sounds.   Chest:      Comments: Icd in situ  Musculoskeletal:         General: Normal range of motion.   Skin:     General: Skin is warm and dry.      Findings: No erythema.   Neurological:      Mental Status: He is alert and oriented to person, place, and time.   Psychiatric:         Speech: Speech normal.         Behavior: Behavior normal.           Lab Results   Component Value Date     10/11/2023    K 5.0 10/11/2023    MG 2.0 03/06/2023    BUN 19 10/11/2023    CREATININE 0.9 10/11/2023    ALT 15 10/11/2023    AST 26 10/11/2023    HGB 15.2 10/11/2023    HCT 46.8 10/11/2023    TSH 2.28 11/11/2020    LDLCALC 66 03/06/2023       Recent Labs   Lab 07/25/22  1312 07/27/22  0804 11/14/22  1620 11/22/22  1148   INR 1.1 1.1 1.1 1.1       Assessment:     1. Persistent atrial fibrillation    2. AICD (automatic cardioverter/defibrillator) present    3. Chronic diastolic heart failure    4. Essential hypertension    5. Presence of Watchman left atrial appendage closure device      Plan:     In summary, Mr. Mclean is an 83 y.o. male with atrial fibrillation, ventricular fibrillation, CAD s/p CABG, AS s/p sAVR followed by TAVR, Htn, CVA, mitral valve prolapse, ICD here for follow up.   Pt in persistent atrial fibrillation since June 2023. Patient asymptomatic.  increased from 1% to 15%. Echo 8/29/2023 shows " preserved LV function. He is on ASA s/p watchman.   He is on sotalol 40mg BID. Has a hx of VT, most recent episode 1/23/2023 treated successfully with ATPx1. We discussed options, which include DCCV and likely switching AAD (amio?) or continuing rate control strategy.   He has a watchman with a history of significant bleeding requiring blood transfusions while on OAC.  His rates need improvement. Will increase toprol (he says he thinks he is on 100mg BID but will confirm when he gets home) for more rate control. Discuss sotalol with Dr. Boo. (UPDATE: OK to continue rate control strategy and continue sotalol for VT).    Increase toprol to 100mg AM/150mg PM  Continue other meds  Continue device checks  RTC 3 mo, sooner if needed    *A copy of this note has been sent to Dr. Boo*    Follow up in about 3 months (around 1/11/2024).    ------------------------------------------------------------------    SYLVIA Loaiza, NP-C  Cardiac Electrophysiology

## 2023-10-10 ENCOUNTER — TELEPHONE (OUTPATIENT)
Dept: ELECTROPHYSIOLOGY | Facility: CLINIC | Age: 83
End: 2023-10-10
Payer: MEDICARE

## 2023-10-11 ENCOUNTER — CLINICAL SUPPORT (OUTPATIENT)
Dept: CARDIOLOGY | Facility: HOSPITAL | Age: 83
End: 2023-10-11
Attending: INTERNAL MEDICINE
Payer: MEDICARE

## 2023-10-11 ENCOUNTER — OFFICE VISIT (OUTPATIENT)
Dept: ELECTROPHYSIOLOGY | Facility: CLINIC | Age: 83
End: 2023-10-11
Payer: MEDICARE

## 2023-10-11 ENCOUNTER — HOSPITAL ENCOUNTER (OUTPATIENT)
Dept: RADIOLOGY | Facility: CLINIC | Age: 83
Discharge: HOME | End: 2023-10-11
Attending: REGISTERED NURSE
Payer: MEDICARE

## 2023-10-11 ENCOUNTER — HOSPITAL ENCOUNTER (OUTPATIENT)
Dept: CARDIOLOGY | Facility: CLINIC | Age: 83
Discharge: HOME | End: 2023-10-11
Payer: MEDICARE

## 2023-10-11 ENCOUNTER — OFFICE VISIT (OUTPATIENT)
Dept: HEMATOLOGY/ONCOLOGY | Facility: CLINIC | Age: 83
End: 2023-10-11
Payer: MEDICARE

## 2023-10-11 VITALS
RESPIRATION RATE: 18 BRPM | WEIGHT: 157.19 LBS | BODY MASS INDEX: 24.67 KG/M2 | HEIGHT: 67 IN | SYSTOLIC BLOOD PRESSURE: 128 MMHG | HEART RATE: 99 BPM | DIASTOLIC BLOOD PRESSURE: 92 MMHG | OXYGEN SATURATION: 99 % | TEMPERATURE: 98 F

## 2023-10-11 VITALS
BODY MASS INDEX: 24.64 KG/M2 | HEIGHT: 67 IN | HEART RATE: 98 BPM | DIASTOLIC BLOOD PRESSURE: 83 MMHG | WEIGHT: 157 LBS | SYSTOLIC BLOOD PRESSURE: 114 MMHG

## 2023-10-11 DIAGNOSIS — Z95.810 AICD (AUTOMATIC CARDIOVERTER/DEFIBRILLATOR) PRESENT: ICD-10-CM

## 2023-10-11 DIAGNOSIS — C61 PROSTATE CANCER: Primary | ICD-10-CM

## 2023-10-11 DIAGNOSIS — I50.32 CHRONIC DIASTOLIC HEART FAILURE: ICD-10-CM

## 2023-10-11 DIAGNOSIS — I63.412 EMBOLIC STROKE INVOLVING LEFT MIDDLE CEREBRAL ARTERY: ICD-10-CM

## 2023-10-11 DIAGNOSIS — D49.9 IMMUNODEFICIENCY SECONDARY TO NEOPLASM: ICD-10-CM

## 2023-10-11 DIAGNOSIS — Z95.818 PRESENCE OF WATCHMAN LEFT ATRIAL APPENDAGE CLOSURE DEVICE: ICD-10-CM

## 2023-10-11 DIAGNOSIS — I48.19 PERSISTENT ATRIAL FIBRILLATION: Primary | ICD-10-CM

## 2023-10-11 DIAGNOSIS — I48.19 PERSISTENT ATRIAL FIBRILLATION: ICD-10-CM

## 2023-10-11 DIAGNOSIS — Z79.818 ENCOUNTER FOR MONITORING ANDROGEN DEPRIVATION THERAPY: ICD-10-CM

## 2023-10-11 DIAGNOSIS — Z92.3 HISTORY OF EXTERNAL BEAM RADIATION THERAPY: ICD-10-CM

## 2023-10-11 DIAGNOSIS — Z95.1 S/P CABG (CORONARY ARTERY BYPASS GRAFT): ICD-10-CM

## 2023-10-11 DIAGNOSIS — D84.81 IMMUNODEFICIENCY SECONDARY TO NEOPLASM: ICD-10-CM

## 2023-10-11 DIAGNOSIS — Z90.79 HISTORY OF RADICAL RETROPUBIC PROSTATECTOMY: ICD-10-CM

## 2023-10-11 DIAGNOSIS — I10 ESSENTIAL HYPERTENSION: ICD-10-CM

## 2023-10-11 DIAGNOSIS — H91.3 DEAF, NONSPEAKING: ICD-10-CM

## 2023-10-11 DIAGNOSIS — C61 PROSTATE CANCER: ICD-10-CM

## 2023-10-11 PROCEDURE — 93005 ELECTROCARDIOGRAM TRACING: CPT | Mod: S$GLB,,, | Performed by: INTERNAL MEDICINE

## 2023-10-11 PROCEDURE — 99999 PR PBB SHADOW E&M-EST. PATIENT-LVL IV: CPT | Mod: PBBFAC,,, | Performed by: REGISTERED NURSE

## 2023-10-11 PROCEDURE — 3079F DIAST BP 80-89 MM HG: CPT | Mod: CPTII,S$GLB,, | Performed by: NURSE PRACTITIONER

## 2023-10-11 PROCEDURE — 3074F SYST BP LT 130 MM HG: CPT | Mod: CPTII,S$GLB,, | Performed by: NURSE PRACTITIONER

## 2023-10-11 PROCEDURE — 1101F PR PT FALLS ASSESS DOC 0-1 FALLS W/OUT INJ PAST YR: ICD-10-PCS | Mod: CPTII,S$GLB,, | Performed by: NURSE PRACTITIONER

## 2023-10-11 PROCEDURE — 1126F PR PAIN SEVERITY QUANTIFIED, NO PAIN PRESENT: ICD-10-PCS | Mod: CPTII,S$GLB,, | Performed by: NURSE PRACTITIONER

## 2023-10-11 PROCEDURE — 1101F PT FALLS ASSESS-DOCD LE1/YR: CPT | Mod: CPTII,S$GLB,, | Performed by: REGISTERED NURSE

## 2023-10-11 PROCEDURE — 3288F FALL RISK ASSESSMENT DOCD: CPT | Mod: CPTII,S$GLB,, | Performed by: NURSE PRACTITIONER

## 2023-10-11 PROCEDURE — 93283 PRGRMG EVAL IMPLANTABLE DFB: CPT

## 2023-10-11 PROCEDURE — 3074F PR MOST RECENT SYSTOLIC BLOOD PRESSURE < 130 MM HG: ICD-10-PCS | Mod: CPTII,S$GLB,, | Performed by: REGISTERED NURSE

## 2023-10-11 PROCEDURE — 3074F SYST BP LT 130 MM HG: CPT | Mod: CPTII,S$GLB,, | Performed by: REGISTERED NURSE

## 2023-10-11 PROCEDURE — 99214 OFFICE O/P EST MOD 30 MIN: CPT | Mod: S$GLB,,, | Performed by: REGISTERED NURSE

## 2023-10-11 PROCEDURE — 3079F PR MOST RECENT DIASTOLIC BLOOD PRESSURE 80-89 MM HG: ICD-10-PCS | Mod: CPTII,S$GLB,, | Performed by: NURSE PRACTITIONER

## 2023-10-11 PROCEDURE — 99214 OFFICE O/P EST MOD 30 MIN: CPT | Mod: S$GLB,,, | Performed by: NURSE PRACTITIONER

## 2023-10-11 PROCEDURE — 1160F PR REVIEW ALL MEDS BY PRESCRIBER/CLIN PHARMACIST DOCUMENTED: ICD-10-PCS | Mod: CPTII,S$GLB,, | Performed by: NURSE PRACTITIONER

## 2023-10-11 PROCEDURE — 99999 PR PBB SHADOW E&M-EST. PATIENT-LVL IV: ICD-10-PCS | Mod: PBBFAC,,, | Performed by: NURSE PRACTITIONER

## 2023-10-11 PROCEDURE — 77080 DXA BONE DENSITY AXIAL: CPT | Mod: 26,,, | Performed by: INTERNAL MEDICINE

## 2023-10-11 PROCEDURE — 1126F AMNT PAIN NOTED NONE PRSNT: CPT | Mod: CPTII,S$GLB,, | Performed by: REGISTERED NURSE

## 2023-10-11 PROCEDURE — 1126F AMNT PAIN NOTED NONE PRSNT: CPT | Mod: CPTII,S$GLB,, | Performed by: NURSE PRACTITIONER

## 2023-10-11 PROCEDURE — 3074F PR MOST RECENT SYSTOLIC BLOOD PRESSURE < 130 MM HG: ICD-10-PCS | Mod: CPTII,S$GLB,, | Performed by: NURSE PRACTITIONER

## 2023-10-11 PROCEDURE — 3288F FALL RISK ASSESSMENT DOCD: CPT | Mod: CPTII,S$GLB,, | Performed by: REGISTERED NURSE

## 2023-10-11 PROCEDURE — 1159F PR MEDICATION LIST DOCUMENTED IN MEDICAL RECORD: ICD-10-PCS | Mod: CPTII,S$GLB,, | Performed by: NURSE PRACTITIONER

## 2023-10-11 PROCEDURE — 1101F PR PT FALLS ASSESS DOC 0-1 FALLS W/OUT INJ PAST YR: ICD-10-PCS | Mod: CPTII,S$GLB,, | Performed by: REGISTERED NURSE

## 2023-10-11 PROCEDURE — 3288F PR FALLS RISK ASSESSMENT DOCUMENTED: ICD-10-PCS | Mod: CPTII,S$GLB,, | Performed by: NURSE PRACTITIONER

## 2023-10-11 PROCEDURE — 77080 DXA BONE DENSITY AXIAL SKELETON 1 OR MORE SITES: ICD-10-PCS | Mod: 26,,, | Performed by: INTERNAL MEDICINE

## 2023-10-11 PROCEDURE — 93283 CARDIAC DEVICE CHECK - IN CLINIC & HOSPITAL: ICD-10-PCS | Mod: 26,,, | Performed by: INTERNAL MEDICINE

## 2023-10-11 PROCEDURE — 3288F PR FALLS RISK ASSESSMENT DOCUMENTED: ICD-10-PCS | Mod: CPTII,S$GLB,, | Performed by: REGISTERED NURSE

## 2023-10-11 PROCEDURE — 99999 PR PBB SHADOW E&M-EST. PATIENT-LVL IV: CPT | Mod: PBBFAC,,, | Performed by: NURSE PRACTITIONER

## 2023-10-11 PROCEDURE — 1160F RVW MEDS BY RX/DR IN RCRD: CPT | Mod: CPTII,S$GLB,, | Performed by: NURSE PRACTITIONER

## 2023-10-11 PROCEDURE — 1126F PR PAIN SEVERITY QUANTIFIED, NO PAIN PRESENT: ICD-10-PCS | Mod: CPTII,S$GLB,, | Performed by: REGISTERED NURSE

## 2023-10-11 PROCEDURE — 99214 PR OFFICE/OUTPT VISIT, EST, LEVL IV, 30-39 MIN: ICD-10-PCS | Mod: S$GLB,,, | Performed by: NURSE PRACTITIONER

## 2023-10-11 PROCEDURE — 99214 PR OFFICE/OUTPT VISIT, EST, LEVL IV, 30-39 MIN: ICD-10-PCS | Mod: S$GLB,,, | Performed by: REGISTERED NURSE

## 2023-10-11 PROCEDURE — 3080F PR MOST RECENT DIASTOLIC BLOOD PRESSURE >= 90 MM HG: ICD-10-PCS | Mod: CPTII,S$GLB,, | Performed by: REGISTERED NURSE

## 2023-10-11 PROCEDURE — 99999 PR PBB SHADOW E&M-EST. PATIENT-LVL IV: ICD-10-PCS | Mod: PBBFAC,,, | Performed by: REGISTERED NURSE

## 2023-10-11 PROCEDURE — 3080F DIAST BP >= 90 MM HG: CPT | Mod: CPTII,S$GLB,, | Performed by: REGISTERED NURSE

## 2023-10-11 PROCEDURE — 77080 DXA BONE DENSITY AXIAL: CPT | Mod: TC

## 2023-10-11 PROCEDURE — 93010 ELECTROCARDIOGRAM REPORT: CPT | Mod: S$GLB,,, | Performed by: INTERNAL MEDICINE

## 2023-10-11 PROCEDURE — 1101F PT FALLS ASSESS-DOCD LE1/YR: CPT | Mod: CPTII,S$GLB,, | Performed by: NURSE PRACTITIONER

## 2023-10-11 PROCEDURE — 93005 RHYTHM STRIP: ICD-10-PCS | Mod: S$GLB,,, | Performed by: INTERNAL MEDICINE

## 2023-10-11 PROCEDURE — 93010 RHYTHM STRIP: ICD-10-PCS | Mod: S$GLB,,, | Performed by: INTERNAL MEDICINE

## 2023-10-11 PROCEDURE — 93283 PRGRMG EVAL IMPLANTABLE DFB: CPT | Mod: 26,,, | Performed by: INTERNAL MEDICINE

## 2023-10-11 PROCEDURE — 1159F MED LIST DOCD IN RCRD: CPT | Mod: CPTII,S$GLB,, | Performed by: NURSE PRACTITIONER

## 2023-10-11 RX ORDER — METOPROLOL SUCCINATE 100 MG/1
TABLET, EXTENDED RELEASE ORAL
Qty: 90 TABLET | Refills: 11 | Status: SHIPPED | OUTPATIENT
Start: 2023-10-11 | End: 2023-10-12 | Stop reason: SDUPTHER

## 2023-10-11 RX ORDER — FERROUS SULFATE, DRIED 160(50) MG
1 TABLET, EXTENDED RELEASE ORAL 2 TIMES DAILY
Qty: 180 TABLET | Refills: 3 | Status: SHIPPED | OUTPATIENT
Start: 2023-10-11 | End: 2024-10-10

## 2023-10-11 NOTE — PROGRESS NOTES
Subjective     Patient ID: Michael Mclean is a 83 y.o. male.    Chief Complaint: Prostate Cancer    HPI    Presents to clinic for follow up   Continues to do well overall  Energy level stable   Appetite remains low, weight stable   Reports some intermittent L sided tooth pain  Also has broken crown/tooth to right side   Compliant with Zytiga + prednisone   Receives Lupron with urology   Tolerating well  No hot flashes   No fevers or chills  No CP, SOB, or palpitations  No nausea, vomiting, constipation or diarrhea  Denies blood in urine or stool  No new urinary complaints today   No new pains     In-person  Monique and daughter present during visit.     3/6/2023 PSA = 11.2 ng/ml    - 3/24/2023 PET PSMA:  FINDINGS:  In the head and neck, there is physiologic uptake in the lacrimal and salivary glands, and there are no tracer avid lesions suspicious for malignancy.  In the chest, there are no tracer avid lesions suspicious for malignancy.  Multiple subcentimeter pulmonary nodules which are below size threshold for evaluation with PET.  For example: 0.7 cm nodule in the periphery the left upper lobe with mildly increased radiotracer uptake SUV max of 3.3 (series 3, image 28), likely present on prior CTA although slightly more prominent on today's exam.  0.3 cm pulmonary nodule in the right lower lobe (series 3, image 194), unchanged from CT 09/30/2021  In the abdomen and pelvis, there is physiologic distribution in the liver, spleen, bowel, and genitourinary tract, and there are no anatomic or tracer avid lesions suspicious for malignancy.  Specifically, there are no pathologically enlarged or tracer avid pelvic or retroperitoneal lymph nodes.  In the bones, there are several tracer avid lesions concerning for bony metastases:  * right 2nd rib long segment of tracer uptake (axial series images 225-239) with max SUV 18.  Mildly increased sclerosis is noted on CT.  * right scapula lytic lesion  (3-202) with max SUV of 15.  * right iliac bone tracer avid lesion with max SUV of 7.9 (axial series image 86), corresponding to area of subtle cortical disruption on CT (3-87), though this is unchanged from CT 11/05/2020.  * T10 inferior endplate hypermetabolic lesion with SUV max of 4.8 (fused axial series image 173) without definite underlying CT correlate.  * C3 vertebral body a tracer avid lesion with max SUV 11.0 (fused axial series image 264 without definite underlying CT correlate).  Additional CT findings:  Right shoulder arthroplasty.  Left hip arthroplasty.  Left chest wall pacer device.  Postoperative change of sternotomy. Left atrial appendage occlusion device.  Change related to aortic valve replacement.  Dense calcific atherosclerosis of the coronary arteries.  Prominent calcific atherosclerosis.  Cholelithiasis.  Left kidney is atrophic with two stable fluid attenuating cysts. Prostate is surgically absent.  Colonic diverticulosis.  Bilateral scrotal/testicular calcifications.  Degenerative changes.  Impression:  In this patient with prostate cancer status post prostatectomy, there are multiple foci of increased uptake throughout osseous structures with index lesions detailed above.  Few of these lesions correspond to underlying lytic and sclerotic lesions. Findings are overall concerning for osseous metastases.  No evidence of local tumor recurrence at prostatectomy site or driss involvement of disease.  Multiple subcentimeter pulmonary nodules including nodule in the left upper lobe with mildly increased radiotracer uptake, nonspecific.    Oncology History:  - negative prostate biopsy in 2011 (PSA = 15 ng.ml)     - followed for a year and PSA was around 7 ng/ml     - 2012, PSA increased to 13.49 ng/ml     - 3/20/2012 prostate biopsy:  Pathology revealed Pelsor 4+3=7 in 1 core involving 80% of that core, Elliott 3+4=7 in 3 cores involving 3 cores involving 70% to 80% of those cores and Elliott  3+3=6 in 1 core involving 5% of that core.      - saw Dr. Bartholomew at [facility] and underwent a robotic prostatectomy with lymphadenectomy on 5/22/2012.   Pathology revealed a Anaconda 5+4= 9 disease, pT3aN0.      - Unfortunately, Mr. Mclean's PSA did not become undetectable and he completed further restaging that was without evidence of metastatic disease. Mr. Mclean opted to proceed with salvage therapy and completed one year of combined ADT and EBRT in 10/2013.   Notes at [facility] note that main side effects were weight gain and hot flashes. He has had Testosterone recovery and improved stamina and has been able to loose a little weight since completing therapy. His PSA became detectable again in September 2015 and has been slowly rising since without clinical symptoms.    PSA History at [facility]:  12/06/2016 1.67 OFF   09/12/2016 1.94  07/11/2016 2.48 Test 221  04/11/2016 1.76 OFF  12/08/2015 1.36   10/06/2015 1.43 OFF  09/01/2015 0.84  05/15/2015 0.08 Test 139  11/06/2014 0.02 Test 128  06/30/2014 <0.01 Test 181  03/31/2014 <0.01 Test 119  01/06/2014 0.01 Test 67  10/07/2013 <0.01 Casodex and LHRHa discontinued  07/01/2013 <0.01  01/08/2013 0.02  10/09/2012 0.82 Salvage CAA Started Test 296  09/19/2012 0.56  08/22/2012 0.43  06/13/2012 0.49  05/08/2012 13.49 PTX 5/22/2012, Anaconda 9, pT3aN0     We evaluated in 2022 with rising PSA  - 2/14/2022 CT A/P:  FINDINGS:  No suspicious mass, lymphadenopathy, or change from the previous examination of 09/30/2021.  Scattered small aortocaval lymph nodes, unchanged.  Left renal cysts and left renal atrophy, unchanged.  Somewhat small liver.  Stones within an otherwise unremarkable gallbladder.  Unremarkable right kidney, adrenals, spleen and pancreas.  Artifact secondary to a right hip prosthesis.  Impression:  No evidence of metastatic disease in the abdomen or pelvis     2/14- /2022 Bone scan:  FINDINGS:  Radiotracer accumulation within the right 2nd rib has increased since  09/30/2021.  Otherwise, additional sites of radiotracer accumulation have not significantly changed in appearance since the prior exam other than resolution of the activity right skull base.  Patchy, suspected degenerative type uptake, cervical spine, lumbar spine, both knees and about right hip and shoulder prostheses.  Impression:  1. Increased radiotracer accumulation within the right 2nd rib as compared to bone scan of 09/30/2021 suspicious for metastatic disease  2. Otherwise, suspected degenerative type uptake within cervical spine, lumbar spine, both knees and about right shoulder and knee prostheses appears similar     - was started on Lupron and casodex  Hesitation around newer generation ADT with cardiac history     - issues with follow up  Lives in [place]- several missed appointments    Past Medical History:   Carotid artery stenosis - endarterectomy on left, monitored on right  - Prior CVA x 2, slight facial droop residual   Deaf - need an     Essential hypertension, benign    Hyperlipidemia    Mitral valve regurgitation   severe MVP, moderately to severe MR    MVP (mitral valve prolapse) 8/25/11   severe MVP, moderately to severe MR    PAD (peripheral artery disease)      FH:  No prostate cancer  No breast, ovarian cancer  No cancers     SH:  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   HENT:  Positive for dental problem (tooth pain, left side) and hearing loss. Negative for trouble swallowing.    Eyes:  Negative for visual disturbance.   Respiratory:  Negative for cough, shortness of breath and wheezing.    Cardiovascular:  Negative for chest pain, palpitations and leg swelling.   Gastrointestinal:  Negative for abdominal distention, abdominal pain, blood  in stool, change in bowel habit, constipation, diarrhea, nausea and vomiting.   Genitourinary:  Negative for bladder incontinence, decreased urine volume, difficulty urinating, frequency and urgency.   Musculoskeletal:  Negative for arthralgias, back pain, gait problem and leg pain.   Integumentary:  Negative for rash and wound.   Neurological:  Positive for dizziness (at times with cardiac condition). Negative for weakness, numbness and headaches.   Hematological:  Negative for adenopathy. Does not bruise/bleed easily.   Psychiatric/Behavioral:  Negative for agitation, behavioral problems, confusion and dysphoric mood. The patient is not nervous/anxious.       Objective     Physical Exam  Vitals and nursing note reviewed.   Constitutional:       General: He is not in acute distress.     Appearance: Normal appearance. He is normal weight. He is not ill-appearing, toxic-appearing or diaphoretic.      Comments: ECOG= 1  Presents with daughter  In a wheelchair   HENT:      Head: Normocephalic and atraumatic.      Right Ear: External ear normal.      Left Ear: External ear normal.      Nose: Nose normal. No congestion.      Mouth/Throat:      Pharynx: Oropharynx is clear. No oropharyngeal exudate.   Eyes:      General: No scleral icterus.     Conjunctiva/sclera: Conjunctivae normal.   Cardiovascular:      Rate and Rhythm: Normal rate.   Pulmonary:      Effort: Pulmonary effort is normal. No respiratory distress.   Abdominal:      General: There is no distension.      Tenderness: There is no abdominal tenderness.   Musculoskeletal:      Right lower leg: No edema.      Left lower leg: No edema.   Skin:     General: Skin is warm and dry.      Coloration: Skin is not jaundiced or pale.      Findings: No bruising, erythema or rash.   Neurological:      Mental Status: He is alert and oriented to person, place, and time. Mental status is at baseline.   Psychiatric:         Mood and Affect: Mood normal.         Behavior:  Behavior normal.         Thought Content: Thought content normal.         Judgment: Judgment normal.      Assessment and Plan     Problem List Items Addressed This Visit          Oncology    Prostate cancer    Relevant Medications    calcium-vitamin D3 (OYSTER SHELL CALCIUM-VIT D3) 500 mg-5 mcg (200 unit) per tablet     Other Visit Diagnoses       Encounter for monitoring androgen deprivation therapy        Relevant Medications    calcium-vitamin D3 (OYSTER SHELL CALCIUM-VIT D3) 500 mg-5 mcg (200 unit) per tablet            CABG, CVA, HTN, a fib-  Managed locally by PCP and Cardiologist  Monitor     Prostate Cancer-   We reviewed progression and options at previous visit   Changed to new generation ADT with zytiga/predisone in May 2023   PSA slightly decreased, now 14.2 ng/ml (was 16.4 in May 202  Will continue regimen and reassess in ~8 weeks     BMD needed - done today, results pending     He also needs to initiate Xgeva and we again reviewed the rationale  As it is given q4 weeks they would prefer local administration  Have not found infusion center closer to home and agreeable to coming here for monthly injections  He will start calcium + vitamin D in the meantime - Rx resent today as he did not  initial Rx.   Reviewed he will need dental clearance, particularly with recent tooth pain. Discussed rationale and potential medication side effect of jaw osteonecrosis. They will reach out to his dentist for eval/clearance letter.      Patient is in agreement with the proposed treatment plan. All questions were answered to the patient's satisfaction. Pt knows to call clinic if anything is needed before the next clinic visit.    Patient discussed with collaborating physician, Dr. iWlkins.    At least 30 minutes were spent today on this encounter including face to face time with the patient, data gathering/interpretation and documentation.       Mariya Fragoso, MSN, APRN, ACCNS-AG  Hematology and Medical  Oncology  Clinical Nurse Specialist to Dr. Carrera, Dr. Wilkins & Dr. Sparks    Route Chart for Scheduling    Med Onc Chart Routing      Follow up with physician . Rtc in 8 weeks with labs to see Dr. Wilkins in clinic for possible Xgeva. please request  Monique.   Follow up with [facility]    Infusion scheduling note    Injection scheduling note will need auth for xgeva   Labs CBC, CMP and PSA   Scheduling:  Preferred lab:  Lab interval:     Imaging    Pharmacy appointment    Other referrals

## 2023-10-11 NOTE — PATIENT INSTRUCTIONS
Check current metoprolol dose on your bottle at home.     If it is 50mg twice daily, then notify clinic and increase to 50mg AM and 100mg PM    If it is 100mg twice daily, then increase to 100mg (1 tablet) in the AM and 150mg (1.5 tablets) in the PM.    Please let clinic know if you feel more light-headed on this new dose.

## 2023-10-11 NOTE — Clinical Note
Pt with AF, VF, CAD, TAVR, ICD, CVA now in persistent AF since June. On sotalol for VT and AF. He is completely asymptomatic and RV pacing not bad at 15%. He has a watchman with hx of bleeding requiring blood transfusions when on OAC. Are you ok with rate control for now and leaving on sotalol for VT? thx

## 2023-10-12 ENCOUNTER — PATIENT MESSAGE (OUTPATIENT)
Dept: ELECTROPHYSIOLOGY | Facility: CLINIC | Age: 83
End: 2023-10-12
Payer: MEDICARE

## 2023-10-12 DIAGNOSIS — Z95.810 AICD (AUTOMATIC CARDIOVERTER/DEFIBRILLATOR) PRESENT: ICD-10-CM

## 2023-10-12 DIAGNOSIS — I48.19 PERSISTENT ATRIAL FIBRILLATION: ICD-10-CM

## 2023-10-12 RX ORDER — METOPROLOL SUCCINATE 100 MG/1
TABLET, EXTENDED RELEASE ORAL
Qty: 180 TABLET | Refills: 1 | Status: SHIPPED | OUTPATIENT
Start: 2023-10-12 | End: 2023-12-31

## 2023-10-15 PROBLEM — Z79.818 ENCOUNTER FOR MONITORING ANDROGEN DEPRIVATION THERAPY: Status: ACTIVE | Noted: 2023-10-15

## 2023-10-18 LAB — OHS CV AF BURDEN PERCENT: 47.1

## 2023-10-19 ENCOUNTER — LAB VISIT (OUTPATIENT)
Dept: LAB | Facility: HOSPITAL | Age: 83
End: 2023-10-19
Attending: INTERNAL MEDICINE
Payer: MEDICARE

## 2023-10-19 DIAGNOSIS — I48.0 PAROXYSMAL ATRIAL FIBRILLATION: ICD-10-CM

## 2023-10-19 DIAGNOSIS — Z12.5 SPECIAL SCREENING FOR MALIGNANT NEOPLASM OF PROSTATE: ICD-10-CM

## 2023-10-19 DIAGNOSIS — I10 ESSENTIAL HYPERTENSION: ICD-10-CM

## 2023-10-19 DIAGNOSIS — I25.10 CORONARY ARTERY DISEASE, UNSPECIFIED VESSEL OR LESION TYPE, UNSPECIFIED WHETHER ANGINA PRESENT, UNSPECIFIED WHETHER NATIVE OR TRANSPLANTED HEART: ICD-10-CM

## 2023-10-19 DIAGNOSIS — R97.20 ELEVATED PSA: ICD-10-CM

## 2023-10-19 DIAGNOSIS — N17.9 AKI (ACUTE KIDNEY INJURY): ICD-10-CM

## 2023-10-19 DIAGNOSIS — I48.19 PERSISTENT ATRIAL FIBRILLATION: ICD-10-CM

## 2023-10-19 DIAGNOSIS — E78.5 HYPERLIPIDEMIA, UNSPECIFIED HYPERLIPIDEMIA TYPE: ICD-10-CM

## 2023-10-19 DIAGNOSIS — E78.49 OTHER HYPERLIPIDEMIA: ICD-10-CM

## 2023-10-19 DIAGNOSIS — Z00.00 ROUTINE GENERAL MEDICAL EXAMINATION AT A HEALTH CARE FACILITY: ICD-10-CM

## 2023-10-19 DIAGNOSIS — Z79.899 ENCOUNTER FOR LONG-TERM (CURRENT) USE OF OTHER MEDICATIONS: ICD-10-CM

## 2023-10-19 DIAGNOSIS — C61 PROSTATE CANCER: ICD-10-CM

## 2023-10-19 DIAGNOSIS — D50.8 IRON DEFICIENCY ANEMIA SECONDARY TO INADEQUATE DIETARY IRON INTAKE: ICD-10-CM

## 2023-10-19 DIAGNOSIS — I38 VHD (VALVULAR HEART DISEASE): ICD-10-CM

## 2023-10-19 DIAGNOSIS — D64.9 ANEMIA, UNSPECIFIED TYPE: ICD-10-CM

## 2023-10-19 DIAGNOSIS — I50.20 HFREF (HEART FAILURE WITH REDUCED EJECTION FRACTION): ICD-10-CM

## 2023-10-19 LAB
ALBUMIN SERPL BCP-MCNC: 3.3 G/DL (ref 3.5–5.2)
ALBUMIN/CREAT UR: 31 UG/MG (ref 0–30)
ALP SERPL-CCNC: 68 U/L (ref 55–135)
ALT SERPL W/O P-5'-P-CCNC: 39 U/L (ref 10–44)
ANION GAP SERPL CALC-SCNC: -1 MMOL/L (ref 3–11)
AST SERPL-CCNC: 37 U/L (ref 10–40)
BASOPHILS # BLD AUTO: 0.06 K/UL (ref 0–0.2)
BASOPHILS NFR BLD: 0.7 % (ref 0–1.9)
BILIRUB SERPL-MCNC: 0.9 MG/DL (ref 0.1–1)
BUN SERPL-MCNC: 21 MG/DL (ref 8–23)
CALCIUM SERPL-MCNC: 8.7 MG/DL (ref 8.7–10.5)
CHLORIDE SERPL-SCNC: 109 MMOL/L (ref 95–110)
CHOLEST SERPL-MCNC: 116 MG/DL (ref 120–199)
CHOLEST/HDLC SERPL: 2.2 {RATIO} (ref 2–5)
CO2 SERPL-SCNC: 31 MMOL/L (ref 23–29)
COMPLEXED PSA SERPL-MCNC: 13.1 NG/ML (ref 0–4)
CREAT SERPL-MCNC: 1.1 MG/DL (ref 0.5–1.4)
CREAT UR-MCNC: 100 MG/DL (ref 23–375)
CRP SERPL-MCNC: 0.8 MG/DL (ref 0–0.75)
DIFFERENTIAL METHOD: ABNORMAL
EOSINOPHIL # BLD AUTO: 0.7 K/UL (ref 0–0.5)
EOSINOPHIL NFR BLD: 8.2 % (ref 0–8)
ERYTHROCYTE [DISTWIDTH] IN BLOOD BY AUTOMATED COUNT: 13.2 % (ref 11.5–14.5)
EST. GFR  (NO RACE VARIABLE): >60 ML/MIN/1.73 M^2
ESTIMATED AVG GLUCOSE: 103 MG/DL (ref 68–131)
FOLATE SERPL-MCNC: 7.7 NG/ML (ref 4–24)
GLUCOSE SERPL-MCNC: 85 MG/DL (ref 70–110)
HBA1C MFR BLD: 5.2 % (ref 4–5.6)
HCT VFR BLD AUTO: 43.3 % (ref 40–54)
HDLC SERPL-MCNC: 52 MG/DL (ref 40–75)
HDLC SERPL: 44.8 % (ref 20–50)
HGB BLD-MCNC: 14.4 G/DL (ref 14–18)
IMM GRANULOCYTES # BLD AUTO: 0.02 K/UL (ref 0–0.04)
IMM GRANULOCYTES NFR BLD AUTO: 0.2 % (ref 0–0.5)
INSULIN COLLECTION INTERVAL: NORMAL
INSULIN SERPL-ACNC: 4.1 UU/ML
LDLC SERPL CALC-MCNC: 47.4 MG/DL (ref 63–159)
LYMPHOCYTES # BLD AUTO: 1.6 K/UL (ref 1–4.8)
LYMPHOCYTES NFR BLD: 19.2 % (ref 18–48)
MAGNESIUM SERPL-MCNC: 2 MG/DL (ref 1.6–2.6)
MCH RBC QN AUTO: 32.9 PG (ref 27–31)
MCHC RBC AUTO-ENTMCNC: 33.3 G/DL (ref 32–36)
MCV RBC AUTO: 99 FL (ref 82–98)
MICROALBUMIN UR DL<=1MG/L-MCNC: 31 MG/L
MONOCYTES # BLD AUTO: 0.8 K/UL (ref 0.3–1)
MONOCYTES NFR BLD: 9.7 % (ref 4–15)
NEUTROPHILS # BLD AUTO: 5.3 K/UL (ref 1.8–7.7)
NEUTROPHILS NFR BLD: 62 % (ref 38–73)
NONHDLC SERPL-MCNC: 64 MG/DL
NRBC BLD-RTO: 0 /100 WBC
PLATELET # BLD AUTO: 125 K/UL (ref 150–450)
PMV BLD AUTO: 11.7 FL (ref 9.2–12.9)
POTASSIUM SERPL-SCNC: 3.6 MMOL/L (ref 3.5–5.1)
PROT SERPL-MCNC: 6.9 G/DL (ref 6–8.4)
RBC # BLD AUTO: 4.38 M/UL (ref 4.6–6.2)
SODIUM SERPL-SCNC: 139 MMOL/L (ref 136–145)
T4 FREE SERPL-MCNC: 1.12 NG/DL (ref 0.71–1.51)
TRIGL SERPL-MCNC: 83 MG/DL (ref 30–150)
TSH SERPL DL<=0.005 MIU/L-ACNC: 4.44 UIU/ML (ref 0.4–4)
VIT B12 SERPL-MCNC: 449 PG/ML (ref 210–950)
WBC # BLD AUTO: 8.55 K/UL (ref 3.9–12.7)

## 2023-10-19 PROCEDURE — 36415 COLL VENOUS BLD VENIPUNCTURE: CPT | Performed by: INTERNAL MEDICINE

## 2023-10-19 PROCEDURE — 84443 ASSAY THYROID STIM HORMONE: CPT | Performed by: INTERNAL MEDICINE

## 2023-10-19 PROCEDURE — 80053 COMPREHEN METABOLIC PANEL: CPT | Performed by: INTERNAL MEDICINE

## 2023-10-19 PROCEDURE — 84439 ASSAY OF FREE THYROXINE: CPT | Performed by: INTERNAL MEDICINE

## 2023-10-19 PROCEDURE — 86140 C-REACTIVE PROTEIN: CPT | Performed by: INTERNAL MEDICINE

## 2023-10-19 PROCEDURE — 82746 ASSAY OF FOLIC ACID SERUM: CPT | Performed by: INTERNAL MEDICINE

## 2023-10-19 PROCEDURE — 84153 ASSAY OF PSA TOTAL: CPT | Performed by: INTERNAL MEDICINE

## 2023-10-19 PROCEDURE — 82043 UR ALBUMIN QUANTITATIVE: CPT | Performed by: INTERNAL MEDICINE

## 2023-10-19 PROCEDURE — 80061 LIPID PANEL: CPT | Performed by: INTERNAL MEDICINE

## 2023-10-19 PROCEDURE — 82607 VITAMIN B-12: CPT | Performed by: INTERNAL MEDICINE

## 2023-10-19 PROCEDURE — 85025 COMPLETE CBC W/AUTO DIFF WBC: CPT | Performed by: INTERNAL MEDICINE

## 2023-10-19 PROCEDURE — 83735 ASSAY OF MAGNESIUM: CPT | Performed by: INTERNAL MEDICINE

## 2023-10-19 PROCEDURE — 82172 ASSAY OF APOLIPOPROTEIN: CPT | Performed by: INTERNAL MEDICINE

## 2023-10-19 PROCEDURE — 83525 ASSAY OF INSULIN: CPT | Performed by: INTERNAL MEDICINE

## 2023-10-19 PROCEDURE — 83698 ASSAY LIPOPROTEIN PLA2: CPT | Performed by: INTERNAL MEDICINE

## 2023-10-19 PROCEDURE — 83036 HEMOGLOBIN GLYCOSYLATED A1C: CPT | Performed by: INTERNAL MEDICINE

## 2023-10-21 LAB — APO B SERPL-MCNC: 63 MG/DL

## 2023-10-24 LAB — LP-PLA2 SERPL-CCNC: 82 NMOL/MIN/ML

## 2023-10-30 ENCOUNTER — TELEPHONE (OUTPATIENT)
Dept: HEMATOLOGY/ONCOLOGY | Facility: CLINIC | Age: 83
End: 2023-10-30
Payer: MEDICARE

## 2023-10-30 ENCOUNTER — DOCUMENTATION ONLY (OUTPATIENT)
Dept: HEMATOLOGY/ONCOLOGY | Facility: CLINIC | Age: 83
End: 2023-10-30
Payer: MEDICARE

## 2023-10-30 NOTE — TELEPHONE ENCOUNTER
----- Message from Stephania Tavares RN sent at 10/26/2023  4:30 PM CDT -----  Regarding: xgeva?  Mariya,  I tried calling but had to leave a message.  Looks like pt is worried about starting xgeva  (in Dec) and that it's contraindicated if he's having dental tx    - Stephania    ----- Message -----  From: Sherly Bey  Sent: 10/26/2023   2:42 PM CDT  To: Fouzia Meraz Staff      Patient Returning Call        Who Called pt  Does the patient know what this is regarding?:please call pt as soon as possible he is asking about a bone shot will effect his tooth work. Dentist told pt if he get shot in bones it will kill him  Would the patient rather a call back or a response via [facility]? call  Best Call Back Number:[phone]  Additional Information: call back

## 2023-10-30 NOTE — TELEPHONE ENCOUNTER
Spoke with patient with use of . Again reviewed side effects of Xgeva. States he is on calcium + vitamin D now. However, needs dental work/extraction. Notes has intermittent tooth pain, but not constant. Generally relieved with PRN pain medications. He is concerned about the financial piece to have dental work done at this time. Reviewed need for dental clearance prior to starting Xgeva to prevent osteonecrosis of jaw. Will reach out to SW to see if any resources available within [facility] or in community to assist. Encouraged patient to discuss with dentist about financial assistance going forward as well.

## 2023-10-30 NOTE — PROGRESS NOTES
Oncology Social Worker received an In Basket Epic message from Mariya Fragoso, CNS, re: patient needing dental work/dental clearance for Xgeva treatment and concern about the cost. Reviewed patient's [employer] Medicare card copy that is on file and looked in [employer] website. Patient's plan includes a dental benefit, and he/his daughter can either call [employer] or get additional information in the website. Called patient at [phone] and left a detailed message for him (via  #[id number] [employer]). Sent a reply In Basket Epic message to Mariya. Will continue to follow and assist as needs are identified.

## 2023-10-31 ENCOUNTER — TELEPHONE (OUTPATIENT)
Dept: HEMATOLOGY/ONCOLOGY | Facility: CLINIC | Age: 83
End: 2023-10-31
Payer: MEDICARE

## 2023-10-31 NOTE — TELEPHONE ENCOUNTER
"----- Message from Monster Sethion sent at 10/31/2023  9:11 AM CDT -----  Consult/Advisory:          Name Of Caller: Self      Contact Preference?: [phone] (Mobile)      Provider Name: Saffell      Does patient feel the need to be seen today? No      What is the nature of the call?: Requesting medical clearance for tooth pulling procedure. Also inquiring about bone density and blood work. Also inquiring about changing insurance from [employer] to [employer]      Additional Notes: Pt has a     "Thank you for all that you do for our patients"       "

## 2023-11-01 DIAGNOSIS — C61 PROSTATE CANCER: ICD-10-CM

## 2023-11-01 RX ORDER — ABIRATERONE ACETATE 250 MG/1
250 TABLET ORAL DAILY
Qty: 30 TABLET | Refills: 11 | Status: SHIPPED | OUTPATIENT
Start: 2023-11-01 | End: 2023-12-03 | Stop reason: SDUPTHER

## 2023-11-01 NOTE — TELEPHONE ENCOUNTER
----- Message from Reyes Lee sent at 11/1/2023  3:14 PM CDT -----  Contact: patient daughter  Type:  RX Refill Request    Who Called: patient daughter   Refill or New Rx:refill   RX Name and Strength:Sig - Route: Take 1 tablet (250 mg total) by mouth once daily. - Oral  How is the patient currently taking it? (ex. 1XDay):abiraterone (ZYTIGA) 250 mg Tab  Is this a 30 day or 90 day RX:90  Preferred Pharmacy with phone number:[facility] [address]  [address]   Phone: [phone]  Local or Mail Order:mail  Ordering Provider:Dr. Wilkins   Would the patient rather a call back or a response via [facility]? Call back   Best Call Back Number:[phone]  Additional Information: please assist

## 2023-11-02 ENCOUNTER — TELEPHONE (OUTPATIENT)
Dept: ELECTROPHYSIOLOGY | Facility: CLINIC | Age: 83
End: 2023-11-02
Payer: MEDICARE

## 2023-11-02 NOTE — TELEPHONE ENCOUNTER
"----- Message from Josh Denis MA sent at 11/2/2023  4:41 PM CDT -----  Contact: son in law Gianna [phone]    ----- Message -----  From: Angela Emerson MA  Sent: 11/2/2023   4:39 PM CDT  To: Hugo Trammell Staff    Pt's son in law gianna is calling about his father in law. Pt is loosing his balance,falling asleep and "is all over the place",,he drooling and  lost of conscience. Please call.      "

## 2023-11-02 NOTE — TELEPHONE ENCOUNTER
Spoke with patient's son-in-law, Stevie. They brought patient to ER in [place]. States that the staff at [facility] called him yesterday because they noticed him running into things and knew something was off. They did send two manual transmissions yesterday but didn't hear from anyone. Advised that he should always notify us if they send a transmission so that we can make the device team aware. Advised that the ER will likely get the rep out for an interrogation to see if this is rhythm related. He will keep us posted and was very appreciative of the call.

## 2023-11-02 NOTE — TELEPHONE ENCOUNTER
Can you please take a look at the manual transmissions sent yesterday? Pt is in ER now and I did advise son-in-law that they would likely get the rep out for interrogation (he is in [place]). He just asked that we take a look.  Thanks

## 2023-11-03 PROBLEM — R41.82 ALTERED MENTAL STATUS: Status: ACTIVE | Noted: 2023-11-03

## 2023-11-03 PROBLEM — R41.82 ALTERED MENTAL STATUS: Status: RESOLVED | Noted: 2023-11-03 | Resolved: 2023-11-03

## 2023-11-04 ENCOUNTER — HOSPITAL ENCOUNTER (INPATIENT)
Facility: HOSPITAL | Age: 83
LOS: 3 days | Discharge: HOME HEALTH | DRG: 193 | End: 2023-11-08
Attending: EMERGENCY MEDICINE | Admitting: STUDENT IN AN ORGANIZED HEALTH CARE EDUCATION/TRAINING PROGRAM
Payer: MEDICARE

## 2023-11-04 DIAGNOSIS — R09.02 HYPOXIA: ICD-10-CM

## 2023-11-04 DIAGNOSIS — R53.1 GENERALIZED WEAKNESS: ICD-10-CM

## 2023-11-04 DIAGNOSIS — J18.9 PNEUMONIA OF LEFT LUNG DUE TO INFECTIOUS ORGANISM: ICD-10-CM

## 2023-11-04 DIAGNOSIS — R41.82 ALTERED MENTAL STATUS, UNSPECIFIED ALTERED MENTAL STATUS TYPE: ICD-10-CM

## 2023-11-04 DIAGNOSIS — I50.32 CHRONIC DIASTOLIC HEART FAILURE: ICD-10-CM

## 2023-11-04 DIAGNOSIS — I25.10 CORONARY ARTERY DISEASE, UNSPECIFIED VESSEL OR LESION TYPE, UNSPECIFIED WHETHER ANGINA PRESENT, UNSPECIFIED WHETHER NATIVE OR TRANSPLANTED HEART: ICD-10-CM

## 2023-11-04 DIAGNOSIS — J18.9 PNEUMONIA OF LEFT LUNG DUE TO INFECTIOUS ORGANISM, UNSPECIFIED PART OF LUNG: Primary | ICD-10-CM

## 2023-11-04 PROCEDURE — 99285 EMERGENCY DEPT VISIT HI MDM: CPT

## 2023-11-05 PROBLEM — R09.02 HYPOXIA: Status: ACTIVE | Noted: 2023-11-05

## 2023-11-05 PROBLEM — J18.9 PNEUMONIA OF LEFT LUNG DUE TO INFECTIOUS ORGANISM: Status: ACTIVE | Noted: 2023-11-05

## 2023-11-05 LAB
ALBUMIN SERPL BCP-MCNC: 2.7 G/DL (ref 3.5–5.2)
ALBUMIN SERPL BCP-MCNC: 3.1 G/DL (ref 3.5–5.2)
ALP SERPL-CCNC: 46 U/L (ref 55–135)
ALP SERPL-CCNC: 56 U/L (ref 55–135)
ALT SERPL W/O P-5'-P-CCNC: 27 U/L (ref 10–44)
ALT SERPL W/O P-5'-P-CCNC: 31 U/L (ref 10–44)
ANION GAP SERPL CALC-SCNC: 11 MMOL/L (ref 3–11)
ANION GAP SERPL CALC-SCNC: 5 MMOL/L (ref 3–11)
AST SERPL-CCNC: 27 U/L (ref 10–40)
AST SERPL-CCNC: 32 U/L (ref 10–40)
BASOPHILS # BLD AUTO: 0.03 K/UL (ref 0–0.2)
BASOPHILS # BLD AUTO: 0.04 K/UL (ref 0–0.2)
BASOPHILS NFR BLD: 0.4 % (ref 0–1.9)
BASOPHILS NFR BLD: 0.4 % (ref 0–1.9)
BILIRUB SERPL-MCNC: 1 MG/DL (ref 0.1–1)
BILIRUB SERPL-MCNC: 1.1 MG/DL (ref 0.1–1)
BUN SERPL-MCNC: 20 MG/DL (ref 8–23)
BUN SERPL-MCNC: 20 MG/DL (ref 8–23)
CALCIUM SERPL-MCNC: 8.7 MG/DL (ref 8.7–10.5)
CALCIUM SERPL-MCNC: 8.7 MG/DL (ref 8.7–10.5)
CHLORIDE SERPL-SCNC: 103 MMOL/L (ref 95–110)
CHLORIDE SERPL-SCNC: 108 MMOL/L (ref 95–110)
CO2 SERPL-SCNC: 26 MMOL/L (ref 23–29)
CO2 SERPL-SCNC: 28 MMOL/L (ref 23–29)
CREAT SERPL-MCNC: 1.1 MG/DL (ref 0.5–1.4)
CREAT SERPL-MCNC: 1.2 MG/DL (ref 0.5–1.4)
CTP QC/QA: YES
CTP QC/QA: YES
DIFFERENTIAL METHOD: ABNORMAL
DIFFERENTIAL METHOD: ABNORMAL
EOSINOPHIL # BLD AUTO: 0 K/UL (ref 0–0.5)
EOSINOPHIL # BLD AUTO: 0.3 K/UL (ref 0–0.5)
EOSINOPHIL NFR BLD: 0.3 % (ref 0–8)
EOSINOPHIL NFR BLD: 3.6 % (ref 0–8)
ERYTHROCYTE [DISTWIDTH] IN BLOOD BY AUTOMATED COUNT: 13.5 % (ref 11.5–14.5)
ERYTHROCYTE [DISTWIDTH] IN BLOOD BY AUTOMATED COUNT: 13.6 % (ref 11.5–14.5)
EST. GFR  (NO RACE VARIABLE): 59.3 ML/MIN/1.73 M^2
EST. GFR  (NO RACE VARIABLE): >60 ML/MIN/1.73 M^2
FIO2: 28 %
GLUCOSE SERPL-MCNC: 106 MG/DL (ref 70–110)
GLUCOSE SERPL-MCNC: 98 MG/DL (ref 70–110)
HCT VFR BLD AUTO: 43.3 % (ref 40–54)
HCT VFR BLD AUTO: 44.5 % (ref 40–54)
HGB BLD-MCNC: 14.6 G/DL (ref 14–18)
HGB BLD-MCNC: 15.1 G/DL (ref 14–18)
IMM GRANULOCYTES # BLD AUTO: 0.02 K/UL (ref 0–0.04)
IMM GRANULOCYTES # BLD AUTO: 0.08 K/UL (ref 0–0.04)
IMM GRANULOCYTES NFR BLD AUTO: 0.2 % (ref 0–0.5)
IMM GRANULOCYTES NFR BLD AUTO: 0.7 % (ref 0–0.5)
LPM: 2
LYMPHOCYTES # BLD AUTO: 0.9 K/UL (ref 1–4.8)
LYMPHOCYTES # BLD AUTO: 1.3 K/UL (ref 1–4.8)
LYMPHOCYTES NFR BLD: 10.4 % (ref 18–48)
LYMPHOCYTES NFR BLD: 11.5 % (ref 18–48)
Lab: ABNORMAL
MCH RBC QN AUTO: 32.7 PG (ref 27–31)
MCH RBC QN AUTO: 32.8 PG (ref 27–31)
MCHC RBC AUTO-ENTMCNC: 33.7 G/DL (ref 32–36)
MCHC RBC AUTO-ENTMCNC: 33.9 G/DL (ref 32–36)
MCV RBC AUTO: 97 FL (ref 82–98)
MCV RBC AUTO: 97 FL (ref 82–98)
MODIFIED ALLEN'S TEST: ABNORMAL
MONOCYTES # BLD AUTO: 0.5 K/UL (ref 0.3–1)
MONOCYTES # BLD AUTO: 0.7 K/UL (ref 0.3–1)
MONOCYTES NFR BLD: 5.6 % (ref 4–15)
MONOCYTES NFR BLD: 6.6 % (ref 4–15)
NEUTROPHILS # BLD AUTO: 6.8 K/UL (ref 1.8–7.7)
NEUTROPHILS # BLD AUTO: 9.1 K/UL (ref 1.8–7.7)
NEUTROPHILS NFR BLD: 79.8 % (ref 38–73)
NEUTROPHILS NFR BLD: 80.5 % (ref 38–73)
NOTIFIED BY: ABNORMAL
NRBC BLD-RTO: 0 /100 WBC
NRBC BLD-RTO: 0 /100 WBC
NT-PROBNP SERPL-MCNC: 5187 PG/ML (ref 5–1800)
O2DEVICE: ABNORMAL
PCO2 BLDA: 33.9 MMHG (ref 35–45)
PH SMN: 7.45 [PH] (ref 7.34–7.45)
PLATELET # BLD AUTO: 124 K/UL (ref 150–450)
PLATELET # BLD AUTO: 55 K/UL (ref 150–450)
PMV BLD AUTO: 10.9 FL (ref 9.2–12.9)
PMV BLD AUTO: 12.6 FL (ref 9.2–12.9)
PO2 BLDA: 55.8 MMHG (ref 80–100)
POC BASE DEFICIT: -0.2 MMOL/L (ref -2–2)
POC HCO3: 24.6 MMOL/L (ref 24–28)
POC MOLECULAR INFLUENZA A AGN: NEGATIVE
POC MOLECULAR INFLUENZA B AGN: NEGATIVE
POC NOTIFIED NOTE: ABNORMAL
POC PERFORMED BY: ABNORMAL
POC SATURATED O2: 91.4 % (ref 95–100)
POC TEMPERATURE: 37 C
POTASSIUM SERPL-SCNC: 3.4 MMOL/L (ref 3.5–5.1)
POTASSIUM SERPL-SCNC: 3.8 MMOL/L (ref 3.5–5.1)
PROT SERPL-MCNC: 5.8 G/DL (ref 6–8.4)
PROT SERPL-MCNC: 6.4 G/DL (ref 6–8.4)
PROVIDER NOTIFIED: ABNORMAL
RBC # BLD AUTO: 4.46 M/UL (ref 4.6–6.2)
RBC # BLD AUTO: 4.61 M/UL (ref 4.6–6.2)
RSV AG SPEC QL IA: NEGATIVE
SARS-COV-2 RDRP RESP QL NAA+PROBE: NEGATIVE
SODIUM SERPL-SCNC: 140 MMOL/L (ref 136–145)
SODIUM SERPL-SCNC: 141 MMOL/L (ref 136–145)
SPECIMEN SOURCE: ABNORMAL
SPECIMEN SOURCE: NORMAL
WBC # BLD AUTO: 11.28 K/UL (ref 3.9–12.7)
WBC # BLD AUTO: 8.55 K/UL (ref 3.9–12.7)

## 2023-11-05 PROCEDURE — 83880 ASSAY OF NATRIURETIC PEPTIDE: CPT | Performed by: EMERGENCY MEDICINE

## 2023-11-05 PROCEDURE — 25000003 PHARM REV CODE 250: Performed by: STUDENT IN AN ORGANIZED HEALTH CARE EDUCATION/TRAINING PROGRAM

## 2023-11-05 PROCEDURE — 27000221 HC OXYGEN, UP TO 24 HOURS

## 2023-11-05 PROCEDURE — 87635 SARS-COV-2 COVID-19 AMP PRB: CPT | Performed by: EMERGENCY MEDICINE

## 2023-11-05 PROCEDURE — 25000003 PHARM REV CODE 250: Performed by: EMERGENCY MEDICINE

## 2023-11-05 PROCEDURE — 82803 BLOOD GASES ANY COMBINATION: CPT

## 2023-11-05 PROCEDURE — 99900035 HC TECH TIME PER 15 MIN (STAT)

## 2023-11-05 PROCEDURE — 63600175 PHARM REV CODE 636 W HCPCS: Performed by: EMERGENCY MEDICINE

## 2023-11-05 PROCEDURE — 99223 PR INITIAL HOSPITAL CARE,LEVL III: ICD-10-PCS | Mod: AI,,, | Performed by: STUDENT IN AN ORGANIZED HEALTH CARE EDUCATION/TRAINING PROGRAM

## 2023-11-05 PROCEDURE — 99223 1ST HOSP IP/OBS HIGH 75: CPT | Mod: AI,,, | Performed by: STUDENT IN AN ORGANIZED HEALTH CARE EDUCATION/TRAINING PROGRAM

## 2023-11-05 PROCEDURE — 27000646 HC AEROBIKA DEVICE

## 2023-11-05 PROCEDURE — 87634 RSV DNA/RNA AMP PROBE: CPT | Performed by: EMERGENCY MEDICINE

## 2023-11-05 PROCEDURE — 94761 N-INVAS EAR/PLS OXIMETRY MLT: CPT

## 2023-11-05 PROCEDURE — 80053 COMPREHEN METABOLIC PANEL: CPT | Performed by: EMERGENCY MEDICINE

## 2023-11-05 PROCEDURE — 25000242 PHARM REV CODE 250 ALT 637 W/ HCPCS: Performed by: INTERNAL MEDICINE

## 2023-11-05 PROCEDURE — 94640 AIRWAY INHALATION TREATMENT: CPT

## 2023-11-05 PROCEDURE — 94664 DEMO&/EVAL PT USE INHALER: CPT

## 2023-11-05 PROCEDURE — 99900031 HC PATIENT EDUCATION (STAT)

## 2023-11-05 PROCEDURE — 25000242 PHARM REV CODE 250 ALT 637 W/ HCPCS: Performed by: EMERGENCY MEDICINE

## 2023-11-05 PROCEDURE — 36600 WITHDRAWAL OF ARTERIAL BLOOD: CPT

## 2023-11-05 PROCEDURE — 36415 COLL VENOUS BLD VENIPUNCTURE: CPT | Performed by: EMERGENCY MEDICINE

## 2023-11-05 PROCEDURE — 85025 COMPLETE CBC W/AUTO DIFF WBC: CPT | Mod: 91 | Performed by: EMERGENCY MEDICINE

## 2023-11-05 PROCEDURE — 87502 INFLUENZA DNA AMP PROBE: CPT

## 2023-11-05 PROCEDURE — 21400001 HC TELEMETRY ROOM

## 2023-11-05 PROCEDURE — 94799 UNLISTED PULMONARY SVC/PX: CPT

## 2023-11-05 RX ORDER — IPRATROPIUM BROMIDE AND ALBUTEROL SULFATE 2.5; .5 MG/3ML; MG/3ML
3 SOLUTION RESPIRATORY (INHALATION) EVERY 4 HOURS PRN
Status: DISCONTINUED | OUTPATIENT
Start: 2023-11-05 | End: 2023-11-08 | Stop reason: HOSPADM

## 2023-11-05 RX ORDER — SODIUM CHLORIDE 0.9 % (FLUSH) 0.9 %
10 SYRINGE (ML) INJECTION
Status: DISCONTINUED | OUTPATIENT
Start: 2023-11-05 | End: 2023-11-08 | Stop reason: HOSPADM

## 2023-11-05 RX ORDER — TALC
6 POWDER (GRAM) TOPICAL NIGHTLY PRN
Status: DISCONTINUED | OUTPATIENT
Start: 2023-11-05 | End: 2023-11-08 | Stop reason: HOSPADM

## 2023-11-05 RX ORDER — NAPROXEN SODIUM 220 MG/1
81 TABLET, FILM COATED ORAL DAILY
Status: DISCONTINUED | OUTPATIENT
Start: 2023-11-05 | End: 2023-11-08 | Stop reason: HOSPADM

## 2023-11-05 RX ORDER — SOTALOL HYDROCHLORIDE 80 MG/1
40 TABLET ORAL 2 TIMES DAILY
Status: COMPLETED | OUTPATIENT
Start: 2023-11-05 | End: 2023-11-05

## 2023-11-05 RX ORDER — FUROSEMIDE 10 MG/ML
40 INJECTION INTRAMUSCULAR; INTRAVENOUS
Status: DISCONTINUED | OUTPATIENT
Start: 2023-11-05 | End: 2023-11-08 | Stop reason: HOSPADM

## 2023-11-05 RX ORDER — SOTALOL HYDROCHLORIDE 80 MG/1
80 TABLET ORAL DAILY
Status: DISCONTINUED | OUTPATIENT
Start: 2023-11-06 | End: 2023-11-08 | Stop reason: HOSPADM

## 2023-11-05 RX ORDER — METOPROLOL SUCCINATE 100 MG/1
100 TABLET, EXTENDED RELEASE ORAL 2 TIMES DAILY
Status: DISCONTINUED | OUTPATIENT
Start: 2023-11-05 | End: 2023-11-08 | Stop reason: HOSPADM

## 2023-11-05 RX ORDER — IPRATROPIUM BROMIDE AND ALBUTEROL SULFATE 2.5; .5 MG/3ML; MG/3ML
3 SOLUTION RESPIRATORY (INHALATION)
Status: DISCONTINUED | OUTPATIENT
Start: 2023-11-05 | End: 2023-11-08 | Stop reason: HOSPADM

## 2023-11-05 RX ORDER — FAMOTIDINE 20 MG/1
20 TABLET, FILM COATED ORAL DAILY
Status: DISCONTINUED | OUTPATIENT
Start: 2023-11-05 | End: 2023-11-08 | Stop reason: HOSPADM

## 2023-11-05 RX ORDER — ACETAMINOPHEN 325 MG/1
650 TABLET ORAL EVERY 8 HOURS PRN
Status: DISCONTINUED | OUTPATIENT
Start: 2023-11-05 | End: 2023-11-08 | Stop reason: HOSPADM

## 2023-11-05 RX ORDER — GUAIFENESIN 600 MG/1
1200 TABLET, EXTENDED RELEASE ORAL 2 TIMES DAILY
Status: DISCONTINUED | OUTPATIENT
Start: 2023-11-05 | End: 2023-11-08 | Stop reason: HOSPADM

## 2023-11-05 RX ORDER — ONDANSETRON 2 MG/ML
4 INJECTION INTRAMUSCULAR; INTRAVENOUS EVERY 8 HOURS PRN
Status: DISCONTINUED | OUTPATIENT
Start: 2023-11-05 | End: 2023-11-08 | Stop reason: HOSPADM

## 2023-11-05 RX ORDER — IPRATROPIUM BROMIDE AND ALBUTEROL SULFATE 2.5; .5 MG/3ML; MG/3ML
3 SOLUTION RESPIRATORY (INHALATION)
Status: DISCONTINUED | OUTPATIENT
Start: 2023-11-05 | End: 2023-11-05

## 2023-11-05 RX ADMIN — PIPERACILLIN AND TAZOBACTAM 4.5 G: 4; .5 INJECTION, POWDER, LYOPHILIZED, FOR SOLUTION INTRAVENOUS; PARENTERAL at 06:11

## 2023-11-05 RX ADMIN — PIPERACILLIN AND TAZOBACTAM 4.5 G: 4; .5 INJECTION, POWDER, LYOPHILIZED, FOR SOLUTION INTRAVENOUS; PARENTERAL at 12:11

## 2023-11-05 RX ADMIN — IPRATROPIUM BROMIDE AND ALBUTEROL SULFATE 3 ML: 2.5; .5 SOLUTION RESPIRATORY (INHALATION) at 07:11

## 2023-11-05 RX ADMIN — METOPROLOL SUCCINATE 100 MG: 100 TABLET, EXTENDED RELEASE ORAL at 10:11

## 2023-11-05 RX ADMIN — SOTALOL HYDROCHLORIDE 40 MG: 80 TABLET ORAL at 10:11

## 2023-11-05 RX ADMIN — FAMOTIDINE 20 MG: 20 TABLET, FILM COATED ORAL at 10:11

## 2023-11-05 RX ADMIN — FUROSEMIDE 40 MG: 10 INJECTION, SOLUTION INTRAVENOUS at 04:11

## 2023-11-05 RX ADMIN — FUROSEMIDE 40 MG: 10 INJECTION, SOLUTION INTRAVENOUS at 02:11

## 2023-11-05 RX ADMIN — GUAIFENESIN 1200 MG: 600 TABLET, EXTENDED RELEASE ORAL at 04:11

## 2023-11-05 RX ADMIN — SOTALOL HYDROCHLORIDE 40 MG: 80 TABLET ORAL at 08:11

## 2023-11-05 RX ADMIN — METOPROLOL SUCCINATE 100 MG: 100 TABLET, EXTENDED RELEASE ORAL at 08:11

## 2023-11-05 RX ADMIN — IPRATROPIUM BROMIDE AND ALBUTEROL SULFATE 3 ML: 2.5; .5 SOLUTION RESPIRATORY (INHALATION) at 04:11

## 2023-11-05 RX ADMIN — ASPIRIN 81 MG 81 MG: 81 TABLET ORAL at 10:11

## 2023-11-05 RX ADMIN — PIPERACILLIN AND TAZOBACTAM 4.5 G: 4; .5 INJECTION, POWDER, LYOPHILIZED, FOR SOLUTION INTRAVENOUS; PARENTERAL at 10:11

## 2023-11-05 NOTE — ASSESSMENT & PLAN NOTE
Patient with Persistent (7 days or more) atrial fibrillation which is controlled currently with Beta Blocker and Calcium Channel Blocker. Patient is currently in atrial fibrillation.[id number] Score: 3.   Once on xarelto in the past, but developed GI bleed requiring blood transfusion.   Currently not on anticoagulation therapy.   - continue daily 81 mg aspirin

## 2023-11-05 NOTE — H&P
"[facility]  History & Physical    Patient Name: Michael Mclean  MRN: [id number]  Patient Class: IP- Inpatient  Admission Date: 11/4/2023  Attending Physician: No att. providers found   Primary Care Provider: Josh Alejo Jr., MD      Patient information was obtained from patient and ER records.     Subjective:     Principal Problem:Pneumonia of left lung due to infectious organism    Chief Complaint:   Chief Complaint   Patient presents with    Altered Mental Status     Pt to the ER w/ complaints of AMS per daughter, "not acting right." Pt complains of hip pain, "feels like he has the flu."     Cough        HPI:   Chief Complaint   Patient presents with    Altered Mental Status       Pt to the ER w/ complaints of AMS per daughter, "not acting right." Pt complains of hip pain, "feels like he has the flu."       83-year-old male with a history of anemia, extensive cardiac history, current atrial fibrillation with Watchman device, gastritis presents to the emergency department with generalized weakness, states he is very tired. Recently admitted for same, discharge 2 days ago.  Patient states he is still feeling weak and more sleepy with reduced appetite. He is deaf and uses American sign language to communicate.  Denies any chest pain or shortness of breath.  He is not ill appearing, he is alert, he is speaking via ASL. Looks much better than when he was admitted 2 days ago.    ED Course: On arrival pulse 103, RR 33, SpO2 90% on room air, /67 mmHg, temp 97.6F.  Chest x ray with left upper lung opacity suggestive of pneumonia with left sided crackles on ausculation.   POC negative for COVID19, influenza and RSV.  Patient speaks of changes to his baseline involving weakness and sleepiness and a chronic right shoulder pain with reduced range of motion. Further collateral information from family notes, since last hospital discharge, they have noticed congestion and breathing to " "be labored with a lot of "rattling."   Communicates in ASL and reads lips well, sits up in bed without assistance.   Patient to be admitted for IV antibiotics, scheduled breathing treatments, pulmonary care for left sided pneumonia.              Past Medical History:   Diagnosis Date    Abdominal hernia     Anemia 02/2021    Anticoagulant long-term use     Arthritis     SHOULDER    Cancer     prostate    Cardiomyopathy     Carotid artery stenosis     left    Coronary artery disease     Deaf     Dizziness     Encounter for blood transfusion     Gastritis 3/2/2021    Hyperlipidemia     Hypertension     Mitral regurgitation     Mitral regurgitation     PAD (peripheral artery disease)     Paroxysmal atrial fibrillation     Stroke 2020    Thyroid disease        Past Surgical History:   Procedure Laterality Date    ABLATION N/A 7/22/2020    Procedure: Ablation, afib;  Surgeon: Tal Laurent MD;  Location: [facility] CATH;  Service: Cardiology;  Laterality: N/A;  needs covid test;needs ALEXYS ([place])    AORTIC VALVE REPLACEMENT      CARDIAC CATH COSURGEON N/A 7/27/2022    Procedure: Cardiac Cath Cosurgeon;  Surgeon: Glenroy Sam MD;  Location: [facility] CATH LAB;  Service: Cardiovascular;  Laterality: N/A;    CARDIAC CATHETERIZATION      3 HEART STENTS    carotid artery stent Left     CLOSURE OF LEFT ATRIAL APPENDAGE USING DEVICE N/A 11/22/2022    Procedure: Left atrial appendage closure device;  Surgeon: Glenroy Richardson MD;  Location: [facility] CATH LAB;  Service: Cardiology;  Laterality: N/A;    COLONOSCOPY N/A 3/2/2021    Procedure: COLONOSCOPY;  Surgeon: Emiliana Cardenas MD;  Location: [facility] ENDO;  Service: General;  Laterality: N/A;  4  8:30am   CL/AM/Bd    CORONARY ARTERY BYPASS GRAFT      ESOPHAGOGASTRODUODENOSCOPY N/A 11/10/2020    Procedure: EGD (ESOPHAGOGASTRODUODENOSCOPY);  Surgeon: Renny Prakash MD;  Location: [facility] ENDO;  Service: Endoscopy;  Laterality: N/A;    HERNIA REPAIR      HIP REPLACEMENT ARTHROPLASTY " Right 11/6/2020    Procedure: EMELY -ARTHROPLASTY, HIP REPLACEMENT (ENDO HIP);  Surgeon: Lino Thomas MD;  Location: [facility] OR;  Service: Orthopedics;  Laterality: Right;    LEFT HEART CATHETERIZATION N/A 6/29/2022    Procedure: Left heart cath;  Surgeon: Glenroy Richardson MD;  Location: [facility] CATH LAB;  Service: Cardiology;  Laterality: N/A;    PACERMAKER      PROSTATECTOMY      SHOULDER SURGERY      TOE SURGERY      TRANSCATHETER AORTIC VALVE REPLACEMENT (TAVR) N/A 7/27/2022    Procedure: REPLACEMENT, AORTIC VALVE, TRANSCATHETER (TAVR);  Surgeon: Glenroy Richardson MD;  Location: [facility] CATH LAB;  Service: Cardiology;  Laterality: N/A;    TRANSCATHETER AORTIC VALVE REPLACEMENT (TAVR)  7/27/2022    Procedure: REPLACEMENT, AORTIC VALVE, TRANSCATHETER (TAVR);  Surgeon: Glenroy Sam MD;  Location: [facility] CATH LAB;  Service: Cardiovascular;;    TRANSESOPHAGEAL ECHOCARDIOGRAPHY N/A 6/27/2022    Procedure: ECHOCARDIOGRAM, TRANSESOPHAGEAL;  Surgeon: Fracisco Diagnostic Provider;  Location: [facility] EP LAB;  Service: Anesthesiology;  Laterality: N/A;    TRANSESOPHAGEAL ECHOCARDIOGRAPHY N/A 11/22/2022    Procedure: ECHOCARDIOGRAM, TRANSESOPHAGEAL;  Surgeon: Leila Arango MD;  Location: [facility] CATH LAB;  Service: Cardiology;  Laterality: N/A;    TRANSESOPHAGEAL ECHOCARDIOGRAPHY N/A 1/5/2023    Procedure: ECHOCARDIOGRAM, TRANSESOPHAGEAL;  Surgeon: Fracisco Diagnostic Provider;  Location: [facility] EP LAB;  Service: Anesthesiology;  Laterality: N/A;    TREATMENT OF CARDIAC ARRHYTHMIA N/A 8/7/2019    Procedure: Cardioversion or Defibrillation;  Surgeon: Tal Laurent MD;  Location: [facility] CATH;  Service: Cardiology;  Laterality: N/A;       Review of patient's allergies indicates:  No Known Allergies    No current facility-administered medications on file prior to encounter.     Current Outpatient Medications on File Prior to Encounter   Medication Sig    abiraterone (ZYTIGA) 250 mg Tab Take 1 tablet (250 mg total) by mouth once daily.     acetylcarnitin/alpha lipoic ac (ACETYLCARNITIN HCL-A LIPOIC AC) 400-200 mg Cap Take 1 capsule by mouth once daily.    aspirin (ASPIR-81 ORAL) Take by mouth.    BICALUTAMIDE ORAL Take 50 mg by mouth once daily.    calcium-vitamin D3 (OYSTER SHELL CALCIUM-VIT D3) 500 mg-5 mcg (200 unit) per tablet Take 1 tablet by mouth 2 (two) times daily.    cholecalciferol, vitamin D3, 5,000 unit capsule Take 5,000 Units by mouth once daily.     clonazePAM (KLONOPIN) 2 MG Tab Take 1 tablet (2 mg total) by mouth every evening.    cyanocobalamin, vitamin B-12, 1,000 mcg/mL Kit Inject 1 mL as directed every 28 days.    docusate sodium (COLACE) 100 MG capsule Take 100 mg by mouth 2 (two) times daily.    ferrous sulfate (FEOSOL) 325 mg (65 mg iron) Tab tablet Take 1 tablet (325 mg total) by mouth once daily.    KLOR-CON M20 20 mEq tablet Take 20 mEq by mouth once daily.     magnesium oxide (MAG-OX) 400 mg tablet Take 400 mg by mouth once daily.    metoprolol succinate (TOPROL-XL) 100 MG 24 hr tablet Take 1 tablet (100mg) in the AM and 1.5 tablets (150mg) in the PM (Patient taking differently: Take 100 mg by mouth 2 (two) times daily.)    predniSONE (DELTASONE) 5 MG tablet Take 1 tablet (5 mg total) by mouth once daily.    rosuvastatin (CRESTOR) 20 MG tablet Take 20 mg by mouth every morning.    sotaloL (BETAPACE) 80 MG tablet Take 0.5 tablets (40 mg total) by mouth 2 (two) times daily. (Patient taking differently: Take 80 mg by mouth 2 (two) times daily.)    torsemide (DEMADEX) 20 MG Tab Take 1 tablet (20 mg total) by mouth as needed.    vitamin D3-folic acid 5,000 unit- 1 mg Tab Take 1 tablet by mouth once daily.    vitamin D3-vitamin K2, MK4, (K2 PLUS D3) 1,000-100 unit-mcg Tab Take by mouth.     Family History       Problem Relation (Age of Onset)    Heart attack Mother    Heart disease Mother          Tobacco Use    Smoking status: Never    Smokeless tobacco: Never   Substance and Sexual Activity    Alcohol use: Yes      Alcohol/week: 7.0 standard drinks of alcohol     Types: 7 Glasses of wine per week    Drug use: Never    Sexual activity: Not on file     Comment:      Review of Systems   Constitutional:  Negative for activity change, appetite change, chills, fatigue and fever.   HENT:  Negative for sinus pain and sore throat.    Eyes:  Negative for pain and visual disturbance.   Respiratory:  Positive for cough and chest tightness. Negative for apnea, choking, shortness of breath, wheezing and stridor.    Cardiovascular:  Negative for chest pain, palpitations and leg swelling.   Gastrointestinal:  Negative for abdominal pain, constipation, diarrhea, nausea and vomiting.   Musculoskeletal:  Negative for arthralgias, back pain, gait problem, joint swelling and neck stiffness.   Skin:  Negative for color change, pallor, rash and wound.   Neurological:  Negative for dizziness, seizures, syncope, facial asymmetry, weakness, light-headedness and headaches.   Psychiatric/Behavioral:  Negative for agitation, behavioral problems, confusion and dysphoric mood.      Objective:     Vital Signs (Most Recent):  Temp: 97.4 °F (36.3 °C) (11/05/23 1133)  Pulse: 82 (11/05/23 1451)  Resp: (!) 26 (11/05/23 1133)  BP: (!) 95/58 (11/05/23 1133)  SpO2: 98 % (11/05/23 1451) Vital Signs (24h Range):  Temp:  [97.4 °F (36.3 °C)-98 °F (36.7 °C)] 97.4 °F (36.3 °C)  Pulse:  [] 82  Resp:  [18-32] 26  SpO2:  [90 %-98 %] 98 %  BP: ()/(58-67) 95/58     Weight: 74.5 kg (164 lb 3.2 oz)  Body mass index is 25.72 kg/m².     Physical Exam  Vitals and nursing note reviewed.   Constitutional:       General: He is not in acute distress.     Appearance: Normal appearance. He is not ill-appearing or toxic-appearing.      Comments: Communicating without difficulties with ASL interpretor and with daughterHeidy   HENT:      Head: Normocephalic and atraumatic.      Nose: Nose normal.      Mouth/Throat:      Mouth: Mucous membranes are dry.      Pharynx:  Oropharynx is clear.      Comments: Thick blood tinged sputum collected in mouth  Eyes:      Extraocular Movements: Extraocular movements intact.      Conjunctiva/sclera: Conjunctivae normal.   Cardiovascular:      Rate and Rhythm: Normal rate and regular rhythm.      Pulses: Normal pulses.      Heart sounds: Normal heart sounds. No murmur heard.  Pulmonary:      Effort: Pulmonary effort is normal. No respiratory distress.      Breath sounds: Rales present.      Comments: Fine crackles over left lung, no chest tenderness  Abdominal:      General: Abdomen is flat.      Palpations: Abdomen is soft. There is no mass.      Tenderness: There is no right CVA tenderness or left CVA tenderness.   Musculoskeletal:         General: No tenderness.      Cervical back: Normal range of motion and neck supple. No rigidity.      Right lower leg: No edema.      Left lower leg: No edema.      Comments: Right shoulder joint with minimal tenderness, passive ROM intact bilaterally, hand  and strength equal bilaterally   Skin:     General: Skin is warm and dry.   Neurological:      General: No focal deficit present.      Mental Status: He is alert and oriented to person, place, and time.      Motor: No weakness.      Gait: Gait normal.   Psychiatric:         Mood and Affect: Mood normal.         Behavior: Behavior normal.         Thought Content: Thought content normal.               Significant Labs: All pertinent labs within the past 24 hours have been reviewed.  A1C:   Recent Labs   Lab 10/19/23  0815   HGBA1C 5.2     Bilirubin:   Recent Labs   Lab 10/19/23  0815 11/02/23  1734 11/03/23  0513 11/05/23  0031 11/05/23  0547   BILITOT 0.9 0.8 0.8 1.0 1.1*     BMP:   Recent Labs   Lab 11/05/23  0547   GLU 98      K 3.4*      CO2 26   BUN 20   CREATININE 1.1   CALCIUM 8.7     CBC:   Recent Labs   Lab 11/05/23  0031 11/05/23  0547   WBC 8.55 11.28   HGB 14.6 15.1   HCT 43.3 44.5   * 55*     CMP:   Recent Labs   Lab  11/05/23  0031 11/05/23  0547    140   K 3.8 3.4*    103   CO2 28 26    98   BUN 20 20   CREATININE 1.2 1.1   CALCIUM 8.7 8.7   PROT 6.4 5.8*   ALBUMIN 3.1* 2.7*   BILITOT 1.0 1.1*   ALKPHOS 56 46*   AST 32 27   ALT 31 27   ANIONGAP 5 11     TSH:   Recent Labs   Lab 11/02/23  1734   TSH 1.960     Significant Imaging: I have reviewed all pertinent imaging results/findings within the past 24 hours.    X-Ray Chest 1 View  Narrative: EXAMINATION:  XR CHEST 1 VIEW    CLINICAL HISTORY:  Weakness    COMPARISON:  11/02/2023    FINDINGS:  Patchy opacity left upper lung zone.    No other significant change from 11/02/2023.  Enlarged cardiac silhouette.  Prior sternotomy.  Left ICD.  Right shoulder prosthesis.  Impression: Patchy left upper lung opacity suspicious for pneumonia    Electronically signed by: Janet Lowry MD  Date:    11/05/2023  Time:    09:38     Assessment/Plan:     * Pneumonia of left lung due to infectious organism  CXR with left lung consolidation. Cough productive, blood tinged. POC covid, influenza and RSV negative. SpO2 90% on room air. Tachypnea.   ED course started on zosyn.   - f/u sputum culture, adjust antibiotics  - continue with scheduled breathing treatments  - start mucinex  - start scheduled pulmonary hygiene care with education on coughing and spitting out mucus and NOT hold in mouth  - supplemental oxygen per respiratory, keep SpO2 >94%  - IS and flutter exercises      Hypoxia  Secondary to respiratory infection. Treat pneumonia and provide supplemental oxygen.       AICD (automatic cardioverter/defibrillator) present  Denies activity. Follow up cardiology and electrophysiologist.       Hyperlipidemia  Continue home medication      Generalized weakness  Likely secondary to pneumonia. Denies falls, loss of consciousness.      Persistent atrial fibrillation  Patient with Persistent (7 days or more) atrial fibrillation which is controlled currently with Beta Blocker and  Calcium Channel Blocker. Patient is currently in atrial fibrillation.[id number] Score: 3.   Once on xarelto in the past, but developed GI bleed requiring blood transfusion.   Currently not on anticoagulation therapy.   - continue daily 81 mg aspirin    Essential hypertension  Chronic, controlled. Latest blood pressure and vitals reviewed-     Temp:  [97.4 °F (36.3 °C)-98 °F (36.7 °C)]   Pulse:  []   Resp:  [18-32]   BP: ()/(58-67)   SpO2:  [90 %-98 %] .   Home meds for hypertension were reviewed and noted below.   Hypertension Medications               metoprolol succinate (TOPROL-XL) 100 MG 24 hr tablet Take 1 tablet (100mg) in the AM and 1.5 tablets (150mg) in the PM    sotaloL (BETAPACE) 80 MG tablet Take 0.5 tablets (40 mg total) by mouth 2 (two) times daily.    torsemide (DEMADEX) 20 MG Tab Take 1 tablet (20 mg total) by mouth as needed.            While in the hospital, will manage blood pressure as follows; Continue home antihypertensive regimen    Will utilize p.r.n. blood pressure medication only if patient's blood pressure greater than 180/110 and he develops symptoms such as worsening chest pain or shortness of breath.    Deaf, nonspeaking  Communicates well through interpretor and reads lips.        VTE Risk Mitigation (From admission, onward)           Ordered     IP VTE HIGH RISK PATIENT  Once         11/05/23 0140     Place sequential compression device  Until discontinued         11/05/23 0140     Place BRIEN hose  Until discontinued         11/05/23 0140                     Nixon Shelby DO  Department of Hospital Medicine  [facility]

## 2023-11-05 NOTE — ASSESSMENT & PLAN NOTE
Chronic, controlled. Latest blood pressure and vitals reviewed-     Temp:  [97.4 °F (36.3 °C)-98 °F (36.7 °C)]   Pulse:  []   Resp:  [18-32]   BP: ()/(58-67)   SpO2:  [90 %-98 %] .   Home meds for hypertension were reviewed and noted below.   Hypertension Medications             metoprolol succinate (TOPROL-XL) 100 MG 24 hr tablet Take 1 tablet (100mg) in the AM and 1.5 tablets (150mg) in the PM    sotaloL (BETAPACE) 80 MG tablet Take 0.5 tablets (40 mg total) by mouth 2 (two) times daily.    torsemide (DEMADEX) 20 MG Tab Take 1 tablet (20 mg total) by mouth as needed.          While in the hospital, will manage blood pressure as follows; Continue home antihypertensive regimen    Will utilize p.r.n. blood pressure medication only if patient's blood pressure greater than 180/110 and he develops symptoms such as worsening chest pain or shortness of breath.

## 2023-11-05 NOTE — NURSING
Elderly male patient lying in bed,no distress noted.Deaf,hard to aroused at times. Thick red thick sputum noted in mouth,suction and mouth care done., Reposition for comfort. HOB elevated.

## 2023-11-05 NOTE — ASSESSMENT & PLAN NOTE
CXR with left lung consolidation. Cough productive, blood tinged. POC covid, influenza and RSV negative. SpO2 90% on room air. Tachypnea.   ED course started on zosyn.   - f/u sputum culture, adjust antibiotics  - continue with scheduled breathing treatments  - start mucinex  - start scheduled pulmonary hygiene care with education on coughing and spitting out mucus and NOT hold in mouth  - supplemental oxygen per respiratory, keep SpO2 >94%  - IS and flutter exercises

## 2023-11-05 NOTE — ED PROVIDER NOTES
"Encounter Date: 11/4/2023       History     Chief Complaint   Patient presents with    Altered Mental Status     Pt to the ER w/ complaints of AMS per daughter, "not acting right." Pt complains of hip pain, "feels like he has the flu."      83-year-old male with a history of anemia, atrial fibrillation with Watchman device, gastritis presents to the emergency department with generalized weakness, states he is very tired.  Recently admitted for same, discharge 2 days ago.  Patient states he is still feeling weak.  He is deaf and uses American sign language to communicate.  Denies any chest pain or shortness of breath.  He is not ill appearing, he is alert, he is speaking via ASL.  Looks much better than when he was admitted 2 days ago      Review of patient's allergies indicates:  No Known Allergies  Past Medical History:   Diagnosis Date    Abdominal hernia     Anemia 02/2021    Anticoagulant long-term use     Arthritis     SHOULDER    Cancer     prostate    Cardiomyopathy     Carotid artery stenosis     left    Coronary artery disease     Deaf     Dizziness     Encounter for blood transfusion     Gastritis 3/2/2021    Hyperlipidemia     Hypertension     Mitral regurgitation     Mitral regurgitation     PAD (peripheral artery disease)     Paroxysmal atrial fibrillation     Stroke 2020    Thyroid disease      Past Surgical History:   Procedure Laterality Date    ABLATION N/A 7/22/2020    Procedure: Ablation, afib;  Surgeon: Tal Laurent MD;  Location: [facility] CATH;  Service: Cardiology;  Laterality: N/A;  needs covid test;needs ALEXYS ([place])    AORTIC VALVE REPLACEMENT      CARDIAC CATH COSURGEON N/A 7/27/2022    Procedure: Cardiac Cath Cosurgeon;  Surgeon: Glenroy Sam MD;  Location: [facility] CATH LAB;  Service: Cardiovascular;  Laterality: N/A;    CARDIAC CATHETERIZATION      3 HEART STENTS    carotid artery stent Left     CLOSURE OF LEFT ATRIAL APPENDAGE USING DEVICE N/A 11/22/2022    Procedure: Left atrial " appendage closure device;  Surgeon: Glenroy Richardson MD;  Location: [facility] CATH LAB;  Service: Cardiology;  Laterality: N/A;    COLONOSCOPY N/A 3/2/2021    Procedure: COLONOSCOPY;  Surgeon: Emiliana Cardenas MD;  Location: [facility] ENDO;  Service: General;  Laterality: N/A;  4  8:30am   CL/AM/Bd    CORONARY ARTERY BYPASS GRAFT      ESOPHAGOGASTRODUODENOSCOPY N/A 11/10/2020    Procedure: EGD (ESOPHAGOGASTRODUODENOSCOPY);  Surgeon: Renny Prakash MD;  [facility];  Service: Endoscopy;  Laterality: N/A;    HERNIA REPAIR      HIP REPLACEMENT ARTHROPLASTY Right 11/6/2020    Procedure: EMELY -ARTHROPLASTY, HIP REPLACEMENT (ENDO HIP);  Surgeon: Lino Thomas MD;  [facility];  Service: Orthopedics;  Laterality: Right;    LEFT HEART CATHETERIZATION N/A 6/29/2022    Procedure: Left heart cath;  Surgeon: Glenroy Richardson MD;  Location: [facility] CATH LAB;  Service: Cardiology;  Laterality: N/A;    PACERMAKER      PROSTATECTOMY      SHOULDER SURGERY      TOE SURGERY      TRANSCATHETER AORTIC VALVE REPLACEMENT (TAVR) N/A 7/27/2022    Procedure: REPLACEMENT, AORTIC VALVE, TRANSCATHETER (TAVR);  Surgeon: Glenroy Richardson MD;  Location: [facility] CATH LAB;  Service: Cardiology;  Laterality: N/A;    TRANSCATHETER AORTIC VALVE REPLACEMENT (TAVR)  7/27/2022    Procedure: REPLACEMENT, AORTIC VALVE, TRANSCATHETER (TAVR);  Surgeon: Glenroy Sam MD;  Location: [facility] CATH LAB;  Service: Cardiovascular;;    TRANSESOPHAGEAL ECHOCARDIOGRAPHY N/A 6/27/2022    Procedure: ECHOCARDIOGRAM, TRANSESOPHAGEAL;  Surgeon: [facility] Diagnostic Provider;  Location: [facility] EP LAB;  Service: Anesthesiology;  Laterality: N/A;    TRANSESOPHAGEAL ECHOCARDIOGRAPHY N/A 11/22/2022    Procedure: ECHOCARDIOGRAM, TRANSESOPHAGEAL;  Surgeon: Leila Arango MD;  Location: [facility] CATH LAB;  Service: Cardiology;  Laterality: N/A;    TRANSESOPHAGEAL ECHOCARDIOGRAPHY N/A 1/5/2023    Procedure: ECHOCARDIOGRAM, TRANSESOPHAGEAL;  Surgeon: [facility] Diagnostic Provider;   Location: [facility] EP LAB;  Service: Anesthesiology;  Laterality: N/A;    TREATMENT OF CARDIAC ARRHYTHMIA N/A 8/7/2019    Procedure: Cardioversion or Defibrillation;  Surgeon: Tal Laurent MD;  Location: [facility] CATH;  Service: Cardiology;  Laterality: N/A;     Family History   Problem Relation Age of Onset    Heart disease Mother     Heart attack Mother      Social History     Tobacco Use    Smoking status: Never    Smokeless tobacco: Never   Substance Use Topics    Alcohol use: Yes     Alcohol/week: 7.0 standard drinks of alcohol     Types: 7 Glasses of wine per week    Drug use: Never     Review of Systems   Constitutional:  Negative for fever.   HENT:  Negative for sore throat.    Respiratory:  Negative for shortness of breath.    Cardiovascular:  Negative for chest pain.   Gastrointestinal:  Negative for nausea.   Genitourinary:  Negative for dysuria.   Musculoskeletal:  Negative for back pain.   Skin:  Negative for rash.   Neurological:  Positive for weakness.   Hematological:  Does not bruise/bleed easily.   All other systems reviewed and are negative.      Physical Exam     Initial Vitals [11/05/23 0014]   BP Pulse Resp Temp SpO2   103/67 103 (!) 22 97.6 °F (36.4 °C) (!) 90 %      MAP       --         Physical Exam    Nursing note and vitals reviewed.  Constitutional: He appears well-developed and well-nourished. He is not diaphoretic. No distress.   HENT:   Head: Normocephalic and atraumatic.   Mouth/Throat: Oropharynx is clear and moist.   Eyes: Conjunctivae and EOM are normal. Pupils are equal, round, and reactive to light. Right eye exhibits no discharge. Left eye exhibits no discharge. No scleral icterus.   Neck: Neck supple. No JVD present.   Normal range of motion.  Cardiovascular:  Normal rate, normal heart sounds and intact distal pulses.           No murmur heard.  Pulmonary/Chest: Breath sounds normal. No stridor. No respiratory distress. He has no wheezes. He has no rhonchi. He has no rales. He  exhibits no tenderness.   Abdominal: Abdomen is soft. Bowel sounds are normal. He exhibits no distension and no mass. There is no abdominal tenderness. There is no rebound and no guarding.   Musculoskeletal:         General: No tenderness or edema. Normal range of motion.      Cervical back: Normal range of motion and neck supple.     Neurological: He is alert and oriented to person, place, and time. He has normal strength. GCS score is 15. GCS eye subscore is 4. GCS verbal subscore is 5. GCS motor subscore is 6.   Speaks via ASL   Skin: Skin is warm and dry. Capillary refill takes less than 2 seconds.         ED Course   Procedures  Labs Reviewed   NT-PRO NATRIURETIC PEPTIDE - Abnormal; Notable for the following components:       Result Value    NT-proBNP 5187 (*)     All other components within normal limits   CBC W/ AUTO DIFFERENTIAL - Abnormal; Notable for the following components:    RBC 4.46 (*)     MCH 32.7 (*)     Platelets 124 (*)     Lymph # 0.9 (*)     Gran % 79.8 (*)     Lymph % 10.4 (*)     All other components within normal limits   COMPREHENSIVE METABOLIC PANEL - Abnormal; Notable for the following components:    Albumin 3.1 (*)     eGFR 59.3 (*)     All other components within normal limits   RSV ANTIGEN DETECTION    Narrative:     Specimen Source->Nasopharyngeal Swab   SARS-COV-2 RDRP GENE   POCT INFLUENZA A/B MOLECULAR          Imaging Results              X-Ray Chest 1 View (In process)                      Medications   piperacillin-tazobactam (ZOSYN) 4.5 g in dextrose 5 % in water (D5W) 100 mL IVPB (MB+) (4.5 g Intravenous New Bag 11/5/23 0054)     Medical Decision Making  Amount and/or Complexity of Data Reviewed  Labs: ordered.  Radiology: ordered.    Risk  Decision regarding hospitalization.               ED Course as of 11/05/23 0125   Radha Nov 05, 2023   0034 Chest x-ray consistent with left-sided pneumonia [SD]   0037 Discussed case with  - will admit for IV antibiotics,  breathing treatments, sputum culture etc..  There is no signs of sepsis.  Afebrile, blood pressure is normal. [SD]      ED Course User Index  [SD] Rodrick Lanier MD               Medical Decision Making:   Differential Diagnosis:   URI, general weakness      Clinical Impression:   Final diagnoses:  [R53.1] Generalized weakness  [J18.9] Pneumonia of left lung due to infectious organism, unspecified part of lung (Primary)  [R09.02] Hypoxia        ED Disposition Condition    Admit Stable                Rodrick Lanier MD  11/05/23 0054       Rodrick Lanier MD  11/05/23 0125

## 2023-11-05 NOTE — SUBJECTIVE & OBJECTIVE
Past Medical History:   Diagnosis Date    Abdominal hernia     Anemia 02/2021    Anticoagulant long-term use     Arthritis     SHOULDER    Cancer     prostate    Cardiomyopathy     Carotid artery stenosis     left    Coronary artery disease     Deaf     Dizziness     Encounter for blood transfusion     Gastritis 3/2/2021    Hyperlipidemia     Hypertension     Mitral regurgitation     Mitral regurgitation     PAD (peripheral artery disease)     Paroxysmal atrial fibrillation     Stroke 2020    Thyroid disease        Past Surgical History:   Procedure Laterality Date    ABLATION N/A 7/22/2020    Procedure: Ablation, afib;  Surgeon: Tal Laurent MD;  Location: [facility] CATH;  Service: Cardiology;  Laterality: N/A;  needs covid test;needs ALEXYS ([place])    AORTIC VALVE REPLACEMENT      CARDIAC CATH COSURGEON N/A 7/27/2022    Procedure: Cardiac Cath Cosurgeon;  Surgeon: Glenroy Sam MD;  Location: [facility] CATH LAB;  Service: Cardiovascular;  Laterality: N/A;    CARDIAC CATHETERIZATION      3 HEART STENTS    carotid artery stent Left     CLOSURE OF LEFT ATRIAL APPENDAGE USING DEVICE N/A 11/22/2022    Procedure: Left atrial appendage closure device;  Surgeon: Glenroy Richardson MD;  Location: [facility] CATH LAB;  Service: Cardiology;  Laterality: N/A;    COLONOSCOPY N/A 3/2/2021    Procedure: COLONOSCOPY;  Surgeon: Emiliana Cardenas MD;  Location: [facility] ENDO;  Service: General;  Laterality: N/A;  4  8:30am   CL/AM/Bd    CORONARY ARTERY BYPASS GRAFT      ESOPHAGOGASTRODUODENOSCOPY N/A 11/10/2020    Procedure: EGD (ESOPHAGOGASTRODUODENOSCOPY);  Surgeon: Renny Prakash MD;  Location: [facility] ENDO;  Service: Endoscopy;  Laterality: N/A;    HERNIA REPAIR      HIP REPLACEMENT ARTHROPLASTY Right 11/6/2020    Procedure: EMELY -ARTHROPLASTY, HIP REPLACEMENT (ENDO HIP);  Surgeon: Lino Thomas MD;  Location: [facility] OR;  Service: Orthopedics;  Laterality: Right;    LEFT HEART CATHETERIZATION N/A 6/29/2022    Procedure: Left heart cath;   Surgeon: Glenroy Richardson MD;  Location: [facility] CATH LAB;  Service: Cardiology;  Laterality: N/A;    PACERMAKER      PROSTATECTOMY      SHOULDER SURGERY      TOE SURGERY      TRANSCATHETER AORTIC VALVE REPLACEMENT (TAVR) N/A 7/27/2022    Procedure: REPLACEMENT, AORTIC VALVE, TRANSCATHETER (TAVR);  Surgeon: Glenroy Richardson MD;  Location: [facility] CATH LAB;  Service: Cardiology;  Laterality: N/A;    TRANSCATHETER AORTIC VALVE REPLACEMENT (TAVR)  7/27/2022    Procedure: REPLACEMENT, AORTIC VALVE, TRANSCATHETER (TAVR);  Surgeon: Glenroy Sam MD;  Location: [facility] CATH LAB;  Service: Cardiovascular;;    TRANSESOPHAGEAL ECHOCARDIOGRAPHY N/A 6/27/2022    Procedure: ECHOCARDIOGRAM, TRANSESOPHAGEAL;  Surgeon: [facility] Diagnostic Provider;  Location: [facility] EP LAB;  Service: Anesthesiology;  Laterality: N/A;    TRANSESOPHAGEAL ECHOCARDIOGRAPHY N/A 11/22/2022    Procedure: ECHOCARDIOGRAM, TRANSESOPHAGEAL;  Surgeon: Leila Arango MD;  Location: [facility] CATH LAB;  Service: Cardiology;  Laterality: N/A;    TRANSESOPHAGEAL ECHOCARDIOGRAPHY N/A 1/5/2023    Procedure: ECHOCARDIOGRAM, TRANSESOPHAGEAL;  Surgeon: [facility] Provider;  Location: [facility] EP LAB;  Service: Anesthesiology;  Laterality: N/A;    TREATMENT OF CARDIAC ARRHYTHMIA N/A 8/7/2019    Procedure: Cardioversion or Defibrillation;  Surgeon: Tal Laurent MD;  Location: [facility] CATH;  Service: Cardiology;  Laterality: N/A;       Review of patient's allergies indicates:  No Known Allergies    No current facility-administered medications on file prior to encounter.     Current Outpatient Medications on File Prior to Encounter   Medication Sig    abiraterone (ZYTIGA) 250 mg Tab Take 1 tablet (250 mg total) by mouth once daily.    acetylcarnitin/alpha lipoic ac (ACETYLCARNITIN HCL-A LIPOIC AC) 400-200 mg Cap Take 1 capsule by mouth once daily.    aspirin (ASPIR-81 ORAL) Take by mouth.    BICALUTAMIDE ORAL Take 50 mg by mouth once daily.    calcium-vitamin D3 (OYSTER SHELL  CALCIUM-VIT D3) 500 mg-5 mcg (200 unit) per tablet Take 1 tablet by mouth 2 (two) times daily.    cholecalciferol, vitamin D3, 5,000 unit capsule Take 5,000 Units by mouth once daily.     clonazePAM (KLONOPIN) 2 MG Tab Take 1 tablet (2 mg total) by mouth every evening.    cyanocobalamin, vitamin B-12, 1,000 mcg/mL Kit Inject 1 mL as directed every 28 days.    docusate sodium (COLACE) 100 MG capsule Take 100 mg by mouth 2 (two) times daily.    ferrous sulfate (FEOSOL) 325 mg (65 mg iron) Tab tablet Take 1 tablet (325 mg total) by mouth once daily.    KLOR-CON M20 20 mEq tablet Take 20 mEq by mouth once daily.     magnesium oxide (MAG-OX) 400 mg tablet Take 400 mg by mouth once daily.    metoprolol succinate (TOPROL-XL) 100 MG 24 hr tablet Take 1 tablet (100mg) in the AM and 1.5 tablets (150mg) in the PM (Patient taking differently: Take 100 mg by mouth 2 (two) times daily.)    predniSONE (DELTASONE) 5 MG tablet Take 1 tablet (5 mg total) by mouth once daily.    rosuvastatin (CRESTOR) 20 MG tablet Take 20 mg by mouth every morning.    sotaloL (BETAPACE) 80 MG tablet Take 0.5 tablets (40 mg total) by mouth 2 (two) times daily. (Patient taking differently: Take 80 mg by mouth 2 (two) times daily.)    torsemide (DEMADEX) 20 MG Tab Take 1 tablet (20 mg total) by mouth as needed.    vitamin D3-folic acid 5,000 unit- 1 mg Tab Take 1 tablet by mouth once daily.    vitamin D3-vitamin K2, MK4, (K2 PLUS D3) 1,000-100 unit-mcg Tab Take by mouth.     Family History       Problem Relation (Age of Onset)    Heart attack Mother    Heart disease Mother          Tobacco Use    Smoking status: Never    Smokeless tobacco: Never   Substance and Sexual Activity    Alcohol use: Yes     Alcohol/week: 7.0 standard drinks of alcohol     Types: 7 Glasses of wine per week    Drug use: Never    Sexual activity: Not on file     Comment:      Review of Systems   Constitutional:  Negative for activity change, appetite change, chills,  fatigue and fever.   HENT:  Negative for sinus pain and sore throat.    Eyes:  Negative for pain and visual disturbance.   Respiratory:  Positive for cough and chest tightness. Negative for apnea, choking, shortness of breath, wheezing and stridor.    Cardiovascular:  Negative for chest pain, palpitations and leg swelling.   Gastrointestinal:  Negative for abdominal pain, constipation, diarrhea, nausea and vomiting.   Musculoskeletal:  Negative for arthralgias, back pain, gait problem, joint swelling and neck stiffness.   Skin:  Negative for color change, pallor, rash and wound.   Neurological:  Negative for dizziness, seizures, syncope, facial asymmetry, weakness, light-headedness and headaches.   Psychiatric/Behavioral:  Negative for agitation, behavioral problems, confusion and dysphoric mood.      Objective:     Vital Signs (Most Recent):  Temp: 97.4 °F (36.3 °C) (11/05/23 1133)  Pulse: 82 (11/05/23 1451)  Resp: (!) 26 (11/05/23 1133)  BP: (!) 95/58 (11/05/23 1133)  SpO2: 98 % (11/05/23 1451) Vital Signs (24h Range):  Temp:  [97.4 °F (36.3 °C)-98 °F (36.7 °C)] 97.4 °F (36.3 °C)  Pulse:  [] 82  Resp:  [18-32] 26  SpO2:  [90 %-98 %] 98 %  BP: ()/(58-67) 95/58     Weight: 74.5 kg (164 lb 3.2 oz)  Body mass index is 25.72 kg/m².     Physical Exam  Vitals and nursing note reviewed.   Constitutional:       General: He is not in acute distress.     Appearance: Normal appearance. He is not ill-appearing or toxic-appearing.      Comments: Communicating without difficulties with ASL interpretor and with daughter, Heidy   HENT:      Head: Normocephalic and atraumatic.      Nose: Nose normal.      Mouth/Throat:      Mouth: Mucous membranes are dry.      Pharynx: Oropharynx is clear.      Comments: Thick blood tinged sputum collected in mouth  Eyes:      Extraocular Movements: Extraocular movements intact.      Conjunctiva/sclera: Conjunctivae normal.   Cardiovascular:      Rate and Rhythm: Normal rate and  regular rhythm.      Pulses: Normal pulses.      Heart sounds: Normal heart sounds. No murmur heard.  Pulmonary:      Effort: Pulmonary effort is normal. No respiratory distress.      Breath sounds: Rales present.      Comments: Fine crackles over left lung, no chest tenderness  Abdominal:      General: Abdomen is flat.      Palpations: Abdomen is soft. There is no mass.      Tenderness: There is no right CVA tenderness or left CVA tenderness.   Musculoskeletal:         General: No tenderness.      Cervical back: Normal range of motion and neck supple. No rigidity.      Right lower leg: No edema.      Left lower leg: No edema.      Comments: Right shoulder joint with minimal tenderness, passive ROM intact bilaterally, hand  and strength equal bilaterally   Skin:     General: Skin is warm and dry.   Neurological:      General: No focal deficit present.      Mental Status: He is alert and oriented to person, place, and time.      Motor: No weakness.      Gait: Gait normal.   Psychiatric:         Mood and Affect: Mood normal.         Behavior: Behavior normal.         Thought Content: Thought content normal.               Significant Labs: All pertinent labs within the past 24 hours have been reviewed.  A1C:   Recent Labs   Lab 10/19/23  0815   HGBA1C 5.2     Bilirubin:   Recent Labs   Lab 10/19/23  0815 11/02/23  1734 11/03/23  0513 11/05/23  0031 11/05/23  0547   BILITOT 0.9 0.8 0.8 1.0 1.1*     BMP:   Recent Labs   Lab 11/05/23  0547   GLU 98      K 3.4*      CO2 26   BUN 20   CREATININE 1.1   CALCIUM 8.7     CBC:   Recent Labs   Lab 11/05/23  0031 11/05/23  0547   WBC 8.55 11.28   HGB 14.6 15.1   HCT 43.3 44.5   * 55*     CMP:   Recent Labs   Lab 11/05/23  0031 11/05/23  0547    140   K 3.8 3.4*    103   CO2 28 26    98   BUN 20 20   CREATININE 1.2 1.1   CALCIUM 8.7 8.7   PROT 6.4 5.8*   ALBUMIN 3.1* 2.7*   BILITOT 1.0 1.1*   ALKPHOS 56 46*   AST 32 27   ALT 31 27    ANIONGAP 5 11     TSH:   Recent Labs   Lab 11/02/23  1734   TSH 1.960     Significant Imaging: I have reviewed all pertinent imaging results/findings within the past 24 hours.

## 2023-11-05 NOTE — H&P
"[facility] Medicine  History & Physical    Patient Name: Michael Mclean  MRN: [id number]  Patient Class: IP- Inpatient  Admission Date: 11/4/2023  Attending Physician: No att. providers found   Primary Care Provider: Josh Alejo Jr., MD         Patient information was obtained from patient and ER records.     Subjective:     Principal Problem:Pneumonia of left lung due to infectious organism    Chief Complaint:   Chief Complaint   Patient presents with    Altered Mental Status     Pt to the ER w/ complaints of AMS per daughter, "not acting right." Pt complains of hip pain, "feels like he has the flu."     Cough        HPI:   Chief Complaint   Patient presents with    Altered Mental Status       Pt to the ER w/ complaints of AMS per daughter, "not acting right." Pt complains of hip pain, "feels like he has the flu."       83-year-old male with a history of anemia, extensive cardiac history, current atrial fibrillation with Watchman device, gastritis presents to the emergency department with generalized weakness, states he is very tired. Recently admitted for same, discharge 2 days ago.  Patient states he is still feeling weak and more sleepy with reduced appetite. He is deaf and uses American sign language to communicate.  Denies any chest pain or shortness of breath.  He is not ill appearing, he is alert, he is speaking via ASL. Looks much better than when he was admitted 2 days ago.    ED Course: On arrival pulse 103, RR 33, SpO2 90% on room air, /67 mmHg, temp 97.6F.  Chest x ray with left upper lung opacity suggestive of pneumonia with left sided crackles on ausculation.   POC negative for COVID19, influenza and RSV.  Patient speaks of changes to his baseline involving weakness and sleepiness and a chronic right shoulder pain with reduced range of motion. Further collateral information from family notes, since last hospital discharge, they have noticed congestion and " "breathing to be labored with a lot of "rattling."   Communicates in ASL and reads lips well, sits up in bed without assistance.   Patient to be admitted for IV antibiotics, scheduled breathing treatments, pulmonary care for left sided pneumonia.              No new subjective & objective note has been filed under this hospital service since the last note was generated.    Assessment/Plan:     * Pneumonia of left lung due to infectious organism  CXR with left lung consolidation. Cough productive, blood tinged. POC covid, influenza and RSV negative. SpO2 90% on room air. Tachypnea.   ED course started on zosyn.   - f/u sputum culture, adjust antibiotics  - continue with scheduled breathing treatments  - start mucinex  - start scheduled pulmonary hygiene care with education on coughing and spitting out mucus and NOT hold in mouth  - supplemental oxygen per respiratory, keep SpO2 >94%  - IS and flutter exercises      Hypoxia  Secondary to respiratory infection. Treat pneumonia and provide supplemental oxygen.       AICD (automatic cardioverter/defibrillator) present  Denies activity. Follow up cardiology and electrophysiologist.       Hyperlipidemia  Continue home medication      Generalized weakness  Likely secondary to pneumonia. Denies falls, loss of consciousness.      Persistent atrial fibrillation  Patient with Persistent (7 days or more) atrial fibrillation which is controlled currently with Beta Blocker and Calcium Channel Blocker. Patient is currently in atrial fibrillation.[id number] Score: 3.   Once on xarelto in the past, but developed GI bleed requiring blood transfusion.   Currently not on anticoagulation therapy.   - continue daily 81 mg aspirin    Essential hypertension  Chronic, controlled. Latest blood pressure and vitals reviewed-     Temp:  [97.4 °F (36.3 °C)-98 °F (36.7 °C)]   Pulse:  []   Resp:  [18-32]   BP: ()/(58-67)   SpO2:  [90 %-98 %] .   Home meds for hypertension were reviewed " and noted below.   Hypertension Medications             metoprolol succinate (TOPROL-XL) 100 MG 24 hr tablet Take 1 tablet (100mg) in the AM and 1.5 tablets (150mg) in the PM    sotaloL (BETAPACE) 80 MG tablet Take 0.5 tablets (40 mg total) by mouth 2 (two) times daily.    torsemide (DEMADEX) 20 MG Tab Take 1 tablet (20 mg total) by mouth as needed.          While in the hospital, will manage blood pressure as follows; Continue home antihypertensive regimen    Will utilize p.r.n. blood pressure medication only if patient's blood pressure greater than 180/110 and he develops symptoms such as worsening chest pain or shortness of breath.    Deaf, nonspeaking  Communicates well through interpretor and reads lips.        VTE Risk Mitigation (From admission, onward)         Ordered     IP VTE HIGH RISK PATIENT  Once         11/05/23 0140     Place sequential compression device  Until discontinued         11/05/23 0140     Place BRIEN hose  Until discontinued         11/05/23 0140                   Nixon Shelby DO  Department of Hospital Medicine  [facility] Surg

## 2023-11-05 NOTE — PROGRESS NOTES
Pharmacist Renal Dose Adjustment Note    Michael Mclean is a 83 y.o. male being treated with the medication pepcid    Patient Data:    Vital Signs (Most Recent):  Temp: 97.6 °F (36.4 °C) (11/05/23 0014)  Pulse: 103 (11/05/23 0157)  Resp: 20 (11/05/23 0157)  BP: 102/62 (11/05/23 0157)  SpO2: 96 % (11/05/23 0157) Vital Signs (72h Range):  Temp:  [97.6 °F (36.4 °C)-98.3 °F (36.8 °C)]   Pulse:  []   Resp:  [18-25]   BP: (102-146)/(62-79)   SpO2:  [90 %-99 %]      Recent Labs   Lab 11/02/23  1734 11/03/23  0513 11/05/23  0031   CREATININE 1.1 0.8 1.2     Serum creatinine: 1.2 mg/dL 11/05/23 0031  Estimated creatinine clearance: 42.1 mL/min    Medication:pepcid dose: 20mg frequency bid will be changed to medication:pepcid dose:20mg frequency:daily    Pharmacist's Name: Tamika Ivory  Pharmacist's Extension:

## 2023-11-05 NOTE — HPI
"Chief Complaint   Patient presents with    Altered Mental Status       Pt to the ER w/ complaints of AMS per daughter, "not acting right." Pt complains of hip pain, "feels like he has the flu."       83-year-old male with a history of anemia, extensive cardiac history, current atrial fibrillation with Watchman device, gastritis presents to the emergency department with generalized weakness, states he is very tired. Recently admitted for same, discharge 2 days ago.  Patient states he is still feeling weak and more sleepy with reduced appetite. He is deaf and uses American sign language to communicate.  Denies any chest pain or shortness of breath.  He is not ill appearing, he is alert, he is speaking via ASL. Looks much better than when he was admitted 2 days ago.    ED Course: On arrival pulse 103, RR 33, SpO2 90% on room air, /67 mmHg, temp 97.6F.  Chest x ray with left upper lung opacity suggestive of pneumonia with left sided crackles on ausculation.   POC negative for COVID19, influenza and RSV.  Patient speaks of changes to his baseline involving weakness and sleepiness and a chronic right shoulder pain with reduced range of motion. Further collateral information from family notes, since last hospital discharge, they have noticed congestion and breathing to be labored with a lot of "rattling."   Communicates in ASL and reads lips well, sits up in bed without assistance.   Patient to be admitted for IV antibiotics, scheduled breathing treatments, pulmonary care for left sided pneumonia.          "

## 2023-11-06 PROCEDURE — 97161 PT EVAL LOW COMPLEX 20 MIN: CPT

## 2023-11-06 PROCEDURE — 99900031 HC PATIENT EDUCATION (STAT)

## 2023-11-06 PROCEDURE — 25000242 PHARM REV CODE 250 ALT 637 W/ HCPCS: Performed by: INTERNAL MEDICINE

## 2023-11-06 PROCEDURE — 87205 SMEAR GRAM STAIN: CPT | Performed by: EMERGENCY MEDICINE

## 2023-11-06 PROCEDURE — 94640 AIRWAY INHALATION TREATMENT: CPT

## 2023-11-06 PROCEDURE — 21400001 HC TELEMETRY ROOM

## 2023-11-06 PROCEDURE — 99900035 HC TECH TIME PER 15 MIN (STAT)

## 2023-11-06 PROCEDURE — 25000003 PHARM REV CODE 250: Performed by: EMERGENCY MEDICINE

## 2023-11-06 PROCEDURE — 94664 DEMO&/EVAL PT USE INHALER: CPT

## 2023-11-06 PROCEDURE — 63600175 PHARM REV CODE 636 W HCPCS: Performed by: EMERGENCY MEDICINE

## 2023-11-06 PROCEDURE — 94761 N-INVAS EAR/PLS OXIMETRY MLT: CPT

## 2023-11-06 PROCEDURE — 27000221 HC OXYGEN, UP TO 24 HOURS

## 2023-11-06 PROCEDURE — 97166 OT EVAL MOD COMPLEX 45 MIN: CPT

## 2023-11-06 PROCEDURE — 94799 UNLISTED PULMONARY SVC/PX: CPT

## 2023-11-06 PROCEDURE — 87106 FUNGI IDENTIFICATION YEAST: CPT | Performed by: EMERGENCY MEDICINE

## 2023-11-06 PROCEDURE — 87070 CULTURE OTHR SPECIMN AEROBIC: CPT | Performed by: EMERGENCY MEDICINE

## 2023-11-06 PROCEDURE — 25000003 PHARM REV CODE 250: Performed by: STUDENT IN AN ORGANIZED HEALTH CARE EDUCATION/TRAINING PROGRAM

## 2023-11-06 RX ORDER — CLONAZEPAM 0.5 MG/1
2 TABLET ORAL ONCE
Status: COMPLETED | OUTPATIENT
Start: 2023-11-06 | End: 2023-11-06

## 2023-11-06 RX ADMIN — FAMOTIDINE 20 MG: 20 TABLET, FILM COATED ORAL at 08:11

## 2023-11-06 RX ADMIN — PIPERACILLIN AND TAZOBACTAM 4.5 G: 4; .5 INJECTION, POWDER, LYOPHILIZED, FOR SOLUTION INTRAVENOUS; PARENTERAL at 02:11

## 2023-11-06 RX ADMIN — IPRATROPIUM BROMIDE AND ALBUTEROL SULFATE 3 ML: 2.5; .5 SOLUTION RESPIRATORY (INHALATION) at 07:11

## 2023-11-06 RX ADMIN — IPRATROPIUM BROMIDE AND ALBUTEROL SULFATE 3 ML: 2.5; .5 SOLUTION RESPIRATORY (INHALATION) at 03:11

## 2023-11-06 RX ADMIN — GUAIFENESIN 1200 MG: 600 TABLET, EXTENDED RELEASE ORAL at 08:11

## 2023-11-06 RX ADMIN — FUROSEMIDE 40 MG: 10 INJECTION, SOLUTION INTRAVENOUS at 02:11

## 2023-11-06 RX ADMIN — PIPERACILLIN AND TAZOBACTAM 4.5 G: 4; .5 INJECTION, POWDER, LYOPHILIZED, FOR SOLUTION INTRAVENOUS; PARENTERAL at 11:11

## 2023-11-06 RX ADMIN — ACETAMINOPHEN 650 MG: 325 TABLET ORAL at 02:11

## 2023-11-06 RX ADMIN — POTASSIUM BICARBONATE 50 MEQ: 978 TABLET, EFFERVESCENT ORAL at 08:11

## 2023-11-06 RX ADMIN — METOPROLOL SUCCINATE 100 MG: 100 TABLET, EXTENDED RELEASE ORAL at 08:11

## 2023-11-06 RX ADMIN — ASPIRIN 81 MG 81 MG: 81 TABLET ORAL at 08:11

## 2023-11-06 RX ADMIN — CLONAZEPAM 2 MG: 0.5 TABLET ORAL at 02:11

## 2023-11-06 RX ADMIN — IPRATROPIUM BROMIDE AND ALBUTEROL SULFATE 3 ML: 2.5; .5 SOLUTION RESPIRATORY (INHALATION) at 11:11

## 2023-11-06 RX ADMIN — PIPERACILLIN AND TAZOBACTAM 4.5 G: 4; .5 INJECTION, POWDER, LYOPHILIZED, FOR SOLUTION INTRAVENOUS; PARENTERAL at 05:11

## 2023-11-06 NOTE — HOSPITAL COURSE
11/6/23:  No acute events overnight.  Patient resting comfortably in bed this morning.  He is slightly lethargic.  Does not endorse any distress.  Lung sounds improved.    11/07/2023: Patient receiving nebulizer treatment this morning.  More alert.  Discussed with patient and family need for skilled nursing placement ongoing physical and occupational therapy as well as close monitoring of the next several weeks.  Patient having intermittent hypoxemia and intermittent delirium and syncopal events placing him at high risk.      11/8/23:  patient cognitively back to baseline.  Does not want skilled.  Agreed to home with  and close outpatient follow up.

## 2023-11-06 NOTE — PLAN OF CARE
Problem: Physical Therapy  Goal: Physical Therapy Goal  Description: Goals to be met by: 11/10/2023   Patient will increase functional independence with mobility by performin. Supine to sit with Modified Independent.  2. Sit to supine with Modified Independent.  3. Bed to chair transfer with Modified Independent with rolling walker using Step Transfer technique.  4. Sit to Stand with Modified Independent with rolling walker.  5. Gait  x 500  feet with Supervision or Set-up Assistance with rolling walker with SPO2 greater than 92% .  6. Lower extremity exercise program x10 reps, with assistance as needed.     Outcome: Plan of care established

## 2023-11-06 NOTE — SUBJECTIVE & OBJECTIVE
Past Medical History:   Diagnosis Date    Abdominal hernia     Anemia 02/2021    Anticoagulant long-term use     Arthritis     SHOULDER    Cancer     prostate    Cardiomyopathy     Carotid artery stenosis     left    Coronary artery disease     Deaf     Dizziness     Encounter for blood transfusion     Gastritis 3/2/2021    Hyperlipidemia     Hypertension     Mitral regurgitation     Mitral regurgitation     PAD (peripheral artery disease)     Paroxysmal atrial fibrillation     Stroke 2020    Thyroid disease        Past Surgical History:   Procedure Laterality Date    ABLATION N/A 7/22/2020    Procedure: Ablation, afib;  Surgeon: Tal Laurent MD;  Location: [facility] CATH;  Service: Cardiology;  Laterality: N/A;  needs covid test;needs ALEXYS ([place])    AORTIC VALVE REPLACEMENT      CARDIAC CATH COSURGEON N/A 7/27/2022    Procedure: Cardiac Cath Cosurgeon;  Surgeon: Glenroy Sam MD;  Location: [facility] CATH LAB;  Service: Cardiovascular;  Laterality: N/A;    CARDIAC CATHETERIZATION      3 HEART STENTS    carotid artery stent Left     CLOSURE OF LEFT ATRIAL APPENDAGE USING DEVICE N/A 11/22/2022    Procedure: Left atrial appendage closure device;  Surgeon: Glenroy Richardson MD;  Location: [facility] CATH LAB;  Service: Cardiology;  Laterality: N/A;    COLONOSCOPY N/A 3/2/2021    Procedure: COLONOSCOPY;  Surgeon: Emiliana Cardenas MD;  Location: [facility] ENDO;  Service: General;  Laterality: N/A;  4  8:30am   CL/AM/Bd    CORONARY ARTERY BYPASS GRAFT      ESOPHAGOGASTRODUODENOSCOPY N/A 11/10/2020    Procedure: EGD (ESOPHAGOGASTRODUODENOSCOPY);  Surgeon: Renny Prakash MD;  Location: [facility] ENDO;  Service: Endoscopy;  Laterality: N/A;    HERNIA REPAIR      HIP REPLACEMENT ARTHROPLASTY Right 11/6/2020    Procedure: EMELY -ARTHROPLASTY, HIP REPLACEMENT (ENDO HIP);  Surgeon: Lino Thomas MD;  Location: [facility] OR;  Service: Orthopedics;  Laterality: Right;    LEFT HEART CATHETERIZATION N/A 6/29/2022    Procedure: Left heart cath;   Surgeon: Glenroy Richardson MD;  Location: [facility] CATH LAB;  Service: Cardiology;  Laterality: N/A;    PACERMAKER      PROSTATECTOMY      SHOULDER SURGERY      TOE SURGERY      TRANSCATHETER AORTIC VALVE REPLACEMENT (TAVR) N/A 7/27/2022    Procedure: REPLACEMENT, AORTIC VALVE, TRANSCATHETER (TAVR);  Surgeon: Glenroy Richardson MD;  Location: [facility] CATH LAB;  Service: Cardiology;  Laterality: N/A;    TRANSCATHETER AORTIC VALVE REPLACEMENT (TAVR)  7/27/2022    Procedure: REPLACEMENT, AORTIC VALVE, TRANSCATHETER (TAVR);  Surgeon: Glenroy Sam MD;  Location: [facility] CATH LAB;  Service: Cardiovascular;;    TRANSESOPHAGEAL ECHOCARDIOGRAPHY N/A 6/27/2022    Procedure: ECHOCARDIOGRAM, TRANSESOPHAGEAL;  Surgeon: [facility];  Location: [facility] EP LAB;  Service: Anesthesiology;  Laterality: N/A;    TRANSESOPHAGEAL ECHOCARDIOGRAPHY N/A 11/22/2022    Procedure: ECHOCARDIOGRAM, TRANSESOPHAGEAL;  Surgeon: Leila Arango MD;  Location: [facility] CATH LAB;  Service: Cardiology;  Laterality: N/A;    TRANSESOPHAGEAL ECHOCARDIOGRAPHY N/A 1/5/2023    Procedure: ECHOCARDIOGRAM, TRANSESOPHAGEAL;  Surgeon: [facility] Diagnostic Provider;  Location: [facility] EP LAB;  Service: Anesthesiology;  Laterality: N/A;    TREATMENT OF CARDIAC ARRHYTHMIA N/A 8/7/2019    Procedure: Cardioversion or Defibrillation;  Surgeon: Tal Laurent MD;  Location: [facility] CATH;  Service: Cardiology;  Laterality: N/A;       Review of patient's allergies indicates:  No Known Allergies    No current facility-administered medications on file prior to encounter.     Current Outpatient Medications on File Prior to Encounter   Medication Sig    abiraterone (ZYTIGA) 250 mg Tab Take 1 tablet (250 mg total) by mouth once daily.    acetylcarnitin/alpha lipoic ac (ACETYLCARNITIN HCL-A LIPOIC AC) 400-200 mg Cap Take 1 capsule by mouth once daily.    aspirin (ASPIR-81 ORAL) Take by mouth.    BICALUTAMIDE ORAL Take 50 mg by mouth once daily.    calcium-vitamin D3 (OYSTER SHELL  CALCIUM-VIT D3) 500 mg-5 mcg (200 unit) per tablet Take 1 tablet by mouth 2 (two) times daily.    cholecalciferol, vitamin D3, 5,000 unit capsule Take 5,000 Units by mouth once daily.     clonazePAM (KLONOPIN) 2 MG Tab Take 1 tablet (2 mg total) by mouth every evening.    cyanocobalamin, vitamin B-12, 1,000 mcg/mL Kit Inject 1 mL as directed every 28 days.    docusate sodium (COLACE) 100 MG capsule Take 100 mg by mouth 2 (two) times daily.    ferrous sulfate (FEOSOL) 325 mg (65 mg iron) Tab tablet Take 1 tablet (325 mg total) by mouth once daily.    KLOR-CON M20 20 mEq tablet Take 20 mEq by mouth once daily.     magnesium oxide (MAG-OX) 400 mg tablet Take 400 mg by mouth once daily.    metoprolol succinate (TOPROL-XL) 100 MG 24 hr tablet Take 1 tablet (100mg) in the AM and 1.5 tablets (150mg) in the PM (Patient taking differently: Take 100 mg by mouth 2 (two) times daily.)    predniSONE (DELTASONE) 5 MG tablet Take 1 tablet (5 mg total) by mouth once daily.    rosuvastatin (CRESTOR) 20 MG tablet Take 20 mg by mouth every morning.    sotaloL (BETAPACE) 80 MG tablet Take 0.5 tablets (40 mg total) by mouth 2 (two) times daily. (Patient taking differently: Take 80 mg by mouth 2 (two) times daily.)    torsemide (DEMADEX) 20 MG Tab Take 1 tablet (20 mg total) by mouth as needed.    vitamin D3-folic acid 5,000 unit- 1 mg Tab Take 1 tablet by mouth once daily.    vitamin D3-vitamin K2, MK4, (K2 PLUS D3) 1,000-100 unit-mcg Tab Take by mouth.     Family History       Problem Relation (Age of Onset)    Heart attack Mother    Heart disease Mother          Tobacco Use    Smoking status: Never    Smokeless tobacco: Never   Substance and Sexual Activity    Alcohol use: Yes     Alcohol/week: 7.0 standard drinks of alcohol     Types: 7 Glasses of wine per week    Drug use: Never    Sexual activity: Not on file     Comment:      Review of Systems   Constitutional:  Negative for activity change, appetite change, chills,  fatigue and fever.   HENT:  Negative for sinus pain and sore throat.    Eyes:  Negative for pain and visual disturbance.   Respiratory:  Positive for cough and chest tightness. Negative for apnea, choking, shortness of breath, wheezing and stridor.    Cardiovascular:  Negative for chest pain, palpitations and leg swelling.   Gastrointestinal:  Negative for abdominal pain, constipation, diarrhea, nausea and vomiting.   Musculoskeletal:  Negative for arthralgias, back pain, gait problem, joint swelling and neck stiffness.   Skin:  Negative for color change, pallor, rash and wound.   Neurological:  Negative for dizziness, seizures, syncope, facial asymmetry, weakness, light-headedness and headaches.   Psychiatric/Behavioral:  Negative for agitation, behavioral problems, confusion and dysphoric mood.      Objective:     Vital Signs (Most Recent):  Temp: 98 °F (36.7 °C) (11/06/23 0443)  Pulse: 92 (11/06/23 0738)  Resp: 20 (11/06/23 0738)  BP: 99/61 (11/06/23 0443)  SpO2: 95 % (11/06/23 0738) Vital Signs (24h Range):  Temp:  [97.4 °F (36.3 °C)-98.1 °F (36.7 °C)] 98 °F (36.7 °C)  Pulse:  [] 92  Resp:  [18-26] 20  SpO2:  [93 %-98 %] 95 %  BP: ()/(58-66) 99/61     Weight: 74.5 kg (164 lb 3.2 oz)  Body mass index is 25.72 kg/m².     Physical Exam  Vitals and nursing note reviewed.   Constitutional:       General: He is not in acute distress.     Appearance: Normal appearance. He is not ill-appearing or toxic-appearing.      Comments: Communicating without difficulties with ASL interpretor and with daughter, Heidy   HENT:      Head: Normocephalic and atraumatic.      Nose: Nose normal.      Mouth/Throat:      Mouth: Mucous membranes are dry.      Pharynx: Oropharynx is clear.      Comments: Thick blood tinged sputum collected in mouth  Eyes:      Extraocular Movements: Extraocular movements intact.      Conjunctiva/sclera: Conjunctivae normal.   Cardiovascular:      Rate and Rhythm: Normal rate and regular rhythm.       Pulses: Normal pulses.      Heart sounds: Normal heart sounds. No murmur heard.  Pulmonary:      Effort: Pulmonary effort is normal. No respiratory distress.      Breath sounds: Rales present.      Comments: Fine crackles over left lung, no chest tenderness  Abdominal:      General: Abdomen is flat.      Palpations: Abdomen is soft. There is no mass.      Tenderness: There is no right CVA tenderness or left CVA tenderness.   Musculoskeletal:         General: No tenderness.      Cervical back: Normal range of motion and neck supple. No rigidity.      Right lower leg: No edema.      Left lower leg: No edema.      Comments: Right shoulder joint with minimal tenderness, passive ROM intact bilaterally, hand  and strength equal bilaterally   Skin:     General: Skin is warm and dry.   Neurological:      General: No focal deficit present.      Mental Status: He is alert and oriented to person, place, and time.      Motor: No weakness.      Gait: Gait normal.   Psychiatric:         Mood and Affect: Mood normal.         Behavior: Behavior normal.         Thought Content: Thought content normal.               Significant Labs: All pertinent labs within the past 24 hours have been reviewed.  A1C:   Recent Labs   Lab 10/19/23  0815   HGBA1C 5.2       Bilirubin:   Recent Labs   Lab 10/19/23  0815 11/02/23  1734 11/03/23  0513 11/05/23  0031 11/05/23  0547   BILITOT 0.9 0.8 0.8 1.0 1.1*       BMP:   Recent Labs   Lab 11/05/23  0547   GLU 98      K 3.4*      CO2 26   BUN 20   CREATININE 1.1   CALCIUM 8.7       CBC:   Recent Labs   Lab 11/05/23  0031 11/05/23  0547   WBC 8.55 11.28   HGB 14.6 15.1   HCT 43.3 44.5   * 55*       CMP:   Recent Labs   Lab 11/05/23  0031 11/05/23  0547    140   K 3.8 3.4*    103   CO2 28 26    98   BUN 20 20   CREATININE 1.2 1.1   CALCIUM 8.7 8.7   PROT 6.4 5.8*   ALBUMIN 3.1* 2.7*   BILITOT 1.0 1.1*   ALKPHOS 56 46*   AST 32 27   ALT 31 27   ANIONGAP 5 11        TSH:   Recent Labs   Lab 11/02/23  1734   TSH 1.960       Significant Imaging: I have reviewed all pertinent imaging results/findings within the past 24 hours.

## 2023-11-06 NOTE — PLAN OF CARE
[facility]  Initial Discharge Assessment       Primary Care Provider: Josh Alejo Jr., MD    Admission Diagnosis: Hypoxia [R09.02]  Generalized weakness [R53.1]  Pneumonia of left lung due to infectious organism, unspecified part of lung [J18.9]    Admission Date: 11/4/2023  Expected Discharge Date:     Transition of Care Barriers: None    Payor: HUMANA MANAGED MEDICARE / Plan: HUMANA MEDICARE PPO / Product Type: Medicare Advantage /     Extended Emergency Contact Information  Primary Emergency Contact: Karen Mclean   [facility]  Home Phone: [phone]  Mobile Phone: [phone]  Relation: Daughter  Preferred language: English   needed? No  Secondary Emergency Contact: Heidy Mclean  Mobile Phone: [phone]  Relation: Daughter  Preferred language: English   needed? No    Discharge Plan A: Home  Discharge Plan B: Home      [facility] - [place], [place] - [address]  [address]  [facility] [address]  Phone: [phone] Fax: [phone]    [facility] - [place], [place] - [address]  [address] [address]  Phone: [phone] Fax: [phone]    [id number] [place], [place] - [address] AT [facility] [address] [facility] [address]  [address]  [facility] [address]  Phone: [phone] Fax: [phone]    [facility] - [place], [place] - [address]  [address]  [place] [address]  Phone: [phone] Fax: [phone]      Initial Assessment (most recent)       Adult Discharge Assessment - 11/06/23 1205          Discharge Assessment    Assessment Type Discharge Planning Assessment     Confirmed/corrected address, phone number and insurance Yes     Confirmed Demographics Correct on Facesheet     Source of Information patient; utilized   Alicia #[id number]    When was your last doctors appointment? 11/01/23     Reason For  Admission Pneumonia of left lung due to infectious organism     People in Home alone   The patient stated that he lives alone, but his daughters check on him often.    Do you expect to return to your current living situation? Yes     Do you have help at home or someone to help you manage your care at home? Yes   The patient does not have a caregiver, but he has daughters who assist home with his care as needed.    Prior to hospitilization cognitive status: Alert/Oriented     Current cognitive status: Alert/Oriented     Walking or Climbing Stairs ambulation difficulty, requires equipment     Home Layout Able to live on 1st floor     Equipment Currently Used at Home walker, standard;grab bar;blood pressure machine     Patient currently being followed by outpatient case management? No     Do you currently have service(s) that help you manage your care at home? No     Do you take prescription medications? Yes     Do you have prescription coverage? Yes     Coverage HUMANA MANAGED MEDICARE - HUMANA MEDICARE PPO     Do you have any problems affording any of your prescribed medications? No     Is the patient taking medications as prescribed? yes     Who is going to help you get home at discharge? TBD     How do you get to doctors appointments? family or friend will provide     Are you on dialysis? No     DME Needed Upon Discharge  other (see comments)   TBD    Discharge Plan discussed with: Patient     Transition of Care Barriers None     Discharge Plan A Home     Discharge Plan B Home        Physical Activity    On average, how many days per week do you engage in moderate to strenuous exercise (like a brisk walk)? 7 days     On average, how many minutes do you engage in exercise at this level? 60 min        Financial Resource Strain    How hard is it for you to pay for the very basics like food, housing, medical care, and heating? Not hard at all        Housing Stability    In the last 12 months, was there a time when you  were not able to pay the mortgage or rent on time? No     In the last 12 months, how many places have you lived? 1     In the last 12 months, was there a time when you did not have a steady place to sleep or slept in a shelter (including now)? No        Transportation Needs    In the past 12 months, has lack of transportation kept you from medical appointments or from getting medications? No     In the past 12 months, has lack of transportation kept you from meetings, work, or from getting things needed for daily living? No        Food Insecurity    Within the past 12 months, you worried that your food would run out before you got the money to buy more. Never true     Within the past 12 months, the food you bought just didn't last and you didn't have money to get more. Never true        Stress    Do you feel stress - tense, restless, nervous, or anxious, or unable to sleep at night because your mind is troubled all the time - these days? Only a little        Social Connections    In a typical week, how many times do you talk on the phone with family, friends, or neighbors? More than three times a week   The patient expressed that he talks to his daughters often.    How often do you attend Yarsani or Bahai services? Never     Do you belong to any clubs or organizations such as Yarsani groups, unions, fraternal or athletic groups, or school groups? No     How often do you attend meetings of the clubs or organizations you belong to? Never     Are you , , , , never , or living with a partner?         Alcohol Use    Q1: How often do you have a drink containing alcohol? Never     Q2: How many drinks containing alcohol do you have on a typical day when you are drinking? Patient does not drink     Q3: How often do you have six or more drinks on one occasion? Never                 Initial discharge assessment is completed. Spoke to the patient in his room. A professional   was utilize for this assessment (Alicia #[id number]).     The patient expressed that he lives alone, but he has daughters who check on him often. The patient expressed that he is independent. He does have a standard walker, shower chair, and grab bar if needed.     The patient was open to community information regarding mental health. Contact information was left in the patient's room. SW/LORA will remain available.

## 2023-11-06 NOTE — PT/OT/SLP EVAL
Physical Therapy Evaluation     Patient Name: Michael Mclean   MRN: [id number]  Recent Surgery: * No surgery found *      Recommendations:     Discharge Recommendations: Low Intensity Therapy   Discharge Equipment Recommendations: none   Barriers to discharge: Decreased caregiver support    Assessment:     Michael Mclean is a 83 y.o. male admitted with a medical diagnosis of Pneumonia of left lung due to infectious organism. He presents with the following impairments/functional limitations: weakness, gait instability, decreased upper extremity function, decreased ROM, impaired cardiopulmonary response to activity, impaired endurance, impaired balance, decreased lower extremity function, impaired joint extensibility, decreased safety awareness, impaired muscle length, impaired cognition, pain, impaired functional mobility. Patient communicates via sign language, daughter and son both present to interpret. Daughter reports that patient lives alone at home and was ambulatory with a rolling walker prior to this hospital admission. At the time of evaluation, patient is c/o right elbow pain, he is on O2 supplement via nasal cannula.  He required stand by assistance with supine <> sit, CGA with sit <> stand using a RW, ambulated ~230 feet with rolling walker and 2 liters of O2 via nasal cannula with CGA, daughter assisting with directions.  SPO2 after walking was 93% with HR of 123 bpm.  Noted pes planus deformity on both feet, right worse than the left. No DME needed upon discharge, recommend home health P.T. with supervision at home upon discharge from the hospital once medically stable.     Rehab Prognosis: Good and Fair; patient would benefit from acute PT services to address these deficits and reach maximum level of function.    Plan:     During this hospitalization, patient to be seen 5 x/week to address the above listed problems via gait training, therapeutic activities, therapeutic exercises,  neuromuscular re-education    Plan of Care Expires: 11/10/23    Subjective     Chief Complaint: Patient communicates via sign language, daughter and son both present to interpret.   Patient Comments/Goals: Get him some help at home.   Pain/Comfort:  Pain Rating 1:  (did not quantify)  Location - Side 1: Right  Location 1: elbow  Pain Addressed 1: Reposition, Distraction  Pain Rating Post-Intervention 1:  (did not quantify)    Social History:  Living Environment: Patient lives alone in a single story home with can live on 1st floor  Prior Level of Function: Prior to admission, patient was modified independent  Equipment Used at Home: walker, rolling, grab bar, blood pressure machine, bedside commode  DME owned (not currently used): none  Assistance Upon Discharge: family    Objective:     Communicated with nurse, patient, son and daughter  prior to session. Patient found supine with peripheral IV, PureWick, oxygen upon PT entry to room.    General Precautions: Standard, fall (deaf and communcates via sign language)   Orthopedic Precautions: N/A   Braces: N/A    Respiratory Status: Nasal cannula, flow 2 L/min    Exams:  Cognition: Patient is oriented to Person and Place  RLE ROM: WFL  RLE Strength: WFL  LLE ROM: WFL  LLE Strength: WFL  Gross Motor Coordination: WFL  Postural Exam: Patient presented with the following abnormalities:    -       Rounded shoulders  -       Forward head  -       pes planus deformity on both feet, right worse than the left   Skin Integrity/Edema:     -       Skin integrity: Visible skin intact    Functional Mobility:  Gait belt applied - Yes  Bed Mobility  Rolling Left: contact guard assistance  Rolling Right: contact guard assistance  Scooting: contact guard assistance  Supine to Sit: contact guard assistance for LE management and trunk management  Sit to Supine: contact guard assistance for LE management and trunk management  Transfers  Sit to Stand: contact guard assistance with rolling  walker and with cues for hand placement and foot placement  Gait  Patient ambulated ~230 feet  with rolling walker and contact guard assistance and minimum assistance. Patient demonstrates occasional unsteady gait, decreased step length, flexed posture, and decreased mauro. .. portable Supplemental O2 2L utilized.  Balance  Sitting: stand by assistance  Standing: contact guard assistance      Therapeutic Activities and Exercises:   Patient educated on role of acute care PT and PT POC, safety while in hospital including calling nurse for mobility, and call light usage  Patient educated about importance of OOB mobility and remaining up in chair most of the day.      AM-PAC 6 CLICK MOBILITY  Total Score:18    Patient left HOB elevated with all lines intact, call button in reach, RN notified, bed alarm on, and family present.    GOALS:   Multidisciplinary Problems       Physical Therapy Goals          Problem: Physical Therapy    Goal Priority Disciplines Outcome Goal Variances Interventions   Physical Therapy Goal     PT, PT/OT Ongoing, Progressing     Description: Goals to be met by: 11/10/2023   Patient will increase functional independence with mobility by performin. Supine to sit with Modified Independent.  2. Sit to supine with Modified Independent.  3. Bed to chair transfer with Modified Independent with rolling walker using Step Transfer technique.  4. Sit to Stand with Modified Independent with rolling walker.  5. Gait  x 500  feet with Supervision or Set-up Assistance with rolling walker with SPO2 greater than 92% .  6. Lower extremity exercise program x10 reps, with assistance as needed.                          History:     Past Medical History:   Diagnosis Date    Abdominal hernia     Anemia 2021    Anticoagulant long-term use     Arthritis     SHOULDER    Cancer     prostate    Cardiomyopathy     Carotid artery stenosis     left    Coronary artery disease     Deaf     Dizziness     Encounter  for blood transfusion     Gastritis 3/2/2021    Hyperlipidemia     Hypertension     Mitral regurgitation     Mitral regurgitation     PAD (peripheral artery disease)     Paroxysmal atrial fibrillation     Stroke 2020    Thyroid disease        Past Surgical History:   Procedure Laterality Date    ABLATION N/A 7/22/2020    Procedure: Ablation, afib;  Surgeon: Tal Laurent MD;  [facility];  Service: Cardiology;  Laterality: N/A;  needs covid test;needs ALEXYS ([place])    AORTIC VALVE REPLACEMENT      CARDIAC CATH COSURGEON N/A 7/27/2022    Procedure: Cardiac Cath Cosurgeon;  Surgeon: Glenroy Sam MD;  Location: [facility] CATH LAB;  Service: Cardiovascular;  Laterality: N/A;    CARDIAC CATHETERIZATION      3 HEART STENTS    carotid artery stent Left     CLOSURE OF LEFT ATRIAL APPENDAGE USING DEVICE N/A 11/22/2022    Procedure: Left atrial appendage closure device;  Surgeon: Glenroy Richardson MD;  Location: [facility] CATH LAB;  Service: Cardiology;  Laterality: N/A;    COLONOSCOPY N/A 3/2/2021    Procedure: COLONOSCOPY;  Surgeon: Emiliana Cardenas MD;  Location: [facility] ENDO;  Service: General;  Laterality: N/A;  4  8:30am   CL/AM/Bd    CORONARY ARTERY BYPASS GRAFT      ESOPHAGOGASTRODUODENOSCOPY N/A 11/10/2020    Procedure: EGD (ESOPHAGOGASTRODUODENOSCOPY);  Surgeon: Renny Prakash MD;  Location: [facility];  Service: Endoscopy;  Laterality: N/A;    HERNIA REPAIR      HIP REPLACEMENT ARTHROPLASTY Right 11/6/2020    Procedure: EMELY -ARTHROPLASTY, HIP REPLACEMENT (ENDO HIP);  Surgeon: Lino Thomas MD;  [facility];  Service: Orthopedics;  Laterality: Right;    LEFT HEART CATHETERIZATION N/A 6/29/2022    Procedure: Left heart cath;  Surgeon: Glenroy Richardson MD;  Location: [facility] CATH LAB;  Service: Cardiology;  Laterality: N/A;    PACERMAKER      PROSTATECTOMY      SHOULDER SURGERY      TOE SURGERY      TRANSCATHETER AORTIC VALVE REPLACEMENT (TAVR) N/A 7/27/2022    Procedure: REPLACEMENT, AORTIC VALVE,  TRANSCATHETER (TAVR);  Surgeon: Glenroy Richardson MD;  Location: [facility] CATH LAB;  Service: Cardiology;  Laterality: N/A;    TRANSCATHETER AORTIC VALVE REPLACEMENT (TAVR)  7/27/2022    Procedure: REPLACEMENT, AORTIC VALVE, TRANSCATHETER (TAVR);  Surgeon: Glenroy Sam MD;  Location: [facility] CATH LAB;  Service: Cardiovascular;;    TRANSESOPHAGEAL ECHOCARDIOGRAPHY N/A 6/27/2022    Procedure: ECHOCARDIOGRAM, TRANSESOPHAGEAL;  Surgeon: [facility] Diagnostic Provider;  Location: [facility] EP LAB;  Service: Anesthesiology;  Laterality: N/A;    TRANSESOPHAGEAL ECHOCARDIOGRAPHY N/A 11/22/2022    Procedure: ECHOCARDIOGRAM, TRANSESOPHAGEAL;  Surgeon: Leila Arango MD;  Location: [facility] CATH LAB;  Service: Cardiology;  Laterality: N/A;    TRANSESOPHAGEAL ECHOCARDIOGRAPHY N/A 1/5/2023    Procedure: ECHOCARDIOGRAM, TRANSESOPHAGEAL;  Surgeon: [facility] Diagnostic Provider;  Location: [facility] EP LAB;  Service: Anesthesiology;  Laterality: N/A;    TREATMENT OF CARDIAC ARRHYTHMIA N/A 8/7/2019    Procedure: Cardioversion or Defibrillation;  Surgeon: Tal Laurent MD;  Location: [facility] CATH;  Service: Cardiology;  Laterality: N/A;       Time Tracking:     PT Received On: 11/06/23  PT Start Time: 1430  PT Stop Time: 1455  PT Total Time (min): 25 min     Billable Minutes: Evaluation low complexity     11/6/2023

## 2023-11-06 NOTE — PT/OT/SLP EVAL
Occupational Therapy   Evaluation    Name: Michael Mclean  MRN: [id number]  Admitting Diagnosis: Pneumonia of left lung due to infectious organism  Recent Surgery: * No surgery found *      Recommendations:     Discharge Recommendations: Low Intensity Therapy  Discharge Equipment Recommendations:  none  Barriers to discharge:  Other (Comment) (Medical status)    Assessment:     Michael Mclean is a 83 y.o. male with a medical diagnosis of Pneumonia of left lung due to infectious organism.  He presents with functional eficits impacting independence with ADL's including functional mobility. Performance deficits affecting function: weakness, impaired endurance, impaired self care skills, impaired functional mobility, impaired balance, gait instability, decreased upper extremity function, decreased coordination, decreased lower extremity function, decreased safety awareness, pain, decreased ROM, edema, impaired cardiopulmonary response to activity, impaired joint extensibility, impaired muscle length.      Rehab Prognosis: Fair; patient would benefit from acute skilled OT services to address these deficits and reach maximum level of function.       Plan:     Patient to be seen 5 x/week to address the above listed problems via self-care/home management, therapeutic activities, therapeutic exercises  Plan of Care Expires: 11/13/23  Plan of Care Reviewed with: patient, daughter    Subjective     Chief Complaint: pain in (R) shoulder with flexion  Patient/Family Comments/goals: Pt would like to return home.     Occupational Profile:  Living Environment: Pt lives alone in a H with 5 steps and handrail on (R).  Previous level of function: Modified Independent  Roles and Routines: ADL's  Equipment Used at Home: walker, rolling, shower chair, rollator, bedside commode, grab bar  Assistance upon Discharge: Pt's daughter assist daily on multiple trips to his house.     Pain/Comfort:  Pain Rating 1: 7/10  Location -  Side 1: Right  Location 1: arm  Pain Addressed 1: Reposition, Cessation of Activity, Nurse notified  Pain Rating Post-Intervention 1: 3/10    Patients cultural, spiritual, Baptism conflicts given the current situation: no    Objective:     Communicated with: nurse prior to session.  Patient found HOB elevated with telemetry, pulse ox (continuous), peripheral IV, PureWick, oxygen upon OT entry to room.    General Precautions: Standard, fall  Orthopedic Precautions: N/A  Braces: N/A  Respiratory Status: Nasal cannula, flow 2 L/min    Occupational Performance:    Bed Mobility:    Patient completed Rolling/Turning to Left with  stand by assistance  Patient completed Rolling/Turning to Right with stand by assistance  Patient completed Scooting/Bridging with stand by assistance  Patient completed Supine to Sit with stand by assistance  Patient completed Sit to Supine with stand by assistance    Functional Mobility/Transfers:  Patient completed Sit <> Stand Transfer with contact guard assistance  with  rolling walker   Patient completed Bed <> Chair Transfer using Step Transfer technique with contact guard assistance with rolling walker  Patient completed Toilet Transfer Step Transfer technique with contact guard assistance with  grab bars  Functional Mobility: Pt ambulated greater than 200' between surfaces requiring steadying assist utilizing RW.    Activities of Daily Living:  Feeding:  setup assistance    Grooming: supervision    Bathing: moderate assistance    Upper Body Dressing: minimum assistance    Lower Body Dressing: moderate assistance    Toileting: moderate assistance      Cognitive/Visual Perceptual:  Cognitive/Psychosocial Skills:  -       Oriented to: Person, Place, and Situation   -       Follows Commands/attention:Follows multistep  commands and requires sign language but can lip read  -       Communication: Sign language  -       Memory: No Deficits noted  -       Safety awareness/insight to  disability: impaired   -       Mood/Affect/Coping skills/emotional control: Cooperative and Pleasant    Physical Exam:  Postural examination/scapula alignment: -       Rounded shoulders  -       Forward head  Edema:  Mild bilateral elbows'  Sensation: -       Intact  light/touch bilateral UE's  Dominant hand: -       Right  Upper Extremity Range of Motion:  -       Right Upper Extremity: WFL except 70 degrees shoulder flexion in plane of scaption  -       Left Upper Extremity: WFL  Upper Extremity Strength: -       Right Upper Extremity: 2- to 3-/5 shoulder, 4/5 elbow, forearm, and wrist  -       Left Upper Extremity: 4/5  Fine Motor Coordination: -       Intact  Gross motor coordination: impaired (R) UE hand-eye coordination    AMPAC 6 Click ADL:  AMPAC Total Score: 20    Treatment & Education:  Pt was provided education / instruction regarding role of OT and established OT POC.    Patient left HOB elevated with all lines intact, call button in reach, and nurse notified    GOALS:   Goals to be met by: 11/13/23     Patient will increase functional independence with ADLs by performing:    Feeding with Modified [place].  UE Dressing with Modified [place].  LE Dressing with Modified [place].  Grooming while seated with Modified [place].  Toileting from toilet with Modified [place] for hygiene and clothing management.   Bathing from  shower chair/bench with Setup Assistance.  Toilet transfer to toilet with Modified [place].      History:     Past Medical History:   Diagnosis Date    Abdominal hernia     Anemia 02/2021    Anticoagulant long-term use     Arthritis     SHOULDER    Cancer     prostate    Cardiomyopathy     Carotid artery stenosis     left    Coronary artery disease     Deaf     Dizziness     Encounter for blood transfusion     Gastritis 3/2/2021    Hyperlipidemia     Hypertension     Mitral regurgitation     Mitral regurgitation     PAD (peripheral artery disease)      Paroxysmal atrial fibrillation     Stroke 2020    Thyroid disease          Past Surgical History:   Procedure Laterality Date    ABLATION N/A 7/22/2020    Procedure: Ablation, afib;  Surgeon: Tal Laurent MD;  Location: [facility] CATH;  Service: Cardiology;  Laterality: N/A;  needs covid test;needs ALEXYS ([place])    AORTIC VALVE REPLACEMENT      CARDIAC CATH COSURGEON N/A 7/27/2022    Procedure: Cardiac Cath Cosurgeon;  Surgeon: Glenroy Sam MD;  Location: [facility] CATH LAB;  Service: Cardiovascular;  Laterality: N/A;    CARDIAC CATHETERIZATION      3 HEART STENTS    carotid artery stent Left     CLOSURE OF LEFT ATRIAL APPENDAGE USING DEVICE N/A 11/22/2022    Procedure: Left atrial appendage closure device;  Surgeon: Glenroy Richardson MD;  Location: [facility] CATH LAB;  Service: Cardiology;  Laterality: N/A;    COLONOSCOPY N/A 3/2/2021    Procedure: COLONOSCOPY;  Surgeon: Emiliana Cardenas MD;  Location: [facility] ENDO;  Service: General;  Laterality: N/A;  4  8:30am   CL/AM/Bd    CORONARY ARTERY BYPASS GRAFT      ESOPHAGOGASTRODUODENOSCOPY N/A 11/10/2020    Procedure: EGD (ESOPHAGOGASTRODUODENOSCOPY);  Surgeon: Renny Prakash MD;  Location: [facility] ENDO;  Service: Endoscopy;  Laterality: N/A;    HERNIA REPAIR      HIP REPLACEMENT ARTHROPLASTY Right 11/6/2020    Procedure: EMELY -ARTHROPLASTY, HIP REPLACEMENT (ENDO HIP);  Surgeon: Lino Thomas MD;  Location: [facility] OR;  Service: Orthopedics;  Laterality: Right;    LEFT HEART CATHETERIZATION N/A 6/29/2022    Procedure: Left heart cath;  Surgeon: Glenroy Richardson MD;  Location: [facility] CATH LAB;  Service: Cardiology;  Laterality: N/A;    PACERMAKER      PROSTATECTOMY      SHOULDER SURGERY      TOE SURGERY      TRANSCATHETER AORTIC VALVE REPLACEMENT (TAVR) N/A 7/27/2022    Procedure: REPLACEMENT, AORTIC VALVE, TRANSCATHETER (TAVR);  Surgeon: Glenroy Richardson MD;  Location: [facility] CATH LAB;  Service: Cardiology;  Laterality: N/A;    TRANSCATHETER AORTIC VALVE REPLACEMENT (TAVR)   7/27/2022    Procedure: REPLACEMENT, AORTIC VALVE, TRANSCATHETER (TAVR);  Surgeon: Glenroy Sam MD;  Location: [facility] CATH LAB;  Service: Cardiovascular;;    TRANSESOPHAGEAL ECHOCARDIOGRAPHY N/A 6/27/2022    Procedure: ECHOCARDIOGRAM, TRANSESOPHAGEAL;  Surgeon: [facility] Diagnostic Provider;  Location: [facility] EP LAB;  Service: Anesthesiology;  Laterality: N/A;    TRANSESOPHAGEAL ECHOCARDIOGRAPHY N/A 11/22/2022    Procedure: ECHOCARDIOGRAM, TRANSESOPHAGEAL;  Surgeon: Leila Arango MD;  Location: [facility] CATH LAB;  Service: Cardiology;  Laterality: N/A;    TRANSESOPHAGEAL ECHOCARDIOGRAPHY N/A 1/5/2023    Procedure: ECHOCARDIOGRAM, TRANSESOPHAGEAL;  Surgeon: [facility] Diagnostic Provider;  Location: [facility] EP LAB;  Service: Anesthesiology;  Laterality: N/A;    TREATMENT OF CARDIAC ARRHYTHMIA N/A 8/7/2019    Procedure: Cardioversion or Defibrillation;  Surgeon: Tal Laurent MD;  Location: [facility] CATH;  Service: Cardiology;  Laterality: N/A;       Time Tracking:     OT Date of Treatment: 11/06/23  OT Start Time: 1605  OT Stop Time: 1640  OT Total Time (min): 35 min    Billable Minutes:Evaluation 35    11/6/2023

## 2023-11-06 NOTE — RESPIRATORY THERAPY
11/06/23 1510   Home Oxygen Qualification   $ Home O2 Qualification Tech time 15 minutes   Room Air SpO2 At Rest (!) 87 %     Patient's oxygen saturation 87% on room air at rest. Placed patient on 2LNC. Patient's oxygen saturation increased to 93%. Patient remains on 2L. Cari RN with Case Management notified.

## 2023-11-06 NOTE — PROGRESS NOTES
"[facility] Medicine  Progress Note    Patient Name: Michael Mclean  MRN: [id number]  Patient Class: IP- Inpatient   Admission Date: 11/4/2023  Length of Stay: 1 days  Attending Physician: Josh Alejo Jr., MD  Primary Care Provider: Josh Alejo Jr., MD        Subjective:     Principal Problem:Pneumonia of left lung due to infectious organism        HPI:  Chief Complaint   Patient presents with    Altered Mental Status       Pt to the ER w/ complaints of AMS per daughter, "not acting right." Pt complains of hip pain, "feels like he has the flu."       83-year-old male with a history of anemia, extensive cardiac history, current atrial fibrillation with Watchman device, gastritis presents to the emergency department with generalized weakness, states he is very tired. Recently admitted for same, discharge 2 days ago.  Patient states he is still feeling weak and more sleepy with reduced appetite. He is deaf and uses American sign language to communicate.  Denies any chest pain or shortness of breath.  He is not ill appearing, he is alert, he is speaking via ASL. Looks much better than when he was admitted 2 days ago.    ED Course: On arrival pulse 103, RR 33, SpO2 90% on room air, /67 mmHg, temp 97.6F.  Chest x ray with left upper lung opacity suggestive of pneumonia with left sided crackles on ausculation.   POC negative for COVID19, influenza and RSV.  Patient speaks of changes to his baseline involving weakness and sleepiness and a chronic right shoulder pain with reduced range of motion. Further collateral information from family notes, since last hospital discharge, they have noticed congestion and breathing to be labored with a lot of "rattling."   Communicates in ASL and reads lips well, sits up in bed without assistance.   Patient to be admitted for IV antibiotics, scheduled breathing treatments, pulmonary care for left sided pneumonia.              Overview/Hospital " Course:  11/6/23:  No acute events overnight.  Patient resting comfortably in bed this morning.  He is slightly lethargic.  Does not endorse any distress.  Lung sounds improved.      Past Medical History:   Diagnosis Date    Abdominal hernia     Anemia 02/2021    Anticoagulant long-term use     Arthritis     SHOULDER    Cancer     prostate    Cardiomyopathy     Carotid artery stenosis     left    Coronary artery disease     Deaf     Dizziness     Encounter for blood transfusion     Gastritis 3/2/2021    Hyperlipidemia     Hypertension     Mitral regurgitation     Mitral regurgitation     PAD (peripheral artery disease)     Paroxysmal atrial fibrillation     Stroke 2020    Thyroid disease        Past Surgical History:   Procedure Laterality Date    ABLATION N/A 7/22/2020    Procedure: Ablation, afib;  Surgeon: Tal Laurent MD;  Location: [facility] include 234 beds at the [facility] CATH;  Service: Cardiology;  Laterality: N/A;  needs covid test;needs ALEXYS ([place])    AORTIC VALVE REPLACEMENT      CARDIAC CATH COSURGEON N/A 7/27/2022    Procedure: Cardiac Cath Cosurgeon;  Surgeon: Glenroy Sam MD;  Location: [facility] CATH LAB;  Service: Cardiovascular;  Laterality: N/A;    CARDIAC CATHETERIZATION      3 HEART STENTS    carotid artery stent Left     CLOSURE OF LEFT ATRIAL APPENDAGE USING DEVICE N/A 11/22/2022    Procedure: Left atrial appendage closure device;  Surgeon: Glenroy Richardson MD;  Location: [facility] CATH LAB;  Service: Cardiology;  Laterality: N/A;    COLONOSCOPY N/A 3/2/2021    Procedure: COLONOSCOPY;  Surgeon: Emiliana Cardenas MD;  Location: [facility];  Service: General;  Laterality: N/A;  4  8:30am   CL/AM/Bd    CORONARY ARTERY BYPASS GRAFT      ESOPHAGOGASTRODUODENOSCOPY N/A 11/10/2020    Procedure: EGD (ESOPHAGOGASTRODUODENOSCOPY);  Surgeon: Renny Prakash MD;  Location: [facility] include 234 beds at the [facility] ENDO;  Service: Endoscopy;  Laterality: N/A;    HERNIA REPAIR      HIP REPLACEMENT ARTHROPLASTY Right 11/6/2020    Procedure:  EMELY -ARTHROPLASTY, HIP REPLACEMENT (ENDO HIP);  Surgeon: Lino Thomas MD;  Location: [facility] OR;  Service: Orthopedics;  Laterality: Right;    LEFT HEART CATHETERIZATION N/A 6/29/2022    Procedure: Left heart cath;  Surgeon: Glenroy Richardson MD;  Location: [facility] CATH LAB;  Service: Cardiology;  Laterality: N/A;    PACERMAKER      PROSTATECTOMY      SHOULDER SURGERY      TOE SURGERY      TRANSCATHETER AORTIC VALVE REPLACEMENT (TAVR) N/A 7/27/2022    Procedure: REPLACEMENT, AORTIC VALVE, TRANSCATHETER (TAVR);  Surgeon: Glenroy Richardson MD;  Location: [facility] CATH LAB;  Service: Cardiology;  Laterality: N/A;    TRANSCATHETER AORTIC VALVE REPLACEMENT (TAVR)  7/27/2022    Procedure: REPLACEMENT, AORTIC VALVE, TRANSCATHETER (TAVR);  Surgeon: Glenroy Sam MD;  Location: [facility] CATH LAB;  Service: Cardiovascular;;    TRANSESOPHAGEAL ECHOCARDIOGRAPHY N/A 6/27/2022    Procedure: ECHOCARDIOGRAM, TRANSESOPHAGEAL;  Surgeon: Jorden Diagnostic Provider;  Location: [facility] EP LAB;  Service: Anesthesiology;  Laterality: N/A;    TRANSESOPHAGEAL ECHOCARDIOGRAPHY N/A 11/22/2022    Procedure: ECHOCARDIOGRAM, TRANSESOPHAGEAL;  Surgeon: Leila Arango MD;  Location: [facility] CATH LAB;  Service: Cardiology;  Laterality: N/A;    TRANSESOPHAGEAL ECHOCARDIOGRAPHY N/A 1/5/2023    Procedure: ECHOCARDIOGRAM, TRANSESOPHAGEAL;  Surgeon: Jorden Diagnostic Provider;  Location: [facility] EP LAB;  Service: Anesthesiology;  Laterality: N/A;    TREATMENT OF CARDIAC ARRHYTHMIA N/A 8/7/2019    Procedure: Cardioversion or Defibrillation;  Surgeon: Tal Laurent MD;  Location: [facility] CATH;  Service: Cardiology;  Laterality: N/A;       Review of patient's allergies indicates:  No Known Allergies    No current facility-administered medications on file prior to encounter.     Current Outpatient Medications on File Prior to Encounter   Medication Sig    abiraterone (ZYTIGA) 250 mg Tab Take 1 tablet (250 mg total) by mouth once daily.     acetylcarnitin/alpha lipoic ac (ACETYLCARNITIN HCL-A LIPOIC AC) 400-200 mg Cap Take 1 capsule by mouth once daily.    aspirin (ASPIR-81 ORAL) Take by mouth.    BICALUTAMIDE ORAL Take 50 mg by mouth once daily.    calcium-vitamin D3 (OYSTER SHELL CALCIUM-VIT D3) 500 mg-5 mcg (200 unit) per tablet Take 1 tablet by mouth 2 (two) times daily.    cholecalciferol, vitamin D3, 5,000 unit capsule Take 5,000 Units by mouth once daily.     clonazePAM (KLONOPIN) 2 MG Tab Take 1 tablet (2 mg total) by mouth every evening.    cyanocobalamin, vitamin B-12, 1,000 mcg/mL Kit Inject 1 mL as directed every 28 days.    docusate sodium (COLACE) 100 MG capsule Take 100 mg by mouth 2 (two) times daily.    ferrous sulfate (FEOSOL) 325 mg (65 mg iron) Tab tablet Take 1 tablet (325 mg total) by mouth once daily.    KLOR-CON M20 20 mEq tablet Take 20 mEq by mouth once daily.     magnesium oxide (MAG-OX) 400 mg tablet Take 400 mg by mouth once daily.    metoprolol succinate (TOPROL-XL) 100 MG 24 hr tablet Take 1 tablet (100mg) in the AM and 1.5 tablets (150mg) in the PM (Patient taking differently: Take 100 mg by mouth 2 (two) times daily.)    predniSONE (DELTASONE) 5 MG tablet Take 1 tablet (5 mg total) by mouth once daily.    rosuvastatin (CRESTOR) 20 MG tablet Take 20 mg by mouth every morning.    sotaloL (BETAPACE) 80 MG tablet Take 0.5 tablets (40 mg total) by mouth 2 (two) times daily. (Patient taking differently: Take 80 mg by mouth 2 (two) times daily.)    torsemide (DEMADEX) 20 MG Tab Take 1 tablet (20 mg total) by mouth as needed.    vitamin D3-folic acid 5,000 unit- 1 mg Tab Take 1 tablet by mouth once daily.    vitamin D3-vitamin K2, MK4, (K2 PLUS D3) 1,000-100 unit-mcg Tab Take by mouth.     Family History       Problem Relation (Age of Onset)    Heart attack Mother    Heart disease Mother          Tobacco Use    Smoking status: Never    Smokeless tobacco: Never   Substance and Sexual Activity    Alcohol use:  Yes     Alcohol/week: 7.0 standard drinks of alcohol     Types: 7 Glasses of wine per week    Drug use: Never    Sexual activity: Not on file     Comment:      Review of Systems   Constitutional:  Negative for activity change, appetite change, chills, fatigue and fever.   HENT:  Negative for sinus pain and sore throat.    Eyes:  Negative for pain and visual disturbance.   Respiratory:  Positive for cough and chest tightness. Negative for apnea, choking, shortness of breath, wheezing and stridor.    Cardiovascular:  Negative for chest pain, palpitations and leg swelling.   Gastrointestinal:  Negative for abdominal pain, constipation, diarrhea, nausea and vomiting.   Musculoskeletal:  Negative for arthralgias, back pain, gait problem, joint swelling and neck stiffness.   Skin:  Negative for color change, pallor, rash and wound.   Neurological:  Negative for dizziness, seizures, syncope, facial asymmetry, weakness, light-headedness and headaches.   Psychiatric/Behavioral:  Negative for agitation, behavioral problems, confusion and dysphoric mood.      Objective:     Vital Signs (Most Recent):  Temp: 98 °F (36.7 °C) (11/06/23 0443)  Pulse: 92 (11/06/23 0738)  Resp: 20 (11/06/23 0738)  BP: 99/61 (11/06/23 0443)  SpO2: 95 % (11/06/23 0738) Vital Signs (24h Range):  Temp:  [97.4 °F (36.3 °C)-98.1 °F (36.7 °C)] 98 °F (36.7 °C)  Pulse:  [] 92  Resp:  [18-26] 20  SpO2:  [93 %-98 %] 95 %  BP: ()/(58-66) 99/61     Weight: 74.5 kg (164 lb 3.2 oz)  Body mass index is 25.72 kg/m².     Physical Exam  Vitals and nursing note reviewed.   Constitutional:       General: He is not in acute distress.     Appearance: Normal appearance. He is not ill-appearing or toxic-appearing.      Comments: Communicating without difficulties with ASL interpretor and with daughterHeidy   HENT:      Head: Normocephalic and atraumatic.      Nose: Nose normal.      Mouth/Throat:      Mouth: Mucous membranes are dry.      Pharynx:  Oropharynx is clear.      Comments: Thick blood tinged sputum collected in mouth  Eyes:      Extraocular Movements: Extraocular movements intact.      Conjunctiva/sclera: Conjunctivae normal.   Cardiovascular:      Rate and Rhythm: Normal rate and regular rhythm.      Pulses: Normal pulses.      Heart sounds: Normal heart sounds. No murmur heard.  Pulmonary:      Effort: Pulmonary effort is normal. No respiratory distress.      Breath sounds: Rales present.      Comments: Fine crackles over left lung, no chest tenderness  Abdominal:      General: Abdomen is flat.      Palpations: Abdomen is soft. There is no mass.      Tenderness: There is no right CVA tenderness or left CVA tenderness.   Musculoskeletal:         General: No tenderness.      Cervical back: Normal range of motion and neck supple. No rigidity.      Right lower leg: No edema.      Left lower leg: No edema.      Comments: Right shoulder joint with minimal tenderness, passive ROM intact bilaterally, hand  and strength equal bilaterally   Skin:     General: Skin is warm and dry.   Neurological:      General: No focal deficit present.      Mental Status: He is alert and oriented to person, place, and time.      Motor: No weakness.      Gait: Gait normal.   Psychiatric:         Mood and Affect: Mood normal.         Behavior: Behavior normal.         Thought Content: Thought content normal.               Significant Labs: All pertinent labs within the past 24 hours have been reviewed.  A1C:   Recent Labs   Lab 10/19/23  0815   HGBA1C 5.2       Bilirubin:   Recent Labs   Lab 10/19/23  0815 11/02/23  1734 11/03/23  0513 11/05/23  0031 11/05/23  0547   BILITOT 0.9 0.8 0.8 1.0 1.1*       BMP:   Recent Labs   Lab 11/05/23  0547   GLU 98      K 3.4*      CO2 26   BUN 20   CREATININE 1.1   CALCIUM 8.7       CBC:   Recent Labs   Lab 11/05/23  0031 11/05/23  0547   WBC 8.55 11.28   HGB 14.6 15.1   HCT 43.3 44.5   * 55*       CMP:   Recent Labs    Lab 11/05/23  0031 11/05/23  0547    140   K 3.8 3.4*    103   CO2 28 26    98   BUN 20 20   CREATININE 1.2 1.1   CALCIUM 8.7 8.7   PROT 6.4 5.8*   ALBUMIN 3.1* 2.7*   BILITOT 1.0 1.1*   ALKPHOS 56 46*   AST 32 27   ALT 31 27   ANIONGAP 5 11       TSH:   Recent Labs   Lab 11/02/23  1734   TSH 1.960       Significant Imaging: I have reviewed all pertinent imaging results/findings within the past 24 hours.      Assessment/Plan:      * Pneumonia of left lung due to infectious organism  CXR with left lung consolidation. Cough productive, blood tinged. POC covid, influenza and RSV negative. SpO2 90% on room air. Tachypnea.   ED course started on zosyn.   - f/u sputum culture, adjust antibiotics  - continue with scheduled breathing treatments  - start mucinex  - start scheduled pulmonary hygiene care with education on coughing and spitting out mucus and NOT hold in mouth  - supplemental oxygen per respiratory, keep SpO2 >94%  - IS and flutter exercises      Hypoxia  Secondary to respiratory infection. Treat pneumonia and provide supplemental oxygen.       AICD (automatic cardioverter/defibrillator) present  Denies activity. Follow up cardiology and electrophysiologist.       Hyperlipidemia  Continue home medication      Generalized weakness  Likely secondary to pneumonia. Denies falls, loss of consciousness.      Persistent atrial fibrillation  Patient with Persistent (7 days or more) atrial fibrillation which is controlled currently with Beta Blocker and Calcium Channel Blocker. Patient is currently in atrial fibrillation.[id number] Score: 3.   Once on xarelto in the past, but developed GI bleed requiring blood transfusion.   Currently not on anticoagulation therapy.   - continue daily 81 mg aspirin    Essential hypertension  Chronic, controlled. Latest blood pressure and vitals reviewed-     Temp:  [97.4 °F (36.3 °C)-98.1 °F (36.7 °C)]   Pulse:  []   Resp:  [18-26]   BP: ()/(58-66)    SpO2:  [93 %-98 %] .   Home meds for hypertension were reviewed and noted below.   Hypertension Medications             metoprolol succinate (TOPROL-XL) 100 MG 24 hr tablet Take 1 tablet (100mg) in the AM and 1.5 tablets (150mg) in the PM    sotaloL (BETAPACE) 80 MG tablet Take 0.5 tablets (40 mg total) by mouth 2 (two) times daily.    torsemide (DEMADEX) 20 MG Tab Take 1 tablet (20 mg total) by mouth as needed.          While in the hospital, will manage blood pressure as follows; Continue home antihypertensive regimen    Will utilize p.r.n. blood pressure medication only if patient's blood pressure greater than 180/110 and he develops symptoms such as worsening chest pain or shortness of breath.    BP Readings from Last 3 Encounters:   11/06/23 99/61   11/03/23 132/75   11/01/23 128/72         Deaf, nonspeaking  Communicates well through interpretor and reads lips.        VTE Risk Mitigation (From admission, onward)         Ordered     IP VTE HIGH RISK PATIENT  Once         11/05/23 0140     Place sequential compression device  Until discontinued         11/05/23 0140     Place BRIEN hose  Until discontinued         11/05/23 0140                Discharge Planning   KELLEY:      Code Status: Full Code   Is the patient medically ready for discharge?:     Reason for patient still in hospital (select all that apply): Treatment                     Josh Alejo Jr, MD  Department of Hospital Medicine   [facility]

## 2023-11-06 NOTE — PLAN OF CARE
Problem: Adult Inpatient Plan of Care  Goal: Optimal Comfort and Wellbeing  11/6/2023 0420 by Mackenzie Argueta RN  Outcome: Ongoing, Not Progressing  11/6/2023 0419 by Mackenzie Argueta RN  Outcome: Ongoing, Progressing  Goal: Readiness for Transition of Care  11/6/2023 0420 by Mackenzie Argueta RN  Outcome: Ongoing, Not Progressing  11/6/2023 0419 by Mackenzie Argueta RN  Outcome: Ongoing, Progressing       Problem: Adult Inpatient Plan of Care  Goal: Plan of Care Review  11/6/2023 0420 by Mackenzie Argueta RN  Outcome: Ongoing, Progressing  11/6/2023 0419 by Mackenzie Argueta RN  Outcome: Ongoing, Progressing  Goal: Patient-Specific Goal (Individualized)  11/6/2023 0420 by Mackenzie Argueta RN  Outcome: Ongoing, Progressing  11/6/2023 0419 by Mackenzie Argueta RN  Outcome: Ongoing, Progressing  Goal: Absence of Hospital-Acquired Illness or Injury  11/6/2023 0420 by Mackenzie Argueta RN  Outcome: Ongoing, Progressing  11/6/2023 0419 by Mackenzie Argueta RN  Outcome: Ongoing, Progressing     Problem: Fluid and Electrolyte Imbalance (Acute Kidney Injury/Impairment)  Goal: Fluid and Electrolyte Balance  11/6/2023 0420 by Mackenzie Argueta RN  Outcome: Ongoing, Progressing  11/6/2023 0419 by Mackenzie Argueta RN  Outcome: Ongoing, Progressing     Problem: Oral Intake Inadequate (Acute Kidney Injury/Impairment)  Goal: Optimal Nutrition Intake  11/6/2023 0420 by Mackenzie Argueta RN  Outcome: Ongoing, Progressing  11/6/2023 0419 by Mackenzie Argueta RN  Outcome: Ongoing, Progressing     Problem: Renal Function Impairment (Acute Kidney Injury/Impairment)  Goal: Effective Renal Function  11/6/2023 0420 by Mackenzie Argueta RN  Outcome: Ongoing, Progressing  11/6/2023 0419 by Mackenzie Argueta RN  Outcome: Ongoing, Progressing     Problem: Fall Injury Risk  Goal: Absence of Fall and Fall-Related Injury  11/6/2023 0420 by Mackenzie Argueta RN  Outcome: Ongoing, Progressing  11/6/2023 0419 by Mackenzie Argueta RN  Outcome: Ongoing, Progressing     Problem: Skin Injury Risk Increased  Goal: Skin Health and Integrity  11/6/2023  0420 by Mackenzie Argueta RN  Outcome: Ongoing, Progressing  11/6/2023 0419 by Mackenzie Argueta RN  Outcome: Ongoing, Progressing     Problem: Fluid Imbalance (Pneumonia)  Goal: Fluid Balance  11/6/2023 0420 by Mackenzie Argueta RN  Outcome: Ongoing, Progressing  11/6/2023 0419 by Mackenzie Argueta RN  Outcome: Ongoing, Progressing     Problem: Infection (Pneumonia)  Goal: Resolution of Infection Signs and Symptoms  11/6/2023 0420 by Mackenzie Argueta RN  Outcome: Ongoing, Progressing  11/6/2023 0419 by Mackenzie Argueta RN  Outcome: Ongoing, Progressing     Problem: Respiratory Compromise (Pneumonia)  Goal: Effective Oxygenation and Ventilation  11/6/2023 0420 by Mackenzie Argueta RN  Outcome: Ongoing, Progressing  11/6/2023 0419 by Mackenzie Argueta RN  Outcome: Ongoing, Progressing

## 2023-11-06 NOTE — PLAN OF CARE
Problem: Occupational Therapy  Goal: Occupational Therapy Goal  Description: Goals to be met by: 11/13/23     Patient will increase functional independence with ADLs by performing:    Feeding with Modified [place].  UE Dressing with Modified [place].  LE Dressing with Modified [place].  Grooming while seated with Modified [place].  Toileting from toilet with Modified [place] for hygiene and clothing management.   Bathing from  shower chair/bench with Setup Assistance.  Toilet transfer to toilet with Modified [place].    Outcome: Ongoing, Progressing

## 2023-11-06 NOTE — ASSESSMENT & PLAN NOTE
Patient with Persistent (7 days or more) atrial fibrillation which is controlled currently with Beta Blocker and Calcium Channel Blocker. Patient is currently in atrial fibrillation.[id number] Score: 3.   Once on xarelto in the past, but developed GI bleed requiring blood transfusion.   Currently not on anticoagulation therapy.   - continue daily 81 mg aspirin

## 2023-11-06 NOTE — ASSESSMENT & PLAN NOTE
Chronic, controlled. Latest blood pressure and vitals reviewed-     Temp:  [97.4 °F (36.3 °C)-98.1 °F (36.7 °C)]   Pulse:  []   Resp:  [18-26]   BP: ()/(58-66)   SpO2:  [93 %-98 %] .   Home meds for hypertension were reviewed and noted below.   Hypertension Medications             metoprolol succinate (TOPROL-XL) 100 MG 24 hr tablet Take 1 tablet (100mg) in the AM and 1.5 tablets (150mg) in the PM    sotaloL (BETAPACE) 80 MG tablet Take 0.5 tablets (40 mg total) by mouth 2 (two) times daily.    torsemide (DEMADEX) 20 MG Tab Take 1 tablet (20 mg total) by mouth as needed.          While in the hospital, will manage blood pressure as follows; Continue home antihypertensive regimen    Will utilize p.r.n. blood pressure medication only if patient's blood pressure greater than 180/110 and he develops symptoms such as worsening chest pain or shortness of breath.    BP Readings from Last 3 Encounters:   11/06/23 99/61   11/03/23 132/75   11/01/23 128/72

## 2023-11-06 NOTE — PLAN OF CARE
11/06/23 1520   Medicare Message   Important Message from Medicare regarding Discharge Appeal Rights Other (comments);Given to patient/caregiver;Explained to patient/caregiver;Signed/date by patient/caregiver  (Consent from patient's daughter, Karen.)   Date IMM was signed 11/06/23   Time IMM was signed [id number]

## 2023-11-07 LAB
ALBUMIN SERPL BCP-MCNC: 2.6 G/DL (ref 3.5–5.2)
ALP SERPL-CCNC: 43 U/L (ref 55–135)
ALT SERPL W/O P-5'-P-CCNC: 23 U/L (ref 10–44)
ANION GAP SERPL CALC-SCNC: 2 MMOL/L (ref 3–11)
AST SERPL-CCNC: 25 U/L (ref 10–40)
BASOPHILS # BLD AUTO: 0.03 K/UL (ref 0–0.2)
BASOPHILS NFR BLD: 0.6 % (ref 0–1.9)
BILIRUB SERPL-MCNC: 0.8 MG/DL (ref 0.1–1)
BUN SERPL-MCNC: 21 MG/DL (ref 8–23)
CALCIUM SERPL-MCNC: 8.4 MG/DL (ref 8.7–10.5)
CHLORIDE SERPL-SCNC: 103 MMOL/L (ref 95–110)
CO2 SERPL-SCNC: 34 MMOL/L (ref 23–29)
CREAT SERPL-MCNC: 1.1 MG/DL (ref 0.5–1.4)
DIFFERENTIAL METHOD: ABNORMAL
EOSINOPHIL # BLD AUTO: 0.4 K/UL (ref 0–0.5)
EOSINOPHIL NFR BLD: 6.6 % (ref 0–8)
ERYTHROCYTE [DISTWIDTH] IN BLOOD BY AUTOMATED COUNT: 13.2 % (ref 11.5–14.5)
EST. GFR  (NO RACE VARIABLE): >60 ML/MIN/1.73 M^2
GLUCOSE SERPL-MCNC: 112 MG/DL (ref 70–110)
HCT VFR BLD AUTO: 36.1 % (ref 40–54)
HGB BLD-MCNC: 12.4 G/DL (ref 14–18)
IMM GRANULOCYTES # BLD AUTO: 0.01 K/UL (ref 0–0.04)
IMM GRANULOCYTES NFR BLD AUTO: 0.2 % (ref 0–0.5)
LYMPHOCYTES # BLD AUTO: 0.9 K/UL (ref 1–4.8)
LYMPHOCYTES NFR BLD: 16.8 % (ref 18–48)
MAGNESIUM SERPL-MCNC: 2.2 MG/DL (ref 1.6–2.6)
MCH RBC QN AUTO: 33.2 PG (ref 27–31)
MCHC RBC AUTO-ENTMCNC: 34.3 G/DL (ref 32–36)
MCV RBC AUTO: 97 FL (ref 82–98)
MONOCYTES # BLD AUTO: 0.5 K/UL (ref 0.3–1)
MONOCYTES NFR BLD: 9.9 % (ref 4–15)
NEUTROPHILS # BLD AUTO: 3.6 K/UL (ref 1.8–7.7)
NEUTROPHILS NFR BLD: 65.9 % (ref 38–73)
NRBC BLD-RTO: 0 /100 WBC
PLATELET # BLD AUTO: 99 K/UL (ref 150–450)
PMV BLD AUTO: 11.3 FL (ref 9.2–12.9)
POTASSIUM SERPL-SCNC: 3.3 MMOL/L (ref 3.5–5.1)
PROT SERPL-MCNC: 5.9 G/DL (ref 6–8.4)
RBC # BLD AUTO: 3.73 M/UL (ref 4.6–6.2)
SODIUM SERPL-SCNC: 139 MMOL/L (ref 136–145)
WBC # BLD AUTO: 5.43 K/UL (ref 3.9–12.7)

## 2023-11-07 PROCEDURE — 25000003 PHARM REV CODE 250: Performed by: EMERGENCY MEDICINE

## 2023-11-07 PROCEDURE — 25000242 PHARM REV CODE 250 ALT 637 W/ HCPCS: Performed by: INTERNAL MEDICINE

## 2023-11-07 PROCEDURE — 85025 COMPLETE CBC W/AUTO DIFF WBC: CPT | Performed by: INTERNAL MEDICINE

## 2023-11-07 PROCEDURE — 21400001 HC TELEMETRY ROOM

## 2023-11-07 PROCEDURE — 27000221 HC OXYGEN, UP TO 24 HOURS

## 2023-11-07 PROCEDURE — 25000003 PHARM REV CODE 250: Performed by: INTERNAL MEDICINE

## 2023-11-07 PROCEDURE — 63600175 PHARM REV CODE 636 W HCPCS: Performed by: INTERNAL MEDICINE

## 2023-11-07 PROCEDURE — 94640 AIRWAY INHALATION TREATMENT: CPT

## 2023-11-07 PROCEDURE — 94664 DEMO&/EVAL PT USE INHALER: CPT

## 2023-11-07 PROCEDURE — 99900031 HC PATIENT EDUCATION (STAT)

## 2023-11-07 PROCEDURE — 97110 THERAPEUTIC EXERCISES: CPT

## 2023-11-07 PROCEDURE — 83735 ASSAY OF MAGNESIUM: CPT | Performed by: INTERNAL MEDICINE

## 2023-11-07 PROCEDURE — 80053 COMPREHEN METABOLIC PANEL: CPT | Performed by: INTERNAL MEDICINE

## 2023-11-07 PROCEDURE — 94761 N-INVAS EAR/PLS OXIMETRY MLT: CPT

## 2023-11-07 PROCEDURE — 99900035 HC TECH TIME PER 15 MIN (STAT)

## 2023-11-07 PROCEDURE — 97530 THERAPEUTIC ACTIVITIES: CPT

## 2023-11-07 PROCEDURE — 36415 COLL VENOUS BLD VENIPUNCTURE: CPT | Performed by: INTERNAL MEDICINE

## 2023-11-07 PROCEDURE — 94799 UNLISTED PULMONARY SVC/PX: CPT

## 2023-11-07 PROCEDURE — 25000003 PHARM REV CODE 250: Performed by: STUDENT IN AN ORGANIZED HEALTH CARE EDUCATION/TRAINING PROGRAM

## 2023-11-07 PROCEDURE — 63600175 PHARM REV CODE 636 W HCPCS: Performed by: EMERGENCY MEDICINE

## 2023-11-07 RX ORDER — DOXYCYCLINE HYCLATE 100 MG
100 TABLET ORAL EVERY 12 HOURS
Status: DISCONTINUED | OUTPATIENT
Start: 2023-11-07 | End: 2023-11-08 | Stop reason: HOSPADM

## 2023-11-07 RX ADMIN — IPRATROPIUM BROMIDE AND ALBUTEROL SULFATE 3 ML: 2.5; .5 SOLUTION RESPIRATORY (INHALATION) at 07:11

## 2023-11-07 RX ADMIN — FUROSEMIDE 40 MG: 10 INJECTION, SOLUTION INTRAVENOUS at 01:11

## 2023-11-07 RX ADMIN — FAMOTIDINE 20 MG: 20 TABLET, FILM COATED ORAL at 08:11

## 2023-11-07 RX ADMIN — IPRATROPIUM BROMIDE AND ALBUTEROL SULFATE 3 ML: 2.5; .5 SOLUTION RESPIRATORY (INHALATION) at 11:11

## 2023-11-07 RX ADMIN — PIPERACILLIN AND TAZOBACTAM 4.5 G: 4; .5 INJECTION, POWDER, LYOPHILIZED, FOR SOLUTION INTRAVENOUS; PARENTERAL at 03:11

## 2023-11-07 RX ADMIN — GUAIFENESIN 1200 MG: 600 TABLET, EXTENDED RELEASE ORAL at 08:11

## 2023-11-07 RX ADMIN — POTASSIUM BICARBONATE 50 MEQ: 978 TABLET, EFFERVESCENT ORAL at 08:11

## 2023-11-07 RX ADMIN — CEFTRIAXONE 2 G: 2 INJECTION, POWDER, FOR SOLUTION INTRAMUSCULAR; INTRAVENOUS at 11:11

## 2023-11-07 RX ADMIN — ASPIRIN 81 MG 81 MG: 81 TABLET ORAL at 08:11

## 2023-11-07 RX ADMIN — FUROSEMIDE 40 MG: 10 INJECTION, SOLUTION INTRAVENOUS at 03:11

## 2023-11-07 RX ADMIN — METOPROLOL SUCCINATE 100 MG: 100 TABLET, EXTENDED RELEASE ORAL at 08:11

## 2023-11-07 RX ADMIN — DOXYCYCLINE HYCLATE 100 MG: 100 TABLET, COATED ORAL at 08:11

## 2023-11-07 RX ADMIN — IPRATROPIUM BROMIDE AND ALBUTEROL SULFATE 3 ML: 2.5; .5 SOLUTION RESPIRATORY (INHALATION) at 03:11

## 2023-11-07 RX ADMIN — DOXYCYCLINE HYCLATE 100 MG: 100 TABLET, COATED ORAL at 10:11

## 2023-11-07 NOTE — PLAN OF CARE
11/07/23 0745   Post-Acute Status   Post-Acute Authorization Placement   Post-Acute Placement Status Referrals Sent       Referral sent to [facility] for home oxygen via fax.     Addendum: [id number]  Dr. Sapna montano. Now recommending nursing home placement. Will call [facility] once they are open to place the oxygen referral on hold.     Addendum: [id number]:   Spoke to Maribeth with [facility] and asked her to place the oxygen referral on hold. She will let the team know.

## 2023-11-07 NOTE — ASSESSMENT & PLAN NOTE
Patient with Persistent (7 days or more) atrial fibrillation which is controlled currently with Beta Blocker and Calcium Channel Blocker. Patient is currently in atrial fibrillation.[id number] Score: 3.   Once on xarelto in the past, but developed GI bleed requiring blood transfusion.   Currently not on anticoagulation therapy.   - continue daily 81 mg aspirin

## 2023-11-07 NOTE — PLAN OF CARE
Problem: Physical Therapy  Goal: Physical Therapy Goal  Description: Goals to be met by: 11/10/2023   Patient will increase functional independence with mobility by performin. Supine to sit with Modified Independent.  2. Sit to supine with Modified Independent.  3. Bed to chair transfer with Modified Independent with rolling walker using Step Transfer technique.  4. Sit to Stand with Modified Independent with rolling walker.  5. Gait  x 500  feet with Supervision or Set-up Assistance with rolling walker with SPO2 greater than 92% .  6. Lower extremity exercise program x10 reps, with assistance as needed.     Outcome: Ongoing, Progressing

## 2023-11-07 NOTE — ASSESSMENT & PLAN NOTE
Chronic, controlled. Latest blood pressure and vitals reviewed-     Temp:  [96.8 °F (36 °C)-97.9 °F (36.6 °C)]   Pulse:  []   Resp:  [17-22]   BP: (103-129)/(56-83)   SpO2:  [91 %-100 %] .   Home meds for hypertension were reviewed and noted below.   Hypertension Medications             metoprolol succinate (TOPROL-XL) 100 MG 24 hr tablet Take 1 tablet (100mg) in the AM and 1.5 tablets (150mg) in the PM    sotaloL (BETAPACE) 80 MG tablet Take 0.5 tablets (40 mg total) by mouth 2 (two) times daily.    torsemide (DEMADEX) 20 MG Tab Take 1 tablet (20 mg total) by mouth as needed.          While in the hospital, will manage blood pressure as follows; Continue home antihypertensive regimen    Will utilize p.r.n. blood pressure medication only if patient's blood pressure greater than 180/110 and he develops symptoms such as worsening chest pain or shortness of breath.    BP Readings from Last 3 Encounters:   11/07/23 129/83   11/03/23 132/75   11/01/23 128/72

## 2023-11-07 NOTE — PT/OT/SLP PROGRESS
Physical Therapy Treatment    Patient Name:  Michael Mclean   MRN:  [id number]    Recommendations:     Discharge Recommendations: Low Intensity Therapy  Discharge Equipment Recommendations: none  Barriers to discharge: Decreased caregiver support    Assessment:     Michael Mclean is a 83 y.o. male admitted with a medical diagnosis of Pneumonia of left lung due to infectious organism.  He presents with the following impairments/functional limitations: weakness, gait instability, impaired cardiopulmonary response to activity, impaired endurance, impaired balance, decreased lower extremity function, impaired joint extensibility, decreased safety awareness, impaired muscle length, impaired functional mobility Patient was found supine in bed, agreeable to therapy.  Communicated via writing.  Patient was able to complete  supine <> sit and sit <> stand with set up assistance, ambulated ~423 feet with RW with O2 supplement with c/o right elbow pain, SPO2 after walking was 95% with HR of 142-148 bpm.  No c/o chest pain of SOB. We will continue to work with patient to prevent decline in function and increase functional endurance. .    Rehab Prognosis: Fair; patient would benefit from acute skilled PT services to address these deficits and reach maximum level of function.    Recent Surgery: * No surgery found *      Plan:     During this hospitalization, patient to be seen 5 x/week to address the identified rehab impairments via gait training, therapeutic activities, therapeutic exercises, neuromuscular re-education and progress toward the following goals:    Plan of Care Expires:  11/10/23    Subjective     Chief Complaint: Gave the okay sign.   Patient/Family Comments/goals: unstated   Pain/Comfort:  Pain Rating 1:  (did not quantify)  Location - Side 1: Right  Location 1: elbow  Pain Addressed 1: Reposition, Distraction  Pain Rating Post-Intervention 1:  (did not quantify)      Objective:     Communicated  with nurse and patient  prior to session.  Patient found supine with PureWick, peripheral IV, telemetry, pulse ox (continuous), oxygen upon PT entry to room.     General Precautions: Standard, fall, deaf (uses ASL)  Orthopedic Precautions: N/A  Braces: N/A  Respiratory Status: Nasal cannula, flow 2 L/min     Functional Mobility:  Bed Mobility:     Rolling Left:  supervision  Rolling Right: supervision  Scooting: supervision  Bridging: supervision  Supine to Sit: supervision  Sit to Supine: supervision  Transfers:     Sit to Stand:  supervision with rolling walker  Gait: ~426 feet with RW and O2 supplement with SBA, pes planus deformity on both feet   Balance: Mod I with static sitting, SBA with static standing using a RW       AM-PAC 6 CLICK MOBILITY  Turning over in bed (including adjusting bedclothes, sheets and blankets)?: 3  Sitting down on and standing up from a chair with arms (e.g., wheelchair, bedside commode, etc.): 3  Moving from lying on back to sitting on the side of the bed?: 3  Moving to and from a bed to a chair (including a wheelchair)?: 3  Need to walk in hospital room?: 3  Climbing 3-5 steps with a railing?: 3 (based on clinical judgement)  Basic Mobility Total Score: 18       Treatment & Education:  Rolling side <> side  Supine <> sit  Scooting to the edge  of the bed   Sitting balance/tolerance  Sit <> stand with RW  Standing balance/tolerance   Gait with RW   Bed positioning         Seated   Both Lower Extremity   Active ROM Sets / Reps / Resistance / Etc.   Hip Flexion 1 x 10    LAQ 1 x 10    Ankle DF/PF                 1 x 10            Patient left HOB elevated with all lines intact, call button in reach, bed alarm on, and nurse  notified..    GOALS:   Multidisciplinary Problems       Physical Therapy Goals          Problem: Physical Therapy    Goal Priority Disciplines Outcome Goal Variances Interventions   Physical Therapy Goal     PT, PT/OT Ongoing, Progressing     Description: Goals to be  met by: 11/10/2023   Patient will increase functional independence with mobility by performin. Supine to sit with Modified Independent.  2. Sit to supine with Modified Independent.  3. Bed to chair transfer with Modified Independent with rolling walker using Step Transfer technique.  4. Sit to Stand with Modified Independent with rolling walker.  5. Gait  x 500  feet with Supervision or Set-up Assistance with rolling walker with SPO2 greater than 92% .  6. Lower extremity exercise program x10 reps, with assistance as needed.                          Time Tracking:     PT Received On: 23  PT Start Time: 820     PT Stop Time: 843  PT Total Time (min): 23 min     Billable Minutes: Therapeutic Activity 10 and Therapeutic Exercise 13    Treatment Type: Treatment  PT/PTA: PT           2023

## 2023-11-07 NOTE — PROGRESS NOTES
"[facility] Medicine  Progress Note    Patient Name: Michael Mclean  MRN: [id number]  Patient Class: IP- Inpatient   Admission Date: 11/4/2023  Length of Stay: 2 days  Attending Physician: Josh Alejo Jr., MD  Primary Care Provider: Josh Alejo Jr., MD        Subjective:     Principal Problem:Pneumonia of left lung due to infectious organism        HPI:  Chief Complaint   Patient presents with    Altered Mental Status       Pt to the ER w/ complaints of AMS per daughter, "not acting right." Pt complains of hip pain, "feels like he has the flu."       83-year-old male with a history of anemia, extensive cardiac history, current atrial fibrillation with Watchman device, gastritis presents to the emergency department with generalized weakness, states he is very tired. Recently admitted for same, discharge 2 days ago.  Patient states he is still feeling weak and more sleepy with reduced appetite. He is deaf and uses American sign language to communicate.  Denies any chest pain or shortness of breath.  He is not ill appearing, he is alert, he is speaking via ASL. Looks much better than when he was admitted 2 days ago.    ED Course: On arrival pulse 103, RR 33, SpO2 90% on room air, /67 mmHg, temp 97.6F.  Chest x ray with left upper lung opacity suggestive of pneumonia with left sided crackles on ausculation.   POC negative for COVID19, influenza and RSV.  Patient speaks of changes to his baseline involving weakness and sleepiness and a chronic right shoulder pain with reduced range of motion. Further collateral information from family notes, since last hospital discharge, they have noticed congestion and breathing to be labored with a lot of "rattling."   Communicates in ASL and reads lips well, sits up in bed without assistance.   Patient to be admitted for IV antibiotics, scheduled breathing treatments, pulmonary care for left sided pneumonia.              Overview/Hospital " Course:  11/6/23:  No acute events overnight.  Patient resting comfortably in bed this morning.  He is slightly lethargic.  Does not endorse any distress.  Lung sounds improved.    07/07/2023: Patient receiving nebulizer treatment this morning.  More alert.  Discussed with patient and family need for skilled nursing placement ongoing physical and occupational therapy as well as close monitoring of the next several weeks.  Patient having intermittent hypoxemia and intermittent delirium and syncopal events placing him at high risk.      Past Medical History:   Diagnosis Date    Abdominal hernia     Anemia 02/2021    Anticoagulant long-term use     Arthritis     SHOULDER    Cancer     prostate    Cardiomyopathy     Carotid artery stenosis     left    Coronary artery disease     Deaf     Dizziness     Encounter for blood transfusion     Gastritis 3/2/2021    Hyperlipidemia     Hypertension     Mitral regurgitation     Mitral regurgitation     PAD (peripheral artery disease)     Paroxysmal atrial fibrillation     Stroke 2020    Thyroid disease        Past Surgical History:   Procedure Laterality Date    ABLATION N/A 7/22/2020    Procedure: Ablation, afib;  Surgeon: Tal Laurent MD;  Location: [facility] CATH;  Service: Cardiology;  Laterality: N/A;  needs covid test;needs ALEXYS ([place])    AORTIC VALVE REPLACEMENT      CARDIAC CATH COSURGEON N/A 7/27/2022    Procedure: Cardiac Cath Cosurgeon;  Surgeon: Glenroy Sam MD;  Location: [facility] CATH LAB;  Service: Cardiovascular;  Laterality: N/A;    CARDIAC CATHETERIZATION      3 HEART STENTS    carotid artery stent Left     CLOSURE OF LEFT ATRIAL APPENDAGE USING DEVICE N/A 11/22/2022    Procedure: Left atrial appendage closure device;  Surgeon: Glenroy Richardson MD;  Location: [facility] CATH LAB;  Service: Cardiology;  Laterality: N/A;    COLONOSCOPY N/A 3/2/2021    Procedure: COLONOSCOPY;  Surgeon: Emiliana Cardenas MD;  Location: [facility] ENDO;  Service:  General;  Laterality: N/A;  4  8:30am   CL/AM/Bd    CORONARY ARTERY BYPASS GRAFT      ESOPHAGOGASTRODUODENOSCOPY N/A 11/10/2020    Procedure: EGD (ESOPHAGOGASTRODUODENOSCOPY);  Surgeon: Renny Prakash MD;  Location: [facility] ENDO;  Service: Endoscopy;  Laterality: N/A;    HERNIA REPAIR      HIP REPLACEMENT ARTHROPLASTY Right 11/6/2020    Procedure: EMELY -ARTHROPLASTY, HIP REPLACEMENT (ENDO HIP);  Surgeon: Lino Thomas MD;  Location: [facility] OR;  Service: Orthopedics;  Laterality: Right;    LEFT HEART CATHETERIZATION N/A 6/29/2022    Procedure: Left heart cath;  Surgeon: Glenroy Richardson MD;  Location: [facility] CATH LAB;  Service: Cardiology;  Laterality: N/A;    PACERMAKER      PROSTATECTOMY      SHOULDER SURGERY      TOE SURGERY      TRANSCATHETER AORTIC VALVE REPLACEMENT (TAVR) N/A 7/27/2022    Procedure: REPLACEMENT, AORTIC VALVE, TRANSCATHETER (TAVR);  Surgeon: Glenroy Richardson MD;  Location: [facility] CATH LAB;  Service: Cardiology;  Laterality: N/A;    TRANSCATHETER AORTIC VALVE REPLACEMENT (TAVR)  7/27/2022    Procedure: REPLACEMENT, AORTIC VALVE, TRANSCATHETER (TAVR);  Surgeon: Glenroy Sam MD;  Location: [facility] CATH LAB;  Service: Cardiovascular;;    TRANSESOPHAGEAL ECHOCARDIOGRAPHY N/A 6/27/2022    Procedure: ECHOCARDIOGRAM, TRANSESOPHAGEAL;  Surgeon: Jorden Diagnostic Provider;  Location: [facility] EP LAB;  Service: Anesthesiology;  Laterality: N/A;    TRANSESOPHAGEAL ECHOCARDIOGRAPHY N/A 11/22/2022    Procedure: ECHOCARDIOGRAM, TRANSESOPHAGEAL;  Surgeon: Leila Arango MD;  Location: [facility] CATH LAB;  Service: Cardiology;  Laterality: N/A;    TRANSESOPHAGEAL ECHOCARDIOGRAPHY N/A 1/5/2023    Procedure: ECHOCARDIOGRAM, TRANSESOPHAGEAL;  Surgeon: Dosc Diagnostic Provider;  Location: [facility] EP LAB;  Service: Anesthesiology;  Laterality: N/A;    TREATMENT OF CARDIAC ARRHYTHMIA N/A 8/7/2019    Procedure: Cardioversion or Defibrillation;  Surgeon: Tal Laurent MD;  Location: [facility] CATH;  Service:  Cardiology;  Laterality: N/A;       Review of patient's allergies indicates:  No Known Allergies    No current facility-administered medications on file prior to encounter.     Current Outpatient Medications on File Prior to Encounter   Medication Sig    abiraterone (ZYTIGA) 250 mg Tab Take 1 tablet (250 mg total) by mouth once daily.    acetylcarnitin/alpha lipoic ac (ACETYLCARNITIN HCL-A LIPOIC AC) 400-200 mg Cap Take 1 capsule by mouth once daily.    aspirin (ASPIR-81 ORAL) Take by mouth.    BICALUTAMIDE ORAL Take 50 mg by mouth once daily.    calcium-vitamin D3 (OYSTER SHELL CALCIUM-VIT D3) 500 mg-5 mcg (200 unit) per tablet Take 1 tablet by mouth 2 (two) times daily.    cholecalciferol, vitamin D3, 5,000 unit capsule Take 5,000 Units by mouth once daily.     clonazePAM (KLONOPIN) 2 MG Tab Take 1 tablet (2 mg total) by mouth every evening.    cyanocobalamin, vitamin B-12, 1,000 mcg/mL Kit Inject 1 mL as directed every 28 days.    docusate sodium (COLACE) 100 MG capsule Take 100 mg by mouth 2 (two) times daily.    ferrous sulfate (FEOSOL) 325 mg (65 mg iron) Tab tablet Take 1 tablet (325 mg total) by mouth once daily.    KLOR-CON M20 20 mEq tablet Take 20 mEq by mouth once daily.     magnesium oxide (MAG-OX) 400 mg tablet Take 400 mg by mouth once daily.    metoprolol succinate (TOPROL-XL) 100 MG 24 hr tablet Take 1 tablet (100mg) in the AM and 1.5 tablets (150mg) in the PM (Patient taking differently: Take 100 mg by mouth 2 (two) times daily.)    predniSONE (DELTASONE) 5 MG tablet Take 1 tablet (5 mg total) by mouth once daily.    rosuvastatin (CRESTOR) 20 MG tablet Take 20 mg by mouth every morning.    sotaloL (BETAPACE) 80 MG tablet Take 0.5 tablets (40 mg total) by mouth 2 (two) times daily. (Patient taking differently: Take 80 mg by mouth 2 (two) times daily.)    torsemide (DEMADEX) 20 MG Tab Take 1 tablet (20 mg total) by mouth as needed.    vitamin D3-folic acid 5,000 unit- 1 mg Tab Take  1 tablet by mouth once daily.    vitamin D3-vitamin K2, MK4, (K2 PLUS D3) 1,000-100 unit-mcg Tab Take by mouth.     Family History       Problem Relation (Age of Onset)    Heart attack Mother    Heart disease Mother          Tobacco Use    Smoking status: Never    Smokeless tobacco: Never   Substance and Sexual Activity    Alcohol use: Yes     Alcohol/week: 7.0 standard drinks of alcohol     Types: 7 Glasses of wine per week    Drug use: Never    Sexual activity: Not on file     Comment:      Review of Systems   Constitutional:  Negative for activity change, appetite change, chills, fatigue and fever.   HENT:  Negative for sinus pain and sore throat.    Eyes:  Negative for pain and visual disturbance.   Respiratory:  Positive for cough and chest tightness. Negative for apnea, choking, shortness of breath, wheezing and stridor.    Cardiovascular:  Negative for chest pain, palpitations and leg swelling.   Gastrointestinal:  Negative for abdominal pain, constipation, diarrhea, nausea and vomiting.   Musculoskeletal:  Negative for arthralgias, back pain, gait problem, joint swelling and neck stiffness.   Skin:  Negative for color change, pallor, rash and wound.   Neurological:  Negative for dizziness, seizures, syncope, facial asymmetry, weakness, light-headedness and headaches.   Psychiatric/Behavioral:  Negative for agitation, behavioral problems, confusion and dysphoric mood.      Objective:     Vital Signs (Most Recent):  Temp: 97.5 °F (36.4 °C) (11/07/23 0727)  Pulse: (!) 116 (11/07/23 0857)  Resp: 19 (11/07/23 0727)  BP: 129/83 (11/07/23 0727)  SpO2: 97 % (11/07/23 0727) Vital Signs (24h Range):  Temp:  [96.8 °F (36 °C)-97.9 °F (36.6 °C)] 97.5 °F (36.4 °C)  Pulse:  [] 116  Resp:  [17-22] 19  SpO2:  [91 %-100 %] 97 %  BP: (103-129)/(56-83) 129/83     Weight: 74.5 kg (164 lb 3.2 oz)  Body mass index is 25.72 kg/m².     Physical Exam  Vitals and nursing note reviewed.   Constitutional:        General: He is not in acute distress.     Appearance: Normal appearance. He is not ill-appearing or toxic-appearing.      Comments: Communicating without difficulties with ASL interpretor and with daughter, Heidy KAUR:      Head: Normocephalic and atraumatic.      Nose: Nose normal.      Mouth/Throat:      Mouth: Mucous membranes are dry.      Pharynx: Oropharynx is clear.      Comments: Thick blood tinged sputum collected in mouth  Eyes:      Extraocular Movements: Extraocular movements intact.      Conjunctiva/sclera: Conjunctivae normal.   Cardiovascular:      Rate and Rhythm: Normal rate and regular rhythm.      Pulses: Normal pulses.      Heart sounds: Normal heart sounds. No murmur heard.  Pulmonary:      Effort: Pulmonary effort is normal. No respiratory distress.      Breath sounds: Rales present.      Comments: Fine crackles over left lung, no chest tenderness  Abdominal:      General: Abdomen is flat.      Palpations: Abdomen is soft. There is no mass.      Tenderness: There is no right CVA tenderness or left CVA tenderness.   Musculoskeletal:         General: No tenderness.      Cervical back: Normal range of motion and neck supple. No rigidity.      Right lower leg: No edema.      Left lower leg: No edema.      Comments: Right shoulder joint with minimal tenderness, passive ROM intact bilaterally, hand  and strength equal bilaterally   Skin:     General: Skin is warm and dry.   Neurological:      General: No focal deficit present.      Mental Status: He is alert and oriented to person, place, and time.      Motor: No weakness.      Gait: Gait normal.   Psychiatric:         Mood and Affect: Mood normal.         Behavior: Behavior normal.         Thought Content: Thought content normal.               Significant Labs: All pertinent labs within the past 24 hours have been reviewed.  A1C:   Recent Labs   Lab 10/19/23  0815   HGBA1C 5.2       Bilirubin:   Recent Labs   Lab 11/02/23  1734 11/03/23  0513  11/05/23  0031 11/05/23  0547 11/07/23  0512   BILITOT 0.8 0.8 1.0 1.1* 0.8       BMP:   Recent Labs   Lab 11/07/23  0512   *      K 3.3*      CO2 34*   BUN 21   CREATININE 1.1   CALCIUM 8.4*   MG 2.2       CBC:   Recent Labs   Lab 11/07/23  0512   WBC 5.43   HGB 12.4*   HCT 36.1*   PLT 99*       CMP:   Recent Labs   Lab 11/07/23  0512      K 3.3*      CO2 34*   *   BUN 21   CREATININE 1.1   CALCIUM 8.4*   PROT 5.9*   ALBUMIN 2.6*   BILITOT 0.8   ALKPHOS 43*   AST 25   ALT 23   ANIONGAP 2*       TSH:   Recent Labs   Lab 11/02/23  1734   TSH 1.960       Significant Imaging: I have reviewed all pertinent imaging results/findings within the past 24 hours.      Assessment/Plan:      * Pneumonia of left lung due to infectious organism  CXR with left lung consolidation. Cough productive, blood tinged. POC covid, influenza and RSV negative. SpO2 90% on room air. Tachypnea.   ED course started on zosyn.   - f/u sputum culture, adjust antibiotics  - continue with scheduled breathing treatments  - start mucinex  - start scheduled pulmonary hygiene care with education on coughing and spitting out mucus and NOT hold in mouth  - supplemental oxygen per respiratory, keep SpO2 >94%  - IS and flutter exercises          Hypoxia  Secondary to respiratory infection. Treat pneumonia and provide supplemental oxygen.       AICD (automatic cardioverter/defibrillator) present  Denies activity. Follow up cardiology and electrophysiologist.       Hyperlipidemia  Continue home medication      Generalized weakness  Likely secondary to pneumonia. Denies falls, loss of consciousness.      Persistent atrial fibrillation  Patient with Persistent (7 days or more) atrial fibrillation which is controlled currently with Beta Blocker and Calcium Channel Blocker. Patient is currently in atrial fibrillation.[id number] Score: 3.   Once on xarelto in the past, but developed GI bleed requiring blood transfusion.    Currently not on anticoagulation therapy.   - continue daily 81 mg aspirin    Essential hypertension  Chronic, controlled. Latest blood pressure and vitals reviewed-     Temp:  [96.8 °F (36 °C)-97.9 °F (36.6 °C)]   Pulse:  []   Resp:  [17-22]   BP: (103-129)/(56-83)   SpO2:  [91 %-100 %] .   Home meds for hypertension were reviewed and noted below.   Hypertension Medications             metoprolol succinate (TOPROL-XL) 100 MG 24 hr tablet Take 1 tablet (100mg) in the AM and 1.5 tablets (150mg) in the PM    sotaloL (BETAPACE) 80 MG tablet Take 0.5 tablets (40 mg total) by mouth 2 (two) times daily.    torsemide (DEMADEX) 20 MG Tab Take 1 tablet (20 mg total) by mouth as needed.          While in the hospital, will manage blood pressure as follows; Continue home antihypertensive regimen    Will utilize p.r.n. blood pressure medication only if patient's blood pressure greater than 180/110 and he develops symptoms such as worsening chest pain or shortness of breath.    BP Readings from Last 3 Encounters:   11/07/23 129/83   11/03/23 132/75   11/01/23 128/72         Deaf, nonspeaking  Communicates well through interpretor and reads lips.        VTE Risk Mitigation (From admission, onward)         Ordered     IP VTE HIGH RISK PATIENT  Once         11/05/23 0140     Place sequential compression device  Until discontinued         11/05/23 0140     Place BRIEN hose  Until discontinued         11/05/23 0140                Discharge Planning   KELLEY:      Code Status: Full Code   Is the patient medically ready for discharge?:     Reason for patient still in hospital (select all that apply): Treatment  Discharge Plan A: Home                  Josh Alejo Jr, MD  Department of Hospital Medicine   [facility]

## 2023-11-07 NOTE — PT/OT/SLP PROGRESS
Occupational Therapy   Treatment    Name: Michael Mclean  MRN: [id number]  Admitting Diagnosis:  Pneumonia of left lung due to infectious organism       Recommendations:     Discharge Recommendations: Low Intensity Therapy  Discharge Equipment Recommendations:  none  Barriers to discharge:  Other (Comment) (Medical status)    Assessment:     Michael Mclean is a 83 y.o. male with a medical diagnosis of Pneumonia of left lung due to infectious organism.  He presents with improved functional performance with transfers as noted by Supervision secondary to verbal cueing for safety and technique. Performance deficits affecting function are weakness, impaired endurance, impaired self care skills, impaired functional mobility, impaired balance, gait instability, decreased upper extremity function, decreased coordination, decreased lower extremity function, decreased safety awareness, pain, decreased ROM, edema, impaired cardiopulmonary response to activity, impaired joint extensibility, impaired muscle length.     Rehab Prognosis:  Fair; patient would benefit from acute skilled OT services to address these deficits and reach maximum level of function.       Plan:     Patient to be seen 5 x/week to address the above listed problems via self-care/home management, therapeutic activities, therapeutic exercises  Plan of Care Expires: 11/13/23  Plan of Care Reviewed with: patient, daughter    Subjective     Chief Complaint: none  Patient/Family Comments/goals: Pt would like to regain independence to return home.  Pain/Comfort:  Pain Rating 1:  (Pt did not provide pain rating on this date.)  Location - Side 1: Right  Location 1: elbow  Pain Addressed 1: Reposition, Distraction, Cessation of Activity    Objective:     Communicated with: nurse prior to session.  Patient found HOB elevated with telemetry, pulse ox (continuous), peripheral IV, PureWick, oxygen upon OT entry to room.    General Precautions: Standard,  fall, deaf    Orthopedic Precautions:N/A  Braces: N/A  Respiratory Status: Nasal cannula, flow 2 L/min     Occupational Performance:     Bed Mobility:    Patient completed Rolling/Turning to Left with  supervision  Patient completed Rolling/Turning to Right with supervision  Patient completed Scooting/Bridging with supervision  Patient completed Supine to Sit with supervision  Patient completed Sit to Supine with supervision     Functional Mobility/Transfers:  Patient completed Sit <> Stand Transfer with supervision  with  rolling walker   Patient completed Toilet Transfer Step Transfer technique with supervision with  rolling walker    Treatment & Education:  Pt was cooperative with encouragement while exhibiting positive affect. He performed bed mobility requiring supervision secondary to min verbal cueing for technique. Pt participated in functional transfer retraining to / from bed and toilet emphasizing fall prevention with use of assistive devices providing extra time requiring supervision secondary to verbal cueing for safety awareness and technique with use of RW and grab bar.     Patient left HOB elevated with all lines intact, call button in reach, and nurse notified    GOALS:   Multidisciplinary Problems       Occupational Therapy Goals          Problem: Occupational Therapy    Goal Priority Disciplines Outcome Interventions   Occupational Therapy Goal     OT, PT/OT Ongoing, Progressing    Description: Goals to be met by: 11/13/23     Patient will increase functional independence with ADLs by performing:    Feeding with Modified [place].  UE Dressing with Modified [place].  LE Dressing with Modified [place].  Grooming while seated with Modified [place].  Toileting from toilet with Modified [place] for hygiene and clothing management.   Bathing from  shower chair/bench with Setup Assistance.  Toilet transfer to toilet with Modified [place].                         Time  Tracking:     OT Date of Treatment: 11/07/23  OT Start Time: 1610  OT Stop Time: 1627  OT Total Time (min): 17 min    Billable Minutes:Therapeutic Activity 17    OT/JOCELINE: OT          11/7/2023

## 2023-11-07 NOTE — SUBJECTIVE & OBJECTIVE
Past Medical History:   Diagnosis Date    Abdominal hernia     Anemia 02/2021    Anticoagulant long-term use     Arthritis     SHOULDER    Cancer     prostate    Cardiomyopathy     Carotid artery stenosis     left    Coronary artery disease     Deaf     Dizziness     Encounter for blood transfusion     Gastritis 3/2/2021    Hyperlipidemia     Hypertension     Mitral regurgitation     Mitral regurgitation     PAD (peripheral artery disease)     Paroxysmal atrial fibrillation     Stroke 2020    Thyroid disease        Past Surgical History:   Procedure Laterality Date    ABLATION N/A 7/22/2020    Procedure: Ablation, afib;  Surgeon: Tal Laurent MD;  Location: [facility] CATH;  Service: Cardiology;  Laterality: N/A;  needs covid test;needs ALEXYS ([place])    AORTIC VALVE REPLACEMENT      CARDIAC CATH COSURGEON N/A 7/27/2022    Procedure: Cardiac Cath Cosurgeon;  Surgeon: Glenroy Sam MD;  Location: [facility] CATH LAB;  Service: Cardiovascular;  Laterality: N/A;    CARDIAC CATHETERIZATION      3 HEART STENTS    carotid artery stent Left     CLOSURE OF LEFT ATRIAL APPENDAGE USING DEVICE N/A 11/22/2022    Procedure: Left atrial appendage closure device;  Surgeon: Glenroy Richardson MD;  Location: [facility] CATH LAB;  Service: Cardiology;  Laterality: N/A;    COLONOSCOPY N/A 3/2/2021    Procedure: COLONOSCOPY;  Surgeon: Emiliana Cardenas MD;  Location: [facility] ENDO;  Service: General;  Laterality: N/A;  4  8:30am   CL/AM/Bd    CORONARY ARTERY BYPASS GRAFT      ESOPHAGOGASTRODUODENOSCOPY N/A 11/10/2020    Procedure: EGD (ESOPHAGOGASTRODUODENOSCOPY);  Surgeon: Renny Prakash MD;  Location: [facility] ENDO;  Service: Endoscopy;  Laterality: N/A;    HERNIA REPAIR      HIP REPLACEMENT ARTHROPLASTY Right 11/6/2020    Procedure: EMELY -ARTHROPLASTY, HIP REPLACEMENT (ENDO HIP);  Surgeon: Lino Thomas MD;  Location: [facility] OR;  Service: Orthopedics;  Laterality: Right;    LEFT HEART CATHETERIZATION N/A 6/29/2022    Procedure: Left heart cath;   Surgeon: Glenroy Richardson MD;  Location: [facility] CATH LAB;  Service: Cardiology;  Laterality: N/A;    PACERMAKER      PROSTATECTOMY      SHOULDER SURGERY      TOE SURGERY      TRANSCATHETER AORTIC VALVE REPLACEMENT (TAVR) N/A 7/27/2022    Procedure: REPLACEMENT, AORTIC VALVE, TRANSCATHETER (TAVR);  Surgeon: Glenroy Richardson MD;  Location: [facility] CATH LAB;  Service: Cardiology;  Laterality: N/A;    TRANSCATHETER AORTIC VALVE REPLACEMENT (TAVR)  7/27/2022    Procedure: REPLACEMENT, AORTIC VALVE, TRANSCATHETER (TAVR);  Surgeon: Glenroy Sam MD;  Location: [facility] CATH LAB;  Service: Cardiovascular;;    TRANSESOPHAGEAL ECHOCARDIOGRAPHY N/A 6/27/2022    Procedure: ECHOCARDIOGRAM, TRANSESOPHAGEAL;  Surgeon: [facility] Diagnostic Provider;  Location: [facility] EP LAB;  Service: Anesthesiology;  Laterality: N/A;    TRANSESOPHAGEAL ECHOCARDIOGRAPHY N/A 11/22/2022    Procedure: ECHOCARDIOGRAM, TRANSESOPHAGEAL;  Surgeon: Leila Arango MD;  Location: [facility] CATH LAB;  Service: Cardiology;  Laterality: N/A;    TRANSESOPHAGEAL ECHOCARDIOGRAPHY N/A 1/5/2023    Procedure: ECHOCARDIOGRAM, TRANSESOPHAGEAL;  Surgeon: [facility] Diagnostic Provider;  Location: [facility] EP LAB;  Service: Anesthesiology;  Laterality: N/A;    TREATMENT OF CARDIAC ARRHYTHMIA N/A 8/7/2019    Procedure: Cardioversion or Defibrillation;  Surgeon: Tal Laurent MD;  Location: [facility] CATH;  Service: Cardiology;  Laterality: N/A;       Review of patient's allergies indicates:  No Known Allergies    No current facility-administered medications on file prior to encounter.     Current Outpatient Medications on File Prior to Encounter   Medication Sig    abiraterone (ZYTIGA) 250 mg Tab Take 1 tablet (250 mg total) by mouth once daily.    acetylcarnitin/alpha lipoic ac (ACETYLCARNITIN HCL-A LIPOIC AC) 400-200 mg Cap Take 1 capsule by mouth once daily.    aspirin (ASPIR-81 ORAL) Take by mouth.    BICALUTAMIDE ORAL Take 50 mg by mouth once daily.    calcium-vitamin D3 (OYSTER SHELL  CALCIUM-VIT D3) 500 mg-5 mcg (200 unit) per tablet Take 1 tablet by mouth 2 (two) times daily.    cholecalciferol, vitamin D3, 5,000 unit capsule Take 5,000 Units by mouth once daily.     clonazePAM (KLONOPIN) 2 MG Tab Take 1 tablet (2 mg total) by mouth every evening.    cyanocobalamin, vitamin B-12, 1,000 mcg/mL Kit Inject 1 mL as directed every 28 days.    docusate sodium (COLACE) 100 MG capsule Take 100 mg by mouth 2 (two) times daily.    ferrous sulfate (FEOSOL) 325 mg (65 mg iron) Tab tablet Take 1 tablet (325 mg total) by mouth once daily.    KLOR-CON M20 20 mEq tablet Take 20 mEq by mouth once daily.     magnesium oxide (MAG-OX) 400 mg tablet Take 400 mg by mouth once daily.    metoprolol succinate (TOPROL-XL) 100 MG 24 hr tablet Take 1 tablet (100mg) in the AM and 1.5 tablets (150mg) in the PM (Patient taking differently: Take 100 mg by mouth 2 (two) times daily.)    predniSONE (DELTASONE) 5 MG tablet Take 1 tablet (5 mg total) by mouth once daily.    rosuvastatin (CRESTOR) 20 MG tablet Take 20 mg by mouth every morning.    sotaloL (BETAPACE) 80 MG tablet Take 0.5 tablets (40 mg total) by mouth 2 (two) times daily. (Patient taking differently: Take 80 mg by mouth 2 (two) times daily.)    torsemide (DEMADEX) 20 MG Tab Take 1 tablet (20 mg total) by mouth as needed.    vitamin D3-folic acid 5,000 unit- 1 mg Tab Take 1 tablet by mouth once daily.    vitamin D3-vitamin K2, MK4, (K2 PLUS D3) 1,000-100 unit-mcg Tab Take by mouth.     Family History       Problem Relation (Age of Onset)    Heart attack Mother    Heart disease Mother          Tobacco Use    Smoking status: Never    Smokeless tobacco: Never   Substance and Sexual Activity    Alcohol use: Yes     Alcohol/week: 7.0 standard drinks of alcohol     Types: 7 Glasses of wine per week    Drug use: Never    Sexual activity: Not on file     Comment:      Review of Systems   Constitutional:  Negative for activity change, appetite change, chills,  fatigue and fever.   HENT:  Negative for sinus pain and sore throat.    Eyes:  Negative for pain and visual disturbance.   Respiratory:  Positive for cough and chest tightness. Negative for apnea, choking, shortness of breath, wheezing and stridor.    Cardiovascular:  Negative for chest pain, palpitations and leg swelling.   Gastrointestinal:  Negative for abdominal pain, constipation, diarrhea, nausea and vomiting.   Musculoskeletal:  Negative for arthralgias, back pain, gait problem, joint swelling and neck stiffness.   Skin:  Negative for color change, pallor, rash and wound.   Neurological:  Negative for dizziness, seizures, syncope, facial asymmetry, weakness, light-headedness and headaches.   Psychiatric/Behavioral:  Negative for agitation, behavioral problems, confusion and dysphoric mood.      Objective:     Vital Signs (Most Recent):  Temp: 97.5 °F (36.4 °C) (11/07/23 0727)  Pulse: (!) 116 (11/07/23 0857)  Resp: 19 (11/07/23 0727)  BP: 129/83 (11/07/23 0727)  SpO2: 97 % (11/07/23 0727) Vital Signs (24h Range):  Temp:  [96.8 °F (36 °C)-97.9 °F (36.6 °C)] 97.5 °F (36.4 °C)  Pulse:  [] 116  Resp:  [17-22] 19  SpO2:  [91 %-100 %] 97 %  BP: (103-129)/(56-83) 129/83     Weight: 74.5 kg (164 lb 3.2 oz)  Body mass index is 25.72 kg/m².     Physical Exam  Vitals and nursing note reviewed.   Constitutional:       General: He is not in acute distress.     Appearance: Normal appearance. He is not ill-appearing or toxic-appearing.      Comments: Communicating without difficulties with ASL interpretor and with daughter, Heidy   HENT:      Head: Normocephalic and atraumatic.      Nose: Nose normal.      Mouth/Throat:      Mouth: Mucous membranes are dry.      Pharynx: Oropharynx is clear.      Comments: Thick blood tinged sputum collected in mouth  Eyes:      Extraocular Movements: Extraocular movements intact.      Conjunctiva/sclera: Conjunctivae normal.   Cardiovascular:      Rate and Rhythm: Normal rate and  regular rhythm.      Pulses: Normal pulses.      Heart sounds: Normal heart sounds. No murmur heard.  Pulmonary:      Effort: Pulmonary effort is normal. No respiratory distress.      Breath sounds: Rales present.      Comments: Fine crackles over left lung, no chest tenderness  Abdominal:      General: Abdomen is flat.      Palpations: Abdomen is soft. There is no mass.      Tenderness: There is no right CVA tenderness or left CVA tenderness.   Musculoskeletal:         General: No tenderness.      Cervical back: Normal range of motion and neck supple. No rigidity.      Right lower leg: No edema.      Left lower leg: No edema.      Comments: Right shoulder joint with minimal tenderness, passive ROM intact bilaterally, hand  and strength equal bilaterally   Skin:     General: Skin is warm and dry.   Neurological:      General: No focal deficit present.      Mental Status: He is alert and oriented to person, place, and time.      Motor: No weakness.      Gait: Gait normal.   Psychiatric:         Mood and Affect: Mood normal.         Behavior: Behavior normal.         Thought Content: Thought content normal.               Significant Labs: All pertinent labs within the past 24 hours have been reviewed.  A1C:   Recent Labs   Lab 10/19/23  0815   HGBA1C 5.2       Bilirubin:   Recent Labs   Lab 11/02/23  1734 11/03/23  0513 11/05/23  0031 11/05/23  0547 11/07/23  0512   BILITOT 0.8 0.8 1.0 1.1* 0.8       BMP:   Recent Labs   Lab 11/07/23  0512   *      K 3.3*      CO2 34*   BUN 21   CREATININE 1.1   CALCIUM 8.4*   MG 2.2       CBC:   Recent Labs   Lab 11/07/23  0512   WBC 5.43   HGB 12.4*   HCT 36.1*   PLT 99*       CMP:   Recent Labs   Lab 11/07/23  0512      K 3.3*      CO2 34*   *   BUN 21   CREATININE 1.1   CALCIUM 8.4*   PROT 5.9*   ALBUMIN 2.6*   BILITOT 0.8   ALKPHOS 43*   AST 25   ALT 23   ANIONGAP 2*       TSH:   Recent Labs   Lab 11/02/23  1734   TSH 1.960        Significant Imaging: I have reviewed all pertinent imaging results/findings within the past 24 hours.

## 2023-11-08 VITALS
OXYGEN SATURATION: 95 % | RESPIRATION RATE: 20 BRPM | SYSTOLIC BLOOD PRESSURE: 118 MMHG | BODY MASS INDEX: 25.77 KG/M2 | HEIGHT: 67 IN | WEIGHT: 164.19 LBS | TEMPERATURE: 99 F | HEART RATE: 103 BPM | DIASTOLIC BLOOD PRESSURE: 74 MMHG

## 2023-11-08 LAB
ALBUMIN SERPL BCP-MCNC: 2.5 G/DL (ref 3.5–5.2)
ALP SERPL-CCNC: 44 U/L (ref 55–135)
ALT SERPL W/O P-5'-P-CCNC: 21 U/L (ref 10–44)
ANION GAP SERPL CALC-SCNC: 2 MMOL/L (ref 3–11)
AST SERPL-CCNC: 24 U/L (ref 10–40)
BASOPHILS # BLD AUTO: 0.03 K/UL (ref 0–0.2)
BASOPHILS NFR BLD: 0.6 % (ref 0–1.9)
BILIRUB SERPL-MCNC: 0.6 MG/DL (ref 0.1–1)
BUN SERPL-MCNC: 17 MG/DL (ref 8–23)
CALCIUM SERPL-MCNC: 8.7 MG/DL (ref 8.7–10.5)
CHLORIDE SERPL-SCNC: 105 MMOL/L (ref 95–110)
CO2 SERPL-SCNC: 33 MMOL/L (ref 23–29)
CREAT SERPL-MCNC: 0.9 MG/DL (ref 0.5–1.4)
DIFFERENTIAL METHOD: ABNORMAL
EOSINOPHIL # BLD AUTO: 0.5 K/UL (ref 0–0.5)
EOSINOPHIL NFR BLD: 9.1 % (ref 0–8)
ERYTHROCYTE [DISTWIDTH] IN BLOOD BY AUTOMATED COUNT: 13.2 % (ref 11.5–14.5)
EST. GFR  (NO RACE VARIABLE): >60 ML/MIN/1.73 M^2
GLUCOSE SERPL-MCNC: 114 MG/DL (ref 70–110)
HCT VFR BLD AUTO: 36.7 % (ref 40–54)
HGB BLD-MCNC: 12.3 G/DL (ref 14–18)
IMM GRANULOCYTES # BLD AUTO: 0.03 K/UL (ref 0–0.04)
IMM GRANULOCYTES NFR BLD AUTO: 0.6 % (ref 0–0.5)
LYMPHOCYTES # BLD AUTO: 1.1 K/UL (ref 1–4.8)
LYMPHOCYTES NFR BLD: 22.2 % (ref 18–48)
MAGNESIUM SERPL-MCNC: 2.2 MG/DL (ref 1.6–2.6)
MCH RBC QN AUTO: 32.5 PG (ref 27–31)
MCHC RBC AUTO-ENTMCNC: 33.5 G/DL (ref 32–36)
MCV RBC AUTO: 97 FL (ref 82–98)
MONOCYTES # BLD AUTO: 0.6 K/UL (ref 0.3–1)
MONOCYTES NFR BLD: 11.8 % (ref 4–15)
NEUTROPHILS # BLD AUTO: 2.8 K/UL (ref 1.8–7.7)
NEUTROPHILS NFR BLD: 55.7 % (ref 38–73)
NRBC BLD-RTO: 0 /100 WBC
PLATELET # BLD AUTO: 105 K/UL (ref 150–450)
PMV BLD AUTO: 11.6 FL (ref 9.2–12.9)
POTASSIUM SERPL-SCNC: 3.9 MMOL/L (ref 3.5–5.1)
PROT SERPL-MCNC: 5.9 G/DL (ref 6–8.4)
RBC # BLD AUTO: 3.79 M/UL (ref 4.6–6.2)
SODIUM SERPL-SCNC: 140 MMOL/L (ref 136–145)
WBC # BLD AUTO: 5.08 K/UL (ref 3.9–12.7)

## 2023-11-08 PROCEDURE — 25000003 PHARM REV CODE 250: Performed by: INTERNAL MEDICINE

## 2023-11-08 PROCEDURE — 94761 N-INVAS EAR/PLS OXIMETRY MLT: CPT

## 2023-11-08 PROCEDURE — 80053 COMPREHEN METABOLIC PANEL: CPT | Performed by: INTERNAL MEDICINE

## 2023-11-08 PROCEDURE — 94640 AIRWAY INHALATION TREATMENT: CPT

## 2023-11-08 PROCEDURE — 25000003 PHARM REV CODE 250: Performed by: STUDENT IN AN ORGANIZED HEALTH CARE EDUCATION/TRAINING PROGRAM

## 2023-11-08 PROCEDURE — 63600175 PHARM REV CODE 636 W HCPCS: Performed by: EMERGENCY MEDICINE

## 2023-11-08 PROCEDURE — 85025 COMPLETE CBC W/AUTO DIFF WBC: CPT | Performed by: INTERNAL MEDICINE

## 2023-11-08 PROCEDURE — 97530 THERAPEUTIC ACTIVITIES: CPT

## 2023-11-08 PROCEDURE — 99900031 HC PATIENT EDUCATION (STAT)

## 2023-11-08 PROCEDURE — 36415 COLL VENOUS BLD VENIPUNCTURE: CPT | Performed by: INTERNAL MEDICINE

## 2023-11-08 PROCEDURE — 25000003 PHARM REV CODE 250: Performed by: EMERGENCY MEDICINE

## 2023-11-08 PROCEDURE — 25000242 PHARM REV CODE 250 ALT 637 W/ HCPCS: Performed by: INTERNAL MEDICINE

## 2023-11-08 PROCEDURE — 99900035 HC TECH TIME PER 15 MIN (STAT)

## 2023-11-08 PROCEDURE — 97110 THERAPEUTIC EXERCISES: CPT

## 2023-11-08 PROCEDURE — 94664 DEMO&/EVAL PT USE INHALER: CPT

## 2023-11-08 PROCEDURE — 27000221 HC OXYGEN, UP TO 24 HOURS

## 2023-11-08 PROCEDURE — 94799 UNLISTED PULMONARY SVC/PX: CPT

## 2023-11-08 PROCEDURE — 83735 ASSAY OF MAGNESIUM: CPT | Performed by: INTERNAL MEDICINE

## 2023-11-08 PROCEDURE — 63600175 PHARM REV CODE 636 W HCPCS: Performed by: INTERNAL MEDICINE

## 2023-11-08 PROCEDURE — 97535 SELF CARE MNGMENT TRAINING: CPT

## 2023-11-08 RX ORDER — DOXYCYCLINE HYCLATE 100 MG
100 TABLET ORAL EVERY 12 HOURS
Qty: 10 TABLET | Refills: 0 | Status: SHIPPED | OUTPATIENT
Start: 2023-11-08 | End: 2023-11-13

## 2023-11-08 RX ORDER — IPRATROPIUM BROMIDE AND ALBUTEROL SULFATE 2.5; .5 MG/3ML; MG/3ML
3 SOLUTION RESPIRATORY (INHALATION) EVERY 4 HOURS PRN
Qty: 75 ML | Refills: 0 | Status: SHIPPED | OUTPATIENT
Start: 2023-11-08 | End: 2024-11-07

## 2023-11-08 RX ORDER — CEFUROXIME AXETIL 500 MG/1
500 TABLET ORAL 2 TIMES DAILY
Qty: 10 TABLET | Refills: 0 | Status: SHIPPED | OUTPATIENT
Start: 2023-11-08 | End: 2023-11-13

## 2023-11-08 RX ADMIN — POTASSIUM BICARBONATE 50 MEQ: 978 TABLET, EFFERVESCENT ORAL at 09:11

## 2023-11-08 RX ADMIN — CEFTRIAXONE 2 G: 2 INJECTION, POWDER, FOR SOLUTION INTRAMUSCULAR; INTRAVENOUS at 10:11

## 2023-11-08 RX ADMIN — SOTALOL HYDROCHLORIDE 80 MG: 80 TABLET ORAL at 09:11

## 2023-11-08 RX ADMIN — FAMOTIDINE 20 MG: 20 TABLET, FILM COATED ORAL at 09:11

## 2023-11-08 RX ADMIN — IPRATROPIUM BROMIDE AND ALBUTEROL SULFATE 3 ML: 2.5; .5 SOLUTION RESPIRATORY (INHALATION) at 07:11

## 2023-11-08 RX ADMIN — METOPROLOL SUCCINATE 100 MG: 100 TABLET, EXTENDED RELEASE ORAL at 09:11

## 2023-11-08 RX ADMIN — DOXYCYCLINE HYCLATE 100 MG: 100 TABLET, COATED ORAL at 09:11

## 2023-11-08 RX ADMIN — IPRATROPIUM BROMIDE AND ALBUTEROL SULFATE 3 ML: 2.5; .5 SOLUTION RESPIRATORY (INHALATION) at 11:11

## 2023-11-08 RX ADMIN — ASPIRIN 81 MG 81 MG: 81 TABLET ORAL at 09:11

## 2023-11-08 RX ADMIN — GUAIFENESIN 1200 MG: 600 TABLET, EXTENDED RELEASE ORAL at 09:11

## 2023-11-08 RX ADMIN — FUROSEMIDE 40 MG: 10 INJECTION, SOLUTION INTRAVENOUS at 03:11

## 2023-11-08 NOTE — PT/OT/SLP DISCHARGE
Physical Therapy Discharge Summary    Name: Michael Mclean  MRN: [id number]   Principal Problem: Pneumonia of left lung due to infectious organism     Patient Discharged from acute Physical Therapy on 2023 .  Please refer to prior PT note dated on 2023  for functional status.     Assessment:     Patient appropriate for care in another setting.    Objective:     GOALS:   Multidisciplinary Problems       Physical Therapy Goals          Problem: Physical Therapy    Goal Priority Disciplines Outcome Goal Variances Interventions   Physical Therapy Goal     PT, PT/OT Adequate for Care Transition     Description: Goals to be met by: 11/10/2023   Patient will increase functional independence with mobility by performin. Supine to sit with Modified Independent.  2. Sit to supine with Modified Independent.  3. Bed to chair transfer with Modified Independent with rolling walker using Step Transfer technique.  4. Sit to Stand with Modified Independent with rolling walker.  5. Gait  x 500  feet with Supervision or Set-up Assistance with rolling walker with SPO2 greater than 92% .  6. Lower extremity exercise program x10 reps, with assistance as needed.                          Reasons for Discontinuation of Therapy Services  Transfer to alternate level of care.      Plan:     Patient Discharged to: Home with Home Health Service.      2023

## 2023-11-08 NOTE — PLAN OF CARE
[facility] - Med Surg  Discharge Final Note    Primary Care Provider: Josh Alejo Jr., MD    Expected Discharge Date: 11/8/2023    Final Discharge Note (most recent)       Final Note - 11/08/23 1024          Final Note    Assessment Type Final Discharge Note     Anticipated Discharge Disposition Home-Health Care Svc        Post-Acute Status    Post-Acute Authorization Home Health     Home Health Status Set-up Complete/Auth obtained     Coverage [employer] MANAGED MEDICARE - [employer] MEDICARE PPO     Discharge Delays None known at this time                     Important Message from Medicare  Important Message from Medicare regarding Discharge Appeal Rights: Other (comments), Given to patient/caregiver, Explained to patient/caregiver, Signed/date by patient/caregiver (Consent from patient's daughter, Karen.)     Date IMM was signed: 11/06/23  Time IMM was signed: 1519     Follow-up providers       Josh Aleoj Jr., MD   Specialty: Internal Medicine   Relationship: PCP - General    [address] [place] [id number]   Phone: [phone]       Next Steps: Go on 11/10/2023    Instructions: 11:00              After-discharge care                Home Medical Care       [facility]   Service: Home Health Services    [facility] [address]  [facility] [address]   Phone: [phone]                           Final note. The will be discharging home with home health care. He refused skilled nursing care at this time. [facility] has accepted this patient.

## 2023-11-08 NOTE — PLAN OF CARE
Spoke with Dalia at [facility].  She confirms receipt of fax from this morning  Patient will need home oxygen.  Informed her patient will be going home and not to nursing home.  She states oxygen will be delivered after lunch.  She is aware patient will need portable oxygen as well as concentrator.

## 2023-11-08 NOTE — ASSESSMENT & PLAN NOTE
Secondary to respiratory infection. Treat pneumonia and provide supplemental oxygen.     Continue supplemental oxygen at dc.

## 2023-11-08 NOTE — PT/OT/SLP PROGRESS
Occupational Therapy   Treatment    Name: Michael Mclean  MRN: [id number]  Admitting Diagnosis:  Pneumonia of left lung due to infectious organism       Recommendations:     Discharge Recommendations: Low Intensity Therapy  Discharge Equipment Recommendations:  none  Barriers to discharge:  Other (Comment) (Medical status)    Assessment:     Michael Mclean is a 83 y.o. male with a medical diagnosis of Pneumonia of left lung due to infectious organism.  He presents with improved functional performance with bed mobility and LB dressing as noted by independent and setup assistance, respectively. Performance deficits affecting function are weakness, impaired endurance, impaired self care skills, impaired functional mobility, impaired balance, gait instability, decreased upper extremity function, decreased coordination, decreased lower extremity function, decreased safety awareness, pain, decreased ROM, edema, impaired cardiopulmonary response to activity, impaired joint extensibility, impaired muscle length.     Rehab Prognosis:  Fair; patient would benefit from acute skilled OT services to address these deficits and reach maximum level of function.       Plan:     Patient to be seen 5 x/week to address the above listed problems via self-care/home management, therapeutic activities, therapeutic exercises  Plan of Care Expires: 11/13/23  Plan of Care Reviewed with: patient, daughter    Subjective     Chief Complaint: pain in (R) elbow following standing due to elbow extension to sustain standing with RW  Patient/Family Comments/goals: Pt would like to return home.  Pain/Comfort:  Pain Rating 1: 8/10  Location - Side 1: Right  Location 1: elbow  Pain Addressed 1: Reposition, Distraction, Cessation of Activity, Nurse notified  Pain Rating Post-Intervention 1: 5/10    Objective:     Communicated with: nurse prior to session.  Patient found HOB elevated with telemetry, peripheral IV, PureWick, oxygen, pulse ox  (continuous) upon OT entry to room.    General Precautions: Standard, fall, deaf    Orthopedic Precautions:N/A  Braces: N/A  Respiratory Status: Nasal cannula, flow 2 L/min     Occupational Performance:     Bed Mobility:    Patient completed Rolling/Turning to Left with  independence  Patient completed Rolling/Turning to Right with independence  Patient completed Scooting/Bridging with independence  Patient completed Supine to Sit with independence  Patient completed Sit to Supine with independence     Functional Mobility/Transfers:  Patient completed Sit <> Stand Transfer with supervision  with  rolling walker   Patient completed Toilet Transfer Step Transfer technique with supervision with  rolling walker  Functional Mobility: Pt ambulated greater than 200' between surfaces requiring supervision utilizing RW.     Activities of Daily Living:  Lower Body Dressing: setup assistance        [facility] 6 Click ADL:      Treatment & Education:  Pt was cooperative and more motivated with written encouragement while exhibiting positive affect. He performed bed mobility demonstrating independence. Pt participated in ADL retraining regarding LB dressing emphasizing fall prevention with use of compensatory strategies providing extra time requiring setup assistance secondary to setup of bilateral shoes with cueing prior to activity. Also, he participated in functional transfer retraining to / from bed and toilet emphasizing fall prevention with use of assistive devices providing extra time requiring supervision secondary to verbal cueing for safety awareness and technique with use of RW and grab bar. Pt ambulated greater than 200' between surfaces requiring supervision utilizing RW.    Patient left HOB elevated with all lines intact, call button in reach, and nurse notified    GOALS:   Multidisciplinary Problems       Occupational Therapy Goals          Problem: Occupational Therapy    Goal Priority Disciplines Outcome  Interventions   Occupational Therapy Goal     OT, PT/OT Ongoing, Progressing    Description: Goals to be met by: 11/13/23     Patient will increase functional independence with ADLs by performing:    Feeding with Modified [place].  UE Dressing with Modified [place].  LE Dressing with Modified [place].  Grooming while seated with Modified [place].  Toileting from toilet with Modified [place] for hygiene and clothing management.   Bathing from  shower chair/bench with Setup Assistance.  Toilet transfer to toilet with Modified [place].                         Time Tracking:     OT Date of Treatment: 11/08/23  OT Start Time: 1043  OT Stop Time: 1115  OT Total Time (min): 32 min    Billable Minutes:Self Care/Home Management 10  Therapeutic Activity 22    OT/JOCELINE: OT          11/8/2023

## 2023-11-08 NOTE — DISCHARGE SUMMARY
"[facility]  Discharge Summary      Patient Name: Michael Mclean  MRN: [id number]  JAYCOB: [id number]  Patient Class: IP- Inpatient  Admission Date: 11/4/2023  Hospital Length of Stay: 3 days  Discharge Date and Time:  11/08/2023 9:36 AM  Attending Physician: Josh Alejo Jr., MD   Discharging Provider: Josh Alejo Jr, MD  Primary Care Provider: Josh Alejo Jr., MD    Primary Care Team: Networked reference to record PCT     HPI:   Chief Complaint   Patient presents with    Altered Mental Status       Pt to the ER w/ complaints of AMS per daughter, "not acting right." Pt complains of hip pain, "feels like he has the flu."       83-year-old male with a history of anemia, extensive cardiac history, current atrial fibrillation with Watchman device, gastritis presents to the emergency department with generalized weakness, states he is very tired. Recently admitted for same, discharge 2 days ago.  Patient states he is still feeling weak and more sleepy with reduced appetite. He is deaf and uses American sign language to communicate.  Denies any chest pain or shortness of breath.  He is not ill appearing, he is alert, he is speaking via ASL. Looks much better than when he was admitted 2 days ago.    ED Course: On arrival pulse 103, RR 33, SpO2 90% on room air, /67 mmHg, temp 97.6F.  Chest x ray with left upper lung opacity suggestive of pneumonia with left sided crackles on ausculation.   POC negative for COVID19, influenza and RSV.  Patient speaks of changes to his baseline involving weakness and sleepiness and a chronic right shoulder pain with reduced range of motion. Further collateral information from family notes, since last hospital discharge, they have noticed congestion and breathing to be labored with a lot of "rattling."   Communicates in ASL and reads lips well, sits up in bed without assistance.   Patient to be admitted for IV antibiotics, scheduled " breathing treatments, pulmonary care for left sided pneumonia.              * No surgery found *      Hospital Course:   11/6/23:  No acute events overnight.  Patient resting comfortably in bed this morning.  He is slightly lethargic.  Does not endorse any distress.  Lung sounds improved.    11/07/2023: Patient receiving nebulizer treatment this morning.  More alert.  Discussed with patient and family need for skilled nursing placement ongoing physical and occupational therapy as well as close monitoring of the next several weeks.  Patient having intermittent hypoxemia and intermittent delirium and syncopal events placing him at high risk.      11/8/23:  patient cognitively back to baseline.  Does not want skilled.  Agreed to home with HH and close outpatient follow up.        Goals of Care Treatment Preferences:  Code Status: Full Code      Consults:   Consults (From admission, onward)        Status Ordering Provider     Inpatient consult to Social Work/Case Management  Once        Provider:  (Not yet assigned)    Acknowledged JEAN-PIERRE MANZO JR     Inpatient consult to Social Work/Case Management  Once        Provider:  (Not yet assigned)    Acknowledged JEAN-PIERRE MANZO JR          ENT  Deaf, nonspeaking  Communicates well through interpretor and reads lips.      Pulmonary  * Pneumonia of left lung due to infectious organism  CXR with left lung consolidation. Cough productive, blood tinged. POC covid, influenza and RSV negative. SpO2 90% on room air. Tachypnea.   ED course started on zosyn.   - f/u sputum culture, adjust antibiotics  - continue with scheduled breathing treatments  - start mucinex  - start scheduled pulmonary hygiene care with education on coughing and spitting out mucus and NOT hold in mouth  - supplemental oxygen per respiratory, keep SpO2 >94%  - IS and flutter exercises    Doxy/cefuroxime at dc for short course.      Hypoxia  Secondary to respiratory infection. Treat pneumonia and provide  supplemental oxygen.     Continue supplemental oxygen at dc.       Cardiac/Vascular  AICD (automatic cardioverter/defibrillator) present  Denies activity. Follow up cardiology and electrophysiologist.       Hyperlipidemia  Continue home medication      Persistent atrial fibrillation  Patient with Persistent (7 days or more) atrial fibrillation which is controlled currently with Beta Blocker and Calcium Channel Blocker. Patient is currently in atrial fibrillation.[id number] Score: 3.   Once on xarelto in the past, but developed GI bleed requiring blood transfusion.   Currently not on anticoagulation therapy.   - continue daily 81 mg aspirin    Continue rate control agents.     Essential hypertension  Chronic, controlled. Latest blood pressure and vitals reviewed-     Temp:  [97.5 °F (36.4 °C)-98.6 °F (37 °C)]   Pulse:  []   Resp:  [18-23]   BP: (114-133)/(62-79)   SpO2:  [95 %-100 %] .   Home meds for hypertension were reviewed and noted below.   Hypertension Medications             metoprolol succinate (TOPROL-XL) 100 MG 24 hr tablet Take 1 tablet (100mg) in the AM and 1.5 tablets (150mg) in the PM    sotaloL (BETAPACE) 80 MG tablet Take 0.5 tablets (40 mg total) by mouth 2 (two) times daily.    torsemide (DEMADEX) 20 MG Tab Take 1 tablet (20 mg total) by mouth as needed.          While in the hospital, will manage blood pressure as follows; Continue home antihypertensive regimen    Will utilize p.r.n. blood pressure medication only if patient's blood pressure greater than 180/110 and he develops symptoms such as worsening chest pain or shortness of breath.    BP Readings from Last 3 Encounters:   11/08/23 124/79   11/03/23 132/75   11/01/23 128/72         Other  Generalized weakness  Likely secondary to pneumonia. Denies falls, loss of consciousness.    PT through HH.      Final Active Diagnoses:    Diagnosis Date Noted POA    PRINCIPAL PROBLEM:  Pneumonia of left lung due to infectious organism [J18.9]  "11/05/2023 Yes    Hypoxia [R09.02] 11/05/2023 Yes    AICD (automatic cardioverter/defibrillator) present [Z95.810] 07/27/2022 Yes    Hyperlipidemia [E78.5] 03/24/2021 Yes    Generalized weakness [R53.1] 10/12/2020 Yes    Persistent atrial fibrillation [I48.19] 08/07/2019 Yes    Essential hypertension [I10] 04/03/2019 Yes    Deaf, nonspeaking [H91.3] 04/03/2019 Yes      Problems Resolved During this Admission:       Discharged Condition: fair    Disposition: Home-Health Care [facility]    Follow Up:   Follow-up Information     Josh Alejo Jr., MD Follow up in 3 day(s).    Specialty: Internal Medicine  Contact information:  [address] [place] [id number]  [phone]                       Patient Instructions:      OXYGEN FOR HOME USE     Order Specific Question Answer Comments   Liter Flow 2    Duration Continuous    Qualifying Test Performed at: Rest    Oxygen saturation: 87    Portable mode: pulse dose acceptable    Mode: Portable concentrator    Route nasal cannula    Device: home concentrator with portable concentrator    Length of need (in months): 99 mos    Patient condition with qualifying saturation CHF    Height: 5' 7" (1.702 m)    Weight: 74.5 kg (164 lb 3.2 oz)    Alternative treatment measures have been tried or considered and deemed clinically ineffective. Yes      Diet Cardiac     SUBSEQUENT HOME HEALTH ORDERS   Order Comments: PT/OT/skilled at dc.     Order Specific Question Answer Comments   What Home Health Agency is the patient currently using? Other/External      Activity as tolerated       Significant Diagnostic Studies: Labs: All labs within the past 24 hours have been reviewed    Pending Diagnostic Studies:     None         Medications:  Reconciled Home Medications:      Medication List      START taking these medications    albuterol-ipratropium 2.5 mg-0.5 mg/3 mL nebulizer solution  Commonly known as: DUO-NEB  Take 3 mLs by " nebulization every 4 (four) hours as needed for Wheezing or Shortness of Breath. Rescue     cefUROXime 500 MG tablet  Commonly known as: CEFTIN  Take 1 tablet (500 mg total) by mouth 2 (two) times daily. for 5 days     doxycycline 100 MG tablet  Commonly known as: VIBRA-TABS  Take 1 tablet (100 mg total) by mouth every 12 (twelve) hours. for 5 days        CHANGE how you take these medications    metoprolol succinate 100 MG 24 hr tablet  Commonly known as: TOPROL-XL  Take 1 tablet (100mg) in the AM and 1.5 tablets (150mg) in the PM  What changed:   · how much to take  · how to take this  · when to take this  · additional instructions     sotaloL 80 MG tablet  Commonly known as: BETAPACE  Take 0.5 tablets (40 mg total) by mouth 2 (two) times daily.  What changed: how much to take        CONTINUE taking these medications    abiraterone 250 mg Tab  Commonly known as: ZYTIGA  Take 1 tablet (250 mg total) by mouth once daily.     acetylcarnitin HCL-a lipoic ac 400-200 mg Cap  Take 1 capsule by mouth once daily.     ASPIR-81 ORAL  Take by mouth.     BICALUTAMIDE ORAL  Take 50 mg by mouth once daily.     calcium-vitamin D3 500 mg-5 mcg (200 unit) per tablet  Commonly known as: OYSTER SHELL CALCIUM-VIT D3  Take 1 tablet by mouth 2 (two) times daily.     cholecalciferol (vitamin D3) 125 mcg (5,000 unit) capsule  Take 5,000 Units by mouth once daily.     clonazePAM 2 MG Tab  Commonly known as: KlonoPIN  Take 1 tablet (2 mg total) by mouth every evening.     cyanocobalamin (vitamin B-12) 1,000 mcg/mL Kit  Inject 1 mL as directed every 28 days.     docusate sodium 100 MG capsule  Commonly known as: COLACE  Take 100 mg by mouth 2 (two) times daily.     ferrous sulfate 325 mg (65 mg iron) Tab tablet  Commonly known as: FEOSOL  Take 1 tablet (325 mg total) by mouth once daily.     K2 PLUS D3 1,000-100 unit-mcg Tab  Generic drug: vitamin D3-vitamin K2 (MK4)  Take by mouth.     KLOR-CON M20 20 MEQ tablet  Generic drug: potassium  chloride SA  Take 20 mEq by mouth once daily.     magnesium oxide 400 mg (241.3 mg magnesium) tablet  Commonly known as: MAG-OX  Take 400 mg by mouth once daily.     predniSONE 5 MG tablet  Commonly known as: DELTASONE  Take 1 tablet (5 mg total) by mouth once daily.     rosuvastatin 20 MG tablet  Commonly known as: CRESTOR  Take 20 mg by mouth every morning.     torsemide 20 MG Tab  Commonly known as: DEMADEX  Take 1 tablet (20 mg total) by mouth as needed.     vitamin D3-folic acid 125 mcg (5,000 unit)-1 mg Tab  Take 1 tablet by mouth once daily.            Indwelling Lines/Drains at time of discharge:   Lines/Drains/Airways     None                 Time spent on the discharge of patient: 60 minutes         Josh Alejo Jr, MD  Department of Hospital Medicine  [facility]

## 2023-11-08 NOTE — PT/OT/SLP PROGRESS
"Physical Therapy Treatment    Patient Name:  Michael Mclean   MRN:  [id number]    Recommendations:     Discharge Recommendations: Low Intensity Therapy  Discharge Equipment Recommendations: none  Barriers to discharge: None    Assessment:     Michael Mclean is a 83 y.o. male admitted with a medical diagnosis of Pneumonia of left lung due to infectious organism.  He presents with the following impairments/functional limitations: weakness, gait instability, decreased ROM, impaired cardiopulmonary response to activity, impaired endurance, impaired balance, decreased lower extremity function, impaired joint extensibility, decreased safety awareness, impaired muscle length, impaired functional mobility Patient's daughter present during treatment, interpreting.  Patient was able to complete supine <> sit with set up assistance, sit <> stand with set up assistance using a RW, ambulated ~616 feet with RW and O2 supplement via nasal cannula with SBA.  SPO2  was 99% to 100% before, during and after activity and HR was between 158-162 bpm during ambulation, denies chest pain.  He tolerated 1 hour of sitting on the bedside chair. .Anticipate discharge to home with follow up form home health P.T. today.     Rehab Prognosis: Good and Fair; patient would benefit from acute skilled PT services to address these deficits and reach maximum level of function.    Recent Surgery: * No surgery found *      Plan:     During this hospitalization, patient to be seen 5 x/week to address the identified rehab impairments via gait training, therapeutic activities, therapeutic exercises, neuromuscular re-education and progress toward the following goals:    Plan of Care Expires:  11/10/23    Subjective     Chief Complaint: Patient is signing "okay".   Patient/Family Comments/goals: Go home.   Pain/Comfort:  Pain Rating 1: 0/10  Pain Rating Post-Intervention 1: 0/10      Objective:     Communicated with nurse, patient and daughter  " prior to session.  Patient found supine with PureWick, peripheral IV, oxygen, telemetry, pulse ox (continuous) upon PT entry to room.     General Precautions: Standard, fall, deaf, other (see comments) (communicates via ASL, daughter present at bedside)  Orthopedic Precautions: N/A  Braces: N/A  Respiratory Status: Nasal cannula, flow 2 L/min     Functional Mobility:  Bed Mobility:     Rolling Left:  independence  Rolling Right: independence  Scooting: supervision  Bridging: supervision  Supine to Sit: supervision  Sit to Supine: supervision  Transfers:     Sit to Stand:  supervision with rolling walker  Bed to Chair: supervision with  rolling walker  using  Step Transfer  Gait: ~616 feet with RW with SBA, 2 liters of O2 via nasal cannula, both knees flexed and pes planus deformity on both feet   Balance: independent with static sitting, set up assistance with static standing using a RW       AM-PAC 6 CLICK MOBILITY  Turning over in bed (including adjusting bedclothes, sheets and blankets)?: 4  Sitting down on and standing up from a chair with arms (e.g., wheelchair, bedside commode, etc.): 3  Moving from lying on back to sitting on the side of the bed?: 3  Moving to and from a bed to a chair (including a wheelchair)?: 3  Need to walk in hospital room?: 3  Climbing 3-5 steps with a railing?: 3 (based on clinical judgement)  Basic Mobility Total Score: 19       Treatment & Education:  Rolling side <> side  Supine <> sit  Scooting to the edge  of the bed   Sitting balance/tolerance  Sit <> stand with RW  Standing balance/tolerance   Stand step transfer with a RW   Gait with RW and O2 supplement   Bed positioning           Seated    Both Lower Extremity   Active ROM Sets / Reps / Resistance / Etc.   Hip Flexion 1 x 10    LAQ 1 x 10    Ankle DF/PF                 1 x 10         Patient left HOB elevated with all lines intact, call button in reach, bed alarm on, and nurse  notified..    GOALS:   Multidisciplinary  Problems       Physical Therapy Goals          Problem: Physical Therapy    Goal Priority Disciplines Outcome Goal Variances Interventions   Physical Therapy Goal     PT, PT/OT Adequate for Care Transition     Description: Goals to be met by: 11/10/2023   Patient will increase functional independence with mobility by performin. Supine to sit with Modified Independent.  2. Sit to supine with Modified Independent.  3. Bed to chair transfer with Modified Independent with rolling walker using Step Transfer technique.  4. Sit to Stand with Modified Independent with rolling walker.  5. Gait  x 500  feet with Supervision or Set-up Assistance with rolling walker with SPO2 greater than 92% .  6. Lower extremity exercise program x10 reps, with assistance as needed.                          Time Tracking:     PT Received On: 23  PT Start Time: 0815     PT Stop Time: 0845  PT Total Time (min): 30 min     Billable Minutes: Therapeutic Activity 15 and Therapeutic Exercise 15    Treatment Type: Treatment  PT/PTA: PT           2023

## 2023-11-08 NOTE — PLAN OF CARE
Problem: Adult Inpatient Plan of Care  Goal: Plan of Care Review  Outcome: Ongoing, Progressing  Goal: Patient-Specific Goal (Individualized)  Outcome: Ongoing, Progressing  Goal: Absence of Hospital-Acquired Illness or Injury  Outcome: Ongoing, Progressing  Goal: Optimal Comfort and Wellbeing  Outcome: Ongoing, Progressing  Goal: Readiness for Transition of Care  Outcome: Ongoing, Progressing     Problem: Fluid and Electrolyte Imbalance (Acute Kidney Injury/Impairment)  Goal: Fluid and Electrolyte Balance  Outcome: Ongoing, Progressing     Problem: Oral Intake Inadequate (Acute Kidney Injury/Impairment)  Goal: Optimal Nutrition Intake  Outcome: Ongoing, Progressing     Problem: Renal Function Impairment (Acute Kidney Injury/Impairment)  Goal: Effective Renal Function  Outcome: Ongoing, Progressing     Problem: Fall Injury Risk  Goal: Absence of Fall and Fall-Related Injury  Outcome: Ongoing, Progressing     Problem: Skin Injury Risk Increased  Goal: Skin Health and Integrity  Outcome: Ongoing, Progressing     Problem: Fluid Imbalance (Pneumonia)  Goal: Fluid Balance  Outcome: Ongoing, Progressing     Problem: Infection (Pneumonia)  Goal: Resolution of Infection Signs and Symptoms  Outcome: Ongoing, Progressing     Problem: Respiratory Compromise (Pneumonia)  Goal: Effective Oxygenation and Ventilation  Outcome: Ongoing, Progressing

## 2023-11-08 NOTE — PLAN OF CARE
Spoke with Maribeth at [facility].  She confirms  is on the way with oxygen.  Currently in [place].

## 2023-11-08 NOTE — PLAN OF CARE
Problem: Occupational Therapy  Goal: Occupational Therapy Goal  Description: Goals to be met by: 11/13/23     Patient will increase functional independence with ADLs by performing:    Feeding with Modified [place].  UE Dressing with Modified [place].  LE Dressing with Modified [place].  Grooming while seated with Modified [place].  Toileting from toilet with Modified [place] for hygiene and clothing management.   Bathing from  shower chair/bench with Setup Assistance.  Toilet transfer to toilet with Modified [place].    Outcome: Ongoing, Progressing

## 2023-11-08 NOTE — ASSESSMENT & PLAN NOTE
Patient with Persistent (7 days or more) atrial fibrillation which is controlled currently with Beta Blocker and Calcium Channel Blocker. Patient is currently in atrial fibrillation.[id number] Score: 3.   Once on xarelto in the past, but developed GI bleed requiring blood transfusion.   Currently not on anticoagulation therapy.   - continue daily 81 mg aspirin    Continue rate control agents.

## 2023-11-08 NOTE — PLAN OF CARE
Problem: Physical Therapy  Goal: Physical Therapy Goal  Description: Goals to be met by: 11/10/2023   Patient will increase functional independence with mobility by performin. Supine to sit with Modified Independent.  2. Sit to supine with Modified Independent.  3. Bed to chair transfer with Modified Independent with rolling walker using Step Transfer technique.  4. Sit to Stand with Modified Independent with rolling walker.  5. Gait  x 500  feet with Supervision or Set-up Assistance with rolling walker with SPO2 greater than 92% .  6. Lower extremity exercise program x10 reps, with assistance as needed.     Outcome: Adequate for Care Transition

## 2023-11-08 NOTE — ASSESSMENT & PLAN NOTE
Chronic, controlled. Latest blood pressure and vitals reviewed-     Temp:  [97.5 °F (36.4 °C)-98.6 °F (37 °C)]   Pulse:  []   Resp:  [18-23]   BP: (114-133)/(62-79)   SpO2:  [95 %-100 %] .   Home meds for hypertension were reviewed and noted below.   Hypertension Medications             metoprolol succinate (TOPROL-XL) 100 MG 24 hr tablet Take 1 tablet (100mg) in the AM and 1.5 tablets (150mg) in the PM    sotaloL (BETAPACE) 80 MG tablet Take 0.5 tablets (40 mg total) by mouth 2 (two) times daily.    torsemide (DEMADEX) 20 MG Tab Take 1 tablet (20 mg total) by mouth as needed.          While in the hospital, will manage blood pressure as follows; Continue home antihypertensive regimen    Will utilize p.r.n. blood pressure medication only if patient's blood pressure greater than 180/110 and he develops symptoms such as worsening chest pain or shortness of breath.    BP Readings from Last 3 Encounters:   11/08/23 124/79   11/03/23 132/75   11/01/23 128/72

## 2023-11-08 NOTE — ASSESSMENT & PLAN NOTE
CXR with left lung consolidation. Cough productive, blood tinged. POC covid, influenza and RSV negative. SpO2 90% on room air. Tachypnea.   ED course started on zosyn.   - f/u sputum culture, adjust antibiotics  - continue with scheduled breathing treatments  - start mucinex  - start scheduled pulmonary hygiene care with education on coughing and spitting out mucus and NOT hold in mouth  - supplemental oxygen per respiratory, keep SpO2 >94%  - IS and flutter exercises    Doxy/cefuroxime at dc for short course.

## 2023-11-09 LAB
BACTERIA SPEC AEROBE CULT: ABNORMAL
GRAM STN SPEC: ABNORMAL

## 2023-11-09 NOTE — NURSING
Elderly male patient sitting in bed no distress noted. Regular tray served appetite good. Ambulated with PT in hallway, tolerated find. Orders for discharge noted.  Telemetry and IV removed patient is dressed and in w/c waiting for family  Discharge instructions given to Romana(leo). Explain the 02 concentrator with a demonstration. Verbally understanding.Discharge home with family per w/c in stable condition

## 2023-11-10 ENCOUNTER — TELEPHONE (OUTPATIENT)
Dept: HEMATOLOGY/ONCOLOGY | Facility: CLINIC | Age: 83
End: 2023-11-10
Payer: MEDICARE

## 2023-11-10 PROBLEM — D64.9 SYMPTOMATIC ANEMIA: Status: RESOLVED | Noted: 2020-10-13 | Resolved: 2023-11-10

## 2023-11-10 NOTE — TELEPHONE ENCOUNTER
"----- Message from Michelle Coates sent at 11/10/2023  1:09 PM CST -----  Regarding: Pt advice  Contact: Pt    Pt requesting call back in regards to the following meds abiraterone (ZYTIGA) 250 mg Tab stating he need assistance with cost, as well as help with getting status of meds. Pt stated he hasn't received meds  Please call and adv @        Confirmed contact below:   Contact Name: Michael Mclean  Phone Number: [phone]               Additional Notes:  "Thank you for all that you do for our patients"                                           "

## 2023-11-13 ENCOUNTER — TELEPHONE (OUTPATIENT)
Dept: HEMATOLOGY/ONCOLOGY | Facility: CLINIC | Age: 83
End: 2023-11-13
Payer: MEDICARE

## 2023-11-13 NOTE — TELEPHONE ENCOUNTER
----- Message from Macy Esparza sent at 11/13/2023  1:03 PM CST -----  Type: General Call Back     Name of Caller:LOKI DILIP JOHNSON [[id number]]  Symptoms:abiraterone (ZYTIGA) 250 mg Tab  Would the patient rather a call back or a response via [facility]? Call back   Best Call Back Number:[phone]  Additional Information: Patient called to notify the providers office that he received his medication finally. Patient indicates he would just like to notify the provider he has the medicine.  Patient indicates he would also like to notify the providers office he will be changing his medication in the new year and would like to know if this is going to affect anything. Patient indicates he will be switching to [facility]. Patient indicates if further information is needed to call back.

## 2023-11-16 ENCOUNTER — PATIENT MESSAGE (OUTPATIENT)
Dept: CARDIOLOGY | Facility: CLINIC | Age: 83
End: 2023-11-16
Payer: MEDICARE

## 2023-11-16 NOTE — PHYSICIAN QUERY
PT Name: Michael Mclean  MR #: [id number]     DOCUMENTATION CLARIFICATION     CDS/: Denisha Velasquez RN CCDS             Contact information:[email or web address]  This form is a permanent document in the medical record.     Query Date: November 16, 2023    By submitting this query, we are merely seeking further clarification of documentation.  Please utilize your independent clinical judgment when addressing the question(s) below.    The Medical Record contains the following   Indicators Supporting Clinical Findings Location in Medical Record   x Heart Failure documented Hx of CHF   H&P/Discharge summary   x BNP [id number]   Lab 11/5   x EF/Echo   Left Ventricle: The left ventricle is normal in size. Normal wall thickness. Normal wall motion. There is normal systolic function with a visually estimated ejection fraction of 60 - 65%. There is normal diastolic function.    Right Ventricle: Normal right ventricular cavity size. Wall thickness is normal. Right ventricle wall motion  is normal. Systolic function is normal.    Left Atrium: Left atrium is severely dilated.    Aortic Valve: There is a transcatheter valve replacement in the aortic position. It is reported to be a 26 mm Evolut valve. Aortic valve peak velocity is 0.95 m/s. Mean gradient is 2 mmHg.    Mitral Valve: Mild mitral annular calcification. There is mild to moderate regurgitation. Anterior leaflet is long with mild prolapase causing eccentric posteriorly directed jet. MR appears unchanged from prior ALEXYS.    Tricuspid Valve: There is mild to moderate regurgitation.    Pulmonary Artery: The estimated pulmonary artery systolic pressure is 34 mmHg.    IVC/SVC: Intermediate venous pressure at 8 mmHg.   Echo 8/29/23   x Radiology findings Patchy left upper lung opacity suspicious for pneumonia   CXR 11/5   x Subjective/Objective Respiratory Conditions Rales present H&P 11/5    Recent/Current MI      Heart Transplant, LVAD       Edema, JVD      Ascites     x Diuretics/Meds Lasix 40 mg IV BID 11/5-11/8 MAR      Other Treatment      Other       Heart failure is a clinical diagnosis which includes symptomatic fluid retention, elevated intracardiac pressures, and/or the inability of the heart to deliver adequate blood flow.    Heart Failure with reduced Ejection Fraction (HFrEF) or Systolic Heart Failure (loses ability to contract normally, EF is <40%)    Heart Failure with preserved Ejection Fraction (HFpEF) or Diastolic Heart Failure (stiff ventricles, does not relax properly, EF is >50%)     Heart Failure with Combined Systolic and Diastolic Failure (stiff ventricles, does not relax properly and EF is <50%)    Mid-range or mildly reduced ejection fraction (HFmrEF) is classified as systolic heart failure.  Congestive heart failure with a recovered EF is classified as Diastolic Heart Failure.  Common clues to acute exacerbation:  Rapidly progressive symptoms (w/in 2 weeks of presentation), using IV diuretics, using supplemental O2, pulmonary edema on Xray, new or worsening pleural effusion, +JVD or other signs of volume overload, MI w/in 4 weeks, and/or BNP >500  The clinical guidelines noted are only system guidelines, and do not replace the providers clinical judgment.    Please further specify the type and acuity of CHF.  Thank you.    [ x  ]  Acute on Chronic Diastolic Heart Failure (HFpEF) - worsening of CHF signs/symptoms in preexisting CHF   [   ]  Chronic Diastolic Heart Failure (HFpEF) - preexisting and stable   [   ]  Heart Failure Ruled Out   [   ]  Other (please specify): ___________________________________   [  ]  Clinically Undetermined           Please document in your progress notes daily for the duration of treatment until resolved and include in your discharge summary.    References:  American Heart Association editorial staff. (2017, May). Ejection Fraction Heart Failure Measurement. American Heart Association.  https://www.heart.org/en/health-topics/heart-failure/diagnosing-heart-failure/ejection-fraction-heart-failure-measurement#:~:text=Ejection%20fraction%20(EF)%20is%20a,pushed%20out%20with%20each%20heartbeat  DINA Santana (2020, December 15). Heart failure with preserved ejection fraction: Clinical manifestations and diagnosis. [employer]. https://www.SmartCrowdz.Cadee/contents/heart-failure-with-preserved-ejection-fraction-clinical-manifestations-and-diagnosis.  ICD-10-CM/PCS Coding Clinic Third Quarter ICD-10, Effective with discharges: September 8, 2020 [facility] § Heart failure with mid-range or mildly reduced ejection fraction (2020).  ICD-10-CM/PCS Coding Clinic Third Quarter ICD-10, Effective with discharges: September 8, 2020 [facility] § Heart failure with recovered ejection fraction (2020).  Form No. 34271

## 2023-11-28 ENCOUNTER — CLINICAL SUPPORT (OUTPATIENT)
Dept: CARDIOLOGY | Facility: HOSPITAL | Age: 83
End: 2023-11-28
Payer: MEDICARE

## 2023-11-28 ENCOUNTER — CLINICAL SUPPORT (OUTPATIENT)
Dept: CARDIOLOGY | Facility: HOSPITAL | Age: 83
End: 2023-11-28
Attending: INTERNAL MEDICINE
Payer: MEDICARE

## 2023-11-28 DIAGNOSIS — Z95.810 PRESENCE OF AUTOMATIC (IMPLANTABLE) CARDIAC DEFIBRILLATOR: ICD-10-CM

## 2023-11-28 PROCEDURE — 93295 DEV INTERROG REMOTE 1/2/MLT: CPT | Mod: ,,, | Performed by: INTERNAL MEDICINE

## 2023-11-28 PROCEDURE — 93295 CARDIAC DEVICE CHECK CHECK - REMOTE ALERT: ICD-10-PCS | Mod: ,,, | Performed by: INTERNAL MEDICINE

## 2023-11-28 PROCEDURE — 93296 REM INTERROG EVL PM/IDS: CPT | Performed by: INTERNAL MEDICINE

## 2023-11-29 ENCOUNTER — TELEPHONE (OUTPATIENT)
Dept: CARDIOLOGY | Facility: CLINIC | Age: 83
End: 2023-11-29
Payer: MEDICARE

## 2023-11-29 NOTE — TELEPHONE ENCOUNTER
Spoke with patient via  ASL.  Patient needs to follow up with PCP or Regular cardiologist.  Stated understanding.       ----- Message from Rose Guthrie RN sent at 11/29/2023  2:10 PM CST -----  Regarding: FW: Pressure  Not sure if he was still being followed. Seen Sara and Dylan in the past.  ----- Message -----  From: Yandy Karimi  Sent: 11/29/2023   2:07 PM CST  To: #  Subject: Pressure                                         Patient calling with some concerns regarding his pressure 147/65- d94. Please call back @ [phone]. Thank you Yandy

## 2023-11-30 ENCOUNTER — TELEPHONE (OUTPATIENT)
Dept: CARDIOLOGY | Facility: CLINIC | Age: 83
End: 2023-11-30
Payer: MEDICARE

## 2023-11-30 LAB
OHS CV AF BURDEN PERCENT: 100
OHS CV DC REMOTE DEVICE TYPE: NORMAL
OHS CV ICD SHOCK: NO
OHS CV RV PACING PERCENT: 37.63 %

## 2023-11-30 NOTE — TELEPHONE ENCOUNTER
Spoke w. pt via  for ASL. Pt is no longer concerned w. his bp, was good today w.  nurse. He mentioned some readings in the 100s and a recent reading of 118/80s. Unclear whether there was an issue or not. Scheduled pt for f/u visit due anyway on the day he is already scheduled for f/u (did not want to make an extra trip) and he agreed to date/time of appointment(s). I told him to log bp / hr at rest and bring to visit and he verbalized understanding.

## 2023-11-30 NOTE — TELEPHONE ENCOUNTER
----- Message from Jennifer Guevara, RN sent at 11/29/2023  4:34 PM CST -----  Regarding: high bp  Spoke w. pt earlier via . Pt states that his BP has been running high for a while.  He takes  his meds sl differently fr Epic. He takes:  metoprolol succ 100 bid  sotalol 80 bid  no torsemide  Kcl 20 qd.    He is supposed to call me back w. bp readings of past few days.  ----- Message -----  From: Yandy Karimi  Sent: 11/29/2023   2:14 PM CST  To: Jennifer Guevara, RN  Subject: Pressure                                         Patient pressure is 147/95 and p 88. Please call back @ [phone]. Thank you Yandy

## 2023-12-03 DIAGNOSIS — C61 PROSTATE CANCER: ICD-10-CM

## 2023-12-04 ENCOUNTER — TELEPHONE (OUTPATIENT)
Dept: HEMATOLOGY/ONCOLOGY | Facility: CLINIC | Age: 83
End: 2023-12-04
Payer: MEDICARE

## 2023-12-04 RX ORDER — ABIRATERONE ACETATE 250 MG/1
250 TABLET ORAL DAILY
Qty: 30 TABLET | Refills: 11 | Status: SHIPPED | OUTPATIENT
Start: 2023-12-04 | End: 2023-12-07 | Stop reason: SDUPTHER

## 2023-12-04 NOTE — TELEPHONE ENCOUNTER
Patient's prescription will cost $40.60 this month. He has new insurance in the new year that will cover more of the cost of the prescription. He cannot afford his zytiga and is wanting to know what he can do.

## 2023-12-04 NOTE — TELEPHONE ENCOUNTER
----- Message from Sherry Morales sent at 12/4/2023  3:11 PM CST -----  Regarding: Refill  Contact: Iggy  [phone]            Name of Pharmacy:    [facility] [address]  [address] [place] [address]  Phone: [phone] Fax: [phone]       Name Of caller:  Michael  [phone]    Name of Medication:   abiraterone (ZYTIGA) 250 mg Tab    Comments/advisory:  Called to check on the price of medication and if he would have to pay for it states he was told he can get a discount

## 2023-12-07 DIAGNOSIS — C61 PROSTATE CANCER: ICD-10-CM

## 2023-12-07 RX ORDER — ABIRATERONE ACETATE 250 MG/1
250 TABLET ORAL DAILY
Qty: 30 TABLET | Refills: 11 | Status: SHIPPED | OUTPATIENT
Start: 2023-12-07 | End: 2024-12-06

## 2023-12-12 DIAGNOSIS — C61 PROSTATE CANCER: Primary | ICD-10-CM

## 2023-12-14 ENCOUNTER — LAB VISIT (OUTPATIENT)
Dept: LAB | Facility: HOSPITAL | Age: 83
End: 2023-12-14
Payer: MEDICARE

## 2023-12-14 ENCOUNTER — OFFICE VISIT (OUTPATIENT)
Dept: HEMATOLOGY/ONCOLOGY | Facility: CLINIC | Age: 83
End: 2023-12-14
Payer: MEDICARE

## 2023-12-14 VITALS
SYSTOLIC BLOOD PRESSURE: 146 MMHG | HEIGHT: 67 IN | OXYGEN SATURATION: 96 % | WEIGHT: 162.06 LBS | RESPIRATION RATE: 17 BRPM | TEMPERATURE: 97 F | DIASTOLIC BLOOD PRESSURE: 87 MMHG | BODY MASS INDEX: 25.44 KG/M2 | HEART RATE: 97 BPM

## 2023-12-14 DIAGNOSIS — C61 PROSTATE CANCER: Primary | ICD-10-CM

## 2023-12-14 DIAGNOSIS — I48.19 PERSISTENT ATRIAL FIBRILLATION: ICD-10-CM

## 2023-12-14 DIAGNOSIS — Z95.1 S/P CABG (CORONARY ARTERY BYPASS GRAFT): ICD-10-CM

## 2023-12-14 DIAGNOSIS — D64.9 ANEMIA, UNSPECIFIED TYPE: ICD-10-CM

## 2023-12-14 DIAGNOSIS — Z95.2 S/P AVR: ICD-10-CM

## 2023-12-14 DIAGNOSIS — K08.9 DENTAL DISEASE: ICD-10-CM

## 2023-12-14 DIAGNOSIS — C61 PROSTATE CANCER: ICD-10-CM

## 2023-12-14 DIAGNOSIS — I50.32 CHRONIC DIASTOLIC HEART FAILURE: ICD-10-CM

## 2023-12-14 DIAGNOSIS — M81.0 OSTEOPOROSIS, UNSPECIFIED OSTEOPOROSIS TYPE, UNSPECIFIED PATHOLOGICAL FRACTURE PRESENCE: ICD-10-CM

## 2023-12-14 DIAGNOSIS — I63.412 EMBOLIC STROKE INVOLVING LEFT MIDDLE CEREBRAL ARTERY: ICD-10-CM

## 2023-12-14 DIAGNOSIS — H91.3 DEAF, NONSPEAKING: ICD-10-CM

## 2023-12-14 DIAGNOSIS — Z95.810 AICD (AUTOMATIC CARDIOVERTER/DEFIBRILLATOR) PRESENT: ICD-10-CM

## 2023-12-14 DIAGNOSIS — Z95.818 PRESENCE OF WATCHMAN LEFT ATRIAL APPENDAGE CLOSURE DEVICE: ICD-10-CM

## 2023-12-14 LAB
ALBUMIN SERPL BCP-MCNC: 3.2 G/DL (ref 3.5–5.2)
ALP SERPL-CCNC: 60 U/L (ref 55–135)
ALT SERPL W/O P-5'-P-CCNC: 17 U/L (ref 10–44)
ANION GAP SERPL CALC-SCNC: 4 MMOL/L (ref 8–16)
AST SERPL-CCNC: 24 U/L (ref 10–40)
BASOPHILS # BLD AUTO: 0.05 K/UL (ref 0–0.2)
BASOPHILS NFR BLD: 0.5 % (ref 0–1.9)
BILIRUB SERPL-MCNC: 1.2 MG/DL (ref 0.1–1)
BUN SERPL-MCNC: 13 MG/DL (ref 8–23)
CALCIUM SERPL-MCNC: 9.3 MG/DL (ref 8.7–10.5)
CHLORIDE SERPL-SCNC: 105 MMOL/L (ref 95–110)
CO2 SERPL-SCNC: 28 MMOL/L (ref 23–29)
COMPLEXED PSA SERPL-MCNC: 14 NG/ML (ref 0–4)
CREAT SERPL-MCNC: 0.8 MG/DL (ref 0.5–1.4)
DIFFERENTIAL METHOD: ABNORMAL
EOSINOPHIL # BLD AUTO: 0.3 K/UL (ref 0–0.5)
EOSINOPHIL NFR BLD: 2.8 % (ref 0–8)
ERYTHROCYTE [DISTWIDTH] IN BLOOD BY AUTOMATED COUNT: 13.3 % (ref 11.5–14.5)
EST. GFR  (NO RACE VARIABLE): >60 ML/MIN/1.73 M^2
GLUCOSE SERPL-MCNC: 126 MG/DL (ref 70–110)
HCT VFR BLD AUTO: 38.5 % (ref 40–54)
HGB BLD-MCNC: 13.2 G/DL (ref 14–18)
IMM GRANULOCYTES # BLD AUTO: 0.02 K/UL (ref 0–0.04)
IMM GRANULOCYTES NFR BLD AUTO: 0.2 % (ref 0–0.5)
LYMPHOCYTES # BLD AUTO: 1.4 K/UL (ref 1–4.8)
LYMPHOCYTES NFR BLD: 14.6 % (ref 18–48)
MCH RBC QN AUTO: 32.9 PG (ref 27–31)
MCHC RBC AUTO-ENTMCNC: 34.3 G/DL (ref 32–36)
MCV RBC AUTO: 96 FL (ref 82–98)
MONOCYTES # BLD AUTO: 0.8 K/UL (ref 0.3–1)
MONOCYTES NFR BLD: 8.4 % (ref 4–15)
NEUTROPHILS # BLD AUTO: 7.2 K/UL (ref 1.8–7.7)
NEUTROPHILS NFR BLD: 73.5 % (ref 38–73)
NRBC BLD-RTO: 0 /100 WBC
PLATELET # BLD AUTO: 163 K/UL (ref 150–450)
PMV BLD AUTO: 10.2 FL (ref 9.2–12.9)
POTASSIUM SERPL-SCNC: 3.6 MMOL/L (ref 3.5–5.1)
PROT SERPL-MCNC: 6.7 G/DL (ref 6–8.4)
RBC # BLD AUTO: 4.01 M/UL (ref 4.6–6.2)
SODIUM SERPL-SCNC: 137 MMOL/L (ref 136–145)
WBC # BLD AUTO: 9.85 K/UL (ref 3.9–12.7)

## 2023-12-14 PROCEDURE — 85025 COMPLETE CBC W/AUTO DIFF WBC: CPT | Performed by: INTERNAL MEDICINE

## 2023-12-14 PROCEDURE — 1159F PR MEDICATION LIST DOCUMENTED IN MEDICAL RECORD: ICD-10-PCS | Mod: CPTII,S$GLB,, | Performed by: INTERNAL MEDICINE

## 2023-12-14 PROCEDURE — 1101F PR PT FALLS ASSESS DOC 0-1 FALLS W/OUT INJ PAST YR: ICD-10-PCS | Mod: CPTII,S$GLB,, | Performed by: INTERNAL MEDICINE

## 2023-12-14 PROCEDURE — 3079F PR MOST RECENT DIASTOLIC BLOOD PRESSURE 80-89 MM HG: ICD-10-PCS | Mod: CPTII,S$GLB,, | Performed by: INTERNAL MEDICINE

## 2023-12-14 PROCEDURE — 3079F DIAST BP 80-89 MM HG: CPT | Mod: CPTII,S$GLB,, | Performed by: INTERNAL MEDICINE

## 2023-12-14 PROCEDURE — 80053 COMPREHEN METABOLIC PANEL: CPT | Performed by: INTERNAL MEDICINE

## 2023-12-14 PROCEDURE — 99215 OFFICE O/P EST HI 40 MIN: CPT | Mod: S$GLB,,, | Performed by: INTERNAL MEDICINE

## 2023-12-14 PROCEDURE — 1159F MED LIST DOCD IN RCRD: CPT | Mod: CPTII,S$GLB,, | Performed by: INTERNAL MEDICINE

## 2023-12-14 PROCEDURE — 1125F PR PAIN SEVERITY QUANTIFIED, PAIN PRESENT: ICD-10-PCS | Mod: CPTII,S$GLB,, | Performed by: INTERNAL MEDICINE

## 2023-12-14 PROCEDURE — 84153 ASSAY OF PSA TOTAL: CPT | Performed by: INTERNAL MEDICINE

## 2023-12-14 PROCEDURE — 99999 PR PBB SHADOW E&M-EST. PATIENT-LVL IV: ICD-10-PCS | Mod: PBBFAC,,, | Performed by: INTERNAL MEDICINE

## 2023-12-14 PROCEDURE — 99215 PR OFFICE/OUTPT VISIT, EST, LEVL V, 40-54 MIN: ICD-10-PCS | Mod: S$GLB,,, | Performed by: INTERNAL MEDICINE

## 2023-12-14 PROCEDURE — 3077F SYST BP >= 140 MM HG: CPT | Mod: CPTII,S$GLB,, | Performed by: INTERNAL MEDICINE

## 2023-12-14 PROCEDURE — 36415 COLL VENOUS BLD VENIPUNCTURE: CPT | Performed by: INTERNAL MEDICINE

## 2023-12-14 PROCEDURE — 3288F PR FALLS RISK ASSESSMENT DOCUMENTED: ICD-10-PCS | Mod: CPTII,S$GLB,, | Performed by: INTERNAL MEDICINE

## 2023-12-14 PROCEDURE — 3288F FALL RISK ASSESSMENT DOCD: CPT | Mod: CPTII,S$GLB,, | Performed by: INTERNAL MEDICINE

## 2023-12-14 PROCEDURE — 1125F AMNT PAIN NOTED PAIN PRSNT: CPT | Mod: CPTII,S$GLB,, | Performed by: INTERNAL MEDICINE

## 2023-12-14 PROCEDURE — 1160F PR REVIEW ALL MEDS BY PRESCRIBER/CLIN PHARMACIST DOCUMENTED: ICD-10-PCS | Mod: CPTII,S$GLB,, | Performed by: INTERNAL MEDICINE

## 2023-12-14 PROCEDURE — 99999 PR PBB SHADOW E&M-EST. PATIENT-LVL IV: CPT | Mod: PBBFAC,,, | Performed by: INTERNAL MEDICINE

## 2023-12-14 PROCEDURE — 3077F PR MOST RECENT SYSTOLIC BLOOD PRESSURE >= 140 MM HG: ICD-10-PCS | Mod: CPTII,S$GLB,, | Performed by: INTERNAL MEDICINE

## 2023-12-14 PROCEDURE — 1101F PT FALLS ASSESS-DOCD LE1/YR: CPT | Mod: CPTII,S$GLB,, | Performed by: INTERNAL MEDICINE

## 2023-12-14 PROCEDURE — 1160F RVW MEDS BY RX/DR IN RCRD: CPT | Mod: CPTII,S$GLB,, | Performed by: INTERNAL MEDICINE

## 2023-12-14 NOTE — PROGRESS NOTES
Subjective     Patient ID: Michael Mclean is a 83 y.o. male.    Chief Complaint: Prostate Cancer    HPI      Presents to clinic for follow up   States only pain is right hip pain- worse when weather cold, pain scale 3-4/10  No falls  Relieved with pain medications- only takes Tylenol prn - often forgets (not daily)  Can be relieved with rest as well, worse with movement  Energy level stable and normal  Mild weight loss since hip surgery  Eating well  Still needs to get dental work done- expensive so had to postpone- unable to gey Xgeva still until cleared    Ran out of Zytiga + prednisone - yesterday  No missed doses  He now has a new insurance and also states his son in law was trying to help with ordering - requires him to call monthly  Receives Lupron with urology - due today    No hot flashes   No fevers or chills  No CP, SOB, or palpitations  No nausea, vomiting, constipation or diarrhea  Denies blood in urine or stool  No new urinary complaints today       PSA today stable     Oncology History:  - negative prostate biopsy in 2011 (PSA = 15 ng.ml)     - followed for a year and PSA was around 7 ng/ml     - 2012, PSA increased to 13.49 ng/ml     - 3/20/2012 prostate biopsy:  Pathology revealed [place] 4+3=7 in 1 core involving 80% of that core, [place] 3+4=7 in 3 cores involving 3 cores involving 70% to 80% of those cores and [place] 3+3=6 in 1 core involving 5% of that core.      - saw Dr. Bartholomew at [facility] and underwent a robotic prostatectomy with lymphadenectomy on 5/22/2012.   Pathology revealed a [place] 5+4= 9 disease, pT3aN0.      - Unfortunately, Mr. Mclean's PSA did not become undetectable and he completed further restaging that was without evidence of metastatic disease. Mr. Mclean opted to proceed with salvage therapy and completed one year of combined ADT and EBRT in 10/2013.   Notes at [facility] note that main side effects were weight gain and hot flashes. He has had Testosterone recovery and  improved stamina and has been able to loose a little weight since completing therapy. His PSA became detectable again in September 2015 and has been slowly rising since without clinical symptoms.    PSA History at [facility]:  12/06/2016 1.67 OFF   09/12/2016 1.94  07/11/2016 2.48 Test 221  04/11/2016 1.76 OFF  12/08/2015 1.36   10/06/2015 1.43 OFF  09/01/2015 0.84  05/15/2015 0.08 Test 139  11/06/2014 0.02 Test 128  06/30/2014 <0.01 Test 181  03/31/2014 <0.01 Test 119  01/06/2014 0.01 Test 67  10/07/2013 <0.01 Casodex and LHRHa discontinued  07/01/2013 <0.01  01/08/2013 0.02  10/09/2012 0.82 Salvage CAA Started Test 296  09/19/2012 0.56  08/22/2012 0.43  06/13/2012 0.49  05/08/2012 13.49 PTX 5/22/2012, [place] 9, pT3aN0     We evaluated in 2022 with rising PSA  - 2/14/2022 CT A/P:  FINDINGS:  No suspicious mass, lymphadenopathy, or change from the previous examination of 09/30/2021.  Scattered small aortocaval lymph nodes, unchanged.  Left renal cysts and left renal atrophy, unchanged.  Somewhat small liver.  Stones within an otherwise unremarkable gallbladder.  Unremarkable right kidney, adrenals, spleen and pancreas.  Artifact secondary to a right hip prosthesis.  Impression:  No evidence of metastatic disease in the abdomen or pelvis     2/14- /2022 Bone scan:  FINDINGS:  Radiotracer accumulation within the right 2nd rib has increased since 09/30/2021.  Otherwise, additional sites of radiotracer accumulation have not significantly changed in appearance since the prior exam other than resolution of the activity right skull base.  Patchy, suspected degenerative type uptake, cervical spine, lumbar spine, both knees and about right hip and shoulder prostheses.  Impression:  1. Increased radiotracer accumulation within the right 2nd rib as compared to bone scan of 09/30/2021 suspicious for metastatic disease  2. Otherwise, suspected degenerative type uptake within cervical spine, lumbar spine, both knees and about right  shoulder and knee prostheses appears similar     - was started on Lupron and casodex  Hesitation around newer generation ADT with cardiac history     - issues with follow up  Lives in [place]- several missed appointments        3/6/2023 PSA = 11.2 ng/ml     - 3/24/2023 PET PSMA:  FINDINGS:  In the head and neck, there is physiologic uptake in the lacrimal and salivary glands, and there are no tracer avid lesions suspicious for malignancy.  In the chest, there are no tracer avid lesions suspicious for malignancy.  Multiple subcentimeter pulmonary nodules which are below size threshold for evaluation with PET.  For example: 0.7 cm nodule in the periphery the left upper lobe with mildly increased radiotracer uptake SUV max of 3.3 (series 3, image 28), likely present on prior CTA although slightly more prominent on today's exam.  0.3 cm pulmonary nodule in the right lower lobe (series 3, image 194), unchanged from CT 09/30/2021  In the abdomen and pelvis, there is physiologic distribution in the liver, spleen, bowel, and genitourinary tract, and there are no anatomic or tracer avid lesions suspicious for malignancy.  Specifically, there are no pathologically enlarged or tracer avid pelvic or retroperitoneal lymph nodes.  In the bones, there are several tracer avid lesions concerning for bony metastases:  * right 2nd rib long segment of tracer uptake (axial series images 225-239) with max SUV 18.  Mildly increased sclerosis is noted on CT.  * right scapula lytic lesion (3-202) with max SUV of 15.  * right iliac bone tracer avid lesion with max SUV of 7.9 (axial series image 86), corresponding to area of subtle cortical disruption on CT (3-87), though this is unchanged from CT 11/05/2020.  * T10 inferior endplate hypermetabolic lesion with SUV max of 4.8 (fused axial series image 173) without definite underlying CT correlate.  * C3 vertebral body a tracer avid lesion with max SUV 11.0 (fused axial series image 264  without definite underlying CT correlate).  Additional CT findings:  Right shoulder arthroplasty.  Left hip arthroplasty.  Left chest wall pacer device.  Postoperative change of sternotomy. Left atrial appendage occlusion device.  Change related to aortic valve replacement.  Dense calcific atherosclerosis of the coronary arteries.  Prominent calcific atherosclerosis.  Cholelithiasis.  Left kidney is atrophic with two stable fluid attenuating cysts. Prostate is surgically absent.  Colonic diverticulosis.  Bilateral scrotal/testicular calcifications.  Degenerative changes.  Impression:  In this patient with prostate cancer status post prostatectomy, there are multiple foci of increased uptake throughout osseous structures with index lesions detailed above.  Few of these lesions correspond to underlying lytic and sclerotic lesions. Findings are overall concerning for osseous metastases.  No evidence of local tumor recurrence at prostatectomy site or driss involvement of disease.  Multiple subcentimeter pulmonary nodules including nodule in the left upper lobe with mildly increased radiotracer uptake, nonspecific.    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   HENT:   Positive for dental problem and hearing loss. Negative for trouble swallowing.    Eyes:  Negative for visual disturbance.   Respiratory:  Negative for cough, shortness of breath and wheezing.    Cardiovascular:  Negative for chest pain, palpitations and leg swelling.   Gastrointestinal:  Negative for abdominal distention, abdominal pain, blood in stool, change in bowel habit, constipation, diarrhea, nausea and vomiting.   Genitourinary:  Positive for frequency (some nocturia noted- 3 x). Negative for bladder incontinence, decreased urine volume, difficulty urinating and urgency.   Musculoskeletal:  Positive for joint deformity (right hip). Negative for arthralgias, back pain, gait problem, leg pain and myalgias.   Integumentary:  Negative for rash and wound.   Neurological:  Positive for dizziness (at times with cardiac condition). Negative for weakness, numbness, headaches and coordination difficulties.   Hematological:  Negative for adenopathy. Does not bruise/bleed easily.   Psychiatric/Behavioral:  Negative for agitation, behavioral problems, confusion and dysphoric mood. The patient is not nervous/anxious.           Objective     Physical Exam  Vitals and nursing note reviewed.   Constitutional:       General: He is not in acute distress.     Appearance: Normal appearance. He is normal weight. He is not ill-appearing.      Comments: ECOG= 1  Presents with daughter; interpretor present (Mitch)  In a wheelchair- chronic   HENT:      Head: Normocephalic and atraumatic.      Right Ear: External ear normal.      Left Ear: External ear normal.      Nose: Nose normal. No congestion.      Mouth/Throat:      Pharynx: Oropharynx is clear. No oropharyngeal exudate or posterior oropharyngeal erythema.      Comments: Dental issues  Eyes:      General: No scleral icterus.     Extraocular Movements: Extraocular movements intact.      Conjunctiva/sclera: Conjunctivae normal.      Pupils: Pupils are equal, round, and reactive to  light.   Cardiovascular:      Rate and Rhythm: Normal rate.   Pulmonary:      Effort: Pulmonary effort is normal. No respiratory distress.      Breath sounds: Normal breath sounds. No wheezing, rhonchi or rales.   Chest:      Chest wall: No tenderness.   Abdominal:      General: Abdomen is flat. Bowel sounds are normal. There is no distension.      Palpations: Abdomen is soft. There is no mass.      Tenderness: There is no abdominal tenderness. There is no guarding or rebound.      Comments: No organomegaly   Musculoskeletal:         General: No swelling, tenderness or deformity. Normal range of motion.      Cervical back: Normal range of motion and neck supple. No rigidity.      Right lower leg: No edema.      Left lower leg: No edema.   Lymphadenopathy:      Cervical: No cervical adenopathy.   Skin:     General: Skin is warm and dry.      Coloration: Skin is not jaundiced or pale.      Findings: No bruising, erythema or rash.   Neurological:      General: No focal deficit present.      Mental Status: He is alert and oriented to person, place, and time. Mental status is at baseline.      Sensory: No sensory deficit.      Motor: No weakness.      Coordination: Coordination normal.      Gait: Gait abnormal.   Psychiatric:         Mood and Affect: Mood normal.         Behavior: Behavior normal.         Thought Content: Thought content normal.         Judgment: Judgment normal.     Labs- reviewed    - 10/11/2023 BMD:  Impression:  *Osteoporosis based on T-score between -1.0 and -2.5 and elevated risk based on FRAX  *Fracture risk is high  RECOMMENDATIONS:  *Daily calcium intake 6334-6401 mg, dietary sources preferred; Vitamin D 9887-4835 IU daily.  *Weight bearing exercise and fall precautions.  *Recommend medical therapy for osteoporosis with high risk for fracture. Consider bisphosphonates (alendronate, risedronate, zoledronic acid), or denosumab.  *Repeat BMD in 2 years     Assessment and Plan     1. Prostate  cancer    2. Chronic diastolic heart failure    3. Persistent atrial fibrillation    4. S/P CABG (coronary artery bypass graft)    5. S/P AVR    6. AICD (automatic cardioverter/defibrillator) present  Overview:  History of HFrEF (2019) with recovered LVEF , Had Bi-V/ICD placed by [facility] in 2019        7. Presence of Watchman left atrial appendage closure device    8. Deaf, nonspeaking    9. Embolic stroke involving left middle cerebral artery    10. Anemia, unspecified type    11. Dental disease    12. Osteoporosis, unspecified osteoporosis type, unspecified pathological fracture presence        Multiple CV diagnoses/risk factors  CABG, CVA, HTN, a fib-  Managed locally by PCP and Cardiologist  Stable on medications     Metastatic Prostate Cancer-   We reviewed progression and options at previous visit   Changed to new generation ADT with zytiga/predisone in May 2023   PSA has stabilized- Lupron also due today  Will continue regimen and reassess in ~8-10 weeks      Osteoporosis- continue Calcium/Vit D  Cannot get Xgeva with dental issues     Dental issues- will discuss with social work    -  [facility] [place] [facility]- will discuss if possible    Meds need refilling- new insurance recommends  Cost plus [facility]    Hearing impaired  Daughter- [phone]  Heidy- use for communication    45 minutes total time    Route Chart for Scheduling    Med Onc Chart Routing      Follow up with physician    Follow up with KALYAN . 8-10 weeks cbc, cmp, psa EP and possible Xgeva   Infusion scheduling note    Injection scheduling note    Labs    Imaging    Pharmacy appointment    Other referrals              Therapy Plan Information  Medications  denosumab (XGEVA) solution 120 mg  120 mg, Subcutaneous, Every 4 weeks

## 2023-12-18 ENCOUNTER — TELEPHONE (OUTPATIENT)
Dept: HEMATOLOGY/ONCOLOGY | Facility: CLINIC | Age: 83
End: 2023-12-18
Payer: MEDICARE

## 2023-12-18 DIAGNOSIS — C61 PROSTATE CANCER: ICD-10-CM

## 2023-12-18 RX ORDER — PREDNISONE 5 MG/1
5 TABLET ORAL DAILY
Qty: 30 TABLET | Refills: 6 | Status: SHIPPED | OUTPATIENT
Start: 2023-12-18 | End: 2023-12-18 | Stop reason: SDUPTHER

## 2023-12-18 RX ORDER — PREDNISONE 5 MG/1
5 TABLET ORAL DAILY
Qty: 30 TABLET | Refills: 6 | Status: SHIPPED | OUTPATIENT
Start: 2023-12-18

## 2023-12-20 ENCOUNTER — TELEPHONE (OUTPATIENT)
Dept: HEMATOLOGY/ONCOLOGY | Facility: CLINIC | Age: 83
End: 2023-12-20
Payer: MEDICARE

## 2023-12-20 NOTE — TELEPHONE ENCOUNTER
"Spoke with pt. Pt needs catheter ablation. Message sent to Dr. Wilkins.     ----- Message from Monster Chauhan sent at 12/20/2023  9:55 AM CST -----  Consult/Advisory:        Name Of Caller: Self      Contact Preference?: [phone]       Provider Name: Claudette      Does patient feel the need to be seen today? No      What is the nature of the call?: Calling to speak w/ nurse about catheter ablation      Additional Notes:  "Thank you for all that you do for our patients"       "

## 2023-12-21 ENCOUNTER — TELEPHONE (OUTPATIENT)
Dept: ELECTROPHYSIOLOGY | Facility: CLINIC | Age: 83
End: 2023-12-21
Payer: MEDICARE

## 2023-12-21 NOTE — TELEPHONE ENCOUNTER
Spoke with patient (through ). Patient states he wants an ablation because he is having trouble breathing. Advised that he does have an appt scheduled to see Valeri and general cardiology on 1/11/2024 (times reviewed/confirmed). We can't just schedule an ablation until he is evaluated. However, if he is feeling that short of breath (states he is sob with any activity), he should go to ER for evaluation. He verbalized understanding and states he will go to ER in [place].

## 2023-12-21 NOTE — TELEPHONE ENCOUNTER
----- Message from Melva Lemus sent at 12/21/2023  9:39 AM CST -----  Regarding: ablation  Pt is calling to schedule his procedure and can be reached at [phone].    Thank you

## 2023-12-23 ENCOUNTER — HOSPITAL ENCOUNTER (EMERGENCY)
Facility: HOSPITAL | Age: 83
Discharge: HOME | End: 2023-12-23
Attending: EMERGENCY MEDICINE
Payer: MEDICARE

## 2023-12-23 VITALS
WEIGHT: 150 LBS | RESPIRATION RATE: 20 BRPM | SYSTOLIC BLOOD PRESSURE: 114 MMHG | DIASTOLIC BLOOD PRESSURE: 74 MMHG | BODY MASS INDEX: 23.54 KG/M2 | HEIGHT: 67 IN | TEMPERATURE: 98 F | HEART RATE: 105 BPM | OXYGEN SATURATION: 95 %

## 2023-12-23 DIAGNOSIS — R06.02 SOB (SHORTNESS OF BREATH): ICD-10-CM

## 2023-12-23 DIAGNOSIS — R06.00 DYSPNEA, UNSPECIFIED TYPE: Primary | ICD-10-CM

## 2023-12-23 LAB
ALBUMIN SERPL BCP-MCNC: 3.2 G/DL (ref 3.5–5.2)
ALP SERPL-CCNC: 70 U/L (ref 55–135)
ALT SERPL W/O P-5'-P-CCNC: 58 U/L (ref 10–44)
ANION GAP SERPL CALC-SCNC: 4 MMOL/L (ref 3–11)
AST SERPL-CCNC: 64 U/L (ref 10–40)
BACTERIA #/AREA URNS HPF: NEGATIVE /HPF
BASOPHILS # BLD AUTO: 0.07 K/UL (ref 0–0.2)
BASOPHILS NFR BLD: 1 % (ref 0–1.9)
BILIRUB SERPL-MCNC: 1.1 MG/DL (ref 0.1–1)
BILIRUB UR QL STRIP: NEGATIVE
BUN SERPL-MCNC: 33 MG/DL (ref 8–23)
CALCIUM SERPL-MCNC: 9 MG/DL (ref 8.7–10.5)
CHLORIDE SERPL-SCNC: 107 MMOL/L (ref 95–110)
CLARITY UR: CLEAR
CO2 SERPL-SCNC: 26 MMOL/L (ref 23–29)
COLOR UR: YELLOW
CREAT SERPL-MCNC: 1 MG/DL (ref 0.5–1.4)
DIFFERENTIAL METHOD: ABNORMAL
EOSINOPHIL # BLD AUTO: 0.2 K/UL (ref 0–0.5)
EOSINOPHIL NFR BLD: 3.4 % (ref 0–8)
ERYTHROCYTE [DISTWIDTH] IN BLOOD BY AUTOMATED COUNT: 13.6 % (ref 11.5–14.5)
EST. GFR  (NO RACE VARIABLE): >60 ML/MIN/1.73 M^2
GLUCOSE SERPL-MCNC: 111 MG/DL (ref 70–110)
GLUCOSE UR QL STRIP: NEGATIVE
HCT VFR BLD AUTO: 44.2 % (ref 40–54)
HGB BLD-MCNC: 14.7 G/DL (ref 14–18)
HGB UR QL STRIP: NEGATIVE
HYALINE CASTS #/AREA URNS LPF: 4 /LPF
IMM GRANULOCYTES # BLD AUTO: 0.02 K/UL (ref 0–0.04)
IMM GRANULOCYTES NFR BLD AUTO: 0.3 % (ref 0–0.5)
KETONES UR QL STRIP: NEGATIVE
LEUKOCYTE ESTERASE UR QL STRIP: NEGATIVE
LYMPHOCYTES # BLD AUTO: 1.3 K/UL (ref 1–4.8)
LYMPHOCYTES NFR BLD: 19.1 % (ref 18–48)
MCH RBC QN AUTO: 32.4 PG (ref 27–31)
MCHC RBC AUTO-ENTMCNC: 33.3 G/DL (ref 32–36)
MCV RBC AUTO: 97 FL (ref 82–98)
MICROSCOPIC COMMENT: ABNORMAL
MONOCYTES # BLD AUTO: 0.6 K/UL (ref 0.3–1)
MONOCYTES NFR BLD: 8.7 % (ref 4–15)
NEUTROPHILS # BLD AUTO: 4.7 K/UL (ref 1.8–7.7)
NEUTROPHILS NFR BLD: 67.5 % (ref 38–73)
NITRITE UR QL STRIP: NEGATIVE
NRBC BLD-RTO: 0 /100 WBC
PH UR STRIP: 6 [PH] (ref 5–8)
PLATELET # BLD AUTO: 186 K/UL (ref 150–450)
PMV BLD AUTO: 10.8 FL (ref 9.2–12.9)
POTASSIUM SERPL-SCNC: 4.1 MMOL/L (ref 3.5–5.1)
PROT SERPL-MCNC: 7.2 G/DL (ref 6–8.4)
PROT UR QL STRIP: ABNORMAL
RBC # BLD AUTO: 4.54 M/UL (ref 4.6–6.2)
RBC #/AREA URNS HPF: 1 /HPF (ref 0–4)
SODIUM SERPL-SCNC: 137 MMOL/L (ref 136–145)
SP GR UR STRIP: >=1.03 (ref 1–1.03)
SQUAMOUS #/AREA URNS HPF: 1 /HPF
URN SPEC COLLECT METH UR: ABNORMAL
UROBILINOGEN UR STRIP-ACNC: 1 EU/DL
WBC # BLD AUTO: 7.01 K/UL (ref 3.9–12.7)
WBC #/AREA URNS HPF: 1 /HPF (ref 0–5)

## 2023-12-23 PROCEDURE — 99284 EMERGENCY DEPT VISIT MOD MDM: CPT | Mod: 25

## 2023-12-23 PROCEDURE — 36415 COLL VENOUS BLD VENIPUNCTURE: CPT | Performed by: EMERGENCY MEDICINE

## 2023-12-23 PROCEDURE — 80053 COMPREHEN METABOLIC PANEL: CPT | Performed by: EMERGENCY MEDICINE

## 2023-12-23 PROCEDURE — 81000 URINALYSIS NONAUTO W/SCOPE: CPT | Performed by: EMERGENCY MEDICINE

## 2023-12-23 PROCEDURE — 85025 COMPLETE CBC W/AUTO DIFF WBC: CPT | Performed by: EMERGENCY MEDICINE

## 2023-12-23 PROCEDURE — 25000003 PHARM REV CODE 250: Performed by: EMERGENCY MEDICINE

## 2023-12-23 RX ORDER — FUROSEMIDE 20 MG/1
20 TABLET ORAL DAILY
Qty: 7 TABLET | Refills: 0 | Status: SHIPPED | OUTPATIENT
Start: 2023-12-23 | End: 2024-01-11

## 2023-12-23 RX ORDER — FUROSEMIDE 40 MG/1
40 TABLET ORAL
Status: COMPLETED | OUTPATIENT
Start: 2023-12-23 | End: 2023-12-23

## 2023-12-23 RX ADMIN — FUROSEMIDE 40 MG: 40 TABLET ORAL at 02:12

## 2023-12-23 NOTE — ED PROVIDER NOTES
EMERGENCY DEPARTMENT HISTORY AND PHYSICAL EXAM     This note is dictated on M*Modal word recognition program.  There are word recognition mistakes and grammatical errors that are occasionally missed on review.     Date: 12/23/2023   Patient Name: Michael Mclean       History of Presenting Illness      Chief Complaint   Patient presents with    Hematuria     Patient daughter stated blood in patients urine and shortness of breath onset yesterday. Daughter stated that patient had to have a blood transfusion in the past. Patient complaining of dizziness and weakness.         1425   Michael Mclean is a 83 y.o. male with PMHX of prostate cancer, CHF, pneumonia November 4, 2023 , TAVR, AFib, CHF who presents to the emergency department C/O shortness of breath.      Patient reports shortness of breath at rest.  No chest pain.  Feels like in the past when he was anemic required a blood transfusion.  Reports general weakness.  Patient also reports small amount of hematuria today.  Has not been taking Lasix lately.  Patient is deaf and daughter here for interpretation    PCP: Josh Alejo Jr., MD        No current facility-administered medications for this encounter.     Current Outpatient Medications   Medication Sig Dispense Refill    abiraterone (ZYTIGA) 250 mg Tab Take 1 tablet (250 mg total) by mouth once daily. 30 tablet 11    acetylcarnitin/alpha lipoic ac (ACETYLCARNITIN HCL-A LIPOIC AC) 400-200 mg Cap Take 1 capsule by mouth once daily.      albuterol (PROVENTIL HFA) 90 mcg/actuation inhaler Inhale 2 puffs into the lungs every 6 (six) hours as needed for Wheezing. Rescue 18 g 2    albuterol-ipratropium (DUO-NEB) 2.5 mg-0.5 mg/3 mL nebulizer solution Take 3 mLs by nebulization every 4 (four) hours as needed for Wheezing or Shortness of Breath. Rescue 75 mL 0    aspirin (ASPIR-81 ORAL) Take by mouth.      BICALUTAMIDE ORAL Take 50 mg by mouth once daily.      calcium-vitamin D3 (OYSTER SHELL  CALCIUM-VIT D3) 500 mg-5 mcg (200 unit) per tablet Take 1 tablet by mouth 2 (two) times daily. 180 tablet 3    cholecalciferol, vitamin D3, 5,000 unit capsule Take 5,000 Units by mouth once daily.       clonazePAM (KLONOPIN) 2 MG Tab Take 1 tablet (2 mg total) by mouth every evening. 30 tablet 0    cyanocobalamin, vitamin B-12, 1,000 mcg/mL Kit Inject 1 mL as directed every 28 days.      docusate sodium (COLACE) 100 MG capsule Take 100 mg by mouth 2 (two) times daily.      ferrous sulfate (FEOSOL) 325 mg (65 mg iron) Tab tablet Take 1 tablet (325 mg total) by mouth once daily.  0    furosemide (LASIX) 20 MG tablet Take 1 tablet (20 mg total) by mouth once daily. for 7 days 7 tablet 0    KLOR-CON M20 20 mEq tablet Take 20 mEq by mouth once daily.       magnesium oxide (MAG-OX) 400 mg tablet Take 400 mg by mouth once daily.      metoprolol succinate (TOPROL-XL) 100 MG 24 hr tablet Take 1 tablet (100mg) in the AM and 1.5 tablets (150mg) in the PM (Patient taking differently: Take 100 mg by mouth 2 (two) times daily.) 180 tablet 1    predniSONE (DELTASONE) 5 MG tablet Take 1 tablet (5 mg total) by mouth once daily. 30 tablet 6    rosuvastatin (CRESTOR) 20 MG tablet Take 20 mg by mouth every morning.      sotaloL (BETAPACE) 80 MG tablet Take 0.5 tablets (40 mg total) by mouth 2 (two) times daily. (Patient taking differently: Take 80 mg by mouth 2 (two) times daily.) 90 tablet 0    torsemide (DEMADEX) 20 MG Tab Take 1 tablet (20 mg total) by mouth as needed. 30 tablet 3    vitamin D3-folic acid 5,000 unit- 1 mg Tab Take 1 tablet by mouth once daily.      vitamin D3-vitamin K2, MK4, (K2 PLUS D3) 1,000-100 unit-mcg Tab Take by mouth.             Past History     Past Medical History:   Past Medical History:   Diagnosis Date    Abdominal hernia     Anemia 02/2021    Anticoagulant long-term use     Arthritis     SHOULDER    Cancer     prostate    Cardiomyopathy     Carotid artery stenosis     left    Coronary artery disease      Deaf     Dizziness     Encounter for blood transfusion     Gastritis 3/2/2021    Hyperlipidemia     Hypertension     Mitral regurgitation     Mitral regurgitation     PAD (peripheral artery disease)     Paroxysmal atrial fibrillation     Stroke 2020    Symptomatic anemia 10/13/2020    Thyroid disease         Past Surgical History:   Past Surgical History:   Procedure Laterality Date    ABLATION N/A 7/22/2020    Procedure: Ablation, afib;  Surgeon: Tal Laurent MD;  [facility];  Service: Cardiology;  Laterality: N/A;  needs covid test;needs ALEXYS ([place])    AORTIC VALVE REPLACEMENT      CARDIAC CATH COSURGEON N/A 7/27/2022    Procedure: Cardiac Cath Cosurgeon;  Surgeon: Glenroy Sam MD;  Location: [facility] CATH LAB;  Service: Cardiovascular;  Laterality: N/A;    CARDIAC CATHETERIZATION      3 HEART STENTS    carotid artery stent Left     CLOSURE OF LEFT ATRIAL APPENDAGE USING DEVICE N/A 11/22/2022    Procedure: Left atrial appendage closure device;  Surgeon: Glenroy Richardson MD;  Location: [facility] CATH LAB;  Service: Cardiology;  Laterality: N/A;    COLONOSCOPY N/A 3/2/2021    Procedure: COLONOSCOPY;  Surgeon: Emiliana Cardenas MD;  Location: [facility] ENDO;  Service: General;  Laterality: N/A;  4  8:30am   CL/AM/Bd    CORONARY ARTERY BYPASS GRAFT      ESOPHAGOGASTRODUODENOSCOPY N/A 11/10/2020    Procedure: EGD (ESOPHAGOGASTRODUODENOSCOPY);  Surgeon: Renny Prakash MD;  [facility];  Service: Endoscopy;  Laterality: N/A;    HERNIA REPAIR      HIP REPLACEMENT ARTHROPLASTY Right 11/6/2020    Procedure: EMELY -ARTHROPLASTY, HIP REPLACEMENT (ENDO HIP);  Surgeon: Lino Thomas MD;  [facility];  Service: Orthopedics;  Laterality: Right;    LEFT HEART CATHETERIZATION N/A 6/29/2022    Procedure: Left heart cath;  Surgeon: Glenroy Richardson MD;  Location: [facility] CATH LAB;  Service: Cardiology;  Laterality: N/A;    PACERMAKER      PROSTATECTOMY      SHOULDER SURGERY      TOE SURGERY       TRANSCATHETER AORTIC VALVE REPLACEMENT (TAVR) N/A 7/27/2022    Procedure: REPLACEMENT, AORTIC VALVE, TRANSCATHETER (TAVR);  Surgeon: Glenroy Richardson MD;  Location: [facility] CATH LAB;  Service: Cardiology;  Laterality: N/A;    TRANSCATHETER AORTIC VALVE REPLACEMENT (TAVR)  7/27/2022    Procedure: REPLACEMENT, AORTIC VALVE, TRANSCATHETER (TAVR);  Surgeon: Glenroy Sam MD;  Location: [facility] CATH LAB;  Service: Cardiovascular;;    TRANSESOPHAGEAL ECHOCARDIOGRAPHY N/A 6/27/2022    Procedure: ECHOCARDIOGRAM, TRANSESOPHAGEAL;  Surgeon: [facility] Diagnostic Provider;  Location: [facility] EP LAB;  Service: Anesthesiology;  Laterality: N/A;    TRANSESOPHAGEAL ECHOCARDIOGRAPHY N/A 11/22/2022    Procedure: ECHOCARDIOGRAM, TRANSESOPHAGEAL;  Surgeon: Leila Arango MD;  Location: [facility] CATH LAB;  Service: Cardiology;  Laterality: N/A;    TRANSESOPHAGEAL ECHOCARDIOGRAPHY N/A 1/5/2023    Procedure: ECHOCARDIOGRAM, TRANSESOPHAGEAL;  Surgeon: [facility] Diagnostic Provider;  Location: [facility] EP LAB;  Service: Anesthesiology;  Laterality: N/A;    TREATMENT OF CARDIAC ARRHYTHMIA N/A 8/7/2019    Procedure: Cardioversion or Defibrillation;  Surgeon: Tal Laurent MD;  [facility];  Service: Cardiology;  Laterality: N/A;        Family History:   Family History   Problem Relation Age of Onset    Heart disease Mother     Heart attack Mother         Social History:   Social History     Tobacco Use    Smoking status: Never    Smokeless tobacco: Never   Substance Use Topics    Alcohol use: Yes     Alcohol/week: 7.0 standard drinks of alcohol     Types: 7 Glasses of wine per week    Drug use: Never        Allergies:   Review of patient's allergies indicates:   Allergen Reactions    Losartan Hives          Review of Systems   Review of Systems   See HPI for pertinent positives and negatives       Physical Exam     Vitals:    12/23/23 1333 12/23/23 1408 12/23/23 1503 12/23/23 1518   BP: 114/78 122/84 119/83    Pulse: 93  97    Resp:       Temp:        [facility]:       SpO2: (!) 94%   95%   Weight:       Height:          Physical Exam  Vitals and nursing note reviewed.   Constitutional:       General: He is not in acute distress.     Appearance: Normal appearance. He is well-developed. He is not ill-appearing.   HENT:      Head: Normocephalic and atraumatic.   Eyes:      Extraocular Movements: Extraocular movements intact.      Conjunctiva/sclera: Conjunctivae normal.   Cardiovascular:      Heart sounds: Murmur heard.   Pulmonary:      Effort: Pulmonary effort is normal. No respiratory distress.      Breath sounds: No stridor.   Musculoskeletal:         General: No deformity or signs of injury. Normal range of motion.      Cervical back: Normal range of motion. No rigidity.      Right lower le+ Edema present.      Left lower le+ Edema present.   Skin:     General: Skin is dry.      Coloration: Skin is not pale.      Findings: No rash.   Neurological:      General: No focal deficit present.      Mental Status: He is alert and oriented to person, place, and time.      Cranial Nerves: No cranial nerve deficit.      Motor: No weakness.      Coordination: Coordination normal.              Diagnostic Study Results      Labs -   Recent Results (from the past 12 hour(s))   CBC Auto Differential    Collection Time: 23 12:41 PM   Result Value Ref Range    WBC 7.01 3.90 - 12.70 K/uL    RBC 4.54 (L) 4.60 - 6.20 M/uL    Hemoglobin 14.7 14.0 - 18.0 g/dL    Hematocrit 44.2 40.0 - 54.0 %    MCV 97 82 - 98 fL    MCH 32.4 (H) 27.0 - 31.0 pg    MCHC 33.3 32.0 - 36.0 g/dL    RDW 13.6 11.5 - 14.5 %    Platelets 186 150 - 450 K/uL    MPV 10.8 9.2 - 12.9 fL    Immature Granulocytes 0.3 0.0 - 0.5 %    Gran # (ANC) 4.7 1.8 - 7.7 K/uL    Immature Grans (Abs) 0.02 0.00 - 0.04 K/uL    Lymph # 1.3 1.0 - 4.8 K/uL    Mono # 0.6 0.3 - 1.0 K/uL    Eos # 0.2 0.0 - 0.5 K/uL    Baso # 0.07 0.00 - 0.20 K/uL    nRBC 0 0 /100 WBC    Gran % 67.5 38.0 - 73.0 %    Lymph % 19.1 18.0 - 48.0 %     Mono % 8.7 4.0 - 15.0 %    Eosinophil % 3.4 0.0 - 8.0 %    Basophil % 1.0 0.0 - 1.9 %    Differential Method Automated    Comprehensive Metabolic Panel    Collection Time: 12/23/23 12:41 PM   Result Value Ref Range    Sodium 137 136 - 145 mmol/L    Potassium 4.1 3.5 - 5.1 mmol/L    Chloride 107 95 - 110 mmol/L    CO2 26 23 - 29 mmol/L    Glucose 111 (H) 70 - 110 mg/dL    BUN 33 (H) 8 - 23 mg/dL    Creatinine 1.0 0.5 - 1.4 mg/dL    Calcium 9.0 8.7 - 10.5 mg/dL    Total Protein 7.2 6.0 - 8.4 g/dL    Albumin 3.2 (L) 3.5 - 5.2 g/dL    Total Bilirubin 1.1 (H) 0.1 - 1.0 mg/dL    Alkaline Phosphatase 70 55 - 135 U/L    AST 64 (H) 10 - 40 U/L    ALT 58 (H) 10 - 44 U/L    eGFR >60.0 >60 mL/min/1.73 m^2    Anion Gap 4 3 - 11 mmol/L   Urinalysis, Reflex to Urine Culture Urine, Clean Catch    Collection Time: 12/23/23  2:29 PM    Specimen: Urine   Result Value Ref Range    Specimen UA Urine, Clean Catch     Color, UA Yellow Yellow, Straw, [place]    Appearance, UA Clear Clear    pH, UA 6.0 5.0 - 8.0    Specific Gravity, UA >=1.030 (A) 1.005 - 1.030    Protein, UA 1+ (A) Negative    Glucose, UA Negative Negative    Ketones, UA Negative Negative    Bilirubin (UA) Negative Negative    Occult Blood UA Negative Negative    Nitrite, UA Negative Negative    Urobilinogen, UA 1.0 <2.0 EU/dL    Leukocytes, UA Negative Negative   Urinalysis Microscopic    Collection Time: 12/23/23  2:29 PM   Result Value Ref Range    RBC, UA 1 0 - 4 /hpf    WBC, UA 1 0 - 5 /hpf    Bacteria Negative None-Occ /hpf    Squam Epithel, UA 1 /hpf    Hyaline Casts, UA 4.0 (A) 0-1/lpf /lpf    Microscopic Comment SEE COMMENT         Radiologic Studies -    X-Ray Chest 1 View   Final Result      Improved  left upper lobe pneumonia      No detrimental radiographic change from prior         Electronically signed by: Janet Lowry MD   Date:    12/23/2023   Time:    14:14           Medications given in the ED-   Medications   furosemide tablet 40 mg (40 mg Oral  Given 12/23/23 1408)           Medical Decision Making    I am the first provider for this patient.     I reviewed the vital signs, available nursing notes, past medical history, past surgical history, family history and social history.     Vital Signs:  Reviewed the patient's vital signs.     Pulse Oximetry Analysis and Interpretation:    94% on Room Air, low normal    CXR  Interpretation: (Per my independent interpretation, pending formal read)   CXR read by Dr. Bull Jung at 1442    Cardiac silhouette enlarged, stable, bilateral pulmonary infiltrates improved from prior study, agree with Radiology interpretation     External Test Results (Pertinent to encounter):    Records Reviewed: Nursing Notes, Current Prescription Medications, Old Medical Records, External Medical Records , and Previous Radiology Studies    History Obtained By: Patient and Daughter    Provider Notes: Michael Mclean is a 83 y.o. male with shortness of breath    Co-morbidities Considered:  CHF, AFib    Differential Diagnosis:  Congestive heart failure, pneumonia, anemia      ED Course:    Patient presents with shortness of breath and small amount of dysuria  Not in respiratory distress and patient appears to be at baseline.  Labs sent reviewed and did not demonstrate a anemia.  Creatinine within normal limits.  Chest x-ray demonstrates cardiomegaly and improving pulmonary infiltrates.  Evaluation is most suspicious for CHF.  Given diuretic in ED which she responded to well.  Will continue on 1 week course of furosemide.  Instructed patient to take his potassium supplementation when taking furosemide.  Discharge instructions were discussed with patient and his son-in-law via .  Patient comfortable and agreeable with discharge at this time.  Reasons to return to ED discussed.         Problems Addressed:  CHF    Procedures:   Procedures       Diagnosis and Disposition             CLINICAL IMPRESSION:         1.  Dyspnea, unspecified type    2. SOB (shortness of breath)              PLAN:   1. Discharge  2.      Medication List        START taking these medications      furosemide 20 MG tablet  Commonly known as: LASIX  Take 1 tablet (20 mg total) by mouth once daily. for 7 days            ASK your doctor about these medications      abiraterone 250 mg Tab  Commonly known as: ZYTIGA  Take 1 tablet (250 mg total) by mouth once daily.     acetylcarnitin HCL-a lipoic ac 400-200 mg Cap     albuterol 90 mcg/actuation inhaler  Commonly known as: PROVENTIL HFA  Inhale 2 puffs into the lungs every 6 (six) hours as needed for Wheezing. Rescue     albuterol-ipratropium 2.5 mg-0.5 mg/3 mL nebulizer solution  Commonly known as: DUO-NEB  Take 3 mLs by nebulization every 4 (four) hours as needed for Wheezing or Shortness of Breath. Rescue     ASPIR-81 ORAL     BICALUTAMIDE ORAL     calcium-vitamin D3 500 mg-5 mcg (200 unit) per tablet  Commonly known as: OYSTER SHELL CALCIUM-VIT D3  Take 1 tablet by mouth 2 (two) times daily.     cholecalciferol (vitamin D3) 125 mcg (5,000 unit) capsule     clonazePAM 2 MG Tab  Commonly known as: KlonoPIN  Take 1 tablet (2 mg total) by mouth every evening.     cyanocobalamin (vitamin B-12) 1,000 mcg/mL Kit     docusate sodium 100 MG capsule  Commonly known as: COLACE     ferrous sulfate 325 mg (65 mg iron) Tab tablet  Commonly known as: FEOSOL  Take 1 tablet (325 mg total) by mouth once daily.     K2 PLUS D3 1,000-100 unit-mcg Tab  Generic drug: vitamin D3-vitamin K2 (MK4)     KLOR-CON M20 20 MEQ tablet  Generic drug: potassium chloride SA     magnesium oxide 400 mg (241.3 mg magnesium) tablet  Commonly known as: MAG-OX     metoprolol succinate 100 MG 24 hr tablet  Commonly known as: TOPROL-XL  Take 1 tablet (100mg) in the AM and 1.5 tablets (150mg) in the PM     predniSONE 5 MG tablet  Commonly known as: DELTASONE  Take 1 tablet (5 mg total) by mouth once daily.     rosuvastatin 20 MG tablet  Commonly  known as: CRESTOR     sotaloL 80 MG tablet  Commonly known as: BETAPACE  Take 0.5 tablets (40 mg total) by mouth 2 (two) times daily.     torsemide 20 MG Tab  Commonly known as: DEMADEX  Take 1 tablet (20 mg total) by mouth as needed.     vitamin D3-folic acid 125 mcg (5,000 unit)-1 mg Tab               Where to Get Your Medications        These medications were sent to [facility] [address] [place] [address] [address]  [address] [address], [facility] [address]      Phone: [phone]   furosemide 20 MG tablet        3. Josh Alejo Jr., MD  [address] [facility] [address]  [phone]    Schedule an appointment as soon as possible for a visit   Primary care follow up    [facility] Emergency Department  [address] [place] [id number] [phone]  Go to   If symptoms worsen       _______________________________     Please note that this dictation was completed with [employer], the computer voice recognition software.  Quite often unanticipated grammatical, syntax, homophones, and other interpretive errors are inadvertently transcribed by the computer software.  Please disregard these errors.  Please excuse any errors that have escaped final proofreading.             Bull Jung MD  12/23/23 [id number]

## 2023-12-28 DIAGNOSIS — Z95.810 AICD (AUTOMATIC CARDIOVERTER/DEFIBRILLATOR) PRESENT: ICD-10-CM

## 2023-12-28 DIAGNOSIS — I48.19 PERSISTENT ATRIAL FIBRILLATION: ICD-10-CM

## 2023-12-31 RX ORDER — METOPROLOL SUCCINATE 100 MG/1
TABLET, EXTENDED RELEASE ORAL
Qty: 225 TABLET | Refills: 3 | Status: SHIPPED | OUTPATIENT
Start: 2023-12-31 | End: 2024-01-11

## 2024-01-01 ENCOUNTER — HOSPITAL ENCOUNTER (EMERGENCY)
Facility: HOSPITAL | Age: 84
Discharge: ANOTHER HOSPITAL | End: 2024-09-09
Attending: EMERGENCY MEDICINE
Payer: MEDICARE

## 2024-01-01 VITALS
RESPIRATION RATE: 18 BRPM | TEMPERATURE: 98 F | HEIGHT: 68 IN | BODY MASS INDEX: 21.98 KG/M2 | DIASTOLIC BLOOD PRESSURE: 59 MMHG | OXYGEN SATURATION: 97 % | WEIGHT: 145 LBS | HEART RATE: 72 BPM | SYSTOLIC BLOOD PRESSURE: 89 MMHG

## 2024-01-01 DIAGNOSIS — I21.4 NSTEMI (NON-ST ELEVATED MYOCARDIAL INFARCTION): Primary | ICD-10-CM

## 2024-01-01 DIAGNOSIS — R06.00 DYSPNEA: ICD-10-CM

## 2024-01-01 LAB
ALBUMIN SERPL BCP-MCNC: 2.9 G/DL (ref 3.5–5.2)
ALP SERPL-CCNC: 191 U/L (ref 55–135)
ALT SERPL W/O P-5'-P-CCNC: 270 U/L (ref 10–44)
ANION GAP SERPL CALC-SCNC: 9 MMOL/L (ref 3–11)
APTT PPP: 24.6 SEC (ref 21–32)
AST SERPL-CCNC: 327 U/L (ref 10–40)
BASOPHILS # BLD AUTO: 0.04 K/UL (ref 0–0.2)
BASOPHILS NFR BLD: 0.5 % (ref 0–1.9)
BILIRUB SERPL-MCNC: 1.1 MG/DL (ref 0.1–1)
BUN SERPL-MCNC: 35 MG/DL (ref 8–23)
CALCIUM SERPL-MCNC: 9 MG/DL (ref 8.7–10.5)
CHLORIDE SERPL-SCNC: 100 MMOL/L (ref 95–110)
CO2 SERPL-SCNC: 27 MMOL/L (ref 23–29)
CREAT SERPL-MCNC: 1.7 MG/DL (ref 0.5–1.4)
DIFFERENTIAL METHOD BLD: ABNORMAL
EOSINOPHIL # BLD AUTO: 0 K/UL (ref 0–0.5)
EOSINOPHIL NFR BLD: 0.5 % (ref 0–8)
ERYTHROCYTE [DISTWIDTH] IN BLOOD BY AUTOMATED COUNT: 13 % (ref 11.5–14.5)
EST. GFR  (NO RACE VARIABLE): 38.8 ML/MIN/1.73 M^2
GLUCOSE SERPL-MCNC: 105 MG/DL (ref 70–110)
HCT VFR BLD AUTO: 43.5 % (ref 40–54)
HGB BLD-MCNC: 15.1 G/DL (ref 14–18)
IMM GRANULOCYTES # BLD AUTO: 0.04 K/UL (ref 0–0.04)
IMM GRANULOCYTES NFR BLD AUTO: 0.5 % (ref 0–0.5)
INR PPP: 1.2 (ref 0.8–1.2)
LYMPHOCYTES # BLD AUTO: 0.9 K/UL (ref 1–4.8)
LYMPHOCYTES NFR BLD: 11.8 % (ref 18–48)
MCH RBC QN AUTO: 34.7 PG (ref 27–31)
MCHC RBC AUTO-ENTMCNC: 34.7 G/DL (ref 32–36)
MCV RBC AUTO: 100 FL (ref 82–98)
MONOCYTES # BLD AUTO: 0.8 K/UL (ref 0.3–1)
MONOCYTES NFR BLD: 11.3 % (ref 4–15)
NEUTROPHILS # BLD AUTO: 5.6 K/UL (ref 1.8–7.7)
NEUTROPHILS NFR BLD: 75.4 % (ref 38–73)
NRBC BLD-RTO: 0 /100 WBC
NT-PROBNP SERPL-MCNC: ABNORMAL PG/ML (ref 5–1800)
OHS QRS DURATION: 152 MS
OHS QTC CALCULATION: 501 MS
PLATELET # BLD AUTO: 132 K/UL (ref 150–450)
PMV BLD AUTO: 11.4 FL (ref 9.2–12.9)
POTASSIUM SERPL-SCNC: 3.5 MMOL/L (ref 3.5–5.1)
PROT SERPL-MCNC: 6.9 G/DL (ref 6–8.4)
PROTHROMBIN TIME: 12.6 SEC (ref 9–12.5)
RBC # BLD AUTO: 4.35 M/UL (ref 4.6–6.2)
SODIUM SERPL-SCNC: 136 MMOL/L (ref 136–145)
TROPONIN I SERPL DL<=0.01 NG/ML-MCNC: 3496.2 PG/ML (ref 0–60)
WBC # BLD AUTO: 7.46 K/UL (ref 3.9–12.7)

## 2024-01-01 PROCEDURE — 85025 COMPLETE CBC W/AUTO DIFF WBC: CPT | Performed by: EMERGENCY MEDICINE

## 2024-01-01 PROCEDURE — 84484 ASSAY OF TROPONIN QUANT: CPT | Performed by: EMERGENCY MEDICINE

## 2024-01-01 PROCEDURE — 85610 PROTHROMBIN TIME: CPT | Performed by: EMERGENCY MEDICINE

## 2024-01-01 PROCEDURE — 83880 ASSAY OF NATRIURETIC PEPTIDE: CPT | Performed by: EMERGENCY MEDICINE

## 2024-01-01 PROCEDURE — 36415 COLL VENOUS BLD VENIPUNCTURE: CPT | Performed by: EMERGENCY MEDICINE

## 2024-01-01 PROCEDURE — 85730 THROMBOPLASTIN TIME PARTIAL: CPT | Performed by: EMERGENCY MEDICINE

## 2024-01-01 PROCEDURE — 93005 ELECTROCARDIOGRAM TRACING: CPT

## 2024-01-01 PROCEDURE — 96365 THER/PROPH/DIAG IV INF INIT: CPT

## 2024-01-01 PROCEDURE — 80053 COMPREHEN METABOLIC PANEL: CPT | Performed by: EMERGENCY MEDICINE

## 2024-01-01 PROCEDURE — 63600175 PHARM REV CODE 636 W HCPCS: Performed by: EMERGENCY MEDICINE

## 2024-01-01 PROCEDURE — 93010 ELECTROCARDIOGRAM REPORT: CPT | Mod: ,,, | Performed by: INTERNAL MEDICINE

## 2024-01-01 PROCEDURE — 99291 CRITICAL CARE FIRST HOUR: CPT

## 2024-01-01 PROCEDURE — 25000003 PHARM REV CODE 250: Performed by: EMERGENCY MEDICINE

## 2024-01-01 RX ORDER — ASPIRIN 325 MG
325 TABLET ORAL
Status: COMPLETED | OUTPATIENT
Start: 2024-01-01 | End: 2024-01-01

## 2024-01-01 RX ORDER — HEPARIN SODIUM,PORCINE/D5W 25000/250
0-40 INTRAVENOUS SOLUTION INTRAVENOUS CONTINUOUS
Status: DISCONTINUED | OUTPATIENT
Start: 2024-01-01 | End: 2024-01-01 | Stop reason: HOSPADM

## 2024-01-01 RX ADMIN — ASPIRIN 325 MG ORAL TABLET 325 MG: 325 PILL ORAL at 10:09

## 2024-01-01 RX ADMIN — HEPARIN SODIUM 12 UNITS/KG/HR: 10000 INJECTION, SOLUTION INTRAVENOUS at 10:09

## 2024-01-07 ENCOUNTER — CLINICAL SUPPORT (OUTPATIENT)
Dept: CARDIOLOGY | Facility: HOSPITAL | Age: 84
End: 2024-01-07
Attending: INTERNAL MEDICINE
Payer: MEDICARE

## 2024-01-07 ENCOUNTER — CLINICAL SUPPORT (OUTPATIENT)
Dept: CARDIOLOGY | Facility: HOSPITAL | Age: 84
End: 2024-01-07
Payer: MEDICARE

## 2024-01-07 DIAGNOSIS — I50.32 CHRONIC DIASTOLIC (CONGESTIVE) HEART FAILURE: ICD-10-CM

## 2024-01-07 DIAGNOSIS — Z95.810 PRESENCE OF AUTOMATIC (IMPLANTABLE) CARDIAC DEFIBRILLATOR: ICD-10-CM

## 2024-01-07 DIAGNOSIS — I48.91 UNSPECIFIED ATRIAL FIBRILLATION: ICD-10-CM

## 2024-01-08 ENCOUNTER — TELEPHONE (OUTPATIENT)
Dept: ELECTROPHYSIOLOGY | Facility: CLINIC | Age: 84
End: 2024-01-08
Payer: MEDICARE

## 2024-01-08 NOTE — TELEPHONE ENCOUNTER
Received a call from the pt via an  this am.  Pt states he received a call from someone this am about his device.  Pt acknowledged speaking to the Sr. Device Tech in relation to his receiving a shock from his ICD on yesterday and that he was not aware of it happening. (This is noted in the prior telephone note from this am). Pt asked about needing an ablation.   relayed to the pt that this will be discussed at this upcoming clinic appt on 1/11/24 with the NP.  (This too was noted on a prior telephone note on 12/21/23 with Dr. Boo's nurse).  Understanding was verbalized. Pt had no further questions.

## 2024-01-08 NOTE — TELEPHONE ENCOUNTER
*Note: patient is hearing impaired and uses a phone service for ASL translation of the call*    Following Provider: CRYSTAL    Device Type:  Medtronic DC ICD  Date/Time:  01/07/2024 @ 05:16 am  Event Type:  MMVT in the setting of AF  Ventricular CL:  270 ms  Duration:  26 secs  Therapy Delivered:  ATP + Shock  Appropriate Therapy:  Yes  Successful:  Yes  Pt Symptomatic:  He is unaware of the shock.  Reports being asleep.  He denies any CP or LH over the weekend. He reports taking sotolol and Toprol w/o interruption.    Most recent BP: 114/74    83 y.o. male with atrial fibrillation, ventricular fibrillation, CAD s/p CABG, AS s/p sAVR followed by TAVR, HTN, CVA, mitral valve prolapse  Persistent atrial fibrillation since June 2023. Patient asymptomatic. s/p watchman.   Hx of ATP treated VT     Toprol increased in Oct 2023 to 100mg AM/150mg PM  No prior VT ablations  Taking sotalol 40mg BID (for VT)  Echo 8/29/2023 shows preserved LV function    Last clinic visit: 10/11/2023  Next clinic visit: THURSDAY 1/11/2024

## 2024-01-10 ENCOUNTER — TELEPHONE (OUTPATIENT)
Dept: CARDIOLOGY | Facility: CLINIC | Age: 84
End: 2024-01-10
Payer: MEDICARE

## 2024-01-10 NOTE — TELEPHONE ENCOUNTER
----- Message from Mary Lorenzana RN sent at 1/10/2024  3:45 PM CST -----  Regarding: RE:   Okay, thank you.     ----- Message -----  From: Jennifer Guevara RN  Sent: 1/10/2024   3:40 PM CST  To: Jennifer Guevara, RN; Mary Lorenzana RN  Subject: RE:                               Hi, this is the email I received today.       Provider Name Fabián Kelley  Appointment Date & Time 1/11/2024 10:00 AM  Request for   Estimated Appointment or   Procedure Duration Time   where  is Needed  4 hours  [facility] Cariology (consult and arrthymia   [facility] #    [facility] [address]  Contact Name Jennifer Guevara  Contact Phone Number [phone]  Language Requested  American Sign Language  Preferred  Any Available  Patient Information  Patient's Name Michael Mclean  MRN [id number]  Patient's Phone Number   Is this  request for   the patient or a family member Patient  Location of Appointment Several appointments. Starting on 3rd floor clinic atrium side then will be on 3rd floor hospital side.    Scheduling Section  Type of  Agency  [facility]  Name    Agency  Name [facility]      Confirmed By Belen Parker  Confirmed Date 1/5/2024 12:00:00 AM  Comments 1/5 Althea will be there from 10-11:30am. Mitch will be there from 1-2:30pm. [place]    1/3  requested with the agency. 10 and 1030am is job #[id number]. 1/130 is job #[id number]. [place]       ----- Message -----  From: Mary Lorenzana, MEMO  Sent: 1/10/2024   2:49 PM CST  To: Jennifer Guevara RN  Subject:                                   Hi,  Mr. Mclean has an appt with our clinic tomorrow afternoon.  We requested an  but I see in his appt notes that you may have also requested an  for the morning appt  with Cardiology. Did  you happen to request for all appts or only your clinic?  I wanted to confirm so we don't end up with two interpreters for his appt in arrhythmia clinic.      Thanks,  Mary  [phone]

## 2024-01-10 NOTE — PROGRESS NOTES
Mr. Mclean is a patient of Dr. Boo and was last seen in clinic 10/11/2023.      Subjective:   Patient ID:  Michael Mclean is an 83 y.o. male who presents for follow up of ICD  .     HPI:    Mr. Mclean is an 83 y.o. male with atrial fibrillation, ventricular fibrillation, CAD s/p CABG, AS s/p sAVR followed by TAVR, Htn, CVA, mitral valve prolapse, ICD here for follow up.     Background:    Previously managed by [facility]. EP was Dr. Laurent. Has switched care to [facility].  Persistent Afib PVI 7/20 ([facility] + roof line).  Remains on Sotalol since then.  CAD s/p 2v CABG 2012  Aortic stenosis Bioprosthetic aotic valve in 2012 s/p 26 mm Evolut valve in valve TAVR inside of a failing 25 mm Perimount bioprosthetic valve  VF in setting of EF 15%. Dual chamber ICD implanted 9/6/19  Echo 9/1/22 EF 45% mitral valve anterior leaflet prolapse with moderate to severe MR, YOAN 85.7, well seated aortic prosthesis.     Feeling well overall.  Not on anticoagulation. Has had GI bleeding in the past requiring transfusion.   MAURICIO occlusion 11/22/22    3/1/2023: Feeling well overall. Denies significant dyspnea, syncope, ICD shocks.  Device interrogation reveals stable function of the leads, RA pacing 99% RV pacing 1%, AF burden 1% longest duration 18 hours 1/23/23 one episode of monomorphic V1T ATP x 1 successful.  Rare paroxysmal AF, not symptomatic. Now s/p Watchman.  One episode of VT, pace-terminated, asymptomatic.  Continue Sotalol and Toprol.  Continue current settings and medications.  F/u with monitoring as scheduled (with us), and with me in 6 months.    7/25/2023: Persistent AF since 6/29/2023: V pacing increased from 1% to 34%.    10/11/2023: Pt in persistent atrial fibrillation since June 2023. Patient asymptomatic.  increased from 1% to 15%. Echo 8/29/2023 shows preserved LV function. He is on ASA s/p watchman.   He is on sotalol 40mg BID. Has a hx of VT, most recent episode 1/23/2023 treated successfully with ATPx1. We  discussed options, which include DCCV and likely switching AAD (amio?) or continuing rate control strategy.   He has a watchman with a history of significant bleeding requiring blood transfusions while on OAC.  His rates need improvement. Will increase toprol (he says he thinks he is on 100mg BID but will confirm when he gets home) for more rate control. Discuss sotalol with Dr. Boo. (UPDATE: OK to continue rate control strategy and continue sotalol for VT).    Update (01/11/2024):    12/23/2023: Patient presents with shortness of breath and small amount of dysuria. Not in respiratory distress and patient appears to be at baseline.  Labs sent reviewed and did not demonstrate a anemia.  Creatinine within normal limits.  Chest x-ray demonstrates cardiomegaly and improving pulmonary infiltrates.  Evaluation is most suspicious for CHF.  Discharged after diuresis. Pt now on torsemide.    01/07/2024 @ 05:16 am. MMVT in the setting of AF  Therapy Delivered:  ATP + Shock  Appropriate Therapy:  Yes  Successful:  Yes  Pt Symptomatic:  He is unaware of the shock.  Reports being asleep.  He denies any CP or LH over the weekend. He reports taking sotolol and Toprol w/o interruption.    Clinic visit conducted with aid of .    Today he says he has been feeling well lately. Previously had some breathing issues (CHF in Dec and PNA in November) but these have improved. No CP, palps, worsening LOCKHART, syncope. Some LH from time to time he thinks may have worsened since increasing metoprolol.     On ASA s/p watchman. On sotalol 80mg BID. On toprol 100mg AM, 150mg PM.    Back on daily torsemide. Recovered from RSV    Device Interrogation (1/11/2024) reveals an intrinsic AT/AF with stable lead and device function. 100% AF/AFL/AT - rates not well controlled. MMVT with shock 1/7/2023, MMVT treated with ATP 1/9/2023..  He paces 0% in the RA and 28.6% in the RV. Estimated battery longevity 4.2 years.     I have personally  reviewed the patient's EKG today, which shows AFL with IVCD at 95. QRS interval is 128. QTc is 427.     Relevant Cardiac Test Results:    2D Echo (8/29/2023):    Left Ventricle: The left ventricle is normal in size. Normal wall thickness. Normal wall motion. There is normal systolic function with a visually estimated ejection fraction of 60 - 65%. There is normal diastolic function.    Right Ventricle: Normal right ventricular cavity size. Wall thickness is normal. Right ventricle wall motion  is normal. Systolic function is normal.    Left Atrium: Left atrium is severely dilated.    Aortic Valve: There is a transcatheter valve replacement in the aortic position. It is reported to be a 26 mm Evolut valve. Aortic valve peak velocity is 0.95 m/s. Mean gradient is 2 mmHg.    Mitral Valve: Mild mitral annular calcification. There is mild to moderate regurgitation. Anterior leaflet is long with mild prolapase causing eccentric posteriorly directed jet. MR appears unchanged from prior ALEXYS.    Tricuspid Valve: There is mild to moderate regurgitation.    Pulmonary Artery: The estimated pulmonary artery systolic pressure is 34 mmHg.    IVC/SVC: Intermediate venous pressure at 8 mmHg.    Current Outpatient Medications   Medication Sig    abiraterone (ZYTIGA) 250 mg Tab Take 1 tablet (250 mg total) by mouth once daily.    acetylcarnitin/alpha lipoic ac (ACETYLCARNITIN HCL-A LIPOIC AC) 400-200 mg Cap Take 1 capsule by mouth once daily.    albuterol (PROVENTIL HFA) 90 mcg/actuation inhaler Inhale 2 puffs into the lungs every 6 (six) hours as needed for Wheezing. Rescue    albuterol-ipratropium (DUO-NEB) 2.5 mg-0.5 mg/3 mL nebulizer solution Take 3 mLs by nebulization every 4 (four) hours as needed for Wheezing or Shortness of Breath. Rescue    aspirin (ASPIR-81 ORAL) Take by mouth.    BICALUTAMIDE ORAL Take 50 mg by mouth once daily.    calcium-vitamin D3 (OYSTER SHELL CALCIUM-VIT D3) 500 mg-5 mcg (200 unit) per tablet Take 1  tablet by mouth 2 (two) times daily.    cholecalciferol, vitamin D3, 5,000 unit capsule Take 5,000 Units by mouth once daily.     clonazePAM (KLONOPIN) 2 MG Tab Take 1 tablet (2 mg total) by mouth every evening.    cyanocobalamin, vitamin B-12, 1,000 mcg/mL Kit Inject 1 mL as directed every 28 days.    docusate sodium (COLACE) 100 MG capsule Take 100 mg by mouth 2 (two) times daily.    ferrous sulfate (FEOSOL) 325 mg (65 mg iron) Tab tablet Take 1 tablet (325 mg total) by mouth once daily.    furosemide (LASIX) 20 MG tablet Take 1 tablet (20 mg total) by mouth once daily. for 7 days    KLOR-CON M20 20 mEq tablet Take 20 mEq by mouth once daily.     magnesium oxide (MAG-OX) 400 mg tablet Take 400 mg by mouth once daily.    metoprolol succinate (TOPROL-XL) 100 MG 24 hr tablet TAKE 1 TABLET IN THE MORNING AND TAKE 1 AND 1/2 TABLETS IN THE EVENING    predniSONE (DELTASONE) 5 MG tablet Take 1 tablet (5 mg total) by mouth once daily.    rosuvastatin (CRESTOR) 20 MG tablet Take 20 mg by mouth every morning.    sotaloL (BETAPACE) 80 MG tablet Take 0.5 tablets (40 mg total) by mouth 2 (two) times daily. (Patient taking differently: Take 80 mg by mouth 2 (two) times daily.)    torsemide (DEMADEX) 20 MG Tab Take 1 tablet (20 mg total) by mouth as needed.    vitamin D3-folic acid 5,000 unit- 1 mg Tab Take 1 tablet by mouth once daily.    vitamin D3-vitamin K2, MK4, (K2 PLUS D3) 1,000-100 unit-mcg Tab Take by mouth.     No current facility-administered medications for this visit.       Review of Systems   Constitutional: Negative for malaise/fatigue.   Cardiovascular:  Negative for chest pain, dyspnea on exertion, irregular heartbeat, leg swelling and palpitations.   Respiratory:  Negative for shortness of breath.    Hematologic/Lymphatic: Negative for bleeding problem.   Skin:  Negative for rash.   Musculoskeletal:  Negative for myalgias.   Gastrointestinal:  Negative for hematemesis, hematochezia and nausea.   Genitourinary:   Negative for hematuria.   Neurological:  Positive for light-headedness.   Psychiatric/Behavioral:  Negative for altered mental status.    Allergic/Immunologic: Negative for persistent infections.       Objective:          /68     Physical Exam  Vitals and nursing note reviewed.   Constitutional:       Appearance: Normal appearance. He is well-developed.   HENT:      Head: Normocephalic.      Nose: Nose normal.   Eyes:      Pupils: Pupils are equal, round, and reactive to light.   Cardiovascular:      Rate and Rhythm: Normal rate. Rhythm irregularly irregular.   Pulmonary:      Effort: No respiratory distress.      Breath sounds: Normal breath sounds.   Chest:      Comments: ICD in situ  Musculoskeletal:         General: Normal range of motion.   Skin:     General: Skin is warm and dry.      Findings: No erythema.   Neurological:      Mental Status: He is alert and oriented to person, place, and time.   Psychiatric:         Speech: Speech normal.         Behavior: Behavior normal.       Lab Results   Component Value Date     12/23/2023    K 4.1 12/23/2023    MG 2.2 11/08/2023    BUN 33 (H) 12/23/2023    CREATININE 1.0 12/23/2023    ALT 58 (H) 12/23/2023    AST 64 (H) 12/23/2023    HGB 14.7 12/23/2023    HCT 44.2 12/23/2023    TSH 1.960 11/02/2023    LDLCALC 47.4 (L) 10/19/2023    LDLCALC 66 03/06/2023       Recent Labs   Lab 07/27/22  0804 11/14/22  1620 11/22/22  1148 11/02/23  1734   INR 1.1 1.1 1.1 1.2       Assessment:     1. Persistent atrial fibrillation    2. Chronic diastolic heart failure    3. Aortic atherosclerosis    4. Presence of Watchman left atrial appendage closure device    5. AICD (automatic cardioverter/defibrillator) present        Plan:     In summary, Mr. Mclean is an 83 y.o. male with atrial fibrillation, ventricular fibrillation, CAD s/p CABG, AS s/p sAVR followed by TAVR, Htn, CVA, mitral valve prolapse, ICD here for follow up.   He is s/p episode of MMVT treated successfully  with shock 1/7/23 and another MMVT episode treated successfully with ATP 1/9/23. Pt asymptomatic to both events and has been feeling well. Case previously discussed with Dr. Boo. Will switch pt from sotalol to amiodarone. Pt on ASA s/p watchman. Some LH after increasing metoprolol at last visit - will change back to previous dosing now that amiodarone is being loaded. LFTs mildly elevated  - will recheck in 4 weeks and add TSH to labs.  CHF exacerbation 12/23/23 and he is now on daily torsemide. Pt also mentions he is interested in DCCV. Will discuss at next clinic visit after he is fully loaded on amiodarone.    Stop sotalol. Start amiodarone with load.  Back to 100mg BID toprol to reduce LH  TSH to next labs, CMP already  Continue device checks  RTC 3 mo, sooner if needed    *A copy of this note has been sent to Dr. Boo*    Follow up in about 3 months (around 4/11/2024).    ------------------------------------------------------------------    SYLVIA Loaiza, NP-C  Cardiac Electrophysiology

## 2024-01-11 ENCOUNTER — HOSPITAL ENCOUNTER (OUTPATIENT)
Dept: CARDIOLOGY | Facility: CLINIC | Age: 84
Discharge: HOME | End: 2024-01-11
Payer: COMMERCIAL

## 2024-01-11 ENCOUNTER — CLINICAL SUPPORT (OUTPATIENT)
Dept: CARDIOLOGY | Facility: HOSPITAL | Age: 84
End: 2024-01-11
Attending: NURSE PRACTITIONER
Payer: COMMERCIAL

## 2024-01-11 ENCOUNTER — OFFICE VISIT (OUTPATIENT)
Dept: ELECTROPHYSIOLOGY | Facility: CLINIC | Age: 84
End: 2024-01-11
Payer: MEDICARE

## 2024-01-11 ENCOUNTER — OFFICE VISIT (OUTPATIENT)
Dept: CARDIOLOGY | Facility: CLINIC | Age: 84
End: 2024-01-11
Payer: COMMERCIAL

## 2024-01-11 VITALS
HEART RATE: 84 BPM | DIASTOLIC BLOOD PRESSURE: 75 MMHG | WEIGHT: 146 LBS | SYSTOLIC BLOOD PRESSURE: 129 MMHG | BODY MASS INDEX: 22.91 KG/M2 | OXYGEN SATURATION: 96 % | HEIGHT: 67 IN

## 2024-01-11 VITALS — SYSTOLIC BLOOD PRESSURE: 119 MMHG | DIASTOLIC BLOOD PRESSURE: 68 MMHG

## 2024-01-11 DIAGNOSIS — Z95.818 PRESENCE OF WATCHMAN LEFT ATRIAL APPENDAGE CLOSURE DEVICE: ICD-10-CM

## 2024-01-11 DIAGNOSIS — I63.412 EMBOLIC STROKE INVOLVING LEFT MIDDLE CEREBRAL ARTERY: ICD-10-CM

## 2024-01-11 DIAGNOSIS — I25.10 CORONARY ARTERY DISEASE INVOLVING NATIVE CORONARY ARTERY OF NATIVE HEART WITHOUT ANGINA PECTORIS: ICD-10-CM

## 2024-01-11 DIAGNOSIS — I70.0 AORTIC ATHEROSCLEROSIS: ICD-10-CM

## 2024-01-11 DIAGNOSIS — I48.19 PERSISTENT ATRIAL FIBRILLATION: Primary | ICD-10-CM

## 2024-01-11 DIAGNOSIS — Z95.810 AICD (AUTOMATIC CARDIOVERTER/DEFIBRILLATOR) PRESENT: ICD-10-CM

## 2024-01-11 DIAGNOSIS — I48.19 PERSISTENT ATRIAL FIBRILLATION: ICD-10-CM

## 2024-01-11 DIAGNOSIS — R42 DIZZINESS: ICD-10-CM

## 2024-01-11 DIAGNOSIS — Z95.1 S/P CABG (CORONARY ARTERY BYPASS GRAFT): ICD-10-CM

## 2024-01-11 DIAGNOSIS — I50.32 CHRONIC DIASTOLIC HEART FAILURE: ICD-10-CM

## 2024-01-11 DIAGNOSIS — Z95.810 BIVENTRICULAR IMPLANTABLE CARDIOVERTER-DEFIBRILLATOR (ICD) IN SITU: Primary | ICD-10-CM

## 2024-01-11 DIAGNOSIS — Z95.2 S/P TAVR (TRANSCATHETER AORTIC VALVE REPLACEMENT): ICD-10-CM

## 2024-01-11 DIAGNOSIS — Z95.810 PRESENCE OF AUTOMATIC (IMPLANTABLE) CARDIAC DEFIBRILLATOR: ICD-10-CM

## 2024-01-11 PROCEDURE — 3074F SYST BP LT 130 MM HG: CPT | Mod: CPTII,S$GLB,, | Performed by: INTERNAL MEDICINE

## 2024-01-11 PROCEDURE — 93283 PRGRMG EVAL IMPLANTABLE DFB: CPT | Mod: 26,,, | Performed by: INTERNAL MEDICINE

## 2024-01-11 PROCEDURE — 1101F PT FALLS ASSESS-DOCD LE1/YR: CPT | Mod: CPTII,S$GLB,, | Performed by: INTERNAL MEDICINE

## 2024-01-11 PROCEDURE — 3078F DIAST BP <80 MM HG: CPT | Mod: CPTII,S$GLB,, | Performed by: INTERNAL MEDICINE

## 2024-01-11 PROCEDURE — 93005 ELECTROCARDIOGRAM TRACING: CPT | Mod: S$GLB,,, | Performed by: NURSE PRACTITIONER

## 2024-01-11 PROCEDURE — 99999 PR PBB SHADOW E&M-EST. PATIENT-LVL IV: CPT | Mod: PBBFAC,,, | Performed by: NURSE PRACTITIONER

## 2024-01-11 PROCEDURE — 99214 OFFICE O/P EST MOD 30 MIN: CPT | Mod: 25,S$GLB,, | Performed by: INTERNAL MEDICINE

## 2024-01-11 PROCEDURE — 93283 PRGRMG EVAL IMPLANTABLE DFB: CPT

## 2024-01-11 PROCEDURE — 99999 PR PBB SHADOW E&M-EST. PATIENT-LVL V: CPT | Mod: PBBFAC,,, | Performed by: INTERNAL MEDICINE

## 2024-01-11 PROCEDURE — 99214 OFFICE O/P EST MOD 30 MIN: CPT | Mod: S$GLB,,, | Performed by: NURSE PRACTITIONER

## 2024-01-11 PROCEDURE — 93010 ELECTROCARDIOGRAM REPORT: CPT | Mod: S$GLB,,, | Performed by: INTERNAL MEDICINE

## 2024-01-11 PROCEDURE — 1126F AMNT PAIN NOTED NONE PRSNT: CPT | Mod: CPTII,S$GLB,, | Performed by: INTERNAL MEDICINE

## 2024-01-11 PROCEDURE — 3288F FALL RISK ASSESSMENT DOCD: CPT | Mod: CPTII,S$GLB,, | Performed by: INTERNAL MEDICINE

## 2024-01-11 RX ORDER — METOPROLOL SUCCINATE 100 MG/1
TABLET, EXTENDED RELEASE ORAL
Qty: 180 TABLET | Refills: 0 | Status: SHIPPED | OUTPATIENT
Start: 2024-01-11 | End: 2024-01-29

## 2024-01-11 RX ORDER — METOPROLOL SUCCINATE 100 MG/1
TABLET, EXTENDED RELEASE ORAL
Qty: 225 TABLET | Refills: 3
Start: 2024-01-11 | End: 2024-01-11 | Stop reason: SDUPTHER

## 2024-01-11 RX ORDER — AMIODARONE HYDROCHLORIDE 200 MG/1
TABLET ORAL
Qty: 120 TABLET | Refills: 0 | Status: SHIPPED | OUTPATIENT
Start: 2024-01-11 | End: 2024-01-26 | Stop reason: SDUPTHER

## 2024-01-11 RX ORDER — TORSEMIDE 20 MG/1
20 TABLET ORAL DAILY
Qty: 90 TABLET | Refills: 2 | Status: SHIPPED | OUTPATIENT
Start: 2024-01-11 | End: 2024-10-07

## 2024-01-11 RX ORDER — POTASSIUM CHLORIDE 20 MEQ/1
20 TABLET, EXTENDED RELEASE ORAL DAILY
Qty: 90 TABLET | Refills: 0 | Status: SHIPPED | OUTPATIENT
Start: 2024-01-11 | End: 2024-01-31 | Stop reason: SDUPTHER

## 2024-01-11 NOTE — PATIENT INSTRUCTIONS
Stop sotalol now.  On Saturday start amiodarone two tablets (400mg) twice daily for 14 days,then reduce to one tablet (200mg) once daily.  Change metoprolol to 100mg in the morning and 100mg in the evening  All other medications stay the same for now.

## 2024-01-11 NOTE — PROGRESS NOTES
[facility]   [place] [id number]    Michael Mclean is a 83 y.o. male here for follow-up for persistent Afib (s/p DCCV in 2017, s/p PVI in 2020), CAD s/p 2v CABG & bioprosthetic aotic valve in 2012 s/p 26 mm Evolut valve in valve TAVR inside of a failing 25 mm Perimount bioprosthetic valve, h/o SSS with cardiomyopathy s/p Bi-V ICD, HTN, HLD, CVA and prostate cancer followed by Dr Wilkins that presents for follow up. Patient was last seen by Dr Kiran in 09/2022.    Patient states that he has chronic dizziness that has persisted for the past 2 years. States that he believes metoprolol could be contributing. States that sometimes symptoms occur with head movement, sometimes getting up from the chair. He reports that he not been able to drive as much due to him being in a wheelchair now.   He recently had a Watchman procedure to limit consequences from bleeding while on a blood thinner for atrial fibrillation.   ECG with rhythm strip today shows AFL with atrial rate of 300 bpm with variable AV conduction. It appears atypical in nature considering upright inferior flutter waves. Flutter waves upright in V1.     Active Ambulatory Problems     Diagnosis Date Noted    Embolic stroke involving left middle cerebral artery 04/02/2019    Deaf, nonspeaking 04/03/2019    Essential hypertension 04/03/2019    Chronic diastolic heart failure 04/03/2019    Aphasia 04/04/2019    Persistent atrial fibrillation 08/07/2019    Ventricular fibrillation 09/05/2019    Generalized weakness 10/12/2020    Closed displaced fracture of right femoral neck 11/05/2020    CAD (coronary artery disease) 11/05/2020    Iron deficiency anemia 11/09/2020    Prostate cancer 02/08/2021    Elevated PSA 02/08/2021    Anemia 03/02/2021    Gastritis 03/02/2021    Reflux esophagitis 03/02/2021    Diverticulosis 03/02/2021    GI bleed 03/24/2021    Edema 03/24/2021    VHD (valvular heart disease) 03/24/2021    MELISSA (acute kidney injury)  03/24/2021    Hyperlipidemia 03/24/2021    Dizziness 03/24/2021    H. pylori infection 03/24/2021    Fatigue 03/24/2021    S/P AVR 06/23/2022    S/P CABG (coronary artery bypass graft) 06/23/2022    AICD (automatic cardioverter/defibrillator) present 07/27/2022    S/P TAVR (transcatheter aortic valve replacement) 07/27/2022    Aortic atherosclerosis 07/27/2022    Presence of Watchman left atrial appendage closure device 01/05/2023    Encounter for monitoring androgen deprivation therapy 10/15/2023    AMS (altered mental status) 11/03/2023    Hypoxia 11/05/2023    Pneumonia of left lung due to infectious organism 11/05/2023    Dental disease 12/14/2023    Osteoporosis 12/14/2023     Resolved Ambulatory Problems     Diagnosis Date Noted    Paroxysmal atrial fibrillation 08/17/2017    Symptomatic anemia 10/13/2020    Cerumen impaction 06/10/2021    Routine general medical examination at a health care facility 10/14/2021    URI (upper respiratory infection) 09/12/2022    Cardiac pacemaker in situ 09/20/2022     Past Medical History:   Diagnosis Date    Abdominal hernia     Anticoagulant long-term use     Arthritis     Cancer     Cardiomyopathy     Carotid artery stenosis     Coronary artery disease     Deaf     Encounter for blood transfusion     Hypertension     Mitral regurgitation     Mitral regurgitation     PAD (peripheral artery disease)     Stroke 2020    Thyroid disease         Review of patient's allergies indicates:   Allergen Reactions    Losartan Hives        Current Outpatient Medications   Medication Instructions    abiraterone (ZYTIGA) 250 mg, Oral, Daily    acetylcarnitin/alpha lipoic ac (ACETYLCARNITIN HCL-A LIPOIC AC) 400-200 mg Cap 1 capsule, Oral, Daily    albuterol (PROVENTIL HFA) 90 mcg/actuation inhaler 2 puffs, Inhalation, Every 6 hours PRN, Rescue    albuterol-ipratropium (DUO-NEB) 2.5 mg-0.5 mg/3 mL nebulizer solution 3 mLs, Nebulization, Every 4 hours PRN, Rescue    aspirin (ASPIR-81 ORAL)  Oral    BICALUTAMIDE ORAL 50 mg, Oral, Daily    calcium-vitamin D3 (OYSTER SHELL CALCIUM-VIT D3) 500 mg-5 mcg (200 unit) per tablet 1 tablet, Oral, 2 times daily    cholecalciferol (vitamin D3) 5,000 Units, Oral, Daily    clonazePAM (KLONOPIN) 2 mg, Oral, Nightly    cyanocobalamin, vitamin B-12, 1,000 mcg/mL Kit 1 mL, Injection, Every 28 days    docusate sodium (COLACE) 100 mg, Oral, 2 times daily    ferrous sulfate (FEOSOL) 325 mg, Oral, Daily    magnesium oxide (MAG-OX) 400 mg, Oral, Daily    metoprolol succinate (TOPROL-XL) 100 MG 24 hr tablet TAKE 1 TABLET IN THE MORNING AND TAKE 1 AND 1/2 TABLETS IN THE EVENING    potassium chloride SA (K-DUR,KLOR-CON) 20 MEQ tablet 20 mEq, Oral, Daily    predniSONE (DELTASONE) 5 mg, Oral, Daily    rosuvastatin (CRESTOR) 20 mg, Oral, Every morning    sotaloL (BETAPACE) 40 mg, Oral, 2 times daily    torsemide (DEMADEX) 20 mg, Oral, Daily    vitamin D3-folic acid 5,000 unit- 1 mg Tab 1 tablet, Oral, Daily    vitamin D3-vitamin K2, MK4, (K2 PLUS D3) 1,000-100 unit-mcg Tab Oral       Past Surgical History:   Procedure Laterality Date    ABLATION N/A 7/22/2020    Procedure: Ablation, afib;  Surgeon: Tal Laurent MD;  Location: [facility] CATH;  Service: Cardiology;  Laterality: N/A;  needs covid test;needs ALEXYS ([place])    AORTIC VALVE REPLACEMENT      CARDIAC CATH COSURGEON N/A 7/27/2022    Procedure: Cardiac Cath Cosurgeon;  Surgeon: Glenroy Sam MD;  Location: [facility] CATH LAB;  Service: Cardiovascular;  Laterality: N/A;    CARDIAC CATHETERIZATION      3 HEART STENTS    carotid artery stent Left     CLOSURE OF LEFT ATRIAL APPENDAGE USING DEVICE N/A 11/22/2022    Procedure: Left atrial appendage closure device;  Surgeon: Glenroy Richardson MD;  Location: [facility] CATH LAB;  Service: Cardiology;  Laterality: N/A;    COLONOSCOPY N/A 3/2/2021    Procedure: COLONOSCOPY;  Surgeon: Emiliana Cardenas MD;  Location: [facility] ENDO;  Service: General;  Laterality: N/A;  4  8:30am   CL/AM/Bd     CORONARY ARTERY BYPASS GRAFT      ESOPHAGOGASTRODUODENOSCOPY N/A 11/10/2020    Procedure: EGD (ESOPHAGOGASTRODUODENOSCOPY);  Surgeon: Renny Prakash MD;  Location: [facility] ENDO;  Service: Endoscopy;  Laterality: N/A;    HERNIA REPAIR      HIP REPLACEMENT ARTHROPLASTY Right 11/6/2020    Procedure: EMELY -ARTHROPLASTY, HIP REPLACEMENT (ENDO HIP);  Surgeon: Lino Thomas MD;  Location: [facility] OR;  Service: Orthopedics;  Laterality: Right;    LEFT HEART CATHETERIZATION N/A 6/29/2022    Procedure: Left heart cath;  Surgeon: Glenroy Richardson MD;  Location: [facility] CATH LAB;  Service: Cardiology;  Laterality: N/A;    PACERMAKER      PROSTATECTOMY      SHOULDER SURGERY      TOE SURGERY      TRANSCATHETER AORTIC VALVE REPLACEMENT (TAVR) N/A 7/27/2022    Procedure: REPLACEMENT, AORTIC VALVE, TRANSCATHETER (TAVR);  Surgeon: Glenroy Richardson MD;  Location: [facility] CATH LAB;  Service: Cardiology;  Laterality: N/A;    TRANSCATHETER AORTIC VALVE REPLACEMENT (TAVR)  7/27/2022    Procedure: REPLACEMENT, AORTIC VALVE, TRANSCATHETER (TAVR);  Surgeon: Glenroy Sam MD;  Location: [facility] CATH LAB;  Service: Cardiovascular;;    TRANSESOPHAGEAL ECHOCARDIOGRAPHY N/A 6/27/2022    Procedure: ECHOCARDIOGRAM, TRANSESOPHAGEAL;  Surgeon: Jorden Diagnostic Provider;  Location: [facility] EP LAB;  Service: Anesthesiology;  Laterality: N/A;    TRANSESOPHAGEAL ECHOCARDIOGRAPHY N/A 11/22/2022    Procedure: ECHOCARDIOGRAM, TRANSESOPHAGEAL;  Surgeon: Leila Aranog MD;  Location: [facility] CATH LAB;  Service: Cardiology;  Laterality: N/A;    TRANSESOPHAGEAL ECHOCARDIOGRAPHY N/A 1/5/2023    Procedure: ECHOCARDIOGRAM, TRANSESOPHAGEAL;  Surgeon: Fracisco Diagnostic Provider;  Location: [facility] EP LAB;  Service: Anesthesiology;  Laterality: N/A;    TREATMENT OF CARDIAC ARRHYTHMIA N/A 8/7/2019    Procedure: Cardioversion or Defibrillation;  Surgeon: Tal Laurent MD;  Location: [facility] CATH;  Service: Cardiology;  Laterality: N/A;        Family History   Problem Relation  "Age of Onset    Heart disease Mother     Heart attack Mother         Social History     Socioeconomic History    Marital status:    Tobacco Use    Smoking status: Never    Smokeless tobacco: Never   Substance and Sexual Activity    Alcohol use: Yes     Alcohol/week: 7.0 standard drinks of alcohol     Types: 7 Glasses of wine per week    Drug use: Never       The following portions of the patient's history were reviewed and updated as appropriate: allergies, current medications, past family history, past medical history, past social history, past surgical history, and problem list.     Review of Systems  Review of Systems   Constitutional:  Negative for chills, fever and malaise/fatigue.   HENT:  Negative for congestion and sore throat.    Respiratory:  Negative for cough and shortness of breath.    Cardiovascular:  Negative for chest pain, palpitations, orthopnea, leg swelling and PND.   Gastrointestinal:  Negative for abdominal pain, constipation, diarrhea, nausea and vomiting.   Genitourinary:  Negative for frequency.   Neurological:  Positive for dizziness (for 2 years, unchanged). Negative for weakness and headaches.         Objective:     Vitals:    01/11/24 1048   BP: 129/75   Pulse: 84   SpO2: 96%   Weight: 66.2 kg (146 lb)   Height: 5' 7" (1.702 m)   PainSc: 0-No pain        Physical Exam:  Physical Exam  Vitals and nursing note reviewed.   Constitutional:       General: He is not in acute distress.     Appearance: Normal appearance. He is not ill-appearing or toxic-appearing.   HENT:      Head: Normocephalic and atraumatic.      Mouth/Throat:      Mouth: Mucous membranes are moist.   Cardiovascular:      Rate and Rhythm: Normal rate. Rhythm irregular.      Heart sounds: Murmur (Grade 2/6 systolic ejection murmur at RUSB 2nd intercostal) heard.      No friction rub. No gallop.      Comments: Left upper chest wall device  Pulmonary:      Effort: Pulmonary effort is normal. No respiratory distress.     "  Breath sounds: Normal breath sounds.   Abdominal:      Palpations: Abdomen is soft.   Musculoskeletal:      Right lower leg: No edema.      Left lower leg: No edema.   Skin:     General: Skin is warm.   Neurological:      Mental Status: He is alert and oriented to person, place, and time. Mental status is at baseline.         Lab Review   Lab Results   Component Value Date     12/23/2023    K 4.1 12/23/2023     12/23/2023    CO2 26 12/23/2023    BUN 33 (H) 12/23/2023    CREATININE 1.0 12/23/2023    CALCIUM 9.0 12/23/2023     Lab Results   Component Value Date    WBC 7.01 12/23/2023    HGB 14.7 12/23/2023    HCT 44.2 12/23/2023    MCV 97 12/23/2023     12/23/2023     Lab Results   Component Value Date    CHOL 116 (L) 10/19/2023    TRIG 83 10/19/2023    HDL 52 10/19/2023         LDL 48 mg/dL      Assessment:        1. Biventricular implantable cardioverter-defibrillator (ICD) in situ    2. Persistent atrial fibrillation    3. Dizziness    4. Aortic atherosclerosis    5. Chronic diastolic heart failure    6. S/P TAVR (transcatheter aortic valve replacement)    7. Presence of automatic (implantable) cardiac defibrillator    8. Embolic stroke involving left middle cerebral artery    9. Presence of Watchman left atrial appendage closure device          Plan:     Bi-V ICD  HFimpEF  Follows with EP Dr Boo  ECG appears to show functioning Bi-V  Continue torsemide 20 mg PO daily with K supplementation  Advise daily weights, if noticing 3 lb weight gain in 1 day or 5 lb weight gain in 1 week, take an extra torsemide + potassium  If noticing 3 lb weight decrease in 1 day or 5 lb weight decrease in 1 week, hold extra torsemide + potassium for 1 day    Atrial flutter   Noted on rhythm strip today  Follow up with EP today to discuss possible DCCV vs rate control strategy (previously rate control was pursued but wonder if NSR can help elizabeth his symptoms of dizziness)  S/p Watchman    Persistent atrial  fibrillation  S/p Watchman  Previously rate control strategy pursued  On metoprolol and sotalol  As managed by EP    CAD s/p CABG  HLD  Aortic atherosclerosis  Continue statin  Feliberto Cruz at [facility] on 4/29/2010.   He is s/p CABG x 2 with LIMA-LAD and SVG-RCA along with aortic valve replacement    HTN   Dietary sodium restriction.  Regular aerobic exercise.  Check blood pressures daily and record.     F/u in 3 months    Staff: Dr Arango    Thank you for your consultation. Please call with questions or concerns.     Fabián Kelley MD  Cardiology PGY6  [facility]

## 2024-01-12 LAB
OHS CV AF BURDEN PERCENT: 100
OHS CV DC REMOTE DEVICE TYPE: NORMAL
OHS CV RV PACING PERCENT: 28.6 %

## 2024-01-16 ENCOUNTER — TELEPHONE (OUTPATIENT)
Dept: ELECTROPHYSIOLOGY | Facility: CLINIC | Age: 84
End: 2024-01-16
Payer: MEDICARE

## 2024-01-16 NOTE — TELEPHONE ENCOUNTER
Virtual Command remote device transmission received; alert for ATP and shock    The device alert information will be reviewed with your physician and any changes to your current medical plan will be communicated to you.    Following Provider: Ema  Device Type:  Medtronic DC AICD  Date/Time:  01/12/2024 @ 05:07 pm  Event Type:  MMVT (w/ AF)  Ventricular CL:  270 ms  Duration:  19 secs  Therapy Delivered:  ATP + Shock  Appropriate Therapy:  Yes  Successful:  Yes  Pt Symptomatic:  s/w patient by phone.  He was unaware of the shock, feels well.  He is currently loading amiodarone, confirmed dose and he reports consistent dosing since starting last week.    Most recent BP: 119/68    83 y.o. male with atrial fibrillation, ventricular fibrillation, CAD s/p CABG, AS s/p sAVR followed by TAVR, HTN, CVA, mitral valve prolapse  Persistent atrial fibrillation since June 2023. Patient asymptomatic. MAURICIO occlusion 11/22/22; watchman.   Hx of treated VT's, last shock 1/7/24, last ATP 1/9/2023     No prior VT ablations  Toprol reduced on 1/11/24 to 100mg BID due to LH  Sotalol stopped 1/11/24, started amiodarone load  Echo 8/29/2023 shows preserved LV function    Last clinic visit: 01/11/2024  Next clinic visit: 04/22/2024

## 2024-01-23 ENCOUNTER — TELEPHONE (OUTPATIENT)
Dept: ELECTROPHYSIOLOGY | Facility: CLINIC | Age: 84
End: 2024-01-23
Payer: MEDICARE

## 2024-01-23 LAB
OHS CV AF BURDEN PERCENT: 100
OHS CV DC REMOTE DEVICE TYPE: NORMAL
OHS CV ICD SHOCK: YES
OHS CV RV PACING PERCENT: 20.53 %

## 2024-01-23 NOTE — TELEPHONE ENCOUNTER
----- Message from Tamika Mcintosh MA sent at 1/23/2024  9:04 AM CST -----  Regarding: FW: Monitor    ----- Message -----  From: Yandy Karimi  Sent: 1/23/2024   8:54 AM CST  To: [facility]ma Garcia Staff  Subject: Monitor                                          Patient light was flashing around 6:30-7 am., on his monitor and have some concerns. Please call back @ [phone]. Thank you Yandy

## 2024-01-23 NOTE — TELEPHONE ENCOUNTER
Returned the pt's call on this afternoon through a telephone service as to pt communicates using sign language.  Pt stated he saw a light come on on his remote ICD home monitor and was concerned that something may be wrong.  Informed the pt that his monitor will never alert him that something is going on with his ICD.  Also informed the pt that should he continue to see his remote monitor flashing to please call [employer] Stay [employer] who can assist him with troubleshooting his monitor.  Understanding was stated.  Pt appreciated the call.

## 2024-01-24 ENCOUNTER — TELEPHONE (OUTPATIENT)
Dept: CARDIOLOGY | Facility: CLINIC | Age: 84
End: 2024-01-24
Payer: MEDICARE

## 2024-01-24 NOTE — TELEPHONE ENCOUNTER
----- Message from Jennifer Guevara RN sent at 1/24/2024  9:51 AM CST -----  Regarding: amiodarone  Call was mistakenly transferred to me. Done through pt's ASL  translation service. Pt is running out of amiodarone pills and wanted to know if he is taking them correctly. I reviewed med with him and he is taking it as ordered. As 120 pills were prescribed on 1/11 he should have enough pills left. I told him to call [facility] and if they don't have anymore pills to call back Ms Arias' team. He verbalized understanding.

## 2024-01-26 ENCOUNTER — TELEPHONE (OUTPATIENT)
Dept: ELECTROPHYSIOLOGY | Facility: CLINIC | Age: 84
End: 2024-01-26
Payer: MEDICARE

## 2024-01-26 NOTE — TELEPHONE ENCOUNTER
----- Message from Salazar Alcocer MA sent at 1/26/2024  4:13 PM CST -----  Regarding: FW: medication    ----- Message -----  From: Melva Lemus  Sent: 1/26/2024  10:10 AM CST  To: Hugo Trammell Staff  Subject: medication                                       Pls call pt at [phone].  He needs to clarify the directions for amiodarone (PACERONE) 200 MG Tab    Thank you

## 2024-01-26 NOTE — TELEPHONE ENCOUNTER
----- Message from Salazar Alcocer MA sent at 1/26/2024  4:13 PM CST -----  Regarding: FW: medication    ----- Message -----  From: Melva Lemus  Sent: 1/26/2024  10:10 AM CST  To: Hugo Trammell Staff  Subject: medication                                       Pls call pt at [phone].  He needs to clarify the directions for amiodarone (PACERONE) 200 MG Tab    Thank you

## 2024-01-26 NOTE — TELEPHONE ENCOUNTER
"Spoke with patient through ASL representative. Advised that today is his last "loading" dose of amiodarone and he should begin the maintenance dose of 200mg daily starting tomorrow. Patient states he only has 2 pills left. New rx with updated directions sent in.   "

## 2024-01-29 ENCOUNTER — TELEPHONE (OUTPATIENT)
Dept: ELECTROPHYSIOLOGY | Facility: CLINIC | Age: 84
End: 2024-01-29
Payer: MEDICARE

## 2024-01-29 DIAGNOSIS — Z95.810 AICD (AUTOMATIC CARDIOVERTER/DEFIBRILLATOR) PRESENT: ICD-10-CM

## 2024-01-29 DIAGNOSIS — I48.19 PERSISTENT ATRIAL FIBRILLATION: ICD-10-CM

## 2024-01-29 RX ORDER — AMIODARONE HYDROCHLORIDE 200 MG/1
TABLET ORAL
Qty: 30 TABLET | Refills: 11 | Status: SHIPPED | OUTPATIENT
Start: 2024-01-29

## 2024-01-29 RX ORDER — METOPROLOL SUCCINATE 100 MG/1
TABLET, EXTENDED RELEASE ORAL
Qty: 180 TABLET | Refills: 3 | Status: SHIPPED | OUTPATIENT
Start: 2024-01-29

## 2024-01-29 NOTE — TELEPHONE ENCOUNTER
----- Message from Valeri Arias NP sent at 1/29/2024  3:06 PM CST -----  Regarding: FW: medication  Can you reach out and make sure he is off the sotalol? Thanks!  ----- Message -----  From: Josh Denis MA  Sent: 1/29/2024   3:00 PM CST  To: Valeri Arias NP  Subject: FW: medication                                     ----- Message -----  From: Melva Lemus  Sent: 1/29/2024   2:48 PM CST  To: Hugo Trammell Staff  Subject: medication                                       Pls call pt at [phone].  He says he just saw a message from Valeri from 1/11/24 stating to stop taking Sotalol but he is just now seeing the message but is still taking the medication.  Please advise    Thank you

## 2024-01-31 DIAGNOSIS — Z95.810 BIVENTRICULAR IMPLANTABLE CARDIOVERTER-DEFIBRILLATOR (ICD) IN SITU: ICD-10-CM

## 2024-01-31 RX ORDER — POTASSIUM CHLORIDE 20 MEQ/1
20 TABLET, EXTENDED RELEASE ORAL DAILY
Qty: 90 TABLET | Refills: 3 | Status: SHIPPED | OUTPATIENT
Start: 2024-01-31

## 2024-02-01 ENCOUNTER — TELEPHONE (OUTPATIENT)
Dept: ELECTROPHYSIOLOGY | Facility: CLINIC | Age: 84
End: 2024-02-01
Payer: MEDICARE

## 2024-02-01 NOTE — TELEPHONE ENCOUNTER
Spoke with patient through . Confirmed that he is no longer taking Sotalol and is on the 200mg maintenance dose of amiodarone. Advised that he can take it in the evening to see if fatigue symptoms improve.

## 2024-02-01 NOTE — TELEPHONE ENCOUNTER
----- Message from Rivera Seymour MA sent at 2/1/2024 10:14 AM CST -----  Regarding: FW: Tired    ----- Message -----  From: Yandy Karimi  Sent: 2/1/2024  10:10 AM CST  To: Hugo Trammell Staff  Subject: Tired                                            Patient calling to see if he can take amiodarone (PACERONE) 200 MG Tab at night because it makes him tired. Please call back @ [phone]. Thank you Yandy

## 2024-02-05 PROBLEM — J18.9 PNEUMONIA OF LEFT LUNG DUE TO INFECTIOUS ORGANISM: Status: RESOLVED | Noted: 2023-11-05 | Resolved: 2024-02-05

## 2024-02-15 ENCOUNTER — OFFICE VISIT (OUTPATIENT)
Dept: HEMATOLOGY/ONCOLOGY | Facility: CLINIC | Age: 84
End: 2024-02-15
Payer: MEDICARE

## 2024-02-15 ENCOUNTER — PATIENT MESSAGE (OUTPATIENT)
Dept: HEMATOLOGY/ONCOLOGY | Facility: CLINIC | Age: 84
End: 2024-02-15

## 2024-02-15 ENCOUNTER — LAB VISIT (OUTPATIENT)
Dept: LAB | Facility: HOSPITAL | Age: 84
End: 2024-02-15
Attending: INTERNAL MEDICINE
Payer: MEDICARE

## 2024-02-15 ENCOUNTER — OFFICE VISIT (OUTPATIENT)
Dept: UROLOGY | Facility: CLINIC | Age: 84
End: 2024-02-15
Payer: MEDICARE

## 2024-02-15 VITALS
BODY MASS INDEX: 22.81 KG/M2 | WEIGHT: 145.31 LBS | HEIGHT: 67 IN | HEART RATE: 84 BPM | SYSTOLIC BLOOD PRESSURE: 101 MMHG | DIASTOLIC BLOOD PRESSURE: 58 MMHG | OXYGEN SATURATION: 98 %

## 2024-02-15 VITALS
SYSTOLIC BLOOD PRESSURE: 110 MMHG | WEIGHT: 145 LBS | BODY MASS INDEX: 22.76 KG/M2 | DIASTOLIC BLOOD PRESSURE: 74 MMHG | HEIGHT: 67 IN | HEART RATE: 100 BPM

## 2024-02-15 DIAGNOSIS — R97.20 ELEVATED PSA: Primary | ICD-10-CM

## 2024-02-15 DIAGNOSIS — C61 PROSTATE CANCER: Primary | ICD-10-CM

## 2024-02-15 DIAGNOSIS — Z92.3 HISTORY OF EXTERNAL BEAM RADIATION THERAPY: ICD-10-CM

## 2024-02-15 DIAGNOSIS — M81.0 OSTEOPOROSIS, UNSPECIFIED OSTEOPOROSIS TYPE, UNSPECIFIED PATHOLOGICAL FRACTURE PRESENCE: ICD-10-CM

## 2024-02-15 DIAGNOSIS — D49.9 IMMUNODEFICIENCY SECONDARY TO NEOPLASM: ICD-10-CM

## 2024-02-15 DIAGNOSIS — Z95.1 S/P CABG (CORONARY ARTERY BYPASS GRAFT): ICD-10-CM

## 2024-02-15 DIAGNOSIS — K08.9 DENTAL DISEASE: ICD-10-CM

## 2024-02-15 DIAGNOSIS — I50.32 CHRONIC DIASTOLIC HEART FAILURE: ICD-10-CM

## 2024-02-15 DIAGNOSIS — D84.81 IMMUNODEFICIENCY SECONDARY TO NEOPLASM: ICD-10-CM

## 2024-02-15 DIAGNOSIS — I63.412 EMBOLIC STROKE INVOLVING LEFT MIDDLE CEREBRAL ARTERY: ICD-10-CM

## 2024-02-15 DIAGNOSIS — D64.9 ANEMIA, UNSPECIFIED TYPE: ICD-10-CM

## 2024-02-15 DIAGNOSIS — I48.19 PERSISTENT ATRIAL FIBRILLATION: ICD-10-CM

## 2024-02-15 DIAGNOSIS — Z90.79 HISTORY OF RADICAL RETROPUBIC PROSTATECTOMY: ICD-10-CM

## 2024-02-15 DIAGNOSIS — H91.3 DEAF, NONSPEAKING: ICD-10-CM

## 2024-02-15 DIAGNOSIS — Z95.2 S/P AVR: ICD-10-CM

## 2024-02-15 DIAGNOSIS — R97.21 RISING PSA FOLLOWING TREATMENT FOR MALIGNANT NEOPLASM OF PROSTATE: ICD-10-CM

## 2024-02-15 DIAGNOSIS — Z79.818 ENCOUNTER FOR MONITORING ANDROGEN DEPRIVATION THERAPY: ICD-10-CM

## 2024-02-15 DIAGNOSIS — R97.20 ELEVATED PSA, BETWEEN 10 AND LESS THAN 20 NG/ML: ICD-10-CM

## 2024-02-15 DIAGNOSIS — Z95.810 AICD (AUTOMATIC CARDIOVERTER/DEFIBRILLATOR) PRESENT: ICD-10-CM

## 2024-02-15 DIAGNOSIS — Z95.818 PRESENCE OF WATCHMAN LEFT ATRIAL APPENDAGE CLOSURE DEVICE: ICD-10-CM

## 2024-02-15 LAB
T4 FREE SERPL-MCNC: 0.97 NG/DL (ref 0.71–1.51)
TSH SERPL DL<=0.005 MIU/L-ACNC: 9.49 UIU/ML (ref 0.4–4)

## 2024-02-15 PROCEDURE — 99214 OFFICE O/P EST MOD 30 MIN: CPT | Mod: 25,S$GLB,, | Performed by: NURSE PRACTITIONER

## 2024-02-15 PROCEDURE — 36415 COLL VENOUS BLD VENIPUNCTURE: CPT | Performed by: NURSE PRACTITIONER

## 2024-02-15 PROCEDURE — 99215 OFFICE O/P EST HI 40 MIN: CPT | Mod: S$GLB,,, | Performed by: REGISTERED NURSE

## 2024-02-15 PROCEDURE — 99999 PR PBB SHADOW E&M-EST. PATIENT-LVL IV: CPT | Mod: PBBFAC,,, | Performed by: NURSE PRACTITIONER

## 2024-02-15 PROCEDURE — 84443 ASSAY THYROID STIM HORMONE: CPT | Performed by: NURSE PRACTITIONER

## 2024-02-15 PROCEDURE — 84439 ASSAY OF FREE THYROXINE: CPT | Performed by: NURSE PRACTITIONER

## 2024-02-15 PROCEDURE — 99999 PR PBB SHADOW E&M-EST. PATIENT-LVL IV: CPT | Mod: PBBFAC,,, | Performed by: REGISTERED NURSE

## 2024-02-15 PROCEDURE — 96402 CHEMO HORMON ANTINEOPL SQ/IM: CPT | Mod: S$GLB,,, | Performed by: NURSE PRACTITIONER

## 2024-02-15 NOTE — PROGRESS NOTES
Subjective     Patient ID: Michael Mclean is a 83 y.o. male.    Chief Complaint: No chief complaint on file.    HPI    Presents to clinic for follow up  Reports compliance with zytiga/prednisone  No issues since transitioning to [facility].   Due for Lupron today - receives with urology.   Was in ED 12/23/23 for RSV infection/SOB and found to have suspicion for CHF  Has been following with cardiology   Recently put on amiodarone as well - had an asymptomatic shock from ICD on 1/12/24  Continues with intermittent mild dizziness, though states this is better since starting sotalol   Stamina low, has to rest after mild/moderate exertion   Trying to walk around more but has to hold on to things for stability     Not eating as much recently - generally doesn't like to cook and fairly picky about foods/tries to be more health conscious   Family will  snacks and cook for him as well  Occasionally drinks protein shakes but not consistent  Nocturia improved - now 1-2x per night instead of 3-4x  Denies pain other than right hip pain - stable overall with no significant changes.   Still needs dental work done - has not gone d/t cost - states [facility] dental school also expensive  Son in law has discussed with dentist who will not clear him without procedures done d/t concern for infection as a result  He does not want tooth extracted as he states teeth are still good   No hot flashes   No fevers or chills  No CP, SOB, or palpitations  No nausea, vomiting, constipation or diarrhea  Denies blood in urine or stool  No new urinary complaints today       PSA pending at time of visit today.     Oncology History:  - negative prostate biopsy in 2011 (PSA = 15 ng.ml)     - followed for a year and PSA was around 7 ng/ml     - 2012, PSA increased to 13.49 ng/ml     - 3/20/2012 prostate biopsy:  Pathology revealed Pace 4+3=7 in 1 core involving 80% of that core, Elliott 3+4=7 in 3 cores involving 3 cores involving  70% to 80% of those cores and [place] 3+3=6 in 1 core involving 5% of that core.      - saw Dr. Bartholomew at [facility] and underwent a robotic prostatectomy with lymphadenectomy on 5/22/2012.   Pathology revealed a Elliott 5+4= 9 disease, pT3aN0.      - Unfortunately, Mr. Mclean's PSA did not become undetectable and he completed further restaging that was without evidence of metastatic disease. Mr. Mclean opted to proceed with salvage therapy and completed one year of combined ADT and EBRT in 10/2013.   Notes at [facility] note that main side effects were weight gain and hot flashes. He has had Testosterone recovery and improved stamina and has been able to loose a little weight since completing therapy. His PSA became detectable again in September 2015 and has been slowly rising since without clinical symptoms.    PSA History at [facility]:  12/06/2016 1.67 OFF   09/12/2016 1.94  07/11/2016 2.48 Test 221  04/11/2016 1.76 OFF  12/08/2015 1.36   10/06/2015 1.43 OFF  09/01/2015 0.84  05/15/2015 0.08 Test 139  11/06/2014 0.02 Test 128  06/30/2014 <0.01 Test 181  03/31/2014 <0.01 Test 119  01/06/2014 0.01 Test 67  10/07/2013 <0.01 Casodex and LHRHa discontinued  07/01/2013 <0.01  01/08/2013 0.02  10/09/2012 0.82 Salvage CAA Started Test 296  09/19/2012 0.56  08/22/2012 0.43  06/13/2012 0.49  05/08/2012 13.49 PTX 5/22/2012, [place] 9, pT3aN0     We evaluated in 2022 with rising PSA  - 2/14/2022 CT A/P:  FINDINGS:  No suspicious mass, lymphadenopathy, or change from the previous examination of 09/30/2021.  Scattered small aortocaval lymph nodes, unchanged.  Left renal cysts and left renal atrophy, unchanged.  Somewhat small liver.  Stones within an otherwise unremarkable gallbladder.  Unremarkable right kidney, adrenals, spleen and pancreas.  Artifact secondary to a right hip prosthesis.  Impression:  No evidence of metastatic disease in the abdomen or pelvis     2/14- /2022 Bone scan:  FINDINGS:  Radiotracer accumulation within the right 2nd  rib has increased since 09/30/2021.  Otherwise, additional sites of radiotracer accumulation have not significantly changed in appearance since the prior exam other than resolution of the activity right skull base.  Patchy, suspected degenerative type uptake, cervical spine, lumbar spine, both knees and about right hip and shoulder prostheses.  Impression:  1. Increased radiotracer accumulation within the right 2nd rib as compared to bone scan of 09/30/2021 suspicious for metastatic disease  2. Otherwise, suspected degenerative type uptake within cervical spine, lumbar spine, both knees and about right shoulder and knee prostheses appears similar     - was started on Lupron and casodex  Hesitation around newer generation ADT with cardiac history     - issues with follow up  Lives in [place]- several missed appointments        3/6/2023 PSA = 11.2 ng/ml     - 3/24/2023 PET PSMA:  FINDINGS:  In the head and neck, there is physiologic uptake in the lacrimal and salivary glands, and there are no tracer avid lesions suspicious for malignancy.  In the chest, there are no tracer avid lesions suspicious for malignancy.  Multiple subcentimeter pulmonary nodules which are below size threshold for evaluation with PET.  For example: 0.7 cm nodule in the periphery the left upper lobe with mildly increased radiotracer uptake SUV max of 3.3 (series 3, image 28), likely present on prior CTA although slightly more prominent on today's exam.  0.3 cm pulmonary nodule in the right lower lobe (series 3, image 194), unchanged from CT 09/30/2021  In the abdomen and pelvis, there is physiologic distribution in the liver, spleen, bowel, and genitourinary tract, and there are no anatomic or tracer avid lesions suspicious for malignancy.  Specifically, there are no pathologically enlarged or tracer avid pelvic or retroperitoneal lymph nodes.  In the bones, there are several tracer avid lesions concerning for bony metastases:  * right 2nd  rib long segment of tracer uptake (axial series images 225-239) with max SUV 18.  Mildly increased sclerosis is noted on CT.  * right scapula lytic lesion (3-202) with max SUV of 15.  * right iliac bone tracer avid lesion with max SUV of 7.9 (axial series image 86), corresponding to area of subtle cortical disruption on CT (3-87), though this is unchanged from CT 11/05/2020.  * T10 inferior endplate hypermetabolic lesion with SUV max of 4.8 (fused axial series image 173) without definite underlying CT correlate.  * C3 vertebral body a tracer avid lesion with max SUV 11.0 (fused axial series image 264 without definite underlying CT correlate).  Additional CT findings:  Right shoulder arthroplasty.  Left hip arthroplasty.  Left chest wall pacer device.  Postoperative change of sternotomy. Left atrial appendage occlusion device.  Change related to aortic valve replacement.  Dense calcific atherosclerosis of the coronary arteries.  Prominent calcific atherosclerosis.  Cholelithiasis.  Left kidney is atrophic with two stable fluid attenuating cysts. Prostate is surgically absent.  Colonic diverticulosis.  Bilateral scrotal/testicular calcifications.  Degenerative changes.  Impression:  In this patient with prostate cancer status post prostatectomy, there are multiple foci of increased uptake throughout osseous structures with index lesions detailed above.  Few of these lesions correspond to underlying lytic and sclerotic lesions. Findings are overall concerning for osseous metastases.  No evidence of local tumor recurrence at prostatectomy site or driss involvement of disease.  Multiple subcentimeter pulmonary nodules including nodule in the left upper lobe with mildly increased radiotracer uptake, nonspecific.    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   HENT:  Positive for dental problem and hearing loss. Negative for trouble swallowing.    Eyes:  Negative for visual disturbance.   Respiratory:  Negative for cough, shortness of breath and wheezing.    Cardiovascular:  Negative for chest pain, palpitations and leg swelling.   Gastrointestinal:  Negative for abdominal distention, abdominal pain, blood in stool, change in bowel habit, constipation, diarrhea, nausea and vomiting.   Genitourinary:  Positive for frequency (some nocturia, improved, now 1-2 times per night). Negative for bladder incontinence, decreased urine volume, difficulty urinating and urgency.   Musculoskeletal:  Positive for joint deformity (right hip). Negative for arthralgias, back pain, gait problem, leg pain and myalgias.   Integumentary:  Negative for rash and wound.   Neurological:  Positive for dizziness (at times with cardiac condition). Negative for weakness, numbness, headaches and coordination difficulties.   Hematological:  Negative for adenopathy. Does not bruise/bleed easily.   Psychiatric/Behavioral:  Negative for agitation, behavioral problems, confusion and dysphoric mood. The patient is not nervous/anxious.         Objective     Physical Exam  Vitals and nursing note reviewed.   Constitutional:       General: He is not in acute distress.     Appearance: Normal appearance. He is normal weight.  He is not ill-appearing, toxic-appearing or diaphoretic.      Comments: ECOG= 1  Presents with daughter and son in law; interpretor present (Monique)  In a wheelchair- chronic   HENT:      Head: Normocephalic and atraumatic.      Right Ear: External ear normal.      Left Ear: External ear normal.      Nose: Nose normal. No congestion.      Mouth/Throat:      Pharynx: Oropharynx is clear. No oropharyngeal exudate or posterior oropharyngeal erythema.      Comments: Dental issues  Eyes:      General: No scleral icterus.     Extraocular Movements: Extraocular movements intact.      Conjunctiva/sclera: Conjunctivae normal.      Pupils: Pupils are equal, round, and reactive to light.   Cardiovascular:      Rate and Rhythm: Normal rate.   Pulmonary:      Effort: Pulmonary effort is normal. No respiratory distress.      Breath sounds: Normal breath sounds. No wheezing, rhonchi or rales.   Chest:      Chest wall: No tenderness.   Abdominal:      General: Abdomen is flat. Bowel sounds are normal. There is no distension.      Palpations: Abdomen is soft. There is no mass.      Tenderness: There is no abdominal tenderness. There is no guarding or rebound.      Comments: No organomegaly   Musculoskeletal:         General: No swelling, tenderness or deformity. Normal range of motion.      Cervical back: Normal range of motion and neck supple. No rigidity.      Right lower leg: No edema.      Left lower leg: No edema.   Lymphadenopathy:      Cervical: No cervical adenopathy.   Skin:     General: Skin is warm and dry.      Coloration: Skin is not jaundiced or pale.      Findings: No bruising, erythema or rash.   Neurological:      General: No focal deficit present.      Mental Status: He is alert and oriented to person, place, and time. Mental status is at baseline.      Sensory: No sensory deficit.      Motor: No weakness.      Coordination: Coordination normal.      Gait: Gait abnormal.   Psychiatric:         Mood and Affect: Mood  normal.         Behavior: Behavior normal.         Thought Content: Thought content normal.         Judgment: Judgment normal.       Labs- reviewed    - 10/11/2023 BMD:  Impression:  *Osteoporosis based on T-score between -1.0 and -2.5 and elevated risk based on FRAX  *Fracture risk is high  RECOMMENDATIONS:  *Daily calcium intake 8027-2399 mg, dietary sources preferred; Vitamin D 2452-4768 IU daily.  *Weight bearing exercise and fall precautions.  *Recommend medical therapy for osteoporosis with high risk for fracture. Consider bisphosphonates (alendronate, risedronate, zoledronic acid), or denosumab.  *Repeat BMD in 2 years     Assessment and Plan     1. Prostate cancer  -     NM PET CT F 18 PYL PSMA, Midthigh to Vertex; Future; Expected date: 02/15/2024    2. Encounter for monitoring androgen deprivation therapy    3. Immunodeficiency secondary to neoplasm    4. Chronic diastolic heart failure    5. Persistent atrial fibrillation    6. S/P CABG (coronary artery bypass graft)    7. S/P AVR    8. AICD (automatic cardioverter/defibrillator) present  Overview:  History of HFrEF (2019) with recovered LVEF , Had Bi-V/ICD placed by [facility] in 2019        9. Presence of Watchman left atrial appendage closure device    10. Deaf, nonspeaking    11. Embolic stroke involving left middle cerebral artery    12. Anemia, unspecified type    13. Dental disease    14. Osteoporosis, unspecified osteoporosis type, unspecified pathological fracture presence        Multiple CV diagnoses/risk factors  CABG, CVA, HTN, a fib-  Managed locally by PCP and Cardiologist  Stable on medications - recently adjusted and tolerating well.   Had asymptomatic shock in 1/2024 - no new concerns/symptoms today.      Metastatic Prostate Cancer-   We reviewed progression and options at previous visit   Changed to new generation ADT with zytiga/predisone in May 2023   PSA has stabilized over last few visits   Due for Lupron today with urology   Future refills  through [facility]  Will continue regimen and reassess in ~8-10 weeks      Osteoporosis- continue Calcium/Vit D  Cannot get Xgeva with dental issues   Again discussed rationale for Xgeva as well as need for dental clearance. He is unable to have dental work d/t expenses. Recommended [facility] at previous visit, patient states still too expensive. Per son-in-law, dentist will not clear for medication until procedures are complete. He does not have any pain at this time and does not feel he needs to see a dentist.   Family will continue to discuss with patient and encourage him to do small procedures at a time if possible to avoid large bill at once.   Will continue to hold medication until issues resolved/treated    Hearing impaired  Daughter- [phone]  Heidy- use for communication    Addendum:   Patient's PSA increased to 37.4 today. Discussed with Dr. Wilkins. Will repeat PSA and PSMA scan in 2-3 weeks with clinic visit to follow. SW assisting for possible [facility] stay. Message sent to patient via portal.     Patient is in agreement with the proposed treatment plan. All questions were answered to the patient's satisfaction. Pt knows to call clinic if anything is needed before the next clinic visit.    Patient discussed with collaborating physician, Dr. Wilkins.    At least 40 minutes were spent today on this encounter including face to face time with the patient, data gathering/interpretation and documentation.       Mariya Fragoso, MSN, APRN, ACCNS-  Hematology and Medical Oncology  Clinical Nurse Specialist to Dr. Carrera, Dr. Wilkins & Dr. Sparks    Route Chart for Scheduling    Med Onc Chart Routing      Follow up with physician . 2-3 weeks with PSA labs and PSMA scan same day then see Dr. Wilkins the following day to review results/treatment discussion   Follow up with [facility]    Infusion scheduling note    Injection scheduling note    Labs    Imaging    Pharmacy appointment    Other referrals                 Therapy Plan Information  Medications  denosumab (XGEVA) solution 120 mg  120 mg, Subcutaneous, Every 4 weeks

## 2024-02-15 NOTE — PROGRESS NOTES
CHIEF COMPLAINT:    Michael Mclean is a 83 y.o. male presents today for Prostate Cancer.     HISTORY OF PRESENTING ILLINESS:    Michael Mclean is a 83 y.o. deaf male with a PMH of prostate cancer. He is s/p RRP 05/2012 with Dr. Bartholomew at [facility]  Pathology revealed a [place] 5+4= 9 disease, pT3aN0.  In 10/2013 completed one year of ADT and XRT.   He had been followed there until 2016 before moving here.      He was initially seen in our clinic with Dr. Manrique. Had been receiving leuprolide (lupron) every 6 months.   Seen with Dr. Ariza 07/08/2020 .   I saw him 08/09/2021.   08/02/2021 PSA was 4.3; previously 0.70 on 01.28/2021  LUPRON 45mg IM given  Plan to recheck PSA and T level; consult Hem/Onc if abnormal.   09/09/2021 PSA was 5.4 with T level of 12. Hem/Onc consult placed.      02/14/2022 CTAP:  -No evidence of metastatic disease in the abdomen or pelvis     02/14/2022 Bone Scan:  - Increased radiotracer accumulation within the right 2nd rib as compared to bone scan of 09/30/2021 suspicious for metastatic disease  -09/01/2022 with a PSA was 3.8.      Seen 03/01/2023 with a PSA of 13.3 on 02/28/2023 (7.4 on 12/01/2022)  LUPRON 45mg IM given    05/17/2023 he started abiraterone with Dr. Wilkins.    Last clinic visit & Lupron was 08/29/2023. PSA was 15.9.    He is here today for LUPRON 45mg.  No complaints.   Has no urinary complaints today; getting up on once a night. Much better.   Daughter/ED and  present.   He was seen in Hem/Onc before our visit  Current PSA was processing. .     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             REVIEW OF SYSTEMS:  Review of Systems   Constitutional:  Positive for malaise/fatigue. Negative for chills and fever.   Eyes:  Negative for double vision.   Respiratory:  Negative for cough and shortness of breath.    Cardiovascular:  Negative for chest pain.   Gastrointestinal:  Negative for abdominal pain, constipation,  diarrhea, nausea and vomiting.   Genitourinary: Negative.  Negative for dysuria, flank pain and hematuria.        Improvement with urination.   Nocturia 1x now.     Neurological:  Negative for dizziness and seizures.   Endo/Heme/Allergies:  Negative for polydipsia.         PATIENT HISTORY:    Past Medical History:   Diagnosis Date    Abdominal hernia     Anemia 02/2021    Anticoagulant long-term use     Arthritis     SHOULDER    Cancer     prostate    Cardiomyopathy     Carotid artery stenosis     left    Coronary artery disease     Deaf     Dizziness     Encounter for blood transfusion     Gastritis 3/2/2021    Hyperlipidemia     Hypertension     Mitral regurgitation     Mitral regurgitation     PAD (peripheral artery disease)     Paroxysmal atrial fibrillation     Stroke 2020    Symptomatic anemia 10/13/2020    Thyroid disease        Past Surgical History:   Procedure Laterality Date    ABLATION N/A 7/22/2020    Procedure: Ablation, afib;  Surgeon: Tal Laurent MD;  Location: [facility] CATH;  Service: Cardiology;  Laterality: N/A;  needs covid test;needs ALEXYS ([place])    AORTIC VALVE REPLACEMENT      CARDIAC CATH COSURGEON N/A 7/27/2022    Procedure: Cardiac Cath Cosurgeon;  Surgeon: Glenroy Sam MD;  Location: [facility] CATH LAB;  Service: Cardiovascular;  Laterality: N/A;    CARDIAC CATHETERIZATION      3 HEART STENTS    carotid artery stent Left     CLOSURE OF LEFT ATRIAL APPENDAGE USING DEVICE N/A 11/22/2022    Procedure: Left atrial appendage closure device;  Surgeon: Glenroy Richardson MD;  Location: [facility] CATH LAB;  Service: Cardiology;  Laterality: N/A;    COLONOSCOPY N/A 3/2/2021    Procedure: COLONOSCOPY;  Surgeon: Emiliana Cardenas MD;  Location: [facility];  Service: General;  Laterality: N/A;  4  8:30am   CL/AM/Bd    CORONARY ARTERY BYPASS GRAFT      ESOPHAGOGASTRODUODENOSCOPY N/A 11/10/2020    Procedure: EGD (ESOPHAGOGASTRODUODENOSCOPY);  Surgeon: Renny Prakash MD;  Location: [facility];  Service:  Endoscopy;  Laterality: N/A;    HERNIA REPAIR      HIP REPLACEMENT ARTHROPLASTY Right 11/6/2020    Procedure: EMELY -ARTHROPLASTY, HIP REPLACEMENT (ENDO HIP);  Surgeon: Lino Thomas MD;  Location: [facility] OR;  Service: Orthopedics;  Laterality: Right;    LEFT HEART CATHETERIZATION N/A 6/29/2022    Procedure: Left heart cath;  Surgeon: Glenroy Richardson MD;  Location: [facility] CATH LAB;  Service: Cardiology;  Laterality: N/A;    PACERMAKER      PROSTATECTOMY      SHOULDER SURGERY      TOE SURGERY      TRANSCATHETER AORTIC VALVE REPLACEMENT (TAVR) N/A 7/27/2022    Procedure: REPLACEMENT, AORTIC VALVE, TRANSCATHETER (TAVR);  Surgeon: Glenroy Richardson MD;  Location: [facility] CATH LAB;  Service: Cardiology;  Laterality: N/A;    TRANSCATHETER AORTIC VALVE REPLACEMENT (TAVR)  7/27/2022    Procedure: REPLACEMENT, AORTIC VALVE, TRANSCATHETER (TAVR);  Surgeon: Glenroy Sam MD;  Location: [facility] CATH LAB;  Service: Cardiovascular;;    TRANSESOPHAGEAL ECHOCARDIOGRAPHY N/A 6/27/2022    Procedure: ECHOCARDIOGRAM, TRANSESOPHAGEAL;  Surgeon: Fracisco Diagnostic Provider;  Location: [facility] EP LAB;  Service: Anesthesiology;  Laterality: N/A;    TRANSESOPHAGEAL ECHOCARDIOGRAPHY N/A 11/22/2022    Procedure: ECHOCARDIOGRAM, TRANSESOPHAGEAL;  Surgeon: Leila Arango MD;  Location: [facility] CATH LAB;  Service: Cardiology;  Laterality: N/A;    TRANSESOPHAGEAL ECHOCARDIOGRAPHY N/A 1/5/2023    Procedure: ECHOCARDIOGRAM, TRANSESOPHAGEAL;  Surgeon: Jorden Diagnostic Provider;  Location: [facility] EP LAB;  Service: Anesthesiology;  Laterality: N/A;    TREATMENT OF CARDIAC ARRHYTHMIA N/A 8/7/2019    Procedure: Cardioversion or Defibrillation;  Surgeon: Tal Laurent MD;  Location: [facility] CATH;  Service: Cardiology;  Laterality: N/A;       Family History   Problem Relation Age of Onset    Heart disease Mother     Heart attack Mother        Social History     Socioeconomic History    Marital status:    Tobacco Use    Smoking status: Never    Smokeless  tobacco: Never   Substance and Sexual Activity    Alcohol use: Yes     Alcohol/week: 7.0 standard drinks of alcohol     Types: 7 Glasses of wine per week    Drug use: Never     Social Determinants of Health     Financial Resource Strain: Low Risk  (11/6/2023)    Overall Financial Resource Strain (CARDIA)     Difficulty of Paying Living Expenses: Not hard at all   Food Insecurity: No Food Insecurity (11/6/2023)    Hunger Vital Sign     Worried About Running Out of Food in the Last Year: Never true     Ran Out of Food in the Last Year: Never true   Transportation Needs: No Transportation Needs (11/6/2023)    PRAPARE - Transportation     Lack of Transportation (Medical): No     Lack of Transportation (Non-Medical): No   Physical Activity: Sufficiently Active (11/6/2023)    Exercise Vital Sign     Days of Exercise per Week: 7 days     Minutes of Exercise per Session: 60 min   Stress: No Stress Concern Present (11/6/2023)    [place] [employer] - Occupational Stress Questionnaire     Feeling of Stress : Only a little   Social Connections: Socially Isolated (11/6/2023)    Social Connection and Isolation Panel [NHANES]     Frequency of Communication with Friends and Family: More than three times a week     Attends Orthodoxy Services: Never     Active Member of Clubs or Organizations: No     Attends Club or Organization Meetings: Never     Marital Status:    Housing Stability: Low Risk  (11/6/2023)    Housing Stability Vital Sign     Unable to Pay for Housing in the Last Year: No     Number of Places Lived in the Last Year: 1     Unstable Housing in the Last Year: No       Allergies:  Losartan    Medications:    Current Outpatient Medications:     abiraterone (ZYTIGA) 250 mg Tab, Take 1 tablet (250 mg total) by mouth once daily., Disp: 30 tablet, Rfl: 11    acetylcarnitin/alpha lipoic ac (ACETYLCARNITIN HCL-A LIPOIC AC) 400-200 mg Cap, Take 1 capsule by mouth once daily., Disp: , Rfl:      albuterol (PROVENTIL HFA) 90 mcg/actuation inhaler, Inhale 2 puffs into the lungs every 6 (six) hours as needed for Wheezing. Rescue, Disp: 18 g, Rfl: 2    albuterol-ipratropium (DUO-NEB) 2.5 mg-0.5 mg/3 mL nebulizer solution, Take 3 mLs by nebulization every 4 (four) hours as needed for Wheezing or Shortness of Breath. Rescue, Disp: 75 mL, Rfl: 0    amiodarone (PACERONE) 200 MG Tab, Take one tablet by mouth daily, Disp: 30 tablet, Rfl: 11    aspirin (ASPIR-81 ORAL), Take by mouth., Disp: , Rfl:     BICALUTAMIDE ORAL, Take 50 mg by mouth once daily., Disp: , Rfl:     calcium-vitamin D3 ([place] CALCIUM-VIT D3) 500 mg-5 mcg (200 unit) per tablet, Take 1 tablet by mouth 2 (two) times daily., Disp: 180 tablet, Rfl: 3    cholecalciferol, vitamin D3, 5,000 unit capsule, Take 5,000 Units by mouth once daily. , Disp: , Rfl:     clonazePAM (KLONOPIN) 2 MG Tab, Take 1 tablet (2 mg total) by mouth every evening., Disp: 30 tablet, Rfl: 0    cyanocobalamin, vitamin B-12, 1,000 mcg/mL Kit, Inject 1 mL as directed every 28 days., Disp: , Rfl:     docusate sodium (COLACE) 100 MG capsule, Take 100 mg by mouth 2 (two) times daily., Disp: , Rfl:     ferrous sulfate (FEOSOL) 325 mg (65 mg iron) Tab tablet, Take 1 tablet (325 mg total) by mouth once daily., Disp: , Rfl: 0    magnesium oxide (MAG-OX) 400 mg tablet, Take 400 mg by mouth once daily., Disp: , Rfl:     metoprolol succinate (TOPROL-XL) 100 MG 24 hr tablet, TAKE 1 TABLET BY MOUTH IN THE  MORNING AND TAKE 1 TABLET BY  MOUTH IN THE EVENING, Disp: 180 tablet, Rfl: 3    potassium chloride SA (K-DUR,KLOR-CON) 20 MEQ tablet, Take 1 tablet (20 mEq total) by mouth once daily., Disp: 90 tablet, Rfl: 3    predniSONE (DELTASONE) 5 MG tablet, Take 1 tablet (5 mg total) by mouth once daily., Disp: 30 tablet, Rfl: 6    rosuvastatin (CRESTOR) 20 MG tablet, Take 20 mg by mouth every morning., Disp: , Rfl:     torsemide (DEMADEX) 20 MG Tab, Take 1 tablet (20 mg total) by mouth once  daily., Disp: 90 tablet, Rfl: 2    vitamin D3-folic acid 5,000 unit- 1 mg Tab, Take 1 tablet by mouth once daily., Disp: , Rfl:     vitamin D3-vitamin K2, MK4, (K2 PLUS D3) 1,000-100 unit-mcg Tab, Take by mouth., Disp: , Rfl:     Current Facility-Administered Medications:     leuprolide acetate (6 month) injection 45 mg, 45 mg, Intramuscular, Q6 Months, Abbie Martinez NP, 45 mg at 02/15/24 1254    PHYSICAL EXAMINATION:  Physical Exam  Vitals and nursing note reviewed.   Constitutional:       General: He is awake.      Appearance: Normal appearance.   HENT:      Head: Normocephalic.      Right Ear: External ear normal.      Left Ear: External ear normal.      Nose: Nose normal.   Cardiovascular:      Rate and Rhythm: Normal rate.   Pulmonary:      Effort: Pulmonary effort is normal. No respiratory distress.   Abdominal:      Palpations: Abdomen is soft.      Tenderness: There is no abdominal tenderness. There is no right CVA tenderness or left CVA tenderness.   Musculoskeletal:         General: Normal range of motion.      Cervical back: Normal range of motion.   Skin:     General: Skin is warm and dry.      Findings: No lesion.   Neurological:      General: No focal deficit present.      Mental Status: He is alert and oriented to person, place, and time.   Psychiatric:         Mood and Affect: Mood normal.         Behavior: Behavior is cooperative.           LABS:          Lab Results   Component Value Date    PSA 13.1 (H) 10/19/2023    PSADIAG 37.4 (H) 02/15/2024    PSADIAG 14.0 (H) 12/14/2023    PSADIAG 14.2 (H) 10/11/2023       Lab Results   Component Value Date    CREATININE 1.5 (H) 02/15/2024    EGFRNORACEVR 45.3 (A) 02/15/2024             IMPRESSION:    Encounter Diagnoses   Name Primary?    Prostate cancer Yes    History of radical retropubic prostatectomy     History of external beam radiation therapy     Rising PSA following treatment for malignant neoplasm of prostate     Elevated PSA, between 10 and  less than 20 ng/ml          Assessment:       1. Prostate cancer    2. History of radical retropubic prostatectomy    3. History of external beam radiation therapy    4. Rising PSA following treatment for malignant neoplasm of prostate    5. Elevated PSA, between 10 and less than 20 ng/ml        Plan:         I spent 30 minutes with the patient of which more than half was spent in direct consultation with the patient in regards to our treatment and plan.  We addressed the office findings and recent labs.   Education and recommendations of today's plan of care including home remedies and needed follow up with PCP.   We discussed his new PSA; reviewed the LUTS and the possible contributory factors for the increase.   Discussed prostate cancer and the agressiveness despite his treatments.  Reassurance that other treatments are available.  LUPRON 45mg IM given today.  Reviewed management  Will discuss with hem/onc about results.  Recommended lifestyle modifications with a proper, healthy diet, good hydration but during the day. Reducing bladder irritants.   Benefits of regular exercise.  RTC 6 months for LUPRON

## 2024-02-16 ENCOUNTER — TELEPHONE (OUTPATIENT)
Dept: HEMATOLOGY/ONCOLOGY | Facility: CLINIC | Age: 84
End: 2024-02-16
Payer: MEDICARE

## 2024-02-16 ENCOUNTER — DOCUMENTATION ONLY (OUTPATIENT)
Dept: HEMATOLOGY/ONCOLOGY | Facility: CLINIC | Age: 84
End: 2024-02-16
Payer: MEDICARE

## 2024-02-16 NOTE — PROGRESS NOTES
Oncology Social Worker received Secure Chat messages yesterday afternoon from Mariya LÓPEZ and Jelly Grewal re: the possibility of a [facility] stay for one night for patient who lives in [place] and is scheduled for appointments on 2/28 and 2/29. Called patient at [phone] and  I.D. # [id number] assisted in leaving a message for patient as he didn't answer and the answering machine message came on. Patient had called for me this morning through the clinic phone staff but I was unable to take the call as I was on hospital rounds and he had asked that I contact his daughter to coordinate the lodging arrangements. Called patient's daughter Karen Mclean, [phone], and spoke with family member who answered and said she wasn't available and he took down my message to give to her. Completed a [facility] referral form with the available information and emailed it to the [facility] requesting a stay for the night of 2/28; will update the form and re-send it after speaking with patient/daughter. Received a call from [place] at the [facility], [phone], after she received my email. Will update the staff at the [facility] after speaking with patient/daughter. Will continue to follow and assist as needs are identified.         Addendum at 2:38 PM - Received a return call from patient with  IJACKIE # [id number] assisting. Discussed the [facility] and his appointment times on 2/28 and 2/29. Vivian asked that I discuss further with his daughter. Awaiting her return call.      Addendum at 4:40 PM - Received additional calls from patient.  IJACKIE # [id number] assisting with translation. Patient asked about appointments possibly being changed to March 4th and 5th. He will have his monthly income then - although I've explained that the [facility] would be free to stay there if they have availability. Offered to check if we have any gas cards available and if so can give one to him when he  comes for his appointments. He also said his daughter will have to drive him and the March dates may be better with her schedule. Explained to him that I cannot change the appointments schedule but I would message the staff in the clinic and he stated understanding and appreciation. Sent additional message in the existing Secure Chat message. I haven't had a return call from his daughter yet. Will continue to follow.

## 2024-02-16 NOTE — TELEPHONE ENCOUNTER
Returned call to daughter no answer. Left voicemail to return call to clinic.     ----- Message from Epikarl Hager sent at 2/16/2024  9:01 AM CST -----  Type:  Patient Returning Call    Who Called:pt   Who Left Message for Patient:  Does the patient know what this is regarding?:appt and pt advice   Would the patient rather a call back or a response via [facility]? Call   Best Call Back Number: [phone]   Additional Information: pt would like to know if he should keep taking the medication, due to his results. Pt would also like to know If his appts can be moved. I did advise that the pt would have to have the blood wok and the Pet scan on one day and to see  on the second day  please give the pt daughter an call at [phone]

## 2024-02-19 ENCOUNTER — DOCUMENTATION ONLY (OUTPATIENT)
Dept: HEMATOLOGY/ONCOLOGY | Facility: CLINIC | Age: 84
End: 2024-02-19
Payer: MEDICARE

## 2024-02-19 NOTE — PROGRESS NOTES
"Oncology Social Worker had missed calls this morning from patient and returned his calls several times before connecting with patient with the assistance of the /[employer] Voice Relay Service. He was calling regarding his appointments and he spoke with his other daughter Heidy Mclean who can assist and drive him for his appointments. Informed him that the staff rescheduled his appointments to early March as he requested and they are now on 3/4 and 3/5. He said that Heidy will be out of town the beginning of March and the Feb. dates are better for her. Informed him that I will send a message to the staff in clinic about this and ask that they call her about the appointments and confirm and reschedule, before I follow up and work on the lodging referral. Patient in agreement.    Copy of the In Basket Epic message I sent to Dr. Wilkins's staff:  "Patient called for me today. He spoke with his other daughter Heidy Mclean ([phone] cell.) and she can assist and bring him for the appointments 2/28 and 2/29. He gave her boyfriend Carlos Noble' number ([phone]) as an alternate as they are always together and she sleeps a lot during the day. Can you call and confirm and reschedule appointments and let me know afterwards so I can work on the lodging referral?"    Will continue to follow.   "

## 2024-02-20 ENCOUNTER — DOCUMENTATION ONLY (OUTPATIENT)
Dept: HEMATOLOGY/ONCOLOGY | Facility: CLINIC | Age: 84
End: 2024-02-20
Payer: MEDICARE

## 2024-02-20 NOTE — PROGRESS NOTES
Oncology Social Worker received several In Basket message updates yesterday and this morning. Patient's appointments have been rescheduled for 3/12 and 3/14 as Dr. Wilkins no longer had available appointments on 2/29. Called patient's daughter Heidy Mclean, [phone], and left a detailed voice mail message for her re: the possibility of lodging at the [facility] and my contact information for her to call me back at if interested in this instead of driving to and from home each day. Will continue to follow and assist as needs are identified.

## 2024-02-26 ENCOUNTER — TELEPHONE (OUTPATIENT)
Dept: HEMATOLOGY/ONCOLOGY | Facility: CLINIC | Age: 84
End: 2024-02-26
Payer: MEDICARE

## 2024-02-26 ENCOUNTER — DOCUMENTATION ONLY (OUTPATIENT)
Dept: HEMATOLOGY/ONCOLOGY | Facility: CLINIC | Age: 84
End: 2024-02-26
Payer: MEDICARE

## 2024-02-26 NOTE — TELEPHONE ENCOUNTER
----- Message from Juju Becker sent at 2/26/2024  8:36 AM CST -----  Regarding: Reschedule Existing Appointment  Contact: Michael/  Reschedule Existing Appointment     Current appt date: 03/ 12 and 14th/2024     Type of appt :Labs,Test, Provider     Physician:Monica Wilkins     Reason for rescheduling: Will not have transportation.     Caller:Michael/ .     Contact Preference:[phone] (home)            Pt : would like to know if the appts for 02/28/2024, is still available. Pt states he thought he still had those appt. Please advise. Requesting a call back with .

## 2024-02-26 NOTE — PROGRESS NOTES
Oncology Social Worker received several In Basket Epic messages from Dr. Wilkins's staff   throughout the day. Patient's appointments were confirmed by staff with patient and his daughter, and a [facility] stay will be needed. Requesting a [facility] stay for patient and his daughter Heidy Mclean from 3/12 - 3/14/24. Sent the lodging request form via email to the [facility] staff. Will continue to follow and assist as needs are identified.

## 2024-02-26 NOTE — PROGRESS NOTES
"Oncology Social Worker received a call this morning from patient via [employer],  ID # [id number] translated for patient. Patient asking again about his clinic appointments and discussed this and explained that I would send a message to Dr. Wilkins's staff in the clinic.     Copy of the In Basket Epic message I sent this morning:  " Yamilka Henry, TEAGAN [facility] Staff  Good Morning,    I received a call this morning from patient and he was asking again about his appointments being rescheduled back to 2/28 and 2/29 - I explained these dates were no longer available for Dr. Wilkins. Reviewed the appointments currently scheduled in March and he's saying his daughter won't be available for these March dates because she'll be out of town.    Please contact patient/daughter about rescheduling again.    Daughter Heidy Mclean cell. [phone]; and if she doesn't answer he said to call her boyfriend Carlos Noble cell. [phone] as they are usually together and she sleeps a lot during the day. Daughter Heidy is who will be driving patient to and from the appointments.    Yamilka Marshall"      Also received an email from Gualberto with the [employer] [facility] and am replying to her via email with update.     Will continue to follow and assist as needs are identified.       "

## 2024-02-27 ENCOUNTER — LAB VISIT (OUTPATIENT)
Dept: LAB | Facility: HOSPITAL | Age: 84
End: 2024-02-27
Attending: INTERNAL MEDICINE
Payer: MEDICARE

## 2024-02-27 DIAGNOSIS — R97.20 ELEVATED PSA: ICD-10-CM

## 2024-02-27 LAB — COMPLEXED PSA SERPL-MCNC: 45.8 NG/ML (ref 0–4)

## 2024-02-27 PROCEDURE — 84153 ASSAY OF PSA TOTAL: CPT | Performed by: INTERNAL MEDICINE

## 2024-02-27 PROCEDURE — 36415 COLL VENOUS BLD VENIPUNCTURE: CPT | Performed by: INTERNAL MEDICINE

## 2024-02-29 ENCOUNTER — DOCUMENTATION ONLY (OUTPATIENT)
Dept: HEMATOLOGY/ONCOLOGY | Facility: CLINIC | Age: 84
End: 2024-02-29
Payer: MEDICARE

## 2024-02-29 ENCOUNTER — TELEPHONE (OUTPATIENT)
Dept: HEMATOLOGY/ONCOLOGY | Facility: CLINIC | Age: 84
End: 2024-02-29
Payer: MEDICARE

## 2024-02-29 NOTE — PROGRESS NOTES
Oncology Social Worker received a call/voice mail message at 3:47 PM this afternoon from patient via [employer], and patient is calling about the bed for March 12th. This is referring to  room at the [facility]. [facility], [phone], and spoke with Seb to follow up on the referral I sent via email earlier this week. He confirmed that they received it on the 26th and staff made contact with patient/daughter and his stay is in for 3/12 - 3/14. Called patient at [phone] and [employer] ID # [id number] translated; explained that patient and his daughter's stay is confirmed for 3/12 - 3/14; explained about the [place] and answered patient's questions. No other needs are indicated at this time. Will continue to follow and assist as needs are identified.

## 2024-02-29 NOTE — TELEPHONE ENCOUNTER
"Called pt with the use of the .   Pt is worried as labs are getting higher.   I assured pt that he is to continue taking the prescribed medications.      will discuss all of the concerns at the time of his appt.       ----- Message from Monica Wilkins MD sent at 2/29/2024  4:19 PM CST -----  This is the reason he has a PET and  visit after  We will discuss at that visit all results    ----- Message -----  From: Fang Paulino RN  Sent: 2/29/2024   4:15 PM CST  To: Monica Wilkins MD      ----- Message -----  From: Monster Chauhan  Sent: 2/29/2024   4:04 PM CST  To: Claudette [facility]A Staff    Consult/Advisory:        Name Of Caller: Self      Contact Preference?: [phone]       Provider Name:  Claudette      Does patient feel the need to be seen today? No      What is the nature of the call?: Calling to discuss his PSA antigen test results. Stating the numbers are higher than they should be. Also inquiring about potential dosage change for abiraterone (ZYTIGA) 250 mg Tab and leuprolide acetate (6 month) injection 45 mg      Additional Notes:  "Thank you for all that you do for our patients"           "

## 2024-03-12 ENCOUNTER — HOSPITAL ENCOUNTER (OUTPATIENT)
Dept: RADIOLOGY | Facility: HOSPITAL | Age: 84
Discharge: HOME | End: 2024-03-12
Attending: REGISTERED NURSE
Payer: MEDICARE

## 2024-03-12 DIAGNOSIS — C61 PROSTATE CANCER: ICD-10-CM

## 2024-03-12 PROCEDURE — A9595 HC PIFLUFOLASTAT F-18, DX, PER 1 MCI: HCPCS | Mod: TB | Performed by: REGISTERED NURSE

## 2024-03-12 PROCEDURE — 78815 PET IMAGE W/CT SKULL-THIGH: CPT | Mod: 26,PS,, | Performed by: STUDENT IN AN ORGANIZED HEALTH CARE EDUCATION/TRAINING PROGRAM

## 2024-03-12 PROCEDURE — 78815 PET IMAGE W/CT SKULL-THIGH: CPT | Mod: TC

## 2024-03-12 RX ADMIN — PIFLUFOLASTAT F-18 10.6 MILLICURIE: 80 INJECTION INTRAVENOUS at 12:03

## 2024-03-14 ENCOUNTER — OFFICE VISIT (OUTPATIENT)
Dept: HEMATOLOGY/ONCOLOGY | Facility: CLINIC | Age: 84
End: 2024-03-14
Payer: MEDICARE

## 2024-03-14 VITALS
DIASTOLIC BLOOD PRESSURE: 75 MMHG | HEART RATE: 77 BPM | OXYGEN SATURATION: 98 % | SYSTOLIC BLOOD PRESSURE: 113 MMHG | WEIGHT: 153.69 LBS | HEIGHT: 67 IN | TEMPERATURE: 97 F | BODY MASS INDEX: 24.12 KG/M2 | RESPIRATION RATE: 17 BRPM

## 2024-03-14 DIAGNOSIS — Z79.818 ENCOUNTER FOR MONITORING ANDROGEN DEPRIVATION THERAPY: ICD-10-CM

## 2024-03-14 DIAGNOSIS — I50.32 CHRONIC DIASTOLIC HEART FAILURE: ICD-10-CM

## 2024-03-14 DIAGNOSIS — I63.412 EMBOLIC STROKE INVOLVING LEFT MIDDLE CEREBRAL ARTERY: ICD-10-CM

## 2024-03-14 DIAGNOSIS — K08.9 DENTAL DISEASE: ICD-10-CM

## 2024-03-14 DIAGNOSIS — K02.7: ICD-10-CM

## 2024-03-14 DIAGNOSIS — Z95.2 S/P TAVR (TRANSCATHETER AORTIC VALVE REPLACEMENT): ICD-10-CM

## 2024-03-14 DIAGNOSIS — Z95.810 AICD (AUTOMATIC CARDIOVERTER/DEFIBRILLATOR) PRESENT: ICD-10-CM

## 2024-03-14 DIAGNOSIS — Z95.2 S/P AVR: ICD-10-CM

## 2024-03-14 DIAGNOSIS — C61 PROSTATE CANCER: ICD-10-CM

## 2024-03-14 DIAGNOSIS — Z95.1 S/P CABG (CORONARY ARTERY BYPASS GRAFT): ICD-10-CM

## 2024-03-14 DIAGNOSIS — H91.3 DEAF, NONSPEAKING: Primary | ICD-10-CM

## 2024-03-14 PROCEDURE — 99999 PR PBB SHADOW E&M-EST. PATIENT-LVL V: CPT | Mod: PBBFAC,,, | Performed by: INTERNAL MEDICINE

## 2024-03-14 PROCEDURE — 99215 OFFICE O/P EST HI 40 MIN: CPT | Mod: S$GLB,,, | Performed by: INTERNAL MEDICINE

## 2024-03-14 NOTE — PROGRESS NOTES
Patient ID: Michael Mclean is a 84 y.o. male.    Chief Complaint: Prostate Cancer          Assessment & Plan                This note was generated with the assistance of ambient listening technology. Verbal consent was obtained by the patient and accompanying visitor(s) for the recording of patient appointment to facilitate this note. I attest to having reviewed and edited the generated note for accuracy, though some syntax or spelling errors may persist. Please contact the author of this note for any clarification.  Subjective     Patient ID: Michael Mclean is a 84 y.o. male.    Chief Complaint: Prostate Cancer    HPI      Presents to clinic for follow up  Reports compliance with zytiga/prednisone- was off > 3 months prior though  No issues since transitioning to [facility].   Continues Lupron- receives with urology.     Overall feels better- dizziness still present  Reviewed cardiac drugs and he is very unclear on indications for these- including not aware one is a diuretic and why he needs     Reviewed below:  - 3/14/2024 PET PSMA:  FINDINGS:  In the head and neck, there is physiologic uptake in the lacrimal and salivary glands, and there are no tracer avid lesions suspicious for malignancy.  In the chest, there are no tracer avid lesions suspicious for malignancy.  There is small volume bilateral dependent pleural effusions, left greater than right with adjacent atelectasis.  Scattered patchy ground-glass attenuation throughout bilateral lungs, poorly characterized due to probable respiratory motion.  Previous 0.7 cm nodule in the left upper lobe with mild tracer uptake is poorly characterized.  No new focal tracer avid lung lesion.  No suspicious tracer avid thoracic lymphadenopathy.  In the abdomen and pelvis, there are postoperative changes status post prostatectomy.  No suspicious tracer uptake about prostatectomy bed.  No new tracer avid abdominopelvic lymphadenopathy.  There is  physiologic distribution in the liver, spleen, bowel, and genitourinary tract.  In the bones, there are numerous tracer avid osseous lesions including multiple new lesions and increased conspicuity of some index lesions.  Index lesions as follows:  Right 2nd rib sclerotic lesion with SUV max 45 (axial fused image 89).  Previously with SUV max 18.  Right scapular lytic lesion with SUV max 8.8 (axial image 111).  Previously with SUV max 15.  Right iliac bone lesion with subtle cortical disruption and SUV max 16 (axial fused image 233).  Previously SUV max 7.9.  New focal tracer uptake in left sacrum with SUV max 9.7 (axial fused image 226).  Additionally, there is interval mixed lucent/sclerotic change throughout bilateral sacral alae noting component of linear lucency extending cortex bilaterally with associated increased tracer uptake.  Findings are concerning for insufficiency fracture given pattern.  Impression:  Progression of tracer avid osseous metastasis.  Index lesions as above.  Interval mixed lucent/sclerotic change in bilateral sacrum with associated tracer uptake, concerning for insufficiency fractures given pattern and CT findings.  Component of underlying metastatic involvement also likely.  Recommend clinical correlation.  May consider further evaluation with dedicated MRI pelvis.    REASON FOR VISIT:  84-year-old male patient with metastatic prostate carcinoma presents today for a follow-up visit to assess compliance on abiraterone and prednisone, and discuss recent PET PSMA scan results.    INTERVAL HISTORY:  The patient has been adhering to his current medication regimen, which includes abiraterone and prednisone from a pharmacy and Lupron injections every 6 months. He has noted some improvement in his dizziness and a pinch-like pain in his flank that occurred two days ago has resolved spontaneously. However, he reports dyspnea on exertion, peripheral edema, and constipation, for which he has been  increasing his fluid intake. He reports nocturia one or 2 times and denies any hematuria or dysuria. He also denies any hematochezia. His last cardiology appointment was in January 2024 following an asymptomatic shock from his ICD, and cardiac issues remain his primary concern. Despite observations of increasing forgetfulness and confusion since his last cerebrovascular accident, the patient denies any cognitive impairment. His PSA levels have decreased from 45.8 to 23.2 as of 03/12/2024. A PET PSMA scan conducted on the same date revealed osseous metastatic spread, sclerotic changes in prior disease areas, improvement in the right scapular lesion, stable in size but increased SUV in the right second rib lesion and right iliac bone lesion, and new uptake at the left sacrum with an SUV of 9.7. The patient denies any headaches, nausea, chest pain, hematuria, dysuria, hematochezia, and use of diuretics.    LABS/PATHOLOGY:  - PSA: The most recent result from 03/12/2024 shows a decrease to 23.2 from 45.8.    IMAGING/RADIOLOGY:  - PET PSMA scan (03/12/2024): Metastatic spread limited to the bones observed.  - Sclerotic changes noted in many of the prior disease areas.  - Improvement seen in the right scapular lesion.  - Worsening SUV stable in size observed in the right second rib lesion and right iliac bone lesion.  - New uptake at the left sacrum with an SUV of 9.7.  - Visceral organs remain normal.    MEDICATIONS:  - Abiraterone, for metastatic prostate carcinoma  - Prednisone, for metastatic prostate carcinoma  - Lupron, every 6-month injections, for metastatic prostate carcinoma  - Amiodarone, for cardiac issues  - Potassium, for heart health  - Torsemide, for heart health  - Furosemide, for heart health    PAST MEDICAL HISTORY:  - Cardiac issues  - Hearing impairment    SOCIAL HISTORY:  - Lives with his daughter who assists with sign language  - Has had issues with cost of medications, indicating potential financial  constraints  - Reports having to remind himself of things multiple times daily  ROS:  Constitutional: +dizziness  Head: -headaches  Respiratory: -shortness of breath  Cardiovascular: -chest pain  Gastrointestinal: -nausea, +constipation  Genitourinary: -dysuria, -hematuria, +nocturia  Psychiatric: +memory problems  Not eating as much recently - generally doesn't like to cook and fairly picky about foods/tries to be more health conscious   Family will  snacks and cook for him as well  Occasionally drinks protein shakes but not consistent  Nocturia improved - now 1-2x per night instead of 3-4x  Denies pain other than right hip pain - stable overall with no significant changes.   Still needs dental work done - has not gone d/t cost - states [facility] dental school also expensive  Son in law has discussed with dentist who will not clear him without procedures done d/t concern for infection as a result  He does not want tooth extracted as he states teeth are still good   No hot flashes   No fevers or chills  No CP, SOB, or palpitations  No nausea, vomiting, constipation or diarrhea  Denies blood in urine or stool  No new urinary complaints today       PSA pending at time of visit today.      Oncology History:  - negative prostate biopsy in 2011 (PSA = 15 ng.ml)     - followed for a year and PSA was around 7 ng/ml     - 2012, PSA increased to 13.49 ng/ml     - 3/20/2012 prostate biopsy:  Pathology revealed Elliott 4+3=7 in 1 core involving 80% of that core, Allen 3+4=7 in 3 cores involving 3 cores involving 70% to 80% of those cores and Elliott 3+3=6 in 1 core involving 5% of that core.      - saw Dr. Bartholomew at [facility] and underwent a robotic prostatectomy with lymphadenectomy on 5/22/2012.   Pathology revealed a Allen 5+4= 9 disease, pT3aN0.      - Unfortunately, Mr. Mclean's PSA did not become undetectable and he completed further restaging that was without evidence of metastatic disease. Mr. Mclean opted to proceed  with salvage therapy and completed one year of combined ADT and EBRT in 10/2013.   Notes at [facility] note that main side effects were weight gain and hot flashes. He has had Testosterone recovery and improved stamina and has been able to loose a little weight since completing therapy. His PSA became detectable again in September 2015 and has been slowly rising since without clinical symptoms.    PSA History at [facility]:  12/06/2016 1.67 OFF   09/12/2016 1.94  07/11/2016 2.48 Test 221  04/11/2016 1.76 OFF  12/08/2015 1.36   10/06/2015 1.43 OFF  09/01/2015 0.84  05/15/2015 0.08 Test 139  11/06/2014 0.02 Test 128  06/30/2014 <0.01 Test 181  03/31/2014 <0.01 Test 119  01/06/2014 0.01 Test 67  10/07/2013 <0.01 Casodex and LHRHa discontinued  07/01/2013 <0.01  01/08/2013 0.02  10/09/2012 0.82 Salvage CAA Started Test 296  09/19/2012 0.56  08/22/2012 0.43  06/13/2012 0.49  05/08/2012 13.49 PTX 5/22/2012, Elliott 9, pT3aN0     We evaluated in 2022 with rising PSA  - 2/14/2022 CT A/P:  FINDINGS:  No suspicious mass, lymphadenopathy, or change from the previous examination of 09/30/2021.  Scattered small aortocaval lymph nodes, unchanged.  Left renal cysts and left renal atrophy, unchanged.  Somewhat small liver.  Stones within an otherwise unremarkable gallbladder.  Unremarkable right kidney, adrenals, spleen and pancreas.  Artifact secondary to a right hip prosthesis.  Impression:  No evidence of metastatic disease in the abdomen or pelvis     2/14- /2022 Bone scan:  FINDINGS:  Radiotracer accumulation within the right 2nd rib has increased since 09/30/2021.  Otherwise, additional sites of radiotracer accumulation have not significantly changed in appearance since the prior exam other than resolution of the activity right skull base.  Patchy, suspected degenerative type uptake, cervical spine, lumbar spine, both knees and about right hip and shoulder prostheses.  Impression:  1. Increased radiotracer accumulation within the right 2nd  rib as compared to bone scan of 09/30/2021 suspicious for metastatic disease  2. Otherwise, suspected degenerative type uptake within cervical spine, lumbar spine, both knees and about right shoulder and knee prostheses appears similar     - was started on Lupron and casodex  Hesitation around newer generation ADT with cardiac history     - issues with follow up  Lives in [place]- several missed appointments        3/6/2023 PSA = 11.2 ng/ml     - 3/24/2023 PET PSMA:  FINDINGS:  In the head and neck, there is physiologic uptake in the lacrimal and salivary glands, and there are no tracer avid lesions suspicious for malignancy.  In the chest, there are no tracer avid lesions suspicious for malignancy.  Multiple subcentimeter pulmonary nodules which are below size threshold for evaluation with PET.  For example: 0.7 cm nodule in the periphery the left upper lobe with mildly increased radiotracer uptake SUV max of 3.3 (series 3, image 28), likely present on prior CTA although slightly more prominent on today's exam.  0.3 cm pulmonary nodule in the right lower lobe (series 3, image 194), unchanged from CT 09/30/2021  In the abdomen and pelvis, there is physiologic distribution in the liver, spleen, bowel, and genitourinary tract, and there are no anatomic or tracer avid lesions suspicious for malignancy.  Specifically, there are no pathologically enlarged or tracer avid pelvic or retroperitoneal lymph nodes.  In the bones, there are several tracer avid lesions concerning for bony metastases:  * right 2nd rib long segment of tracer uptake (axial series images 225-239) with max SUV 18.  Mildly increased sclerosis is noted on CT.  * right scapula lytic lesion (3-202) with max SUV of 15.  * right iliac bone tracer avid lesion with max SUV of 7.9 (axial series image 86), corresponding to area of subtle cortical disruption on CT (3-87), though this is unchanged from CT 11/05/2020.  * T10 inferior endplate hypermetabolic  lesion with SUV max of 4.8 (fused axial series image 173) without definite underlying CT correlate.  * C3 vertebral body a tracer avid lesion with max SUV 11.0 (fused axial series image 264 without definite underlying CT correlate).  Additional CT findings:  Right shoulder arthroplasty.  Left hip arthroplasty.  Left chest wall pacer device.  Postoperative change of sternotomy. Left atrial appendage occlusion device.  Change related to aortic valve replacement.  Dense calcific atherosclerosis of the coronary arteries.  Prominent calcific atherosclerosis.  Cholelithiasis.  Left kidney is atrophic with two stable fluid attenuating cysts. Prostate is surgically absent.  Colonic diverticulosis.  Bilateral scrotal/testicular calcifications.  Degenerative changes.  Impression:  In this patient with prostate cancer status post prostatectomy, there are multiple foci of increased uptake throughout osseous structures with index lesions detailed above.  Few of these lesions correspond to underlying lytic and sclerotic lesions. Findings are overall concerning for osseous metastases.  No evidence of local tumor recurrence at prostatectomy site or driss involvement of disease.  Multiple subcentimeter pulmonary nodules including nodule in the left upper lobe with mildly increased radiotracer uptake, nonspecific.     - on Zytiga/prednisone/Lupron  Xgeva on hold     Past Medical History:   Carotid artery stenosis - endarterectomy on left, monitored on right  - Prior CVA x 2, slight facial droop residual   Deaf - need an     Essential hypertension, benign    Hyperlipidemia    Mitral valve regurgitation   severe MVP, moderately to severe MR    MVP (mitral valve prolapse) 8/25/11   severe MVP, moderately to severe MR    PAD (peripheral artery disease)      FH:  No prostate cancer  No breast, ovarian cancer  No cancers     SH:  Worked for [employer]  - took steroids  No tobacco  No EtOH  (rare)  Lives alone- 7 children  Physical Exam            Metastatic Prostate Cancer-   We reviewed progression and options at previous visit   Changed to new generation ADT with zytiga/predisone in May 2023   PSA has stabilized over last few visits   Due for Lupron today with urology   Future refills through [facility]  Will continue regimen and reassess in ~8-10 weeks      Osteoporosis- continue Calcium/Vit D  Cannot get Xgeva with dental issues   Again discussed rationale for Xgeva as well as need for dental clearance. He is unable to have dental work d/t expenses. Recommended [employer] dental school at previous visit, patient states still too expensive. Per son-in-law, dentist will not clear for medication until procedures are complete. He does not have any pain at this time and does not feel he needs to see a dentist.   Family will continue to discuss with patient and encourage him to do small procedures at a time if possible to avoid large bill at once.   Will continue to hold medication until issues resolved/treated     Hearing impaired  Daughter- [phone]  Heidy- use for communication  Review of Systems   Constitutional:  Negative for activity change, appetite change, chills, fatigue, fever and unexpected weight change (gained weight).   HENT:  Positive for dental problem and hearing loss. Negative for trouble swallowing.    Eyes:  Negative for visual disturbance.   Respiratory:  Negative for cough, shortness of breath and wheezing.    Cardiovascular:  Negative for chest pain, palpitations and leg swelling.   Gastrointestinal:  Positive for constipation. Negative for abdominal distention, abdominal pain, blood in stool, change in bowel habit, diarrhea, nausea and vomiting.   Genitourinary:  Positive for frequency (some nocturia noted- 3 x). Negative for bladder incontinence, decreased urine volume, difficulty urinating, dysuria, hematuria and urgency.   Musculoskeletal:  Positive for joint deformity  (right hip). Negative for arthralgias, back pain, gait problem, leg pain and myalgias.   Integumentary:  Negative for rash and wound.   Neurological:  Positive for dizziness (at times with cardiac condition), weakness and memory loss. Negative for numbness, headaches and coordination difficulties.   Hematological:  Negative for adenopathy. Does not bruise/bleed easily.   Psychiatric/Behavioral:  Negative for agitation, behavioral problems, confusion and dysphoric mood. The patient is not nervous/anxious.           Objective     Physical Exam  Vitals and nursing note reviewed.   Constitutional:       General: He is not in acute distress.     Appearance: Normal appearance. He is normal weight. He is not ill-appearing.      Comments: ECOG= 1  Presents with daughter; interpretor present  In a wheelchair- chronic   HENT:      Head: Normocephalic and atraumatic.      Right Ear: External ear normal.      Left Ear: External ear normal.      Nose: Nose normal. No congestion.      Mouth/Throat:      Pharynx: Oropharynx is clear. No oropharyngeal exudate or posterior oropharyngeal erythema.      Comments: Dental issues  Eyes:      General: No scleral icterus.     Extraocular Movements: Extraocular movements intact.      Conjunctiva/sclera: Conjunctivae normal.      Pupils: Pupils are equal, round, and reactive to light.   Cardiovascular:      Rate and Rhythm: Normal rate.      Heart sounds: Murmur heard.   Pulmonary:      Effort: Pulmonary effort is normal. No respiratory distress.      Breath sounds: Normal breath sounds. No wheezing, rhonchi or rales.   Chest:      Chest wall: No tenderness.   Abdominal:      General: Abdomen is flat. Bowel sounds are normal. There is no distension.      Palpations: Abdomen is soft. There is no mass.      Tenderness: There is no abdominal tenderness. There is no guarding or rebound.      Comments: No organomegaly   Musculoskeletal:         General: No swelling, tenderness or deformity.  Normal range of motion.      Cervical back: Normal range of motion and neck supple. No rigidity.      Right lower leg: No edema.      Left lower leg: No edema.   Lymphadenopathy:      Cervical: No cervical adenopathy.   Skin:     General: Skin is warm and dry.      Coloration: Skin is not jaundiced or pale.      Findings: No bruising, erythema or rash.   Neurological:      General: No focal deficit present.      Mental Status: He is alert and oriented to person, place, and time.      Sensory: No sensory deficit.      Motor: No weakness.      Coordination: Coordination normal.      Gait: Gait abnormal.      Comments: Noted memory loss     Psychiatric:         Mood and Affect: Mood normal.         Behavior: Behavior normal.         Judgment: Judgment normal.          Assessment and Plan     1. Deaf, nonspeaking    2. Embolic stroke involving left middle cerebral artery    3. Dental disease    4. Chronic diastolic heart failure    5. S/P AVR    6. S/P CABG (coronary artery bypass graft)    7. AICD (automatic cardioverter/defibrillator) present  Overview:  History of HFrEF (2019) with recovered LVEF , Had Bi-V/ICD placed by [facility] in 2019        8. S/P TAVR (transcatheter aortic valve replacement)    9. Prostate cancer    10. 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  - Will continue current prostate cancer treatment due to PSA decrease.  - Discussed need for dental clearance to start bone-protecting medication.  - Recommend follow-up with neurologist for memory concerns.  - Advised patient to call for refills on medication through the pharmacy.  - Suggested contacting the dentist or [facility] for dental work assistance.  CARDIAC ISSUES:  - Informed about heart medication side effects including lightheadedness and dizziness.  - Explained the role of diuretics in managing heart failure.  - Suggested contacting the pharmacy for medication refills.  - Encouraged follow-up with cardiologist for better understanding of heart medications.  BONE HEALTH:  - Discussed necessity of dental clearance before starting bone-protecting medication.  HYDRATION AND BOWEL MOVEMENT:  - Advised to increase water intake for improved hydration and bowel movements.  - Recommend MiraLax for constipation relief.  NEUROLOGICAL CONCERNS:  - Suggested consultation with neurologist for memory concerns.  SKIN CARE AND WOUND MANAGEMENT:  - Advised to monitor leg wounds and maintain skin care to prevent cellulitis.  CANCER SCREENING:  - Ordered PET PSMA scan.  DENTAL HEALTH:  - Referred to the dentist for dental evaluation and possible root canal.  GENERAL FOLLOW-UP:  - Scheduled follow-up visit with current provider.  Multiple CV diagnoses/risk factors  CABG, CVA, HTN, a fib-  Managed locally by PCP and Cardiologist  Stable on medications - recently adjusted and tolerating well.   Had asymptomatic shock in 1/2024 - no new concerns/symptoms today.          Very concerned about memory loss and retention of directions and side effects of medications  Reviewed our meds and side effects again today we discussed PET likely with worsening  areas and 1 new area due to several months missed  Now PSA back down on therapy and will monitor    Dental referral as can not start Xgeva as a result    Route Chart for Scheduling    Med Onc Chart Routing      Follow up with physician . Dentist   Follow up with [facility]    Infusion scheduling note    Injection scheduling note    Labs    Imaging    Pharmacy appointment    Other referrals              Therapy Plan Information  Medications  denosumab (XGEVA) solution 120 mg  120 mg, Subcutaneous, Every 4 weeks

## 2024-03-14 NOTE — PROGRESS NOTES
Patient ID: Michael Mclean is a 84 y.o. male.    Chief Complaint: Prostate Cancer    History of Present Illness              Physical Exam              Assessment & Plan               ***    No follow-ups on file.    This note was generated with the assistance of ambient listening technology. Verbal consent was obtained by the patient and accompanying visitor(s) for the recording of patient appointment to facilitate this note. I attest to having reviewed and edited the generated note for accuracy, though some syntax or spelling errors may persist. Please contact the author of this note for any clarification.  Patient ID: Michael Mclean is a 84 y.o. male.    Chief Complaint: No chief complaint on file.    History of Present Illness            Presents to clinic for follow up  Reports compliance with zytiga/prednisone  No issues since transitioning to [facility].   Due for Lupron today - receives with urology.     Overall feels better- dizziness improved  Was in ED 12/23/23 for RSV infection/SOB and found to have suspicion for CHF  Has been following with cardiology   Recently put on amiodarone as well - had an asymptomatic shock from ICD on 1/12/24  Continues with intermittent mild dizziness, though states this is better since starting sotalol   Stamina low, has to rest after mild/moderate exertion   Trying to walk around more but has to hold on to things for stability      - 3/14/2024 PET PSMA:  FINDINGS:  In the head and neck, there is physiologic uptake in the lacrimal and salivary glands, and there are no tracer avid lesions suspicious for malignancy.     In the chest, there are no tracer avid lesions suspicious for malignancy.  There is small volume bilateral dependent pleural effusions, left greater than right with adjacent atelectasis.  Scattered patchy ground-glass attenuation throughout bilateral lungs, poorly characterized due to probable respiratory motion.  Previous 0.7 cm nodule  in the left upper lobe with mild tracer uptake is poorly characterized.  No new focal tracer avid lung lesion.  No suspicious tracer avid thoracic lymphadenopathy.     In the abdomen and pelvis, there are postoperative changes status post prostatectomy.  No suspicious tracer uptake about prostatectomy bed.  No new tracer avid abdominopelvic lymphadenopathy.     There is physiologic distribution in the liver, spleen, bowel, and genitourinary tract.     In the bones, there are numerous tracer avid osseous lesions including multiple new lesions and increased conspicuity of some index lesions.  Index lesions as follows:     Right 2nd rib sclerotic lesion with SUV max 45 (axial fused image 89).  Previously with SUV max 18.     Right scapular lytic lesion with SUV max 8.8 (axial image 111).  Previously with SUV max 15.     Right iliac bone lesion with subtle cortical disruption and SUV max 16 (axial fused image 233).  Previously SUV max 7.9.     New focal tracer uptake in left sacrum with SUV max 9.7 (axial fused image 226).     Additionally, there is interval mixed lucent/sclerotic change throughout bilateral sacral alae noting component of linear lucency extending cortex bilaterally with associated increased tracer uptake.  Findings are concerning for insufficiency fracture given pattern.     Impression:     Progression of tracer avid osseous metastasis.  Index lesions as above.     Interval mixed lucent/sclerotic change in bilateral sacrum with associated tracer uptake, concerning for insufficiency fractures given pattern and CT findings.  Component of underlying metastatic involvement also likely.  Recommend clinical correlation.  May consider further evaluation with dedicated MRI pelvis.    Not eating as much recently - generally doesn't like to cook and fairly picky about foods/tries to be more health conscious   Family will  snacks and cook for him as well  Occasionally drinks protein shakes but not  consistent  Nocturia improved - now 1-2x per night instead of 3-4x  Denies pain other than right hip pain - stable overall with no significant changes.   Still needs dental work done - has not gone d/t cost - states [facility] [place] [facility] also expensive  Son in law has discussed with dentist who will not clear him without procedures done d/t concern for infection as a result  He does not want tooth extracted as he states teeth are still good   No hot flashes   No fevers or chills  No CP, SOB, or palpitations  No nausea, vomiting, constipation or diarrhea  Denies blood in urine or stool  No new urinary complaints today       PSA pending at time of visit today.      Oncology History:  - negative prostate biopsy in 2011 (PSA = 15 ng.ml)     - followed for a year and PSA was around 7 ng/ml     - 2012, PSA increased to 13.49 ng/ml     - 3/20/2012 prostate biopsy:  Pathology revealed [place] 4+3=7 in 1 core involving 80% of that core, [place] 3+4=7 in 3 cores involving 3 cores involving 70% to 80% of those cores and [place] 3+3=6 in 1 core involving 5% of that core.      - saw Dr. Bartholomew at [facility] and underwent a robotic prostatectomy with lymphadenectomy on 5/22/2012.   Pathology revealed a [place] 5+4= 9 disease, pT3aN0.      - Unfortunately, Mr. Mclean's PSA did not become undetectable and he completed further restaging that was without evidence of metastatic disease. Mr. Mclean opted to proceed with salvage therapy and completed one year of combined ADT and EBRT in 10/2013.   Notes at [facility] note that main side effects were weight gain and hot flashes. He has had Testosterone recovery and improved stamina and has been able to loose a little weight since completing therapy. His PSA became detectable again in September 2015 and has been slowly rising since without clinical symptoms.    PSA History at [facility]:  12/06/2016 1.67 OFF   09/12/2016 1.94  07/11/2016 2.48 Test 221  04/11/2016 1.76 OFF  12/08/2015 1.36   10/06/2015 1.43  OFF  09/01/2015 0.84  05/15/2015 0.08 Test 139  11/06/2014 0.02 Test 128  06/30/2014 <0.01 Test 181  03/31/2014 <0.01 Test 119  01/06/2014 0.01 Test 67  10/07/2013 <0.01 Casodex and LHRHa discontinued  07/01/2013 <0.01  01/08/2013 0.02  10/09/2012 0.82 Salvage CAA Started Test 296  09/19/2012 0.56  08/22/2012 0.43  06/13/2012 0.49  05/08/2012 13.49 PTX 5/22/2012, [place] 9, pT3aN0     We evaluated in 2022 with rising PSA  - 2/14/2022 CT A/P:  FINDINGS:  No suspicious mass, lymphadenopathy, or change from the previous examination of 09/30/2021.  Scattered small aortocaval lymph nodes, unchanged.  Left renal cysts and left renal atrophy, unchanged.  Somewhat small liver.  Stones within an otherwise unremarkable gallbladder.  Unremarkable right kidney, adrenals, spleen and pancreas.  Artifact secondary to a right hip prosthesis.  Impression:  No evidence of metastatic disease in the abdomen or pelvis     2/14- /2022 Bone scan:  FINDINGS:  Radiotracer accumulation within the right 2nd rib has increased since 09/30/2021.  Otherwise, additional sites of radiotracer accumulation have not significantly changed in appearance since the prior exam other than resolution of the activity right skull base.  Patchy, suspected degenerative type uptake, cervical spine, lumbar spine, both knees and about right hip and shoulder prostheses.  Impression:  1. Increased radiotracer accumulation within the right 2nd rib as compared to bone scan of 09/30/2021 suspicious for metastatic disease  2. Otherwise, suspected degenerative type uptake within cervical spine, lumbar spine, both knees and about right shoulder and knee prostheses appears similar     - was started on Lupron and casodex  Hesitation around newer generation ADT with cardiac history     - issues with follow up  Lives in [place]- several missed appointments        3/6/2023 PSA = 11.2 ng/ml     - 3/24/2023 PET PSMA:  FINDINGS:  In the head and neck, there is physiologic  uptake in the lacrimal and salivary glands, and there are no tracer avid lesions suspicious for malignancy.  In the chest, there are no tracer avid lesions suspicious for malignancy.  Multiple subcentimeter pulmonary nodules which are below size threshold for evaluation with PET.  For example: 0.7 cm nodule in the periphery the left upper lobe with mildly increased radiotracer uptake SUV max of 3.3 (series 3, image 28), likely present on prior CTA although slightly more prominent on today's exam.  0.3 cm pulmonary nodule in the right lower lobe (series 3, image 194), unchanged from CT 09/30/2021  In the abdomen and pelvis, there is physiologic distribution in the liver, spleen, bowel, and genitourinary tract, and there are no anatomic or tracer avid lesions suspicious for malignancy.  Specifically, there are no pathologically enlarged or tracer avid pelvic or retroperitoneal lymph nodes.  In the bones, there are several tracer avid lesions concerning for bony metastases:  * right 2nd rib long segment of tracer uptake (axial series images 225-239) with max SUV 18.  Mildly increased sclerosis is noted on CT.  * right scapula lytic lesion (3-202) with max SUV of 15.  * right iliac bone tracer avid lesion with max SUV of 7.9 (axial series image 86), corresponding to area of subtle cortical disruption on CT (3-87), though this is unchanged from CT 11/05/2020.  * T10 inferior endplate hypermetabolic lesion with SUV max of 4.8 (fused axial series image 173) without definite underlying CT correlate.  * C3 vertebral body a tracer avid lesion with max SUV 11.0 (fused axial series image 264 without definite underlying CT correlate).  Additional CT findings:  Right shoulder arthroplasty.  Left hip arthroplasty.  Left chest wall pacer device.  Postoperative change of sternotomy. Left atrial appendage occlusion device.  Change related to aortic valve replacement.  Dense calcific atherosclerosis of the coronary arteries.   Prominent calcific atherosclerosis.  Cholelithiasis.  Left kidney is atrophic with two stable fluid attenuating cysts. Prostate is surgically absent.  Colonic diverticulosis.  Bilateral scrotal/testicular calcifications.  Degenerative changes.  Impression:  In this patient with prostate cancer status post prostatectomy, there are multiple foci of increased uptake throughout osseous structures with index lesions detailed above.  Few of these lesions correspond to underlying lytic and sclerotic lesions. Findings are overall concerning for osseous metastases.  No evidence of local tumor recurrence at prostatectomy site or driss involvement of disease.  Multiple subcentimeter pulmonary nodules including nodule in the left upper lobe with mildly increased radiotracer uptake, nonspecific.     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   Had asymptomatic shock in 1/2024 - no new concerns/symptoms today.      Metastatic Prostate Cancer-   We reviewed progression and options at previous visit   Changed to new generation ADT with zytiga/predisone in May 2023   PSA has stabilized over last few visits   Due for Lupron  today with urology   Future refills through [facility]  Will continue regimen and reassess in ~8-10 weeks      Osteoporosis- continue Calcium/Vit D  Cannot get Xgeva with dental issues   Again discussed rationale for Xgeva as well as need for dental clearance. He is unable to have dental work d/t expenses. Recommended [facility] at previous visit, patient states still too expensive. Per son-in-law, dentist will not clear for medication until procedures are complete. He does not have any pain at this time and does not feel he needs to see a dentist.   Family will continue to discuss with patient and encourage him to do small procedures at a time if possible to avoid large bill at once.   Will continue to hold medication until issues resolved/treated     Hearing impaired  Daughter- [phone]  Heidy- use for communication    ***    No follow-ups on file.    This note was generated with the assistance of ambient listening technology. Verbal consent was obtained by the patient and accompanying visitor(s) for the recording of patient appointment to facilitate this note. I attest to having reviewed and edited the generated note for accuracy, though some syntax or spelling errors may persist. Please contact the author of this note for any clarification.

## 2024-03-15 ENCOUNTER — NURSE TRIAGE (OUTPATIENT)
Dept: ADMINISTRATIVE | Facility: CLINIC | Age: 84
End: 2024-03-15
Payer: MEDICARE

## 2024-03-15 NOTE — TELEPHONE ENCOUNTER
Pt ( on line as well, ID [id number] [employer]) reports he has a medication, Sotalol 80mg, from 12/5/23 that he just found and wasn't sure if he should be taking the medication or not, states he hasn't taken it in a very long time, states he forgot he had it. Pt reports he had a lot of medications changed recently. That medication is not seen in pt's med list or easily found in an office note (did go back to around the date it was prescribed per pt's report of 12/5/23), Pt advised to call his PCP when the office is open on Monday to clarify per protocol, discussed what meds pt currently had listed such as amiodarone, metoprolol (pt wanted to clarify he was taking them as prescribed, and he was). Pt encouraged to call back with any worsening symptoms or questions. He verbalized understanding.      Reason for Disposition   [1] Caller has NON-URGENT medicine question about med that PCP prescribed AND [2] triager unable to answer question    Additional Information   Negative: [1] Intentional drug overdose AND [2] suicidal thoughts or ideas   Negative: MORE THAN A DOUBLE DOSE of a prescription or over-the-counter (OTC) drug   Negative: [1] DOUBLE DOSE (an extra dose or lesser amount) of prescription drug AND [2] any symptoms (e.g., dizziness, nausea, pain, sleepiness)   Negative: [1] DOUBLE DOSE (an extra dose or lesser amount) of over-the-counter (OTC) drug AND [2] any symptoms (e.g., dizziness, nausea, pain, sleepiness)   Negative: Took another person's prescription drug   Negative: [1] DOUBLE DOSE (an extra dose or lesser amount) of prescription drug AND [2] NO symptoms  (Exception: A double dose of antibiotics.)   Negative: Diabetes drug error or overdose (e.g., took wrong type of insulin or took extra dose)   Negative: [1] Prescription not at pharmacy AND [2] was prescribed by PCP recently (Exception: Triager has access to EMR and prescription is recorded there. Go to Home Care and  confirm for pharmacy.)   Negative: [1] Pharmacy calling with prescription question AND [2] triager unable to answer question   Negative: [1] Caller has URGENT medicine question about med that PCP or specialist prescribed AND [2] triager unable to answer question   Negative: Medicine patch causing local rash or itching   Negative: [1] Caller has medicine question about med NOT prescribed by PCP AND [2] triager unable to answer question (e.g., compatibility with other med, storage)   Negative: Prescription request for new medicine (not a refill)    Protocols used: Medication Question Call-A-

## 2024-03-18 ENCOUNTER — TELEPHONE (OUTPATIENT)
Dept: ELECTROPHYSIOLOGY | Facility: CLINIC | Age: 84
End: 2024-03-18
Payer: MEDICARE

## 2024-03-18 ENCOUNTER — TELEPHONE (OUTPATIENT)
Dept: CARDIOLOGY | Facility: CLINIC | Age: 84
End: 2024-03-18
Payer: MEDICARE

## 2024-03-18 NOTE — TELEPHONE ENCOUNTER
----- Message from Yandy Karimi sent at 3/18/2024  2:17 PM CDT -----  Regarding: Refill  Patient calling to speak with staff and asking can he stop taking sotalol 80 mg because it makes him fill uncomfortable and asking can he take amiodarone (PACERONE) 200 MG Tab. Please call back @ [phone]. Thank you Yandy

## 2024-03-18 NOTE — TELEPHONE ENCOUNTER
----- Message from Jennifer Guevara, RN sent at 3/18/2024  4:39 PM CDT -----  Regarding: FW: Refill  Please clarify which meds pt is supposed to take, contradicting info in chart.    Please adviseJennifer,  General Cardiology Triage RN    ----- Message -----  From: Yandy Karimi  Sent: 3/18/2024   2:23 PM CDT  To: Jennifer Guevara, RN  Subject: Refill                                           Patient calling to speak with staff and asking can he stop taking sotalol 80 mg because it makes him fill uncomfortable and asking can he take amiodarone (PACERONE) 200 MG Tab. Please call back @ [phone]. Thank you Yandy

## 2024-03-18 NOTE — TELEPHONE ENCOUNTER
Spoke with patient through . Despite our conversation on 2/1/2024 with , he has continued to take Sotalol and Amiodarone (states he forgot). Advised that he cannot take both. We want him on Amiodarone 200mg daily and NOT Sotalol. Again, he verbalized understanding and repeated back the instructions.

## 2024-04-11 ENCOUNTER — TELEPHONE (OUTPATIENT)
Dept: ELECTROPHYSIOLOGY | Facility: CLINIC | Age: 84
End: 2024-04-11
Payer: MEDICARE

## 2024-04-11 NOTE — TELEPHONE ENCOUNTER
----- Message from Yandy Karimi sent at 4/11/2024  8:33 AM CDT -----  Regarding: Appt  Patient calling to change his appt and asking to see if he can get them on the same day. Please call back @ [phone]. Thank you Yandy

## 2024-04-12 ENCOUNTER — TELEPHONE (OUTPATIENT)
Dept: ELECTROPHYSIOLOGY | Facility: CLINIC | Age: 84
End: 2024-04-12
Payer: MEDICARE

## 2024-04-12 NOTE — TELEPHONE ENCOUNTER
Spoke with pt, through , pt reports that he feels the amiodarone is too strong or not the right medication for him, confirmed he is on amiodarone 200mg daily and no longer on the sotalol, reports he is SOB with walking, showering or any activity, denies SOB at rest or while sleeping, b/p 123/85 hr76, he missed his clinic visit appt with Valeri yesterday, he reports he daughter was sick and unable to bring him, he wants to switch the amio to alternative or cut dose in half, informed pt this will be discussed with Dr Boo and we will let him know his recommendation, instructed that if he feels the SOB worsens or is present at rest, he should go to ER

## 2024-04-12 NOTE — TELEPHONE ENCOUNTER
----- Message from Mikey Mclean MA sent at 4/12/2024 10:35 AM CDT -----    ----- Message -----  From: Karoline Mclean  Sent: 4/12/2024  10:31 AM CDT  To: [facility] Staff    Type:  Medical Advice Call Back    Who Called: DILIP MCLEAN [[id number]]      Pharmacy name and phone #: [address]  Would the patient rather a call back or a response via [facility]? Call   Best Call Back Number: [phone]    Additional Information: Patient states that amiodarone (PACERONE) 200 MG Tab is making him feel like it is hard to catch his breathe with everyday activities.  Patient states that the provider prescribed this medication before and patient had labored breathing.  Patient states he has recently been prescribed this medication again, patient is not currently taking this medication as he has  had issues with it in the past.  Patient states that he would like to be prescribed another medication as he cannot take this medication. Patient states he does not think this medication is right for him. Please call back for further assistance and more information.

## 2024-04-12 NOTE — TELEPHONE ENCOUNTER
Reviewed with Dr Boo, called pt to notify he that Dr Boo said he is very limited on medication he can use and he should stay on the medication and come in to see him or Valeri next week to discuss, called through  services with his phone#, there was no answer and left message to stay on the medication and that our office would be calling to schedule an appt

## 2024-04-15 ENCOUNTER — LAB VISIT (OUTPATIENT)
Dept: LAB | Facility: HOSPITAL | Age: 84
End: 2024-04-15
Attending: INTERNAL MEDICINE
Payer: MEDICARE

## 2024-04-15 ENCOUNTER — TELEPHONE (OUTPATIENT)
Dept: ELECTROPHYSIOLOGY | Facility: CLINIC | Age: 84
End: 2024-04-15
Payer: MEDICARE

## 2024-04-15 DIAGNOSIS — I50.20 HFREF (HEART FAILURE WITH REDUCED EJECTION FRACTION): ICD-10-CM

## 2024-04-15 DIAGNOSIS — I38 VHD (VALVULAR HEART DISEASE): ICD-10-CM

## 2024-04-15 DIAGNOSIS — D64.9 ANEMIA, UNSPECIFIED TYPE: ICD-10-CM

## 2024-04-15 DIAGNOSIS — I48.19 PERSISTENT ATRIAL FIBRILLATION: ICD-10-CM

## 2024-04-15 DIAGNOSIS — E78.49 OTHER HYPERLIPIDEMIA: ICD-10-CM

## 2024-04-15 DIAGNOSIS — E78.5 HYPERLIPIDEMIA, UNSPECIFIED HYPERLIPIDEMIA TYPE: ICD-10-CM

## 2024-04-15 DIAGNOSIS — Z79.899 ENCOUNTER FOR LONG-TERM (CURRENT) USE OF OTHER MEDICATIONS: ICD-10-CM

## 2024-04-15 DIAGNOSIS — I48.0 PAROXYSMAL ATRIAL FIBRILLATION: ICD-10-CM

## 2024-04-15 DIAGNOSIS — I25.10 CORONARY ARTERY DISEASE, UNSPECIFIED VESSEL OR LESION TYPE, UNSPECIFIED WHETHER ANGINA PRESENT, UNSPECIFIED WHETHER NATIVE OR TRANSPLANTED HEART: ICD-10-CM

## 2024-04-15 DIAGNOSIS — I10 ESSENTIAL HYPERTENSION: ICD-10-CM

## 2024-04-15 LAB
ALBUMIN SERPL BCP-MCNC: 3.1 G/DL (ref 3.5–5.2)
ALP SERPL-CCNC: 93 U/L (ref 55–135)
ALT SERPL W/O P-5'-P-CCNC: 29 U/L (ref 10–44)
ANION GAP SERPL CALC-SCNC: 4 MMOL/L (ref 3–11)
AST SERPL-CCNC: 31 U/L (ref 10–40)
BASOPHILS # BLD AUTO: 0.07 K/UL (ref 0–0.2)
BASOPHILS NFR BLD: 0.7 % (ref 0–1.9)
BILIRUB SERPL-MCNC: 0.7 MG/DL (ref 0.1–1)
BUN SERPL-MCNC: 28 MG/DL (ref 8–23)
CALCIUM SERPL-MCNC: 9.4 MG/DL (ref 8.7–10.5)
CHLORIDE SERPL-SCNC: 103 MMOL/L (ref 95–110)
CHOLEST SERPL-MCNC: 119 MG/DL (ref 120–199)
CHOLEST/HDLC SERPL: 2.1 {RATIO} (ref 2–5)
CO2 SERPL-SCNC: 32 MMOL/L (ref 23–29)
CREAT SERPL-MCNC: 1.6 MG/DL (ref 0.5–1.4)
DIFFERENTIAL METHOD BLD: ABNORMAL
EOSINOPHIL # BLD AUTO: 0.6 K/UL (ref 0–0.5)
EOSINOPHIL NFR BLD: 5.9 % (ref 0–8)
ERYTHROCYTE [DISTWIDTH] IN BLOOD BY AUTOMATED COUNT: 14 % (ref 11.5–14.5)
EST. GFR  (NO RACE VARIABLE): 41.7 ML/MIN/1.73 M^2
GLUCOSE SERPL-MCNC: 93 MG/DL (ref 70–110)
HCT VFR BLD AUTO: 40 % (ref 40–54)
HDLC SERPL-MCNC: 56 MG/DL (ref 40–75)
HDLC SERPL: 47.1 % (ref 20–50)
HGB BLD-MCNC: 13.2 G/DL (ref 14–18)
IMM GRANULOCYTES # BLD AUTO: 0.03 K/UL (ref 0–0.04)
IMM GRANULOCYTES NFR BLD AUTO: 0.3 % (ref 0–0.5)
IRON SATN MFR SERPL: 33 % (ref 20–50)
IRON SERPL-MCNC: 103 UG/DL (ref 45–160)
LDLC SERPL CALC-MCNC: 50 MG/DL (ref 63–159)
LYMPHOCYTES # BLD AUTO: 2.4 K/UL (ref 1–4.8)
LYMPHOCYTES NFR BLD: 24.6 % (ref 18–48)
MCH RBC QN AUTO: 33.2 PG (ref 27–31)
MCHC RBC AUTO-ENTMCNC: 33 G/DL (ref 32–36)
MCV RBC AUTO: 101 FL (ref 82–98)
MONOCYTES # BLD AUTO: 0.8 K/UL (ref 0.3–1)
MONOCYTES NFR BLD: 8 % (ref 4–15)
NEUTROPHILS # BLD AUTO: 5.9 K/UL (ref 1.8–7.7)
NEUTROPHILS NFR BLD: 60.5 % (ref 38–73)
NONHDLC SERPL-MCNC: 63 MG/DL
NRBC BLD-RTO: 0 /100 WBC
PLATELET # BLD AUTO: 211 K/UL (ref 150–450)
PMV BLD AUTO: 11.1 FL (ref 9.2–12.9)
POTASSIUM SERPL-SCNC: 3.1 MMOL/L (ref 3.5–5.1)
PROT SERPL-MCNC: 7.1 G/DL (ref 6–8.4)
RBC # BLD AUTO: 3.97 M/UL (ref 4.6–6.2)
RETICS/RBC NFR AUTO: 2.7 % (ref 0.4–2)
SODIUM SERPL-SCNC: 139 MMOL/L (ref 136–145)
TOTAL IRON BINDING CAPACITY: 315 UG/DL (ref 250–450)
TRIGL SERPL-MCNC: 65 MG/DL (ref 30–150)
WBC # BLD AUTO: 9.74 K/UL (ref 3.9–12.7)

## 2024-04-15 PROCEDURE — 85045 AUTOMATED RETICULOCYTE COUNT: CPT | Performed by: INTERNAL MEDICINE

## 2024-04-15 PROCEDURE — 80061 LIPID PANEL: CPT | Performed by: INTERNAL MEDICINE

## 2024-04-15 PROCEDURE — 85025 COMPLETE CBC W/AUTO DIFF WBC: CPT | Performed by: INTERNAL MEDICINE

## 2024-04-15 PROCEDURE — 83540 ASSAY OF IRON: CPT | Performed by: INTERNAL MEDICINE

## 2024-04-15 PROCEDURE — 80053 COMPREHEN METABOLIC PANEL: CPT | Performed by: INTERNAL MEDICINE

## 2024-04-15 PROCEDURE — 36415 COLL VENOUS BLD VENIPUNCTURE: CPT | Performed by: INTERNAL MEDICINE

## 2024-04-16 ENCOUNTER — TELEPHONE (OUTPATIENT)
Dept: HEMATOLOGY/ONCOLOGY | Facility: CLINIC | Age: 84
End: 2024-04-16
Payer: MEDICARE

## 2024-04-16 NOTE — TELEPHONE ENCOUNTER
Returned call. No answer. Left voicemail to return call       ----- Message from Sherry Nielsen sent at 4/16/2024  1:45 PM CDT -----  Regarding: Patient advice  Contact: Romana [phone]          Name of Caller:  Romana pt's daughter      Contact Preference:  [phone]    Nature of Call:  Requesting a call have question and concerns about health

## 2024-04-26 ENCOUNTER — TELEPHONE (OUTPATIENT)
Dept: ELECTROPHYSIOLOGY | Facility: CLINIC | Age: 84
End: 2024-04-26
Payer: MEDICARE

## 2024-04-26 NOTE — TELEPHONE ENCOUNTER
----- Message from Tamika Mcintosh MA sent at 4/26/2024  3:48 PM CDT -----  Regarding: FW: medication    ----- Message -----  From: Melva Lemus  Sent: 4/26/2024  11:14 AM CDT  To: [facility] Staff  Subject: medication                                       Pt is calling to clarify the directions and strength for his potassium chloride SA (K-DUR,KLOR-CON) 20 MEQ tablet   And can be reached at [phone].    Thank you

## 2024-05-02 ENCOUNTER — TELEPHONE (OUTPATIENT)
Dept: ELECTROPHYSIOLOGY | Facility: CLINIC | Age: 84
End: 2024-05-02
Payer: MEDICARE

## 2024-05-02 ENCOUNTER — LAB VISIT (OUTPATIENT)
Dept: LAB | Facility: HOSPITAL | Age: 84
End: 2024-05-02
Attending: FAMILY MEDICINE
Payer: MEDICARE

## 2024-05-02 DIAGNOSIS — E78.49 OTHER HYPERLIPIDEMIA: ICD-10-CM

## 2024-05-02 LAB
ALBUMIN SERPL BCP-MCNC: 3.3 G/DL (ref 3.5–5.2)
ALP SERPL-CCNC: 93 U/L (ref 55–135)
ALT SERPL W/O P-5'-P-CCNC: 29 U/L (ref 10–44)
ANION GAP SERPL CALC-SCNC: 6 MMOL/L (ref 3–11)
AST SERPL-CCNC: 27 U/L (ref 10–40)
BILIRUB SERPL-MCNC: 0.7 MG/DL (ref 0.1–1)
BUN SERPL-MCNC: 30 MG/DL (ref 8–23)
CALCIUM SERPL-MCNC: 9.4 MG/DL (ref 8.7–10.5)
CHLORIDE SERPL-SCNC: 107 MMOL/L (ref 95–110)
CO2 SERPL-SCNC: 28 MMOL/L (ref 23–29)
CREAT SERPL-MCNC: 1.5 MG/DL (ref 0.5–1.4)
EST. GFR  (NO RACE VARIABLE): 45.1 ML/MIN/1.73 M^2
GLUCOSE SERPL-MCNC: 88 MG/DL (ref 70–110)
POTASSIUM SERPL-SCNC: 4.6 MMOL/L (ref 3.5–5.1)
PROT SERPL-MCNC: 7.3 G/DL (ref 6–8.4)
SODIUM SERPL-SCNC: 141 MMOL/L (ref 136–145)

## 2024-05-02 PROCEDURE — 36415 COLL VENOUS BLD VENIPUNCTURE: CPT | Performed by: FAMILY MEDICINE

## 2024-05-02 PROCEDURE — 80053 COMPREHEN METABOLIC PANEL: CPT | Performed by: FAMILY MEDICINE

## 2024-05-02 NOTE — TELEPHONE ENCOUNTER
"----- Message from Mikey Mclean MA sent at 5/2/2024  2:43 PM CDT -----    ----- Message -----  From: Monster Chauhan  Sent: 5/2/2024   2:40 PM CDT  To: [facility] Staff    Consult/Advisory      Name Of Caller: Self    Contact Preference?: [phone]     Provider Name: Hugo    Does patient feel the need to be seen today? No    What is the nature of the call?: Calling to speak w/ Valeri or nurse about shortness of breath symptoms. Stating he believes it could be due to recent increase in dosage for potassium chloride SA (K-DUR,KLOR-CON) 20 MEQ tablet. Also stating he would like to discuss results from today's labs    Additional Notes:  "Thank you for all that you do for our patients"  "

## 2024-05-02 NOTE — TELEPHONE ENCOUNTER
Spoke with patient through . Confirmed that his K+ is now normal at 4.6 and confirmed that he should take his Kcl once a day. Reviewed all meds (again) and questions answered.

## 2024-05-23 ENCOUNTER — OFFICE VISIT (OUTPATIENT)
Dept: CARDIOLOGY | Facility: CLINIC | Age: 84
End: 2024-05-23
Payer: MEDICARE

## 2024-05-23 VITALS
HEIGHT: 67 IN | OXYGEN SATURATION: 79 % | WEIGHT: 147.06 LBS | DIASTOLIC BLOOD PRESSURE: 79 MMHG | SYSTOLIC BLOOD PRESSURE: 119 MMHG | BODY MASS INDEX: 23.08 KG/M2 | HEART RATE: 99 BPM

## 2024-05-23 DIAGNOSIS — I48.19 PERSISTENT ATRIAL FIBRILLATION: ICD-10-CM

## 2024-05-23 DIAGNOSIS — Z95.3 S/P TAVR (TRANSCATHETER AORTIC VALVE REPLACEMENT), BIOPROSTHETIC: ICD-10-CM

## 2024-05-23 DIAGNOSIS — I25.10 CORONARY ARTERY DISEASE INVOLVING NATIVE CORONARY ARTERY OF NATIVE HEART WITHOUT ANGINA PECTORIS: ICD-10-CM

## 2024-05-23 DIAGNOSIS — Z95.810 AICD (AUTOMATIC CARDIOVERTER/DEFIBRILLATOR) PRESENT: ICD-10-CM

## 2024-05-23 DIAGNOSIS — E78.49 OTHER HYPERLIPIDEMIA: ICD-10-CM

## 2024-05-23 DIAGNOSIS — Z95.818 PRESENCE OF WATCHMAN LEFT ATRIAL APPENDAGE CLOSURE DEVICE: ICD-10-CM

## 2024-05-23 DIAGNOSIS — I70.0 AORTIC ATHEROSCLEROSIS: ICD-10-CM

## 2024-05-23 DIAGNOSIS — I50.32 CHRONIC DIASTOLIC HEART FAILURE: Primary | ICD-10-CM

## 2024-05-23 DIAGNOSIS — I10 ESSENTIAL HYPERTENSION: ICD-10-CM

## 2024-05-23 DIAGNOSIS — I38 VHD (VALVULAR HEART DISEASE): ICD-10-CM

## 2024-05-23 DIAGNOSIS — Z86.73 HISTORY OF EMBOLIC STROKE: ICD-10-CM

## 2024-05-23 DIAGNOSIS — I25.119 ATHEROSCLEROSIS OF NATIVE CORONARY ARTERY OF NATIVE HEART WITH ANGINA PECTORIS: ICD-10-CM

## 2024-05-23 DIAGNOSIS — Z95.1 S/P CABG (CORONARY ARTERY BYPASS GRAFT): ICD-10-CM

## 2024-05-23 DIAGNOSIS — I65.21 STENOSIS OF RIGHT CAROTID ARTERY: ICD-10-CM

## 2024-05-23 DIAGNOSIS — I73.9 PAD (PERIPHERAL ARTERY DISEASE): ICD-10-CM

## 2024-05-23 PROBLEM — I49.01 VENTRICULAR FIBRILLATION: Status: RESOLVED | Noted: 2019-09-05 | Resolved: 2024-05-23

## 2024-05-23 PROBLEM — I77.9 CAROTID ARTERY DISEASE: Status: ACTIVE | Noted: 2024-05-23

## 2024-05-23 PROCEDURE — 1125F AMNT PAIN NOTED PAIN PRSNT: CPT | Mod: CPTII,GC,S$GLB, | Performed by: INTERNAL MEDICINE

## 2024-05-23 PROCEDURE — 3288F FALL RISK ASSESSMENT DOCD: CPT | Mod: CPTII,GC,S$GLB, | Performed by: INTERNAL MEDICINE

## 2024-05-23 PROCEDURE — 99999 PR PBB SHADOW E&M-EST. PATIENT-LVL V: CPT | Mod: PBBFAC,GC,, | Performed by: INTERNAL MEDICINE

## 2024-05-23 PROCEDURE — 99214 OFFICE O/P EST MOD 30 MIN: CPT | Mod: GC,S$GLB,, | Performed by: INTERNAL MEDICINE

## 2024-05-23 PROCEDURE — 1100F PTFALLS ASSESS-DOCD GE2>/YR: CPT | Mod: CPTII,GC,S$GLB, | Performed by: INTERNAL MEDICINE

## 2024-05-23 PROCEDURE — 1159F MED LIST DOCD IN RCRD: CPT | Mod: CPTII,GC,S$GLB, | Performed by: INTERNAL MEDICINE

## 2024-05-24 NOTE — PROGRESS NOTES
Michael Mclean is a 84 y.o. male here for follow-up for follow-up for HFpEF, persistent Afib (s/p DCCV in 2017, s/p PVI in 2020), CAD s/p 2v CABG & bioprosthetic aotic valve in 2012 s/p 26 mm Evolut valve in valve TAVR inside of a failing 25 mm Perimount bioprosthetic valve, h/o SSS with cardiomyopathy s/p Bi-V ICD, HTN, HLD, CVA and prostate cancer followed by Dr Wilkins that presents for follow up. Patient was last seen by me 01/2024 and .     HPI 01/11/2024: Patient states that he has chronic dizziness that has persisted for the past 2 years. States that he believes metoprolol could be contributing. States that sometimes symptoms occur with head movement, sometimes getting up from the chair. He reports that he not been able to drive as much due to him being in a wheelchair now.   He recently had a Watchman procedure to limit consequences from bleeding while on a blood thinner for atrial fibrillation.   ECG with rhythm strip today shows AFL with atrial rate of 300 bpm with variable AV conduction. It appears atypical in nature considering upright inferior flutter waves. Flutter waves upright in V1.     Interval History: Patient states that he is still suffering from dizziness and wonders if it is because of amiodarone. He feels like it is about the same as before his visit with EP as compared to not. He reports that he has not been as active recently with a recent dry cough and feeling that he has had some difficulty breathing. Also reports worsening leg swelling. Denies PND orthopnea. States that his weight is about the same as before. Son and daughter in room assist in sign language interpretation as per patient preference at visit today.     Active Ambulatory Problems     Diagnosis Date Noted    History of embolic stroke 04/02/2019    Deaf, nonspeaking 04/03/2019    Essential hypertension 04/03/2019    Chronic diastolic heart failure 04/03/2019    Aphasia 04/04/2019    Persistent atrial fibrillation  08/07/2019    Generalized weakness 10/12/2020    Closed displaced fracture of right femoral neck 11/05/2020    CAD (coronary artery disease) 11/05/2020    Iron deficiency anemia 11/09/2020    Prostate cancer 02/08/2021    Elevated PSA 02/08/2021    Anemia 03/02/2021    Gastritis 03/02/2021    Reflux esophagitis 03/02/2021    Diverticulosis 03/02/2021    GI bleed 03/24/2021    Edema 03/24/2021    VHD (valvular heart disease) 03/24/2021    MELISSA (acute kidney injury) 03/24/2021    Hyperlipidemia 03/24/2021    Dizziness 03/24/2021    H. pylori infection 03/24/2021    Fatigue 03/24/2021    TAVR in SAVR 06/23/2022    S/P CABG (coronary artery bypass graft) 06/23/2022    AICD (automatic cardioverter/defibrillator) present 07/27/2022    Aortic atherosclerosis 07/27/2022    Presence of Watchman left atrial appendage closure device 01/05/2023    Encounter for monitoring androgen deprivation therapy 10/15/2023    AMS (altered mental status) 11/03/2023    Hypoxia 11/05/2023    Dental disease 12/14/2023    Osteoporosis 12/14/2023    PAD (peripheral artery disease)     Carotid artery disease 05/23/2024     Resolved Ambulatory Problems     Diagnosis Date Noted    Symptomatic anemia 10/13/2020    Cerumen impaction 06/10/2021    Routine general medical examination at a health care facility 10/14/2021    URI (upper respiratory infection) 09/12/2022    Pneumonia of left lung due to infectious organism 11/05/2023     Past Medical History:   Diagnosis Date    Abdominal hernia     Anticoagulant long-term use     Arthritis     Cancer     Deaf     Encounter for blood transfusion     Stroke 2020    Thyroid disease         Review of patient's allergies indicates:   Allergen Reactions    Losartan Hives        Current Outpatient Medications   Medication Instructions    abiraterone (ZYTIGA) 250 mg, Oral, Daily    acetylcarnitin/alpha lipoic ac (ACETYLCARNITIN HCL-A LIPOIC AC) 400-200 mg Cap 1 capsule, Oral, Daily    albuterol (PROVENTIL HFA) 90  mcg/actuation inhaler 2 puffs, Inhalation, Every 6 hours PRN, Rescue    albuterol-ipratropium (DUO-NEB) 2.5 mg-0.5 mg/3 mL nebulizer solution 3 mLs, Nebulization, Every 4 hours PRN, Rescue    amiodarone (PACERONE) 200 MG Tab Take one tablet by mouth daily    aspirin (ASPIR-81 ORAL) Oral    BICALUTAMIDE ORAL 50 mg, Oral, Daily    calcium-vitamin D3 (OYSTER SHELL CALCIUM-VIT D3) 500 mg-5 mcg (200 unit) per tablet 1 tablet, Oral, 2 times daily    cholecalciferol (vitamin D3) 5,000 Units, Oral, Daily    clonazePAM (KLONOPIN) 2 MG Tab 1 tablet PO h.s.    cyanocobalamin, vitamin B-12, 1,000 mcg/mL Kit 1 mL, Injection, Every 28 days    docusate sodium (COLACE) 100 mg, Oral, 2 times daily    empagliflozin (JARDIANCE) 10 mg, Oral, Daily    ferrous sulfate (FEOSOL) 325 mg, Oral, Daily    magnesium oxide (MAG-OX) 400 mg, Oral, Daily    metoprolol succinate (TOPROL-XL) 100 MG 24 hr tablet TAKE 1 TABLET BY MOUTH IN THE  MORNING AND TAKE 1 TABLET BY  MOUTH IN THE EVENING    potassium chloride SA (K-DUR,KLOR-CON) 20 MEQ tablet 20 mEq, Oral, Daily    predniSONE (DELTASONE) 5 mg, Oral, Daily    rosuvastatin (CRESTOR) 20 mg, Oral, Every morning    torsemide (DEMADEX) 20 mg, Oral, Daily    vitamin D3-folic acid 5,000 unit- 1 mg Tab 1 tablet, Oral, Daily    vitamin D3-vitamin K2, MK4, (K2 PLUS D3) 1,000-100 unit-mcg Tab Oral       Past Surgical History:   Procedure Laterality Date    ABLATION N/A 7/22/2020    Procedure: Ablation, afib;  Surgeon: Tal Laurent MD;  Location: [facility] CATH;  Service: Cardiology;  Laterality: N/A;  needs covid test;needs ALEXYS ([place])    AORTIC VALVE REPLACEMENT      CARDIAC CATH COSURGEON N/A 7/27/2022    Procedure: Cardiac Cath Cosurgeon;  Surgeon: Glenroy Sam MD;  Location: [facility] CATH LAB;  Service: Cardiovascular;  Laterality: N/A;    CARDIAC CATHETERIZATION      3 HEART STENTS    carotid artery stent Left     CLOSURE OF LEFT ATRIAL APPENDAGE USING DEVICE N/A 11/22/2022    Procedure: Left atrial  appendage closure device;  Surgeon: Glenroy Richardson MD;  Location: [facility] CATH LAB;  Service: Cardiology;  Laterality: N/A;    COLONOSCOPY N/A 3/2/2021    Procedure: COLONOSCOPY;  Surgeon: Emiliana Cardenas MD;  Location: [facility] ENDO;  Service: General;  Laterality: N/A;  4  8:30am   CL/AM/Bd    CORONARY ARTERY BYPASS GRAFT      ESOPHAGOGASTRODUODENOSCOPY N/A 11/10/2020    Procedure: EGD (ESOPHAGOGASTRODUODENOSCOPY);  Surgeon: Renny Prakash MD;  Location: [facility] ENDO;  Service: Endoscopy;  Laterality: N/A;    HERNIA REPAIR      HIP REPLACEMENT ARTHROPLASTY Right 11/6/2020    Procedure: EMELY -ARTHROPLASTY, HIP REPLACEMENT (ENDO HIP);  Surgeon: Lino Thomas MD;  Location: [facility] OR;  Service: Orthopedics;  Laterality: Right;    LEFT HEART CATHETERIZATION N/A 6/29/2022    Procedure: Left heart cath;  Surgeon: Glenroy Richardson MD;  Location: [facility] CATH LAB;  Service: Cardiology;  Laterality: N/A;    PACERMAKER      PROSTATECTOMY      SHOULDER SURGERY      TOE SURGERY      TRANSCATHETER AORTIC VALVE REPLACEMENT (TAVR) N/A 7/27/2022    Procedure: REPLACEMENT, AORTIC VALVE, TRANSCATHETER (TAVR);  Surgeon: Glenroy Richardson MD;  Location: [facility] CATH LAB;  Service: Cardiology;  Laterality: N/A;    TRANSCATHETER AORTIC VALVE REPLACEMENT (TAVR)  7/27/2022    Procedure: REPLACEMENT, AORTIC VALVE, TRANSCATHETER (TAVR);  Surgeon: Glenroy Sam MD;  Location: [facility] CATH LAB;  Service: Cardiovascular;;    TRANSESOPHAGEAL ECHOCARDIOGRAPHY N/A 6/27/2022    Procedure: ECHOCARDIOGRAM, TRANSESOPHAGEAL;  Surgeon: [employer] Diagnostic Provider;  Location: [facility] EP LAB;  Service: Anesthesiology;  Laterality: N/A;    TRANSESOPHAGEAL ECHOCARDIOGRAPHY N/A 11/22/2022    Procedure: ECHOCARDIOGRAM, TRANSESOPHAGEAL;  Surgeon: Leila Arango MD;  Location: [facility] CATH LAB;  Service: Cardiology;  Laterality: N/A;    TRANSESOPHAGEAL ECHOCARDIOGRAPHY N/A 1/5/2023    Procedure: ECHOCARDIOGRAM, TRANSESOPHAGEAL;  Surgeon: [employer] Diagnostic Provider;   "Location: [facility] EP LAB;  Service: Anesthesiology;  Laterality: N/A;    TREATMENT OF CARDIAC ARRHYTHMIA N/A 8/7/2019    Procedure: Cardioversion or Defibrillation;  Surgeon: Tal Laurent MD;  Location: [facility] CATH;  Service: Cardiology;  Laterality: N/A;        Family History   Problem Relation Name Age of Onset    Heart disease Mother      Heart attack Mother          Social History     Socioeconomic History    Marital status:    Tobacco Use    Smoking status: Never    Smokeless tobacco: Never   Substance and Sexual Activity    Alcohol use: Yes     Alcohol/week: 7.0 standard drinks of alcohol     Types: 7 Glasses of wine per week    Drug use: Never         The following portions of the patient's history were reviewed and updated as appropriate: allergies, current medications, past family history, past medical history, past social history, past surgical history, and problem list.     Review of Systems  Review of Systems   Constitutional:  Negative for chills, fever and malaise/fatigue.   HENT:  Negative for congestion and sore throat.    Respiratory:  Negative for cough and shortness of breath.    Cardiovascular:  Negative for chest pain, palpitations, orthopnea, leg swelling and PND.   Gastrointestinal:  Negative for abdominal pain, constipation, diarrhea, nausea and vomiting.   Genitourinary:  Negative for frequency.   Neurological:  Negative for weakness and headaches.       Objective:     Vitals:    05/23/24 0932   BP: 119/79   Pulse: 99   SpO2: (!) 79%   Weight: 66.7 kg (147 lb 0.8 oz)   Height: 5' 7" (1.702 m)   PainSc:   4   PainLoc: Hip        Physical Exam:  Physical Exam  Vitals reviewed.   Constitutional:       General: He is not in acute distress.     Appearance: Normal appearance. He is not ill-appearing or toxic-appearing.      Comments: Thin, in wheel chair   HENT:      Head: Normocephalic and atraumatic.      Ears:      Comments: Deaf     Mouth/Throat:      Mouth: Mucous membranes are moist. "   Cardiovascular:      Rate and Rhythm: Normal rate. Rhythm irregular.      Heart sounds: No murmur heard.     No friction rub. No gallop.      Comments: LUCW device  Pulmonary:      Effort: Pulmonary effort is normal. No respiratory distress.      Breath sounds: Normal breath sounds.   Abdominal:      Palpations: Abdomen is soft.   Musculoskeletal:      Right lower leg: Edema (3+ to the tibia) present.      Left lower leg: Edema (2+ to the tibia) present.   Skin:     General: Skin is warm.   Neurological:      Mental Status: He is alert and oriented to person, place, and time. Mental status is at baseline.     Lab Review   Lab Results   Component Value Date     05/02/2024    K 4.6 05/02/2024     05/02/2024    CO2 28 05/02/2024    BUN 30 (H) 05/02/2024    CREATININE 1.5 (H) 05/02/2024    CALCIUM 9.4 05/02/2024     Lab Results   Component Value Date    WBC 9.74 04/15/2024    HGB 13.2 (L) 04/15/2024    HCT 40.0 04/15/2024     (H) 04/15/2024     04/15/2024     Lab Results   Component Value Date    CHOL 119 (L) 04/15/2024    TRIG 65 04/15/2024    HDL 56 04/15/2024          Assessment:        1. Chronic diastolic heart failure    2. TAVR in SAVR    3. S/P CABG (coronary artery bypass graft)    4. Presence of Watchman left atrial appendage closure device    5. Persistent atrial fibrillation    6. Aortic atherosclerosis    7. Coronary artery disease involving native coronary artery of native heart without angina pectoris    8. AICD (automatic cardioverter/defibrillator) present    9. PAD (peripheral artery disease)    10. Other hyperlipidemia    11. Essential hypertension    12. VHD (valvular heart disease)    13. History of embolic stroke    14. Stenosis of right carotid artery    15. Atherosclerosis of native coronary artery of native heart with angina pectoris          Plan:   Bi-V ICD  HFimpEF  Follows with EP Dr Boo  ECG appears to show functioning Bi-V  Plan for 2 x torsemide 20 mg PO  daily and 2 x Kcl 20 mEq PO daily until Sunday, then continue torsemide 20 mg PO daily with K supplementation on Monday  Advise daily weights, if noticing 3 lb weight gain in 1 day or 5 lb weight gain in 1 week, take an extra torsemide + potassium  If noticing 3 lb weight decrease in 1 day or 5 lb weight decrease in 1 week, hold extra torsemide + potassium for 1 day  Ordered Jardiance for HFpEF    - BMP + BNP in 1 week  Compression stockings during day time as tolerated  Leg elevation recommended at night  Sodium restriction advised     Atrial flutter   Noted on rhythm strip 01/2024  Follow up with EP, recommended medical rate/rhythm control strategy with amiodarone + metoprolol  S/p Watchman     Persistent atrial fibrillation  S/p Watchman  Previously rate control strategy pursued  On metoprolol and amiodarone  As managed by EP     CAD s/p CABG  HLD  Aortic atherosclerosis  LDL 50 mg/dL, at target  Continue statin  Feliberto Willcox at [facility] in [facility] on 4/29/2010.   He is s/p CABG x 2 with LIMA-LAD and SVG-RCA along with aortic valve replacement     HTN   Dietary sodium restriction.  Regular aerobic exercise.  Check blood pressures daily and record.      F/u in 3 months     Staff: Dr Reyes     Thank you for your consultation. Please call with questions or concerns.      Fabián Kelley MD  Cardiology PGY6  [facility]

## 2024-05-24 NOTE — PROGRESS NOTES
I have reviewed the notes, assessments, and/or procedures performed by Dr Kelley, I concur with her/his documentation of Michael Mclean.  Date of Service: 5/23/2024

## 2024-05-26 ENCOUNTER — NURSE TRIAGE (OUTPATIENT)
Dept: ADMINISTRATIVE | Facility: CLINIC | Age: 84
End: 2024-05-26
Payer: MEDICARE

## 2024-05-26 NOTE — TELEPHONE ENCOUNTER
Reason for Disposition   [1] Follow-up call from patient regarding patient's clinical status AND [2] information urgent    Additional Information   Negative: Lab calling with strep throat test results and triager can call in prescription   Negative: Lab calling with urinalysis test results and triager can call in prescription   Negative: Medication questions   Negative: Medication renewal and refill questions   Negative: Pre-operative or pre-procedural questions    Protocols used: PCP Call - No Triage-A-

## 2024-05-27 ENCOUNTER — TELEPHONE (OUTPATIENT)
Dept: ELECTROPHYSIOLOGY | Facility: CLINIC | Age: 84
End: 2024-05-27
Payer: MEDICARE

## 2024-05-27 ENCOUNTER — CLINICAL SUPPORT (OUTPATIENT)
Dept: CARDIOLOGY | Facility: HOSPITAL | Age: 84
End: 2024-05-27
Payer: MEDICARE

## 2024-05-27 ENCOUNTER — CLINICAL SUPPORT (OUTPATIENT)
Dept: CARDIOLOGY | Facility: HOSPITAL | Age: 84
End: 2024-05-27
Attending: INTERNAL MEDICINE
Payer: MEDICARE

## 2024-05-27 DIAGNOSIS — Z95.810 PRESENCE OF AUTOMATIC (IMPLANTABLE) CARDIAC DEFIBRILLATOR: ICD-10-CM

## 2024-05-27 DIAGNOSIS — I50.32 CHRONIC DIASTOLIC (CONGESTIVE) HEART FAILURE: ICD-10-CM

## 2024-05-27 DIAGNOSIS — I48.91 UNSPECIFIED ATRIAL FIBRILLATION: ICD-10-CM

## 2024-05-27 NOTE — TELEPHONE ENCOUNTER
[facility] ([place]) remote device transmission received; alert for ATP and HV therapy delivery, historical events taking place > 30 days ago    Chart notes indicate patient wanted to discuss getting off amiodarone due to side effects  It was requested he be seen in the clinic to discuss, appt in April was cancelled due to his daughter (transportation) being ill  No rescheduled appt at this time    Following Provider: Jose Boo MD    Device Type:  LEI LEYVA  Date/Time:  04/03/2024_ @ 10:48am  Event Type:  AF w/ VT  Ventricular CL:  260 ms  Duration:  25 secs  Therapy Delivered:  ATP + Shock  *NOTE: shock converted patient to NSR --> he has since returned to AF based on presenting egram 5/27/2024  Appropriate Therapy:  Yes  Successful:  Yes    83 y.o. male with atrial fibrillation, ventricular fibrillation, CAD s/p CABG, AS s/p sAVR followed by TAVR, HTN, CVA, mitral valve prolapse  Persistent atrial fibrillation since June 2023. Patient asymptomatic. MAURICIO occlusion 11/22/22; watchman.   Hx of significant bleeding requiring blood transfusions while on OAC.   Hx of treated VT's, last ATP + shock 1/7/2024    Last clinic visit: 1/11/2024  Next clinic visit: none scheduled, pt cancelled clinic appts in April due to transportation    UPDATE:  reviewed with Dr. Linda (consult MD-SK out of office); event likely c/w atrial fibrillation w/ RVR w/ aberrant conduction.  Pt should reschedule an EP clinic appt to discuss.     Will request clinic staff reach out to patient to arrange f/u

## 2024-05-31 ENCOUNTER — TELEPHONE (OUTPATIENT)
Dept: ELECTROPHYSIOLOGY | Facility: CLINIC | Age: 84
End: 2024-05-31
Payer: MEDICARE

## 2024-05-31 NOTE — TELEPHONE ENCOUNTER
----- Message from Melva Lemus sent at 5/31/2024  8:38 AM CDT -----  Regarding: bp issue  Pls call pt at  [phone].  He is concerned about his BP being 122/85 and he is feeling dizzy.    Thank you

## 2024-06-05 ENCOUNTER — TELEPHONE (OUTPATIENT)
Dept: CARDIOLOGY | Facility: HOSPITAL | Age: 84
End: 2024-06-05
Payer: MEDICARE

## 2024-06-05 ENCOUNTER — TELEPHONE (OUTPATIENT)
Dept: PHARMACY | Facility: CLINIC | Age: 84
End: 2024-06-05
Payer: MEDICARE

## 2024-06-05 DIAGNOSIS — I50.32 CHRONIC DIASTOLIC HEART FAILURE: ICD-10-CM

## 2024-06-05 DIAGNOSIS — I50.32 CHRONIC HEART FAILURE WITH PRESERVED EJECTION FRACTION: Primary | ICD-10-CM

## 2024-06-05 NOTE — TELEPHONE ENCOUNTER
Spoke with patient, will need referral to Patient Assistance. He has purchased one fill of the medication on his own. Order placed to Patient Assistance.    Patient confirmed, labs scheduled in 1 week.    Please call with questions or concerns.     Fabián Kelley MD  Cardiology PGY6  [facility]    ===View-only below this line===  ----- Message -----  From: Usha López MA  Sent: 5/23/2024   2:51 PM CDT  To: Fabián Kelley MD      ----- Message -----  From: Belén Bean MA  Sent: 5/23/2024   2:42 PM CDT  To: Allie [facility] Staff    The patient need  to talk  to you about his visit and his medication Jardiance and the cost please call [phone]  Thank you.      ===View-only below this line===  ----- Message -----  From: Usha López MA  Sent: 5/28/2024   9:02 AM CDT  To: Fabián Kelley MD      ----- Message -----  From: Belén Bean MA  Sent: 5/28/2024   8:41 AM CDT  To: Allie Lockwood Staff    The patient need to talk to talk about his lab please [phone]. Thank you.

## 2024-06-05 NOTE — TELEPHONE ENCOUNTER
We have reached out to Michael Mclean to inform him of the [employer] application process for Jardiance and whats required to apply.  Michael Mclean did not answer. We have left a voicemail and mailed a letter.   Follow-up will be made in 5 business days.

## 2024-06-10 LAB
OHS CV AF BURDEN PERCENT: 100
OHS CV DC REMOTE DEVICE TYPE: NORMAL
OHS CV ICD SHOCK: YES
OHS CV RV PACING PERCENT: 79.49 %

## 2024-06-12 PROBLEM — Z00.00 WELLNESS EXAMINATION: Status: ACTIVE | Noted: 2024-06-12

## 2024-06-13 ENCOUNTER — LAB VISIT (OUTPATIENT)
Dept: LAB | Facility: HOSPITAL | Age: 84
End: 2024-06-13
Attending: INTERNAL MEDICINE
Payer: MEDICARE

## 2024-06-13 DIAGNOSIS — Z79.899 ENCOUNTER FOR LONG-TERM (CURRENT) USE OF OTHER MEDICATIONS: ICD-10-CM

## 2024-06-13 DIAGNOSIS — N17.9 AKI (ACUTE KIDNEY INJURY): ICD-10-CM

## 2024-06-13 DIAGNOSIS — I25.10 CORONARY ARTERY DISEASE, UNSPECIFIED VESSEL OR LESION TYPE, UNSPECIFIED WHETHER ANGINA PRESENT, UNSPECIFIED WHETHER NATIVE OR TRANSPLANTED HEART: ICD-10-CM

## 2024-06-13 DIAGNOSIS — E78.49 OTHER HYPERLIPIDEMIA: ICD-10-CM

## 2024-06-13 DIAGNOSIS — I10 ESSENTIAL HYPERTENSION: ICD-10-CM

## 2024-06-13 DIAGNOSIS — I48.19 PERSISTENT ATRIAL FIBRILLATION: ICD-10-CM

## 2024-06-13 DIAGNOSIS — D64.9 ANEMIA, UNSPECIFIED TYPE: ICD-10-CM

## 2024-06-13 LAB
ALBUMIN SERPL BCP-MCNC: 3.1 G/DL (ref 3.5–5.2)
ALP SERPL-CCNC: 73 U/L (ref 55–135)
ALT SERPL W/O P-5'-P-CCNC: 40 U/L (ref 10–44)
ANION GAP SERPL CALC-SCNC: 10 MMOL/L (ref 3–11)
AST SERPL-CCNC: 39 U/L (ref 10–40)
BILIRUB SERPL-MCNC: 0.6 MG/DL (ref 0.1–1)
BUN SERPL-MCNC: 35 MG/DL (ref 8–23)
CALCIUM SERPL-MCNC: 8.6 MG/DL (ref 8.7–10.5)
CHLORIDE SERPL-SCNC: 100 MMOL/L (ref 95–110)
CO2 SERPL-SCNC: 29 MMOL/L (ref 23–29)
CREAT SERPL-MCNC: 1.7 MG/DL (ref 0.5–1.4)
EST. GFR  (NO RACE VARIABLE): 38.8 ML/MIN/1.73 M^2
GLUCOSE SERPL-MCNC: 99 MG/DL (ref 70–110)
POTASSIUM SERPL-SCNC: 3.6 MMOL/L (ref 3.5–5.1)
PROT SERPL-MCNC: 6.8 G/DL (ref 6–8.4)
SODIUM SERPL-SCNC: 139 MMOL/L (ref 136–145)

## 2024-06-13 PROCEDURE — 36415 COLL VENOUS BLD VENIPUNCTURE: CPT | Performed by: INTERNAL MEDICINE

## 2024-06-13 PROCEDURE — 80053 COMPREHEN METABOLIC PANEL: CPT | Performed by: INTERNAL MEDICINE

## 2024-06-14 ENCOUNTER — TELEPHONE (OUTPATIENT)
Dept: CARDIOLOGY | Facility: CLINIC | Age: 84
End: 2024-06-14
Payer: MEDICARE

## 2024-06-14 ENCOUNTER — PATIENT MESSAGE (OUTPATIENT)
Dept: CARDIOLOGY | Facility: CLINIC | Age: 84
End: 2024-06-14
Payer: MEDICARE

## 2024-06-14 NOTE — TELEPHONE ENCOUNTER
----- Message from Dennis Reyes III, MD sent at 6/14/2024  1:06 PM CDT -----  Regarding: RE: Pt Advice  Contact: [phone]  He can remain on the jardiance.    If he is not having swelling or signs of heart failure, I'd like for him to try changing the torsemide from daily to as needed for weight gain (3lb in 1 day or 5lb in 1 week).    Rolando,  Heriberto  ----- Message -----  From: Regla Manjarrez RN  Sent: 6/14/2024  12:20 PM CDT  To: Dennis Reyes III, MD  Subject: FW: Pt Advice                                    See below, please labs and let me know what to tell the pt. Dr Kelley is gone.    Thank you  ----- Message -----  From: Ananya Manzo  Sent: 6/14/2024  10:27 AM CDT  To: [facility]ie [facility] Staff  Subject: Pt Advice                                        CONSULT/ADVISORY    Name of Caller:  DILIP MANJARREZ [[id number]]    Contact Preference:   [phone]    Nature of Call:  Pt states he had labs done on yesterday and wants to know if it's ok for him to continue taking his empagliflozin (JARDIANCE) 10 mg tablet.

## 2024-06-14 NOTE — TELEPHONE ENCOUNTER
Pt notified through ASL line , He can remain on the Jardiance.     If you are  not having swelling or signs of heart failure,Please  try to changing the Torsemide from daily to as needed for weight gain (3lb in 1 day or 5lb in 1 week .    Pt stated understanding through the ASL line.    Follow up scheduled for August.

## 2024-06-20 ENCOUNTER — TELEPHONE (OUTPATIENT)
Dept: CARDIOLOGY | Facility: CLINIC | Age: 84
End: 2024-06-20
Payer: MEDICARE

## 2024-06-20 DIAGNOSIS — I50.32 CHRONIC DIASTOLIC HEART FAILURE: Primary | ICD-10-CM

## 2024-06-20 NOTE — TELEPHONE ENCOUNTER
----- Message from Jennifer Guevara RN sent at 6/20/2024 12:43 PM CDT -----  Unable to reach pt, will call later. Currently being charged $47/mth. Called in a 14 days coupon, don't know if went through.  ----- Message -----  From: Belén Bean MA  Sent: 6/20/2024   9:21 AM CDT  To: MEMO Burch the patient need  to talk to you about his medication Jardiance  10 mg and the cost last  visit was on 5-  please call  [phone]. Thank you

## 2024-06-20 NOTE — TELEPHONE ENCOUNTER
Spoke w. pt, confirmed the $47/mth (pt had received a high quote fr the insurance). Pt states that it is a high copay. Teaching done on Pharmacy Patient Assistance Department and pt interested in consult. No charting f/u  prev referral to Pharmacy Patient Assistance Department. Pt's upcoming appt rescheduled to 8/14 when he is already coming to clinic and pt agreed to date/time of appointment(s). Appt reminder mailed to pt.

## 2024-06-28 ENCOUNTER — LAB VISIT (OUTPATIENT)
Dept: LAB | Facility: HOSPITAL | Age: 84
End: 2024-06-28
Attending: INTERNAL MEDICINE
Payer: MEDICARE

## 2024-06-28 DIAGNOSIS — Z79.899 ENCOUNTER FOR LONG-TERM (CURRENT) USE OF OTHER MEDICATIONS: ICD-10-CM

## 2024-06-28 DIAGNOSIS — I48.0 PAROXYSMAL ATRIAL FIBRILLATION: ICD-10-CM

## 2024-06-28 DIAGNOSIS — I50.20 HFREF (HEART FAILURE WITH REDUCED EJECTION FRACTION): ICD-10-CM

## 2024-06-28 DIAGNOSIS — I48.19 PERSISTENT ATRIAL FIBRILLATION: ICD-10-CM

## 2024-06-28 DIAGNOSIS — I25.10 CORONARY ARTERY DISEASE, UNSPECIFIED VESSEL OR LESION TYPE, UNSPECIFIED WHETHER ANGINA PRESENT, UNSPECIFIED WHETHER NATIVE OR TRANSPLANTED HEART: ICD-10-CM

## 2024-06-28 DIAGNOSIS — D64.9 ANEMIA, UNSPECIFIED TYPE: ICD-10-CM

## 2024-06-28 DIAGNOSIS — I38 VHD (VALVULAR HEART DISEASE): ICD-10-CM

## 2024-06-28 DIAGNOSIS — D50.8 IRON DEFICIENCY ANEMIA SECONDARY TO INADEQUATE DIETARY IRON INTAKE: ICD-10-CM

## 2024-06-28 DIAGNOSIS — Z12.5 SPECIAL SCREENING FOR MALIGNANT NEOPLASM OF PROSTATE: ICD-10-CM

## 2024-06-28 DIAGNOSIS — E78.49 OTHER HYPERLIPIDEMIA: ICD-10-CM

## 2024-06-28 DIAGNOSIS — I10 ESSENTIAL HYPERTENSION: ICD-10-CM

## 2024-06-28 DIAGNOSIS — I25.10 CORONARY ARTERY DISEASE INVOLVING NATIVE CORONARY ARTERY OF NATIVE HEART WITHOUT ANGINA PECTORIS: ICD-10-CM

## 2024-06-28 LAB
ALBUMIN SERPL BCP-MCNC: 3 G/DL (ref 3.5–5.2)
ALP SERPL-CCNC: 83 U/L (ref 55–135)
ALT SERPL W/O P-5'-P-CCNC: 87 U/L (ref 10–44)
ANION GAP SERPL CALC-SCNC: 4 MMOL/L (ref 3–11)
AST SERPL-CCNC: 73 U/L (ref 10–40)
BASOPHILS # BLD AUTO: 0.07 K/UL (ref 0–0.2)
BASOPHILS NFR BLD: 0.8 % (ref 0–1.9)
BILIRUB SERPL-MCNC: 0.5 MG/DL (ref 0.1–1)
BUN SERPL-MCNC: 33 MG/DL (ref 8–23)
CALCIUM SERPL-MCNC: 8.6 MG/DL (ref 8.7–10.5)
CHLORIDE SERPL-SCNC: 109 MMOL/L (ref 95–110)
CO2 SERPL-SCNC: 28 MMOL/L (ref 23–29)
COMPLEXED PSA SERPL-MCNC: 43.4 NG/ML (ref 0–4)
CREAT SERPL-MCNC: 1.4 MG/DL (ref 0.5–1.4)
DIFFERENTIAL METHOD BLD: ABNORMAL
EOSINOPHIL # BLD AUTO: 0.9 K/UL (ref 0–0.5)
EOSINOPHIL NFR BLD: 10.4 % (ref 0–8)
ERYTHROCYTE [DISTWIDTH] IN BLOOD BY AUTOMATED COUNT: 14.4 % (ref 11.5–14.5)
EST. GFR  (NO RACE VARIABLE): 48.9 ML/MIN/1.73 M^2
ESTIMATED AVG GLUCOSE: 108 MG/DL (ref 68–131)
GLUCOSE SERPL-MCNC: 91 MG/DL (ref 70–110)
HBA1C MFR BLD: 5.4 % (ref 4–5.6)
HCT VFR BLD AUTO: 44.1 % (ref 40–54)
HGB BLD-MCNC: 14.5 G/DL (ref 14–18)
IMM GRANULOCYTES # BLD AUTO: 0.07 K/UL (ref 0–0.04)
IMM GRANULOCYTES NFR BLD AUTO: 0.8 % (ref 0–0.5)
LYMPHOCYTES # BLD AUTO: 2.2 K/UL (ref 1–4.8)
LYMPHOCYTES NFR BLD: 24.4 % (ref 18–48)
MCH RBC QN AUTO: 34 PG (ref 27–31)
MCHC RBC AUTO-ENTMCNC: 32.9 G/DL (ref 32–36)
MCV RBC AUTO: 103 FL (ref 82–98)
MONOCYTES # BLD AUTO: 0.8 K/UL (ref 0.3–1)
MONOCYTES NFR BLD: 8.7 % (ref 4–15)
NEUTROPHILS # BLD AUTO: 4.8 K/UL (ref 1.8–7.7)
NEUTROPHILS NFR BLD: 54.9 % (ref 38–73)
NRBC BLD-RTO: 0 /100 WBC
PLATELET # BLD AUTO: 161 K/UL (ref 150–450)
PMV BLD AUTO: 11.5 FL (ref 9.2–12.9)
POTASSIUM SERPL-SCNC: 4.5 MMOL/L (ref 3.5–5.1)
PROT SERPL-MCNC: 6.7 G/DL (ref 6–8.4)
RBC # BLD AUTO: 4.27 M/UL (ref 4.6–6.2)
SODIUM SERPL-SCNC: 141 MMOL/L (ref 136–145)
T3FREE SERPL-MCNC: <1.5 PG/ML (ref 2.3–4.2)
T4 FREE SERPL-MCNC: 0.48 NG/DL (ref 0.71–1.51)
THYROPEROXIDASE IGG SERPL-ACNC: <6 IU/ML
TSH SERPL DL<=0.005 MIU/L-ACNC: 91.2 UIU/ML (ref 0.4–4)
WBC # BLD AUTO: 8.81 K/UL (ref 3.9–12.7)

## 2024-06-28 PROCEDURE — 85025 COMPLETE CBC W/AUTO DIFF WBC: CPT | Performed by: INTERNAL MEDICINE

## 2024-06-28 PROCEDURE — 84153 ASSAY OF PSA TOTAL: CPT | Performed by: INTERNAL MEDICINE

## 2024-06-28 PROCEDURE — 83036 HEMOGLOBIN GLYCOSYLATED A1C: CPT | Performed by: INTERNAL MEDICINE

## 2024-06-28 PROCEDURE — 84439 ASSAY OF FREE THYROXINE: CPT | Performed by: INTERNAL MEDICINE

## 2024-06-28 PROCEDURE — 86376 MICROSOMAL ANTIBODY EACH: CPT | Performed by: INTERNAL MEDICINE

## 2024-06-28 PROCEDURE — 84443 ASSAY THYROID STIM HORMONE: CPT | Performed by: INTERNAL MEDICINE

## 2024-06-28 PROCEDURE — 36415 COLL VENOUS BLD VENIPUNCTURE: CPT | Performed by: INTERNAL MEDICINE

## 2024-06-28 PROCEDURE — 84481 FREE ASSAY (FT-3): CPT | Performed by: INTERNAL MEDICINE

## 2024-06-28 PROCEDURE — 80053 COMPREHEN METABOLIC PANEL: CPT | Performed by: INTERNAL MEDICINE

## 2024-07-01 ENCOUNTER — TELEPHONE (OUTPATIENT)
Dept: CARDIOLOGY | Facility: CLINIC | Age: 84
End: 2024-07-01
Payer: MEDICARE

## 2024-07-01 ENCOUNTER — TELEPHONE (OUTPATIENT)
Dept: PHARMACY | Facility: CLINIC | Age: 84
End: 2024-07-01
Payer: MEDICARE

## 2024-07-01 NOTE — TELEPHONE ENCOUNTER
Called pt and provided him with the phone # to call to reach Ms Reannas. He verbalized understanding.

## 2024-07-01 NOTE — TELEPHONE ENCOUNTER
Michael Mclean has been informed of the "[employer]" application process for Jardiance and what's required to apply.  He will provide the following documents: Proof of household Income( such as social security statement, 1099 form, pension statement or 3 consecutive pay stubs       Follow-up will be made in 5 business days.     Beth Rawls  Pharmacy Patient Assistance Team

## 2024-07-01 NOTE — TELEPHONE ENCOUNTER
----- Message from Izabel Emmett sent at 6/28/2024  8:06 PM CDT -----  Regarding: RE: Order for DILIP MANJARREZ  Patient can reach out to Beth GODDARD [phone]. Follow up letter mailed.    Thank you  Izabel  ----- Message -----  From: Jennifer Guevara RN  Sent: 6/28/2024   1:59 PM CDT  To: Izabel Christine; Jennifer Guevara RN  Subject: FW: Order for DILIP MANJARREZ             Called pt again today. Pt is deaf. He says that he did not receive a letter. He uses a phone that automatically connects him to a sign  so it is easy for him to miss a call because he cannot carry it with him. He would like to call you instead but I did not know which number to give him.   He has difficulties accessing My [employer] and I reset his password for him today however I did not see a message for him on [employer]. His daughter also has access to my [employer].     Please advise, and tell me which phone # he can call to reach you.    Thanks,      Jennifer,  General Cardiology Triage RN  ----- Message -----  From: Izabel Christine  Sent: 6/21/2024   7:52 AM CDT  To: Jennifer Guevara RN  Subject: RE: Order for DILIP MANJARREZ             Duplicate referral.  1st contact attempt made 06/05/24 and letter mailed.  ----- Message -----  From: Jennifer Guevara RN  Sent: 6/20/2024   3:50 PM CDT  To: Pharmacy Patient Assistance Team  Subject: Order for DILIP MANJARREZ

## 2024-07-02 DIAGNOSIS — I50.32 CHRONIC DIASTOLIC HEART FAILURE: ICD-10-CM

## 2024-07-02 NOTE — TELEPHONE ENCOUNTER
----- Message from Amanda Khalil RN sent at 7/2/2024  2:25 PM CDT -----  Regarding: FW: jordy hernandez    ----- Message -----  From: Melva Lemus  Sent: 7/2/2024   2:10 PM CDT  To: Amanda Khalil RN  Subject: jordy hernandez                                      Pt called to inform that [facility] - [address]  Phone: [phone]  Fax: [phone]    Needs prior auth for empagliflozin (JARDIANCE) 10 mg tablet with a 3 month supply so that he can get it at no cost to him.    Thank you    Pt of Dr. Reyes  LOV 05/23/24 Allie

## 2024-07-09 ENCOUNTER — CLINICAL SUPPORT (OUTPATIENT)
Dept: CARDIOLOGY | Facility: HOSPITAL | Age: 84
End: 2024-07-09
Attending: INTERNAL MEDICINE
Payer: MEDICARE

## 2024-07-09 ENCOUNTER — TELEPHONE (OUTPATIENT)
Dept: ELECTROPHYSIOLOGY | Facility: CLINIC | Age: 84
End: 2024-07-09
Payer: MEDICARE

## 2024-07-09 ENCOUNTER — CLINICAL SUPPORT (OUTPATIENT)
Dept: CARDIOLOGY | Facility: HOSPITAL | Age: 84
End: 2024-07-09

## 2024-07-09 DIAGNOSIS — I50.32 CHRONIC DIASTOLIC (CONGESTIVE) HEART FAILURE: ICD-10-CM

## 2024-07-09 DIAGNOSIS — R42 DIZZINESS: ICD-10-CM

## 2024-07-09 DIAGNOSIS — I48.91 UNSPECIFIED ATRIAL FIBRILLATION: ICD-10-CM

## 2024-07-09 DIAGNOSIS — Z95.810 PRESENCE OF AUTOMATIC (IMPLANTABLE) CARDIAC DEFIBRILLATOR: ICD-10-CM

## 2024-07-09 DIAGNOSIS — R35.89 DIURESIS: ICD-10-CM

## 2024-07-09 DIAGNOSIS — I50.32 CHRONIC DIASTOLIC HEART FAILURE: Primary | ICD-10-CM

## 2024-07-09 PROCEDURE — 93296 REM INTERROG EVL PM/IDS: CPT | Performed by: INTERNAL MEDICINE

## 2024-07-09 PROCEDURE — 93295 DEV INTERROG REMOTE 1/2/MLT: CPT | Mod: ,,, | Performed by: INTERNAL MEDICINE

## 2024-07-09 NOTE — TELEPHONE ENCOUNTER
----- Message from Jennifer Guevara, RN sent at 7/9/2024 10:10 AM CDT -----  Regarding: FW: Concerns  Pt had an episode of dizziness weakness SOB when went to bank yesterday around 11-12. From talking to him appears to be from dehydration possibly. HR 70s, BP 120s hard to get info. Might need interrogation.    Please advise,  ----- Message -----  From: Shelley Chavez  Sent: 7/9/2024   8:54 AM CDT  To: Jennifer Guevara, RN  Subject: Concerns                                         Pt [phone] says when he went to the [employer] on yesterday going up the stairs he got a little dizzy and SOB. He's been noticing the SOB off and on lately.    LOV 5/23/24 Dr. Kelley    Thanks   KATHRYN Gant is a 26 y.o. male  Chief Complaint   Patient presents with    Cough     productive, sinus drainage    Chills    Spasms     back     Patient presents today for drainage down the back of his throat and a cough. Patient states he has had the drainage for \"months\" and he is scheduled to see ENT in march. Patient reports that he went to the ED at [facility] on Monday because he had an episode of vomiting along with chest pain and palpitations. Patient states they did an EKG and Chest XR and was told they were normal. Patient states he was given a prescription trista zyrtec and told he likely had a viral URI. Patient reports he has not yet had the prescription for zyrtec filled. Patient states yesterday had an episode of diarrhea but that his stool is back to normal today. Patient also reports having back spasms but states they have also improved and improve with heat. Patient denies any fevers but does endorse chills. States the palpitations have resolved and that he has \"slight\" chest pain when he coughs and denies chest pain otherwise. Reports he has a CPAP machine but he has not cleaned this and he thinks this is what is causing his drainage. Past Medical History  Past Medical History:   Diagnosis Date    Condyloma acuminatum     Environmental allergies     Groin pain     Obstructive sleep apnea on CPAP     CATARINA (obstructive sleep apnea)     Plantar fasciitis     Sinusitis     Tinea pedis        Surgical History  Past Surgical History:   Procedure Laterality Date    HX OTHER SURGICAL      dental        Medications  Current Outpatient Medications   Medication Sig Dispense Refill    OTHER [facility] chest congestion relief tab 400 mg.      dextroamphetamine-amphetamine (ADDERALL) 30 mg tablet Take 1 Tab by mouth two (2) times a day. Max Daily Amount: 2 Tabs. 60 Tab 0    ergocalciferol (ERGOCALCIFEROL) 50,000 unit capsule Take 1 Cap by mouth every seven (7) days.  12 Cap 3    valACYclovir (VALTREX) 1 gram tablet Take 1 Tab by mouth daily. 60 Tab 5    montelukast (SINGULAIR) 10 mg tablet Take 1 Tab by mouth daily. 30 Tab 5    benzonatate (TESSALON) 200 mg capsule Take 200 mg by mouth three (3) times daily as needed for Cough.  cetirizine (ZYRTEC) 10 mg tablet Take 1 Tab by mouth daily.  30 Tab 0       Allergies  Allergies   Allergen Reactions    Penicillins Rash, Swelling and Hives    Mold Extracts Rash    Peanut Rash and Swelling       Family History  Family History   Problem Relation Age of Onset    Hypertension Mother     Diabetes Mother     Hypertension Father        Social History  Social History     Socioeconomic History    Marital status:      Spouse name: Not on file    Number of children: Not on file    Years of education: Not on file    Highest education level: Not on file   Occupational History    Not on file   Social Needs    Financial resource strain: Not on file    Food insecurity:     Worry: Not on file     Inability: Not on file    Transportation needs:     Medical: Not on file     Non-medical: Not on file   Tobacco Use    Smoking status: Never Smoker    Smokeless tobacco: Never Used   Substance and Sexual Activity    Alcohol use: No    Drug use: No    Sexual activity: Not on file   Lifestyle    Physical activity:     Days per week: Not on file     Minutes per session: Not on file    Stress: Not on file   Relationships    Social connections:     Talks on phone: Not on file     Gets together: Not on file     Attends Jewish service: Not on file     Active member of club or organization: Not on file     Attends meetings of clubs or organizations: Not on file     Relationship status: Not on file    Intimate partner violence:     Fear of current or ex partner: Not on file     Emotionally abused: Not on file     Physically abused: Not on file     Forced sexual activity: Not on file   Other Topics Concern    Not on file   Social History Narrative    Not on file       Problem List  Patient Active Problem List   Diagnosis Code    Fatigue R53.83    Allergic rhinitis J30.9    Fungal infection of left foot B35.3    Insomnia G47.00    Headache, unspecified headache type R51    Dizziness R42    Nausea R11.0    Vertigo R42    Vitamin D deficiency E55.9    Great toe pain M79.676    Depression F32.9    Snoring R06.83    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 9) E66.9    Dyslipidemia E78.5    Encounter for long-term (current) use of medications Z79.899    Obstructive sleep apnea G47.33    Severe obesity (HCC) E66.01       Review of Systems  Review of Systems   Constitutional: Positive for chills. Negative for fever. HENT: Negative for ear pain. Respiratory: Positive for cough. Negative for shortness of breath. Cardiovascular: Positive for chest pain. Negative for palpitations. Musculoskeletal: Positive for back pain. Vital Signs  Vitals:    02/12/20 1130 02/12/20 1138   BP: 148/90 135/89   Pulse: 86    Resp: 16    Temp: 98.2 °F (36.8 °C)    TempSrc: Oral    SpO2: 98%    Weight: 205 lb 12.8 oz (93.4 kg)    Height: 5' 9\" (1.753 m)    PainSc:   1    PainLoc: Back        Physical Exam  Physical Exam  Constitutional:       Appearance: Normal appearance. HENT:      Mouth/Throat:      Mouth: Mucous membranes are moist.      Pharynx: Oropharyngeal exudate: clear drainage. Eyes:      Extraocular Movements: Extraocular movements intact. Pupils: Pupils are equal, round, and reactive to light. Neck:      Musculoskeletal: Normal range of motion and neck supple. Cardiovascular:      Rate and Rhythm: Normal rate and regular rhythm. Pulses: Normal pulses. Heart sounds: Normal heart sounds. Pulmonary:      Effort: Pulmonary effort is normal.      Breath sounds: Normal breath sounds. Musculoskeletal: Normal range of motion.    Neurological:      Mental Status: He is alert and oriented to person, place, and time. Diagnostics  No orders of the defined types were placed in this encounter. Results  Results for orders placed or performed during the hospital encounter of 02/10/20   TROPONIN I   Result Value Ref Range    Troponin-I, QT <0.02 0.0 - 0.045 NG/ML   CBC WITH AUTOMATED DIFF   Result Value Ref Range    WBC 4.5 (L) 4.6 - 13.2 K/uL    RBC 4.59 (L) 4.70 - 5.50 M/uL    HGB 14.1 13.0 - 16.0 g/dL    HCT 42.0 36.0 - 48.0 %    MCV 91.5 74.0 - 97.0 FL    MCH 30.7 24.0 - 34.0 PG    MCHC 33.6 31.0 - 37.0 g/dL    RDW 13.3 11.6 - 14.5 %    PLATELET 079 221 - 812 K/uL    MPV 9.0 (L) 9.2 - 11.8 FL    NEUTROPHILS 45 40 - 73 %    LYMPHOCYTES 44 21 - 52 %    MONOCYTES 9 3 - 10 %    EOSINOPHILS 1 0 - 5 %    BASOPHILS 1 0 - 2 %    ABS. NEUTROPHILS 2.1 1.8 - 8.0 K/UL    ABS. LYMPHOCYTES 2.0 0.9 - 3.6 K/UL    ABS. MONOCYTES 0.4 0.05 - 1.2 K/UL    ABS. EOSINOPHILS 0.0 0.0 - 0.4 K/UL    ABS. BASOPHILS 0.0 0.0 - 0.1 K/UL    DF AUTOMATED     METABOLIC PANEL, COMPREHENSIVE   Result Value Ref Range    Sodium 142 136 - 145 mmol/L    Potassium 4.3 3.5 - 5.5 mmol/L    Chloride 105 100 - 111 mmol/L    CO2 31 21 - 32 mmol/L    Anion gap 6 3.0 - 18 mmol/L    Glucose 97 74 - 99 mg/dL    BUN 9 7.0 - 18 MG/DL    Creatinine 1.25 0.6 - 1.3 MG/DL    BUN/Creatinine ratio 7 (L) 12 - 20      GFR est AA >60 >60 ml/min/1.73m2    GFR est non-AA >60 >60 ml/min/1.73m2    Calcium 9.5 8.5 - 10.1 MG/DL    Bilirubin, total 0.5 0.2 - 1.0 MG/DL    ALT (SGPT) 48 16 - 61 U/L    AST (SGOT) 22 10 - 38 U/L    Alk.  phosphatase 88 45 - 117 U/L    Protein, total 8.2 6.4 - 8.2 g/dL    Albumin 4.4 3.4 - 5.0 g/dL    Globulin 3.8 2.0 - 4.0 g/dL    A-G Ratio 1.2 0.8 - 1.7     EKG, 12 LEAD, INITIAL   Result Value Ref Range    Ventricular Rate 94 BPM    Atrial Rate 94 BPM    P-R Interval 156 ms    QRS Duration 110 ms    Q-T Interval 354 ms    QTC Calculation (Bezet) 442 ms    Calculated P Axis 70 degrees    Calculated R Axis 80 degrees    Calculated T Axis 51 degrees    Diagnosis       Normal sinus rhythm  Normal ECG  No previous ECGs available  Confirmed by Nirmal Corral ([id number]) on 2/11/2020 7:36:45 AM             Assessment and Plan  Diagnoses and all orders for this visit:    1. Environmental allergies    2. CPAP use counseling      OTC zyrtec. Follow with ENT. Clean CPAP machine as indicated. Letter for work given. After care summary printed and reviewed with patient. Plan reviewed with patient. Questions answered. Patient verbalized understanding of plan and is in agreement with plan. Patient to follow up PCP as needed or earlier if symptoms worsen.      Shayne Bravo, SUNDAYP-C

## 2024-07-09 NOTE — TELEPHONE ENCOUNTER
----- Message from Tamika Mcintosh MA sent at 7/9/2024 10:26 AM CDT -----  Regarding: FW: Concerns    ----- Message -----  From: Jennifer Guevara RN  Sent: 7/9/2024  10:17 AM CDT  To: Regla Ibarra RN; Ema Garcia Staff  Subject: FW: Concerns                                     Pt had an episode of dizziness weakness SOB when went to bank yesterday around 11-12. From talking to him appears to be from dehydration possibly. HR 70s, BP 120s hard to get info. Might need interrogation.    Please advise,  ----- Message -----  From: Shelley Chavez  Sent: 7/9/2024   8:54 AM CDT  To: Jennifer Guevara RN  Subject: Concerns                                         Pt [phone] says when he went to the [employer] on yesterday going up the stairs he got a little dizzy and SOB. He's been noticing the SOB off and on lately.    LOV 5/23/24 Dr. Kelley    Thanks

## 2024-07-09 NOTE — TELEPHONE ENCOUNTER
Can you look at transmission data from overnight and escalate if needed? If you need to speak with him he uses American Sign Language ( will answer the number in his chart)  Thanks

## 2024-07-09 NOTE — TELEPHONE ENCOUNTER
Left message for patient to return call (thru ASL) and to send transmission manually. Call back # left and device team notified.

## 2024-07-10 ENCOUNTER — TELEPHONE (OUTPATIENT)
Dept: CARDIOLOGY | Facility: CLINIC | Age: 84
End: 2024-07-10
Payer: MEDICARE

## 2024-07-10 NOTE — TELEPHONE ENCOUNTER
Unable to reach pt yesterday and he called back today. Now c/o dizziness. Scheduled for labs in [place]. Tried to schedule labs w. dr Alejo but practice stated that they cannot do labs for outside MD. Pt was worried about walking distance, states SOB w. walking. He will get a ride and agreed to date/time of appointment(s).

## 2024-07-10 NOTE — TELEPHONE ENCOUNTER
----- Message from Jennifer Guevara RN sent at 7/9/2024  1:59 PM CDT -----  Regarding: FW: dizziness SOB  Spoke w. dr Reyes. In view of living far away pt could just do the labs locally also. I called pt and left a message w. the  (sign language) to call me back.  ----- Message -----  From: Dennis Reyes III, MD  Sent: 7/9/2024  10:47 AM CDT  To: Jennifer Guevara, RN  Subject: RE: dizziness SOB                                Please offer him an appt with a fellow or KALYAN or staff. BMP and BNP prior to visit. He can keep a BP log until the visit.    Thanks,  Heriberto Reyes  ----- Message -----  From: Jennifer Guevara RN  Sent: 7/9/2024  10:32 AM CDT  To: Dennis Reyes III, MD; #  Subject: dizziness SOB                                    Sl difficult historian. Pt went to [employer] yesterday around 11-12 and got very dizzy weak SOB, had to sit on a chair. Feeling ok today. Possibly dizzy SOB on other occasions but could not get clear info and later denied it.  No wt record. Mild ankle swelling wnl for him. BP apparently 120s HR 70s pt not writing down readings. Did admit to likely dehydrated. Did advice bringing water with when going outdoors, not get dehydrated. Did message EP in case want to interrogate device.    Please advise.  ----- Message -----  From: Shelley Chavez  Sent: 7/9/2024   8:54 AM CDT  To: Jennifer Guevara, RN  Subject: Concerns                                         Pt [phone] says when he went to the [employer] on yesterday going up the stairs he got a little dizzy and SOB. He's been noticing the SOB off and on lately.    LOV 5/23/24 Dr. Kelley    Thanks

## 2024-07-11 ENCOUNTER — LAB VISIT (OUTPATIENT)
Dept: LAB | Facility: HOSPITAL | Age: 84
End: 2024-07-11
Payer: MEDICARE

## 2024-07-11 DIAGNOSIS — R35.89 DIURESIS: ICD-10-CM

## 2024-07-11 DIAGNOSIS — R42 DIZZINESS: ICD-10-CM

## 2024-07-11 DIAGNOSIS — I50.32 CHRONIC DIASTOLIC HEART FAILURE: ICD-10-CM

## 2024-07-11 LAB
ANION GAP SERPL CALC-SCNC: 4 MMOL/L (ref 3–11)
BNP SERPL-MCNC: 473 PG/ML (ref 0–99)
BUN SERPL-MCNC: 24 MG/DL (ref 8–23)
CALCIUM SERPL-MCNC: 8.7 MG/DL (ref 8.7–10.5)
CHLORIDE SERPL-SCNC: 106 MMOL/L (ref 95–110)
CO2 SERPL-SCNC: 29 MMOL/L (ref 23–29)
CREAT SERPL-MCNC: 1.4 MG/DL (ref 0.5–1.4)
EST. GFR  (NO RACE VARIABLE): 48.9 ML/MIN/1.73 M^2
GLUCOSE SERPL-MCNC: 86 MG/DL (ref 70–110)
POTASSIUM SERPL-SCNC: 4.9 MMOL/L (ref 3.5–5.1)
SODIUM SERPL-SCNC: 139 MMOL/L (ref 136–145)

## 2024-07-11 PROCEDURE — 36415 COLL VENOUS BLD VENIPUNCTURE: CPT | Performed by: INTERNAL MEDICINE

## 2024-07-11 PROCEDURE — 80048 BASIC METABOLIC PNL TOTAL CA: CPT | Performed by: INTERNAL MEDICINE

## 2024-07-11 PROCEDURE — 83880 ASSAY OF NATRIURETIC PEPTIDE: CPT | Performed by: INTERNAL MEDICINE

## 2024-07-16 ENCOUNTER — TELEPHONE (OUTPATIENT)
Dept: CARDIOLOGY | Facility: CLINIC | Age: 84
End: 2024-07-16

## 2024-07-16 ENCOUNTER — HOSPITAL ENCOUNTER (EMERGENCY)
Facility: HOSPITAL | Age: 84
Discharge: HOME | End: 2024-07-16
Attending: EMERGENCY MEDICINE
Payer: MEDICARE

## 2024-07-16 VITALS
OXYGEN SATURATION: 96 % | RESPIRATION RATE: 20 BRPM | HEART RATE: 70 BPM | DIASTOLIC BLOOD PRESSURE: 65 MMHG | TEMPERATURE: 97 F | SYSTOLIC BLOOD PRESSURE: 104 MMHG

## 2024-07-16 DIAGNOSIS — I50.9 CONGESTIVE HEART FAILURE, UNSPECIFIED HF CHRONICITY, UNSPECIFIED HEART FAILURE TYPE: Primary | ICD-10-CM

## 2024-07-16 DIAGNOSIS — R06.02 SOB (SHORTNESS OF BREATH): ICD-10-CM

## 2024-07-16 LAB
ALBUMIN SERPL BCP-MCNC: 2.9 G/DL (ref 3.5–5.2)
ALP SERPL-CCNC: 66 U/L (ref 55–135)
ALT SERPL W/O P-5'-P-CCNC: 84 U/L (ref 10–44)
ANION GAP SERPL CALC-SCNC: 8 MMOL/L (ref 3–11)
AST SERPL-CCNC: 109 U/L (ref 10–40)
BASOPHILS # BLD AUTO: 0.02 K/UL (ref 0–0.2)
BASOPHILS NFR BLD: 0.3 % (ref 0–1.9)
BILIRUB SERPL-MCNC: 0.9 MG/DL (ref 0.1–1)
BUN SERPL-MCNC: 26 MG/DL (ref 8–23)
CALCIUM SERPL-MCNC: 8.6 MG/DL (ref 8.7–10.5)
CHLORIDE SERPL-SCNC: 111 MMOL/L (ref 95–110)
CO2 SERPL-SCNC: 20 MMOL/L (ref 23–29)
CREAT SERPL-MCNC: 1.4 MG/DL (ref 0.5–1.4)
DIFFERENTIAL METHOD BLD: ABNORMAL
EOSINOPHIL # BLD AUTO: 0.1 K/UL (ref 0–0.5)
EOSINOPHIL NFR BLD: 1.5 % (ref 0–8)
ERYTHROCYTE [DISTWIDTH] IN BLOOD BY AUTOMATED COUNT: 14.8 % (ref 11.5–14.5)
EST. GFR  (NO RACE VARIABLE): 48.9 ML/MIN/1.73 M^2
GLUCOSE SERPL-MCNC: 129 MG/DL (ref 70–110)
HCT VFR BLD AUTO: 42.5 % (ref 40–54)
HGB BLD-MCNC: 14.6 G/DL (ref 14–18)
IMM GRANULOCYTES # BLD AUTO: 0.03 K/UL (ref 0–0.04)
IMM GRANULOCYTES NFR BLD AUTO: 0.4 % (ref 0–0.5)
LYMPHOCYTES # BLD AUTO: 1.2 K/UL (ref 1–4.8)
LYMPHOCYTES NFR BLD: 17.8 % (ref 18–48)
MCH RBC QN AUTO: 34.7 PG (ref 27–31)
MCHC RBC AUTO-ENTMCNC: 34.4 G/DL (ref 32–36)
MCV RBC AUTO: 101 FL (ref 82–98)
MONOCYTES # BLD AUTO: 0.5 K/UL (ref 0.3–1)
MONOCYTES NFR BLD: 6.6 % (ref 4–15)
NEUTROPHILS # BLD AUTO: 5.1 K/UL (ref 1.8–7.7)
NEUTROPHILS NFR BLD: 73.4 % (ref 38–73)
NRBC BLD-RTO: 0 /100 WBC
NT-PROBNP SERPL-MCNC: 7204 PG/ML (ref 5–1800)
OHS QRS DURATION: 142 MS
OHS QTC CALCULATION: 483 MS
PLATELET # BLD AUTO: 143 K/UL (ref 150–450)
PMV BLD AUTO: 11.4 FL (ref 9.2–12.9)
POTASSIUM SERPL-SCNC: 5 MMOL/L (ref 3.5–5.1)
PROT SERPL-MCNC: 6.2 G/DL (ref 6–8.4)
RBC # BLD AUTO: 4.21 M/UL (ref 4.6–6.2)
SODIUM SERPL-SCNC: 139 MMOL/L (ref 136–145)
WBC # BLD AUTO: 6.87 K/UL (ref 3.9–12.7)

## 2024-07-16 PROCEDURE — 83880 ASSAY OF NATRIURETIC PEPTIDE: CPT | Performed by: NURSE PRACTITIONER

## 2024-07-16 PROCEDURE — 36415 COLL VENOUS BLD VENIPUNCTURE: CPT | Performed by: NURSE PRACTITIONER

## 2024-07-16 PROCEDURE — 93005 ELECTROCARDIOGRAM TRACING: CPT

## 2024-07-16 PROCEDURE — 93010 ELECTROCARDIOGRAM REPORT: CPT | Mod: ,,, | Performed by: INTERNAL MEDICINE

## 2024-07-16 PROCEDURE — 63600175 PHARM REV CODE 636 W HCPCS: Performed by: NURSE PRACTITIONER

## 2024-07-16 PROCEDURE — 99285 EMERGENCY DEPT VISIT HI MDM: CPT | Mod: 25

## 2024-07-16 PROCEDURE — 80053 COMPREHEN METABOLIC PANEL: CPT | Performed by: NURSE PRACTITIONER

## 2024-07-16 PROCEDURE — 96372 THER/PROPH/DIAG INJ SC/IM: CPT | Performed by: NURSE PRACTITIONER

## 2024-07-16 PROCEDURE — 25000003 PHARM REV CODE 250: Performed by: NURSE PRACTITIONER

## 2024-07-16 PROCEDURE — 85025 COMPLETE CBC W/AUTO DIFF WBC: CPT | Performed by: NURSE PRACTITIONER

## 2024-07-16 RX ORDER — FUROSEMIDE 10 MG/ML
40 INJECTION INTRAMUSCULAR; INTRAVENOUS
Status: COMPLETED | OUTPATIENT
Start: 2024-07-16 | End: 2024-07-16

## 2024-07-16 RX ORDER — MUPIROCIN 20 MG/G
OINTMENT TOPICAL 3 TIMES DAILY
Qty: 22 G | Refills: 0 | Status: SHIPPED | OUTPATIENT
Start: 2024-07-16 | End: 2024-07-26

## 2024-07-16 RX ORDER — ACETAMINOPHEN 325 MG/1
650 TABLET ORAL
Status: COMPLETED | OUTPATIENT
Start: 2024-07-16 | End: 2024-07-16

## 2024-07-16 RX ADMIN — FUROSEMIDE 40 MG: 10 INJECTION, SOLUTION INTRAVENOUS at 05:07

## 2024-07-16 RX ADMIN — ACETAMINOPHEN 650 MG: 325 TABLET ORAL at 05:07

## 2024-07-16 NOTE — DISCHARGE INSTRUCTIONS
Continue home medications as directed.  It is recommended that you see your primary doctor for further evaluation and maintenance of your symptoms.  You may want to discuss the frequency of when you take your furosemide.  Return to the emergency department for worsening condition.

## 2024-07-16 NOTE — ED PROVIDER NOTES
"Encounter Date: 7/16/2024       History     Chief Complaint   Patient presents with    Shortness of Breath     SOB today, worse with exertion     This is an 84-year-old white male with a history of CHF, anemia, and CVA who presents to the emergency department with complaints of exertional dyspnea.  Patient reports that he experiences chronic shortness of breath, however just over the last few days he is noticed that his symptoms have worsened.  He states that he recently had a checkup and that "everything was good".  He does report that he takes furosemide as needed in that he has not taken it in a few days.      Review of patient's allergies indicates:   Allergen Reactions    Losartan Hives     Past Medical History:   Diagnosis Date    Abdominal hernia     Anemia 02/2021    Anticoagulant long-term use     Arthritis     SHOULDER    Cancer     prostate    Deaf     Dizziness     Encounter for blood transfusion     Gastritis 03/02/2021    Hyperlipidemia     PAD (peripheral artery disease)     Stroke 2020    Symptomatic anemia 10/13/2020    Thyroid disease      Past Surgical History:   Procedure Laterality Date    ABLATION N/A 7/22/2020    Procedure: Ablation, afib;  Surgeon: Tal Laurent MD;  Location: [facility] CATH;  Service: Cardiology;  Laterality: N/A;  needs covid test;needs ALEXYS ([place])    AORTIC VALVE REPLACEMENT      CARDIAC CATH COSURGEON N/A 7/27/2022    Procedure: Cardiac Cath Cosurgeon;  Surgeon: Glenroy Sam MD;  Location: [facility] CATH LAB;  Service: Cardiovascular;  Laterality: N/A;    CARDIAC CATHETERIZATION      3 HEART STENTS    carotid artery stent Left     CLOSURE OF LEFT ATRIAL APPENDAGE USING DEVICE N/A 11/22/2022    Procedure: Left atrial appendage closure device;  Surgeon: Glenroy Richardson MD;  Location: [facility] CATH LAB;  Service: Cardiology;  Laterality: N/A;    COLONOSCOPY N/A 3/2/2021    Procedure: COLONOSCOPY;  Surgeon: Emiliana Cardenas MD;  Location: [facility];  Service: General;  " Laterality: N/A;  4  8:30am   CL/AM/Bd    CORONARY ARTERY BYPASS GRAFT      ESOPHAGOGASTRODUODENOSCOPY N/A 11/10/2020    Procedure: EGD (ESOPHAGOGASTRODUODENOSCOPY);  Surgeon: Renny Prakash MD;  Location: [facility] ENDO;  Service: Endoscopy;  Laterality: N/A;    HERNIA REPAIR      HIP REPLACEMENT ARTHROPLASTY Right 11/6/2020    Procedure: EMELY -ARTHROPLASTY, HIP REPLACEMENT (ENDO HIP);  Surgeon: Lino Thomas MD;  Location: [facility] OR;  Service: Orthopedics;  Laterality: Right;    LEFT HEART CATHETERIZATION N/A 6/29/2022    Procedure: Left heart cath;  Surgeon: Glenroy Richardson MD;  Location: [facility] CATH LAB;  Service: Cardiology;  Laterality: N/A;    PACERMAKER      PROSTATECTOMY      SHOULDER SURGERY      TOE SURGERY      TRANSCATHETER AORTIC VALVE REPLACEMENT (TAVR) N/A 7/27/2022    Procedure: REPLACEMENT, AORTIC VALVE, TRANSCATHETER (TAVR);  Surgeon: Glenroy Richardson MD;  Location: [facility] CATH LAB;  Service: Cardiology;  Laterality: N/A;    TRANSCATHETER AORTIC VALVE REPLACEMENT (TAVR)  7/27/2022    Procedure: REPLACEMENT, AORTIC VALVE, TRANSCATHETER (TAVR);  Surgeon: Glenroy Sam MD;  Location: [facility] CATH LAB;  Service: Cardiovascular;;    TRANSESOPHAGEAL ECHOCARDIOGRAPHY N/A 6/27/2022    Procedure: ECHOCARDIOGRAM, TRANSESOPHAGEAL;  Surgeon: Jorden Diagnostic Provider;  Location: [facility] EP LAB;  Service: Anesthesiology;  Laterality: N/A;    TRANSESOPHAGEAL ECHOCARDIOGRAPHY N/A 11/22/2022    Procedure: ECHOCARDIOGRAM, TRANSESOPHAGEAL;  Surgeon: Leila Arango MD;  Location: [facility] CATH LAB;  Service: Cardiology;  Laterality: N/A;    TRANSESOPHAGEAL ECHOCARDIOGRAPHY N/A 1/5/2023    Procedure: ECHOCARDIOGRAM, TRANSESOPHAGEAL;  Surgeon: Dosc Diagnostic Provider;  Location: [facility] EP LAB;  Service: Anesthesiology;  Laterality: N/A;    TREATMENT OF CARDIAC ARRHYTHMIA N/A 8/7/2019    Procedure: Cardioversion or Defibrillation;  Surgeon: Tal Laurent MD;  Location: [facility] CATH;  Service: Cardiology;  Laterality: N/A;      Family History   Problem Relation Name Age of Onset    Heart disease Mother      Heart attack Mother       Social History     Tobacco Use    Smoking status: Never    Smokeless tobacco: Never   Substance Use Topics    Alcohol use: Yes     Alcohol/week: 7.0 standard drinks of alcohol     Types: 7 Glasses of wine per week    Drug use: Never     Review of Systems   Constitutional:  Negative for appetite change and fever.   HENT:  Negative for congestion and rhinorrhea.    Respiratory:  Positive for shortness of breath. Negative for cough.    Cardiovascular:  Negative for chest pain.   Gastrointestinal:  Negative for abdominal pain, diarrhea and vomiting.       Physical Exam     Initial Vitals   BP Pulse Resp Temp SpO2   07/16/24 1457 07/16/24 1457 07/16/24 1457 07/16/24 1503 07/16/24 1457   (!) 150/99 71 20 97.2 °F (36.2 °C) (!) 92 %      MAP       --                Physical Exam    Nursing note and vitals reviewed.  Constitutional: He appears well-developed and well-nourished. He is active. No distress.   HENT:   Head: Normocephalic and atraumatic.   Eyes: EOM are normal. Pupils are equal, round, and reactive to light.   Neck: Neck supple.   Normal range of motion.  Cardiovascular:  Normal rate, regular rhythm and normal heart sounds.           Pulmonary/Chest: No respiratory distress. He has rales (bases).   Musculoskeletal:         General: Edema (1+ bilateral lower extremity edema) present.      Cervical back: Normal range of motion and neck supple.     Neurological: He is alert and oriented to person, place, and time. GCS score is 15. GCS eye subscore is 4. GCS verbal subscore is 5. GCS motor subscore is 6.   Skin: Skin is warm and dry. Capillary refill takes less than 2 seconds.   Psychiatric: He has a normal mood and affect. His behavior is normal. Thought content normal.         ED Course   Procedures  Labs Reviewed   CBC W/ AUTO DIFFERENTIAL - Abnormal; Notable for the following components:       Result  Value    RBC 4.21 (*)      (*)     MCH 34.7 (*)     RDW 14.8 (*)     Platelets 143 (*)     Gran % 73.4 (*)     Lymph % 17.8 (*)     All other components within normal limits   COMPREHENSIVE METABOLIC PANEL - Abnormal; Notable for the following components:    Chloride 111 (*)     CO2 20 (*)     Glucose 129 (*)     BUN 26 (*)     Calcium 8.6 (*)     Albumin 2.9 (*)      (*)     ALT 84 (*)     eGFR 48.9 (*)     All other components within normal limits   NT-PRO NATRIURETIC PEPTIDE - Abnormal; Notable for the following components:    NT-proBNP 7204 (*)     All other components within normal limits     EKG Readings: (Independently Interpreted)   Initial Reading: No STEMI. Rhythm: Paced Rhythm. Heart Rate: 76. Ectopy: No Ectopy. ST Segments: Normal ST Segments.       Imaging Results              X-Ray Chest AP Portable (In process)                      Medications   furosemide injection 40 mg (has no administration in time range)     Medical Decision Making  Amount and/or Complexity of Data Reviewed  Labs: ordered.  Radiology: ordered.    Risk  Prescription drug management.               ED Course as of 07/16/24 1701   Tue Jul 16, 2024   1659 Slight worsening in BNP.  Chest x-ray reveals mild increased haziness throughout.  All other labs relatively normal.  Patient given Lasix here in the ED and instructed to follow-up with his PCP for further guidance on Lasix. [CB]      ED Course User Index  [CB] Trudy Archuleta NP                           Clinical Impression:  Final diagnoses:  [R06.02] SOB (shortness of breath)  [I50.9] Congestive heart failure, unspecified HF chronicity, unspecified heart failure type (Primary)          ED Disposition Condition    Discharge Stable          ED Prescriptions    None       Follow-up Information       Follow up With Specialties Details Why Contact Info    PCP Follow UP  Schedule an appointment as soon as possible for a visit in 2 days for follow-up, for re-evaluation  of today's complaint              Trudy Archuleta NP  07/16/24 9219

## 2024-07-17 PROBLEM — R79.89 ABNORMAL THYROID BLOOD TEST: Status: ACTIVE | Noted: 2024-07-17

## 2024-07-17 PROBLEM — K75.81 NASH (NONALCOHOLIC STEATOHEPATITIS): Status: ACTIVE | Noted: 2024-07-17

## 2024-07-17 PROBLEM — E03.8 OTHER SPECIFIED HYPOTHYROIDISM: Status: ACTIVE | Noted: 2024-07-17

## 2024-07-17 PROBLEM — E03.9 ACQUIRED HYPOTHYROIDISM: Status: ACTIVE | Noted: 2024-07-17

## 2024-07-22 ENCOUNTER — PATIENT MESSAGE (OUTPATIENT)
Dept: ELECTROPHYSIOLOGY | Facility: CLINIC | Age: 84
End: 2024-07-22
Payer: MEDICARE

## 2024-07-25 ENCOUNTER — TELEPHONE (OUTPATIENT)
Dept: CARDIOLOGY | Facility: CLINIC | Age: 84
End: 2024-07-25
Payer: MEDICARE

## 2024-07-25 NOTE — TELEPHONE ENCOUNTER
Request for  placed 7/25/[id number]-EavquAmanda Khalil RN via [email or web address]

## 2024-08-09 LAB
OHS CV AF BURDEN PERCENT: 99.9
OHS CV DC REMOTE DEVICE TYPE: NORMAL
OHS CV ICD SHOCK: NO
OHS CV RV PACING PERCENT: 93.07 %

## 2024-08-09 NOTE — PROGRESS NOTES
Subjective   Patient ID:  Michael Mclean is a 84 y.o. male who presents for follow-up of Ventricular tachycardia      HPI 83 yo male with atrial fibrillation, ventricular fibrillation, CAD s/p CABG, AS s/p sAVR followed by TAVR, Htn, CVA, mitral valve prolapse, ICD.     Background:  Previously managed by [facility]. EP was Dr. Laurent. Has switched care to [facility].  Persistent Afib PVI 7/20 ([facility] + roof line).  Remains on Sotalol since then.  CAD s/p 2v CABG 2012  Aortic stenosis Bioprosthetic aotic valve in 2012 s/p 26 mm Evolut valve in valve TAVR inside of a failing 25 mm Perimount bioprosthetic valve  VF in setting of EF 15%. Dual chamber ICD implanted 9/6/19  Echo 9/1/22 EF 45% mitral valve anterior leaflet prolapse with moderate to severe MR, YOAN 85.7, well seated aortic prosthesis.     Feeling well overall.  Not on anticoagulation. Has had GI bleeding in the past requiring transfusion.   MAURICIO occlusion 11/22/22  Echo 8/29/23    Left Ventricle: The left ventricle is normal in size. Normal wall thickness. Normal wall motion. There is normal systolic function with a visually estimated ejection fraction of 60 - 65%. There is normal diastolic function.    Right Ventricle: Normal right ventricular cavity size. Wall thickness is normal. Right ventricle wall motion  is normal. Systolic function is normal.    Left Atrium: Left atrium is severely dilated.    Aortic Valve: There is a transcatheter valve replacement in the aortic position. It is reported to be a 26 mm Evolut valve. Aortic valve peak velocity is 0.95 m/s. Mean gradient is 2 mmHg.    Mitral Valve: Mild mitral annular calcification. There is mild to moderate regurgitation. Anterior leaflet is long with mild prolapase causing eccentric posteriorly directed jet. MR appears unchanged from prior ALEXYS.    Tricuspid Valve: There is mild to moderate regurgitation.    Pulmonary Artery: The estimated pulmonary artery systolic pressure is 34 mmHg.    IVC/SVC:  Intermediate venous pressure at 8 mmHg.    Had VT >> switched from Sotalol to Amiodarone.     Update:  Has noted increasing dyspnea.   Device interrogation reveals RV threshold are increasing, with stable sensing and impedances, RA pacing 0.4% RV pacing 80.7%, has been in chronic AF since 7/23, rate controlled. Had VT 4/24 appropriately defibrillated.      Review of Systems   Constitutional: Positive for malaise/fatigue. Negative for fever.   HENT:  Negative for congestion and sore throat.    Cardiovascular:  Positive for dyspnea on exertion. Negative for chest pain, irregular heartbeat, leg swelling, near-syncope, orthopnea, palpitations, paroxysmal nocturnal dyspnea and syncope.   Respiratory:  Positive for shortness of breath. Negative for cough.    Gastrointestinal:  Negative for abdominal pain, constipation and diarrhea.   Neurological:  Negative for dizziness, light-headedness and weakness.   Psychiatric/Behavioral:  Negative for depression. The patient is not nervous/anxious.           Objective     Physical Exam  Constitutional:       Appearance: He is well-developed.   Eyes:      General: No scleral icterus.     Conjunctiva/sclera: Conjunctivae normal.   Neck:      Vascular: No JVD.      Trachea: No tracheal deviation.   Cardiovascular:      Rate and Rhythm: Normal rate and regular rhythm.      Chest Wall: PMI is not displaced.      Heart sounds: Normal heart sounds.   Pulmonary:      Effort: Pulmonary effort is normal. No respiratory distress.      Breath sounds: Normal breath sounds.   Abdominal:      Palpations: Abdomen is soft.      Tenderness: There is no abdominal tenderness.   Musculoskeletal:         General: No tenderness.   Skin:     General: Skin is warm and dry.      Findings: No rash.   Neurological:      Mental Status: He is alert and oriented to person, place, and time.   Psychiatric:         Behavior: Behavior normal.        Assessment and Plan     1. Persistent atrial fibrillation    2.  Chronic diastolic heart failure    3. Essential hypertension    4. TAVR in SAVR    5. S/P CABG (coronary artery bypass graft)    6. AICD (automatic cardioverter/defibrillator) present    7. Presence of Watchman left atrial appendage closure device    8. History of embolic stroke        Plan:     Would like to increase Amiodarone to 200 mg BID, but his LFT's are elevated, and he has significant hypothyroidism. They are going to speak with his PCP regarding thyroid and liver function.  If has increasing events >> add Mexitil.  Echo  Pet stress.  If EF depressed >> will consider upgrade.  F/u in 6 months.

## 2024-08-12 RX ORDER — ROSUVASTATIN CALCIUM 20 MG/1
20 TABLET, COATED ORAL EVERY MORNING
OUTPATIENT
Start: 2024-08-12

## 2024-08-13 ENCOUNTER — TELEPHONE (OUTPATIENT)
Dept: UROLOGY | Facility: CLINIC | Age: 84
End: 2024-08-13
Payer: MEDICARE

## 2024-08-14 ENCOUNTER — OFFICE VISIT (OUTPATIENT)
Dept: UROLOGY | Facility: CLINIC | Age: 84
End: 2024-08-14
Payer: MEDICARE

## 2024-08-14 ENCOUNTER — OFFICE VISIT (OUTPATIENT)
Dept: CARDIOLOGY | Facility: CLINIC | Age: 84
End: 2024-08-14
Payer: MEDICARE

## 2024-08-14 ENCOUNTER — HOSPITAL ENCOUNTER (OUTPATIENT)
Dept: CARDIOLOGY | Facility: HOSPITAL | Age: 84
Discharge: HOME | End: 2024-08-14
Attending: INTERNAL MEDICINE
Payer: MEDICARE

## 2024-08-14 ENCOUNTER — CLINICAL SUPPORT (OUTPATIENT)
Dept: CARDIOLOGY | Facility: HOSPITAL | Age: 84
End: 2024-08-14
Attending: NURSE PRACTITIONER
Payer: MEDICARE

## 2024-08-14 ENCOUNTER — OFFICE VISIT (OUTPATIENT)
Dept: ELECTROPHYSIOLOGY | Facility: CLINIC | Age: 84
End: 2024-08-14
Payer: MEDICARE

## 2024-08-14 VITALS
OXYGEN SATURATION: 99 % | SYSTOLIC BLOOD PRESSURE: 109 MMHG | WEIGHT: 138.44 LBS | HEART RATE: 82 BPM | BODY MASS INDEX: 20.98 KG/M2 | DIASTOLIC BLOOD PRESSURE: 75 MMHG | HEIGHT: 68 IN

## 2024-08-14 VITALS — WEIGHT: 138 LBS | BODY MASS INDEX: 20.92 KG/M2 | HEIGHT: 68 IN

## 2024-08-14 VITALS
WEIGHT: 156.06 LBS | HEIGHT: 67 IN | BODY MASS INDEX: 24.49 KG/M2 | SYSTOLIC BLOOD PRESSURE: 109 MMHG | HEART RATE: 78 BPM | DIASTOLIC BLOOD PRESSURE: 69 MMHG

## 2024-08-14 VITALS
BODY MASS INDEX: 22.07 KG/M2 | WEIGHT: 140.63 LBS | SYSTOLIC BLOOD PRESSURE: 124 MMHG | DIASTOLIC BLOOD PRESSURE: 59 MMHG | HEART RATE: 75 BPM | HEIGHT: 67 IN

## 2024-08-14 DIAGNOSIS — R97.21 RISING PSA FOLLOWING TREATMENT FOR MALIGNANT NEOPLASM OF PROSTATE: ICD-10-CM

## 2024-08-14 DIAGNOSIS — Z92.3 HISTORY OF EXTERNAL BEAM RADIATION THERAPY: ICD-10-CM

## 2024-08-14 DIAGNOSIS — Z95.1 S/P CABG (CORONARY ARTERY BYPASS GRAFT): ICD-10-CM

## 2024-08-14 DIAGNOSIS — Z95.810 BIVENTRICULAR IMPLANTABLE CARDIOVERTER-DEFIBRILLATOR (ICD) IN SITU: ICD-10-CM

## 2024-08-14 DIAGNOSIS — C61 PROSTATE CANCER: Primary | ICD-10-CM

## 2024-08-14 DIAGNOSIS — I50.32 CHRONIC DIASTOLIC HEART FAILURE: ICD-10-CM

## 2024-08-14 DIAGNOSIS — Z90.79 HISTORY OF RADICAL RETROPUBIC PROSTATECTOMY: ICD-10-CM

## 2024-08-14 DIAGNOSIS — I10 ESSENTIAL HYPERTENSION: ICD-10-CM

## 2024-08-14 DIAGNOSIS — I48.19 PERSISTENT ATRIAL FIBRILLATION: Primary | ICD-10-CM

## 2024-08-14 DIAGNOSIS — Z95.810 AICD (AUTOMATIC CARDIOVERTER/DEFIBRILLATOR) PRESENT: ICD-10-CM

## 2024-08-14 DIAGNOSIS — I25.10 CORONARY ARTERY DISEASE INVOLVING NATIVE CORONARY ARTERY OF NATIVE HEART WITHOUT ANGINA PECTORIS: ICD-10-CM

## 2024-08-14 DIAGNOSIS — Z79.818 ANDROGEN DEPRIVATION THERAPY: ICD-10-CM

## 2024-08-14 DIAGNOSIS — M79.89 LEG SWELLING: Primary | ICD-10-CM

## 2024-08-14 DIAGNOSIS — Z86.73 HISTORY OF EMBOLIC STROKE: ICD-10-CM

## 2024-08-14 DIAGNOSIS — I48.19 PERSISTENT ATRIAL FIBRILLATION: ICD-10-CM

## 2024-08-14 DIAGNOSIS — Z95.818 PRESENCE OF WATCHMAN LEFT ATRIAL APPENDAGE CLOSURE DEVICE: ICD-10-CM

## 2024-08-14 DIAGNOSIS — Z95.3 S/P TAVR (TRANSCATHETER AORTIC VALVE REPLACEMENT), BIOPROSTHETIC: ICD-10-CM

## 2024-08-14 DIAGNOSIS — I70.0 AORTIC ATHEROSCLEROSIS: ICD-10-CM

## 2024-08-14 DIAGNOSIS — R97.20 ELEVATED PSA, GREATER THAN OR EQUAL TO 20 NG/ML: ICD-10-CM

## 2024-08-14 DIAGNOSIS — E78.5 HYPERLIPIDEMIA, UNSPECIFIED HYPERLIPIDEMIA TYPE: ICD-10-CM

## 2024-08-14 LAB
ASCENDING AORTA: 4.2 CM
AV INDEX (PROSTH): 0.46
AV MEAN GRADIENT: 7 MMHG
AV PEAK GRADIENT: 13 MMHG
AV VALVE AREA BY VELOCITY RATIO: 14.63 CM²
AV VALVE AREA: 14.82 CM²
AV VELOCITY RATIO: 0.45
BSA FOR ECHO PROCEDURE: 1.73 M2
CV ECHO LV RWT: 0.27 CM
DOP CALC AO PEAK VEL: 1.78 M/S
DOP CALC AO VTI: 32.78 CM
DOP CALC LVOT AREA: 32.6 CM2
DOP CALC LVOT DIAMETER: 6.44 CM
DOP CALC LVOT PEAK VEL: 0.8 M/S
DOP CALC LVOT STROKE VOLUME: 485.75 CM3
DOP CALCLVOT PEAK VEL VTI: 14.92 CM
E WAVE DECELERATION TIME: 215.27 MSEC
E/A RATIO: 2.69
E/E' RATIO: 15.67 M/S
ECHO LV POSTERIOR WALL: 0.83 CM (ref 0.6–1.1)
FRACTIONAL SHORTENING: 26 % (ref 28–44)
INTERVENTRICULAR SEPTUM: 1.14 CM (ref 0.6–1.1)
LA MAJOR: 6.25 CM
LA MINOR: 6.28 CM
LA WIDTH: 5.76 CM
LEFT ATRIUM SIZE: 5.83 CM
LEFT ATRIUM VOLUME INDEX MOD: 78.6 ML/M2
LEFT ATRIUM VOLUME INDEX: 102.2 ML/M2
LEFT ATRIUM VOLUME MOD: 137.61 CM3
LEFT ATRIUM VOLUME: 178.83 CM3
LEFT INTERNAL DIMENSION IN SYSTOLE: 4.59 CM (ref 2.1–4)
LEFT VENTRICLE DIASTOLIC VOLUME INDEX: 85.67 ML/M2
LEFT VENTRICLE DIASTOLIC VOLUME: 149.92 ML
LEFT VENTRICLE MASS INDEX: 146 G/M2
LEFT VENTRICLE SYSTOLIC VOLUME INDEX: 55.3 ML/M2
LEFT VENTRICLE SYSTOLIC VOLUME: 96.7 ML
LEFT VENTRICULAR INTERNAL DIMENSION IN DIASTOLE: 6.2 CM (ref 3.5–6)
LEFT VENTRICULAR MASS: 256.03 G
LV LATERAL E/E' RATIO: 13.43 M/S
LV SEPTAL E/E' RATIO: 18.8 M/S
MV PEAK A VEL: 0.35 M/S
MV PEAK E VEL: 0.94 M/S
MV STENOSIS PRESSURE HALF TIME: 62.43 MS
MV VALVE AREA P 1/2 METHOD: 3.52 CM2
PISA TR MAX VEL: 2.8 M/S
RA MAJOR: 5.12 CM
RA PRESSURE ESTIMATED: 3 MMHG
RA WIDTH: 4.53 CM
RIGHT ATRIUM VOLUME AREA LENGTH APICAL 4 CHAMBER: 100 ML
RIGHT VENTRICLE DIASTOLIC BASEL DIMENSION: 4.1 CM
RV TB RVSP: 6 MMHG
SINUS: 4.2 CM
STJ: 4 CM
TDI LATERAL: 0.07 M/S
TDI SEPTAL: 0.05 M/S
TDI: 0.06 M/S
TR MAX PG: 31 MMHG
TRICUSPID ANNULAR PLANE SYSTOLIC EXCURSION: 1.16 CM
TV REST PULMONARY ARTERY PRESSURE: 34 MMHG
Z-SCORE OF LEFT VENTRICULAR DIMENSION IN END DIASTOLE: 2.47
Z-SCORE OF LEFT VENTRICULAR DIMENSION IN END SYSTOLE: 3.37

## 2024-08-14 PROCEDURE — 99215 OFFICE O/P EST HI 40 MIN: CPT | Mod: S$GLB,,, | Performed by: INTERNAL MEDICINE

## 2024-08-14 PROCEDURE — 93306 TTE W/DOPPLER COMPLETE: CPT | Mod: 26,,, | Performed by: INTERNAL MEDICINE

## 2024-08-14 PROCEDURE — 1159F MED LIST DOCD IN RCRD: CPT | Mod: CPTII,S$GLB,, | Performed by: PHYSICIAN ASSISTANT

## 2024-08-14 PROCEDURE — 93283 PRGRMG EVAL IMPLANTABLE DFB: CPT | Mod: 26,,, | Performed by: INTERNAL MEDICINE

## 2024-08-14 PROCEDURE — 99999 PR PBB SHADOW E&M-EST. PATIENT-LVL V: CPT | Mod: PBBFAC,,, | Performed by: NURSE PRACTITIONER

## 2024-08-14 PROCEDURE — 93005 ELECTROCARDIOGRAM TRACING: CPT | Mod: S$GLB,,, | Performed by: INTERNAL MEDICINE

## 2024-08-14 PROCEDURE — 99999 PR PBB SHADOW E&M-EST. PATIENT-LVL V: CPT | Mod: PBBFAC,,, | Performed by: PHYSICIAN ASSISTANT

## 2024-08-14 PROCEDURE — 1159F MED LIST DOCD IN RCRD: CPT | Mod: CPTII,S$GLB,, | Performed by: INTERNAL MEDICINE

## 2024-08-14 PROCEDURE — 93283 PRGRMG EVAL IMPLANTABLE DFB: CPT

## 2024-08-14 PROCEDURE — 1101F PT FALLS ASSESS-DOCD LE1/YR: CPT | Mod: CPTII,S$GLB,, | Performed by: PHYSICIAN ASSISTANT

## 2024-08-14 PROCEDURE — 3288F FALL RISK ASSESSMENT DOCD: CPT | Mod: CPTII,S$GLB,, | Performed by: INTERNAL MEDICINE

## 2024-08-14 PROCEDURE — 1126F AMNT PAIN NOTED NONE PRSNT: CPT | Mod: CPTII,S$GLB,, | Performed by: PHYSICIAN ASSISTANT

## 2024-08-14 PROCEDURE — 3288F FALL RISK ASSESSMENT DOCD: CPT | Mod: CPTII,S$GLB,, | Performed by: PHYSICIAN ASSISTANT

## 2024-08-14 PROCEDURE — 93306 TTE W/DOPPLER COMPLETE: CPT

## 2024-08-14 PROCEDURE — 93010 ELECTROCARDIOGRAM REPORT: CPT | Mod: S$GLB,,, | Performed by: INTERNAL MEDICINE

## 2024-08-14 PROCEDURE — 1101F PT FALLS ASSESS-DOCD LE1/YR: CPT | Mod: CPTII,S$GLB,, | Performed by: INTERNAL MEDICINE

## 2024-08-14 PROCEDURE — 99214 OFFICE O/P EST MOD 30 MIN: CPT | Mod: S$GLB,,, | Performed by: PHYSICIAN ASSISTANT

## 2024-08-14 PROCEDURE — 99999 PR PBB SHADOW E&M-EST. PATIENT-LVL III: CPT | Mod: PBBFAC,,, | Performed by: INTERNAL MEDICINE

## 2024-08-14 PROCEDURE — 1125F AMNT PAIN NOTED PAIN PRSNT: CPT | Mod: CPTII,S$GLB,, | Performed by: INTERNAL MEDICINE

## 2024-08-14 RX ORDER — ROSUVASTATIN CALCIUM 20 MG/1
20 TABLET, COATED ORAL EVERY MORNING
Qty: 90 TABLET | Refills: 3 | Status: CANCELLED | OUTPATIENT
Start: 2024-08-14

## 2024-08-14 NOTE — PROGRESS NOTES
CHIEF COMPLAINT:    Michael Mclean is a 84 y.o. male presents today for Prostate Cancer.     HISTORY OF PRESENTING ILLINESS:    Michael Mclean is a 83 y.o. deaf male with a PMH of prostate cancer. He is s/p RRP 05/2012 with Dr. Bartholomew at [facility]  Pathology revealed a Elliott 5+4= 9 disease, pT3aN0.  In 10/2013 completed one year of ADT and XRT.   He had been followed there until 2016 before moving here.      He was initially seen in our clinic with Dr. Manrique. Had been receiving leuprolide (lupron) every 6 months.   Seen with Dr. Ariza 07/08/2020 .   I saw him 08/09/2021.   08/02/2021 PSA was 4.3; previously 0.70 on 01.28/2021  LUPRON 45mg IM given    09/09/2021 PSA was 5.4 with T level of 12. Hem/Onc consult placed.      02/14/2022 CTAP:  -No evidence of metastatic disease in the abdomen or pelvis     02/14/2022 Bone Scan:  - Increased radiotracer accumulation within the right 2nd rib as compared to bone scan of 09/30/2021 suspicious for metastatic disease  -09/01/2022 with a PSA was 3.8.      Seen 03/01/2023 with a PSA of 13.3 on 02/28/2023 (7.4 on 12/01/2022)  LUPRON 45mg IM given     05/17/2023 he started abiraterone with Dr. Wilkins.  08/29/2023. PSA was 15.9. Lupron given.     He was seen in Hem/Onc 02/15/2024  Last clinic visit & Lupron was 02/15/2024; PSA was 37.4    03/12/2024 Seen with Dr. Wilkins; that day PSA was 23.2    He is here today for LUPRON 45mg.  Daughter/ED and  present.    Last PSA was 43.4 on 06/28/2024  No complaints.   Has no urinary complaints today; getting up on once a night. Much better. Occasion leakage/dribble.        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            REVIEW OF SYSTEMS:  Review of Systems   Constitutional:  Positive for malaise/fatigue. Negative for chills and fever.   Eyes:  Negative for double vision.   Respiratory:  Positive for shortness of breath. Negative for cough.    Cardiovascular:  Negative for chest  pain and palpitations.        He is seeing Cardiology today     Gastrointestinal:  Negative for abdominal pain, constipation, diarrhea, nausea and vomiting.   Genitourinary:  Negative for dysuria, frequency, hematuria and urgency.        See HPI   Musculoskeletal:  Negative for falls.   Neurological:  Negative for dizziness and seizures.   Endo/Heme/Allergies:  Negative for polydipsia. Bruises/bleeds easily.         PATIENT HISTORY:    Past Medical History:   Diagnosis Date    Abdominal hernia     Anemia 02/2021    Anticoagulant long-term use     Arthritis     SHOULDER    Cancer     prostate    Deaf     Dizziness     Encounter for blood transfusion     Gastritis 03/02/2021    Hyperlipidemia     PAD (peripheral artery disease)     Stroke 2020    Symptomatic anemia 10/13/2020    Thyroid disease        Past Surgical History:   Procedure Laterality Date    ABLATION N/A 7/22/2020    Procedure: Ablation, afib;  Surgeon: Tal Laurent MD;  Location: [facility] CATH;  Service: Cardiology;  Laterality: N/A;  needs covid test;needs ALEXYS ([place])    AORTIC VALVE REPLACEMENT      CARDIAC CATH COSURGEON N/A 7/27/2022    Procedure: Cardiac Cath Cosurgeon;  Surgeon: Glenroy Sam MD;  Location: [facility] CATH LAB;  Service: Cardiovascular;  Laterality: N/A;    CARDIAC CATHETERIZATION      3 HEART STENTS    carotid artery stent Left     CLOSURE OF LEFT ATRIAL APPENDAGE USING DEVICE N/A 11/22/2022    Procedure: Left atrial appendage closure device;  Surgeon: Glenroy Richardson MD;  Location: [facility] CATH LAB;  Service: Cardiology;  Laterality: N/A;    COLONOSCOPY N/A 3/2/2021    Procedure: COLONOSCOPY;  Surgeon: Emiliana Cardenas MD;  Location: [facility];  Service: General;  Laterality: N/A;  4  8:30am   CL/AM/Bd    CORONARY ARTERY BYPASS GRAFT      ESOPHAGOGASTRODUODENOSCOPY N/A 11/10/2020    Procedure: EGD (ESOPHAGOGASTRODUODENOSCOPY);  Surgeon: Renny Prakash MD;  Location: [facility];  Service: Endoscopy;  Laterality: N/A;     HERNIA REPAIR      HIP REPLACEMENT ARTHROPLASTY Right 11/6/2020    Procedure: EMELY -ARTHROPLASTY, HIP REPLACEMENT (ENDO HIP);  Surgeon: Lino Thomas MD;  Location: [facility] OR;  Service: Orthopedics;  Laterality: Right;    LEFT HEART CATHETERIZATION N/A 6/29/2022    Procedure: Left heart cath;  Surgeon: Glenroy Richardson MD;  Location: [facility] CATH LAB;  Service: Cardiology;  Laterality: N/A;    PACERMAKER      PROSTATECTOMY      SHOULDER SURGERY      TOE SURGERY      TRANSCATHETER AORTIC VALVE REPLACEMENT (TAVR) N/A 7/27/2022    Procedure: REPLACEMENT, AORTIC VALVE, TRANSCATHETER (TAVR);  Surgeon: Glenroy Richardson MD;  Location: [facility] CATH LAB;  Service: Cardiology;  Laterality: N/A;    TRANSCATHETER AORTIC VALVE REPLACEMENT (TAVR)  7/27/2022    Procedure: REPLACEMENT, AORTIC VALVE, TRANSCATHETER (TAVR);  Surgeon: Glenroy Sam MD;  Location: [facility] CATH LAB;  Service: Cardiovascular;;    TRANSESOPHAGEAL ECHOCARDIOGRAPHY N/A 6/27/2022    Procedure: ECHOCARDIOGRAM, TRANSESOPHAGEAL;  Surgeon: Fracisco Diagnostic Provider;  Location: [facility] EP LAB;  Service: Anesthesiology;  Laterality: N/A;    TRANSESOPHAGEAL ECHOCARDIOGRAPHY N/A 11/22/2022    Procedure: ECHOCARDIOGRAM, TRANSESOPHAGEAL;  Surgeon: Leila Arango MD;  Location: [facility] CATH LAB;  Service: Cardiology;  Laterality: N/A;    TRANSESOPHAGEAL ECHOCARDIOGRAPHY N/A 1/5/2023    Procedure: ECHOCARDIOGRAM, TRANSESOPHAGEAL;  Surgeon: Fracisco Diagnostic Provider;  Location: [facility] EP LAB;  Service: Anesthesiology;  Laterality: N/A;    TREATMENT OF CARDIAC ARRHYTHMIA N/A 8/7/2019    Procedure: Cardioversion or Defibrillation;  Surgeon: Tal Laurent MD;  Location: [facility] CATH;  Service: Cardiology;  Laterality: N/A;       Family History   Problem Relation Name Age of Onset    Heart disease Mother      Heart attack Mother         Social History     Socioeconomic History    Marital status:    Tobacco Use    Smoking status: Never    Smokeless tobacco: Never   Substance  and Sexual Activity    Alcohol use: Yes     Alcohol/week: 7.0 standard drinks of alcohol     Types: 7 Glasses of wine per week    Drug use: Never     Social Determinants of Health     Financial Resource Strain: Low Risk  (11/6/2023)    Overall Financial Resource Strain (CARDIA)     Difficulty of Paying Living Expenses: Not hard at all   Food Insecurity: No Food Insecurity (11/6/2023)    Hunger Vital Sign     Worried About Running Out of Food in the Last Year: Never true     Ran Out of Food in the Last Year: Never true   Transportation Needs: No Transportation Needs (11/6/2023)    PRAPARE - Transportation     Lack of Transportation (Medical): No     Lack of Transportation (Non-Medical): No   Physical Activity: Sufficiently Active (11/6/2023)    Exercise Vital Sign     Days of Exercise per Week: 7 days     Minutes of Exercise per Session: 60 min   Stress: No Stress Concern Present (11/6/2023)    [employer] - Occupational Stress Questionnaire     Feeling of Stress : Only a little   Housing Stability: Low Risk  (11/6/2023)    Housing Stability Vital Sign     Unable to Pay for Housing in the Last Year: No     Number of Places Lived in the Last Year: 1     Unstable Housing in the Last Year: No       Allergies:  Losartan    Medications:    Current Outpatient Medications:     abiraterone (ZYTIGA) 250 mg Tab, Take 1 tablet (250 mg total) by mouth once daily., Disp: 30 tablet, Rfl: 11    acetylcarnitin/alpha lipoic ac (ACETYLCARNITIN HCL-A LIPOIC AC) 400-200 mg Cap, Take 1 capsule by mouth once daily., Disp: , Rfl:     albuterol (PROVENTIL HFA) 90 mcg/actuation inhaler, Inhale 2 puffs into the lungs every 6 (six) hours as needed for Wheezing. Rescue, Disp: 18 g, Rfl: 2    albuterol-ipratropium (DUO-NEB) 2.5 mg-0.5 mg/3 mL nebulizer solution, Take 3 mLs by nebulization every 4 (four) hours as needed for Wheezing or Shortness of Breath. Rescue (Patient not taking: Reported on 8/14/2024), Disp: 75  mL, Rfl: 0    amiodarone (PACERONE) 200 MG Tab, Take one tablet by mouth daily, Disp: 30 tablet, Rfl: 11    aspirin (ASPIR-81 ORAL), Take by mouth., Disp: , Rfl:     BICALUTAMIDE ORAL, Take 50 mg by mouth once daily. (Patient not taking: Reported on 8/14/2024), Disp: , Rfl:     calcium-vitamin D3-vitamin K (VIACTIV) 650 mg-12.5 mcg-40 mcg Chew, Take 650 mg by mouth 2 (two) times a day., Disp: , Rfl:     clonazePAM (KLONOPIN) 2 MG Tab, 1 tablet PO h.s., Disp: 90 tablet, Rfl: 0    cyanocobalamin, vitamin B-12, 1,000 mcg/mL Kit, Inject 1 mL as directed every 28 days., Disp: , Rfl:     docusate sodium (COLACE) 100 MG capsule, Take 100 mg by mouth 2 (two) times daily., Disp: , Rfl:     empagliflozin (JARDIANCE) 10 mg tablet, Take 1 tablet (10 mg total) by mouth once daily., Disp: 90 tablet, Rfl: 3    ferrous sulfate (FEOSOL) 325 mg (65 mg iron) Tab tablet, Take 1 tablet (325 mg total) by mouth once daily., Disp: , Rfl: 0    levothyroxine (SYNTHROID) 100 MCG tablet, Take 1 tablet (100 mcg total) by mouth before breakfast., Disp: 30 tablet, Rfl: 5    metoprolol succinate (TOPROL-XL) 100 MG 24 hr tablet, TAKE 1 TABLET BY MOUTH IN THE  MORNING AND TAKE 1 TABLET BY  MOUTH IN THE EVENING, Disp: 180 tablet, Rfl: 3    potassium chloride SA (K-DUR,KLOR-CON) 20 MEQ tablet, Take 1 tablet (20 mEq total) by mouth once daily., Disp: 90 tablet, Rfl: 3    predniSONE (DELTASONE) 5 MG tablet, Take 1 tablet (5 mg total) by mouth once daily., Disp: 30 tablet, Rfl: 6    rosuvastatin (CRESTOR) 20 MG tablet, Take 20 mg by mouth every morning., Disp: , Rfl:     torsemide (DEMADEX) 20 MG Tab, Take 1 tablet (20 mg total) by mouth once daily., Disp: 90 tablet, Rfl: 2    Current Facility-Administered Medications:     leuprolide acetate (6 month) injection 45 mg, 45 mg, Intramuscular, Q6 Months, Abbie Martinez, NP, 45 mg at 08/14/24 1043    PHYSICAL EXAMINATION:  Physical Exam  Vitals and nursing note reviewed.   Constitutional:       General: He  is awake.      Appearance: Normal appearance.   HENT:      Head: Normocephalic.      Right Ear: External ear normal.      Left Ear: External ear normal.      Nose: Nose normal.   Cardiovascular:      Rate and Rhythm: Normal rate.   Pulmonary:      Effort: Pulmonary effort is normal. No respiratory distress.   Abdominal:      Tenderness: There is no abdominal tenderness. There is no right CVA tenderness or left CVA tenderness.   Musculoskeletal:         General: Normal range of motion.      Cervical back: Normal range of motion.   Skin:     General: Skin is warm and dry.   Neurological:      General: No focal deficit present.      Mental Status: He is alert and oriented to person, place, and time.   Psychiatric:         Mood and Affect: Mood normal.         Behavior: Behavior is cooperative.           LABS:          Lab Results   Component Value Date    PSA 43.4 (H) 06/28/2024    PSA 13.1 (H) 10/19/2023    PSADIAG 23.2 (H) 03/12/2024    PSADIAG 45.8 (H) 02/27/2024    PSADIAG 37.4 (H) 02/15/2024       Lab Results   Component Value Date    CREATININE 1.4 07/16/2024    EGFRNORACEVR 48.9 (A) 07/16/2024               IMPRESSION:    Encounter Diagnoses   Name Primary?    Prostate cancer Yes    Rising PSA following treatment for malignant neoplasm of prostate     History of radical retropubic prostatectomy     History of external beam radiation therapy     Elevated PSA, greater than or equal to 20 ng/ml     Androgen deprivation therapy          Assessment:       1. Prostate cancer    2. Rising PSA following treatment for malignant neoplasm of prostate    3. History of radical retropubic prostatectomy    4. History of external beam radiation therapy    5. Elevated PSA, greater than or equal to 20 ng/ml    6. Androgen deprivation therapy        Plan:         I spent 30 minutes with the patient of which more than half was spent in direct consultation with the patient in regards to our treatment and plan.  We addressed the  office findings and recent labs; any possible need to go the ER today.   Education and recommendations of today's plan of care including home remedies and needed follow up with PCP.   We discussed the chief complaints; reviewed the LUTS and the possible contributory factors.   PSA's are fluctuating.   Needs follow up with Hem/Onc  Reviewed management;   Recommended lifestyle modifications with a proper, healthy diet, good hydration but during the day. Reducing bladder irritants.   LUPRON 45 mg IM today;   RTC 6 months with new PSA; see Hem/onc same day.

## 2024-08-14 NOTE — PROGRESS NOTES
Cardiology Clinic Note  Reason for Visit: Shortness of breath     HPI:     PMHx:  HFpEF  Persistent Afib (s/p DCCV 2017 and PVI in 2020)  CAD s/p 2v CABG and bioprosthetic AV (2012)  S/p 26 mm Evolut valve in valve TAVR  H/o SSS s/p Bi-V ICD  Atrial fibrillation   S/p Watchman   HTN  HLD  CVA  Prostate Cancer       Michael Mclean is a 84 y.o. M, who presents for shortness of breath. He was seen by Dr. Boo this AM and is scheduled for echo at 1 PM today and PET stress in Sept. Dr. Boo mentioned possibly increasing amiodarone 200 mg to twice daily, but is not able to do so due to liver and thyroid function.  He had an ED visit for shortness of breath 7/16. He had not been taking torsemide regularly and was advised to take daily. Since then his shortness of breath is improving. Leg swelling is also reduced. He has a wound on his right shin that appears to be healing. He tells me a blister developed and ruptured. He struggles to wear compression stockings.     ROS:    Pertinent ROS included in HPI and below.  PMH:     Past Medical History:   Diagnosis Date    Abdominal hernia     Anemia 02/2021    Anticoagulant long-term use     Arthritis     SHOULDER    Cancer     prostate    Deaf     Dizziness     Encounter for blood transfusion     Gastritis 03/02/2021    Hyperlipidemia     PAD (peripheral artery disease)     Stroke 2020    Symptomatic anemia 10/13/2020    Thyroid disease      Past Surgical History:   Procedure Laterality Date    ABLATION N/A 7/22/2020    Procedure: Ablation, afib;  Surgeon: Tal Laurent MD;  Location: [facility] CATH;  Service: Cardiology;  Laterality: N/A;  needs covid test;needs ALEXYS ([place])    AORTIC VALVE REPLACEMENT      CARDIAC CATH COSURGEON N/A 7/27/2022    Procedure: Cardiac Cath Cosurgeon;  Surgeon: Glenroy Sam MD;  Location: [facility] CATH LAB;  Service: Cardiovascular;  Laterality: N/A;    CARDIAC CATHETERIZATION      3 HEART STENTS    carotid artery stent Left      CLOSURE OF LEFT ATRIAL APPENDAGE USING DEVICE N/A 11/22/2022    Procedure: Left atrial appendage closure device;  Surgeon: Glenroy Richardson MD;  Location: [facility] CATH LAB;  Service: Cardiology;  Laterality: N/A;    COLONOSCOPY N/A 3/2/2021    Procedure: COLONOSCOPY;  Surgeon: Emiliana Cardenas MD;  Location: [facility] ENDO;  Service: General;  Laterality: N/A;  4  8:30am   CL/AM/Bd    CORONARY ARTERY BYPASS GRAFT      ESOPHAGOGASTRODUODENOSCOPY N/A 11/10/2020    Procedure: EGD (ESOPHAGOGASTRODUODENOSCOPY);  Surgeon: Renny Prakash MD;  Location: [facility] ENDO;  Service: Endoscopy;  Laterality: N/A;    HERNIA REPAIR      HIP REPLACEMENT ARTHROPLASTY Right 11/6/2020    Procedure: EMELY -ARTHROPLASTY, HIP REPLACEMENT (ENDO HIP);  Surgeon: Lino Thomas MD;  Location: [facility] OR;  Service: Orthopedics;  Laterality: Right;    LEFT HEART CATHETERIZATION N/A 6/29/2022    Procedure: Left heart cath;  Surgeon: Glenroy Richardson MD;  Location: [facility] CATH LAB;  Service: Cardiology;  Laterality: N/A;    PACERMAKER      PROSTATECTOMY      SHOULDER SURGERY      TOE SURGERY      TRANSCATHETER AORTIC VALVE REPLACEMENT (TAVR) N/A 7/27/2022    Procedure: REPLACEMENT, AORTIC VALVE, TRANSCATHETER (TAVR);  Surgeon: Glenroy Richardson MD;  Location: [facility] CATH LAB;  Service: Cardiology;  Laterality: N/A;    TRANSCATHETER AORTIC VALVE REPLACEMENT (TAVR)  7/27/2022    Procedure: REPLACEMENT, AORTIC VALVE, TRANSCATHETER (TAVR);  Surgeon: Glenroy Sam MD;  Location: [facility] CATH LAB;  Service: Cardiovascular;;    TRANSESOPHAGEAL ECHOCARDIOGRAPHY N/A 6/27/2022    Procedure: ECHOCARDIOGRAM, TRANSESOPHAGEAL;  Surgeon: [facility] Diagnostic Provider;  Location: [facility] EP LAB;  Service: Anesthesiology;  Laterality: N/A;    TRANSESOPHAGEAL ECHOCARDIOGRAPHY N/A 11/22/2022    Procedure: ECHOCARDIOGRAM, TRANSESOPHAGEAL;  Surgeon: Leila Arango MD;  Location: [facility] CATH LAB;  Service: Cardiology;  Laterality: N/A;    TRANSESOPHAGEAL ECHOCARDIOGRAPHY N/A  1/5/2023    Procedure: ECHOCARDIOGRAM, TRANSESOPHAGEAL;  Surgeon: [facility] Diagnostic Provider;  Location: [facility] EP LAB;  Service: Anesthesiology;  Laterality: N/A;    TREATMENT OF CARDIAC ARRHYTHMIA N/A 8/7/2019    Procedure: Cardioversion or Defibrillation;  Surgeon: Tal Laurent MD;  Location: [facility] CATH;  Service: Cardiology;  Laterality: N/A;     Allergies:     Review of patient's allergies indicates:   Allergen Reactions    Losartan Hives     Medications:     Current Outpatient Medications on File Prior to Visit   Medication Sig Dispense Refill    abiraterone (ZYTIGA) 250 mg Tab Take 1 tablet (250 mg total) by mouth once daily. 30 tablet 11    acetylcarnitin/alpha lipoic ac (ACETYLCARNITIN HCL-A LIPOIC AC) 400-200 mg Cap Take 1 capsule by mouth once daily.      albuterol (PROVENTIL HFA) 90 mcg/actuation inhaler Inhale 2 puffs into the lungs every 6 (six) hours as needed for Wheezing. Rescue 18 g 2    amiodarone (PACERONE) 200 MG Tab Take one tablet by mouth daily 30 tablet 11    aspirin (ASPIR-81 ORAL) Take by mouth.      calcium-vitamin D3-vitamin K (VIACTIV) 650 mg-12.5 mcg-40 mcg Chew Take 650 mg by mouth 2 (two) times a day.      clonazePAM (KLONOPIN) 2 MG Tab 1 tablet PO h.s. 90 tablet 0    cyanocobalamin, vitamin B-12, 1,000 mcg/mL Kit Inject 1 mL as directed every 28 days.      docusate sodium (COLACE) 100 MG capsule Take 100 mg by mouth 2 (two) times daily.      empagliflozin (JARDIANCE) 10 mg tablet Take 1 tablet (10 mg total) by mouth once daily. 90 tablet 3    ferrous sulfate (FEOSOL) 325 mg (65 mg iron) Tab tablet Take 1 tablet (325 mg total) by mouth once daily.  0    levothyroxine (SYNTHROID) 100 MCG tablet Take 1 tablet (100 mcg total) by mouth before breakfast. 30 tablet 5    metoprolol succinate (TOPROL-XL) 100 MG 24 hr tablet TAKE 1 TABLET BY MOUTH IN THE  MORNING AND TAKE 1 TABLET BY  MOUTH IN THE EVENING 180 tablet 3    potassium chloride SA (K-DUR,KLOR-CON) 20 MEQ tablet Take 1 tablet (20  "mEq total) by mouth once daily. 90 tablet 3    predniSONE (DELTASONE) 5 MG tablet Take 1 tablet (5 mg total) by mouth once daily. 30 tablet 6    rosuvastatin (CRESTOR) 20 MG tablet Take 20 mg by mouth every morning.      torsemide (DEMADEX) 20 MG Tab Take 1 tablet (20 mg total) by mouth once daily. 90 tablet 2    albuterol-ipratropium (DUO-NEB) 2.5 mg-0.5 mg/3 mL nebulizer solution Take 3 mLs by nebulization every 4 (four) hours as needed for Wheezing or Shortness of Breath. Rescue (Patient not taking: Reported on 8/14/2024) 75 mL 0    BICALUTAMIDE ORAL Take 50 mg by mouth once daily. (Patient not taking: Reported on 8/14/2024)       Current Facility-Administered Medications on File Prior to Visit   Medication Dose Route Frequency Provider Last Rate Last Admin    leuprolide acetate (6 month) injection 45 mg  45 mg Intramuscular Q6 Months Abbie Martinez, NP   45 mg at 08/14/24 1043     Social History:     Social History     Tobacco Use    Smoking status: Never    Smokeless tobacco: Never   Substance Use Topics    Alcohol use: Yes     Alcohol/week: 7.0 standard drinks of alcohol     Types: 7 Glasses of wine per week     Family History:     Family History   Problem Relation Name Age of Onset    Heart disease Mother      Heart attack Mother       Physical Exam:   /75   Pulse 82   Ht 5' 8" (1.727 m)   Wt 62.8 kg (138 lb 7.2 oz)   SpO2 99%   BMI 21.05 kg/m²      Physical Exam  Vitals and nursing note reviewed.   Constitutional:       General: He is not in acute distress.     Appearance: Normal appearance. He is not ill-appearing or toxic-appearing.      Comments: Thin, in wheel chair   HENT:      Head: Normocephalic and atraumatic.      Ears:      Comments: Deaf     Mouth/Throat:      Mouth: Mucous membranes are moist.   Neck:      Vascular: Normal carotid pulses. No carotid bruit or hepatojugular reflux.   Cardiovascular:      Rate and Rhythm: Normal rate and regular rhythm.      Chest Wall: PMI is not " displaced.      Pulses:           Carotid pulses are 2+ on the right side and 2+ on the left side.       Radial pulses are 2+ on the right side and 2+ on the left side.        Dorsalis pedis pulses are 2+ on the right side and 2+ on the left side.        Posterior tibial pulses are 2+ on the right side and 2+ on the left side.      Heart sounds: No murmur heard.     No friction rub. No gallop.   Pulmonary:      Effort: Pulmonary effort is normal. No respiratory distress.      Breath sounds: Normal breath sounds. No wheezing, rhonchi or rales.   Abdominal:      General: Bowel sounds are normal. There is no abdominal bruit.      Palpations: Abdomen is soft. There is no pulsatile mass.      Tenderness: There is no abdominal tenderness.   Musculoskeletal:      Right lower le+ Edema (3+ to the tibia) present.      Left lower le+ Edema (2+ to the tibia) present.      Comments: Skin tear right pretibial region, no warmth or purulent drainage    Feet:      Right foot:      Skin integrity: Skin integrity normal.      Left foot:      Skin integrity: Skin integrity normal.   Skin:     General: Skin is warm.      Capillary Refill: Capillary refill takes less than 2 seconds.   Neurological:      General: No focal deficit present.      Mental Status: He is alert and oriented to person, place, and time. Mental status is at baseline.   Psychiatric:         Mood and Affect: Mood and affect normal.         Speech: Speech normal.         Behavior: Behavior is cooperative.         Thought Content: Thought content normal.          Labs:     Blood Tests:  Lab Results   Component Value Date     (H) 2024     2024    K 5.0 2024     (H) 2024    CO2 20 (L) 2024    BUN 26 (H) 2024    CREATININE 1.4 2024     (H) 2024    HGBA1C 5.4 2024    MG 2.2 2023     (H) 2024    ALT 84 (H) 2024    ALBUMIN 2.9 (L) 2024    PROT 6.2 2024     BILITOT 0.9 07/16/2024    WBC 6.87 07/16/2024    HGB 14.6 07/16/2024    HCT 42.5 07/16/2024     (H) 07/16/2024     (L) 07/16/2024    INR 1.2 11/02/2023    TSH 91.200 (H) 06/28/2024       Lab Results   Component Value Date    CHOL 119 (L) 04/15/2024    HDL 56 04/15/2024    TRIG 65 04/15/2024       Lab Results   Component Value Date    LDLCALC 50.0 (L) 04/15/2024         Imaging:     Echocardiogram  TTE 8/29/2023    Left Ventricle: The left ventricle is normal in size. Normal wall thickness. Normal wall motion. There is normal systolic function with a visually estimated ejection fraction of 60 - 65%. There is normal diastolic function.    Right Ventricle: Normal right ventricular cavity size. Wall thickness is normal. Right ventricle wall motion  is normal. Systolic function is normal.    Left Atrium: Left atrium is severely dilated.    Aortic Valve: There is a transcatheter valve replacement in the aortic position. It is reported to be a 26 mm Evolut valve. Aortic valve peak velocity is 0.95 m/s. Mean gradient is 2 mmHg.    Mitral Valve: Mild mitral annular calcification. There is mild to moderate regurgitation. Anterior leaflet is long with mild prolapase causing eccentric posteriorly directed jet. MR appears unchanged from prior ALEXYS.    Tricuspid Valve: There is mild to moderate regurgitation.    Pulmonary Artery: The estimated pulmonary artery systolic pressure is 34 mmHg.    IVC/SVC: Intermediate venous pressure at 8 mmHg.    Assessment:     1. Leg swelling    2. Chronic diastolic heart failure    3. Biventricular implantable cardioverter-defibrillator (ICD) in situ    4. Persistent atrial fibrillation    5. Presence of Watchman left atrial appendage closure device    6. Coronary artery disease involving native coronary artery of native heart without angina pectoris    7. S/P CABG (coronary artery bypass graft)    8. Hyperlipidemia, unspecified hyperlipidemia type    9. Aortic atherosclerosis         Plan:     Leg swelling  -     Ambulatory referral/consult to Vascular Medicine; Future; Expected date: 08/21/2024    Bi-V ICD  HFimpEF  Continue torsemide 20 mg qD  Continue jardiance 10 mg qD  Check CMP and NT proBNP in 2 weeks   Limit dietary sodium to < 2g/day   Limit fluid intake to <1500 cc/day   Daily weights      Persistent atrial fibrillation  S/p Watchman  Previously rate control strategy pursued  On metoprolol and amiodarone  As managed by EP     CAD s/p CABG  HLD  Aortic atherosclerosis  LDL 50 mg/dL, at target  Continue statin  He is s/p CABG x 2 with LIMA-LAD and SVG-RCA along with aortic valve replacement         Signed:  Wendie Smith PA-C  Cardiology     8/14/2024 10:59 AM    Follow-up:     Future Appointments   Date Time Provider Department Center   8/28/2024 10:00 AM LAB, [facility]   9/27/2024  9:15 AM LAB, [facility] INTERNAL MEDICINE [facility]   9/27/2024  9:30 AM CARDIAC, PET IMAGING [facility] BELLO Saldivar   10/14/2024 10:30 AM Wendie Smith PA-C [facility] CARDIO Johan Saldivar   12/24/2024 11:30 AM Josh Martin MD PhD [facility] PERVAS Johan Saldivar

## 2024-08-14 NOTE — PATIENT INSTRUCTIONS
Lab Results   Component Value Date    PSA 43.4 (H) 06/28/2024    PSA 13.1 (H) 10/19/2023    PSA 23.2 (H) 03/12/2024    PSA 45.8 (H) 02/27/2024    PSA 37.4 (H) 02/15/2024

## 2024-08-16 LAB
OHS CV AF BURDEN PERCENT: 100
OHS CV DC REMOTE DEVICE TYPE: NORMAL
OHS CV RV PACING PERCENT: 80.7 %
OHS QRS DURATION: 158 MS
OHS QTC CALCULATION: 442 MS

## 2024-08-19 RX ORDER — ROSUVASTATIN CALCIUM 20 MG/1
20 TABLET, COATED ORAL EVERY MORNING
Qty: 30 TABLET | Refills: 11 | Status: SHIPPED | OUTPATIENT
Start: 2024-08-19 | End: 2024-08-21 | Stop reason: SDUPTHER

## 2024-08-19 NOTE — TELEPHONE ENCOUNTER
----- Message from Shelley Chavez sent at 8/19/2024  9:29 AM CDT -----  Regarding: Refill  Pt requesting refill for his Crestor 20 mg    LOV 8/14/24     [facility] - [address]   Phone: [phone]      Thanks

## 2024-08-21 DIAGNOSIS — E78.5 HYPERLIPIDEMIA, UNSPECIFIED HYPERLIPIDEMIA TYPE: ICD-10-CM

## 2024-08-21 DIAGNOSIS — R00.2 PALPITATIONS: Primary | ICD-10-CM

## 2024-08-21 RX ORDER — ROSUVASTATIN CALCIUM 20 MG/1
20 TABLET, COATED ORAL EVERY MORNING
Qty: 90 TABLET | Refills: 3 | Status: SHIPPED | OUTPATIENT
Start: 2024-08-21

## 2024-08-21 NOTE — TELEPHONE ENCOUNTER
----- Message from Juana Urbano sent at 8/21/2024  2:07 PM CDT -----  Regarding: rx refill  Contact: DILIP MANJARREZ [[id number]]  RX Name:rosuvastatin (CRESTOR) 20 MG tablet     How is it taken:Sig - Route: Take 1 tablet (20 mg total) by mouth every morning. - Oral     Quantity: 3 month supply       Preferred Pharmacy with phone number:    [facility] - [place] [address] [address]  Phone: [phone] Fax: [phone]           Ordering Provider:Stanford       Contact Preference:[phone] (home)       Addl info:Patient has 1 week left

## 2024-08-21 NOTE — TELEPHONE ENCOUNTER
----- Message from Ana Collins sent at 8/21/2024  2:04 PM CDT -----  Regarding: Rx change  Contact: [phone]  Consult/Advisory     Name Of Caller: Tarun Alex        Contact Preference:    [facility] - [place], [place] - [address]  [address]  [facility] [address]  [facility] [address]  Phone: [phone] Fax: [phone]         Nature of call: Tarun Alex with Rx calling to request that RX be sent to them instead of [facility] pharmacy  rosuvastatin (CRESTOR) 20 MG tablet

## 2024-08-28 ENCOUNTER — LAB VISIT (OUTPATIENT)
Dept: LAB | Facility: HOSPITAL | Age: 84
End: 2024-08-28
Attending: PHYSICIAN ASSISTANT
Payer: MEDICARE

## 2024-08-28 DIAGNOSIS — M79.89 LEG SWELLING: ICD-10-CM

## 2024-08-28 LAB
ALBUMIN SERPL BCP-MCNC: 3.3 G/DL (ref 3.5–5.2)
ALP SERPL-CCNC: 75 U/L (ref 55–135)
ALT SERPL W/O P-5'-P-CCNC: 46 U/L (ref 10–44)
ANION GAP SERPL CALC-SCNC: 9 MMOL/L (ref 3–11)
AST SERPL-CCNC: 44 U/L (ref 10–40)
BILIRUB SERPL-MCNC: 1.2 MG/DL (ref 0.1–1)
BUN SERPL-MCNC: 34 MG/DL (ref 8–23)
CALCIUM SERPL-MCNC: 9.3 MG/DL (ref 8.7–10.5)
CHLORIDE SERPL-SCNC: 96 MMOL/L (ref 95–110)
CO2 SERPL-SCNC: 31 MMOL/L (ref 23–29)
CREAT SERPL-MCNC: 2.1 MG/DL (ref 0.5–1.4)
EST. GFR  (NO RACE VARIABLE): 30.1 ML/MIN/1.73 M^2
GLUCOSE SERPL-MCNC: 108 MG/DL (ref 70–110)
NT-PROBNP SERPL-MCNC: 4654 PG/ML (ref 5–1800)
POTASSIUM SERPL-SCNC: 3.5 MMOL/L (ref 3.5–5.1)
PROT SERPL-MCNC: 7.3 G/DL (ref 6–8.4)
SODIUM SERPL-SCNC: 136 MMOL/L (ref 136–145)

## 2024-08-28 PROCEDURE — 80053 COMPREHEN METABOLIC PANEL: CPT | Performed by: PHYSICIAN ASSISTANT

## 2024-08-28 PROCEDURE — 83880 ASSAY OF NATRIURETIC PEPTIDE: CPT | Performed by: PHYSICIAN ASSISTANT

## 2024-08-28 PROCEDURE — 36415 COLL VENOUS BLD VENIPUNCTURE: CPT | Performed by: PHYSICIAN ASSISTANT

## 2024-08-29 ENCOUNTER — TELEPHONE (OUTPATIENT)
Dept: CARDIOLOGY | Facility: CLINIC | Age: 84
End: 2024-08-29
Payer: MEDICARE

## 2024-08-29 DIAGNOSIS — R35.89 DIURESIS: ICD-10-CM

## 2024-08-29 DIAGNOSIS — R79.89 ELEVATED SERUM CREATININE: ICD-10-CM

## 2024-08-29 DIAGNOSIS — I50.32 CHRONIC DIASTOLIC HEART FAILURE: Primary | ICD-10-CM

## 2024-08-29 NOTE — TELEPHONE ENCOUNTER
----- Message from Jennifer Guevara RN sent at 8/29/2024  5:15 PM CDT -----  Regarding: FW: lab results  Ms Smith called me back after speaking w. dr Reyes about pt's SOB. Dr Reyes believes that the SOB might be anginal in etiology and gave new instructions:  -hold torsemide for one week.  -restart torsemide at 20 mg every other day.  -BMP in 2 weeks  -Schedule pt w. Ms Smith about 2 weeks + from now.    Pt was updated on the above via . Lab is still scheduled for 9/12 and he will see Ms Smith on 9/16. Instructions also given on getting to escort to get a w/c (coming via transportation).  Pt verbalized understanding (thru ) and agreed to date/time of appointment(s).    Reminders mailed to pt.  ----- Message -----  From: Jennifer Guevara RN  Sent: 8/29/2024   2:34 PM CDT  To: Jennifer Guevara RN; #  Subject: lab results                                      Pt was instructed about Ms Smith instructions, questions answered, teaching done on how to check wt in the am (after urinating , before getting dressed, before eating). He verbalized understanding (thru ). Labs were scheduled and he agreed to date/time of appointment(s).  He is c/o SOb on exertion (going to the grocery store, getting dressed) which he says was not an issue before/ at last visit.    Please advise,  ----- Message -----  From: Jennifer Guevara RN  Sent: 8/29/2024  12:20 PM CDT  To: Jennifer Guevara RN    Called pt and left message via .  ----- Message -----  From: Wendie Smith PA-C  Sent: 8/29/2024  11:54 AM CDT  To: Jennifer Guevara RN    Please let the patient know his labs show dehydration and worsened kidney function. Reduce torsemide to 10 mg once daily and re-check BMP in 2 weeks. His NT-pro BNP is lower, which indicates improvement in heart failure. Ideally we would have him on Entresto or valsartan for heart failure, but his BP has been  running low. He needs to check his BP and record the numbers daily. We will ask for those number following his repeat blood work in 2 weeks. He should also be weighing daily and recording those numbers.     Thanks  ----- Message -----  From: Wilbur [employer]  Sent: 8/28/2024   9:19 AM CDT  To: Wendie Smith PA-C

## 2024-08-30 ENCOUNTER — PATIENT MESSAGE (OUTPATIENT)
Dept: CARDIOLOGY | Facility: CLINIC | Age: 84
End: 2024-08-30
Payer: MEDICARE

## 2024-09-03 ENCOUNTER — TELEPHONE (OUTPATIENT)
Dept: CARDIOLOGY | Facility: CLINIC | Age: 84
End: 2024-09-03
Payer: MEDICARE

## 2024-09-03 NOTE — TELEPHONE ENCOUNTER
Before her response wJeannie robles, Ms Smith had been messaged the following after she told me that she wanted to know if pt was checking wts at home:      I spoke wJeannie pt and messaged her the following.    31-Aug -- 136.8  01-Sep -- 136.8  02-Sep -- 135.1  03-Sep -- 139.2    SOB ok now, gets SOB/ weak legs only when gets out of the house    Denies extra salt intake, taken am after urinating w. same shoes and underwear on    -----------------------------  I gave pt Ms Smith's instructions (except for repeated instructions on going to ER but I had already told him earlier today to go to ER if SOb really bad).  Pt verbalized understanding , was rescheduled for labs for 9/10 and agreed to date/time of appointment(s).

## 2024-09-05 ENCOUNTER — TELEPHONE (OUTPATIENT)
Dept: CARDIOLOGY | Facility: CLINIC | Age: 84
End: 2024-09-05
Payer: MEDICARE

## 2024-09-05 NOTE — TELEPHONE ENCOUNTER
"----- Message from Jennifer Guevara RN sent at 8/30/2024 11:46 AM CDT -----  Regarding: FW: med and appt issue  Called daughter at one phone number(s), left voicemail, and " [facility]" message.  ----- Message -----  From: Jennifer Guevara RN  Sent: 8/30/2024   8:53 AM CDT  To: Jennifer Guevara RN  Subject: med and appt issue                               Pt called due to worried about stopping torsemide as was told in ER 3 wks ago that he needed it to avoid " his lungs to be filled w. fluid".  Also stated that daughter got upset and cannot bring him to any of the 9/12 and 9/16 appts.    Extensive teaching done on instructions from yesterday (which pt forgot), told him to write it down, teaching on newest instructions based on now vs ER visit 3 wks ago, kidney damage from torsemide...  Told him I will talk to daughter about transportation.    He verbalized understanding.  "

## 2024-09-06 ENCOUNTER — TELEPHONE (OUTPATIENT)
Dept: CARDIOLOGY | Facility: CLINIC | Age: 84
End: 2024-09-06
Payer: MEDICARE

## 2024-09-06 NOTE — TELEPHONE ENCOUNTER
Called daughter again today, left a voicemail. I have called her at least 4 times so far and pt is also aware that I am trying to reach her concerning transportation to appts (see other messages/ entries).

## 2024-09-06 NOTE — TELEPHONE ENCOUNTER
----- Message from Jennifer Guevara RN sent at 9/5/2024  8:42 AM CDT -----  Regarding: FW: SOB  Called daughter, spoke to ? who said that she will call me later.  ----- Message -----  From: Jennifer Guevara RN  Sent: 9/3/2024   4:11 PM CDT  To: Jennifer Guevara RN; #  Subject: SOB                                              5 days ago pt stopped his torsemide as instructed. Today he called c/o SOB and weak legs with any activity including walking around the house. I believe some mild swelling also.   As in previous conversations he is still very worried about being off the torsemide and I talked to him again about the concern about his kidneys. I also reminded him that the lab visit was on 9/12 as he thought it was as per the original date.  I am still waiting on his daughter to call me back concerning transportation to these appts. He told me that she is very worried about his health and I reminded him that she needs to call me.    Please advise,

## 2024-09-09 PROBLEM — I21.4 NSTEMI (NON-ST ELEVATED MYOCARDIAL INFARCTION): Status: ACTIVE | Noted: 2024-01-01

## 2024-09-09 PROBLEM — R06.02 SHORTNESS OF BREATH: Status: ACTIVE | Noted: 2024-01-01

## 2024-09-09 PROBLEM — R57.0 CARDIOGENIC SHOCK: Status: ACTIVE | Noted: 2024-01-01

## 2024-09-09 PROBLEM — I50.43 ACUTE ON CHRONIC COMBINED SYSTOLIC AND DIASTOLIC HEART FAILURE: Status: ACTIVE | Noted: 2019-04-03

## 2024-09-09 NOTE — ED PROVIDER NOTES
"Encounter Date: 9/9/2024       History     Chief Complaint   Patient presents with    Shortness of Breath     SOB chronic, "been getting worse since I've been older" "hard to breath"     83 yo male with history as below here via POV with dyspnea that is chronic in nature but worse over the last few days. Cough, nonproductive. No fever. No known sick contacts. History of heart failure was recently off torsemide for a week and now taking QOD. Dyspnea worse with exertion. Today fell into a door frame and caused skin tear to R lateral arm. No pain with ROM. No active bleeding. No head injury or trauma.       Review of patient's allergies indicates:   Allergen Reactions    Losartan Hives     Past Medical History:   Diagnosis Date    Abdominal hernia     Anemia 02/2021    Anticoagulant long-term use     Arthritis     SHOULDER    Cancer     prostate    Deaf     Dizziness     Encounter for blood transfusion     Gastritis 03/02/2021    Hyperlipidemia     PAD (peripheral artery disease)     Stroke 2020    Symptomatic anemia 10/13/2020    Thyroid disease      Past Surgical History:   Procedure Laterality Date    ABLATION N/A 7/22/2020    Procedure: Ablation, afib;  Surgeon: Tal Laurent MD;  Location: [facility] CATH;  Service: Cardiology;  Laterality: N/A;  needs covid test;needs ALEXYS ([place])    AORTIC VALVE REPLACEMENT      CARDIAC CATH COSURGEON N/A 7/27/2022    Procedure: Cardiac Cath Cosurgeon;  Surgeon: Glenroy Sam MD;  Location: [facility] CATH LAB;  Service: Cardiovascular;  Laterality: N/A;    CARDIAC CATHETERIZATION      3 HEART STENTS    carotid artery stent Left     CLOSURE OF LEFT ATRIAL APPENDAGE USING DEVICE N/A 11/22/2022    Procedure: Left atrial appendage closure device;  Surgeon: Glenroy Richardson MD;  Location: [facility] CATH LAB;  Service: Cardiology;  Laterality: N/A;    COLONOSCOPY N/A 3/2/2021    Procedure: COLONOSCOPY;  Surgeon: Emiliana Cardenas MD;  Location: [facility] ENDO;  Service: General;  Laterality: " N/A;  4  8:30am   CL/AM/Bd    CORONARY ARTERY BYPASS GRAFT      ESOPHAGOGASTRODUODENOSCOPY N/A 11/10/2020    Procedure: EGD (ESOPHAGOGASTRODUODENOSCOPY);  Surgeon: Renny Prakash MD;  Location: [facility] ENDO;  Service: Endoscopy;  Laterality: N/A;    HERNIA REPAIR      HIP REPLACEMENT ARTHROPLASTY Right 11/6/2020    Procedure: EMELY -ARTHROPLASTY, HIP REPLACEMENT (ENDO HIP);  Surgeon: Lino Thomas MD;  Location: [facility] OR;  Service: Orthopedics;  Laterality: Right;    LEFT HEART CATHETERIZATION N/A 6/29/2022    Procedure: Left heart cath;  Surgeon: Glenroy Richardson MD;  Location: [facility] CATH LAB;  Service: Cardiology;  Laterality: N/A;    PACERMAKER      PROSTATECTOMY      SHOULDER SURGERY      TOE SURGERY      TRANSCATHETER AORTIC VALVE REPLACEMENT (TAVR) N/A 7/27/2022    Procedure: REPLACEMENT, AORTIC VALVE, TRANSCATHETER (TAVR);  Surgeon: Glenroy Richardson MD;  Location: [facility] CATH LAB;  Service: Cardiology;  Laterality: N/A;    TRANSCATHETER AORTIC VALVE REPLACEMENT (TAVR)  7/27/2022    Procedure: REPLACEMENT, AORTIC VALVE, TRANSCATHETER (TAVR);  Surgeon: Glenroy Sam MD;  Location: [facility] CATH LAB;  Service: Cardiovascular;;    TRANSESOPHAGEAL ECHOCARDIOGRAPHY N/A 6/27/2022    Procedure: ECHOCARDIOGRAM, TRANSESOPHAGEAL;  Surgeon: Jorden Diagnostic Provider;  Location: [facility] EP LAB;  Service: Anesthesiology;  Laterality: N/A;    TRANSESOPHAGEAL ECHOCARDIOGRAPHY N/A 11/22/2022    Procedure: ECHOCARDIOGRAM, TRANSESOPHAGEAL;  Surgeon: Leila Arango MD;  Location: [facility] CATH LAB;  Service: Cardiology;  Laterality: N/A;    TRANSESOPHAGEAL ECHOCARDIOGRAPHY N/A 1/5/2023    Procedure: ECHOCARDIOGRAM, TRANSESOPHAGEAL;  Surgeon: Dos Diagnostic Provider;  Location: [facility] EP LAB;  Service: Anesthesiology;  Laterality: N/A;    TREATMENT OF CARDIAC ARRHYTHMIA N/A 8/7/2019    Procedure: Cardioversion or Defibrillation;  Surgeon: Tal Laurent MD;  Location: [facility] CATH;  Service: Cardiology;  Laterality: N/A;     Family  History   Problem Relation Name Age of Onset    Heart disease Mother      Heart attack Mother       Social History     Tobacco Use    Smoking status: Never    Smokeless tobacco: Never   Substance Use Topics    Alcohol use: Yes     Alcohol/week: 7.0 standard drinks of alcohol     Types: 7 Glasses of wine per week    Drug use: Never     Review of Systems   Constitutional: Negative.    Respiratory:  Positive for shortness of breath.    Cardiovascular:  Negative for chest pain and leg swelling.   Gastrointestinal: Negative.    All other systems reviewed and are negative.      Physical Exam     Initial Vitals [09/09/24 0841]   BP Pulse Resp Temp SpO2   102/70 89 18 98 °F (36.7 °C) 97 %      MAP       --         Physical Exam    Nursing note and vitals reviewed.  Constitutional: He is not diaphoretic. No distress.   HENT:   Head: Normocephalic and atraumatic.   Eyes: Conjunctivae and EOM are normal. Pupils are equal, round, and reactive to light.   Neck: Neck supple. No JVD present.   Normal range of motion.  Cardiovascular:  Normal rate, regular rhythm and intact distal pulses.           Pulmonary/Chest: No respiratory distress. He has no wheezes. He has rales.   Abdominal: Abdomen is soft. Bowel sounds are normal. He exhibits no distension. There is no abdominal tenderness. There is no rebound.   Musculoskeletal:         General: No tenderness or edema. Normal range of motion.      Cervical back: Normal range of motion and neck supple.     Neurological: He is alert and oriented to person, place, and time. GCS score is 15. GCS eye subscore is 4. GCS verbal subscore is 5. GCS motor subscore is 6.   Skin: Skin is warm and dry. Capillary refill takes less than 2 seconds.   Psychiatric: He has a normal mood and affect. Thought content normal.         ED Course   Critical Care    Date/Time: 9/10/2024 8:13 AM    Performed by: Geovanny Estrada MD  Authorized by: Geovanny Estrada MD  Direct patient critical care time: 10  minutes  Additional history critical care time: 10 minutes  Ordering / reviewing critical care time: 10 minutes  Documentation critical care time: 10 minutes  Consulting other physicians critical care time: 10 minutes  Total critical care time (exclusive of procedural time) : 50 minutes        Labs Reviewed   CBC W/ AUTO DIFFERENTIAL - Abnormal       Result Value    WBC 7.46      RBC 4.35 (*)     Hemoglobin 15.1      Hematocrit 43.5       (*)     MCH 34.7 (*)     MCHC 34.7      RDW 13.0      Platelets 132 (*)     MPV 11.4      Immature Granulocytes 0.5      Gran # (ANC) 5.6      Immature Grans (Abs) 0.04      Lymph # 0.9 (*)     Mono # 0.8      Eos # 0.0      Baso # 0.04      nRBC 0      Gran % 75.4 (*)     Lymph % 11.8 (*)     Mono % 11.3      Eosinophil % 0.5      Basophil % 0.5      Differential Method Automated     COMPREHENSIVE METABOLIC PANEL - Abnormal    Sodium 136      Potassium 3.5      Chloride 100      CO2 27      Glucose 105      BUN 35 (*)     Creatinine 1.7 (*)     Calcium 9.0      Total Protein 6.9      Albumin 2.9 (*)     Total Bilirubin 1.1 (*)     Alkaline Phosphatase 191 (*)      (*)      (*)     eGFR 38.8 (*)     Anion Gap 9     TROPONIN I HIGH SENSITIVITY - Abnormal    Troponin I High Sensitivity 3496.2 (*)    NT-PRO NATRIURETIC PEPTIDE - Abnormal    NT-proBNP 33611 (*)    PROTIME-INR - Abnormal    Prothrombin Time 12.6 (*)     INR 1.2      Narrative:     Draw baseline aPTT prior to starting the heparin bolus or  infusion  (if patient is on warfarin prior to heparin therapy)   APTT    aPTT 24.6      Narrative:     Draw baseline aPTT prior to starting the heparin bolus or  infusion  (if patient is on warfarin prior to heparin therapy)     EKG Readings: (Independently Interpreted)   Initial Reading: No STEMI. Rhythm: Paced Rhythm. Clinical Impression: Paced Rhythm     ECG Results              EKG 12-lead (Final result)        Collection Time Result Time QRS Duration [facility] QTC  Calculation    09/09/24 08:41:28 09/09/24 13:26:22 152 501                     Final result by Interface, Lab In [facility] (09/09/24 13:26:33)                   Narrative:    Test Reason : R06.00,    Vent. Rate : 078 BPM     Atrial Rate : 071 BPM     P-R Int : 000 ms          QRS Dur : 152 ms      QT Int : 440 ms       P-R-T Axes : 000 -74 130 degrees     QTc Int : 501 ms    Atrial fibrillation with V pacing Abnormal ECG  When compared with ECG of 14-AUG-2024 10:21,  No significant change was found although flutter waves not well seen    Confirmed by Saad BARNETT MD ([id number]) on 9/9/2024 1:26:18 PM    Referred By: [email or web address]   SELF           Confirmed By:Saad BARNETT MD                                  Imaging Results              X-Ray Chest AP Portable (Final result)  Result time 09/09/24 10:24:19      Final result by Shmuel Fonseca MD (09/09/24 10:24:19)                   Impression:      1. Interval improved aeration at the right lung base.  2. Retrocardiac atelectasis versus consolidation with possible overlying pleural effusion.      Electronically signed by: Shmuel Fonseca MD  Date:    09/09/2024  Time:    10:24               Narrative:    EXAMINATION:  XR CHEST AP PORTABLE    CLINICAL HISTORY:  Dyspnea, unspecified    TECHNIQUE:  Frontal view obtained of the chest    COMPARISON:  07/16/2024    FINDINGS:  Right shoulder arthroplasty is in place.  Left subclavian approach cardiac pacer/AICD is in place appearing unchanged.  Sternotomy wires and mediastinal clips are present.  Stable cardiomediastinal contours.  The lungs are hypoinflated.  Right lung is clear.  Retrocardiac opacity present obscuring the left hemidiaphragm and blunting the left costophrenic angle.                                    X-Rays:   Independently Interpreted Readings:   Chest X-Ray: Increased vascular markings consistent with CHF are present.     Medications   aspirin tablet 325 mg (325 mg Oral Given 9/9/24 1017)   heparin 25,000 units in dextrose  5% (100 units/ml) IV bolus from bag LOW INTENSITY nomogram - [facility] (3,940 Units Intravenous Bolus from Bag 9/9/24 1040)     Medical Decision Making  Dyspnea, worse on exertion. Marked trop elevation. No active chest pain. History difficult given patient is deaf, translation service used. Suspect acute NSTEMI possible 2/2 demand from CHF. Borderline BP, normal O2 sat, will defer diuresis to receiving team. Discussed with Dr Alejo, recommends transfer. Will initiate heparin gtt.     Problems Addressed:  NSTEMI (non-ST elevated myocardial infarction): acute illness or injury that poses a threat to life or bodily functions    Amount and/or Complexity of Data Reviewed  Labs: ordered. Decision-making details documented in ED Course.  Radiology: ordered and independent interpretation performed. Decision-making details documented in ED Course.  ECG/medicine tests: ordered and independent interpretation performed. Decision-making details documented in ED Course.    Risk  OTC drugs.  Prescription drug management.                                      Clinical Impression:  Final diagnoses:  [R06.00] Dyspnea  [I21.4] NSTEMI (non-ST elevated myocardial infarction) (Primary)          ED Disposition Condition    Transfer to Another Facility Stable                Geovanny Estrada MD  09/10/24 [id number]

## 2024-09-10 PROBLEM — E63.9 INADEQUATE DIETARY ENERGY INTAKE: Status: ACTIVE | Noted: 2024-01-01

## 2024-09-10 PROBLEM — E87.6 HYPOKALEMIA: Status: ACTIVE | Noted: 2024-01-01

## 2024-09-12 PROBLEM — H91.3 DEAF, NONSPEAKING: Status: RESOLVED | Noted: 2019-04-03 | Resolved: 2024-01-01

## 2024-09-12 PROBLEM — I10 ESSENTIAL HYPERTENSION: Status: RESOLVED | Noted: 2019-04-03 | Resolved: 2024-01-01

## 2025-07-08 NOTE — TELEPHONE ENCOUNTER
"Spoke with pt via . Informed pt we received his message. Will inform Dr. Wilkins as well.       ----- Message from Michelle Coates sent at 12/18/2023  3:36 PM CST -----  Regarding: Pt advice  Contact: Pt     Pt calling to inform office that he has finally go the following meds approved, and will be sent to him within a week.   Please call and adv @     Confirmed contact below:   Contact Name: Michael Mclean  Phone Number: [phone]               Additional Notes:  "Thank you for all that you do for our patients"                                           " If you are a smoker, it is important for your health to stop smoking. Please be aware that second hand smoke is also harmful.

## 2025-07-27 NOTE — Clinical Note
1.5 ml of contrast were injected throughout the case. 48.5 mL of contrast was the total wasted during the case. 50 mL was the total amount used during the case.  
A percutaneous stick to the left femoral artery was performed. 
A percutaneous stick to the right femoral artery was performed. 
A percutaneous stick to the right femoral vein was performed. 
A pulse oximeter was placed on the patient's left index finger and left index finger.
All medications and monitoring per Anesthesia
An airway assessment has been completed by *Per Anesthesia**.  
An angiography of the  left femoral artery was performed to evaluate for the placement of a closure device. 
An angiography of the  right femoral artery was performed to evaluate for the placement of a closure device. 
An angiography was performed of the left common femoral artery. 
Hemodynamics were performed.  EDP = 30
ID band present and verified. Family is in the lobby. 
Manual pressure was applied to the right femoral vein sheath insertion site. 
Phone report was given to *Codi HAMPTON**  
The ECG showed sinus rhythm. 
The ECG showed sinus rhythm. 
The catheter was inserted in the right ventricle. The transvenous pacing lead was placed and capture was confirmed. 
The catheter was inserted into the abdominal aorta. 
The catheter was inserted into the aorta. 
The catheter was inserted into the left ventricle. 
The catheter was removed from the aorta. 
The catheter was removed from the left ventricle. 
The catheter was removed from the right ventricle. 
The catheter was repositioned into the left common femoral artery. 
The closure device was deployed in the left femoral artery. 
The closure device was deployed in the left femoral artery. 
The closure device was deployed in the right femoral artery. 
The closure device was inserted into the left femoral artery. 
The closure device was inserted into the left femoral artery. 
The closure device was inserted into the right femoral artery. 
The defib pads were placed on the patient's **Chest and back*.  
The device was removed from the left femoral artery.  UNDEPLOYED 
The dilator was inserted into the  left femoral artery and left femoral artery.  And removed 
The groin was prepped. The site was prepped with ChloraPrep. The patient was draped. The patient was positioned supine. 
The pacer was paced at 150 BPM 10 mA 0.8 mV.
The physician was paged.  
The procedural consent was signed. The blood consent was signed. A history and physical note was completed in the chart. 
The sheath was inserted into the left femoral artery. 
The sheath was inserted into the left femoral artery. 
The sheath was inserted into the right femoral artery. 
The sheath was inserted into the right femoral vein. 
The sheath was inserted through the larger sheath in the left femoral artery. 
The sheath was removed from the left femoral artery. 
The sheath was removed from the left femoral artery. 
The sheath was removed from the right femoral artery. 
The sheath was removed from the right femoral vein. 
The sheath was sutured in place in the right femoral vein. 
The transvenous pacing lead was secured in place in the RV. 
The wire was inserted into the abdominal aorta.
The wire was inserted into the aorta.
The wire was inserted into the left ventricle.
The wire was inserted into the left ventricle.
The wire was reinserted into the aorta.
The wire was removed from the aorta.
The wire was removed from the left common femoral artery.
The wire was removed from the left ventricle.
The wire was removed from the left ventricle.
The wire was repositioned to the left common femoral artery.
Valve Evolut Pro+ Tavr 26mm was deployed to the aortic valve. 
Valve Evolut Pro+ Tavr 26mm was inserted into the aortic valve. 
dry, intact, no bleeding and no hematoma. Bilateral femoral sites 
Airway patent, Nasal mucosa clear. Mouth with normal mucosa.

## (undated) DEVICE — KIT CUSTOM MANIFOLD

## (undated) DEVICE — DEVICE PERCLOSE SUT CLSR 6FR

## (undated) DEVICE — CATH SUPER TORQUE MP 4FRX80CM

## (undated) DEVICE — LINE 60IN PRESSURE MON.

## (undated) DEVICE — GUIDEWIRE TORAY INOUE

## (undated) DEVICE — TRANSDUCER ADULT DISP

## (undated) DEVICE — SEE MEDLINE ITEM 152546

## (undated) DEVICE — NDL BROCKENBROUGH CRV 71CM

## (undated) DEVICE — OMNIPAQUE 350 200ML

## (undated) DEVICE — SPONGE DRY VIA GREEN

## (undated) DEVICE — GUIDEWIRE STF .035X180CM ANG

## (undated) DEVICE — GUIDEWIRE CONFIDA BECKER CURVE

## (undated) DEVICE — KIT VIA CUSTOM PROCEDURE

## (undated) DEVICE — SOL IRRI STRL WATER 1000ML

## (undated) DEVICE — LEFT ATRIAL APPENDAGE CLOSURE DEVICE WITH DELIVERY SYSTEM
Type: IMPLANTABLE DEVICE | Site: HEART | Status: NON-FUNCTIONAL
Brand: WATCHMAN FLX™

## (undated) DEVICE — TUBE SUC UNIVERSAL .25XIN 6FT

## (undated) DEVICE — CATH DXTERITY AL20 100CM 6FR

## (undated) DEVICE — CATH DXTERITY PG145 110CM 6FR

## (undated) DEVICE — SEE MEDLINE ITEM 156894

## (undated) DEVICE — PAD DEFIB CADENCE ADULT R2

## (undated) DEVICE — CATH BERNSTAN 5FR

## (undated) DEVICE — SPIKE CONTRAST CONTROLLER

## (undated) DEVICE — INTRODUCER CHECK FLO 14FR

## (undated) DEVICE — SHEATH INTRODUCER 6FR 11CM

## (undated) DEVICE — SYR SLIP TIP 60 CC DISP

## (undated) DEVICE — KIT BIOGUARD AIR WTR SUC VALVE

## (undated) DEVICE — CATH INFINITI 4F 3DRC 100CM

## (undated) DEVICE — GUIDEWIRE EMERALD 150CM PTFE

## (undated) DEVICE — PROTECTION STATION PLUS

## (undated) DEVICE — SHEATH 6FR 35CM

## (undated) DEVICE — BITE BLOCK ADULT JUMBO ENDO W/

## (undated) DEVICE — DILATOR VES COONS .038X16X20CM

## (undated) DEVICE — SHEATH INTRODUCER 5FR 10CM

## (undated) DEVICE — GUIDEWIRE NITINOL

## (undated) DEVICE — CATH MPA2 INFINITI 4FR 100CM

## (undated) DEVICE — STOPCOCK 3-WAY

## (undated) DEVICE — BASIN EMESIS GRAPHITE 500ML

## (undated) DEVICE — CONNECTOR TORRENT SCP OLYMPUS

## (undated) DEVICE — GUIDEWIRE SUPRA CORE 035 190CM

## (undated) DEVICE — KIT MICROINTRO 4F .018X40X7CM

## (undated) DEVICE — CATH 5FR BALLOON PT HEART PACE

## (undated) DEVICE — TRAY CATH LAB OMC

## (undated) DEVICE — CATH ALII 4FR INFINITY

## (undated) DEVICE — GUIDEWIRE X SPORT .014IN 190CM

## (undated) DEVICE — SHEATH PINNACLE 8FR

## (undated) DEVICE — SEE MEDLINE ITEM 107746

## (undated) DEVICE — INTRO FAST-CATH SL1 8.5FR 63CM

## (undated) DEVICE — CATH IMA INFINITI 4FRX100CM

## (undated) DEVICE — ELECTRODE FOAM 535 TEARDROP

## (undated) DEVICE — GUIDEWIRE STF .035X260CM STR

## (undated) DEVICE — EGD BX FORCEP

## (undated) DEVICE — ADAPTER PACING CABLE

## (undated) DEVICE — CATH DXTERITY JL50 100CM 6FR

## (undated) DEVICE — CATH DXTERITY JR40 100CM 6FR

## (undated) DEVICE — GUIDEWIRE AMPLATZ .035X260

## (undated) DEVICE — CATH DXTERITY IMA 100CM 6FR

## (undated) DEVICE — TUBING OXYGEN CONNECT BUBBLE

## (undated) DEVICE — UNDERPAD DISPOSABLE 30X30IN

## (undated) DEVICE — Device

## (undated) DEVICE — LINER SUCTION CANNISTER REGUGA

## (undated) DEVICE — SHEATH INTRODUCER 8FR 11CM

## (undated) DEVICE — DILATOR VESSEL COONS 18FR 20CM

## (undated) DEVICE — SYS WATCHMAN FXD CURVE DBL US